# Patient Record
Sex: MALE | Race: WHITE | Employment: FULL TIME | URBAN - METROPOLITAN AREA
[De-identification: names, ages, dates, MRNs, and addresses within clinical notes are randomized per-mention and may not be internally consistent; named-entity substitution may affect disease eponyms.]

---

## 2017-11-01 ENCOUNTER — HOSPITAL ENCOUNTER (EMERGENCY)
Age: 27
Discharge: HOME | End: 2017-11-01
Payer: MEDICARE

## 2017-11-01 ENCOUNTER — APPOINTMENT (OUTPATIENT)
Dept: CT IMAGING | Age: 27
End: 2017-11-01
Payer: MEDICARE

## 2017-11-01 VITALS
BODY MASS INDEX: 23.7 KG/M2 | HEART RATE: 89 BPM | HEIGHT: 69 IN | WEIGHT: 160 LBS | TEMPERATURE: 98.3 F | OXYGEN SATURATION: 98 % | DIASTOLIC BLOOD PRESSURE: 69 MMHG | RESPIRATION RATE: 16 BRPM | SYSTOLIC BLOOD PRESSURE: 112 MMHG

## 2017-11-01 DIAGNOSIS — R10.9 FLANK PAIN: ICD-10-CM

## 2017-11-01 DIAGNOSIS — R39.198 DIFFICULTY URINATING: Primary | ICD-10-CM

## 2017-11-01 DIAGNOSIS — K59.00 CONSTIPATION, UNSPECIFIED CONSTIPATION TYPE: ICD-10-CM

## 2017-11-01 LAB
ALBUMIN SERPL-MCNC: 4.5 G/DL (ref 3.9–4.9)
ALP BLD-CCNC: 78 U/L (ref 35–104)
ALT SERPL-CCNC: 14 U/L (ref 0–41)
ANION GAP SERPL CALCULATED.3IONS-SCNC: 10 MEQ/L (ref 7–13)
AST SERPL-CCNC: 18 U/L (ref 0–40)
BACTERIA: NORMAL /HPF
BASOPHILS ABSOLUTE: 0.1 K/UL (ref 0–0.2)
BASOPHILS RELATIVE PERCENT: 1.1 %
BILIRUB SERPL-MCNC: 0.2 MG/DL (ref 0–1.2)
BILIRUBIN URINE: NEGATIVE
BLOOD, URINE: NEGATIVE
BUN BLDV-MCNC: 11 MG/DL (ref 6–20)
CALCIUM SERPL-MCNC: 9 MG/DL (ref 8.6–10.2)
CHLORIDE BLD-SCNC: 95 MEQ/L (ref 98–107)
CLARITY: CLEAR
CO2: 29 MEQ/L (ref 22–29)
COLOR: ABNORMAL
CREAT SERPL-MCNC: 0.7 MG/DL (ref 0.7–1.2)
EOSINOPHILS ABSOLUTE: 0.3 K/UL (ref 0–0.7)
EOSINOPHILS RELATIVE PERCENT: 3.8 %
EPITHELIAL CELLS, UA: NORMAL /HPF
GFR AFRICAN AMERICAN: >60
GFR NON-AFRICAN AMERICAN: >60
GLOBULIN: 2.2 G/DL (ref 2.3–3.5)
GLUCOSE BLD-MCNC: 88 MG/DL (ref 74–109)
GLUCOSE URINE: NEGATIVE MG/DL
HCT VFR BLD CALC: 42.3 % (ref 42–52)
HEMOGLOBIN: 14 G/DL (ref 14–18)
KETONES, URINE: NEGATIVE MG/DL
LEUKOCYTE ESTERASE, URINE: NEGATIVE
LYMPHOCYTES ABSOLUTE: 2 K/UL (ref 1–4.8)
LYMPHOCYTES RELATIVE PERCENT: 29.1 %
MCH RBC QN AUTO: 28.6 PG (ref 27–31.3)
MCHC RBC AUTO-ENTMCNC: 33.2 % (ref 33–37)
MCV RBC AUTO: 86.3 FL (ref 80–100)
MONOCYTES ABSOLUTE: 0.4 K/UL (ref 0.2–0.8)
MONOCYTES RELATIVE PERCENT: 5.6 %
NEUTROPHILS ABSOLUTE: 4.1 K/UL (ref 1.4–6.5)
NEUTROPHILS RELATIVE PERCENT: 60.4 %
NITRITE, URINE: POSITIVE
PDW BLD-RTO: 12.5 % (ref 11.5–14.5)
PH UA: 7 (ref 5–9)
PLATELET # BLD: 229 K/UL (ref 130–400)
POTASSIUM SERPL-SCNC: 4.3 MEQ/L (ref 3.5–5.1)
PROTEIN UA: NEGATIVE MG/DL
RBC # BLD: 4.9 M/UL (ref 4.7–6.1)
RBC UA: NORMAL /HPF (ref 0–2)
SODIUM BLD-SCNC: 134 MEQ/L (ref 132–144)
SPECIFIC GRAVITY UA: 1.01 (ref 1–1.03)
TOTAL PROTEIN: 6.7 G/DL (ref 6.4–8.1)
UROBILINOGEN, URINE: 1 E.U./DL
WBC # BLD: 6.9 K/UL (ref 4.8–10.8)
WBC UA: NORMAL /HPF (ref 0–5)

## 2017-11-01 PROCEDURE — 36415 COLL VENOUS BLD VENIPUNCTURE: CPT

## 2017-11-01 PROCEDURE — 80053 COMPREHEN METABOLIC PANEL: CPT

## 2017-11-01 PROCEDURE — 87491 CHLMYD TRACH DNA AMP PROBE: CPT

## 2017-11-01 PROCEDURE — 99283 EMERGENCY DEPT VISIT LOW MDM: CPT

## 2017-11-01 PROCEDURE — 81001 URINALYSIS AUTO W/SCOPE: CPT

## 2017-11-01 PROCEDURE — 74150 CT ABDOMEN W/O CONTRAST: CPT

## 2017-11-01 PROCEDURE — 85025 COMPLETE CBC W/AUTO DIFF WBC: CPT

## 2017-11-01 PROCEDURE — 87591 N.GONORRHOEAE DNA AMP PROB: CPT

## 2017-11-01 RX ORDER — CIPROFLOXACIN 500 MG/1
500 TABLET, FILM COATED ORAL 2 TIMES DAILY
Qty: 20 TABLET | Refills: 0 | Status: SHIPPED | OUTPATIENT
Start: 2017-11-01 | End: 2017-11-11

## 2017-11-01 RX ORDER — DESVENLAFAXINE 50 MG/1
50 TABLET, EXTENDED RELEASE ORAL DAILY
COMMUNITY
End: 2018-01-23

## 2017-11-01 RX ORDER — PHENAZOPYRIDINE HYDROCHLORIDE 100 MG/1
100 TABLET, FILM COATED ORAL 3 TIMES DAILY PRN
COMMUNITY
End: 2018-01-23 | Stop reason: ALTCHOICE

## 2017-11-01 RX ORDER — DOCUSATE SODIUM 100 MG/1
100 CAPSULE, LIQUID FILLED ORAL 2 TIMES DAILY
Qty: 14 CAPSULE | Refills: 0 | Status: SHIPPED | OUTPATIENT
Start: 2017-11-01 | End: 2018-01-23 | Stop reason: ALTCHOICE

## 2017-11-01 ASSESSMENT — ENCOUNTER SYMPTOMS
GASTROINTESTINAL NEGATIVE: 1
EYES NEGATIVE: 1
RESPIRATORY NEGATIVE: 1

## 2017-11-01 ASSESSMENT — PAIN DESCRIPTION - ORIENTATION: ORIENTATION: RIGHT;LEFT

## 2017-11-01 ASSESSMENT — PAIN DESCRIPTION - LOCATION: LOCATION: BACK;PELVIS

## 2017-11-01 ASSESSMENT — PAIN DESCRIPTION - DESCRIPTORS: DESCRIPTORS: PRESSURE

## 2017-11-01 ASSESSMENT — PAIN DESCRIPTION - FREQUENCY: FREQUENCY: INTERMITTENT

## 2017-11-01 ASSESSMENT — PAIN SCALES - GENERAL: PAINLEVEL_OUTOF10: 4

## 2017-11-01 NOTE — ED PROVIDER NOTES
alert and oriented to person, place, and time. No cranial nerve deficit. Skin: Skin is warm and dry. No rash noted. No erythema. Psychiatric: He has a normal mood and affect. Judgment normal.         All other labs were within normal range or not returned as of this dictation. EMERGENCY DEPARTMENT COURSE and DIFFERENTIAL DIAGNOSIS/MDM:   Vitals:    Vitals:    11/01/17 1755   BP: 112/69   Pulse: 89   Resp: 16   Temp: 98.3 °F (36.8 °C)   TempSrc: Oral   SpO2: 98%   Weight: 160 lb (72.6 kg)   Height: 5' 9\" (1.753 m)       ED Course      All labs are within normal limits. Urinalysis reveals no acute abnormality. Patient's urine showed nitrite positive; however, he was on a course of Pyridium which likely gave us false positive results. After a negative urine a CT scan was ordered to rule out a kidney stone. Renal CT showed no nephrolithiasis. They did mention a significant amount of constipation present. Patient is instructed to increase his water intake. Patient did mention after test results he was started on a new medication which has been causing him some constipation after the fact. Patient will be started on Colace to help with bowel movements. He is also instructed to increase his water intake. Patient will be placed on a course of Cipro to cover for any potential urinary infection. Patient's urine is cultured and family doctors is to follow up with results. He shouldn't is instructed to return immediately if any new concerning symptoms arise. He verbalizes understanding discharge and as no further questions. PROCEDURES:  Unless otherwise noted below, none     Procedures      FINAL IMPRESSION      1. Difficulty urinating    2. Constipation, unspecified constipation type    3.  Flank pain          DISPOSITION/PLAN   DISPOSITION Decision to Discharge        Bozena Etienne PA-C (electronically signed)  Attending Emergency Physician  [facility] Nicola Baez PA-C  11/01/17 [id number]

## 2017-11-07 LAB
C. TRACHOMATIS DNA ,URINE: NEGATIVE
N. GONORRHOEAE DNA, URINE: NEGATIVE

## 2018-01-23 ENCOUNTER — OFFICE VISIT (OUTPATIENT)
Dept: FAMILY MEDICINE CLINIC | Age: 28
End: 2018-01-23
Payer: MEDICARE

## 2018-01-23 VITALS
OXYGEN SATURATION: 97 % | HEIGHT: 70 IN | WEIGHT: 165 LBS | TEMPERATURE: 98.1 F | HEART RATE: 81 BPM | BODY MASS INDEX: 23.62 KG/M2 | SYSTOLIC BLOOD PRESSURE: 108 MMHG | DIASTOLIC BLOOD PRESSURE: 74 MMHG

## 2018-01-23 DIAGNOSIS — R06.02 SHORTNESS OF BREATH: ICD-10-CM

## 2018-01-23 DIAGNOSIS — F90.9 ATTENTION DEFICIT HYPERACTIVITY DISORDER (ADHD), UNSPECIFIED ADHD TYPE: Primary | ICD-10-CM

## 2018-01-23 PROCEDURE — 1036F TOBACCO NON-USER: CPT | Performed by: NURSE PRACTITIONER

## 2018-01-23 PROCEDURE — G8484 FLU IMMUNIZE NO ADMIN: HCPCS | Performed by: NURSE PRACTITIONER

## 2018-01-23 PROCEDURE — G8427 DOCREV CUR MEDS BY ELIG CLIN: HCPCS | Performed by: NURSE PRACTITIONER

## 2018-01-23 PROCEDURE — G8420 CALC BMI NORM PARAMETERS: HCPCS | Performed by: NURSE PRACTITIONER

## 2018-01-23 PROCEDURE — 99215 OFFICE O/P EST HI 40 MIN: CPT | Performed by: NURSE PRACTITIONER

## 2018-01-23 RX ORDER — CETIRIZINE HYDROCHLORIDE 10 MG/1
10 TABLET ORAL DAILY
Qty: 30 TABLET | Refills: 3 | Status: SHIPPED | OUTPATIENT
Start: 2018-01-23 | End: 2018-11-27

## 2018-01-23 RX ORDER — ALBUTEROL SULFATE 90 UG/1
2 AEROSOL, METERED RESPIRATORY (INHALATION) EVERY 6 HOURS PRN
Qty: 1 INHALER | Refills: 3 | Status: SHIPPED | OUTPATIENT
Start: 2018-01-23 | End: 2018-11-27 | Stop reason: ALTCHOICE

## 2018-01-23 RX ORDER — ALPRAZOLAM 0.25 MG/1
TABLET ORAL
Refills: 0 | COMMUNITY
Start: 2018-01-08 | End: 2018-11-27 | Stop reason: CLARIF

## 2018-01-23 ASSESSMENT — ENCOUNTER SYMPTOMS
SHORTNESS OF BREATH: 1
CHEST TIGHTNESS: 1
COUGH: 0

## 2018-01-23 ASSESSMENT — PATIENT HEALTH QUESTIONNAIRE - PHQ9
1. LITTLE INTEREST OR PLEASURE IN DOING THINGS: 0
SUM OF ALL RESPONSES TO PHQ9 QUESTIONS 1 & 2: 0
SUM OF ALL RESPONSES TO PHQ QUESTIONS 1-9: 0
2. FEELING DOWN, DEPRESSED OR HOPELESS: 0

## 2018-01-23 NOTE — PROGRESS NOTES
Subjective  Chief Complaint   Patient presents with   [address]     pt is here to est care    Other     wondering if he is developing asthma would like to talk to you about that    Discuss Medications     would like to discuss ADD medication with you. has been off of them since August.        HPI     Establish care  Presents today to establish care. Previous PCP was Dr. Troy Almendarez, Sondra Woodward and Dr. Aruna Dawson. He would also like to discuss possible asthma and adhd. Overall is doing well. Has no other questions or concerns at this time. Has been living with his girlfriend and her parents and said people smoke and \"vape\" in the house and there are also 5 cats. He has been waking up every morning feeling some shortness of breath, tightness and wheezing. Got short of breath shoveling snow for 5 minutes. This all started once he got into these living arrangements. Said he did have an inhaler when he was young but grew out of it. Says some days are worse than others. Will notice that it is worse when he is around the cigarette smoke. Pt states he has been diagnosed with ADD for over 10 years. Was on adderall and vyvanse in the past. He did not like they way those made him feel. He is already established with psychiatry. Was seeing [facility] but then wasn't able to see her so he saw Dr. Cristina Funk but says he took him off pretty much everything. Did genetic testing and says he was told that he had depression, anxiety and ADHD. Was tried on pristiq for 3 months but says it made him feel terrible. Does have documented history in his chart of issues with controlled substances including being discharged from a different psychiatry practice. Past Medical History:   Diagnosis Date    ADHD (attention deficit hyperactivity disorder)     was initiated on treatment by Dr. Kat Pelayo.   any testing was done here by Dr. Kat Pelayo, no formal psych eval.      Anxiety      Patient Active Problem List    Diagnosis

## 2018-02-05 DIAGNOSIS — R06.02 SHORTNESS OF BREATH: ICD-10-CM

## 2018-02-06 ENCOUNTER — OFFICE VISIT (OUTPATIENT)
Dept: FAMILY MEDICINE CLINIC | Age: 28
End: 2018-02-06
Payer: MEDICARE

## 2018-02-06 VITALS
HEIGHT: 70 IN | TEMPERATURE: 96.3 F | BODY MASS INDEX: 23.19 KG/M2 | WEIGHT: 162 LBS | SYSTOLIC BLOOD PRESSURE: 128 MMHG | DIASTOLIC BLOOD PRESSURE: 62 MMHG | OXYGEN SATURATION: 98 % | HEART RATE: 92 BPM

## 2018-02-06 DIAGNOSIS — J45.909 REACTIVE AIRWAY DISEASE WITHOUT COMPLICATION, UNSPECIFIED ASTHMA SEVERITY, UNSPECIFIED WHETHER PERSISTENT: ICD-10-CM

## 2018-02-06 DIAGNOSIS — F90.9 ATTENTION DEFICIT HYPERACTIVITY DISORDER (ADHD), UNSPECIFIED ADHD TYPE: Primary | ICD-10-CM

## 2018-02-06 PROCEDURE — G8420 CALC BMI NORM PARAMETERS: HCPCS | Performed by: NURSE PRACTITIONER

## 2018-02-06 PROCEDURE — G8427 DOCREV CUR MEDS BY ELIG CLIN: HCPCS | Performed by: NURSE PRACTITIONER

## 2018-02-06 PROCEDURE — G8484 FLU IMMUNIZE NO ADMIN: HCPCS | Performed by: NURSE PRACTITIONER

## 2018-02-06 PROCEDURE — 1036F TOBACCO NON-USER: CPT | Performed by: NURSE PRACTITIONER

## 2018-02-06 PROCEDURE — 99213 OFFICE O/P EST LOW 20 MIN: CPT | Performed by: NURSE PRACTITIONER

## 2018-02-06 ASSESSMENT — ENCOUNTER SYMPTOMS
SHORTNESS OF BREATH: 0
COUGH: 0

## 2018-02-06 NOTE — PROGRESS NOTES
Subjective  Chief Complaint   Patient presents with   David Scruggs     pt states that he is here to go over results and discuss ADHD. states that he did bring in a dx for that. HPI     Pt here to follow up on shortness of breath. Was started on zyrtec and albuterol inhaler. Feels somewhat better, but does still notice the shortness of breath when he is exposed to the cigarette smoke. Says when he is out of the house he feels instantly better. Also asking about ADHD. Stating he brought his paperwork with his diagnosis. Cannot get in to neurology until March. Has documented questionable use of adderall from 2 providers in this office and also documentation from Dr. Roberth Longoria saying that he was discharged from their practice d/t questionable use of the medications. Patient Active Problem List    Diagnosis Date Noted    Anxiety 07/13/2015    ADHD (attention deficit hyperactivity disorder) 12/05/2012     Past Medical History:   Diagnosis Date    ADHD (attention deficit hyperactivity disorder)     was initiated on treatment by Dr. Jacob Vigil.   any testing was done here by Dr. Jacob Vigil, no formal psych eval.      Anxiety      Past Surgical History:   Procedure Laterality Date    TONSILLECTOMY AND ADENOIDECTOMY      WISDOM TOOTH EXTRACTION       Family History   Problem Relation Age of Onset    Depression Mother     Heart Disease Father     High Blood Pressure Father     High Cholesterol Father      Social History     Social History    Marital status: Single     Spouse name: N/A    Number of children: N/A    Years of education: N/A     Social History Main Topics    Smoking status: Never Smoker    Smokeless tobacco: Never Used    Alcohol use Yes      Comment: occasional    Drug use: No    Sexual activity: Not Asked     Other Topics Concern    None     Social History Narrative    None     Current Outpatient Prescriptions on File Prior to Visit   Medication Sig Dispense Refill    ALPRAZolam (XANAX) 0.25

## 2018-02-08 LAB
ALLERGEN ASPERGILLUS ALTERNATA IGE: <0.1 KU/L
ALLERGEN ASPERGILLUS FUMIGATUS IGE: <0.1 KU/L
ALLERGEN BERMUDA GRASS IGE: <0.1 KU/L
ALLERGEN BIRCH IGE: 0.13 KU/L
ALLERGEN CAT DANDER IGE: 0.89 KU/L
ALLERGEN COMMON SHORT RAGWEED IGE: 0.38 KU/L
ALLERGEN COTTONWOOD: <0.1 KU/L
ALLERGEN COW MILK IGE: <0.1 KU/L
ALLERGEN DOG DANDER IGE: <0.1 KU/L
ALLERGEN ELM IGE: 0.27 KU/L
ALLERGEN ENGLISH PLANTAIN: 0.24 KU/L
ALLERGEN FUNGI/MOLD M.RACEMOSUS IGE: <0.1 KU/L
ALLERGEN GERMAN COCKROACH IGE: <0.1 KU/L
ALLERGEN HORMODENDRUM HORDEI IGE: <0.1 KU/L
ALLERGEN JOHNSON GRASS IGE: <0.1 KU/L
ALLERGEN MAPLE/BOX ELDER IGE: <0.1 KU/L
ALLERGEN MITE DUST FARINAE IGE: 7.4 KU/L
ALLERGEN MITE DUST PTERONYSSINUS IGE: 8.77 KU/L
ALLERGEN MOUNTAIN CEDAR: <0.1 KU/L
ALLERGEN MOUSE EPITHELIA IGE: <0.1 KU/L
ALLERGEN OAK TREE IGE: <0.1 KU/L
ALLERGEN PEANUT (F13) IGE: 0.12 KU/L
ALLERGEN PECAN TREE IGE: 0.23 KU/L
ALLERGEN PENICILLIUM NOTATUM: <0.1 KU/L
ALLERGEN PERENNIAL RYE GRASS IGE: 0.15 KU/L
ALLERGEN ROUGH PIGWEED (W14) IGE: 0.14 KU/L
ALLERGEN RUSSIAN THISTLE IGE: <0.1 KU/L
ALLERGEN SEE NOTE: NORMAL
ALLERGEN SHEEP SORREL (W18) IGE: <0.1 KU/L
ALLERGEN TIMOTHY GRASS: 0.2 KU/L
ALLERGEN TREE SYCAMORE: <0.1 KU/L
ALLERGEN WALNUT TREE IGE: 0.15 KU/L
ALLERGEN WHITE MULBERRY TREE, IGE: <0.1 KU/L
ALLERGEN, TREE, WHITE ASH IGE: <0.1 KU/L
IGE: 63 KU/L

## 2018-02-15 ENCOUNTER — TELEPHONE (OUTPATIENT)
Dept: OTHER | Facility: CLINIC | Age: 28
End: 2018-02-15

## 2018-11-14 ENCOUNTER — OFFICE VISIT (OUTPATIENT)
Dept: FAMILY MEDICINE CLINIC | Age: 28
End: 2018-11-14
Payer: MEDICARE

## 2018-11-14 VITALS
HEART RATE: 87 BPM | SYSTOLIC BLOOD PRESSURE: 100 MMHG | BODY MASS INDEX: 23.31 KG/M2 | HEIGHT: 70 IN | DIASTOLIC BLOOD PRESSURE: 75 MMHG | TEMPERATURE: 96.4 F | OXYGEN SATURATION: 98 % | WEIGHT: 162.8 LBS

## 2018-11-14 DIAGNOSIS — F90.9 ATTENTION DEFICIT HYPERACTIVITY DISORDER (ADHD), UNSPECIFIED ADHD TYPE: Primary | ICD-10-CM

## 2018-11-14 DIAGNOSIS — R30.0 DYSURIA: ICD-10-CM

## 2018-11-14 DIAGNOSIS — F41.9 ANXIETY: ICD-10-CM

## 2018-11-14 LAB
BILIRUBIN, POC: NORMAL
BLOOD URINE, POC: NORMAL
CLARITY, POC: CLEAR
COLOR, POC: NORMAL
GLUCOSE URINE, POC: NORMAL
KETONES, POC: NORMAL
LEUKOCYTE EST, POC: NORMAL
NITRITE, POC: NORMAL
PH, POC: 6
PROTEIN, POC: NORMAL
SPECIFIC GRAVITY, POC: 1.01
UROBILINOGEN, POC: 0.2

## 2018-11-14 PROCEDURE — 99214 OFFICE O/P EST MOD 30 MIN: CPT | Performed by: NURSE PRACTITIONER

## 2018-11-14 RX ORDER — BUPROPION HYDROCHLORIDE 150 MG/1
150 TABLET ORAL EVERY MORNING
Qty: 30 TABLET | Refills: 3 | Status: SHIPPED | OUTPATIENT
Start: 2018-11-14 | End: 2019-08-15 | Stop reason: ALTCHOICE

## 2018-11-14 RX ORDER — BUSPIRONE HYDROCHLORIDE 5 MG/1
5 TABLET ORAL 3 TIMES DAILY
Qty: 90 TABLET | Refills: 1 | Status: SHIPPED | OUTPATIENT
Start: 2018-11-14 | End: 2019-01-13

## 2018-11-14 ASSESSMENT — ENCOUNTER SYMPTOMS
RESPIRATORY NEGATIVE: 1
EYES NEGATIVE: 1
GASTROINTESTINAL NEGATIVE: 1

## 2018-11-14 NOTE — PROGRESS NOTES
Subjective  Chief Complaint   Patient presents with    Hand Injury     right hand broken finger in summer . ..a little sore patient doesnt have insurance    Discuss Medications     not sure if meds can be filled    Depression     patient feeling suicidal lately       HPI     Pt states that his right hand is bothering him since about July. He punched a door out of anger and it has been causing minimal pain ever since. Hasn't been doing anything for it at the moment. Doesn't affect ADLs or ROM. Pt would like to wean off controlled medications. Interested in treating the anxiety and depression with noncontrolled substances. Recently discharged from [facility] office but apparently for a bill that needed paid. Has been on probation with the courts for tampering with adderall prescription. Has occasional suicidal thoughts. Does have a plan. Denies wanting to take action on the plan. Feels he has control over the situation currently. Does not have any desire to go to [facility] psych muro\"  Pt would like a medication to help with anxiety and depression. Pt also mentions occasional lower abdominal pain associated with urination. Patient Active Problem List    Diagnosis Date Noted    Anxiety 07/13/2015    ADHD (attention deficit hyperactivity disorder) 12/05/2012     Past Medical History:   Diagnosis Date    ADHD (attention deficit hyperactivity disorder)     was initiated on treatment by Dr. Aye Kaur.   any testing was done here by Dr. Aye Kaur, no formal psych eval.      Anxiety      Past Surgical History:   Procedure Laterality Date    TONSILLECTOMY AND ADENOIDECTOMY      WISDOM TOOTH EXTRACTION       Family History   Problem Relation Age of Onset    Depression Mother     Heart Disease Father     High Blood Pressure Father     High Cholesterol Father      Social History     Social History    Marital status: Single     Spouse name: N/A    Number of children: N/A    Years of education: N/A     Social

## 2018-11-20 LAB
C. TRACHOMATIS DNA ,URINE: NEGATIVE
N. GONORRHOEAE DNA, URINE: NEGATIVE

## 2018-11-27 ENCOUNTER — OFFICE VISIT (OUTPATIENT)
Dept: FAMILY MEDICINE CLINIC | Age: 28
End: 2018-11-27
Payer: MEDICARE

## 2018-11-27 VITALS
TEMPERATURE: 96.2 F | SYSTOLIC BLOOD PRESSURE: 124 MMHG | OXYGEN SATURATION: 98 % | HEART RATE: 73 BPM | BODY MASS INDEX: 23.94 KG/M2 | WEIGHT: 167.2 LBS | HEIGHT: 70 IN | DIASTOLIC BLOOD PRESSURE: 78 MMHG

## 2018-11-27 DIAGNOSIS — F41.9 ANXIETY: ICD-10-CM

## 2018-11-27 DIAGNOSIS — F32.89 OTHER DEPRESSION: ICD-10-CM

## 2018-11-27 DIAGNOSIS — F90.9 ATTENTION DEFICIT HYPERACTIVITY DISORDER (ADHD), UNSPECIFIED ADHD TYPE: Primary | ICD-10-CM

## 2018-11-27 DIAGNOSIS — Z13.31 POSITIVE DEPRESSION SCREENING: ICD-10-CM

## 2018-11-27 PROCEDURE — G8420 CALC BMI NORM PARAMETERS: HCPCS | Performed by: NURSE PRACTITIONER

## 2018-11-27 PROCEDURE — 99213 OFFICE O/P EST LOW 20 MIN: CPT | Performed by: NURSE PRACTITIONER

## 2018-11-27 PROCEDURE — G8427 DOCREV CUR MEDS BY ELIG CLIN: HCPCS | Performed by: NURSE PRACTITIONER

## 2018-11-27 PROCEDURE — G8484 FLU IMMUNIZE NO ADMIN: HCPCS | Performed by: NURSE PRACTITIONER

## 2018-11-27 PROCEDURE — 96160 PT-FOCUSED HLTH RISK ASSMT: CPT | Performed by: NURSE PRACTITIONER

## 2018-11-27 PROCEDURE — 1036F TOBACCO NON-USER: CPT | Performed by: NURSE PRACTITIONER

## 2018-11-27 PROCEDURE — G8431 POS CLIN DEPRES SCRN F/U DOC: HCPCS | Performed by: NURSE PRACTITIONER

## 2018-11-27 RX ORDER — CLONAZEPAM 0.5 MG/1
TABLET ORAL
Refills: 1 | COMMUNITY
Start: 2018-11-21 | End: 2019-07-08

## 2018-11-27 ASSESSMENT — PATIENT HEALTH QUESTIONNAIRE - PHQ9
7. TROUBLE CONCENTRATING ON THINGS, SUCH AS READING THE NEWSPAPER OR WATCHING TELEVISION: 3
6. FEELING BAD ABOUT YOURSELF - OR THAT YOU ARE A FAILURE OR HAVE LET YOURSELF OR YOUR FAMILY DOWN: 3
1. LITTLE INTEREST OR PLEASURE IN DOING THINGS: 3
SUM OF ALL RESPONSES TO PHQ QUESTIONS 1-9: 21
4. FEELING TIRED OR HAVING LITTLE ENERGY: 3
SUM OF ALL RESPONSES TO PHQ9 QUESTIONS 1 & 2: 5
5. POOR APPETITE OR OVEREATING: 1
2. FEELING DOWN, DEPRESSED OR HOPELESS: 2
9. THOUGHTS THAT YOU WOULD BE BETTER OFF DEAD, OR OF HURTING YOURSELF: 0
8. MOVING OR SPEAKING SO SLOWLY THAT OTHER PEOPLE COULD HAVE NOTICED. OR THE OPPOSITE, BEING SO FIGETY OR RESTLESS THAT YOU HAVE BEEN MOVING AROUND A LOT MORE THAN USUAL: 3
3. TROUBLE FALLING OR STAYING ASLEEP: 3
SUM OF ALL RESPONSES TO PHQ QUESTIONS 1-9: 21
10. IF YOU CHECKED OFF ANY PROBLEMS, HOW DIFFICULT HAVE THESE PROBLEMS MADE IT FOR YOU TO DO YOUR WORK, TAKE CARE OF THINGS AT HOME, OR GET ALONG WITH OTHER PEOPLE: 3

## 2019-07-08 ENCOUNTER — HOSPITAL ENCOUNTER (EMERGENCY)
Age: 29
Discharge: HOME | End: 2019-07-08
Attending: EMERGENCY MEDICINE
Payer: COMMERCIAL

## 2019-07-08 VITALS
SYSTOLIC BLOOD PRESSURE: 120 MMHG | TEMPERATURE: 98.1 F | RESPIRATION RATE: 16 BRPM | DIASTOLIC BLOOD PRESSURE: 78 MMHG | HEART RATE: 97 BPM | OXYGEN SATURATION: 98 %

## 2019-07-08 DIAGNOSIS — Z76.0 ENCOUNTER FOR MEDICATION REFILL: Primary | ICD-10-CM

## 2019-07-08 LAB
ACETAMINOPHEN LEVEL: <5 UG/ML (ref 10–30)
ALBUMIN SERPL-MCNC: 5 G/DL (ref 3.5–4.6)
ALP BLD-CCNC: 103 U/L (ref 35–104)
ALT SERPL-CCNC: 9 U/L (ref 0–41)
ANION GAP SERPL CALCULATED.3IONS-SCNC: 13 MEQ/L (ref 9–15)
AST SERPL-CCNC: 19 U/L (ref 0–40)
BASOPHILS ABSOLUTE: 0.1 K/UL (ref 0–0.2)
BASOPHILS RELATIVE PERCENT: 1.4 %
BILIRUB SERPL-MCNC: 0.3 MG/DL (ref 0.2–0.7)
BUN BLDV-MCNC: 8 MG/DL (ref 6–20)
CALCIUM SERPL-MCNC: 9.3 MG/DL (ref 8.5–9.9)
CHLORIDE BLD-SCNC: 101 MEQ/L (ref 95–107)
CK MB: 2.6 NG/ML (ref 0–6.7)
CO2: 27 MEQ/L (ref 20–31)
CREAT SERPL-MCNC: 0.92 MG/DL (ref 0.7–1.2)
CREATINE KINASE-MB INDEX: 1.3 % (ref 0–3.5)
EOSINOPHILS ABSOLUTE: 0.2 K/UL (ref 0–0.7)
EOSINOPHILS RELATIVE PERCENT: 3.2 %
ETHANOL PERCENT: NORMAL G/DL
ETHANOL: <10 MG/DL (ref 0–0.08)
GFR AFRICAN AMERICAN: >60
GFR NON-AFRICAN AMERICAN: >60
GLOBULIN: 2.7 G/DL (ref 2.3–3.5)
GLUCOSE BLD-MCNC: 110 MG/DL (ref 70–99)
HCT VFR BLD CALC: 43.9 % (ref 42–52)
HEMOGLOBIN: 15.5 G/DL (ref 14–18)
LYMPHOCYTES ABSOLUTE: 2 K/UL (ref 1–4.8)
LYMPHOCYTES RELATIVE PERCENT: 26.4 %
MCH RBC QN AUTO: 30 PG (ref 27–31.3)
MCHC RBC AUTO-ENTMCNC: 35.2 % (ref 33–37)
MCV RBC AUTO: 85.3 FL (ref 80–100)
MONOCYTES ABSOLUTE: 0.6 K/UL (ref 0.2–0.8)
MONOCYTES RELATIVE PERCENT: 7.5 %
NEUTROPHILS ABSOLUTE: 4.7 K/UL (ref 1.4–6.5)
NEUTROPHILS RELATIVE PERCENT: 61.5 %
PDW BLD-RTO: 12.5 % (ref 11.5–14.5)
PLATELET # BLD: 305 K/UL (ref 130–400)
POTASSIUM SERPL-SCNC: 3.9 MEQ/L (ref 3.4–4.9)
RBC # BLD: 5.15 M/UL (ref 4.7–6.1)
SALICYLATE, SERUM: <0.3 MG/DL (ref 15–30)
SODIUM BLD-SCNC: 141 MEQ/L (ref 135–144)
TOTAL CK: 207 U/L (ref 0–190)
TOTAL PROTEIN: 7.7 G/DL (ref 6.3–8)
TSH SERPL DL<=0.05 MIU/L-ACNC: 0.94 UIU/ML (ref 0.44–3.86)
WBC # BLD: 7.7 K/UL (ref 4.8–10.8)

## 2019-07-08 PROCEDURE — 99282 EMERGENCY DEPT VISIT SF MDM: CPT

## 2019-07-08 PROCEDURE — 80053 COMPREHEN METABOLIC PANEL: CPT

## 2019-07-08 PROCEDURE — G0480 DRUG TEST DEF 1-7 CLASSES: HCPCS

## 2019-07-08 PROCEDURE — 82553 CREATINE MB FRACTION: CPT

## 2019-07-08 PROCEDURE — 36415 COLL VENOUS BLD VENIPUNCTURE: CPT

## 2019-07-08 PROCEDURE — 85025 COMPLETE CBC W/AUTO DIFF WBC: CPT

## 2019-07-08 PROCEDURE — 82550 ASSAY OF CK (CPK): CPT

## 2019-07-08 PROCEDURE — 84443 ASSAY THYROID STIM HORMONE: CPT

## 2019-07-08 RX ORDER — CLONAZEPAM 0.5 MG/1
0.5 TABLET ORAL 2 TIMES DAILY PRN
Qty: 6 TABLET | Refills: 0 | Status: ON HOLD | OUTPATIENT
Start: 2019-07-08 | End: 2022-07-20 | Stop reason: HOSPADM

## 2019-07-09 NOTE — ED NOTES
Patient up to nursing office stating \" I just want to leave and go to work\" Explained to patient he would have to collin to be evaluated by RN and Doctor would make the final decision. Patient states \" I don't feel I belong here, its creeping me out\". Will continue to monitor.      Vik Wang, MONICA  42/32/74 [id number]

## 2019-08-13 PROBLEM — G47.26 SHIFT WORK SLEEP DISORDER: Status: ACTIVE | Noted: 2019-07-15

## 2019-08-13 PROBLEM — F41.1 GAD (GENERALIZED ANXIETY DISORDER): Status: ACTIVE | Noted: 2019-06-04

## 2019-08-13 PROBLEM — F33.1 MDD (MAJOR DEPRESSIVE DISORDER), RECURRENT EPISODE, MODERATE (HCC): Status: ACTIVE | Noted: 2019-06-04

## 2019-08-13 PROBLEM — F19.20 POLYSUBSTANCE (EXCLUDING OPIOIDS) DEPENDENCE (HCC): Status: ACTIVE | Noted: 2019-06-04

## 2020-07-16 ENCOUNTER — APPOINTMENT (OUTPATIENT)
Dept: CT IMAGING | Age: 30
End: 2020-07-16
Payer: COMMERCIAL

## 2020-07-16 ENCOUNTER — HOSPITAL ENCOUNTER (EMERGENCY)
Age: 30
Discharge: HOME | End: 2020-07-16
Attending: EMERGENCY MEDICINE
Payer: COMMERCIAL

## 2020-07-16 VITALS
OXYGEN SATURATION: 100 % | SYSTOLIC BLOOD PRESSURE: 113 MMHG | TEMPERATURE: 97.9 F | HEART RATE: 81 BPM | BODY MASS INDEX: 22.96 KG/M2 | DIASTOLIC BLOOD PRESSURE: 71 MMHG | RESPIRATION RATE: 16 BRPM | WEIGHT: 160 LBS

## 2020-07-16 LAB
ALBUMIN SERPL-MCNC: 4.9 G/DL (ref 3.5–4.6)
ALP BLD-CCNC: 87 U/L (ref 35–104)
ALT SERPL-CCNC: 16 U/L (ref 0–41)
ANION GAP SERPL CALCULATED.3IONS-SCNC: 12 MEQ/L (ref 9–15)
AST SERPL-CCNC: 25 U/L (ref 0–40)
BASOPHILS ABSOLUTE: 0.1 K/UL (ref 0–0.2)
BASOPHILS RELATIVE PERCENT: 0.8 %
BILIRUB SERPL-MCNC: <0.2 MG/DL (ref 0.2–0.7)
BUN BLDV-MCNC: 17 MG/DL (ref 6–20)
CALCIUM SERPL-MCNC: 9.8 MG/DL (ref 8.5–9.9)
CHLORIDE BLD-SCNC: 102 MEQ/L (ref 95–107)
CO2: 26 MEQ/L (ref 20–31)
CREAT SERPL-MCNC: 0.86 MG/DL (ref 0.7–1.2)
EOSINOPHILS ABSOLUTE: 0.2 K/UL (ref 0–0.7)
EOSINOPHILS RELATIVE PERCENT: 2.1 %
GFR AFRICAN AMERICAN: >60
GFR NON-AFRICAN AMERICAN: >60
GLOBULIN: 2.2 G/DL (ref 2.3–3.5)
GLUCOSE BLD-MCNC: 94 MG/DL (ref 70–99)
HCT VFR BLD CALC: 40.4 % (ref 42–52)
HEMOGLOBIN: 13.9 G/DL (ref 14–18)
LYMPHOCYTES ABSOLUTE: 2.7 K/UL (ref 1–4.8)
LYMPHOCYTES RELATIVE PERCENT: 30.6 %
MCH RBC QN AUTO: 30.4 PG (ref 27–31.3)
MCHC RBC AUTO-ENTMCNC: 34.5 % (ref 33–37)
MCV RBC AUTO: 88 FL (ref 80–100)
MONOCYTES ABSOLUTE: 0.6 K/UL (ref 0.2–0.8)
MONOCYTES RELATIVE PERCENT: 6.8 %
NEUTROPHILS ABSOLUTE: 5.2 K/UL (ref 1.4–6.5)
NEUTROPHILS RELATIVE PERCENT: 59.7 %
PDW BLD-RTO: 12.2 % (ref 11.5–14.5)
PLATELET # BLD: 279 K/UL (ref 130–400)
POTASSIUM SERPL-SCNC: 4 MEQ/L (ref 3.4–4.9)
RBC # BLD: 4.59 M/UL (ref 4.7–6.1)
SODIUM BLD-SCNC: 140 MEQ/L (ref 135–144)
TOTAL PROTEIN: 7.1 G/DL (ref 6.3–8)
WBC # BLD: 8.7 K/UL (ref 4.8–10.8)

## 2020-07-16 PROCEDURE — 6360000002 HC RX W HCPCS: Performed by: EMERGENCY MEDICINE

## 2020-07-16 PROCEDURE — 6370000000 HC RX 637 (ALT 250 FOR IP): Performed by: EMERGENCY MEDICINE

## 2020-07-16 PROCEDURE — 36415 COLL VENOUS BLD VENIPUNCTURE: CPT

## 2020-07-16 PROCEDURE — 96374 THER/PROPH/DIAG INJ IV PUSH: CPT

## 2020-07-16 PROCEDURE — 74177 CT ABD & PELVIS W/CONTRAST: CPT

## 2020-07-16 PROCEDURE — 99284 EMERGENCY DEPT VISIT MOD MDM: CPT

## 2020-07-16 PROCEDURE — 80053 COMPREHEN METABOLIC PANEL: CPT

## 2020-07-16 PROCEDURE — 6360000004 HC RX CONTRAST MEDICATION: Performed by: EMERGENCY MEDICINE

## 2020-07-16 PROCEDURE — 85025 COMPLETE CBC W/AUTO DIFF WBC: CPT

## 2020-07-16 RX ORDER — KETOROLAC TROMETHAMINE 30 MG/ML
30 INJECTION, SOLUTION INTRAMUSCULAR; INTRAVENOUS ONCE
Status: COMPLETED | OUTPATIENT
Start: 2020-07-16 | End: 2020-07-16

## 2020-07-16 RX ORDER — ESCITALOPRAM OXALATE 10 MG/1
15 TABLET ORAL DAILY
Status: ON HOLD | COMMUNITY
End: 2022-07-20 | Stop reason: HOSPADM

## 2020-07-16 RX ADMIN — MAGNESIUM CITRATE 296 ML: 1.75 LIQUID ORAL at 23:18

## 2020-07-16 RX ADMIN — IOPAMIDOL 100 ML: 612 INJECTION, SOLUTION INTRAVENOUS at 22:43

## 2020-07-16 RX ADMIN — KETOROLAC TROMETHAMINE 30 MG: 30 INJECTION, SOLUTION INTRAMUSCULAR at 21:45

## 2020-07-16 ASSESSMENT — PAIN SCALES - GENERAL
PAINLEVEL_OUTOF10: 5

## 2020-07-16 ASSESSMENT — PAIN DESCRIPTION - PAIN TYPE
TYPE: ACUTE PAIN
TYPE: ACUTE PAIN

## 2020-07-16 ASSESSMENT — PAIN DESCRIPTION - DESCRIPTORS: DESCRIPTORS: DISCOMFORT

## 2020-07-16 ASSESSMENT — ENCOUNTER SYMPTOMS
COUGH: 0
VOMITING: 0
ABDOMINAL DISTENTION: 1
WHEEZING: 0
SORE THROAT: 0
CHEST TIGHTNESS: 0
PHOTOPHOBIA: 0
ABDOMINAL PAIN: 1
EYE DISCHARGE: 0
CONSTIPATION: 1
SHORTNESS OF BREATH: 0

## 2020-07-16 ASSESSMENT — PAIN DESCRIPTION - LOCATION
LOCATION: ABDOMEN
LOCATION: ABDOMEN

## 2020-07-16 ASSESSMENT — PAIN SCALES - WONG BAKER: WONGBAKER_NUMERICALRESPONSE: 0

## 2020-07-16 ASSESSMENT — PAIN DESCRIPTION - FREQUENCY: FREQUENCY: CONTINUOUS

## 2020-07-16 ASSESSMENT — PAIN DESCRIPTION - ORIENTATION: ORIENTATION: LEFT;UPPER

## 2020-07-17 LAB
GFR AFRICAN AMERICAN: >60
GFR NON-AFRICAN AMERICAN: >60
PERFORMED ON: NORMAL
POC CREATININE: 0.9 MG/DL (ref 0.9–1.3)
POC SAMPLE TYPE: NORMAL

## 2020-07-17 NOTE — ED PROVIDER NOTES
[address] [facility] ED  eMERGENCY dEPARTMENT eNCOUnter      Pt Name: Kwasi Ibarra  MRN: [id number]  [place] 1990  Date of evaluation: 7/16/2020  Provider: Patti Bello MD    CHIEF COMPLAINT       Chief Complaint   Patient presents with    Abdominal Pain         HISTORY OF PRESENT ILLNESS   (Location/Symptom, Timing/Onset,Context/Setting, Quality, Duration, Modifying Factors, Severity)  Note limiting factors. Kwasi Ibarra is a 30 y.o. male who presents to the emergency department for evaluation of abdominal pain possible bowel obstruction. Patient states he has been constipated for the past couple weeks and now feels distended thinks may have a bowel obstruction. No prior abdominal surgeries. He states at work he is unable to use the bathroom and that has led to some constipation issues. He tried enema at home x2 without relief. He now is getting nauseated and feels bloated. No related fever chills. He is on Lexapro and Vyvanse but is been on those for a while. No fever chills. No urinary complaints. Minimal abdominal pain. HPI    NursingNotes were reviewed. REVIEW OF SYSTEMS    (2-9 systems for level 4, 10 or more for level 5)     Review of Systems   Constitutional: Negative for chills and diaphoresis. HENT: Negative for congestion, ear pain, mouth sores and sore throat. Eyes: Negative for photophobia and discharge. Respiratory: Negative for cough, chest tightness, shortness of breath and wheezing. Cardiovascular: Negative for chest pain and palpitations. Gastrointestinal: Positive for abdominal distention, abdominal pain and constipation. Negative for vomiting. Endocrine: Negative for cold intolerance. Genitourinary: Negative for difficulty urinating. Musculoskeletal: Negative for arthralgias. Skin: Negative for pallor and rash. Allergic/Immunologic: Negative for immunocompromised state. Neurological: Negative for dizziness and syncope. Hematological: Negative for adenopathy. Psychiatric/Behavioral: Negative for agitation and hallucinations. All other systems reviewed and are negative. Except as noted above the remainder of the review of systems was reviewed and negative. PAST MEDICAL HISTORY     Past Medical History:   Diagnosis Date    ADHD (attention deficit hyperactivity disorder)     was initiated on treatment by Dr. Darcy Patel. any testing was done here by Dr. Darcy Patel, no formal psych eval.      Anxiety     MDD (major depressive disorder), recurrent episode, moderate ([facility] [address] 6/4/2019         SURGICALHISTORY       Past Surgical History:   Procedure Laterality Date    TONSILLECTOMY AND ADENOIDECTOMY      WISDOM TOOTH EXTRACTION           CURRENT MEDICATIONS       Previous Medications    CLONAZEPAM (KLONOPIN) 0.5 MG TABLET    Take 1 tablet by mouth 2 times daily as needed for Anxiety for up to 3 days. ESCITALOPRAM (LEXAPRO) 10 MG TABLET    Take 15 mg by mouth daily    LISDEXAMFETAMINE (VYVANSE) 70 MG CAPSULE    Take 70 mg by mouth every morning.        ALLERGIES     Peanut-containing drug products and Peanut oil    FAMILY HISTORY       Family History   Problem Relation Age of Onset    Depression Mother     Heart Disease Father     High Blood Pressure Father     High Cholesterol Father           SOCIAL HISTORY       Social History     Socioeconomic History    Marital status: Single     Spouse name: None    Number of children: None    Years of education: None    Highest education level: None   Occupational History    None   Social Needs    Financial resource strain: None    Food insecurity     Worry: None     Inability: None    Transportation needs     Medical: None     Non-medical: None   Tobacco Use    Smoking status: Never Smoker    Smokeless tobacco: Never Used   Substance and Sexual Activity    Alcohol use: Yes     Comment: occasional    Drug use: No    Sexual activity: None   Lifestyle    Physical activity Days per week: None     Minutes per session: None    Stress: None   Relationships    Social connections     Talks on phone: None     Gets together: None     Attends Orthodox service: None     Active member of club or organization: None     Attends meetings of clubs or organizations: None     Relationship status: None    Intimate partner violence     Fear of current or ex partner: None     Emotionally abused: None     Physically abused: None     Forced sexual activity: None   Other Topics Concern    None   Social History Narrative    None       SCREENINGS      @FLOW([id number])@      PHYSICAL EXAM    (up to 7 for level 4, 8 or more for level 5)     ED Triage Vitals [07/16/20 2115]   BP Temp Temp Source Pulse Resp SpO2 Height Weight   135/79 97.9 °F (36.6 °C) Oral 93 18 100 % -- 160 lb (72.6 kg)       Physical Exam  Vitals signs and nursing note reviewed. Constitutional:       Appearance: He is well-developed. HENT:      Head: Normocephalic. Right Ear: Tympanic membrane normal.      Left Ear: Tympanic membrane normal.      Nose: Nose normal.      Mouth/Throat:      Mouth: Mucous membranes are moist.   Eyes:      Conjunctiva/sclera: Conjunctivae normal.      Pupils: Pupils are equal, round, and reactive to light. Neck:      Musculoskeletal: Normal range of motion and neck supple. Cardiovascular:      Rate and Rhythm: Normal rate and regular rhythm. Heart sounds: Normal heart sounds. Pulmonary:      Effort: Pulmonary effort is normal.      Breath sounds: Normal breath sounds. Abdominal:      General: Bowel sounds are normal. There is distension. Palpations: Abdomen is soft. Tenderness: There is no abdominal tenderness. There is no guarding. Musculoskeletal: Normal range of motion. Skin:     General: Skin is warm and dry. Capillary Refill: Capillary refill takes less than 2 seconds. Neurological:      Mental Status: He is alert and oriented to person, place, and time. DIAGNOSTIC RESULTS     EKG: All EKG's are interpreted by the Emergency Department Physician who either signs or Co-signsthis chart in the absence of a cardiologist.      RADIOLOGY:   Corean Polio such as CT, Ultrasound and MRI are read by the radiologist. Liz Carissa radiographic images are visualized and preliminarily interpreted by the emergency physician with the below findings:      Interpretation per the Radiologist below, if available at the time ofthis note:    CT ABDOMEN PELVIS W IV CONTRAST Additional Contrast? None    (Results Pending)         ED BEDSIDE ULTRASOUND:   Performed by ED Physician - none    LABS:  Labs Reviewed   COMPREHENSIVE METABOLIC PANEL - Abnormal; Notable for the following components:       Result Value    Alb 4.9 (*)     Globulin 2.2 (*)     All other components within normal limits   CBC WITH AUTO DIFFERENTIAL - Abnormal; Notable for the following components:    RBC 4.59 (*)     Hemoglobin 13.9 (*)     Hematocrit 40.4 (*)     All other components within normal limits       All other labs were within normal range or not returned as of this dictation. EMERGENCY DEPARTMENT COURSE and DIFFERENTIAL DIAGNOSIS/MDM:   Vitals:    Vitals:    07/16/20 2115 07/16/20 2200 07/16/20 2230   BP: 135/79 114/74 115/72   Pulse: 93     Resp: 18     Temp: 97.9 °F (36.6 °C)     TempSrc: Oral     SpO2: 100% 99% 99%   Weight: 160 lb (72.6 kg)          MDM patient CT scan is unremarkable for any acute pathology. Clinically he has constipation. He is treated with mag citrate and other recommendations for home. Return here for fever or worsening symptoms. CONSULTS:  None    PROCEDURES:  Unless otherwise noted below, none     Procedures    FINAL IMPRESSION      1.  Constipation, unspecified constipation type          DISPOSITION/PLAN   DISPOSITION Decision To Discharge 07/16/2020 11:11:20 PM      PATIENT REFERRED TO:  YING Navarro - CNP  [address] [address], [address]  [facility]

## 2020-07-17 NOTE — ED TRIAGE NOTES
Pt presents from home with c/o abd pain. Onset x1 week. Pt a&o x4. resp even. Skin p/w/d. Pt afebrile. Pt reports pain to generalized abd, greater to LUQ. abd distended. Bowel sounds hypoactive. Last bm x1 week. Pt reports nausea, denies emesis.

## 2021-05-25 ENCOUNTER — HOSPITAL ENCOUNTER (EMERGENCY)
Age: 31
Discharge: HOME | End: 2021-05-25
Attending: EMERGENCY MEDICINE
Payer: COMMERCIAL

## 2021-05-25 ENCOUNTER — APPOINTMENT (OUTPATIENT)
Dept: CT IMAGING | Age: 31
End: 2021-05-25
Payer: COMMERCIAL

## 2021-05-25 VITALS
BODY MASS INDEX: 23.62 KG/M2 | RESPIRATION RATE: 16 BRPM | SYSTOLIC BLOOD PRESSURE: 121 MMHG | TEMPERATURE: 98.1 F | WEIGHT: 165 LBS | HEIGHT: 70 IN | HEART RATE: 111 BPM | DIASTOLIC BLOOD PRESSURE: 85 MMHG | OXYGEN SATURATION: 98 %

## 2021-05-25 DIAGNOSIS — R10.30 LOWER ABDOMINAL PAIN: Primary | ICD-10-CM

## 2021-05-25 LAB
ALBUMIN SERPL-MCNC: 4.7 G/DL (ref 3.5–4.6)
ALP BLD-CCNC: 110 U/L (ref 35–104)
ALT SERPL-CCNC: 15 U/L (ref 0–41)
ANION GAP SERPL CALCULATED.3IONS-SCNC: 8 MEQ/L (ref 9–15)
AST SERPL-CCNC: 19 U/L (ref 0–40)
BASOPHILS ABSOLUTE: 0.1 K/UL (ref 0–0.2)
BASOPHILS RELATIVE PERCENT: 0.6 %
BILIRUB SERPL-MCNC: 0.3 MG/DL (ref 0.2–0.7)
BILIRUBIN URINE: NEGATIVE
BLOOD, URINE: NEGATIVE
BUN BLDV-MCNC: 17 MG/DL (ref 6–20)
C-REACTIVE PROTEIN: 0.9 MG/L (ref 0–5)
CALCIUM SERPL-MCNC: 9 MG/DL (ref 8.5–9.9)
CHLORIDE BLD-SCNC: 102 MEQ/L (ref 95–107)
CLARITY: CLEAR
CO2: 29 MEQ/L (ref 20–31)
COLOR: YELLOW
CREAT SERPL-MCNC: 0.97 MG/DL (ref 0.7–1.2)
EOSINOPHILS ABSOLUTE: 0.2 K/UL (ref 0–0.7)
EOSINOPHILS RELATIVE PERCENT: 1.9 %
GFR AFRICAN AMERICAN: >60
GFR NON-AFRICAN AMERICAN: >60
GLOBULIN: 2.2 G/DL (ref 2.3–3.5)
GLUCOSE BLD-MCNC: 96 MG/DL (ref 70–99)
GLUCOSE URINE: NEGATIVE MG/DL
HCT VFR BLD CALC: 42.4 % (ref 42–52)
HEMOGLOBIN: 14.7 G/DL (ref 14–18)
KETONES, URINE: NEGATIVE MG/DL
LEUKOCYTE ESTERASE, URINE: NEGATIVE
LIPASE: 43 U/L (ref 12–95)
LYMPHOCYTES ABSOLUTE: 1.8 K/UL (ref 1–4.8)
LYMPHOCYTES RELATIVE PERCENT: 21.9 %
MCH RBC QN AUTO: 30 PG (ref 27–31.3)
MCHC RBC AUTO-ENTMCNC: 34.8 % (ref 33–37)
MCV RBC AUTO: 86.3 FL (ref 80–100)
MONOCYTES ABSOLUTE: 0.4 K/UL (ref 0.2–0.8)
MONOCYTES RELATIVE PERCENT: 4.2 %
NEUTROPHILS ABSOLUTE: 5.9 K/UL (ref 1.4–6.5)
NEUTROPHILS RELATIVE PERCENT: 71.4 %
NITRITE, URINE: NEGATIVE
PDW BLD-RTO: 12.2 % (ref 11.5–14.5)
PH UA: 7.5 (ref 5–9)
PLATELET # BLD: 263 K/UL (ref 130–400)
POTASSIUM SERPL-SCNC: 4.1 MEQ/L (ref 3.4–4.9)
PROTEIN UA: NEGATIVE MG/DL
RBC # BLD: 4.91 M/UL (ref 4.7–6.1)
SEDIMENTATION RATE, ERYTHROCYTE: 2 MM (ref 0–10)
SODIUM BLD-SCNC: 139 MEQ/L (ref 135–144)
SPECIFIC GRAVITY UA: 1.02 (ref 1–1.03)
TOTAL PROTEIN: 6.9 G/DL (ref 6.3–8)
URINE REFLEX TO CULTURE: NORMAL
UROBILINOGEN, URINE: 1 E.U./DL
WBC # BLD: 8.3 K/UL (ref 4.8–10.8)

## 2021-05-25 PROCEDURE — 85025 COMPLETE CBC W/AUTO DIFF WBC: CPT

## 2021-05-25 PROCEDURE — 81003 URINALYSIS AUTO W/O SCOPE: CPT

## 2021-05-25 PROCEDURE — 85652 RBC SED RATE AUTOMATED: CPT

## 2021-05-25 PROCEDURE — 80053 COMPREHEN METABOLIC PANEL: CPT

## 2021-05-25 PROCEDURE — 36415 COLL VENOUS BLD VENIPUNCTURE: CPT

## 2021-05-25 PROCEDURE — 74177 CT ABD & PELVIS W/CONTRAST: CPT

## 2021-05-25 PROCEDURE — 83690 ASSAY OF LIPASE: CPT

## 2021-05-25 PROCEDURE — 86140 C-REACTIVE PROTEIN: CPT

## 2021-05-25 PROCEDURE — 99283 EMERGENCY DEPT VISIT LOW MDM: CPT

## 2021-05-25 PROCEDURE — 6360000004 HC RX CONTRAST MEDICATION: Performed by: EMERGENCY MEDICINE

## 2021-05-25 RX ORDER — 0.9 % SODIUM CHLORIDE 0.9 %
1000 INTRAVENOUS SOLUTION INTRAVENOUS ONCE
Status: DISCONTINUED | OUTPATIENT
Start: 2021-05-25 | End: 2021-05-26 | Stop reason: HOSPADM

## 2021-05-25 RX ADMIN — IOPAMIDOL 100 ML: 755 INJECTION, SOLUTION INTRAVENOUS at 15:18

## 2021-05-25 ASSESSMENT — PAIN SCALES - GENERAL: PAINLEVEL_OUTOF10: 3

## 2021-05-25 ASSESSMENT — ENCOUNTER SYMPTOMS
RESPIRATORY NEGATIVE: 1
ALLERGIC/IMMUNOLOGIC NEGATIVE: 1
EYES NEGATIVE: 1
ABDOMINAL PAIN: 1

## 2021-05-25 NOTE — ED PROVIDER NOTES
[facility] ED  eMERGENCY dEPARTMENT eNCOUnter      Pt Name: Hal Arzate  MRN: [id number]  [place] 1990  Date of evaluation: 5/25/2021  Provider: Sharifa Marx MD    CHIEF COMPLAINT       Chief Complaint   Patient presents with    Abdominal Pain         HISTORY OF PRESENT ILLNESS   (Location/Symptom, Timing/Onset,Context/Setting, Quality, Duration, Modifying Factors, Severity)  Note limiting factors. Hal Arztae is a 26 y.o. male who presents to the emergency department with lower abdominal pain that has been off and on for over a year. Patient has been to the emergency department before and diagnosed with constipation according to the patient. Pain now is consistent and worse over the last several days. No fever or chills no nausea vomiting or diarrhea no headache blurry double vision. Pain is worse with palpation better with rest does not radiate. Currently the pain is mild it is a 3/10. Dull and achy in nature. No other complaints or concerns. HPI    NursingNotes were reviewed. REVIEW OF SYSTEMS    (2-9 systems for level 4, 10 or more for level 5)     Review of Systems   Constitutional: Negative. HENT: Negative. Eyes: Negative. Respiratory: Negative. Cardiovascular: Negative. Gastrointestinal: Positive for abdominal pain. Endocrine: Negative. Genitourinary: Negative. Musculoskeletal: Negative. Skin: Negative. Allergic/Immunologic: Negative. Neurological: Negative. Hematological: Negative. Psychiatric/Behavioral: Negative. Except as noted above the remainder of the review of systems was reviewed and negative. PAST MEDICAL HISTORY     Past Medical History:   Diagnosis Date    ADHD (attention deficit hyperactivity disorder)     was initiated on treatment by Dr. Isreal Vargas.   any testing was done here by Dr. Isreal Vargas, no formal psych eval.      Anxiety     MDD (major depressive disorder), recurrent episode, moderate ([facility] [address] 6/4/2019         SURGICALHISTORY       Past Surgical History:   Procedure Laterality Date    TONSILLECTOMY AND ADENOIDECTOMY      WISDOM TOOTH EXTRACTION           CURRENT MEDICATIONS       Current Discharge Medication List      CONTINUE these medications which have NOT CHANGED    Details   escitalopram (LEXAPRO) 10 MG tablet Take 15 mg by mouth daily      lisdexamfetamine (VYVANSE) 70 MG capsule Take 70 mg by mouth every morning. clonazePAM (KLONOPIN) 0.5 MG tablet Take 1 tablet by mouth 2 times daily as needed for Anxiety for up to 3 days. Qty: 6 tablet, Refills: 0    Associated Diagnoses: Encounter for medication refill             ALLERGIES     Peanut-containing drug products and Peanut oil    FAMILY HISTORY       Family History   Problem Relation Age of Onset    Depression Mother     Heart Disease Father     High Blood Pressure Father     High Cholesterol Father           SOCIAL HISTORY       Social History     Socioeconomic History    Marital status: Single     Spouse name: None    Number of children: None    Years of education: None    Highest education level: None   Occupational History    None   Tobacco Use    Smoking status: Never Smoker    Smokeless tobacco: Never Used   Substance and Sexual Activity    Alcohol use: Yes     Comment: occasional    Drug use: No    Sexual activity: None   Other Topics Concern    None   Social History Narrative    None     Social Determinants of Health     Financial Resource Strain:     Difficulty of Paying Living Expenses:    Food Insecurity:     Worried About Running Out of Food in the Last Year:     Ran Out of Food in the Last Year:    Transportation Needs:     Lack of Transportation (Medical):      Lack of Transportation (Non-Medical):    Physical Activity:     Days of Exercise per Week:     Minutes of Exercise per Session:    Stress:     Feeling of Stress :    Social Connections:     Frequency of Communication with Friends and Family:     Frequency of Social Gatherings with Friends and Family:     Attends Zoroastrianism Services:     Active Member of Clubs or Organizations:     Attends Club or Organization Meetings:     Marital Status:    Intimate Partner Violence:     Fear of Current or Ex-Partner:     Emotionally Abused:     Physically Abused:     Sexually Abused:        SCREENINGS      @FLOW([id number])@      PHYSICAL EXAM    (up to 7 for level 4, 8 or more for level 5)     ED Triage Vitals [05/25/21 1317]   BP Temp Temp Source Pulse Resp SpO2 Height Weight   121/85 98.1 °F (36.7 °C) Oral 111 16 98 % 5' 10\" (1.778 m) 165 lb (74.8 kg)       Physical Exam  HENT:      Head: Normocephalic. Eyes:      Extraocular Movements: Extraocular movements intact. Pupils: Pupils are equal, round, and reactive to light. Cardiovascular:      Rate and Rhythm: Regular rhythm. Heart sounds: Normal heart sounds. Pulmonary:      Effort: Pulmonary effort is normal.      Breath sounds: Normal breath sounds. Abdominal:      General: Abdomen is flat. Bowel sounds are normal.      Palpations: Abdomen is soft. Tenderness: There is abdominal tenderness in the right lower quadrant and left lower quadrant. Skin:     Capillary Refill: Capillary refill takes less than 2 seconds. Neurological:      General: No focal deficit present. Mental Status: He is alert and oriented to person, place, and time.    Psychiatric:         Mood and Affect: Mood normal.         Behavior: Behavior normal.         DIAGNOSTIC RESULTS     EKG: All EKG's are interpreted by the Emergency Department Physician who either signs or Co-signsthis chart in the absence of a cardiologist.        RADIOLOGY:   Non-plain filmimages such as CT, Ultrasound and MRI are read by the radiologist. Plain radiographic images are visualized and preliminarily interpreted by the emergency physician with the below findings:        Interpretation per the Radiologist below, if available at the time ofthis note:    CT ABDOMEN PELVIS W IV CONTRAST Additional Contrast? None   Final Result      NO ACUTE INTRA-ABDOMINAL PROCESS OR SIGNIFICANT CHANGE FROM 7/16/2020 IDENTIFIED. ED BEDSIDE ULTRASOUND:   Performed by ED Physician - none    LABS:  Labs Reviewed   COMPREHENSIVE METABOLIC PANEL - Abnormal; Notable for the following components:       Result Value    Anion Gap 8 (*)     Albumin 4.7 (*)     Alkaline Phosphatase 110 (*)     Globulin 2.2 (*)     All other components within normal limits   CBC WITH AUTO DIFFERENTIAL   LIPASE   URINE RT REFLEX TO CULTURE   C-REACTIVE PROTEIN   SEDIMENTATION RATE       All other labs were within normal range or not returned as of this dictation. EMERGENCY DEPARTMENT COURSE and DIFFERENTIAL DIAGNOSIS/MDM:   Vitals:    Vitals:    05/25/21 1317   BP: 121/85   Pulse: 111   Resp: 16   Temp: 98.1 °F (36.7 °C)   TempSrc: Oral   SpO2: 98%   Weight: 165 lb (74.8 kg)   Height: 5' 10\" (1.778 m)           MDM  Patient in good and stable condition for discharge home CAT scan and blood work unremarkable. CONSULTS:  None    PROCEDURES:  Unless otherwise noted below, none     Procedures    FINAL IMPRESSION      1.  Lower abdominal pain          DISPOSITION/PLAN   DISPOSITION Decision To Discharge 05/25/2021 04:14:01 PM      PATIENT REFERRED TO:  YING Steel - CNP  [address], [address] [facility] [phone]    In 2 days        DISCHARGE MEDICATIONS:  Current Discharge Medication List             (Please note that portions of this note were completed with a voice recognition program.  Efforts were made to edit the dictations but occasionally words are mis-transcribed.)    Sharifa Marx MD (electronically signed)  Attending Emergency Physician       Sharifa Marx MD  05/25/21 2009

## 2021-05-25 NOTE — ED NOTES
Pt up to restroom at this time. Pt states that he gets the urge to go and is unable to go at time. Pt states that has had an issue with slow urination, trouble urinating for the last year. Pt states that he has lower ABD pain continous. Pt states that he does have an issue with constipation but does not feel this is related to the urination issue. Pt states that this is an ongoing issue that he has had. Pt states he is on vyvanse and antidepressants. Pt states that he has not been feeling himself mentally and feels maybe this has something to do with the urination and bowel issues. Pt states he is under a lot of stress right now. Pt denies N/V at this time.       Denia Farfan, RN  05/25/21 0600

## 2021-05-25 NOTE — ED TRIAGE NOTES
PATIENT PRESENTS TO THE ER WITH MULTIPLE COMPLAINTS  PT WAS SEEN AT  IN [place] AND SENT TO THE ER FOR FURTHER MEDICAL EVALUATION  PT STATES THAT FOR THE PAST YEAR HE HAS HAD PRESSURE IN HIS LOWER ABDOMEN, TROUBLE URINATING, SLOW URINATING STREAM, AND PAIN IN PROSTATE  PT STATES THAT FOR THE PAST TWO WEEKS THESE ABOVE SYMPTOMS HAVE BEEN GETTING WORSE  PT REPORTS THAT WHEN HE USES THE RESTROOM AND HAS A BOWEL MOVEMENT THAT SOMETIMES HE WIPES AND IT IS BRIGHT RED  SOMETIMES HE PASSES WHITE MUCOUS FROM RECTUM  PATIENT HAS HISTORY OF CONSTIPATION  DENIES NVD CURRENTLY

## 2021-05-26 LAB
PERFORMED ON: NORMAL
POC SAMPLE TYPE: NORMAL

## 2021-12-22 ENCOUNTER — VIRTUAL VISIT (OUTPATIENT)
Dept: FAMILY MEDICINE CLINIC | Age: 31
End: 2021-12-22
Payer: COMMERCIAL

## 2021-12-22 DIAGNOSIS — R68.83 CHILLS: ICD-10-CM

## 2021-12-22 DIAGNOSIS — R52 BODY ACHES: Primary | ICD-10-CM

## 2021-12-22 PROCEDURE — 87426 SARSCOV CORONAVIRUS AG IA: CPT | Performed by: FAMILY MEDICINE

## 2021-12-22 PROCEDURE — G8427 DOCREV CUR MEDS BY ELIG CLIN: HCPCS | Performed by: FAMILY MEDICINE

## 2021-12-22 PROCEDURE — 87804 INFLUENZA ASSAY W/OPTIC: CPT | Performed by: FAMILY MEDICINE

## 2021-12-22 PROCEDURE — 99214 OFFICE O/P EST MOD 30 MIN: CPT | Performed by: FAMILY MEDICINE

## 2021-12-22 RX ORDER — DEXTROAMPHETAMINE SACCHARATE, AMPHETAMINE ASPARTATE, DEXTROAMPHETAMINE SULFATE AND AMPHETAMINE SULFATE 3.75; 3.75; 3.75; 3.75 MG/1; MG/1; MG/1; MG/1
TABLET ORAL
Status: ON HOLD | COMMUNITY
End: 2022-07-22 | Stop reason: HOSPADM

## 2021-12-22 SDOH — ECONOMIC STABILITY: FOOD INSECURITY: WITHIN THE PAST 12 MONTHS, THE FOOD YOU BOUGHT JUST DIDN'T LAST AND YOU DIDN'T HAVE MONEY TO GET MORE.: NEVER TRUE

## 2021-12-22 SDOH — ECONOMIC STABILITY: FOOD INSECURITY: WITHIN THE PAST 12 MONTHS, YOU WORRIED THAT YOUR FOOD WOULD RUN OUT BEFORE YOU GOT MONEY TO BUY MORE.: NEVER TRUE

## 2021-12-22 ASSESSMENT — ENCOUNTER SYMPTOMS
ABDOMINAL PAIN: 0
COUGH: 0
ABDOMINAL DISTENTION: 0
CHEST TIGHTNESS: 0
PHOTOPHOBIA: 0
SHORTNESS OF BREATH: 0

## 2021-12-22 ASSESSMENT — VISUAL ACUITY: OU: 1

## 2021-12-22 ASSESSMENT — SOCIAL DETERMINANTS OF HEALTH (SDOH): HOW HARD IS IT FOR YOU TO PAY FOR THE VERY BASICS LIKE FOOD, HOUSING, MEDICAL CARE, AND HEATING?: NOT HARD AT ALL

## 2021-12-22 NOTE — PATIENT INSTRUCTIONS
Patient Education        Learning About COVID-19 and Flu Symptoms  How can you tell COVID-19 from the flu? COVID-19 and the flu have similar symptoms. The two can be hard to tell apart. The only way to know for sure which illness you have is to be tested. Since the symptoms are so alike, it makes sense to act as if you have COVID-19 until your test results come back. This means staying home and limiting contact with people in your home. You'll need to wash your hands often and disinfect surfaces that you touch. And be sure to wear a mask when you're around other people. This is also good advice if you think you have the flu. COVID-19 and the flu have these symptoms in common:  · Fever or chills  · Cough  · Shortness of breath  · Fatigue (tiredness)  · Sore throat  · Runny or stuffy nose  · Muscle and body aches  · Headache  · Vomiting and diarrhea (more common in children than adults)  COVID-19 has another symptom that also may occur:  · New loss of taste or smell  COVID-19 symptoms may appear from 2 to 14 days after infection. Flu symptoms usually appear 1 to 4 days after infection. Why should you get a flu shot during the COVID-19 pandemic? It's important to get your yearly flu vaccine. Both the flu and COVID-19 can be active at the same time. You can get sick with both infections at once. And having both may make you more sick than getting just one. The flu vaccine won't protect you from COVID-19. But it can help prevent the flu or reduce its symptoms. If fewer people get very ill with the flu, this will help free up medical resources that are needed for COVID-19 patients. Where can you learn more? Go to https://[employer]. org and sign in to your [employer] account. Enter C123 in the [employer] box to learn more about \"Learning About COVID-19 and Flu Symptoms. \"     If you do not have an account, please click on the \"Sign Up Now\" link.   Current as of: March 26, 2021               Content Version: 13.1  © 3554-0995 [employer], [employer]. Care instructions adapted under license by [facility] ([facility]). If you have questions about a medical condition or this instruction, always ask your healthcare professional. [address] any warranty or liability for your use of this information.

## 2021-12-22 NOTE — PROGRESS NOTES
Diagnosis Orders   1. Body aches  POCT COVID-19, Antigen    POCT Influenza A/B   2. Chills  POCT COVID-19, Antigen    POCT Influenza A/B         Orders Placed This Encounter   Procedures    POCT COVID-19, Antigen     Order Specific Question:   Is this test for diagnosis or screening? Answer:   Diagnosis of ill patient     Order Specific Question:   Symptomatic for COVID-19 as defined by CDC? Answer:   Yes     Order Specific Question:   Date of Symptom Onset     Answer:   12/21/2021     Order Specific Question:   Hospitalized for COVID-19? Answer:   No     Order Specific Question:   Admitted to ICU for COVID-19? Answer:   No     Order Specific Question:   Employed in healthcare setting? Answer:   Unknown     Order Specific Question:   Resident in a congregate (group) care setting? Answer:   No     Order Specific Question:   Pregnant: Answer:   No    POCT Influenza A/B       No follow-ups on file. Patient Instructions     Patient Education        Learning About COVID-19 and Flu Symptoms  How can you tell COVID-19 from the flu? COVID-19 and the flu have similar symptoms. The two can be hard to tell apart. The only way to know for sure which illness you have is to be tested. Since the symptoms are so alike, it makes sense to act as if you have COVID-19 until your test results come back. This means staying home and limiting contact with people in your home. You'll need to wash your hands often and disinfect surfaces that you touch. And be sure to wear a mask when you're around other people. This is also good advice if you think you have the flu.   COVID-19 and the flu have these symptoms in common:  · Fever or chills  · Cough  · Shortness of breath  · Fatigue (tiredness)  · Sore throat  · Runny or stuffy nose  · Muscle and body aches  · Headache  · Vomiting and diarrhea (more common in children than adults)  COVID-19 has another symptom that also may occur:  · New loss of taste or smell  COVID-19 symptoms may appear from 2 to 14 days after infection. Flu symptoms usually appear 1 to 4 days after infection. Why should you get a flu shot during the COVID-19 pandemic? It's important to get your yearly flu vaccine. Both the flu and COVID-19 can be active at the same time. You can get sick with both infections at once. And having both may make you more sick than getting just one. The flu vaccine won't protect you from COVID-19. But it can help prevent the flu or reduce its symptoms. If fewer people get very ill with the flu, this will help free up medical resources that are needed for COVID-19 patients. Where can you learn more? Go to [email or web address] org and sign in to your [employer] account. Enter C123 in the [employer] box to learn more about \"Learning About COVID-19 and Flu Symptoms. \"     If you do not have an account, please click on the \"Sign Up Now\" link. Current as of: March 26, 2021               Content Version: 13.1  © 1160-4188 [employer], [employer]. Care instructions adapted under license by [facility] ([facility]). If you have questions about a medical condition or this instruction, always ask your healthcare professional. [place] [address] any warranty or liability for your use of this information. This visit began at 11:37am    The location for this appointment was the [facility] primary care site. TELEHEALTH APPOINTMENT  Patient has been screened to determine that this visit qualifies for a \"Video Visit\". This visit was via video due to the restrictions of the COVID-19 pandemic. All issues as below were discussed and addressed but note a limited visually based physical exam was performed. If it was felt the patient should be evaluated in the clinic there will be comment below demonstrating they were directed there.     The patient is aware and has given verbal consent to be billed for this video encounter. The resources used for this visit were [employer] for chart access and [employer]. me    Chief Complaint   Patient presents with    Headache    Fatigue     chills        Patient has been having headache for 1 day  Patient has been having chills for  1 day    Not much cough    Body aches. And some stomach upset and noted constipation and took mag citrate and feels a little better. PMH:    Current Outpatient Medications on File Prior to Visit   Medication Sig Dispense Refill    escitalopram (LEXAPRO) 10 MG tablet Take 15 mg by mouth daily      lisdexamfetamine (VYVANSE) 70 MG capsule Take 70 mg by mouth every morning.  clonazePAM (KLONOPIN) 0.5 MG tablet Take 1 tablet by mouth 2 times daily as needed for Anxiety for up to 3 days. 6 tablet 0    amphetamine-dextroamphetamine (ADDERALL) 15 MG tablet dextroamphetamine-amphetamine 15 mg tablet       No current facility-administered medications on file prior to visit. Past Medical History:   Diagnosis Date    ADHD (attention deficit hyperactivity disorder)     was initiated on treatment by Dr. Reinaldo Winkler.   any testing was done here by Dr. Reinaldo Winkler, no formal psych eval.      Anxiety     MDD (major depressive disorder), recurrent episode, moderate ([facility] [address] 6/4/2019     Past Surgical History:   Procedure Laterality Date    TONSILLECTOMY AND ADENOIDECTOMY      WISDOM TOOTH EXTRACTION       Social History     Socioeconomic History    Marital status: Single     Spouse name: Not on file    Number of children: Not on file    Years of education: Not on file    Highest education level: Not on file   Occupational History    Not on file   Tobacco Use    Smoking status: Never Smoker    Smokeless tobacco: Never Used   Substance and Sexual Activity    Alcohol use: Yes     Comment: occasional    Drug use: No    Sexual activity: Not on file   Other Topics Concern    Not on file   Social History Narrative    Not on file     Social Determinants of Health Financial Resource Strain: Low Risk     Difficulty of Paying Living Expenses: Not hard at all   Food Insecurity: No Food Insecurity    Worried About Running Out of Food in the Last Year: Never true    Anand of Food in the Last Year: Never true   Transportation Needs:     Lack of Transportation (Medical): Not on file    Lack of Transportation (Non-Medical): Not on file   Physical Activity:     Days of Exercise per Week: Not on file    Minutes of Exercise per Session: Not on file   Stress:     Feeling of Stress : Not on file   Social Connections:     Frequency of Communication with Friends and Family: Not on file    Frequency of Social Gatherings with Friends and Family: Not on file    Attends Church Services: Not on file    Active Member of [address] [place] [address] [employer] or Organizations: Not on file    Attends Club or Organization Meetings: Not on file    Marital Status: Not on file   Intimate Partner Violence:     Fear of Current or Ex-Partner: Not on file    Emotionally Abused: Not on file    Physically Abused: Not on file    Sexually Abused: Not on file   Housing Stability:     Unable to Pay for Housing in the Last Year: Not on file    Number of [place] in the Last Year: Not on file    Unstable Housing in the Last Year: Not on file     Family History   Problem Relation Age of Onset    Depression Mother     Heart Disease Father     High Blood Pressure Father     High Cholesterol Father      Allergies:  Peanut-containing drug products and Peanut oil    Review of Systems   Constitutional: Negative for activity change, appetite change, diaphoresis and unexpected weight change. HENT: Positive for congestion. Eyes: Negative for photophobia and visual disturbance. Respiratory: Negative for cough, chest tightness and shortness of breath. No orthopnea   Cardiovascular: Negative for chest pain, palpitations and leg swelling.    Gastrointestinal: Negative for abdominal distention and abdominal pain.   Genitourinary: Negative for flank pain and frequency. Musculoskeletal: Positive for myalgias. Negative for gait problem and joint swelling. Neurological: Positive for headaches. Negative for dizziness, weakness and light-headedness. Psychiatric/Behavioral: Negative for confusion. PHYSICAL EXAM/ RESULTS    (Limited exam: only performed what is available through a visual exam during the video encounter as indicated due to the restrictions of the COVID-19 pandemic)    Patient-Reported Vitals 12/22/2021   Patient-Reported Weight 165lbs   Patient-Reported Height 5 10   Patient-Reported Temperature 99.1             Physical Exam  Vitals (All exam findings are noted during patient movement during video exam) reviewed. Constitutional:       General: He is not in acute distress. Appearance: Normal appearance. He is well-developed. He is not ill-appearing or diaphoretic. HENT:      Head: Normocephalic and atraumatic. No right periorbital erythema or left periorbital erythema. Hair is normal.      Jaw: No swelling or pain on movement. Right Ear: Hearing and external ear normal.      Left Ear: Hearing and external ear normal.      Ears:      Comments: External ears are normal shape and even level placement on the head     Nose: No nasal deformity. Right Nostril: No epistaxis. Left Nostril: No epistaxis. Mouth/Throat:      Lips: Pink. Comments: Lips have appropriate movement with conversation  Eyes:      General: Lids are normal. Vision grossly intact. Gaze aligned appropriately. No allergic shiner. Right eye: No discharge. Left eye: No discharge. Extraocular Movements: Extraocular movements intact. Conjunctiva/sclera: Conjunctivae normal.      Comments: Pupils equal   Neck:      Thyroid: No thyromegaly. Trachea: No tracheal deviation. Pulmonary:      Effort: No tachypnea, accessory muscle usage or respiratory distress.       Comments: No difficulty with conversation  Musculoskeletal:      Cervical back: Normal range of motion. No erythema. No pain with movement. Comments: Range of motion of upper and lower extremity large muscle groups are normal during ambulation. Gait normal   Skin:     Coloration: Skin is not cyanotic, jaundiced or pale. Findings: No acne. Comments: No evidence of abnormal hair loss. Facial skin without defect. Neurological:      Mental Status: He is alert and oriented to person, place, and time. Cranial Nerves: No cranial nerve deficit, dysarthria or facial asymmetry. Coordination: Coordination normal.      Gait: Gait is intact. Comments: Motor function intact for gait and range of motion of large muscle groups of extremities   Psychiatric:         Attention and Perception: Attention and perception normal.         Mood and Affect: Mood and affect normal.         Speech: Speech normal.         Behavior: Behavior normal. Behavior is cooperative. Thought Content: Thought content normal.         Judgment: Judgment normal.         No results found for this or any previous visit (from the past 2016 hour(s)). [] Pt was seen by provider for   Minutes  Counseling and coordination of care was done for all assessment diagnosis listed for today with patient and any family/friend present. More than 50% of this visit was spent coordinating cuurent care, obtaining information for prior records, and counseling for current plan of action. Assessment:       Diagnosis Orders   1. Body aches  POCT COVID-19, Antigen    POCT Influenza A/B   2. Chills  POCT COVID-19, Antigen    POCT Influenza A/B         Orders Placed This Encounter   Procedures    POCT COVID-19, Antigen     Order Specific Question:   Is this test for diagnosis or screening? Answer:   Diagnosis of ill patient     Order Specific Question:   Symptomatic for COVID-19 as defined by CDC?      Answer:   Yes     Order Specific Question:   Date of Symptom Onset     Answer:   12/21/2021     Order Specific Question:   Hospitalized for COVID-19? Answer:   No     Order Specific Question:   Admitted to ICU for COVID-19? Answer:   No     Order Specific Question:   Employed in healthcare setting? Answer:   Unknown     Order Specific Question:   Resident in a congregate (group) care setting? Answer:   No     Order Specific Question:   Pregnant: Answer:   No    POCT Influenza A/B         Plan:   No follow-ups on file. Patient Instructions     Patient Education        Learning About COVID-19 and Flu Symptoms  How can you tell COVID-19 from the flu? COVID-19 and the flu have similar symptoms. The two can be hard to tell apart. The only way to know for sure which illness you have is to be tested. Since the symptoms are so alike, it makes sense to act as if you have COVID-19 until your test results come back. This means staying home and limiting contact with people in your home. You'll need to wash your hands often and disinfect surfaces that you touch. And be sure to wear a mask when you're around other people. This is also good advice if you think you have the flu. COVID-19 and the flu have these symptoms in common:  · Fever or chills  · Cough  · Shortness of breath  · Fatigue (tiredness)  · Sore throat  · Runny or stuffy nose  · Muscle and body aches  · Headache  · Vomiting and diarrhea (more common in children than adults)  COVID-19 has another symptom that also may occur:  · New loss of taste or smell  COVID-19 symptoms may appear from 2 to 14 days after infection. Flu symptoms usually appear 1 to 4 days after infection. Why should you get a flu shot during the COVID-19 pandemic? It's important to get your yearly flu vaccine. Both the flu and COVID-19 can be active at the same time. You can get sick with both infections at once. And having both may make you more sick than getting just one.   The flu vaccine won't

## 2021-12-23 ENCOUNTER — TELEPHONE (OUTPATIENT)
Dept: FAMILY MEDICINE CLINIC | Age: 31
End: 2021-12-23

## 2021-12-23 NOTE — TELEPHONE ENCOUNTER
----- Message from Garlin Dance sent at 12/22/2021 11:49 AM EST -----  Subject: Message to Provider    QUESTIONS  Information for Provider? Doctor ordered a covid test, I tried to warm   transfer to office. Patient wants to know when he's able to come in for   testing.  ---------------------------------------------------------------------------  --------------  CALL BACK INFO  What is the best way for the office to contact you? OK to leave message on   voicemail  Preferred Call Back Phone Number?  [phone]  ---------------------------------------------------------------------------  --------------  SCRIPT ANSWERS  undefined

## 2021-12-23 NOTE — TELEPHONE ENCOUNTER
----- Message from Cornelioruthie Aye sent at 12/22/2021 11:49 AM EST -----  Subject: Message to Provider    QUESTIONS  Information for Provider? Doctor ordered a covid test, I tried to warm   transfer to office. Patient wants to know when he's able to come in for   testing.  ---------------------------------------------------------------------------  --------------  CALL BACK INFO  What is the best way for the office to contact you? OK to leave message on   voicemail  Preferred Call Back Phone Number?  [phone]  ---------------------------------------------------------------------------  --------------  SCRIPT ANSWERS  undefined

## 2021-12-27 LAB
INFLUENZA A ANTIBODY: NORMAL
INFLUENZA B ANTIBODY: NORMAL
Lab: ABNORMAL
PERFORMING INSTRUMENT: ABNORMAL
QC PASS/FAIL: ABNORMAL
SARS-COV-2, POC: DETECTED

## 2022-03-23 ENCOUNTER — HOSPITAL ENCOUNTER (EMERGENCY)
Age: 32
Discharge: HOME | End: 2022-03-23
Payer: COMMERCIAL

## 2022-03-23 ENCOUNTER — APPOINTMENT (OUTPATIENT)
Dept: CT IMAGING | Age: 32
End: 2022-03-23
Payer: COMMERCIAL

## 2022-03-23 VITALS
OXYGEN SATURATION: 99 % | HEART RATE: 86 BPM | BODY MASS INDEX: 24.34 KG/M2 | TEMPERATURE: 97.4 F | DIASTOLIC BLOOD PRESSURE: 76 MMHG | HEIGHT: 70 IN | SYSTOLIC BLOOD PRESSURE: 109 MMHG | RESPIRATION RATE: 16 BRPM | WEIGHT: 170 LBS

## 2022-03-23 DIAGNOSIS — G89.29 CHRONIC BILATERAL LOW BACK PAIN WITHOUT SCIATICA: Primary | ICD-10-CM

## 2022-03-23 DIAGNOSIS — M54.50 CHRONIC BILATERAL LOW BACK PAIN WITHOUT SCIATICA: Primary | ICD-10-CM

## 2022-03-23 PROCEDURE — 99283 EMERGENCY DEPT VISIT LOW MDM: CPT

## 2022-03-23 PROCEDURE — 72131 CT LUMBAR SPINE W/O DYE: CPT

## 2022-03-23 PROCEDURE — 72128 CT CHEST SPINE W/O DYE: CPT

## 2022-03-23 RX ORDER — CYCLOBENZAPRINE HCL 10 MG
10 TABLET ORAL 3 TIMES DAILY PRN
Qty: 21 TABLET | Refills: 0 | Status: SHIPPED | OUTPATIENT
Start: 2022-03-23 | End: 2022-03-30

## 2022-03-23 RX ORDER — NAPROXEN 500 MG/1
500 TABLET ORAL 2 TIMES DAILY
Qty: 14 TABLET | Refills: 0 | Status: ON HOLD | OUTPATIENT
Start: 2022-03-23 | End: 2022-07-01

## 2022-03-23 RX ORDER — ONDANSETRON 2 MG/ML
4 INJECTION INTRAMUSCULAR; INTRAVENOUS ONCE
Status: DISCONTINUED | OUTPATIENT
Start: 2022-03-23 | End: 2022-03-23

## 2022-03-23 RX ORDER — MORPHINE SULFATE 4 MG/ML
4 INJECTION, SOLUTION INTRAMUSCULAR; INTRAVENOUS ONCE
Status: DISCONTINUED | OUTPATIENT
Start: 2022-03-23 | End: 2022-03-23

## 2022-03-23 ASSESSMENT — ENCOUNTER SYMPTOMS
BACK PAIN: 1
ABDOMINAL DISTENTION: 0
SORE THROAT: 0
COLOR CHANGE: 0
CONSTIPATION: 0
RHINORRHEA: 0
ABDOMINAL PAIN: 0
EYE DISCHARGE: 0
SHORTNESS OF BREATH: 0

## 2022-03-23 ASSESSMENT — PAIN SCALES - GENERAL: PAINLEVEL_OUTOF10: 2

## 2022-03-23 ASSESSMENT — PAIN DESCRIPTION - ORIENTATION: ORIENTATION: MID

## 2022-03-23 ASSESSMENT — PAIN DESCRIPTION - LOCATION: LOCATION: BACK

## 2022-03-23 NOTE — ED TRIAGE NOTES
Pt a/o x 3 skin pink w/d resp non labored. Pt reports mid back pain. Sx x 1 month. Pt reports difficulty urinating and constipated.

## 2022-03-23 NOTE — ED PROVIDER NOTES
[address] [facility] ED  eMERGENCY dEPARTMENT eNCOUnter      Pt Name: Cora Ye  MRN: [id number]  [place] 1990  Date of evaluation: 3/23/2022  Provider: Duarte Santoro PA-C    CHIEF COMPLAINT       Chief Complaint   Patient presents with    Back Pain     chronc         HISTORY OF PRESENT ILLNESS   (Location/Symptom, Timing/Onset,Context/Setting, Quality, Duration, Modifying Factors, Severity)  Note limiting factors. Cora Ye is a 33 y.o. male who presents to the emergency department with complaint of mid and low back pain which patient states been ongoing for approximately 1 year, he states that he has not followed up with his regular family provider, he states he has been seen in the emerge department previously for the same issue without any specific diagnosis. He denies any new or acute injury, denies any bowel or bladder issues, he states he has intermittent issues with constipation. There is no fevers, no nausea vomiting, no paresthesias. Patient rates his current pain at this time is a 2 out of 10 to the mid back region. Past medical history significant for ADHD, anxiety, major just depressive disorder, polysubstance abuse. Patient states he was seen at [facility] for the same issue yesterday, he states that he left prior to completing evaluation. HPI    NursingNotes were reviewed. REVIEW OF SYSTEMS    (2-9 systems for level 4, 10 or more for level 5)     Review of Systems   Constitutional: Negative for activity change and appetite change. HENT: Negative for congestion, ear discharge, ear pain, nosebleeds, rhinorrhea and sore throat. Eyes: Negative for discharge. Respiratory: Negative for shortness of breath. Cardiovascular: Negative for chest pain, palpitations and leg swelling. Gastrointestinal: Negative for abdominal distention, abdominal pain and constipation. Genitourinary: Negative for difficulty urinating and dysuria. Musculoskeletal: Positive for back pain. Negative for arthralgias. Skin: Negative for color change, pallor, rash and wound. Neurological: Negative for dizziness, tremors, syncope, weakness, numbness and headaches. Psychiatric/Behavioral: Negative for agitation and confusion. Except as noted above the remainder of the review of systems was reviewed and negative. PAST MEDICAL HISTORY     Past Medical History:   Diagnosis Date    ADHD (attention deficit hyperactivity disorder)     was initiated on treatment by Dr. Jamari Angel. any testing was done here by Dr. Jamari Angel, no formal psych eval.      Anxiety     MDD [facility]major depressive disorder), [facility], [facility]         SURGICALHISTORY       Past Surgical History:   Procedure Laterality Date    TONSILLECTOMY AND ADENOIDECTOMY      WISDOM TOOTH EXTRACTION           CURRENT MEDICATIONS       Previous Medications    AMPHETAMINE-DEXTROAMPHETAMINE (ADDERALL) 15 MG TABLET    dextroamphetamine-amphetamine 15 mg tablet    CLONAZEPAM (KLONOPIN) 0.5 MG TABLET    Take 1 tablet by mouth 2 times daily as needed for Anxiety for up to 3 days. ESCITALOPRAM (LEXAPRO) 10 MG TABLET    Take 15 mg by mouth daily    LISDEXAMFETAMINE (VYVANSE) 70 MG CAPSULE    Take 70 mg by mouth every morning.        ALLERGIES     Peanut-containing drug products and Peanut oil    FAMILY HISTORY       Family History   Problem Relation Age of Onset    Depression Mother     Heart Disease Father     High Blood Pressure Father     High Cholesterol Father           SOCIAL HISTORY       Social History     Socioeconomic History    Marital status: Single     Spouse name: None    Number of children: None    Years of education: None    Highest education level: None   Occupational History    None   Tobacco Use    Smoking status: Never Smoker    Smokeless tobacco: Never Used   Substance and Sexual Activity    Alcohol use: Yes     Comment: occasional    Drug use: No    Sexual activity: None   Other Topics Concern    None   Social History Narrative    None     Social Determinants of Health     Financial Resource Strain: Low Risk     Difficulty of Paying Living Expenses: Not hard at all   Food Insecurity: No Food Insecurity    Worried About Running Out of Food in the Last Year: Never true    Anand of Food in the Last Year: Never true   Transportation Needs:     Lack of Transportation (Medical): Not on file    Lack of Transportation (Non-Medical): Not on file   Physical Activity:     Days of Exercise per Week: Not on file    Minutes of Exercise per Session: Not on file   Stress:     Feeling of Stress : Not on file   Social Connections:     Frequency of Communication with Friends and Family: Not on file    Frequency of Social Gatherings with Friends and Family: Not on file    Attends Muslim Services: Not on file    Active Member of [address] [place] [address] [employer] or Organizations: Not on file    Attends Club or Organization Meetings: Not on file    Marital Status: Not on file   Intimate Partner Violence:     Fear of Current or Ex-Partner: Not on file    Emotionally Abused: Not on file    Physically Abused: Not on file    Sexually Abused: Not on file   Housing Stability:     Unable to Pay for Housing in the Last Year: Not on file    Number of [place] in the Last Year: Not on file    Unstable Housing in the Last Year: Not on file       SCREENINGS    [place] Coma Scale  Eye Opening: Spontaneous  Best Verbal Response: Oriented  Best Motor Response: Obeys commands  [place] Coma Scale Score: 15 @FLOW([id number])@      PHYSICAL EXAM    (up to 7 for level 4, 8 or more for level 5)     ED Triage Vitals [03/23/22 1418]   BP Temp Temp Source Pulse Resp SpO2 Height Weight   109/76 97.4 °F (36.3 °C) Oral 86 16 99 % 5' 10\" (1.778 m) 170 lb (77.1 kg)       Physical Exam  Vitals and nursing note reviewed. Constitutional:       General: He is not in acute distress.      Appearance: He is well-developed. He is not ill-appearing, toxic-appearing or diaphoretic. HENT:      Head: Normocephalic. Nose: No congestion. Mouth/Throat:      Mouth: Mucous membranes are moist.      Pharynx: No oropharyngeal exudate or posterior oropharyngeal erythema. Eyes:      Extraocular Movements: Extraocular movements intact. Conjunctiva/sclera: Conjunctivae normal.      Pupils: Pupils are equal, round, and reactive to light. Neck:      Vascular: No JVD. Trachea: No tracheal deviation. Cardiovascular:      Rate and Rhythm: Normal rate. Pulses: Normal pulses. Heart sounds: Normal heart sounds. No murmur heard. No friction rub. No gallop. Pulmonary:      Effort: Pulmonary effort is normal. No tachypnea, accessory muscle usage, respiratory distress or retractions. Breath sounds: Normal breath sounds. No stridor. No wheezing, rhonchi or rales. Chest:      Chest wall: No tenderness. Abdominal:      General: Abdomen is flat. Bowel sounds are normal. There is no distension or abdominal bruit. Palpations: There is no shifting dullness, fluid wave, hepatomegaly, splenomegaly, mass or pulsatile mass. Tenderness: There is no abdominal tenderness. There is no right CVA tenderness, left CVA tenderness, guarding or rebound. Negative signs include Tsang's sign, Rovsing's sign and McBurney's sign. Comments: Abdomen soft nondistended nontender no guarding mass rebound, no CVA tenderness, no facial grimace on examination. Musculoskeletal:         General: No deformity. Cervical back: Normal range of motion and neck supple. No rigidity. Back:       Comments: Patient is able to stand and ambulate with upright steady gait, there is no limp ataxia or foot drop. Patient appears uncomfortable during ambulation. Skin:     General: Skin is warm and dry. Capillary Refill: Capillary refill takes less than 2 seconds. Coloration: Skin is not jaundiced. Neurological:      General: No focal deficit present. Mental Status: He is alert and oriented to person, place, and time. Mental status is at baseline. Cranial Nerves: No cranial nerve deficit. Sensory: No sensory deficit. Motor: No weakness. Coordination: Coordination normal.   Psychiatric:         Mood and Affect: Mood normal.         DIAGNOSTIC RESULTS     EKG: All EKG's are interpreted by the Emergency Department Physician who either signs or Co-signsthis chart in the absence of a cardiologist.        RADIOLOGY:   Antonio Gun such as CT, Ultrasound and MRI are read by the radiologist. Plain radiographic images are visualized and preliminarily interpreted by the emergency physician with the below findings:        Interpretation per the Radiologist below, if available at the time ofthis note:    CT THORACIC SPINE WO CONTRAST   Final Result   FINAL IMPRESSION:       ESSENTIALLY NEGATIVE THORACIC AND LUMBAR SPINE CTs. CT LUMBAR SPINE WO CONTRAST   Final Result   FINAL IMPRESSION:       ESSENTIALLY NEGATIVE THORACIC AND LUMBAR SPINE CTs. ED BEDSIDE ULTRASOUND:   Performed by ED Physician - none    LABS:  Labs Reviewed - No data to display    All other labs were within normal range or not returned as of this dictation.     EMERGENCY DEPARTMENT COURSE and DIFFERENTIAL DIAGNOSIS/MDM:   Vitals:    Vitals:    03/23/22 1418   BP: 109/76   Pulse: 86   Resp: 16   Temp: 97.4 °F (36.3 °C)   TempSrc: Oral   SpO2: 99%   Weight: 170 lb (77.1 kg)   Height: 5' 10\" (1.778 m)          MDM  Number of Diagnoses or Management Options  Chronic bilateral low back pain without sciatica  Diagnosis management comments: Patient presented ED with complaint of back pain which has been ongoing for approximately 1 year, he states today he is having worsening low back pain, he states increased pain with ambulation, he denies any new or acute injury, there is no bowel or bladder dysfunction, no paresthesias, no weakness. Patient states he has not followed up with his regular family provider for this issue. CT scan of the thoracic and lumbar spine were completed which showed no acute process at this time. Patient very is very comfortable, he is able to stand and ambulate with upright steady gait, he was given a prescription for Naprosyn Flexeril, he was advised to contact his regular family provider for follow-up. Should he have any worsening or changes symptoms, he was advised return to the ED. CRITICAL CARE TIME   Total Critical Care time was 0 minutes, excluding separately reportableprocedures. There was a high probability of clinicallysignificant/life threatening deterioration in the patient's condition which required my urgent intervention. CONSULTS:  None    PROCEDURES:  Unless otherwise noted below, none     Procedures    FINAL IMPRESSION      1.  Chronic bilateral low back pain without sciatica          DISPOSITION/PLAN   DISPOSITION Decision To Discharge 03/23/2022 04:03:19 PM      PATIENT REFERRED TO:  Shagufta Keyes, YING - CNP  [address] [address], [address]  [place] [address] [phone]    In 3 days        DISCHARGE MEDICATIONS:  New Prescriptions    CYCLOBENZAPRINE (FLEXERIL) 10 MG TABLET    Take 1 tablet by mouth 3 times daily as needed for Muscle spasms    NAPROXEN (NAPROSYN) 500 MG TABLET    Take 1 tablet by mouth 2 times daily for 7 days          (Please note that portions of this note were completed with a voice recognition program.  Efforts were made to edit the dictations but occasionally words are mis-transcribed.)    Jai Madden PA-C (electronically signed)  Attending Emergency Physician         Jai Madden PA-C  03/23/22 2282

## 2022-06-30 ENCOUNTER — APPOINTMENT (OUTPATIENT)
Dept: CT IMAGING | Age: 32
DRG: 812 | End: 2022-06-30
Payer: COMMERCIAL

## 2022-06-30 ENCOUNTER — HOSPITAL ENCOUNTER (INPATIENT)
Age: 32
LOS: 11 days | Discharge: NURSING, REHAB OR LONG-TERM CARE | DRG: 812 | End: 2022-07-11
Attending: EMERGENCY MEDICINE
Payer: COMMERCIAL

## 2022-06-30 DIAGNOSIS — R41.82 ALTERED MENTAL STATUS, UNSPECIFIED ALTERED MENTAL STATUS TYPE: ICD-10-CM

## 2022-06-30 DIAGNOSIS — M62.82 NON-TRAUMATIC RHABDOMYOLYSIS: ICD-10-CM

## 2022-06-30 DIAGNOSIS — F19.10 DRUG ABUSE (HCC): Primary | ICD-10-CM

## 2022-06-30 PROBLEM — G93.40 ACUTE ENCEPHALOPATHY: Status: ACTIVE | Noted: 2022-06-30

## 2022-06-30 LAB
ACETAMINOPHEN LEVEL: <5 UG/ML (ref 10–30)
ACETAMINOPHEN LEVEL: <5 UG/ML (ref 10–30)
ALBUMIN SERPL-MCNC: 3.6 G/DL (ref 3.5–4.6)
ALBUMIN SERPL-MCNC: 4.3 G/DL (ref 3.5–4.6)
ALP BLD-CCNC: 120 U/L (ref 35–104)
ALP BLD-CCNC: 138 U/L (ref 35–104)
ALT SERPL-CCNC: 1371 U/L (ref 0–41)
ALT SERPL-CCNC: 1627 U/L (ref 0–41)
AMPHETAMINE SCREEN, URINE: POSITIVE
ANION GAP SERPL CALCULATED.3IONS-SCNC: 12 MEQ/L (ref 9–15)
AST SERPL-CCNC: 1437 U/L (ref 0–40)
AST SERPL-CCNC: 1818 U/L (ref 0–40)
BACTERIA: NEGATIVE /HPF
BANDED NEUTROPHILS RELATIVE PERCENT: 20 %
BARBITURATE SCREEN URINE: ABNORMAL
BASOPHILS ABSOLUTE: 0 K/UL (ref 0–0.2)
BASOPHILS RELATIVE PERCENT: 0.1 %
BENZODIAZEPINE SCREEN, URINE: POSITIVE
BILIRUB SERPL-MCNC: 0.6 MG/DL (ref 0.2–0.7)
BILIRUB SERPL-MCNC: 0.7 MG/DL (ref 0.2–0.7)
BILIRUBIN DIRECT: <0.2 MG/DL (ref 0–0.4)
BILIRUBIN URINE: NEGATIVE
BILIRUBIN, INDIRECT: ABNORMAL MG/DL (ref 0–0.6)
BLOOD, URINE: ABNORMAL
BUN BLDV-MCNC: 30 MG/DL (ref 6–20)
CALCIUM SERPL-MCNC: 8.9 MG/DL (ref 8.5–9.9)
CANNABINOID SCREEN URINE: ABNORMAL
CHLORIDE BLD-SCNC: 98 MEQ/L (ref 95–107)
CHOLESTEROL, TOTAL: 166 MG/DL (ref 0–199)
CLARITY: ABNORMAL
CO2: 28 MEQ/L (ref 20–31)
COCAINE METABOLITE SCREEN URINE: ABNORMAL
COLOR: ABNORMAL
CREAT SERPL-MCNC: 1.49 MG/DL (ref 0.7–1.2)
EOSINOPHILS ABSOLUTE: 0 K/UL (ref 0–0.7)
EOSINOPHILS RELATIVE PERCENT: 0 %
EPITHELIAL CELLS, UA: ABNORMAL /HPF (ref 0–5)
ETHANOL PERCENT: NORMAL G/DL
ETHANOL: <10 MG/DL (ref 0–0.08)
FENTANYL SCREEN, URINE: POSITIVE
GFR AFRICAN AMERICAN: >60
GFR NON-AFRICAN AMERICAN: 54
GLOBULIN: 3.1 G/DL (ref 2.3–3.5)
GLUCOSE BLD-MCNC: 162 MG/DL (ref 70–99)
GLUCOSE URINE: NEGATIVE MG/DL
HCT VFR BLD CALC: 48.8 % (ref 42–52)
HDLC SERPL-MCNC: 55 MG/DL (ref 40–59)
HEMOGLOBIN: 16.2 G/DL (ref 14–18)
HYALINE CASTS: ABNORMAL /HPF (ref 0–5)
KETONES, URINE: NEGATIVE MG/DL
LACTIC ACID: 3.4 MMOL/L (ref 0.5–2.2)
LDL CHOLESTEROL CALCULATED: 85 MG/DL (ref 0–129)
LEUKOCYTE ESTERASE, URINE: ABNORMAL
LYMPHOCYTES ABSOLUTE: 0.3 K/UL (ref 1–4.8)
LYMPHOCYTES RELATIVE PERCENT: 1 %
Lab: ABNORMAL
MCH RBC QN AUTO: 28.6 PG (ref 27–31.3)
MCHC RBC AUTO-ENTMCNC: 33.2 % (ref 33–37)
MCV RBC AUTO: 85.9 FL (ref 80–100)
METAMYELOCYTES RELATIVE PERCENT: 1 %
METHADONE SCREEN, URINE: ABNORMAL
MONOCYTES ABSOLUTE: 0.7 K/UL (ref 0.2–0.8)
MONOCYTES RELATIVE PERCENT: 1.9 %
NEUTROPHILS ABSOLUTE: 33.8 K/UL (ref 1.4–6.5)
NEUTROPHILS RELATIVE PERCENT: 76 %
NITRITE, URINE: NEGATIVE
OPIATE SCREEN URINE: ABNORMAL
OXYCODONE URINE: ABNORMAL
PDW BLD-RTO: 13 % (ref 11.5–14.5)
PH UA: 5.5 (ref 5–9)
PHENCYCLIDINE SCREEN URINE: ABNORMAL
PLATELET # BLD: 322 K/UL (ref 130–400)
PLATELET SLIDE REVIEW: NORMAL
POTASSIUM SERPL-SCNC: 4 MEQ/L (ref 3.4–4.9)
PROCALCITONIN: 5.37 NG/ML (ref 0–0.15)
PROPOXYPHENE SCREEN: ABNORMAL
PROTEIN UA: >=300 MG/DL
RBC # BLD: 5.68 M/UL (ref 4.7–6.1)
RBC # BLD: NORMAL 10*6/UL
RBC UA: ABNORMAL /HPF (ref 0–5)
SALICYLATE, SERUM: <0.3 MG/DL (ref 15–30)
SODIUM BLD-SCNC: 138 MEQ/L (ref 135–144)
SPECIFIC GRAVITY UA: 1.03 (ref 1–1.03)
TOTAL CK: ABNORMAL U/L (ref 0–190)
TOTAL PROTEIN: 6.5 G/DL (ref 6.3–8)
TOTAL PROTEIN: 7.4 G/DL (ref 6.3–8)
TRIGL SERPL-MCNC: 130 MG/DL (ref 0–150)
TSH REFLEX: 1.15 UIU/ML (ref 0.44–3.86)
URINE REFLEX TO CULTURE: ABNORMAL
UROBILINOGEN, URINE: 0.2 E.U./DL
WBC # BLD: 34.8 K/UL (ref 4.8–10.8)
WBC UA: ABNORMAL /HPF (ref 0–5)

## 2022-06-30 PROCEDURE — 81001 URINALYSIS AUTO W/SCOPE: CPT

## 2022-06-30 PROCEDURE — 80143 DRUG ASSAY ACETAMINOPHEN: CPT

## 2022-06-30 PROCEDURE — 87040 BLOOD CULTURE FOR BACTERIA: CPT

## 2022-06-30 PROCEDURE — 80179 DRUG ASSAY SALICYLATE: CPT

## 2022-06-30 PROCEDURE — 82550 ASSAY OF CK (CPK): CPT

## 2022-06-30 PROCEDURE — 6360000002 HC RX W HCPCS: Performed by: EMERGENCY MEDICINE

## 2022-06-30 PROCEDURE — 2580000003 HC RX 258: Performed by: EMERGENCY MEDICINE

## 2022-06-30 PROCEDURE — 84443 ASSAY THYROID STIM HORMONE: CPT

## 2022-06-30 PROCEDURE — 99285 EMERGENCY DEPT VISIT HI MDM: CPT

## 2022-06-30 PROCEDURE — 83605 ASSAY OF LACTIC ACID: CPT

## 2022-06-30 PROCEDURE — 80061 LIPID PANEL: CPT

## 2022-06-30 PROCEDURE — 80307 DRUG TEST PRSMV CHEM ANLYZR: CPT

## 2022-06-30 PROCEDURE — 84145 PROCALCITONIN (PCT): CPT

## 2022-06-30 PROCEDURE — 36415 COLL VENOUS BLD VENIPUNCTURE: CPT

## 2022-06-30 PROCEDURE — 80053 COMPREHEN METABOLIC PANEL: CPT

## 2022-06-30 PROCEDURE — 70450 CT HEAD/BRAIN W/O DYE: CPT

## 2022-06-30 PROCEDURE — 82077 ASSAY SPEC XCP UR&BREATH IA: CPT

## 2022-06-30 PROCEDURE — 85025 COMPLETE CBC W/AUTO DIFF WBC: CPT

## 2022-06-30 PROCEDURE — 96360 HYDRATION IV INFUSION INIT: CPT

## 2022-06-30 PROCEDURE — 2000000000 HC ICU R&B

## 2022-06-30 RX ORDER — ENOXAPARIN SODIUM 100 MG/ML
40 INJECTION SUBCUTANEOUS DAILY
Status: DISCONTINUED | OUTPATIENT
Start: 2022-06-30 | End: 2022-07-11 | Stop reason: HOSPADM

## 2022-06-30 RX ORDER — 0.9 % SODIUM CHLORIDE 0.9 %
1000 INTRAVENOUS SOLUTION INTRAVENOUS ONCE
Status: COMPLETED | OUTPATIENT
Start: 2022-06-30 | End: 2022-06-30

## 2022-06-30 RX ORDER — 0.9 % SODIUM CHLORIDE 0.9 %
1000 INTRAVENOUS SOLUTION INTRAVENOUS ONCE
Status: DISCONTINUED | OUTPATIENT
Start: 2022-06-30 | End: 2022-07-11 | Stop reason: HOSPADM

## 2022-06-30 RX ORDER — POTASSIUM CHLORIDE 7.45 MG/ML
10 INJECTION INTRAVENOUS PRN
Status: DISCONTINUED | OUTPATIENT
Start: 2022-06-30 | End: 2022-07-11 | Stop reason: HOSPADM

## 2022-06-30 RX ORDER — POTASSIUM CHLORIDE 20 MEQ/1
40 TABLET, EXTENDED RELEASE ORAL PRN
Status: DISCONTINUED | OUTPATIENT
Start: 2022-06-30 | End: 2022-07-11 | Stop reason: HOSPADM

## 2022-06-30 RX ORDER — ONDANSETRON 2 MG/ML
4 INJECTION INTRAMUSCULAR; INTRAVENOUS EVERY 6 HOURS PRN
Status: DISCONTINUED | OUTPATIENT
Start: 2022-06-30 | End: 2022-07-11 | Stop reason: HOSPADM

## 2022-06-30 RX ORDER — SODIUM CHLORIDE 0.9 % (FLUSH) 0.9 %
5-40 SYRINGE (ML) INJECTION EVERY 12 HOURS SCHEDULED
Status: DISCONTINUED | OUTPATIENT
Start: 2022-06-30 | End: 2022-07-11 | Stop reason: HOSPADM

## 2022-06-30 RX ORDER — SODIUM CHLORIDE 9 MG/ML
INJECTION, SOLUTION INTRAVENOUS PRN
Status: DISCONTINUED | OUTPATIENT
Start: 2022-06-30 | End: 2022-07-11 | Stop reason: HOSPADM

## 2022-06-30 RX ORDER — ONDANSETRON 4 MG/1
4 TABLET, ORALLY DISINTEGRATING ORAL EVERY 8 HOURS PRN
Status: DISCONTINUED | OUTPATIENT
Start: 2022-06-30 | End: 2022-07-11 | Stop reason: HOSPADM

## 2022-06-30 RX ORDER — SODIUM CHLORIDE 0.9 % (FLUSH) 0.9 %
5-40 SYRINGE (ML) INJECTION PRN
Status: DISCONTINUED | OUTPATIENT
Start: 2022-06-30 | End: 2022-07-11 | Stop reason: HOSPADM

## 2022-06-30 RX ADMIN — SODIUM CHLORIDE 1000 ML: 9 INJECTION, SOLUTION INTRAVENOUS at 17:39

## 2022-06-30 RX ADMIN — ACETYLCYSTEINE 14260 MG: 200 INJECTION, SOLUTION INTRAVENOUS at 22:52

## 2022-06-30 RX ADMIN — SODIUM CHLORIDE 1000 ML: 9 INJECTION, SOLUTION INTRAVENOUS at 16:25

## 2022-06-30 ASSESSMENT — PAIN - FUNCTIONAL ASSESSMENT: PAIN_FUNCTIONAL_ASSESSMENT: NONE - DENIES PAIN

## 2022-06-30 NOTE — H&P
daily as needed for Anxiety for up to 3 days. 7/8/19 12/22/21  Tone Sheriff MD       Allergies:  Peanut-containing drug products and Peanut oil    Social History:   TOBACCO:   reports that he has never smoked. He has never used smokeless tobacco.  ETOH:   reports current alcohol use. OCCUPATION:  [employer]     Family History:       Problem Relation Age of Onset    Depression Mother     Heart Disease Father     High Blood Pressure Father     High Cholesterol Father        REVIEW OF SYSTEMS:  Ten systems reviewed and negative except for as above. Physical Exam:    Vitals: BP (!) 130/92   Pulse (!) 123   Temp 99 °F (37.2 °C) (Oral)   Resp 27   Ht 5' 10\" (1.778 m)   SpO2 96%   BMI 24.39 kg/m²   Constitutional: Obtunded  Skin: pressure changes right foot, large abrasions posterior skull/neck  Eyes:Eye: Normal external eye, conjunctiva, SHABANA. ENT: Head: Normocephalic, no lesions, without obvious abnormality.   Neck: no adenopathy, no carotid bruit, no JVD, supple, symmetrical, trachea midline and thyroid not enlarged, symmetric  Respiratory: clear to auscultation bilaterally  Cardiovascular: regular rate and rhythm, S1, S2 normal, no murmur, click, rub or gallop  Gastrointestinal: soft; bowel sounds normal; no masses,  no organomegaly  Genitourinary: Deferred  Musculoskeletal:extremities normal, atraumatic, no cyanosis or edema  Neurologic: Obtunded, minimally responsive to stimuli        Recent Labs     06/30/22  1600   WBC 34.8*   HGB 16.2        Recent Labs     06/30/22  1600      K 4.0   CL 98   CO2 28   BUN 30*   CREATININE 1.49*   GLUCOSE 162*   AST 1,818*   ALT 1,627*   BILITOT 0.7   ALKPHOS 138*       URINALYSIS:  Recent Labs     06/30/22  1600   COLORU ORANGE*   PHUR 5.5   WBCUA 3-5   RBCUA 3-5*   BACTERIA Negative   CLARITYU CLOUDY*   SPECGRAV 1.033   LEUKOCYTESUR SMALL*   UROBILINOGEN 0.2   BILIRUBINUR Negative   BLOODU LARGE*   GLUCOSEU Negative -----------------------------------------------------------------   CT Head WO Contrast    Result Date: 6/30/2022  CT Brain. Contrast medium:  without contrast.. History: Altered mental status. Technical factors: CT imaging of the brain was obtained and formatted as 5 mm contiguous axial images. 2.5 mm contiguous axial images were obtained through the osseous structures. Sagittal and coronal reconstruction obtained during postprocessing. Comparison:  None. Findings: Extra-axial spaces:  Normal. Intracranial hemorrhage:  None. Ventricular system: [Negative. Basal Cisterns:  Without anomaly. Cerebral Parenchyma: Bilateral, symmetric areas of decreased attenuation exerting no mass effect measuring approximately 9 mm in size since found within the bilateral globus pallidus (series 2, image 17). Midline Shift:  None. Cerebellum:  No anomaly identified. Paranasal sinuses and mastoid air cells:  No anomaly identified. Visualized Orbits:  Negative. Impression: Round symmetric areas decreased attenuation bilateral globus pallidus. This finding may be seen in patients experiencing hypoxia, i.e., carbon monoxide poisoning. All CT scans at this facility use dose modulation, iterative reconstruction, and/or weight based dosing when appropriate to reduce radiation dose to as low as reasonably achievable. Assessment and Plan   1. Polysubstance overdose/acute encephalopathy  1. Unclear story of which substances taken when, supportive care in ICU, intensivist c/s. Protecting airway currently. 2. Rhabdomyolysis/EDGAR  1. CK > 20,000, IV fluids, cycle ck q6, monitor Cr. 3. Acute Hepatitis  1. May be 2/2 kratom use, however cannot completely rule out acetaminophen tox. Neg serum level, patient cannot provide hx or use. NAC given in ER in discussion with poison control, q12 LFTs.   4. DVT proph    Patient Active Problem List   Diagnosis Code    ADHD (attention deficit hyperactivity disorder) F90.9    Anxiety F41.9    ROSALES (generalized anxiety disorder) F41.1    LPRD (laryngopharyngeal reflux disease) K21.9    MDD (major depressive disorder), recurrent episode, moderate (HCC) F33.1    Polysubstance (excluding opioids) dependence (HCC) F19.20    Shift work sleep disorder G47.26    Attention deficit hyperactivity disorder (ADHD) F90.9    Acute encephalopathy G93.40       Karthik Rodriguez MD, MD  Admitting Hospitalist    CCT 30 minutes

## 2022-06-30 NOTE — ED TRIAGE NOTES
[facility] and  hadn't seen pt for a few days   Pt has been taking royal augustintom  Pt hasn't been getting up and just peeing on himself  Pt will open eyes when talked to  Pt isn't really answering any questions  Pt wasn't given any narcan   Pt denies pain

## 2022-06-30 NOTE — PROGRESS NOTES
Assumed 1:1 . Patient resting quietly in bed, with eyes closed, positioned on back. Respirations WNL, even, unlabored. Will continue to monitor.

## 2022-06-30 NOTE — ED NOTES
Straight cath was utilized to obtain the urine sample, pt did respond to the straight cath making a moaning sound and moving his left hand towards his groin.      Gretel Choudhury RN  06/30/22 4106

## 2022-06-30 NOTE — ED PROVIDER NOTES
[address] [facility] ED  EMERGENCY DEPARTMENT ENCOUNTER      Pt Name: Jojo Brooks  MRN: [id number]  [place] 1990  Date of evaluation: 6/30/2022  Provider: Prince Diane MD    CHIEF COMPLAINT       Chief Complaint   Patient presents with    Drug Overdose     pt has been taking royal kratom (hasn't left his apartment in a few days). Well check. HISTORY OF PRESENT ILLNESS   (Location/Symptom, Timing/Onset, Context/Setting, Quality, Duration, Modifying Factors, Severity)  Note limiting factors. 68-year-old male with history of drug abuse presenting with altered mental status. He has not been to work in several days. Crescencio Godwin called his family who did a well check. When they went to the room they noticed that he has not left his house in several days and was disoriented. Nursing Notes were reviewed. REVIEW OF SYSTEMS    (2-9 systems for level 4, 10 or more for level 5)     Review of Systems   Unable to perform ROS: Mental status change   All other systems reviewed and are negative. Except as noted above the remainder of the review of systems was reviewed and negative. PAST MEDICAL HISTORY     Past Medical History:   Diagnosis Date    ADHD (attention deficit hyperactivity disorder)     was initiated on treatment by Dr. Ariel Vo. any testing was done here by Dr. Ariel Vo, no formal psych eval.      Anxiety     MDD (major depressive disorder), recurrent episode, moderate ([facility] [address] 6/4/2019         SURGICAL HISTORY       Past Surgical History:   Procedure Laterality Date    TONSILLECTOMY AND ADENOIDECTOMY      WISDOM TOOTH EXTRACTION           CURRENT MEDICATIONS       Previous Medications    AMPHETAMINE-DEXTROAMPHETAMINE (ADDERALL) 15 MG TABLET    dextroamphetamine-amphetamine 15 mg tablet    CLONAZEPAM (KLONOPIN) 0.5 MG TABLET    Take 1 tablet by mouth 2 times daily as needed for Anxiety for up to 3 days.     ESCITALOPRAM (LEXAPRO) 10 MG TABLET    Take 15 mg by mouth daily Abused: Not on file    Physically Abused: Not on file    Sexually Abused: Not on file   Housing Stability:     Unable to Pay for Housing in the Last Year: Not on file    Number of Places Lived in the Last Year: Not on file    Unstable Housing in the Last Year: Not on file       SCREENINGS    Verna Coma Scale  Eye Opening: Spontaneous  Best Verbal Response: Oriented  Best Motor Response: Obeys commands  [place] Coma Scale Score: 15          PHYSICAL EXAM    (up to 7 for level 4, 8 or more for level 5)     ED Triage Vitals [06/30/22 1550]   BP Temp Temp Source Heart Rate Resp SpO2 Height Weight   117/85 99 °F (37.2 °C) Oral (!) 130 18 98 % 5' 10\" (1.778 m) --       Physical Exam  Vitals and nursing note reviewed. Constitutional:       General: He is not in acute distress. Appearance: Normal appearance. He is well-developed. He is not ill-appearing. Comments: Responsive, alert but not oriented; gcs 12   HENT:      Head: Normocephalic and atraumatic. Mouth/Throat:      Mouth: Mucous membranes are moist.      Pharynx: Oropharynx is clear. Eyes:      Extraocular Movements: Extraocular movements intact. Conjunctiva/sclera: Conjunctivae normal.   Cardiovascular:      Rate and Rhythm: Regular rhythm. Tachycardia present. Pulmonary:      Effort: Pulmonary effort is normal.      Breath sounds: Normal breath sounds. Abdominal:      General: Bowel sounds are normal.      Palpations: Abdomen is soft. Tenderness: There is no abdominal tenderness. Musculoskeletal:         General: No deformity. Normal range of motion. Cervical back: Normal range of motion and neck supple. Skin:     General: Skin is warm and dry. Capillary Refill: Capillary refill takes less than 2 seconds. Neurological:      General: No focal deficit present. Mental Status: He is oriented to person, place, and time. Mental status is at baseline. Cranial Nerves: No cranial nerve deficit.    Psychiatric: Thought Content: Thought content normal.         DIAGNOSTIC RESULTS     EKG: All EKG's are interpreted by the Emergency Department Physician who either signs or Co-signs this chart in the absence of a cardiologist.    RADIOLOGY:   Non-plain film images such as CT, Ultrasound and MRI are read by the radiologist. Plain radiographic images are visualized and preliminarily interpreted by the emergency physician with the below findings:    Interpretation per the Radiologist below, if available at the time of this note:    CT Head WO Contrast   Final Result   Impression:      Round symmetric areas decreased attenuation bilateral globus pallidus. This finding may be seen in patients experiencing hypoxia, i.e., carbon monoxide poisoning. All CT scans at this facility use dose modulation, iterative reconstruction, and/or weight based dosing when appropriate to reduce radiation dose to as low as reasonably achievable. LABS:  Labs Reviewed   CBC WITH AUTO DIFFERENTIAL - Abnormal; Notable for the following components:       Result Value    WBC 34.8 (*)     Neutrophils Absolute 33.8 (*)     Lymphocytes Absolute 0.3 (*)     All other components within normal limits   ACETAMINOPHEN LEVEL - Abnormal; Notable for the following components:    Acetaminophen Level <5 (*)     All other components within normal limits   CK - Abnormal; Notable for the following components:     Total CK >20,000 (*)     All other components within normal limits   COMPREHENSIVE METABOLIC PANEL - Abnormal; Notable for the following components:    Glucose 162 (*)     BUN 30 (*)     CREATININE 1.49 (*)     GFR Non- 54.0 (*)     Alkaline Phosphatase 138 (*)     ALT 1,627 (*)     AST 1,818 (*)     All other components within normal limits   SALICYLATE LEVEL - Abnormal; Notable for the following components:    Salicylate, Serum <7.3 (*)     All other components within normal limits   URINE DRUG SCREEN - Abnormal; Notable for the following components:    Amphetamine Screen, Urine POSITIVE (*)     Benzodiazepine Screen, Urine POSITIVE (*)     FENTANYL SCREEN, URINE POSITIVE (*)     All other components within normal limits   URINALYSIS WITH REFLEX TO CULTURE - Abnormal; Notable for the following components:    Color, UA ORANGE (*)     Clarity, UA CLOUDY (*)     Blood, Urine LARGE (*)     Protein, UA >=300 (*)     Leukocyte Esterase, Urine SMALL (*)     All other components within normal limits   LACTIC ACID - Abnormal; Notable for the following components:    Lactic Acid 3.4 (*)     All other components within normal limits    Narrative:     CALL  [facility] tel. [phone],  Lactic Acid results called to and read back by Eric Wolff, 06/30/2022 17:30, by  [facility] [place]   PROCALCITONIN - Abnormal; Notable for the following components:    Procalcitonin 5.37 (*)     All other components within normal limits   MICROSCOPIC URINALYSIS - Abnormal; Notable for the following components:    RBC, UA 3-5 (*)     All other components within normal limits   CULTURE, BLOOD 2   CULTURE, BLOOD 1   ETHANOL   LIPID PANEL   TSH WITH REFLEX   ACETAMINOPHEN LEVEL   COMPREHENSIVE METABOLIC PANEL W/ REFLEX TO MG FOR LOW K       All other labs were within normal range or not returned as of this dictation. EMERGENCY DEPARTMENT COURSE and DIFFERENTIAL DIAGNOSIS/MDM:   Vitals:    Vitals:    06/30/22 1600 06/30/22 1642 06/30/22 1659 06/30/22 1700   BP: 120/85 (!) 126/92  (!) 130/92   Pulse: (!) 129 (!) 131 (!) 124 (!) 123   Resp: 28 27 26 27   Temp:       TempSrc:       SpO2:   96% 96%   Height:           MDM  Number of Diagnoses or Management Options  Altered mental status, unspecified altered mental status type  Drug abuse ([facility] [address]  Non-traumatic rhabdomyolysis  Diagnosis management comments: Overdose on unknown drug. Found to be in rhabdo with slight elevation in kidney function and liver as well. Spoke with poison control.   Due to unclear history we will start Acetadote. Second bag of Acetadote will need to be run at dose of 100 mg/kg over 16 hours. LFT should be repeated after 12 hours from initial presentation to ED. Spoke to Deonna Durán, hospitalist who accepts admission. Patient is altered and will start in ICU. Otherwise hemodynamically stable and transferred to the floor in fair condition. Procedures    CRITICAL CARE TIME   Total Critical Care time was 0 minutes, excluding separately reportable procedures. There was a high probability of clinically significant/life threatening deterioration in the patient's condition which required my urgent intervention. FINAL IMPRESSION      1. Drug abuse ([facility] [address]    2. Altered mental status, unspecified altered mental status type    3.  Non-traumatic rhabdomyolysis          DISPOSITION/PLAN   DISPOSITION Admitted 06/30/2022 05:42:41 PM      (Please note that portions of this note were completed with a voice recognition program.  Efforts were made to edit the dictations but occasionally words are mis-transcribed.)    Nathalie Hernández MD (electronically signed)  Attending Emergency Physician        Nathalie Hernández MD  06/30/22 9662

## 2022-06-30 NOTE — CARE COORDINATION
[facility] [place] Case Management Initial Discharge Assessment    Met with Family to discuss discharge plan. PCP: Latrice Bland, APRN - CNP                                Date of Last Visit: \"recently\" per mom    [facility] Patient: No        [facility] Notified: no    If no PCP, list provided? N/A    Discharge Planning    Living Arrangements: independently at home    Who do you live with? self    Who helps you with your care:  self    If lives at home:     Do you have any barriers navigating in your home? no    Patient can perform ADL? Yes    Current Services (outpatient and in home) :  None    Dialysis: No    Is transportation available to get to your appointments? No, Discussed Options caresource transportation discussed with mom    DME Equipment:  no    Respiratory equipment: None    Respiratory provider:  no     Pharmacy:  [facility]    Consult with Medication Assistance Program?  No           Does Patient Have a High-Risk for Readmission Diagnosis (CHF, PN, MI, COPD)? No      Initial Discharge Plan? (Note: please see concurrent daily documentation for any updates after initial note). Pt is unconscious so d/c needs will need to be addressed by cm.     Readmission Risk              Risk of Unplanned Readmission:  0         Electronically signed by Gregorio Rubio RN on 6/30/2022 at 6:21 PM

## 2022-07-01 ENCOUNTER — APPOINTMENT (OUTPATIENT)
Dept: GENERAL RADIOLOGY | Age: 32
DRG: 812 | End: 2022-07-01
Payer: COMMERCIAL

## 2022-07-01 ENCOUNTER — APPOINTMENT (OUTPATIENT)
Dept: MRI IMAGING | Age: 32
DRG: 812 | End: 2022-07-01
Payer: COMMERCIAL

## 2022-07-01 LAB
ALBUMIN SERPL-MCNC: 3.1 G/DL (ref 3.5–4.6)
ALBUMIN SERPL-MCNC: 3.3 G/DL (ref 3.5–4.6)
ALP BLD-CCNC: 101 U/L (ref 35–104)
ALP BLD-CCNC: 117 U/L (ref 35–104)
ALT SERPL-CCNC: 1224 U/L (ref 0–41)
ALT SERPL-CCNC: 1436 U/L (ref 0–41)
ANION GAP SERPL CALCULATED.3IONS-SCNC: 13 MEQ/L (ref 9–15)
AST SERPL-CCNC: 1237 U/L (ref 0–40)
AST SERPL-CCNC: 872 U/L (ref 0–40)
BILIRUB SERPL-MCNC: 0.6 MG/DL (ref 0.2–0.7)
BILIRUB SERPL-MCNC: 0.6 MG/DL (ref 0.2–0.7)
BILIRUBIN DIRECT: <0.2 MG/DL (ref 0–0.4)
BILIRUBIN, INDIRECT: ABNORMAL MG/DL (ref 0–0.6)
BUN BLDV-MCNC: 23 MG/DL (ref 6–20)
CALCIUM SERPL-MCNC: 8.4 MG/DL (ref 8.5–9.9)
CARBOXYHEMOGLOBIN: 4.1 % (ref 0–4)
CHLORIDE BLD-SCNC: 105 MEQ/L (ref 95–107)
CO2: 25 MEQ/L (ref 20–31)
CREAT SERPL-MCNC: 1.18 MG/DL (ref 0.7–1.2)
GFR AFRICAN AMERICAN: >60
GFR NON-AFRICAN AMERICAN: >60
GGT: 55 U/L (ref 8–61)
GLOBULIN: 3.2 G/DL (ref 2.3–3.5)
GLUCOSE BLD-MCNC: 113 MG/DL (ref 70–99)
INR BLD: 1.3
POTASSIUM REFLEX MAGNESIUM: 3.6 MEQ/L (ref 3.4–4.9)
PROCALCITONIN: 4.79 NG/ML (ref 0–0.15)
PROTHROMBIN TIME: 16.2 SEC (ref 12.3–14.9)
SODIUM BLD-SCNC: 143 MEQ/L (ref 135–144)
TOTAL CK: ABNORMAL U/L (ref 0–190)
TOTAL PROTEIN: 6.1 G/DL (ref 6.3–8)
TOTAL PROTEIN: 6.5 G/DL (ref 6.3–8)

## 2022-07-01 PROCEDURE — 82977 ASSAY OF GGT: CPT

## 2022-07-01 PROCEDURE — 6360000002 HC RX W HCPCS: Performed by: INTERNAL MEDICINE

## 2022-07-01 PROCEDURE — 99254 IP/OBS CNSLTJ NEW/EST MOD 60: CPT | Performed by: PSYCHIATRY & NEUROLOGY

## 2022-07-01 PROCEDURE — 85610 PROTHROMBIN TIME: CPT

## 2022-07-01 PROCEDURE — 6360000002 HC RX W HCPCS: Performed by: FAMILY MEDICINE

## 2022-07-01 PROCEDURE — 99211 OFF/OP EST MAY X REQ PHY/QHP: CPT

## 2022-07-01 PROCEDURE — 84145 PROCALCITONIN (PCT): CPT

## 2022-07-01 PROCEDURE — 6360000002 HC RX W HCPCS: Performed by: PSYCHIATRY & NEUROLOGY

## 2022-07-01 PROCEDURE — 2580000003 HC RX 258: Performed by: FAMILY MEDICINE

## 2022-07-01 PROCEDURE — 70551 MRI BRAIN STEM W/O DYE: CPT

## 2022-07-01 PROCEDURE — 2580000003 HC RX 258: Performed by: INTERNAL MEDICINE

## 2022-07-01 PROCEDURE — 82375 ASSAY CARBOXYHB QUANT: CPT

## 2022-07-01 PROCEDURE — 1210000000 HC MED SURG R&B

## 2022-07-01 PROCEDURE — 80053 COMPREHEN METABOLIC PANEL: CPT

## 2022-07-01 PROCEDURE — 99291 CRITICAL CARE FIRST HOUR: CPT | Performed by: INTERNAL MEDICINE

## 2022-07-01 PROCEDURE — 2500000003 HC RX 250 WO HCPCS: Performed by: INTERNAL MEDICINE

## 2022-07-01 PROCEDURE — 82550 ASSAY OF CK (CPK): CPT

## 2022-07-01 PROCEDURE — 36415 COLL VENOUS BLD VENIPUNCTURE: CPT

## 2022-07-01 PROCEDURE — 71045 X-RAY EXAM CHEST 1 VIEW: CPT

## 2022-07-01 RX ORDER — DIAZEPAM 5 MG/ML
5 INJECTION, SOLUTION INTRAMUSCULAR; INTRAVENOUS
Status: COMPLETED | OUTPATIENT
Start: 2022-07-01 | End: 2022-07-01

## 2022-07-01 RX ORDER — SODIUM CHLORIDE, SODIUM LACTATE, POTASSIUM CHLORIDE, CALCIUM CHLORIDE 600; 310; 30; 20 MG/100ML; MG/100ML; MG/100ML; MG/100ML
INJECTION, SOLUTION INTRAVENOUS CONTINUOUS
Status: DISCONTINUED | OUTPATIENT
Start: 2022-07-01 | End: 2022-07-11 | Stop reason: HOSPADM

## 2022-07-01 RX ADMIN — PIPERACILLIN AND TAZOBACTAM 3375 MG: 3; .375 INJECTION, POWDER, LYOPHILIZED, FOR SOLUTION INTRAVENOUS at 12:19

## 2022-07-01 RX ADMIN — PIPERACILLIN AND TAZOBACTAM 3375 MG: 3; .375 INJECTION, POWDER, LYOPHILIZED, FOR SOLUTION INTRAVENOUS at 20:31

## 2022-07-01 RX ADMIN — Medication 10 ML: at 20:32

## 2022-07-01 RX ADMIN — ENOXAPARIN SODIUM 40 MG: 100 INJECTION SUBCUTANEOUS at 16:28

## 2022-07-01 RX ADMIN — ACETYLCYSTEINE 7140 MG: 200 INJECTION, SOLUTION INTRAVENOUS at 12:40

## 2022-07-01 RX ADMIN — SODIUM CHLORIDE, POTASSIUM CHLORIDE, SODIUM LACTATE AND CALCIUM CHLORIDE: 600; 310; 30; 20 INJECTION, SOLUTION INTRAVENOUS at 17:36

## 2022-07-01 RX ADMIN — Medication 10 ML: at 08:28

## 2022-07-01 RX ADMIN — SODIUM BICARBONATE: 84 INJECTION, SOLUTION INTRAVENOUS at 08:27

## 2022-07-01 RX ADMIN — DIAZEPAM 5 MG: 5 INJECTION, SOLUTION INTRAMUSCULAR; INTRAVENOUS at 16:24

## 2022-07-01 ASSESSMENT — ENCOUNTER SYMPTOMS
NAUSEA: 0
VOMITING: 0
PHOTOPHOBIA: 0
TROUBLE SWALLOWING: 0
SHORTNESS OF BREATH: 0
CHOKING: 0
COLOR CHANGE: 0
BACK PAIN: 0

## 2022-07-01 ASSESSMENT — PAIN SCALES - WONG BAKER
WONGBAKER_NUMERICALRESPONSE: 2
WONGBAKER_NUMERICALRESPONSE: 4

## 2022-07-01 ASSESSMENT — PAIN DESCRIPTION - LOCATION
LOCATION: GENERALIZED
LOCATION: GENERALIZED
LOCATION: HIP

## 2022-07-01 ASSESSMENT — PAIN SCALES - GENERAL: PAINLEVEL_OUTOF10: 5

## 2022-07-01 ASSESSMENT — PAIN DESCRIPTION - DESCRIPTORS
DESCRIPTORS: PATIENT UNABLE TO DESCRIBE
DESCRIPTORS: SORE

## 2022-07-01 ASSESSMENT — PAIN DESCRIPTION - ORIENTATION: ORIENTATION: RIGHT

## 2022-07-01 NOTE — CONSULTS
Subjective:      Patient ID: Herlinda Reid is a 33 y.o. male who presents today for encephalopathy. HPI 76-year-old right-handed gentleman found unresponsive on the floor unknown time. Patient was found in the motel room by or nurse. He was last seen night before which was last night. His work reports that he is not reported to work for 3 days. On arrival he was unresponsive except for sternal rub. He had he will skin issues as well and he was found with a bottle of Kratom   near him. He is not able to give me much answers regarding what he really took. Poison control was contacted as well. He does take stimulant such as Vyvanse and Adderall for ADD. At this time he denies any headaches or any other symptoms though he is appears to be somewhat encephalopathic but oriented    Review of Systems   Constitutional: Negative for fever. HENT: Negative for ear pain, tinnitus and trouble swallowing. Eyes: Negative for photophobia and visual disturbance. Respiratory: Negative for choking and shortness of breath. Cardiovascular: Negative for chest pain and palpitations. Gastrointestinal: Negative for nausea and vomiting. Musculoskeletal: Negative for back pain, gait problem, joint swelling, myalgias, neck pain and neck stiffness. Skin: Negative for color change. Allergic/Immunologic: Negative for food allergies. Neurological: Negative for dizziness, tremors, seizures, syncope, facial asymmetry, speech difficulty, weakness, light-headedness, numbness and headaches. Psychiatric/Behavioral: Negative for behavioral problems, confusion, hallucinations and sleep disturbance. Past Medical History:   Diagnosis Date    ADHD (attention deficit hyperactivity disorder)     was initiated on treatment by Dr. Layla Rosales.   any testing was done here by Dr. Layla Rosales, no formal psych eval.      Anxiety     MDD (major depressive disorder), recurrent episode, moderate ([facility] [address] 6/4/2019     Past Surgical History:   Procedure Laterality Date    TONSILLECTOMY AND ADENOIDECTOMY      WISDOM TOOTH EXTRACTION       Social History     Socioeconomic History    Marital status: Single     Spouse name: Not on file    Number of children: Not on file    Years of education: Not on file    Highest education level: Not on file   Occupational History    Not on file   Tobacco Use    Smoking status: Never Smoker    Smokeless tobacco: Never Used   Substance and Sexual Activity    Alcohol use: Yes     Comment: occasional    Drug use: No    Sexual activity: Not on file   Other Topics Concern    Not on file   Social History Narrative    Not on file     Social Determinants of Health     Financial Resource Strain: Low Risk     Difficulty of Paying Living Expenses: Not hard at all   Food Insecurity: No Food Insecurity    Worried About [employer] [employer] in the Last Year: Never true    [address] in the Last Year: Never true   Transportation Needs:     Lack of Transportation (Medical): Not on file    Lack of Transportation (Non-Medical):  Not on file   Physical Activity:     Days of Exercise per Week: Not on file    Minutes of Exercise per Session: Not on file   Stress:     Feeling of Stress : Not on file   Social Connections:     Frequency of Communication with Friends and Family: Not on file    Frequency of Social Gatherings with Friends and Family: Not on file    Attends Buddhist Services: Not on file    Active Member of [address] [place] [address] or Organizations: Not on file    Attends Club or Organization Meetings: Not on file    Marital Status: Not on file   Intimate Partner Violence:     Fear of Current or Ex-Partner: Not on file    Emotionally Abused: Not on file    Physically Abused: Not on file    Sexually Abused: Not on file   Housing Stability:     Unable to Pay for Housing in the Last Year: Not on file    Number of [place] in the Last Year: Not on file    Unstable Housing in the Last Year: Not on file Family History   Problem Relation Age of Onset    Depression Mother     Heart Disease Father     High Blood Pressure Father     High Cholesterol Father      Allergies   Allergen Reactions    Peanut-Containing Drug Products Anaphylaxis     Throat swelling, hives, \"I needed hospital admission last time I had any\"    Peanut Oil Other (See Comments)     Current Facility-Administered Medications   Medication Dose Route Frequency Provider Last Rate Last Admin    piperacillin-tazobactam (ZOSYN) 3,375 mg in dextrose 5 % 50 mL IVPB extended infusion (mini-bag)  3,375 mg IntraVENous Q8H Lisa Gill MD   Stopped at 07/01/22 1456    lactated ringers infusion   IntraVENous Continuous Lisa Gill MD        acetylcysteine (ACETADOTE) 7,140 mg in dextrose 5 % 500 mL infusion  100 mg/kg IntraVENous Once Lisa Gill MD 33.5 mL/hr at 07/01/22 1500 Rate Verify at 07/01/22 1500    diazePAM (VALIUM) injection 5 mg  5 mg IntraVENous Once PRN Timothy Kumar MD        0.9 % sodium chloride bolus  1,000 mL IntraVENous Once Paty Martinez MD        sodium chloride flush 0.9 % injection 5-40 mL  5-40 mL IntraVENous 2 times per day Severo Seltzer, MD   10 mL at 07/01/22 0828    sodium chloride flush 0.9 % injection 5-40 mL  5-40 mL IntraVENous PRN Severo Seltzer, MD        0.9 % sodium chloride infusion   IntraVENous PRN Severo Seltzer, MD        enoxaparin (LOVENOX) injection 40 mg  40 mg SubCUTAneous Daily Severo Seltzer, MD        ondansetron (ZOFRAN-ODT) disintegrating tablet 4 mg  4 mg Oral Q8H PRN Severo Seltzer, MD        Or    ondansetron [facility]) injection 4 mg  4 mg IntraVENous Q6H PRN Severo Seltzer, MD        potassium chloride Avanell Cecil M) extended release tablet 40 mEq  40 mEq Oral PRN Severo Seltzer, MD        Or    potassium bicarb-citric acid (EFFER-K) effervescent tablet 40 mEq  40 mEq Oral PRN Severo Seltzer, MD        Or    potassium chloride 10 mEq/100 mL IVPB (Peripheral Line)  10 mEq IntraVENous PRN Vic Rodriguez MD            Objective:   BP (!) 140/94   Pulse (!) 102   Temp 98.5 °F (36.9 °C) (Oral)   Resp 28   Ht 5' 10\" (1.778 m)   Wt 157 lb 3 oz (71.3 kg)   SpO2 97%   BMI 22.55 kg/m²     Physical Exam  Vitals reviewed. Eyes:      Pupils: Pupils are equal, round, and reactive to light. Cardiovascular:      Rate and Rhythm: Normal rate and regular rhythm. Heart sounds: No murmur heard. Pulmonary:      Effort: Pulmonary effort is normal.      Breath sounds: Normal breath sounds. Abdominal:      General: Bowel sounds are normal.   Musculoskeletal:         General: Normal range of motion. Cervical back: Normal range of motion. Skin:     General: Skin is warm. Neurological:      Mental Status: He is alert and oriented to person, place, and time. Cranial Nerves: No cranial nerve deficit. Sensory: No sensory deficit. Motor: No abnormal muscle tone. Coordination: Coordination normal.      Deep Tendon Reflexes: Reflexes are normal and symmetric. Babinski sign absent on the right side. Babinski sign absent on the left side. Psychiatric:         Mood and Affect: Mood normal.       She is oriented to self place and year but did not know the month. Nonfocal examination is otherwise notable and we did not attempt to walk him. CT Head WO Contrast    Result Date: 6/30/2022  CT Brain. Contrast medium:  without contrast.. History: Altered mental status. Technical factors: CT imaging of the brain was obtained and formatted as 5 mm contiguous axial images. 2.5 mm contiguous axial images were obtained through the osseous structures. Sagittal and coronal reconstruction obtained during postprocessing. Comparison:  None. Findings: Extra-axial spaces:  Normal. Intracranial hemorrhage:  None. Ventricular system: [Negative. Basal Cisterns:  Without anomaly.  Cerebral Parenchyma: Bilateral, symmetric areas of decreased attenuation exerting no mass effect measuring approximately 9 mm in size since found within the bilateral globus pallidus (series 2, image 17). Midline Shift:  None. Cerebellum:  No anomaly identified. Paranasal sinuses and mastoid air cells:  No anomaly identified. Visualized Orbits:  Negative. Impression: Round symmetric areas decreased attenuation bilateral globus pallidus. This finding may be seen in patients experiencing hypoxia, i.e., carbon monoxide poisoning. All CT scans at this facility use dose modulation, iterative reconstruction, and/or weight based dosing when appropriate to reduce radiation dose to as low as reasonably achievable. XR CHEST PORTABLE    Result Date: 7/1/2022  EXAMINATION: XR CHEST PORTABLE CLINICAL HISTORY: FEVER COMPARISONS: JANUARY 29, 2018 FINDINGS: Osseous structures are intact. Cardiopericardial silhouette is normal. Pulmonary vasculature is normal. Right lung is clear. Patchy area of increased opacity left upper lung. LEFT UPPER LUNG ATELECTASIS/PNEUMONIA.       Lab Results   Component Value Date/Time    WBC 34.8 06/30/2022 04:00 PM    RBC 5.68 06/30/2022 04:00 PM    HGB 16.2 06/30/2022 04:00 PM    HCT 48.8 06/30/2022 04:00 PM    MCV 85.9 06/30/2022 04:00 PM    MCH 28.6 06/30/2022 04:00 PM    MCHC 33.2 06/30/2022 04:00 PM    RDW 13.0 06/30/2022 04:00 PM     06/30/2022 04:00 PM    MPV 8.5 07/13/2015 04:16 PM     Lab Results   Component Value Date/Time     07/01/2022 07:47 AM    K 3.6 07/01/2022 07:47 AM     07/01/2022 07:47 AM    CO2 25 07/01/2022 07:47 AM    BUN 23 07/01/2022 07:47 AM    CREATININE 1.18 07/01/2022 07:47 AM    GFRAA >60.0 07/01/2022 07:47 AM    LABGLOM >60.0 07/01/2022 07:47 AM    GLUCOSE 113 07/01/2022 07:47 AM    PROT 6.5 07/01/2022 07:47 AM    LABALBU 3.3 07/01/2022 07:47 AM    CALCIUM 8.4 07/01/2022 07:47 AM    BILITOT 0.6 07/01/2022 07:47 AM    ALKPHOS 117 07/01/2022 07:47 AM    AST 1,237 07/01/2022 07:47 AM    ALT 1,436 07/01/2022 07:47 AM Lab Results   Component Value Date/Time    PROTIME 16.2 07/01/2022 12:15 PM    INR 1.3 07/01/2022 12:15 PM     Lab Results   Component Value Date/Time    TSH 0.944 07/08/2019 08:00 PM    [id number] 612 05/16/2019 02:13 PM     Lab Results   Component Value Date/Time    TRIG 130 06/30/2022 04:00 PM    HDL 55 06/30/2022 04:00 PM    LDLCALC 85 06/30/2022 04:00 PM     Lab Results   Component Value Date/Time    LABAMPH POSITIVE 06/30/2022 04:00 PM    BARBSCNU Neg 06/30/2022 04:00 PM    LABBENZ POSITIVE 06/30/2022 04:00 PM    LABMETH Neg 06/30/2022 04:00 PM    OPIATESCREENURINE Neg 06/30/2022 04:00 PM    PHENCYCLIDINESCREENURINE Neg 06/30/2022 04:00 PM    ETOH <10 06/30/2022 04:00 PM     No results found for: LITHIUM, DILFRTOT, VALPROATE    Assessment:   Encephalopathy related to underlying drug use. His urine tox screen is positive for Benzo and amphetamines which he takes. Significant elevated CPK levels are noted and he also had elevated white counts consistent with margination. Underlying status epilepticus with a seizure is a possibility secondary to drugs. Patient's calcitonin also is elevated and an underlying respiratory infection is likely. I was consulted due to abnormal CT scan which I do not see anything of significance and therefore will require an MRI of the brain. Findings were discussed with the intensivist and we will keep him on file. Elevated CPK levels will be obtained. Urine tox screen for fentanyl as well and the patient was on kratom something that patient's buy over-the-counter and does not show up on urine tox screens but can have a liver in toxicities. She denies alcohol use. Rayray Rivera MD, Graciela Hitchcock, American Board of Psychiatry & Neurology  Board Certified in Vascular Neurology  Board Certified in Neuromuscular Medicine  Certified in [facility]:

## 2022-07-01 NOTE — PROGRESS NOTES
Physician Progress Note      PATIENT:               David Myers  CSN #:                  [id number]  :                       1990  ADMIT DATE:       2022 3:45 PM  [address] [place] DATE:  RESPONDING  PROVIDER #:        Brianna Yip MD          QUERY TEXT:    Patient admitted with polysubstance abuse overdose, acute hepatitis,   rhabdomyolysis, EDGAR and has encephalopathy documented. If possible, please   document in progress notes and discharge summary further specificity regarding   the type of encephalopathy:    The medical record reflects the following:  Risk Factors: 33 yom  polysubstance abuse overdose, acute hepatitis,   rhabdomyolysis, EDGAR, use of royal kratom  Clinical Indicators: ALT 1627   AST 1818;    BUN Cr  GFR 30  1.49  54.0;     urine + amphetamines benzo  fentanyl  Treatment:  ICU admit Acetadote x 2 bags  pertinent labs including LFT    Thank you,  Manoj DUMONTN RN [facility]   M-F 6am-2pm  Options provided:  -- Acute hepatic encephalopathy without coma  -- Metabolic encephalopathy  -- Drug-induced encephalopathy due to, Please specify substance. -- Drug-induced encephalopathy, substance(s) unknown  -- Other - I will add my own diagnosis  -- Disagree - Not applicable / Not valid  -- Disagree - Clinically unable to determine / Unknown  -- Refer to Clinical Documentation Reviewer    PROVIDER RESPONSE TEXT:    This patient has metabolic encephalopathy.     Query created by: Gaudencio Lynne on 2022 7:18 AM      Electronically signed by:  Brianna Yip MD 2022 7:01 PM

## 2022-07-01 NOTE — PROGRESS NOTES
Hospitalist Daily Progress Note  Name: Rachael Alexander  Age: 33 y.o. Gender: male  CodeStatus: Full Code  Allergies: Peanut-Containing Drug Products  Nuts [Macadamia Nut Oil]  Peanut Oil  Shellfish-Derived Products    Chief Complaint:Drug Overdose (pt has been taking royal kratom (hasn't left his apartment in a few days). Well check. )      Primary Care Provider: YING Graham CNP    InpatientTreatment Team: Treatment Team: Attending Provider: Lisha Hutchinson MD; Consulting Physician: Delonte Franco MD; Consulting Physician: Gabbi Randolph MD; : Roshni Orlando. JEFF Zaragoza; Utilization Reviewer: Caitlin Lynn RN; Registered Nurse: Dayanna Joshi RN; Consulting Physician: Ne Dooley MD    Admission Date: 6/30/2022      Subjective: No chest pain, sob, nausea. Sleeping, rousable but does not stay awake. Physical Exam  Vitals and nursing note reviewed. Constitutional:       Appearance: Normal appearance. He is ill-appearing. Cardiovascular:      Rate and Rhythm: Normal rate and regular rhythm. Pulmonary:      Effort: Pulmonary effort is normal.      Breath sounds: Normal breath sounds. Abdominal:      General: Bowel sounds are normal.      Palpations: Abdomen is soft. Musculoskeletal:         General: Normal range of motion. Skin:     General: Skin is warm and dry.          Medications:  Reviewed    Infusion Medications:    lactated ringers 100 mL/hr at 07/01/22 1736    sodium chloride       Scheduled Medications:    piperacillin-tazobactam  3,375 mg IntraVENous Q8H    sodium chloride  1,000 mL IntraVENous Once    sodium chloride flush  5-40 mL IntraVENous 2 times per day    enoxaparin  40 mg SubCUTAneous Daily     PRN Meds: sodium chloride flush, sodium chloride, ondansetron **OR** ondansetron, potassium chloride **OR** potassium alternative oral replacement **OR** potassium chloride    Labs:   Recent Labs     06/30/22  1600   WBC 34.8*   HGB 16.2   HCT 48.8      Recent Labs     06/30/22  1600 07/01/22  0747    143   K 4.0 3.6   CL 98 105   CO2 28 25   BUN 30* 23*   CREATININE 1.49* 1.18   CALCIUM 8.9 8.4*     Recent Labs     06/30/22  1600 06/30/22  2110 07/01/22  0747   AST 1,818* 1,437* 1,237*   ALT 1,627* 1,371* 1,436*   BILIDIR  --  <0.2  --    BILITOT 0.7 0.6 0.6   ALKPHOS 138* 120* 117*     Recent Labs     07/01/22  1215   INR 1.3     Recent Labs     06/30/22  1600 07/01/22  1215   CKTOTAL >20,000* >20,000*       Urinalysis:   Lab Results   Component Value Date/Time    NITRU Negative 06/30/2022 04:00 PM    WBCUA 3-5 06/30/2022 04:00 PM    BACTERIA Negative 06/30/2022 04:00 PM    RBCUA 3-5 06/30/2022 04:00 PM    BLOODU LARGE 06/30/2022 04:00 PM    SPECGRAV 1.033 06/30/2022 04:00 PM    GLUCOSEU Negative 06/30/2022 04:00 PM       Radiology:   Most recent    Chest CT      WITH CONTRAST:No results found for this or any previous visit. WITHOUT CONTRAST: No results found for this or any previous visit. CXR      2-view: Results for orders placed in visit on 01/29/18    XR CHEST STANDARD (2 VW)    Narrative  EXAM: CHEST, 2 VIEWS    COMPARISON: NONE AVAILABLE    REASON FOR EXAMINATION: UPPER RESPIRATORY CONGESTION, DYSPNEA X2 WEEKS    FINDINGS:   Two views of the chest demonstrate normal appearance of the heart and mediastinum. The lungs appear clear. The visualized bony thorax and remainder of the chest appears unremarkable. Impression  NO EVIDENCE OF ACTIVE DISEASE IN THE CHEST. Portable: Results for orders placed during the hospital encounter of 06/30/22    XR CHEST PORTABLE    Narrative  EXAMINATION: XR CHEST PORTABLE    CLINICAL HISTORY: FEVER    COMPARISONS: JANUARY 29, 2018    FINDINGS: Osseous structures are intact. Cardiopericardial silhouette is normal. Pulmonary vasculature is normal. Right lung is clear. Patchy area of increased opacity left upper lung. Impression  LEFT UPPER LUNG ATELECTASIS/PNEUMONIA.       Echo No results found for this or any previous visit. Assessment/Plan:    Active Hospital Problems    Diagnosis Date Noted    Drug abuse ([facility] [address] [F19.10]      Priority: Medium    Acute encephalopathy [G93.40] 06/30/2022     Priority: Medium     1. Polysubstance overdose/acute encephalopathy  1. Unclear story of which substances taken when, supportive care in ICU, intensivist c/s. Protecting airway currently. 2. 7/1 - mild improvement, more awake but still altered, plan for MRI brain when able  2. Rhabdomyolysis/EDGAR  1. CK > 20,000, IV fluids, cycle ck q6, monitor Cr.  2. 7/1 - on bicarb gtt, remains elevated, monitor  3. Acute Hepatitis  1. May be 2/2 kratom use, however cannot completely rule out acetaminophen tox. Neg serum level, patient cannot provide hx or use. NAC given in ER in discussion with poison control, q12 LFTs. 2. 7/1 - completing nac treatment, cont monitoring  4.  DVT proph      Electronically signed by Jeannie Davis MD on 7/1/2022 at 5:42 PM

## 2022-07-01 NOTE — CONSULTS
Pulmonary and Critical Care Medicine  Consult Note  Encounter Date: 2022 7:43 AM    Mr. Jean Hernandez is a 33 y.o. male  : 1990  Requesting Provider: Katelin Ruff MD    Reason for request: Drug overdose            HISTORY OF PRESENT ILLNESS:    Patient is 33 y.o. presents with rhabdomyolysis and altered mental status, he is currently awake and alert, he admits to taking Royal kratom, he complains of aching all over, denies chest pain, no shortness of breath, no nausea no vomiting, he denies any trauma or falls, no headaches, no dizziness, no blurry vision or double vision. Fever noted this am           Past Medical History:        Diagnosis Date    ADHD (attention deficit hyperactivity disorder)     was initiated on treatment by Dr. Chuck Desai. any testing was done here by Dr. Chuck Desai, no formal psych eval.      Anxiety     MDD (major depressive disorder), recurrent episode, moderate ([address] 2019       Past Surgical History:        Procedure Laterality Date    TONSILLECTOMY AND ADENOIDECTOMY      WISDOM TOOTH EXTRACTION         Social History:     reports that he has never smoked. He has never used smokeless tobacco. He reports current alcohol use. He reports that he does not use drugs.     Family History:       Problem Relation Age of Onset    Depression Mother     Heart Disease Father     High Blood Pressure Father     High Cholesterol Father        Allergies:  Peanut-containing drug products and Peanut oil        MEDICATIONS during current hospitalization:    Continuous Infusions:   sodium bicarbonate infusion      sodium chloride         Scheduled Meds:   sodium chloride  1,000 mL IntraVENous Once    sodium chloride flush  5-40 mL IntraVENous 2 times per day    enoxaparin  40 mg SubCUTAneous Daily       PRN Meds:sodium chloride flush, sodium chloride, ondansetron **OR** ondansetron, potassium chloride **OR** potassium alternative oral replacement **OR** potassium chloride        REVIEW OF SYSTEMS:  ROS: 10 organs review of system is done including general, psychological, ENT, hematological, endocrine, respiratory, cardiovascular, gastrointestinal, musculoskeletal, neurological,  allergy and Immunology is done and is otherwise negative. PHYSICAL EXAM:    Vitals:  /86   Pulse (!) 128   Temp 98.8 °F (37.1 °C) (Oral)   Resp 23   Ht 5' 10\" (1.778 m)   Wt 157 lb 3 oz (71.3 kg)   SpO2 97%   BMI 22.55 kg/m²     General: alert, cooperative, no distress  Head: normocephalic, atraumatic  Eyes:No gross abnormalities. ENT:  MMM no lesions  Neck:  supple and no masses  Chest : clear to auscultation bilaterally- no wheezes, rales or rhonchi, normal air movement, no respiratory distress  Heart[de-identified] Heart sounds are normal.  Regular rate and rhythm without murmur, gallop or rub. ABD:  symmetric, soft, non-tender  Musculoskeletal : no cyanosis, no clubbing and no edema  Neuro:  Grossly normal  Skin: No rashes or nodules noted. Lymph node:  no cervical nodes  Urology: No Vaughan   Psychiatric: appropriate        Data Review  Recent Labs     06/30/22  1600   WBC 34.8*   HGB 16.2   HCT 48.8         Recent Labs     06/30/22  1600      K 4.0   CL 98   CO2 28   BUN 30*   CREATININE 1.49*   GLUCOSE 162*       MV Settings: ABGs: No results for input(s): PHART, HPV4OPJ, PO2ART, VLG4GXU, BEART, Q9FZNOXZ, YUG7RJU in the last 72 hours. O2 Device: None (Room air)  Lab Results   Component Value Date/Time    LACTA 3.4 06/30/2022 04:45 PM       Radiology  Round symmetric areas decreased attenuation bilateral globus pallidus. This finding may be seen in patients experiencing hypoxia, i.e., carbon monoxide poisoning.               Assessment, plan:    This is a critically ill patient at risk of deterioration / death , needing close ICU monitoring and intervention due to below noted problems   · Drug overdose, urine screen positive for amphetamine, benzos, and fentanyl  · acute encephalopathy, resolving  · Rhabdomyolysis, likely secondary to drug overdose, no history of trauma  · Abnormal LFT, likely muscle source  · EDGAR, mild  · Leukocytosis, likely reactive     Recommendation  · Gentle hydration, will use bicarb drip for now  · Monitor CK  · Monitor renal function urine output  · Check GGT P  · Monitor LFT  · Watch for withdrawal symptoms, if needed will use as needed Ativan  · Monitor in ICU, intubate if worsening mental status or unable to protect his airways  · Start zosyn   · Will contact poison control   · CXR   · Neurology consult for abnormal CT. Thank you for consultation      Due to the immediate potential for life-threatening deterioration due to drug overdose, I spent 35 minutes providing critical care. This time is excluding time spent performing procedures.       Electronically signed by Caitlin Andrea MD, [facility],  on 7/1/2022 at 7:43 AM

## 2022-07-01 NOTE — FLOWSHEET NOTE
Shift summary    0730 report at bedside. Noted pt condom catheter off, pt incontinent of dark solitario foul smelling urine. Liza care done and linen changed. Pt alert to person, stated year was 2019, stated he was in the ER, unsure of situation. Follows commands, delayed responses, sleepy. 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 PIERRE Rodriguez on RA no SOB. Dr. Keya Mccracken in to see pt, updated, see orders. [phone]  pt mom and sister here, updated. Assessment complete see flowsheet. Able to answer now that it is 2022 stated month is March. 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 Discussed plan of care, meds. Per family pt has taken Kratom for \"a while\", is not a new drug for him. Urinal placed. [phone] spoke with Akiko Paris from [address], reviewed case. She recommended:  - a repeat dose acetylcysteine 100 mg/kg over 16 hours  -repeat CK, PT/INR, ALT/AST now and at least 2 hours prior to acetylcysteine dose complete to see if another dose is needed  -check carboxyhemoglobin level x1 now.  -continue acetylcysteine until ALT/AST is less than half of peak values and PT/INR is less than or equal to 1.5  -call with results to see about additional dosing. Spoke with Dr. Keya Mccracken, orders entered. Family updated. [phone] pt still sleepy, taking PO with assistance, no change to assessment- remains alert x 1-2 recognizes family. Safety measures maintained. New IV placed, meds per MAR. Electronically signed by Leonela Arroyo RN on 7/1/2022 at 2:26 PM    1500 incontinent large amt urine. Linen change done, liza care completed. Dr. Jamar Goyal at bedside. Pt able to state year, [facility] ([facility]), does not remember what happened, still lethargic. Follows commands. 1540 MRI form completed with pt mom Lorraine Jack. Electronically signed by Leonela Arroyo RN on 7/1/2022 at 3:43 PM     [phone] noted pt accidentally removed IV. Another IV placed to R hand. Pt medicated with valium for MRI.  Noted R hand IV fell out (pt keeps putting arm up by head). IVF stopped for MRI. This RN walked down with pt to MRI but as pt in for transfer out of ICU, did not stay with patient. Electronically signed by Sunday Lazo RN on 7/1/2022 at 5:06 PM     [id number] pt back from MRI, resting. New IV placed. IVF restarted. I/o's done. Electronically signed by Sunday Lazo RN on 7/1/2022 at 5:51 PM    1800 report to [facility] on 2W. Tele placed, call to monitor room, able to visualize on telemetry. 1810 call to pt radha Macdonald to update on transfer.  Electronically signed by Sunday Lazo RN on 7/1/2022 at 6:13 PM

## 2022-07-01 NOTE — PROGRESS NOTES
Wound Ostomy Continence Nursing    This [phone] [facility] Nurse received referral for evaluation of redness to the right foot and left ankle. Patient is currently resting in room. Spoke with Ivette Chirinos, reports patient's skin is red and blanchable, was found down at home prior to admission. No pressure injury noted at this time, only blanchable erythema. Pressure injury prevention interventions will be placed at  This time for a Sacha Score of 15. No other needs at this time.     Electronically signed by RODERICK Franklin, RN, Maddie Raymond on 7/1/2022 at 1:26 PM

## 2022-07-01 NOTE — CARE COORDINATION
WAYNEW and care management team meet with pt and family at bedside. Pt is currently sleeping at this time. Per family, pt lives in apartment by himself. Family have known that pt is been using drugs for a couple years now. Family were surprised about pt tested positive for Fentanyl. Family report, pt is currently homeless right now because the land lord found pt on the ground at the apartment unresponsive and fran gill does not want pt to come back. Gave two weeks notice to pt and family. Family is unsure if pt over dose is intentional or not. At this time family would like pt to go to a inpatient drug rehab center. Family have no questions or concerns at this time. LSW will follow for any questions or concerns. DC plan: Possible [facility]; pending Psych.      Electronically signed by ANNALISA Gamez on 7/1/2022 at 11:33 AM

## 2022-07-02 ENCOUNTER — APPOINTMENT (OUTPATIENT)
Dept: MRI IMAGING | Age: 32
DRG: 812 | End: 2022-07-02
Payer: COMMERCIAL

## 2022-07-02 LAB
ALBUMIN SERPL-MCNC: 3.1 G/DL (ref 3.5–4.6)
ALBUMIN SERPL-MCNC: 3.2 G/DL (ref 3.5–4.6)
ALP BLD-CCNC: 150 U/L (ref 35–104)
ALP BLD-CCNC: 91 U/L (ref 35–104)
ALP BLD-CCNC: 94 U/L (ref 35–104)
ALP BLD-CCNC: 97 U/L (ref 35–104)
ALT SERPL-CCNC: 1100 U/L (ref 0–41)
ALT SERPL-CCNC: 1152 U/L (ref 0–41)
ALT SERPL-CCNC: 1169 U/L (ref 0–41)
ALT SERPL-CCNC: 977 U/L (ref 0–41)
ANION GAP SERPL CALCULATED.3IONS-SCNC: 13 MEQ/L (ref 9–15)
AST SERPL-CCNC: 643 U/L (ref 0–40)
AST SERPL-CCNC: 768 U/L (ref 0–40)
AST SERPL-CCNC: 883 U/L (ref 0–40)
AST SERPL-CCNC: 913 U/L (ref 0–40)
BASOPHILS ABSOLUTE: 0 K/UL (ref 0–0.2)
BASOPHILS RELATIVE PERCENT: 0.2 %
BILIRUB SERPL-MCNC: 0.5 MG/DL (ref 0.2–0.7)
BILIRUB SERPL-MCNC: 0.6 MG/DL (ref 0.2–0.7)
BILIRUBIN DIRECT: <0.2 MG/DL (ref 0–0.4)
BILIRUBIN, INDIRECT: ABNORMAL MG/DL (ref 0–0.6)
BUN BLDV-MCNC: 15 MG/DL (ref 6–20)
C-REACTIVE PROTEIN: 137.7 MG/L (ref 0–5)
CALCIUM SERPL-MCNC: 8.3 MG/DL (ref 8.5–9.9)
CHLORIDE BLD-SCNC: 100 MEQ/L (ref 95–107)
CO2: 24 MEQ/L (ref 20–31)
CREAT SERPL-MCNC: 0.66 MG/DL (ref 0.7–1.2)
EOSINOPHILS ABSOLUTE: 0.1 K/UL (ref 0–0.7)
EOSINOPHILS RELATIVE PERCENT: 0.6 %
GFR AFRICAN AMERICAN: >60
GFR NON-AFRICAN AMERICAN: >60
GLOBULIN: 2.9 G/DL (ref 2.3–3.5)
GLUCOSE BLD-MCNC: 100 MG/DL (ref 70–99)
GLUCOSE BLD-MCNC: 99 MG/DL (ref 70–99)
HCT VFR BLD CALC: 39.2 % (ref 42–52)
HEMOGLOBIN: 13.3 G/DL (ref 14–18)
HOMOCYSTEINE: <3 UMOL/L
INR BLD: 1.2
INR BLD: 1.3
LYMPHOCYTES ABSOLUTE: 1 K/UL (ref 1–4.8)
LYMPHOCYTES RELATIVE PERCENT: 6.3 %
MAGNESIUM: 2.1 MG/DL (ref 1.7–2.4)
MCH RBC QN AUTO: 28.8 PG (ref 27–31.3)
MCHC RBC AUTO-ENTMCNC: 33.8 % (ref 33–37)
MCV RBC AUTO: 85 FL (ref 80–100)
MONOCYTES ABSOLUTE: 0.8 K/UL (ref 0.2–0.8)
MONOCYTES RELATIVE PERCENT: 4.6 %
NEUTROPHILS ABSOLUTE: 14.6 K/UL (ref 1.4–6.5)
NEUTROPHILS RELATIVE PERCENT: 88.3 %
PDW BLD-RTO: 13.1 % (ref 11.5–14.5)
PERFORMED ON: ABNORMAL
PLATELET # BLD: 210 K/UL (ref 130–400)
POTASSIUM REFLEX MAGNESIUM: 3.4 MEQ/L (ref 3.4–4.9)
PROTHROMBIN TIME: 15.1 SEC (ref 12.3–14.9)
PROTHROMBIN TIME: 16.4 SEC (ref 12.3–14.9)
RBC # BLD: 4.61 M/UL (ref 4.7–6.1)
SODIUM BLD-SCNC: 137 MEQ/L (ref 135–144)
TOTAL CK: ABNORMAL U/L (ref 0–190)
TOTAL CK: ABNORMAL U/L (ref 0–190)
TOTAL PROTEIN: 6 G/DL (ref 6.3–8)
TOTAL PROTEIN: 6.1 G/DL (ref 6.3–8)
TOTAL PROTEIN: 6.2 G/DL (ref 6.3–8)
TOTAL PROTEIN: 6.3 G/DL (ref 6.3–8)
WBC # BLD: 16.6 K/UL (ref 4.8–10.8)

## 2022-07-02 PROCEDURE — 83735 ASSAY OF MAGNESIUM: CPT

## 2022-07-02 PROCEDURE — 2580000003 HC RX 258: Performed by: INTERNAL MEDICINE

## 2022-07-02 PROCEDURE — 36415 COLL VENOUS BLD VENIPUNCTURE: CPT

## 2022-07-02 PROCEDURE — 1210000000 HC MED SURG R&B

## 2022-07-02 PROCEDURE — 80053 COMPREHEN METABOLIC PANEL: CPT

## 2022-07-02 PROCEDURE — 83090 ASSAY OF HOMOCYSTEINE: CPT

## 2022-07-02 PROCEDURE — 99233 SBSQ HOSP IP/OBS HIGH 50: CPT | Performed by: PSYCHIATRY & NEUROLOGY

## 2022-07-02 PROCEDURE — 6360000002 HC RX W HCPCS: Performed by: INTERNAL MEDICINE

## 2022-07-02 PROCEDURE — 90792 PSYCH DIAG EVAL W/MED SRVCS: CPT | Performed by: PSYCHIATRY & NEUROLOGY

## 2022-07-02 PROCEDURE — 86140 C-REACTIVE PROTEIN: CPT

## 2022-07-02 PROCEDURE — 97163 PT EVAL HIGH COMPLEX 45 MIN: CPT

## 2022-07-02 PROCEDURE — 85730 THROMBOPLASTIN TIME PARTIAL: CPT

## 2022-07-02 PROCEDURE — 83516 IMMUNOASSAY NONANTIBODY: CPT

## 2022-07-02 PROCEDURE — 99232 SBSQ HOSP IP/OBS MODERATE 35: CPT | Performed by: INTERNAL MEDICINE

## 2022-07-02 PROCEDURE — 70544 MR ANGIOGRAPHY HEAD W/O DYE: CPT

## 2022-07-02 PROCEDURE — 82550 ASSAY OF CK (CPK): CPT

## 2022-07-02 PROCEDURE — 85610 PROTHROMBIN TIME: CPT

## 2022-07-02 PROCEDURE — 86147 CARDIOLIPIN ANTIBODY EA IG: CPT

## 2022-07-02 PROCEDURE — 6370000000 HC RX 637 (ALT 250 FOR IP): Performed by: FAMILY MEDICINE

## 2022-07-02 PROCEDURE — 85305 CLOT INHIBIT PROT S TOTAL: CPT

## 2022-07-02 PROCEDURE — 85025 COMPLETE CBC W/AUTO DIFF WBC: CPT

## 2022-07-02 PROCEDURE — 85302 CLOT INHIBIT PROT C ANTIGEN: CPT

## 2022-07-02 PROCEDURE — 70547 MR ANGIOGRAPHY NECK W/O DYE: CPT

## 2022-07-02 PROCEDURE — 85613 RUSSELL VIPER VENOM DILUTED: CPT

## 2022-07-02 PROCEDURE — 6360000002 HC RX W HCPCS: Performed by: FAMILY MEDICINE

## 2022-07-02 RX ORDER — DIAZEPAM 5 MG/ML
5 INJECTION, SOLUTION INTRAMUSCULAR; INTRAVENOUS
Status: DISPENSED | OUTPATIENT
Start: 2022-07-02 | End: 2022-07-02

## 2022-07-02 RX ORDER — LIDOCAINE 4 G/G
1 PATCH TOPICAL DAILY
Status: DISCONTINUED | OUTPATIENT
Start: 2022-07-02 | End: 2022-07-11 | Stop reason: HOSPADM

## 2022-07-02 RX ORDER — POLYETHYLENE GLYCOL 3350 17 G/17G
17 POWDER, FOR SOLUTION ORAL DAILY
Status: DISCONTINUED | OUTPATIENT
Start: 2022-07-02 | End: 2022-07-11 | Stop reason: HOSPADM

## 2022-07-02 RX ADMIN — SODIUM CHLORIDE, POTASSIUM CHLORIDE, SODIUM LACTATE AND CALCIUM CHLORIDE: 600; 310; 30; 20 INJECTION, SOLUTION INTRAVENOUS at 04:35

## 2022-07-02 RX ADMIN — PIPERACILLIN AND TAZOBACTAM 3375 MG: 3; .375 INJECTION, POWDER, LYOPHILIZED, FOR SOLUTION INTRAVENOUS at 12:54

## 2022-07-02 RX ADMIN — PIPERACILLIN AND TAZOBACTAM 3375 MG: 3; .375 INJECTION, POWDER, LYOPHILIZED, FOR SOLUTION INTRAVENOUS at 21:03

## 2022-07-02 RX ADMIN — SODIUM CHLORIDE, POTASSIUM CHLORIDE, SODIUM LACTATE AND CALCIUM CHLORIDE: 600; 310; 30; 20 INJECTION, SOLUTION INTRAVENOUS at 17:20

## 2022-07-02 RX ADMIN — ENOXAPARIN SODIUM 40 MG: 100 INJECTION SUBCUTANEOUS at 12:52

## 2022-07-02 RX ADMIN — ACETYLCYSTEINE 7140 MG: 200 INJECTION, SOLUTION INTRAVENOUS at 05:25

## 2022-07-02 RX ADMIN — PIPERACILLIN AND TAZOBACTAM 3375 MG: 3; .375 INJECTION, POWDER, LYOPHILIZED, FOR SOLUTION INTRAVENOUS at 04:36

## 2022-07-02 ASSESSMENT — PAIN SCALES - GENERAL: PAINLEVEL_OUTOF10: 5

## 2022-07-02 ASSESSMENT — ENCOUNTER SYMPTOMS
CHOKING: 0
COLOR CHANGE: 0
BACK PAIN: 0
TROUBLE SWALLOWING: 0
PHOTOPHOBIA: 0
SHORTNESS OF BREATH: 0
VOMITING: 0
NAUSEA: 0

## 2022-07-02 NOTE — PROGRESS NOTES
INPATIENT PROGRESS NOTES    PATIENT NAME: Destiny Moscoso  MRN: [id number]  SERVICE DATE:  July 2, 2022   SERVICE TIME:  1:33 PM      PRIMARY SERVICE: Pulmonary Disease    CHIEF COMPLAIN: Drug overdose      INTERVAL HPI: Patient seen and examined at bedside, Interval Notes, orders reviewed. Nursing notes noted  He said he is feeling fine. No complaint of shortness of breath. No chest pain. No fever or chills. No nausea vomiting diarrhea. He is on room air and O2 saturation 99%. WBC count decreased from 34.8k- 16.6k. Chest x-ray shows left upper lobe atelectasis/pneumonia    OBJECTIVE    Body mass index is 22.55 kg/m². PHYSICAL EXAM:  Vitals:  /76   Pulse 85   Temp 98.7 °F (37.1 °C) (Oral)   Resp 18   Ht 5' 10\" (1.778 m)   Wt 157 lb 3 oz (71.3 kg)   SpO2 99%   BMI 22.55 kg/m²   General: Alert, awake . comfortable in bed, No distress. Head: Atraumatic , Normocephalic   Eyes: PERRL. No sclera icterus. No conjunctival injection. No discharge   ENT: No nasal  discharge. Pharynx clear. Neck:  Trachea midline. No thyromegaly, no JVD, No cervical adenopathy. Chest : Bilaterally symmetrical ,Normal effort,  No accessory muscle use  Lung : . Fair BS bilateral, decreased BS at bases. No Rales. No wheezing. No rhonchi. Heart[de-identified] Normal  rate. Regular rhythm. No mumur ,  Rub or gallop  ABD: Non-tender. Non-distended. No masses. No organmegaly. Normal bowel sounds. No hernia.   Ext : No Pitting both leg , No Cyanosis No clubbing  Neuro: no focal weakness          DATA:   Recent Labs     06/30/22  1600 07/02/22  0505   WBC 34.8* 16.6*   HGB 16.2 13.3*   HCT 48.8 39.2*   MCV 85.9 85.0    210     Recent Labs     07/01/22  0747 07/01/22 2127 07/02/22  0505 07/02/22  0505 07/02/22  1003     --  137  --   --    K 3.6  --  3.4  --   --      --  100  --   --    CO2 25  --  24  --   --    BUN 23*  --  15  --   --    CREATININE 1.18  --  0.66*  --   --    GLUCOSE 113*  --  99  --   --    CALCIUM 8.4*  --  8.3*  --   --    PROT 6.5   < > 6.0*  --  6.2*   LABALBU 3.3*   < > 3.1*  --  3.2*   BILITOT 0.6   < > 0.6  --  0.6   ALKPHOS 117*   < > 94  --  150*   AST 1,237*   < > 883*  --  768*   ALT 1,436*   < > 1,169*   < > 1,100*   LABGLOM >60.0  --  >60.0  --   --    GFRAA >60.0  --  >60.0  --   --    GLOB 3.2  --  2.9  --   --     < > = values in this interval not displayed. MV Settings:          No results for input(s): PHART, QJR5DAU, PO2ART, YJY0LXG, BEART, [id number] in the last 72 hours. O2 Device: None (Room air)    ADULT DIET; Full Liquid     MEDICATIONS during current hospitalization:    Continuous Infusions:   lactated ringers 100 mL/hr at 07/02/22 0435    sodium chloride         Scheduled Meds:   acetylcysteine (ACETADOTE) infusion *second dose*  100 mg/kg IntraVENous Once    piperacillin-tazobactam  3,375 mg IntraVENous Q8H    sodium chloride  1,000 mL IntraVENous Once    sodium chloride flush  5-40 mL IntraVENous 2 times per day    enoxaparin  40 mg SubCUTAneous Daily       PRN Meds:diazePAM, sodium chloride flush, sodium chloride, ondansetron **OR** ondansetron, potassium chloride **OR** potassium alternative oral replacement **OR** potassium chloride    Radiology  CT Head WO Contrast    Result Date: 6/30/2022  CT Brain. Contrast medium:  without contrast.. History: Altered mental status. Technical factors: CT imaging of the brain was obtained and formatted as 5 mm contiguous axial images. 2.5 mm contiguous axial images were obtained through the osseous structures. Sagittal and coronal reconstruction obtained during postprocessing. Comparison:  None. Findings: Extra-axial spaces:  Normal. Intracranial hemorrhage:  None. Ventricular system: [Negative. Basal Cisterns:  Without anomaly.  Cerebral Parenchyma: Bilateral, symmetric areas of decreased attenuation exerting no mass effect measuring approximately 9 mm in size since found within the bilateral globus pallidus (series 2, image 17). Midline Shift:  None. Cerebellum:  No anomaly identified. Paranasal sinuses and mastoid air cells:  No anomaly identified. Visualized Orbits:  Negative. Impression: Round symmetric areas decreased attenuation bilateral globus pallidus. This finding may be seen in patients experiencing hypoxia, i.e., carbon monoxide poisoning. All CT scans at this facility use dose modulation, iterative reconstruction, and/or weight based dosing when appropriate to reduce radiation dose to as low as reasonably achievable. XR CHEST PORTABLE    Result Date: 7/1/2022  EXAMINATION: XR CHEST PORTABLE CLINICAL HISTORY: FEVER COMPARISONS: JANUARY 29, 2018 FINDINGS: Osseous structures are intact. Cardiopericardial silhouette is normal. Pulmonary vasculature is normal. Right lung is clear. Patchy area of increased opacity left upper lung. LEFT UPPER LUNG ATELECTASIS/PNEUMONIA. MRI BRAIN WO CONTRAST    Result Date: 7/1/2022  MRI BRAIN WO CONTRAST : 7/1/2022 CLINICAL HISTORY:  stroke . COMPARISON: Head CT 6/30/2022. TECHNIQUE: Multiplanar MR imaging of the head was performed without contrast. FINDINGS: Areas of restricted diffusion within the globus pallidus, supratentorial white matter and splenium of the corpus callosum are most consistent with carbon monoxide poisoning/hypoxemia and/or other toxic encephalopathy. There is no intracranial hemorrhage, mass effect, midline shift, extra-axial collection, significant cerebral volume loss or other complication identified. FINDINGS CONSISTENT WITH CARBON MONOXIDE POISONING/HYPOXEMIA AND/OR OTHER TOXIC ENCEPHALOPATHY. NO INTRACRANIAL HEMORRHAGE OR COMPLICATION IDENTIFIED.        IMPRESSION AND SUGGESTION:  · Drug overdose, urine screen positive for amphetamine, benzos, and fentanyl  ·  acute encephalopathy, improved   · Rhabdomyolysis, likely secondary to drug overdose, no history of trauma  · Abnormal LFT, likely muscle source  · EDGAR, mild  · Leukocytosis  · Left upper lobe atelectasis/pneumonia      Patient is on room air O2 saturation is 99%. No complaint of shortness of breath. Chest x-ray shows right upper lung atelectasis versus infiltration. He is on IV Zosyn. Leukocytosis is improving. He can be changed to p.o. antibiotic. NOTE: This report was transcribed using voice recognition software. Every effort was made to ensure accuracy; however, inadvertent computerized transcription errors may be present.       Electronically signed by Anahy Adkins MD, [facility] on 7/2/2022 at 1:33 PM

## 2022-07-02 NOTE — CONSULTS
[address] [place] [address], Department of Psychiatry  Behavioral Health Consult    REASON FOR CONSULT: Overdose    CONSULTING PHYSICIAN:     History obtained from: patient and sister    HISTORY OF PRESENT ILLNESS:      The patient is a 33 y.o. male with significant past psychiatric history of addiction and depression who presents following an overdose. Patient was found unconscious in his room. He could not recall the circumstance of admission. Apparently his work reported that he was not showing up for the past 3 days and they wanted a safety check to be done and during this process he was found to be in an unresponsive state. Patient is a poor historian secondary to his current mental status but his family member was at the bedside provided some information. Patient gave consent to talk to the family. According to the sister patient has been abusing Adderall Vyvanse for many years and he has been successfuly getting the prescription even after the family raised concerns with the doctors. She was never aware about him using fentanyl. there were a bottle of kratom found in his room. Patient was encephalopathic when he came to the hospital but is currently alert and oriented x3. He is gradually recovering from the intoxicated state. The patient is not currently receiving care for the above psychiatric illness. Psychiatric Review of Systems       Unable to obtain details        Past Psychiatric History:  Has H/O ADD and addiction    Past Medical History:        Diagnosis Date    ADHD (attention deficit hyperactivity disorder)     was initiated on treatment by Dr. Jorge Luis Orellana.   any testing was done here by Dr. Jorge Luis Orellana, no formal psych eval.      Anxiety     Drug abuse ([facility] [address]     MDD (major depressive disorder), recurrent episode, moderate ([address] 6/4/2019       Past Surgical History:        Procedure Laterality Date    TONSILLECTOMY AND ADENOIDECTOMY      WISDOM TOOTH EXTRACTION         Medications Prior to Frequency of Communication with Friends and Family: Not on file    Frequency of Social Gatherings with Friends and Family: Not on file    Attends Mormonism Services: Not on file    Active Member of Clubs or Organizations: Not on file    Attends Club or Organization Meetings: Not on file    Marital Status: Not on file   Intimate Partner Violence:     Fear of Current or Ex-Partner: Not on file    Emotionally Abused: Not on file    Physically Abused: Not on file    Sexually Abused: Not on file   Housing Stability:     Unable to Pay for Housing in the Last Year: Not on file    Number of [place] in the Last Year: Not on file    Unstable Housing in the Last Year: Not on file       REVIEW OF SYSTEMS    Constitutional: [] fever  [] chills  [] weight loss  []weakness [] Other:  Eyes:  [] photophobia  [] discharge [] acuity change   [] Diplopia   [] Other:  HENT:  [] sore throat  [] ear pain [] Tinnitus   [] Other  Respiratory:  [] Cough  [] Shortness of breath   [] Sputum   [] Other:   Cardiac: []Chest pain   []Palpitations []Edema  []PND  [] Other:  GI:  []Abdominal pain   []Nausea  []Vomiting  []Diarrhea  [] Other:  :  [] Dysuria   []Frequency  []Hematuria  []Discharge  [] Other:  Possible Pregnancy: []Yes   []No   LMP:   Musculoskeletal:  []Back pain  []Neck pain  []Recent Injury   Skin:  []Rash  [] Itching  [] Other:  Neurologic:  [] Headache  [] Focal weakness  [] Sensory changes []Other:  Endocrine:  [] Polyuria  [] Polydipsia  [] Hair Loss  [] Other:  Lymphatic:   [] Swollen glands   Psychiatric:  As per HPI      All other systems negative except as marked or mentioned/indicated in the HPI. Lula Aviles      PHYSICAL EXAM:  Vitals:  /76   Pulse 85   Temp 98.7 °F (37.1 °C) (Oral)   Resp 18   Ht 5' 10\" (1.778 m)   Wt 157 lb 3 oz (71.3 kg)   SpO2 99%   BMI 22.55 kg/m²      Neuro Exam:   Muscle Strength & Tone: full ROM  Gait: normal gait   Involuntary Movements: No    Mental Status Examination: Level of consciousness:  within normal limits   Appearance:  ill-appearing  Behavior/Motor:  no abnormalities noted  Attitude toward examiner:  poor eye contact  Speech:  slow   Mood: anxious  Affect:  anxious  Thought processes:  slow   Association  Thought content:  Unable to elicit  Cognition:  oriented to person, place  Attention & Concentration poor  Memory unable to assess  Mini Mental Status not complete  Insight poor   Judgement poor   Fund of Knowledge limited      DIAGNOSIS:     Poly drug dependence   S/P Overdose  R/O Mood disorder - bipolar vs MDD        RISK ASSESSMENT: high risk of impulsive behavior        LABS: REVIEWED TODAY:  Recent Labs     06/30/22  1600 07/02/22  0505   WBC 34.8* 16.6*   HGB 16.2 13.3*    210     Recent Labs     06/30/22  1600 07/01/22  0747 07/02/22  0505    143 137   K 4.0 3.6 3.4   CL 98 105 100   CO2 28 25 24   BUN 30* 23* 15   CREATININE 1.49* 1.18 0.66*   GLUCOSE 162* 113* 99     Recent Labs     07/02/22  0242 07/02/22  0505 07/02/22  1003   BILITOT 0.6 0.6 0.6   ALKPHOS 97 94 150*   * 883* 768*   ALT 1,152* 1,169* 1,100*     Lab Results   Component Value Date/Time    LABAMPH POSITIVE 06/30/2022 04:00 PM    BARBSCNU Neg 06/30/2022 04:00 PM    LABBENZ POSITIVE 06/30/2022 04:00 PM    LABMETH Neg 06/30/2022 04:00 PM    OPIATESCREENURINE Neg 06/30/2022 04:00 PM    PHENCYCLIDINESCREENURINE Neg 06/30/2022 04:00 PM    ETOH <10 06/30/2022 04:00 PM     Lab Results   Component Value Date/Time    TSH 0.944 07/08/2019 08:00 PM     No results found for: LITHIUM  No results found for: VALPROATE, CBMZ  No results found for: LITHIUM, VALPROATE    FURTHER LABS ORDERED :      Radiology   CT Head WO Contrast    Result Date: 6/30/2022  CT Brain. Contrast medium:  without contrast.. History: Altered mental status. Technical factors: CT imaging of the brain was obtained and formatted as 5 mm contiguous axial images.  2.5 mm contiguous axial images were obtained through the osseous structures. Sagittal and coronal reconstruction obtained during postprocessing. Comparison:  None. Findings: Extra-axial spaces:  Normal. Intracranial hemorrhage:  None. Ventricular system: [Negative. Basal Cisterns:  Without anomaly. Cerebral Parenchyma: Bilateral, symmetric areas of decreased attenuation exerting no mass effect measuring approximately 9 mm in size since found within the bilateral globus pallidus (series 2, image 17). Midline Shift:  None. Cerebellum:  No anomaly identified. Paranasal sinuses and mastoid air cells:  No anomaly identified. Visualized Orbits:  Negative. Impression: Round symmetric areas decreased attenuation bilateral globus pallidus. This finding may be seen in patients experiencing hypoxia, i.e., carbon monoxide poisoning. All CT scans at this facility use dose modulation, iterative reconstruction, and/or weight based dosing when appropriate to reduce radiation dose to as low as reasonably achievable. XR CHEST PORTABLE    Result Date: 7/1/2022  EXAMINATION: XR CHEST PORTABLE CLINICAL HISTORY: FEVER COMPARISONS: JANUARY 29, 2018 FINDINGS: Osseous structures are intact. Cardiopericardial silhouette is normal. Pulmonary vasculature is normal. Right lung is clear. Patchy area of increased opacity left upper lung. LEFT UPPER LUNG ATELECTASIS/PNEUMONIA. MRI BRAIN WO CONTRAST    Result Date: 7/1/2022  MRI BRAIN WO CONTRAST : 7/1/2022 CLINICAL HISTORY:  stroke . COMPARISON: Head CT 6/30/2022. TECHNIQUE: Multiplanar MR imaging of the head was performed without contrast. FINDINGS: Areas of restricted diffusion within the globus pallidus, supratentorial white matter and splenium of the corpus callosum are most consistent with carbon monoxide poisoning/hypoxemia and/or other toxic encephalopathy. There is no intracranial hemorrhage, mass effect, midline shift, extra-axial collection, significant cerebral volume loss or other complication identified.      FINDINGS CONSISTENT WITH CARBON MONOXIDE POISONING/HYPOXEMIA AND/OR OTHER TOXIC ENCEPHALOPATHY. NO INTRACRANIAL HEMORRHAGE OR COMPLICATION IDENTIFIED. EKG: TRACING REVIEWED    RECOMMENDATIONS    Risk Management:  1:1 sitter    Transfer to 3 W when medically stable  Discussed with the treating physician/ team about the patient and treatment plan  Reviewed the chart    Discussed with the patient risk, benefit, alternative and common side effects for the  proposed medication treatment. Patient is consenting to the treatment. Thanks for the consult. Please call me if needed.     Electronically signed by Gerardo Mccracken MD on 7/2/2022 at 12:42 PM

## 2022-07-02 NOTE — PROGRESS NOTES
Neurology Follow up    SUBJECTIVE:  NO Headache, double vision,blurry vision,difficulty with speech,difficulty with swallowing,weakness,numbness,pain,nausea,vomitting,chocking,neck pain,dizziness,    Current Facility-Administered Medications   Medication Dose Route Frequency Provider Last Rate Last Admin    acetylcysteine (ACETADOTE) 7,140 mg in dextrose 5 % 500 mL infusion  100 mg/kg IntraVENous Once Mollys MD Cata 33.5 mL/hr at 07/02/22 0525 7,140 mg at 07/02/22 0525    piperacillin-tazobactam (ZOSYN) 3,375 mg in dextrose 5 % 50 mL IVPB extended infusion (mini-bag)  3,375 mg IntraVENous Q8H Kira Lopez MD   Stopped at 07/02/22 0924    lactated ringers infusion   IntraVENous Continuous Kira Lopez  mL/hr at 07/02/22 0435 New Bag at 07/02/22 0435    0.9 % sodium chloride bolus  1,000 mL IntraVENous Once Cris Velez MD        sodium chloride flush 0.9 % injection 5-40 mL  5-40 mL IntraVENous 2 times per day Tess Escalona MD   10 mL at 07/01/22 2032    sodium chloride flush 0.9 % injection 5-40 mL  5-40 mL IntraVENous PRN Tess Escalona MD        0.9 % sodium chloride infusion   IntraVENous PRN Tess Escalona MD        enoxaparin (LOVENOX) injection 40 mg  40 mg SubCUTAneous Daily Tess Escalona MD   40 mg at 07/01/22 1628    ondansetron (ZOFRAN-ODT) disintegrating tablet 4 mg  4 mg Oral Q8H PRN Tess Escalona MD        Or    ondansetron [facility]) injection 4 mg  4 mg IntraVENous Q6H PRN Tess Escalona MD        potassium chloride Amaryllis Pavy M) extended release tablet 40 mEq  40 mEq Oral PRN Tess Escalona MD        Or    potassium bicarb-citric acid (EFFER-K) effervescent tablet 40 mEq  40 mEq Oral PRN Tess Escalona MD        Or    potassium chloride 10 mEq/100 mL IVPB (Peripheral Line)  10 mEq IntraVENous PRN Tess Escalona MD           PHYSICAL EXAM:    /76   Pulse 85   Temp 98.7 °F (37.1 °C) (Oral)   Resp 18   Ht 5' 10\" (1.778 m)   Wt 157 lb 3 oz (71.3 kg)   SpO2 99%   BMI 22.55 kg/m²    General Appearance:      Skin:  normal  CVS - Normal sounds, No murmurs , No carotid Bruits  RS -CTA  Abdomen Soft, BS present  Review of Systems   Constitutional: Negative for fever. HENT: Negative for ear pain, tinnitus and trouble swallowing. Eyes: Negative for photophobia and visual disturbance. Respiratory: Negative for choking and shortness of breath. Cardiovascular: Negative for chest pain and palpitations. Gastrointestinal: Negative for nausea and vomiting. Musculoskeletal: Negative for back pain, gait problem, joint swelling, myalgias, neck pain and neck stiffness. Skin: Negative for color change. Allergic/Immunologic: Negative for food allergies. Neurological: Negative for dizziness, tremors, seizures, syncope, facial asymmetry, speech difficulty, weakness, light-headedness, numbness and headaches. Psychiatric/Behavioral: Negative for behavioral problems, confusion, hallucinations and sleep disturbance.       Mental Status Exam:             Level of Alertness:   awake            Orientation:   person, place, time                      Attention/Concentration:  normal            Language:  normal      Funduscopic Exam:     Cranial Nerves            Cranial nerve III           Pupils:  equal, round, reactive to light      Cranial nerves III, IV, VI           Extraocular Movements: intact      Cranial nerve V           Facial sensation:  intact      Cranial nerve VII           Facial strength: intact      Cranial nerve VIII           Hearing:  intact      Cranial nerve IX           Palate:  intact      Cranial nerve XI         Shoulder shrug:  intact      Cranial nerve XII          Tongue movement:  normal    Motor:    Drift:  absent  Motor exam is symmetrical 5 out of 5 all extremities bilaterally  Tone:  normal  Abnormal Movements:  absent            Sensory:        Pinprick             Right Upper Extremity:  normal             Left Upper Extremity:  normal             Right Lower Extremity:  normal             Left Lower Extremity:  normal           Vibration                         Touch            Proprioception                 Coordination:           Finger/Nose   Right:  normal              Left:  normal          Heel-Knee-Shin                Right:  normal              Left:  normal          Rapid Alternating Movements              Right:  normal              Left:  normal          Gait:                       Casual: Gait is deferred          Reflexes:             Deep Tendon Reflexes:             Reflexes are 2 +             Plantar response:                Right:  downgoing               Left:  downgoing    Vascular:  Cardiac Exam:  normal         CT Head WO Contrast    Result Date: 6/30/2022  CT Brain. Contrast medium:  without contrast.. History: Altered mental status. Technical factors: CT imaging of the brain was obtained and formatted as 5 mm contiguous axial images. 2.5 mm contiguous axial images were obtained through the osseous structures. Sagittal and coronal reconstruction obtained during postprocessing. Comparison:  None. Findings: Extra-axial spaces:  Normal. Intracranial hemorrhage:  None. Ventricular system: [Negative. Basal Cisterns:  Without anomaly. Cerebral Parenchyma: Bilateral, symmetric areas of decreased attenuation exerting no mass effect measuring approximately 9 mm in size since found within the bilateral globus pallidus (series 2, image 17). Midline Shift:  None. Cerebellum:  No anomaly identified. Paranasal sinuses and mastoid air cells:  No anomaly identified. Visualized Orbits:  Negative. Impression: Round symmetric areas decreased attenuation bilateral globus pallidus. This finding may be seen in patients experiencing hypoxia, i.e., carbon monoxide poisoning.  All CT scans at this facility use dose modulation, iterative reconstruction, and/or weight based dosing when appropriate to reduce radiation dose to as low as reasonably achievable. XR CHEST PORTABLE    Result Date: 7/1/2022  EXAMINATION: XR CHEST PORTABLE CLINICAL HISTORY: FEVER COMPARISONS: JANUARY 29, 2018 FINDINGS: Osseous structures are intact. Cardiopericardial silhouette is normal. Pulmonary vasculature is normal. Right lung is clear. Patchy area of increased opacity left upper lung. LEFT UPPER LUNG ATELECTASIS/PNEUMONIA. MRI BRAIN WO CONTRAST    Result Date: 7/1/2022  MRI BRAIN WO CONTRAST : 7/1/2022 CLINICAL HISTORY:  stroke . COMPARISON: Head CT 6/30/2022. TECHNIQUE: Multiplanar MR imaging of the head was performed without contrast. FINDINGS: Areas of restricted diffusion within the globus pallidus, supratentorial white matter and splenium of the corpus callosum are most consistent with carbon monoxide poisoning/hypoxemia and/or other toxic encephalopathy. There is no intracranial hemorrhage, mass effect, midline shift, extra-axial collection, significant cerebral volume loss or other complication identified. FINDINGS CONSISTENT WITH CARBON MONOXIDE POISONING/HYPOXEMIA AND/OR OTHER TOXIC ENCEPHALOPATHY. NO INTRACRANIAL HEMORRHAGE OR COMPLICATION IDENTIFIED. Recent Labs     06/30/22  1600 07/02/22  0505   WBC 34.8* 16.6*   HGB 16.2 13.3*    210     Recent Labs     06/30/22  1600 07/01/22  0747 07/02/22  0505    143 137   K 4.0 3.6 3.4   CL 98 105 100   CO2 28 25 24   BUN 30* 23* 15   CREATININE 1.49* 1.18 0.66*   GLUCOSE 162* 113* 99     Recent Labs     07/01/22  2127 07/01/22  2127 07/02/22  0242 07/02/22  0505 07/02/22  1003   BILITOT 0.6  --  0.6 0.6  --    ALKPHOS 101  --  97 94  --    *   < > 913* 883* 768*   ALT 1,224*   < > 1,152* 1,169* 1,100*    < > = values in this interval not displayed.      Lab Results   Component Value Date/Time    PROTIME 16.4 07/02/2022 02:42 AM    INR 1.3 07/02/2022 02:42 AM     No results found for: LITHIUM, DILFRTOT, VALPROATE    ASSESSMENT AND PLAN  Hypoxic ischemic encephalopathy with a seizure. Patient appears to have taken some medications that might be contributing with some hepatic dysfunction as well. Patient reports he was down 2 days and cardiac arrhythmia with hypoxic ischemic encephalopathy with an abnormal MRI as noted. MRI shows multiple areas of hypoxemic damage though underlying embolic strokes must be ruled out. Recommended a complete hypercoagulable work-up for now. Findings were discussed with the patient and I am not seeing anything focal the patient is likely to have some sustained memory issues. We will follow this up with an EEG. Rayray Vasquez MD, Silvina Hardy, American Board of Psychiatry & Neurology  Board Certified in Vascular Neurology  Board Certified in Neuromuscular Medicine  Certified in . [address]

## 2022-07-02 NOTE — PROGRESS NOTES
Called poison control center with recent labs, they recommend another bag of acetylcysteine at the same rate since the pt is not improving. Dr. Kinga Dacosta was notified.

## 2022-07-02 NOTE — PROGRESS NOTES
Hospitalist Daily Progress Note  Name: Natalie Solomon  Age: 33 y.o. Gender: male  CodeStatus: Full Code  Allergies: Peanut-Containing Drug Products  Nuts [Macadamia Nut Oil]  Peanut Oil  Shellfish-Derived Products    Chief Complaint:Drug Overdose (pt has been taking royal kratom (hasn't left his apartment in a few days). Well check. )      Primary Care Provider: YING Arechiga CNP    InpatientTreatment Team: Treatment Team: Attending Provider: Jordan Forrest MD; Consulting Physician: Halle Ayers MD; Consulting Physician: Oseas Kent MD; Consulting Physician: Lauryn Reyes MD; Registered Nurse: Marcelo Butts RN; Utilization Reviewer: Sidney Vincent RN    Admission Date: 6/30/2022      Subjective: No chest pain, sob, nausea. Alert, however poor ability to answer questions appropriately. Physical Exam  Vitals and nursing note reviewed. Constitutional:       Appearance: Normal appearance. Cardiovascular:      Rate and Rhythm: Normal rate and regular rhythm. Pulmonary:      Effort: Pulmonary effort is normal.      Breath sounds: Normal breath sounds. Abdominal:      General: Bowel sounds are normal.      Palpations: Abdomen is soft. Musculoskeletal:         General: Normal range of motion. Skin:     General: Skin is warm and dry. Neurological:      Mental Status: He is alert.       Comments: Slow, indirect answers         Medications:  Reviewed    Infusion Medications:    lactated ringers 100 mL/hr at 07/02/22 1720    sodium chloride       Scheduled Medications:    acetylcysteine (ACETADOTE) infusion *second dose*  100 mg/kg IntraVENous Once    polyethylene glycol  17 g Oral Daily    piperacillin-tazobactam  3,375 mg IntraVENous Q8H    sodium chloride  1,000 mL IntraVENous Once    sodium chloride flush  5-40 mL IntraVENous 2 times per day    enoxaparin  40 mg SubCUTAneous Daily     PRN Meds: diazePAM, sodium chloride flush, sodium chloride, ondansetron **OR** ondansetron, potassium chloride **OR** potassium alternative oral replacement **OR** potassium chloride    Labs:   Recent Labs     06/30/22  1600 07/02/22  0505   WBC 34.8* 16.6*   HGB 16.2 13.3*   HCT 48.8 39.2*    210     Recent Labs     06/30/22  1600 07/01/22  0747 07/02/22  0505    143 137   K 4.0 3.6 3.4   CL 98 105 100   CO2 28 25 24   BUN 30* 23* 15   CREATININE 1.49* 1.18 0.66*   CALCIUM 8.9 8.4* 8.3*     Recent Labs     07/01/22  2127 07/01/22  2127 07/02/22  0242 07/02/22  0505 07/02/22  1003   *   < > 913* 883* 768*   ALT 1,224*   < > 1,152* 1,169* 1,100*   BILIDIR <0.2  --  <0.2  --  <0.2   BILITOT 0.6   < > 0.6 0.6 0.6   ALKPHOS 101   < > 97 94 150*    < > = values in this interval not displayed. Recent Labs     07/01/22  1215 07/02/22  0242   INR 1.3 1.3     Recent Labs     07/01/22  1215 07/02/22  0054 07/02/22  1224   CKTOTAL >20,000* >20,000* >20,000*       Urinalysis:   Lab Results   Component Value Date/Time    NITRU Negative 06/30/2022 04:00 PM    WBCUA 3-5 06/30/2022 04:00 PM    BACTERIA Negative 06/30/2022 04:00 PM    RBCUA 3-5 06/30/2022 04:00 PM    BLOODU LARGE 06/30/2022 04:00 PM    SPECGRAV 1.033 06/30/2022 04:00 PM    GLUCOSEU Negative 06/30/2022 04:00 PM       Radiology:   Most recent    Chest CT      WITH CONTRAST:No results found for this or any previous visit. WITHOUT CONTRAST: No results found for this or any previous visit. CXR      2-view: Results for orders placed in visit on 01/29/18    XR CHEST STANDARD (2 VW)    Narrative  EXAM: CHEST, 2 VIEWS    COMPARISON: NONE AVAILABLE    REASON FOR EXAMINATION: UPPER RESPIRATORY CONGESTION, DYSPNEA X2 WEEKS    FINDINGS:   Two views of the chest demonstrate normal appearance of the heart and mediastinum. The lungs appear clear. The visualized bony thorax and remainder of the chest appears unremarkable. Impression  NO EVIDENCE OF ACTIVE DISEASE IN THE CHEST.        Portable: Results for orders placed during the hospital encounter of 06/30/22    XR CHEST PORTABLE    Narrative  EXAMINATION: XR CHEST PORTABLE    CLINICAL HISTORY: FEVER    COMPARISONS: JANUARY 29, 2018    FINDINGS: Osseous structures are intact. Cardiopericardial silhouette is normal. Pulmonary vasculature is normal. Right lung is clear. Patchy area of increased opacity left upper lung. Impression  LEFT UPPER LUNG ATELECTASIS/PNEUMONIA. Echo No results found for this or any previous visit. Assessment/Plan:    Active Hospital Problems    Diagnosis Date Noted    Altered mental status [R41.82]      Priority: Medium    Drug abuse ([address] [F19.10]      Priority: Medium    Acute encephalopathy [G93.40] 06/30/2022     Priority: Medium    Polydrug dependence including opioid type drug with continuous use with complication ([address] [A50.23] 06/04/2019     1. Polysubstance overdose/acute encephalopathy  1. Unclear story of which substances taken when, supportive care in ICU, intensivist c/s. Protecting airway currently. 2. 7/1 - mild improvement, more awake but still altered, plan for MRI brain when able  3. 7/2 - MRI shows multiple areas of hypoxemia  2. Rhabdomyolysis/EDGAR  1. CK > 20,000, IV fluids, cycle ck q6, monitor Cr.  2. 7/1 - on bicarb gtt, remains elevated, monitor  3. 7/2 - renal fxn improves, however ck remains >20,000, cont iv fluids  3. Acute Hepatitis  1. May be 2/2 kratom use, however cannot completely rule out acetaminophen tox. Neg serum level, patient cannot provide hx or use. NAC given in ER in discussion with poison control, q12 LFTs. 2. 7/1 - completing nac treatment, cont monitoring  4.  DVT proph      Electronically signed by Larissa Schultz MD on 7/2/2022 at 6:46 PM

## 2022-07-02 NOTE — PROGRESS NOTES
Physical Therapy Med Surg Initial Assessment  Facility/Department: [facility]  Room: Z236/W196-42       NAME: Gloriann Kussmaul  : 1990 (33 y.o.)  MRN: [id number]  CODE STATUS: Full Code    Date of Service: 2022    Patient Diagnosis(es): Drug abuse ([facility] [F19.10]  Acute encephalopathy [G93.40]  Non-traumatic rhabdomyolysis [M62.82]  Altered mental status, unspecified altered mental status type [R41.82]   Chief Complaint   Patient presents with    Drug Overdose     pt has been taking royal kratom (hasn't left his apartment in a few days). Well check. Patient Active Problem List    Diagnosis Date Noted    Altered mental status     Drug abuse ([facility]     Acute encephalopathy 2022    Shift work sleep disorder 07/15/2019    ROSALES (generalized anxiety disorder) 2019    MDD (major depressive disorder), recurrent episode, moderate ([address] 2019    Polydrug dependence including opioid type drug with continuous use with complication ([address]     Anxiety 2015    ADHD (attention deficit hyperactivity disorder) 2012    Attention deficit hyperactivity disorder (ADHD) 2012    LPRD (laryngopharyngeal reflux disease) 10/19/2011        Past Medical History:   Diagnosis Date    ADHD (attention deficit hyperactivity disorder)     was initiated on treatment by Dr. Gertrude Pabon. any testing was done here by Dr. Gertrude Pabon, no formal psych eval.      Anxiety     Drug abuse ([address]     MDD (major depressive disorder), recurrent episode, moderate ([address] 2019     Past Surgical History:   Procedure Laterality Date    TONSILLECTOMY AND ADENOIDECTOMY      WISDOM TOOTH EXTRACTION         Chart Reviewed: Yes  Patient assessed for rehabilitation services?: Yes    Restrictions:risk for falls        SUBJECTIVE:   Subjective: Pt agreeable to PT evaluation with family present in room.     Pain   R hip 7-8/10    Prior Level of Function:  Social/Functional History  Lives With: Alone  Home Layout: One level  Home Access: Level entry  Active : Yes  Type of Occupation: [employer] work    OBJECTIVE:   Vision  Vision: Within Functional Limits  Hearing: Within functional limits    Cognition:  Orientation Level: Oriented to place,Oriented to time,Oriented to person    Observation/Palpation  Posture: Fair  Palpation: R hip pain to palpation, no bruising  Observation: head gash with pain R hip supine and standing  Edema: has B leg pumps on. ROM:  RLE AROM: WFL  RLE General AROM: Limited hip flex with resistance and pain limiting  LLE AROM : WFL  RUE General AROM: WFL  LUE General AROM: WFL    Strength:  Strength RLE  Comment: 3/5 hip, knee 3+ to 4-/5, ankle 2/5  Strength LLE  Comment: 4/5 hip, knee 4/5, ankle 4-/5  Strength RUE  Comment: shoulder 3 to 3+/5, elbow 4-/5,  3/5  Strength LUE  Comment: shoulder 3- to 3/5, elbow 3/5,  3+/5    Neuro:  Balance  Sitting - Static: Poor; Fair  Sitting - Dynamic: Poor  Standing - Static: Poor  Standing - Dynamic: Poor        Bed mobility  Rolling to Right: Stand by assistance  Supine to Sit: Minimal assistance  Sit to Supine: Moderate assistance    Transfers  Sit to Stand: Minimal Assistance  Stand to sit: Contact guard assistance    Ambulation  WB Status: WBAT  Ambulation  Surface: level tile  Device: Rolling Walker  Assistance: Minimal assistance  Quality of Gait: Assist to lift R LE for lateral stepping, decreased DF in forward and retro. Narrow MESSI, heavy UE use  Gait Deviations: Slow Radha;Decreased step length  Distance: 3 feet forward, 3 feet side step       Activity Tolerance  Activity Tolerance: Patient limited by fatigue;Patient limited by pain        ASSESSMENT:   Body Structures, Functions, Activity Limitations Requiring Skilled Therapeutic Intervention: Decreased functional mobility ; Decreased ROM; Decreased ADL status; Decreased strength;Decreased posture; Increased pain;Decreased balance  Decision Making: High Complexity  History: high  Exam: high  Clinical Presentation: unpredictable    Treatment Diagnosis: Difficulty ambulating  Therapy Prognosis: Fair         DISCHARGE RECOMMENDATIONS:  Discharge Recommendations: Continue to assess pending progress    Assessment: Pt would benefit from skilled PT to improve overall functional tolerance. Decreased ROM/Strength of UE/LE and core contributing to decreased functional ability         PLAN OF CARE:  Plan  Plan: 1 time a day 3-6 times a week  Plan weeks: 4 weeks  Current Treatment Recommendations: Strengthening,ROM,Gait training,Stair training,Functional mobility training,Balance training,Transfer training,Neuromuscular re-education,Home exercise program,Safety education & training    Safety Devices  Type of Devices: All fall risk precautions in place,Bed alarm in place,Call light within reach    Goals:  Patient goals : Improve function  Short Term Goals  Time Frame for Short term goals: 4 weeks  Short term goal 1: Indep HEP for symptom management  Short term goal 2: Indep to supervision bed mobility  Short term goal 3: Indep to supervision transfers sit to stand and bed to chair  Short term goal 4: Amb with approp device 100 ft safe with supervision  Short term goal 5: Improve LE strength 1/2 grade to assist with above goals. [facility] (6 CLICK) BASIC MOBILITY  AM-PAC Inpatient Mobility without Stair Climbing Raw Score : 13     Therapy Time:   Individual   Time In 1430   Time Out 1500   Minutes [facility], [address], 07/02/22 at 3:25 PM         Definitions for assistance levels  Independent = pt does not require any physical supervision or assistance from another person for activity completion. Device may be needed.   Stand by assistance = pt requires verbal cues or instructions from another person, close to but not touching, to perform the activity  Minimal assistance= pt performs 75% or more of the activity; assistance is required to complete the activity  Moderate assistance= pt performs 50% of the activity; assistance is required to complete the activity  Maximal assistance = pt performs 25% of the activity; assistance is required to complete the activity  Dependent = pt requires total physical assistance to accomplish the task

## 2022-07-03 ENCOUNTER — APPOINTMENT (OUTPATIENT)
Dept: ULTRASOUND IMAGING | Age: 32
DRG: 812 | End: 2022-07-03
Payer: COMMERCIAL

## 2022-07-03 LAB
ALBUMIN SERPL-MCNC: 2.9 G/DL (ref 3.5–4.6)
ALBUMIN SERPL-MCNC: 3.2 G/DL (ref 3.5–4.6)
ALBUMIN SERPL-MCNC: 3.3 G/DL (ref 3.5–4.6)
ALP BLD-CCNC: 87 U/L (ref 35–104)
ALP BLD-CCNC: 88 U/L (ref 35–104)
ALP BLD-CCNC: 93 U/L (ref 35–104)
ALT SERPL-CCNC: 659 U/L (ref 0–41)
ALT SERPL-CCNC: 815 U/L (ref 0–41)
ALT SERPL-CCNC: 827 U/L (ref 0–41)
ANION GAP SERPL CALCULATED.3IONS-SCNC: 12 MEQ/L (ref 9–15)
AST SERPL-CCNC: 365 U/L (ref 0–40)
AST SERPL-CCNC: 497 U/L (ref 0–40)
AST SERPL-CCNC: 512 U/L (ref 0–40)
BASOPHILS ABSOLUTE: 0 K/UL (ref 0–0.2)
BASOPHILS RELATIVE PERCENT: 0.1 %
BILIRUB SERPL-MCNC: 0.3 MG/DL (ref 0.2–0.7)
BILIRUB SERPL-MCNC: 0.5 MG/DL (ref 0.2–0.7)
BILIRUB SERPL-MCNC: 0.5 MG/DL (ref 0.2–0.7)
BILIRUBIN DIRECT: <0.2 MG/DL (ref 0–0.4)
BILIRUBIN DIRECT: <0.2 MG/DL (ref 0–0.4)
BILIRUBIN, INDIRECT: ABNORMAL MG/DL (ref 0–0.6)
BILIRUBIN, INDIRECT: ABNORMAL MG/DL (ref 0–0.6)
BUN BLDV-MCNC: 10 MG/DL (ref 6–20)
CALCIUM SERPL-MCNC: 8.9 MG/DL (ref 8.5–9.9)
CHLORIDE BLD-SCNC: 101 MEQ/L (ref 95–107)
CO2: 24 MEQ/L (ref 20–31)
CREAT SERPL-MCNC: 0.62 MG/DL (ref 0.7–1.2)
EOSINOPHILS ABSOLUTE: 0.1 K/UL (ref 0–0.7)
EOSINOPHILS RELATIVE PERCENT: 0.8 %
GFR AFRICAN AMERICAN: >60
GFR NON-AFRICAN AMERICAN: >60
GLOBULIN: 3.2 G/DL (ref 2.3–3.5)
GLUCOSE BLD-MCNC: 104 MG/DL (ref 70–99)
GLUCOSE BLD-MCNC: 76 MG/DL (ref 70–99)
GLUCOSE BLD-MCNC: 84 MG/DL (ref 70–99)
HCT VFR BLD CALC: 38.3 % (ref 42–52)
HEMOGLOBIN: 12.7 G/DL (ref 14–18)
INR BLD: 1.2
LYMPHOCYTES ABSOLUTE: 1 K/UL (ref 1–4.8)
LYMPHOCYTES RELATIVE PERCENT: 8.2 %
MAGNESIUM: 1.9 MG/DL (ref 1.7–2.4)
MCH RBC QN AUTO: 28.4 PG (ref 27–31.3)
MCHC RBC AUTO-ENTMCNC: 33.2 % (ref 33–37)
MCV RBC AUTO: 85.4 FL (ref 80–100)
MONOCYTES ABSOLUTE: 0.8 K/UL (ref 0.2–0.8)
MONOCYTES RELATIVE PERCENT: 6.1 %
NEUTROPHILS ABSOLUTE: 10.8 K/UL (ref 1.4–6.5)
NEUTROPHILS RELATIVE PERCENT: 84.8 %
PDW BLD-RTO: 13.1 % (ref 11.5–14.5)
PERFORMED ON: NORMAL
PERFORMED ON: NORMAL
PLATELET # BLD: 238 K/UL (ref 130–400)
POTASSIUM REFLEX MAGNESIUM: 3.2 MEQ/L (ref 3.4–4.9)
PROTHROMBIN TIME: 14.7 SEC (ref 12.3–14.9)
RBC # BLD: 4.48 M/UL (ref 4.7–6.1)
SODIUM BLD-SCNC: 137 MEQ/L (ref 135–144)
TOTAL CK: ABNORMAL U/L (ref 0–190)
TOTAL CK: ABNORMAL U/L (ref 0–190)
TOTAL PROTEIN: 6.1 G/DL (ref 6.3–8)
TOTAL PROTEIN: 6.1 G/DL (ref 6.3–8)
TOTAL PROTEIN: 6.2 G/DL (ref 6.3–8)
WBC # BLD: 12.7 K/UL (ref 4.8–10.8)

## 2022-07-03 PROCEDURE — 6370000000 HC RX 637 (ALT 250 FOR IP): Performed by: FAMILY MEDICINE

## 2022-07-03 PROCEDURE — 85025 COMPLETE CBC W/AUTO DIFF WBC: CPT

## 2022-07-03 PROCEDURE — 36415 COLL VENOUS BLD VENIPUNCTURE: CPT

## 2022-07-03 PROCEDURE — 97167 OT EVAL HIGH COMPLEX 60 MIN: CPT

## 2022-07-03 PROCEDURE — 6360000002 HC RX W HCPCS: Performed by: FAMILY MEDICINE

## 2022-07-03 PROCEDURE — 85610 PROTHROMBIN TIME: CPT

## 2022-07-03 PROCEDURE — 1210000000 HC MED SURG R&B

## 2022-07-03 PROCEDURE — 99232 SBSQ HOSP IP/OBS MODERATE 35: CPT | Performed by: INTERNAL MEDICINE

## 2022-07-03 PROCEDURE — 93971 EXTREMITY STUDY: CPT

## 2022-07-03 PROCEDURE — 80053 COMPREHEN METABOLIC PANEL: CPT

## 2022-07-03 PROCEDURE — 6370000000 HC RX 637 (ALT 250 FOR IP): Performed by: INTERNAL MEDICINE

## 2022-07-03 PROCEDURE — 6360000002 HC RX W HCPCS: Performed by: INTERNAL MEDICINE

## 2022-07-03 PROCEDURE — 82550 ASSAY OF CK (CPK): CPT

## 2022-07-03 PROCEDURE — 2580000003 HC RX 258: Performed by: INTERNAL MEDICINE

## 2022-07-03 PROCEDURE — 83735 ASSAY OF MAGNESIUM: CPT

## 2022-07-03 RX ORDER — AMOXICILLIN AND CLAVULANATE POTASSIUM 875; 125 MG/1; MG/1
1 TABLET, FILM COATED ORAL EVERY 12 HOURS SCHEDULED
Status: DISCONTINUED | OUTPATIENT
Start: 2022-07-03 | End: 2022-07-11 | Stop reason: HOSPADM

## 2022-07-03 RX ADMIN — POLYETHYLENE GLYCOL 3350 17 G: 17 POWDER, FOR SOLUTION ORAL at 08:01

## 2022-07-03 RX ADMIN — AMOXICILLIN AND CLAVULANATE POTASSIUM 1 TABLET: 875; 125 TABLET, FILM COATED ORAL at 11:09

## 2022-07-03 RX ADMIN — POTASSIUM CHLORIDE 40 MEQ: 1500 TABLET, EXTENDED RELEASE ORAL at 08:01

## 2022-07-03 RX ADMIN — ENOXAPARIN SODIUM 40 MG: 100 INJECTION SUBCUTANEOUS at 08:00

## 2022-07-03 RX ADMIN — SODIUM CHLORIDE, POTASSIUM CHLORIDE, SODIUM LACTATE AND CALCIUM CHLORIDE: 600; 310; 30; 20 INJECTION, SOLUTION INTRAVENOUS at 15:25

## 2022-07-03 RX ADMIN — PIPERACILLIN AND TAZOBACTAM 3375 MG: 3; .375 INJECTION, POWDER, LYOPHILIZED, FOR SOLUTION INTRAVENOUS at 04:34

## 2022-07-03 RX ADMIN — AMOXICILLIN AND CLAVULANATE POTASSIUM 1 TABLET: 875; 125 TABLET, FILM COATED ORAL at 22:38

## 2022-07-03 RX ADMIN — SODIUM CHLORIDE, POTASSIUM CHLORIDE, SODIUM LACTATE AND CALCIUM CHLORIDE: 600; 310; 30; 20 INJECTION, SOLUTION INTRAVENOUS at 03:01

## 2022-07-03 ASSESSMENT — ENCOUNTER SYMPTOMS
TROUBLE SWALLOWING: 0
VOMITING: 0
PHOTOPHOBIA: 0
CHOKING: 0
SHORTNESS OF BREATH: 0
COLOR CHANGE: 0
NAUSEA: 0
BACK PAIN: 0

## 2022-07-03 NOTE — PROGRESS NOTES
Spoke with poison control. Updated on latest labs ALT/AST and INR and aware Acetadote infusion scheduled to stop this evening. Verified that further labs will be checked in the AM.      2245:  Updated Dr. John Diaz on ALT/AST and INR and discussion with poison control. No further orders at this time.

## 2022-07-03 NOTE — PROGRESS NOTES
INPATIENT PROGRESS NOTES    PATIENT NAME: Lennie Coronel  MRN: [id number]  SERVICE DATE:  July 3, 2022   SERVICE TIME:  10:46 AM      PRIMARY SERVICE: Pulmonary Disease    CHIEF COMPLAIN: Drug overdose      INTERVAL HPI: Patient seen and examined at bedside, Interval Notes, orders reviewed. Nursing notes noted  He denies having any hortness of breath. No chest pain. No fever or chills. No nausea vomiting diarrhea. He is on room air and O2 saturation 97%. Chest x-ray shows left upper lobe atelectasis/pneumonia. He is on IV Zosyn. WBC is coming down. OBJECTIVE    Body mass index is 22.55 kg/m². PHYSICAL EXAM:  Vitals:  /77   Pulse 84   Temp 98.2 °F (36.8 °C) (Oral)   Resp 18   Ht 5' 10\" (1.778 m)   Wt 157 lb 3 oz (71.3 kg)   SpO2 97%   BMI 22.55 kg/m²   General: Alert, awake . comfortable in bed, No distress. Head: Atraumatic , Normocephalic   Eyes: PERRL. No sclera icterus. No conjunctival injection. No discharge   ENT: No nasal  discharge. Pharynx clear. Neck:  Trachea midline. No thyromegaly, no JVD, No cervical adenopathy. Chest : Bilaterally symmetrical ,Normal effort,  No accessory muscle use  Lung : . Fair BS bilateral, decreased BS at bases. No Rales. No wheezing. No rhonchi. Heart[de-identified] Normal  rate. Regular rhythm. No mumur ,  Rub or gallop  ABD: Non-tender. Non-distended. No masses. No organmegaly. Normal bowel sounds. No hernia.   Ext : No Pitting both leg , No Cyanosis No clubbing  Neuro: no focal weakness          DATA:   Recent Labs     07/02/22  0505 07/03/22  0450   WBC 16.6* 12.7*   HGB 13.3* 12.7*   HCT 39.2* 38.3*   MCV 85.0 85.4    238     Recent Labs     07/02/22  0505 07/02/22  0505 07/02/22  1003 07/02/22 2024 07/03/22  0450     --   --   --  137   K 3.4  --   --   --  3.2*     --   --   --  101   CO2 24  --   --   --  24   BUN 15  --   --   --  10   CREATININE 0.66*  --   --   --  0.62*   GLUCOSE 99  --   --   --  104*   CALCIUM 8.3*  --   --   -- 8. 9   PROT 6.0*  --    < > 6.1* 6.1*   LABALBU 3.1*  --    < > 3.1* 2.9*   BILITOT 0.6  --    < > 0.5 0.5   ALKPHOS 94  --    < > 91 87   *  --    < > 643* 512*   ALT 1,169*   < >   < > 977* 827*   LABGLOM >60.0   < >  --   --  >60.0   GFRAA >60.0   < >  --   --  >60.0   GLOB 2.9   < >  --   --  3.2    < > = values in this interval not displayed. MV Settings:          No results for input(s): PHART, MFB4DBB, PO2ART, CMH9KWP, BEART, F4OFZZXM in the last 72 hours. O2 Device: None (Room air)    ADULT DIET; Full Liquid     MEDICATIONS during current hospitalization:    Continuous Infusions:   lactated ringers 100 mL/hr at 07/03/22 0301    sodium chloride         Scheduled Meds:   polyethylene glycol  17 g Oral Daily    lidocaine  1 patch TransDERmal Daily    piperacillin-tazobactam  3,375 mg IntraVENous Q8H    sodium chloride  1,000 mL IntraVENous Once    sodium chloride flush  5-40 mL IntraVENous 2 times per day    enoxaparin  40 mg SubCUTAneous Daily       PRN Meds:sodium chloride flush, sodium chloride, ondansetron **OR** ondansetron, potassium chloride **OR** potassium alternative oral replacement **OR** potassium chloride    Radiology  CT Head WO Contrast    Result Date: 6/30/2022  CT Brain. Contrast medium:  without contrast.. History: Altered mental status. Technical factors: CT imaging of the brain was obtained and formatted as 5 mm contiguous axial images. 2.5 mm contiguous axial images were obtained through the osseous structures. Sagittal and coronal reconstruction obtained during postprocessing. Comparison:  None. Findings: Extra-axial spaces:  Normal. Intracranial hemorrhage:  None. Ventricular system: [Negative. Basal Cisterns:  Without anomaly. Cerebral Parenchyma: Bilateral, symmetric areas of decreased attenuation exerting no mass effect measuring approximately 9 mm in size since found within the bilateral globus pallidus (series 2, image 17). Midline Shift:  None.  Cerebellum: No anomaly identified. Paranasal sinuses and mastoid air cells:  No anomaly identified. Visualized Orbits:  Negative. Impression: Round symmetric areas decreased attenuation bilateral globus pallidus. This finding may be seen in patients experiencing hypoxia, i.e., carbon monoxide poisoning. All CT scans at this facility use dose modulation, iterative reconstruction, and/or weight based dosing when appropriate to reduce radiation dose to as low as reasonably achievable. XR CHEST PORTABLE    Result Date: 7/1/2022  EXAMINATION: XR CHEST PORTABLE CLINICAL HISTORY: FEVER COMPARISONS: JANUARY 29, 2018 FINDINGS: Osseous structures are intact. Cardiopericardial silhouette is normal. Pulmonary vasculature is normal. Right lung is clear. Patchy area of increased opacity left upper lung. LEFT UPPER LUNG ATELECTASIS/PNEUMONIA. MRI BRAIN WO CONTRAST    Result Date: 7/1/2022  MRI BRAIN WO CONTRAST : 7/1/2022 CLINICAL HISTORY:  stroke . COMPARISON: Head CT 6/30/2022. TECHNIQUE: Multiplanar MR imaging of the head was performed without contrast. FINDINGS: Areas of restricted diffusion within the globus pallidus, supratentorial white matter and splenium of the corpus callosum are most consistent with carbon monoxide poisoning/hypoxemia and/or other toxic encephalopathy. There is no intracranial hemorrhage, mass effect, midline shift, extra-axial collection, significant cerebral volume loss or other complication identified. FINDINGS CONSISTENT WITH CARBON MONOXIDE POISONING/HYPOXEMIA AND/OR OTHER TOXIC ENCEPHALOPATHY. NO INTRACRANIAL HEMORRHAGE OR COMPLICATION IDENTIFIED.        IMPRESSION AND SUGGESTION:  · Drug overdose, urine screen positive for amphetamine, benzos, and fentanyl  ·  Acute encephalopathy, improved   · Rhabdomyolysis, likely secondary to drug overdose, no history of trauma  · Abnormal LFT, likely muscle source  · EDGAR, mild  · Leukocytosis  · Left upper lobe atelectasis/pneumonia      Patient is on room air O2 saturation is 97%. No complaint of shortness of breath. Chest x-ray shows right upper lung atelectasis versus infiltration. He is on IV Zosyn changed to p.o. Augmentin. NOTE: This report was transcribed using voice recognition software. Every effort was made to ensure accuracy; however, inadvertent computerized transcription errors may be present.       Electronically signed by Génesis Cervantes MD, FCCP on 7/3/2022 at 10:46 AM

## 2022-07-03 NOTE — CARE COORDINATION
Patient is not medically cleared for admission to [facility]. Patient CK is still >18,000, liver enzymes are improving. CM to monitor for d/c readiness, and whenever the patient is medically cleared, the plans are to admit to [facility]. CM to follow.

## 2022-07-03 NOTE — PROGRESS NOTES
Neurology Follow up    SUBJECTIVE:  NO Headache, double vision,blurry vision,difficulty with speech,difficulty with swallowing,weakness,numbness,pain,nausea,vomitting,chocking,neck pain,dizziness,    Current Facility-Administered Medications   Medication Dose Route Frequency Provider Last Rate Last Admin    amoxicillin-clavulanate (AUGMENTIN) 875-125 MG per tablet 1 tablet  1 tablet Oral 2 times per day Thi Young MD   1 tablet at 07/03/22 1109    polyethylene glycol (GLYCOLAX) packet 17 g  17 g Oral Daily Kasey Leos MD   17 g at 07/03/22 0801    lidocaine 4 % external patch 1 patch  1 patch TransDERmal Daily Kasey Leos MD   1 patch at 07/03/22 0804    lactated ringers infusion   IntraVENous Continuous Necia Cheadle,  mL/hr at 07/03/22 0301 New Bag at 07/03/22 0301    0.9 % sodium chloride bolus  1,000 mL IntraVENous Once Umer White MD        sodium chloride flush 0.9 % injection 5-40 mL  5-40 mL IntraVENous 2 times per day Kasey Leos MD   10 mL at 07/01/22 2032    sodium chloride flush 0.9 % injection 5-40 mL  5-40 mL IntraVENous PRN Kasey Leos MD        0.9 % sodium chloride infusion   IntraVENous PRN Kasey Leos MD        enoxaparin (LOVENOX) injection 40 mg  40 mg SubCUTAneous Daily Kasey Leos MD   40 mg at 07/03/22 0800    ondansetron (ZOFRAN-ODT) disintegrating tablet 4 mg  4 mg Oral Q8H PRN Kasey Leos MD        Or    ondansetron [facility]) injection 4 mg  4 mg IntraVENous Q6H PRN Kasey Leos MD        potassium chloride Cleven Combe M) extended release tablet 40 mEq  40 mEq Oral PRN Kasey Loes MD   40 mEq at 07/03/22 0801    Or    potassium bicarb-citric acid (EFFER-K) effervescent tablet 40 mEq  40 mEq Oral PRN Kasey Leos MD        Or    potassium chloride 10 mEq/100 mL IVPB (Peripheral Line)  10 mEq IntraVENous PRN Kasey Leos MD           PHYSICAL EXAM:    /77   Pulse 84   Temp 98.2 °F (36.8 °C) (Oral)   Resp 18   Ht 5' 10\" (1.778 m)   Wt 157 lb 3 oz (71.3 kg)   SpO2 97%   BMI 22.55 kg/m²    General Appearance:      Skin:  normal  CVS - Normal sounds, No murmurs , No carotid Bruits  RS -CTA  Abdomen Soft, BS present  Review of Systems   Constitutional: Negative for fever. HENT: Negative for ear pain, tinnitus and trouble swallowing. Eyes: Negative for photophobia and visual disturbance. Respiratory: Negative for choking and shortness of breath. Cardiovascular: Negative for chest pain and palpitations. Gastrointestinal: Negative for nausea and vomiting. Musculoskeletal: Negative for back pain, gait problem, joint swelling, myalgias, neck pain and neck stiffness. Skin: Negative for color change. Allergic/Immunologic: Negative for food allergies. Neurological: Negative for dizziness, tremors, seizures, syncope, facial asymmetry, speech difficulty, weakness, light-headedness, numbness and headaches. Psychiatric/Behavioral: Negative for behavioral problems, confusion, hallucinations and sleep disturbance.    His only complaints appears to be pain in the right leg in the calf  Mental Status Exam:             Level of Alertness:   awake            Orientation:   person, place, time                      Attention/Concentration:  normal            Language:  normal      Funduscopic Exam:     Cranial Nerves                Cranial nerves III, IV, VI           Extraocular Movements: intact      Cranial nerve V           Facial sensation:  intact      Cranial nerve VII           Facial strength: intact      Cranial nerve VIII           Hearing:  intact      Cranial nerve IX           Palate:  intact      Cranial nerve XI         Shoulder shrug:  intact      Cranial nerve XII          Tongue movement:  normal    Motor:    Drift:  absent  Motor exam is symmetrical 5 out of 5 all extremities bilaterally  Tone:  normal  Abnormal Movements:  absent            Sensory:        Pinprick Right Upper Extremity:  normal             Left Upper Extremity:  normal             Right Lower Extremity:  normal             Left Lower Extremity:  normal           Vibration                         Touch            Proprioception                 Coordination:           Finger/Nose   Right:  normal              Left:  normal          Heel-Knee-Shin                Right:  normal              Left:  normal          Rapid Alternating Movements              Right:  normal              Left:  normal          Gait:                       Casual: Gait is deferred          Reflexes:             Deep Tendon Reflexes:             Reflexes are 2 +             Plantar response:                Right:  downgoing               Left:  downgoing    Vascular:  Cardiac Exam:  normal         CT Head WO Contrast    Result Date: 6/30/2022  CT Brain. Contrast medium:  without contrast.. History: Altered mental status. Technical factors: CT imaging of the brain was obtained and formatted as 5 mm contiguous axial images. 2.5 mm contiguous axial images were obtained through the osseous structures. Sagittal and coronal reconstruction obtained during postprocessing. Comparison:  None. Findings: Extra-axial spaces:  Normal. Intracranial hemorrhage:  None. Ventricular system: [Negative. Basal Cisterns:  Without anomaly. Cerebral Parenchyma: Bilateral, symmetric areas of decreased attenuation exerting no mass effect measuring approximately 9 mm in size since found within the bilateral globus pallidus (series 2, image 17). Midline Shift:  None. Cerebellum:  No anomaly identified. Paranasal sinuses and mastoid air cells:  No anomaly identified. Visualized Orbits:  Negative. Impression: Round symmetric areas decreased attenuation bilateral globus pallidus. This finding may be seen in patients experiencing hypoxia, i.e., carbon monoxide poisoning.  All CT scans at this facility use dose modulation, iterative reconstruction, and/or weight based dosing when appropriate to reduce radiation dose to as low as reasonably achievable. XR CHEST PORTABLE    Result Date: 7/1/2022  EXAMINATION: XR CHEST PORTABLE CLINICAL HISTORY: FEVER COMPARISONS: JANUARY 29, 2018 FINDINGS: Osseous structures are intact. Cardiopericardial silhouette is normal. Pulmonary vasculature is normal. Right lung is clear. Patchy area of increased opacity left upper lung. LEFT UPPER LUNG ATELECTASIS/PNEUMONIA. MRI BRAIN WO CONTRAST    Result Date: 7/1/2022  MRI BRAIN WO CONTRAST : 7/1/2022 CLINICAL HISTORY:  stroke . COMPARISON: Head CT 6/30/2022. TECHNIQUE: Multiplanar MR imaging of the head was performed without contrast. FINDINGS: Areas of restricted diffusion within the globus pallidus, supratentorial white matter and splenium of the corpus callosum are most consistent with carbon monoxide poisoning/hypoxemia and/or other toxic encephalopathy. There is no intracranial hemorrhage, mass effect, midline shift, extra-axial collection, significant cerebral volume loss or other complication identified. FINDINGS CONSISTENT WITH CARBON MONOXIDE POISONING/HYPOXEMIA AND/OR OTHER TOXIC ENCEPHALOPATHY. NO INTRACRANIAL HEMORRHAGE OR COMPLICATION IDENTIFIED. Recent Labs     06/30/22  1600 07/02/22  0505 07/03/22  0450   WBC 34.8* 16.6* 12.7*   HGB 16.2 13.3* 12.7*    210 238     Recent Labs     07/01/22  0747 07/02/22  0505 07/03/22  0450    137 137   K 3.6 3.4 3.2*    100 101   CO2 25 24 24   BUN 23* 15 10   CREATININE 1.18 0.66* 0.62*   GLUCOSE 113* 99 104*     Recent Labs     07/02/22  1003 07/02/22  1003 07/02/22  2024 07/03/22  0450 07/03/22  0951   BILITOT 0.6  --  0.5 0.5  --    ALKPHOS 150*  --  91 87  --    *  --  643* 512*  --    ALT 1,100*   < > 977* 827* 815*    < > = values in this interval not displayed.      Lab Results   Component Value Date/Time    PROTIME 14.7 07/03/2022 04:50 AM    INR 1.2 07/03/2022 04:50 AM     No results found for:

## 2022-07-03 NOTE — PROGRESS NOTES
Pt assessment complete. Pt is alert and oriented. Speech is clear, but delayed. Pt requested going for x-ray in the AM for hip, did not want to go this evening. Lidocaine patch placed to right hip for discomfort. Follows commands. Incontinent of urine at times. Call light in reach. Bed alarm on.

## 2022-07-03 NOTE — PROGRESS NOTES
Hospitalist Daily Progress Note  Name: Jaylene Madison  Age: 33 y.o. Gender: male  CodeStatus: Full Code  Allergies: Peanut-Containing Drug Products  Nuts [Macadamia Nut Oil]  Peanut Oil  Shellfish-Derived Products    Chief Complaint:Drug Overdose (pt has been taking royal kratom (hasn't left his apartment in a few days). Well check. )      Primary Care Provider: YING Williamson - CNP    InpatientTreatment Team: Treatment Team: Attending Provider: Sylvia Frances MD; Consulting Physician: Alex Wright MD; Consulting Physician: Daniel West MD; Consulting Physician: Rachel Salazar MD; Utilization Reviewer: Monique Taylor, RN; Registered Nurse: Tila Matthews RN; Patient Care Tech: Soren Ashton    Admission Date: 6/30/2022      Subjective: No chest pain, sob, nausea. Alert, however poor ability to answer questions appropriately. No significant changes. Physical Exam  Vitals and nursing note reviewed. Constitutional:       Appearance: Normal appearance. Cardiovascular:      Rate and Rhythm: Normal rate and regular rhythm. Pulmonary:      Effort: Pulmonary effort is normal.      Breath sounds: Normal breath sounds. Abdominal:      General: Bowel sounds are normal.      Palpations: Abdomen is soft. Musculoskeletal:         General: Normal range of motion. Skin:     General: Skin is warm and dry. Neurological:      Mental Status: He is alert.       Comments: Slow, indirect answers         Medications:  Reviewed    Infusion Medications:    lactated ringers 100 mL/hr at 07/03/22 1525    sodium chloride       Scheduled Medications:    amoxicillin-clavulanate  1 tablet Oral 2 times per day    polyethylene glycol  17 g Oral Daily    lidocaine  1 patch TransDERmal Daily    sodium chloride  1,000 mL IntraVENous Once    sodium chloride flush  5-40 mL IntraVENous 2 times per day    enoxaparin  40 mg SubCUTAneous Daily     PRN Meds: sodium chloride flush, sodium chloride, ondansetron **OR** ondansetron, potassium chloride **OR** potassium alternative oral replacement **OR** potassium chloride    Labs:   Recent Labs     07/02/22  0505 07/03/22  0450   WBC 16.6* 12.7*   HGB 13.3* 12.7*   HCT 39.2* 38.3*    238     Recent Labs     07/01/22  0747 07/02/22  0505 07/03/22  0450    137 137   K 3.6 3.4 3.2*    100 101   CO2 25 24 24   BUN 23* 15 10   CREATININE 1.18 0.66* 0.62*   CALCIUM 8.4* 8.3* 8.9     Recent Labs     07/02/22  1003 07/02/22  1003 07/02/22 2024 07/03/22  0450 07/03/22  0951   *   < > 643* 512* 497*   ALT 1,100*   < > 977* 827* 815*   BILIDIR <0.2  --  <0.2  --  <0.2   BILITOT 0.6   < > 0.5 0.5 0.5   ALKPHOS 150*   < > 91 87 88    < > = values in this interval not displayed. Recent Labs     07/02/22  0242 07/02/22 2024 07/03/22  0450   INR 1.3 1.2 1.2     Recent Labs     07/02/22  1224 07/02/22  2355 07/03/22  0951   CKTOTAL >20,000* 18,274* 15,536*       Urinalysis:   Lab Results   Component Value Date/Time    NITRU Negative 06/30/2022 04:00 PM    WBCUA 3-5 06/30/2022 04:00 PM    BACTERIA Negative 06/30/2022 04:00 PM    RBCUA 3-5 06/30/2022 04:00 PM    BLOODU LARGE 06/30/2022 04:00 PM    SPECGRAV 1.033 06/30/2022 04:00 PM    GLUCOSEU Negative 06/30/2022 04:00 PM       Radiology:   Most recent    Chest CT      WITH CONTRAST:No results found for this or any previous visit. WITHOUT CONTRAST: No results found for this or any previous visit. CXR      2-view: Results for orders placed in visit on 01/29/18    XR CHEST STANDARD (2 VW)    Narrative  EXAM: CHEST, 2 VIEWS    COMPARISON: NONE AVAILABLE    REASON FOR EXAMINATION: UPPER RESPIRATORY CONGESTION, DYSPNEA X2 WEEKS    FINDINGS:   Two views of the chest demonstrate normal appearance of the heart and mediastinum. The lungs appear clear. The visualized bony thorax and remainder of the chest appears unremarkable.     Impression  NO EVIDENCE OF ACTIVE DISEASE IN THE CHEST.       Portable: Results for orders placed during the hospital encounter of 06/30/22    XR CHEST PORTABLE    Narrative  EXAMINATION: XR CHEST PORTABLE    CLINICAL HISTORY: FEVER    COMPARISONS: JANUARY 29, 2018    FINDINGS: Osseous structures are intact. Cardiopericardial silhouette is normal. Pulmonary vasculature is normal. Right lung is clear. Patchy area of increased opacity left upper lung. Impression  LEFT UPPER LUNG ATELECTASIS/PNEUMONIA. Echo No results found for this or any previous visit. Assessment/Plan:    Active Hospital Problems    Diagnosis Date Noted    Altered mental status [R41.82]      Priority: Medium    Drug abuse ([facility] [F19.10]      Priority: Medium    Acute encephalopathy [G93.40] 06/30/2022     Priority: Medium    Polydrug dependence including opioid type drug with continuous use with complication ([facility] [address] [X99.04] 06/04/2019     1. Polysubstance overdose/acute encephalopathy  1. Unclear story of which substances taken when, supportive care in ICU, intensivist c/s. Protecting airway currently. 2. 7/1 - mild improvement, more awake but still altered, plan for MRI brain when able  3. 7/2 - MRI shows multiple areas of hypoxemia  4. 7/3 - plan to repeat in 2-3 days when edema improves. 2. Rhabdomyolysis/EDGAR  1. CK > 20,000, IV fluids, cycle ck q6, monitor Cr.  2. 7/1 - on bicarb gtt, remains elevated, monitor  3. 7/2 - renal fxn improves, however ck remains >20,000, cont iv fluids  4. 7/3 - starting to improve, cont iv fluids  3. Acute Hepatitis  1. May be 2/2 kratom use, however cannot completely rule out acetaminophen tox. Neg serum level, patient cannot provide hx or use. NAC given in ER in discussion with poison control, q12 LFTs. 2. 7/1 - completing nac treatment, cont monitoring  3. 7/3 - slow improvement  4.  DVT proph      Electronically signed by Isacc Fabian MD on 7/3/2022 at 6:54 PM

## 2022-07-03 NOTE — PROGRESS NOTES
[facility] - ACUTE     NAME: Reji Calderon  : 1990 (33 y.o.)  MRN: [id number]  CODE STATUS: Full Code  [facility]    Date of Service: 7/3/2022    Patient Diagnosis(es): Drug abuse ([facility] [address] [F19.10]  Acute encephalopathy [G93.40]  Non-traumatic rhabdomyolysis [M62.82]  Altered mental status, unspecified altered mental status type [R41.82]   Patient Active Problem List    Diagnosis Date Noted    Altered mental status     Drug abuse ([facility] [address]     Acute encephalopathy 2022    Shift work sleep disorder 07/15/2019    ROSALES (generalized anxiety disorder) 2019    MDD (major depressive disorder), recurrent episode, moderate ([facility] [address] 2019    Polydrug dependence including opioid type drug with continuous use with complication ([facility] [address]     Anxiety 2015    ADHD (attention deficit hyperactivity disorder) 2012    Attention deficit hyperactivity disorder (ADHD) 2012    LPRD (laryngopharyngeal reflux disease) 10/19/2011        Past Medical History:   Diagnosis Date    ADHD (attention deficit hyperactivity disorder)     was initiated on treatment by Dr. Geo Avery. any testing was done here by Dr. Geo Avery, no formal psych eval.      Anxiety     Drug abuse ([facility] [address]     MDD (major depressive disorder), recurrent episode, moderate ([address] 2019     Past Surgical History:   Procedure Laterality Date    TONSILLECTOMY AND ADENOIDECTOMY      WISDOM TOOTH EXTRACTION          Restrictions  Restrictions/Precautions: Fall Risk              Safety Devices: Safety Devices  Type of Devices: All fall risk precautions in place     Patient's date of birth confirmed: Yes    General:  Chart Reviewed: Yes  Patient assessed for rehabilitation services?: Yes  Family / Caregiver Present: Yes    Subjective          Pain at start of treatment: Yes: 5/10    Pain at end of treatment: Yes: 5/10    Location: R hip/pelvic region  Nursing notified:  Team aware.      Prior Level of Function:  Social/Functional History  Lives With: Alone  Home Layout: One level  Home Access: Level entry  Bathroom Toilet: Standard  Has the patient had two or more falls in the past year or any fall with injury in the past year?: No  ADL Assistance: [address]: Independent  Homemaking Responsibilities: Yes  Ambulation Assistance: Independent  Transfer Assistance: Independent  Active : Yes  Type of Occupation: [employer]  Additional Comments: Independent PTA- no medical equipment    OBJECTIVE:     Orientation Status:  Orientation  Overall Orientation Status: Within Functional Limits  Orientation Level: Oriented to place;Oriented to time;Oriented to person    Observation:  Observation/Palpation  Posture: Fair  Palpation: R hip pain to palpation, no bruising  Observation: R hip/pelvic pain mainly in supine  Edema: RLE edema    Cognition Status:  Cognition  Overall Cognitive Status: Exceptions  Arousal/Alertness: Delayed responses to stimuli  Following Commands:  Follows multistep commands with repitition  Attention Span: Difficulty attending to directions  Memory: Decreased recall of recent events  Safety Judgement: Decreased awareness of need for safety;Decreased awareness of need for assistance  Problem Solving: Assistance required to generate solutions;Assistance required to identify errors made  Insights: Decreased awareness of deficits  Initiation: Requires cues for some  Sequencing: Requires cues for some  Cognition Comment: pt reported noticeable fatigue, delayed processing    Perception Status:  Perception  Overall Perceptual Status: WFL    Vision and Hearing Status:  Hearing  Hearing: Within functional limits        GROSS ASSESSMENT AROM/PROM:  AROM: Generally decreased, functional       ROM:   LUE AROM (degrees)  LUE AROM : WFL  RUE AROM (degrees)  RUE AROM : WFL    UE STRENGTH:  Strength: Generally decreased, functional (Generally 3+ to 4-/5 BUEs)    UE COORDINATION:  Coordination: Generally decreased, functional    UE TONE:       UE SENSATION:  Sensation: Impaired (R foot)    Hand Dominance:  Hand Dominance  Hand Dominance: Right    ADL Status:  ADL  Feeding: Setup  Grooming: Setup  LE Dressing: Maximum assistance  LE Dressing Skilled Clinical Factors: difficulty with managing sock over RLE, unable to cross over LLE due to swelling/weakness. Decreased balance in standing  Toileting: Maximum assistance  Additional Comments: Simulated ADLs as above. Pt declined most bathing and dressing tasks due to completing them earlier. Functional Mobility:  Patient ambulated several steps forward from EOB using FWW. Noted: RLE buckling and several posterior losses of balance to take approx 4 steps forward/backward. Min-ModA overall needed for fx mobility     Transfers: CGA-Konrad for STSs and stand to sitting position. Multiple cues needed to follow directions during session. Standing pivot: Konrad bed <> bedside chair     Bed Mobility  Bed mobility  Supine to Sit: Minimal assistance    Seated and Standing Balance:   Konrad for standing balance     Functional Endurance:  Activity Tolerance  Activity Tolerance: Patient limited by fatigue  Activity Tolerance Comments: decreased endurance. Pt pivoted into bedside chair however he only tolerated sitting there for a few minutes prior to asking nursing to put him back into the bed due to fatigue    D/C Recommendations:  OT D/C RECOMMENDATIONS  REQUIRES OT FOLLOW-UP: Yes    Equipment Recommendations:  OT Equipment Recommendations  Other: continue to assess    OT Education:        OT Follow Up:   OT D/C RECOMMENDATIONS  REQUIRES OT FOLLOW-UP: Yes       Assessment/Discharge Disposition:  Assessment: Pt is a 32 y/o male who presents to [facility] for medical decline. Pt with above deficits and significant ADL impairments. Pt with decreased balance, endurance, and general cognition.  Recommend continued OT at this time to address deficits Performance deficits / Impairments: Decreased functional mobility ,Decreased safe awareness,Decreased balance,Decreased coordination,Decreased ADL status,Decreased cognition,Decreased endurance,Decreased high-level IADLs,Decreased strength,Decreased fine motor control  Prognosis: Fair  Discharge Recommendations: Continue to assess pending progress  Decision Making: High Complexity  History: complex chart review  Exam: 10 perf deficits  Assistance / Modification: ModA-max    [facility] ([employer]) Self care Score   How much help for putting on and taking off regular lower body clothing?: A Lot  How much help for Bathing?: A Lot  How much help for Toileting?: A Lot  How much help for putting on and taking off regular upper body clothing?: A Little  How much help for taking care of personal grooming?: None  How much help for eating meals?: None  AM-[facility] Inpatient Daily Activity Raw Score: 17  AM-[facility] Inpatient ADL T-Scale Score : 37.26  ADL Inpatient CMS 0-100% Score: 50.11    Therapy key for assistance levels -   Independent/Mod I = Pt. is able to perform task with no assistance but may require a device   Stand by assistance = Pt. does not perform task at an independent level but does not need physical assistance, requires verbal cues  Minimal, Moderate, Maximal Assistance = Pt. requires physical assistance (25%, 50%, 75% assist from helper) for task but is able to actively participate in task   Dependent = Pt. requires total assistance with task and is not able to actively participate with task completion     Plan:  Plan  Times per Week: 1-4x/week  Plan Weeks: acute LOS  Current Treatment Recommendations: Strengthening,Balance training,Functional mobility training,Endurance training,Neuromuscular re-education,Safety education & training,Patient/Caregiver education & training,Equipment evaluation, education, & procurement,Self-Care / ADL    Goals:   Patient will:    - Improve functional endurance to tolerate/complete 30 mins of ADL's  - Be SUP in UB ADLs   - Be Konrad in LB ADLs  - Be SBA in ADL transfers without LOB  - Improve B UE strength and endurance to good in order to participate in self-care activities as projected. - Access appropriate D/C site with as few architectural barriers as possible.     Patient Goal: Patient goals : Get stronger, walk better     Discussed and agreed upon: Yes Comments:       Therapy Time:   Individual   Time In 1448   Time Out 1517   Minutes 29          Eval: 29 minutes     Electronically signed by:    Asim Pfeiffer OT,   7/3/2022, 3:46 PM

## 2022-07-03 NOTE — PROGRESS NOTES
INPATIENT PROGRESS NOTES    PATIENT NAME: Arleth Laird  MRN: [id number]  SERVICE DATE:  July 3, 2022   SERVICE TIME:  10:49 AM      PRIMARY SERVICE: Pulmonary Disease    CHIEF COMPLAIN: Drug overdose      INTERVAL HPI: Patient seen and examined at bedside, Interval Notes, orders reviewed. Nursing notes noted  He denies having any hortness of breath. No chest pain. No fever or chills. No nausea vomiting diarrhea. He is on room air and O2 saturation 97%. Chest x-ray shows left upper lobe atelectasis/pneumonia. He is on IV Zosyn. WBC is coming down. OBJECTIVE    Body mass index is 22.55 kg/m². PHYSICAL EXAM:  Vitals:  /77   Pulse 84   Temp 98.2 °F (36.8 °C) (Oral)   Resp 18   Ht 5' 10\" (1.778 m)   Wt 157 lb 3 oz (71.3 kg)   SpO2 97%   BMI 22.55 kg/m²   General: Alert, awake . comfortable in bed, No distress. Head: Atraumatic , Normocephalic   Eyes: PERRL. No sclera icterus. No conjunctival injection. No discharge   ENT: No nasal  discharge. Pharynx clear. Neck:  Trachea midline. No thyromegaly, no JVD, No cervical adenopathy. Chest : Bilaterally symmetrical ,Normal effort,  No accessory muscle use  Lung : . Fair BS bilateral, decreased BS at bases. No Rales. No wheezing. No rhonchi. Heart[de-identified] Normal  rate. Regular rhythm. No mumur ,  Rub or gallop  ABD: Non-tender. Non-distended. No masses. No organmegaly. Normal bowel sounds. No hernia.   Ext : No Pitting both leg , No Cyanosis No clubbing  Neuro: no focal weakness          DATA:   Recent Labs     07/02/22  0505 07/03/22  0450   WBC 16.6* 12.7*   HGB 13.3* 12.7*   HCT 39.2* 38.3*   MCV 85.0 85.4    238     Recent Labs     07/02/22  0505 07/02/22  0505 07/02/22  1003 07/02/22 2024 07/03/22  0450     --   --   --  137   K 3.4  --   --   --  3.2*     --   --   --  101   CO2 24  --   --   --  24   BUN 15  --   --   --  10   CREATININE 0.66*  --   --   --  0.62*   GLUCOSE 99  --   --   --  104*   CALCIUM 8.3*  --   --   -- 8. 9   PROT 6.0*  --    < > 6.1* 6.1*   LABALBU 3.1*  --    < > 3.1* 2.9*   BILITOT 0.6  --    < > 0.5 0.5   ALKPHOS 94  --    < > 91 87   *  --    < > 643* 512*   ALT 1,169*   < >   < > 977* 827*   LABGLOM >60.0   < >  --   --  >60.0   GFRAA >60.0   < >  --   --  >60.0   GLOB 2.9   < >  --   --  3.2    < > = values in this interval not displayed. MV Settings:          No results for input(s): PHART, FOC6NWW, PO2ART, TXU8ZVX, BEART, [id number] in the last 72 hours. O2 Device: None (Room air)    ADULT DIET; Full Liquid     MEDICATIONS during current hospitalization:    Continuous Infusions:   lactated ringers 100 mL/hr at 07/03/22 0301    sodium chloride         Scheduled Meds:   polyethylene glycol  17 g Oral Daily    lidocaine  1 patch TransDERmal Daily    piperacillin-tazobactam  3,375 mg IntraVENous Q8H    sodium chloride  1,000 mL IntraVENous Once    sodium chloride flush  5-40 mL IntraVENous 2 times per day    enoxaparin  40 mg SubCUTAneous Daily       PRN Meds:sodium chloride flush, sodium chloride, ondansetron **OR** ondansetron, potassium chloride **OR** potassium alternative oral replacement **OR** potassium chloride    Radiology  CT Head WO Contrast    Result Date: 6/30/2022  CT Brain. Contrast medium:  without contrast.. History: Altered mental status. Technical factors: CT imaging of the brain was obtained and formatted as 5 mm contiguous axial images. 2.5 mm contiguous axial images were obtained through the osseous structures. Sagittal and coronal reconstruction obtained during postprocessing. Comparison:  None. Findings: Extra-axial spaces:  Normal. Intracranial hemorrhage:  None. Ventricular system: [Negative. Basal Cisterns:  Without anomaly. Cerebral Parenchyma: Bilateral, symmetric areas of decreased attenuation exerting no mass effect measuring approximately 9 mm in size since found within the bilateral globus pallidus (series 2, image 17). Midline Shift:  None.  Cerebellum: No anomaly identified. Paranasal sinuses and mastoid air cells:  No anomaly identified. Visualized Orbits:  Negative. Impression: Round symmetric areas decreased attenuation bilateral globus pallidus. This finding may be seen in patients experiencing hypoxia, i.e., carbon monoxide poisoning. All CT scans at this facility use dose modulation, iterative reconstruction, and/or weight based dosing when appropriate to reduce radiation dose to as low as reasonably achievable. XR CHEST PORTABLE    Result Date: 7/1/2022  EXAMINATION: XR CHEST PORTABLE CLINICAL HISTORY: FEVER COMPARISONS: JANUARY 29, 2018 FINDINGS: Osseous structures are intact. Cardiopericardial silhouette is normal. Pulmonary vasculature is normal. Right lung is clear. Patchy area of increased opacity left upper lung. LEFT UPPER LUNG ATELECTASIS/PNEUMONIA. MRI BRAIN WO CONTRAST    Result Date: 7/1/2022  MRI BRAIN WO CONTRAST : 7/1/2022 CLINICAL HISTORY:  stroke . COMPARISON: Head CT 6/30/2022. TECHNIQUE: Multiplanar MR imaging of the head was performed without contrast. FINDINGS: Areas of restricted diffusion within the globus pallidus, supratentorial white matter and splenium of the corpus callosum are most consistent with carbon monoxide poisoning/hypoxemia and/or other toxic encephalopathy. There is no intracranial hemorrhage, mass effect, midline shift, extra-axial collection, significant cerebral volume loss or other complication identified. FINDINGS CONSISTENT WITH CARBON MONOXIDE POISONING/HYPOXEMIA AND/OR OTHER TOXIC ENCEPHALOPATHY. NO INTRACRANIAL HEMORRHAGE OR COMPLICATION IDENTIFIED.        IMPRESSION AND SUGGESTION:  · Drug overdose, urine screen positive for amphetamine, benzos, and fentanyl  ·  Acute encephalopathy, improved   · Rhabdomyolysis, likely secondary to drug overdose, no history of trauma  · Abnormal LFT, likely muscle source  · EDGAR, mild  · Leukocytosis  · Left upper lobe atelectasis/pneumonia      Patient is on room air O2 saturation is 97%. No complaint of shortness of breath. Chest x-ray shows right upper lung atelectasis versus infiltration. He is on IV Zosyn changed to p.o. Augmentin. NOTE: This report was transcribed using voice recognition software. Every effort was made to ensure accuracy; however, inadvertent computerized transcription errors may be present.       Electronically signed by Ernesto Funk MD, [facility] on 7/3/2022 at 10:49 AM

## 2022-07-04 ENCOUNTER — APPOINTMENT (OUTPATIENT)
Dept: MRI IMAGING | Age: 32
DRG: 812 | End: 2022-07-04
Payer: COMMERCIAL

## 2022-07-04 ENCOUNTER — APPOINTMENT (OUTPATIENT)
Dept: GENERAL RADIOLOGY | Age: 32
DRG: 812 | End: 2022-07-04
Payer: COMMERCIAL

## 2022-07-04 LAB
ACETAMINOPHEN LEVEL: <5 UG/ML (ref 10–30)
ALBUMIN SERPL-MCNC: 3.2 G/DL (ref 3.5–4.6)
ALBUMIN SERPL-MCNC: 3.2 G/DL (ref 3.5–4.6)
ALBUMIN SERPL-MCNC: 3.3 G/DL (ref 3.5–4.6)
ALP BLD-CCNC: 85 U/L (ref 35–104)
ALP BLD-CCNC: 89 U/L (ref 35–104)
ALP BLD-CCNC: 90 U/L (ref 35–104)
ALT SERPL-CCNC: 478 U/L (ref 0–41)
ALT SERPL-CCNC: 606 U/L (ref 0–41)
ALT SERPL-CCNC: 610 U/L (ref 0–41)
ANION GAP SERPL CALCULATED.3IONS-SCNC: 11 MEQ/L (ref 9–15)
AST SERPL-CCNC: 262 U/L (ref 0–40)
AST SERPL-CCNC: 331 U/L (ref 0–40)
AST SERPL-CCNC: 334 U/L (ref 0–40)
BANDED NEUTROPHILS RELATIVE PERCENT: 1 %
BASOPHILS ABSOLUTE: 0 K/UL (ref 0–0.2)
BASOPHILS RELATIVE PERCENT: 0.1 %
BILIRUB SERPL-MCNC: 0.4 MG/DL (ref 0.2–0.7)
BILIRUBIN DIRECT: <0.2 MG/DL (ref 0–0.4)
BILIRUBIN DIRECT: <0.2 MG/DL (ref 0–0.4)
BILIRUBIN, INDIRECT: ABNORMAL MG/DL (ref 0–0.6)
BILIRUBIN, INDIRECT: ABNORMAL MG/DL (ref 0–0.6)
BUN BLDV-MCNC: 10 MG/DL (ref 6–20)
CALCIUM SERPL-MCNC: 8.9 MG/DL (ref 8.5–9.9)
CHLORIDE BLD-SCNC: 106 MEQ/L (ref 95–107)
CO2: 25 MEQ/L (ref 20–31)
CREAT SERPL-MCNC: 0.58 MG/DL (ref 0.7–1.2)
EOSINOPHILS ABSOLUTE: 0.3 K/UL (ref 0–0.7)
EOSINOPHILS RELATIVE PERCENT: 2 %
GFR AFRICAN AMERICAN: >60
GFR NON-AFRICAN AMERICAN: >60
GLOBULIN: 2.9 G/DL (ref 2.3–3.5)
GLUCOSE BLD-MCNC: 109 MG/DL (ref 70–99)
HCT VFR BLD CALC: 40.2 % (ref 42–52)
HEMOGLOBIN: 13.7 G/DL (ref 14–18)
LACTATE DEHYDROGENASE: 795 U/L (ref 135–225)
LYMPHOCYTES ABSOLUTE: 1.8 K/UL (ref 1–4.8)
LYMPHOCYTES RELATIVE PERCENT: 11 %
MCH RBC QN AUTO: 29 PG (ref 27–31.3)
MCHC RBC AUTO-ENTMCNC: 34.1 % (ref 33–37)
MCV RBC AUTO: 85.2 FL (ref 80–100)
METAMYELOCYTES RELATIVE PERCENT: 1 %
MONOCYTES ABSOLUTE: 0.3 K/UL (ref 0.2–0.8)
MONOCYTES RELATIVE PERCENT: 1.9 %
NEUTROPHILS ABSOLUTE: 13.9 K/UL (ref 1.4–6.5)
NEUTROPHILS RELATIVE PERCENT: 83 %
PDW BLD-RTO: 13 % (ref 11.5–14.5)
PLATELET # BLD: 293 K/UL (ref 130–400)
PLATELET SLIDE REVIEW: NORMAL
POTASSIUM REFLEX MAGNESIUM: 4 MEQ/L (ref 3.4–4.9)
RBC # BLD: 4.72 M/UL (ref 4.7–6.1)
RBC # BLD: NORMAL 10*6/UL
SODIUM BLD-SCNC: 142 MEQ/L (ref 135–144)
TOTAL CK: ABNORMAL U/L (ref 0–190)
TOTAL PROTEIN: 6 G/DL (ref 6.3–8)
TOTAL PROTEIN: 6.1 G/DL (ref 6.3–8)
TOTAL PROTEIN: 6.3 G/DL (ref 6.3–8)
WBC # BLD: 16.3 K/UL (ref 4.8–10.8)

## 2022-07-04 PROCEDURE — 36415 COLL VENOUS BLD VENIPUNCTURE: CPT

## 2022-07-04 PROCEDURE — 82550 ASSAY OF CK (CPK): CPT

## 2022-07-04 PROCEDURE — 99253 IP/OBS CNSLTJ NEW/EST LOW 45: CPT | Performed by: SPECIALIST

## 2022-07-04 PROCEDURE — 80143 DRUG ASSAY ACETAMINOPHEN: CPT

## 2022-07-04 PROCEDURE — 2580000003 HC RX 258: Performed by: FAMILY MEDICINE

## 2022-07-04 PROCEDURE — 1210000000 HC MED SURG R&B

## 2022-07-04 PROCEDURE — 80074 ACUTE HEPATITIS PANEL: CPT

## 2022-07-04 PROCEDURE — 6370000000 HC RX 637 (ALT 250 FOR IP): Performed by: FAMILY MEDICINE

## 2022-07-04 PROCEDURE — 99232 SBSQ HOSP IP/OBS MODERATE 35: CPT | Performed by: INTERNAL MEDICINE

## 2022-07-04 PROCEDURE — 85025 COMPLETE CBC W/AUTO DIFF WBC: CPT

## 2022-07-04 PROCEDURE — 83615 LACTATE (LD) (LDH) ENZYME: CPT

## 2022-07-04 PROCEDURE — 70553 MRI BRAIN STEM W/O & W/DYE: CPT

## 2022-07-04 PROCEDURE — 2580000003 HC RX 258: Performed by: INTERNAL MEDICINE

## 2022-07-04 PROCEDURE — 99233 SBSQ HOSP IP/OBS HIGH 50: CPT | Performed by: PSYCHIATRY & NEUROLOGY

## 2022-07-04 PROCEDURE — 6360000004 HC RX CONTRAST MEDICATION: Performed by: PSYCHIATRY & NEUROLOGY

## 2022-07-04 PROCEDURE — 80053 COMPREHEN METABOLIC PANEL: CPT

## 2022-07-04 PROCEDURE — 6360000002 HC RX W HCPCS: Performed by: PSYCHIATRY & NEUROLOGY

## 2022-07-04 PROCEDURE — 6370000000 HC RX 637 (ALT 250 FOR IP): Performed by: INTERNAL MEDICINE

## 2022-07-04 PROCEDURE — A9579 GAD-BASE MR CONTRAST NOS,1ML: HCPCS | Performed by: PSYCHIATRY & NEUROLOGY

## 2022-07-04 PROCEDURE — 99232 SBSQ HOSP IP/OBS MODERATE 35: CPT | Performed by: PSYCHIATRY & NEUROLOGY

## 2022-07-04 PROCEDURE — 71046 X-RAY EXAM CHEST 2 VIEWS: CPT

## 2022-07-04 PROCEDURE — 6360000002 HC RX W HCPCS: Performed by: FAMILY MEDICINE

## 2022-07-04 RX ORDER — LORAZEPAM 2 MG/ML
1 INJECTION INTRAMUSCULAR ONCE
Status: COMPLETED | OUTPATIENT
Start: 2022-07-04 | End: 2022-07-04

## 2022-07-04 RX ORDER — DIAZEPAM 5 MG/ML
5 INJECTION, SOLUTION INTRAMUSCULAR; INTRAVENOUS ONCE
Status: DISCONTINUED | OUTPATIENT
Start: 2022-07-04 | End: 2022-07-04

## 2022-07-04 RX ADMIN — ENOXAPARIN SODIUM 40 MG: 100 INJECTION SUBCUTANEOUS at 09:26

## 2022-07-04 RX ADMIN — GADOTERIDOL 15 ML: 279.3 INJECTION, SOLUTION INTRAVENOUS at 14:05

## 2022-07-04 RX ADMIN — Medication 10 ML: at 09:27

## 2022-07-04 RX ADMIN — SODIUM CHLORIDE, POTASSIUM CHLORIDE, SODIUM LACTATE AND CALCIUM CHLORIDE: 600; 310; 30; 20 INJECTION, SOLUTION INTRAVENOUS at 01:05

## 2022-07-04 RX ADMIN — LORAZEPAM 1 MG: 2 INJECTION, SOLUTION INTRAMUSCULAR; INTRAVENOUS at 13:35

## 2022-07-04 RX ADMIN — Medication 10 ML: at 22:56

## 2022-07-04 RX ADMIN — AMOXICILLIN AND CLAVULANATE POTASSIUM 1 TABLET: 875; 125 TABLET, FILM COATED ORAL at 09:26

## 2022-07-04 RX ADMIN — AMOXICILLIN AND CLAVULANATE POTASSIUM 1 TABLET: 875; 125 TABLET, FILM COATED ORAL at 22:56

## 2022-07-04 ASSESSMENT — ENCOUNTER SYMPTOMS
TROUBLE SWALLOWING: 0
VOMITING: 0
COLOR CHANGE: 0
SHORTNESS OF BREATH: 0
CHOKING: 0
BACK PAIN: 0
NAUSEA: 0
PHOTOPHOBIA: 0

## 2022-07-04 NOTE — PROGRESS NOTES
Neurology Follow up    SUBJECTIVE: Somewhat sleepy this morning but appropriate. He has left leg weakness.   He denies any headaches    Current Facility-Administered Medications   Medication Dose Route Frequency Provider Last Rate Last Admin    diazePAM (VALIUM) injection 5 mg  5 mg IntraVENous Once Timothy Kumar MD        amoxicillin-clavulanate (AUGMENTIN) 875-125 MG per tablet 1 tablet  1 tablet Oral 2 times per day Tiffany Macias MD   1 tablet at 07/04/22 0926    polyethylene glycol (GLYCOLAX) packet 17 g  17 g Oral Daily Severo Seltzer, MD   17 g at 07/03/22 0801    lidocaine 4 % external patch 1 patch  1 patch TransDERmal Daily Severo Seltzer, MD   1 patch at 07/04/22 7988    lactated ringers infusion   IntraVENous Continuous Shiela Ta  mL/hr at 07/04/22 0105 New Bag at 07/04/22 0105    0.9 % sodium chloride bolus  1,000 mL IntraVENous Once Paty Martinez MD        sodium chloride flush 0.9 % injection 5-40 mL  5-40 mL IntraVENous 2 times per day Severo Seltzer, MD   10 mL at 07/04/22 5907    sodium chloride flush 0.9 % injection 5-40 mL  5-40 mL IntraVENous PRN Severo Seltzer, MD        0.9 % sodium chloride infusion   IntraVENous PRN Severo Seltzer, MD        enoxaparin (LOVENOX) injection 40 mg  40 mg SubCUTAneous Daily Severo Seltzer, MD   40 mg at 07/04/22 5735    ondansetron (ZOFRAN-ODT) disintegrating tablet 4 mg  4 mg Oral Q8H PRN Severo Seltzer, MD        Or    [facility] injection 4 mg  4 mg IntraVENous Q6H PRN Severo Seltzer, MD        potassium chloride Avanell Cecil M) extended release tablet 40 mEq  40 mEq Oral PRN Severo Seltzer, MD   40 mEq at 07/03/22 0801    Or    potassium bicarb-citric acid (EFFER-K) effervescent tablet 40 mEq  40 mEq Oral PRN Severo Seltzer, MD        Or    potassium chloride 10 mEq/100 mL IVPB (Peripheral Line)  10 mEq IntraVENous PRN Severo Seltzer, MD           PHYSICAL EXAM:    /82   Pulse 91   Temp 97.5 °F (36.4 °C) (Oral)   Resp 18   Ht 5' 10\" (1.778 m)   Wt 157 lb 3 oz (71.3 kg)   SpO2 100%   BMI 22.55 kg/m²    General Appearance:      Skin:  normal  CVS - Normal sounds, No murmurs , No carotid Bruits  RS -CTA  Abdomen Soft, BS present  Review of Systems   Constitutional: Negative for fever. HENT: Negative for ear pain, tinnitus and trouble swallowing. Eyes: Negative for photophobia and visual disturbance. Respiratory: Negative for choking and shortness of breath. Cardiovascular: Negative for chest pain and palpitations. Gastrointestinal: Negative for nausea and vomiting. Musculoskeletal: Negative for back pain, gait problem, joint swelling, myalgias, neck pain and neck stiffness. Skin: Negative for color change. Allergic/Immunologic: Negative for food allergies. Neurological: Negative for dizziness, tremors, seizures, syncope, facial asymmetry, speech difficulty, weakness, light-headedness, numbness and headaches. Psychiatric/Behavioral: Negative for behavioral problems, confusion, hallucinations and sleep disturbance. His only complaints appears to be pain in the right leg in the calf.   Patient reports of left leg weakness  Mental Status Exam:             Level of Alertness:   awake            Orientation:   person, place, time                      Attention/Concentration:  normal            Language:  normal      Funduscopic Exam:     Cranial Nerves                Cranial nerves III, IV, VI           Extraocular Movements: intact      Cranial nerve V           Facial sensation:  intact      Cranial nerve VII           Facial strength: intact      Cranial nerve VIII           Hearing:  intact      Cranial nerve IX           Palate:  intact      Cranial nerve XI         Shoulder shrug:  intact      Cranial nerve XII          Tongue movement:  normal    Motor:    Drift:  absent  Motor exam is symmetrical 5 out of 5 all extremities bilaterally  Tone:  normal  Abnormal Movements: absent            Sensory:        Pinprick             Right Upper Extremity:  normal             Left Upper Extremity:  normal             Right Lower Extremity:  normal             Left Lower Extremity:  normal           Vibration                         Touch            Proprioception                 Coordination:           Finger/Nose   Right:  normal              Left:  normal          Heel-Knee-Shin                Right:  normal              Left:  normal          Rapid Alternating Movements              Right:  normal              Left:  normal          Gait:                       Casual: Gait is deferred          Reflexes:             Deep Tendon Reflexes:             Reflexes are 2 +             Plantar response:                Right:  downgoing               Left:  downgoing    Vascular:  Cardiac Exam:  normal         CT Head WO Contrast    Result Date: 6/30/2022  CT Brain. Contrast medium:  without contrast.. History: Altered mental status. Technical factors: CT imaging of the brain was obtained and formatted as 5 mm contiguous axial images. 2.5 mm contiguous axial images were obtained through the osseous structures. Sagittal and coronal reconstruction obtained during postprocessing. Comparison:  None. Findings: Extra-axial spaces:  Normal. Intracranial hemorrhage:  None. Ventricular system: [Negative. Basal Cisterns:  Without anomaly. Cerebral Parenchyma: Bilateral, symmetric areas of decreased attenuation exerting no mass effect measuring approximately 9 mm in size since found within the bilateral globus pallidus (series 2, image 17). Midline Shift:  None. Cerebellum:  No anomaly identified. Paranasal sinuses and mastoid air cells:  No anomaly identified. Visualized Orbits:  Negative. Impression: Round symmetric areas decreased attenuation bilateral globus pallidus. This finding may be seen in patients experiencing hypoxia, i.e., carbon monoxide poisoning.  All CT scans at this facility use dose modulation, iterative reconstruction, and/or weight based dosing when appropriate to reduce radiation dose to as low as reasonably achievable. XR CHEST PORTABLE    Result Date: 7/1/2022  EXAMINATION: XR CHEST PORTABLE CLINICAL HISTORY: FEVER COMPARISONS: JANUARY 29, 2018 FINDINGS: Osseous structures are intact. Cardiopericardial silhouette is normal. Pulmonary vasculature is normal. Right lung is clear. Patchy area of increased opacity left upper lung. LEFT UPPER LUNG ATELECTASIS/PNEUMONIA. MRI BRAIN WO CONTRAST    Result Date: 7/1/2022  MRI BRAIN WO CONTRAST : 7/1/2022 CLINICAL HISTORY:  stroke . COMPARISON: Head CT 6/30/2022. TECHNIQUE: Multiplanar MR imaging of the head was performed without contrast. FINDINGS: Areas of restricted diffusion within the globus pallidus, supratentorial white matter and splenium of the corpus callosum are most consistent with carbon monoxide poisoning/hypoxemia and/or other toxic encephalopathy. There is no intracranial hemorrhage, mass effect, midline shift, extra-axial collection, significant cerebral volume loss or other complication identified. FINDINGS CONSISTENT WITH CARBON MONOXIDE POISONING/HYPOXEMIA AND/OR OTHER TOXIC ENCEPHALOPATHY. NO INTRACRANIAL HEMORRHAGE OR COMPLICATION IDENTIFIED.        Recent Labs     07/02/22  0505 07/03/22  0450 07/04/22  0458   WBC 16.6* 12.7* 16.3*   HGB 13.3* 12.7* 13.7*    238 293     Recent Labs     07/02/22  0505 07/03/22  0450 07/04/22  0458    137 142   K 3.4 3.2* 4.0    101 106   CO2 24 24 25   BUN 15 10 10   CREATININE 0.66* 0.62* 0.58*   GLUCOSE 99 104* 109*     Recent Labs     07/03/22  0951 07/03/22  2152 07/04/22  0458   BILITOT 0.5 0.3 0.4  0.4   ALKPHOS 88 93 89  90   * 365* 331*  334*   * 659* 610*  606*     Lab Results   Component Value Date/Time    PROTIME 14.7 07/03/2022 04:50 AM    INR 1.2 07/03/2022 04:50 AM     No results found for: LITHIUM, DILFRTOT, VALPROATE    ASSESSMENT AND PLAN  Hypoxic ischemic encephalopathy with a seizure. Patient appears to have taken some medications that might be contributing with some hepatic dysfunction as well. Patient reports he was down 2 days and cardiac arrhythmia with hypoxic ischemic encephalopathy with an abnormal MRI as noted. MRI shows multiple areas of hypoxemic damage though underlying embolic strokes must be ruled out. Recommended a complete hypercoagulable work-up for now. Findings were discussed with the patient and I am not seeing anything focal the patient is likely to have some sustained memory issues. We will follow this up with an EEG. Patient is remained stable without any new complications except for pain in the right leg. Examination was obtained and he has some tenderness and will require evaluation for DVT. Patient has not any seizures. He has significantly abnormal MRI with diffuse edema and require follow-up MRI in 2 days. Patient is somewhat sleepy this morning though awakens and appears to be appropriate. Complains of some left leg weakness. He is has not any headache. A follow-up MRI with contrast to be obtained given his significant abnormal findings on his initial MRI which may suggest hypoxic ischemic encephalopathy secondary status epilepticus. Liver functions are improving. DVT evaluation of the right leg was negative      Rayray Hobson MD, Kierra Morrison, American Board of Psychiatry & Neurology  Board Certified in Vascular Neurology  Board Certified in Neuromuscular Medicine  Certified in Kristen Simms 38

## 2022-07-04 NOTE — PROGRESS NOTES
[facility] FOLLOW-UP NOTE       7/4/2022     Patient was seen and examined in person, Chart reviewed   Patient's case discussed with staff/team    Chief Complaint: Overdose    Interim History:     Pt is currently being investigated by Neurlogy with further work up  Pt still has memory loss around event before admit  Pt is vague with his response  Has been cooperative with treatment  Denies any suicidal thoughts  Agreeable to come to [facility] [address] when medically stable  Appetite:   [x] Normal/Unchanged  [] Increased  [] Decreased      Sleep:       [] Normal/Unchanged  [] Fair       [] Poor              Energy:    [] Normal/Unchanged  [] Increased  [] Decreased        SI [] Present  [] Absent    HI  []Present  [] Absent     Aggression:  [] yes  [] no    Patient is [] able  [] unable to CONTRACT FOR SAFETY     PAST MEDICAL/PSYCHIATRIC HISTORY:   Past Medical History:   Diagnosis Date    ADHD (attention deficit hyperactivity disorder)     was initiated on treatment by Dr. Gopal Maurer.   any testing was done here by Dr. Gopal Maurer, no formal psych eval.      Anxiety     Drug abuse ([facility]     MDD (major depressive disorder), recurrent episode, moderate ([facility] 6/4/2019       FAMILY/SOCIAL HISTORY:  Family History   Problem Relation Age of Onset    Depression Mother     Heart Disease Father     High Blood Pressure Father     High Cholesterol Father      Social History     Socioeconomic History    Marital status: Single     Spouse name: Not on file    Number of children: Not on file    Years of education: Not on file    Highest education level: Not on file   Occupational History    Not on file   Tobacco Use    Smoking status: Never Smoker    Smokeless tobacco: Never Used   Substance and Sexual Activity    Alcohol use: Yes     Comment: occasional    Drug use: No    Sexual activity: Not on file   Other Topics Concern    Not on file   Social History Narrative    Not on file     Social Determinants of Health     Financial Resource Strain: Low Risk     Difficulty of Paying Living Expenses: Not hard at all   Food Insecurity: No Food Insecurity    Worried About Running Out of Food in the Last Year: Never true    Anand of Food in the Last Year: Never true   Transportation Needs:     Lack of Transportation (Medical): Not on file    Lack of Transportation (Non-Medical): Not on file   Physical Activity:     Days of Exercise per Week: Not on file    Minutes of Exercise per Session: Not on file   Stress:     Feeling of Stress : Not on file   Social Connections:     Frequency of Communication with Friends and Family: Not on file    Frequency of Social Gatherings with Friends and Family: Not on file    Attends Worship Services: Not on file    Active Member of [address] [place] [address] [employer] or Organizations: Not on file    Attends Club or Organization Meetings: Not on file    Marital Status: Not on file   Intimate Partner Violence:     Fear of Current or Ex-Partner: Not on file    Emotionally Abused: Not on file    Physically Abused: Not on file    Sexually Abused: Not on file   Housing Stability:     Unable to Pay for Housing in the Last Year: Not on file    Number of [place] in the Last Year: Not on file    Unstable Housing in the Last Year: Not on file           ROS:  [x] All negative/unchanged except if checked.  Explain positive(checked items) below:  [] Constitutional  [] Eyes  [] Ear/Nose/Mouth/Throat  [] Respiratory  [] CV  [] GI  []   [] Musculoskeletal  [] Skin/Breast  [] Neurological  [] Endocrine  [] Heme/Lymph  [] Allergic/Immunologic    Explanation:     MEDICATIONS:    Current Facility-Administered Medications:     amoxicillin-clavulanate (AUGMENTIN) 875-125 MG per tablet 1 tablet, 1 tablet, Oral, 2 times per day, Boyd Argueta MD, 1 tablet at 07/04/22 0926    polyethylene glycol (GLYCOLAX) packet 17 g, 17 g, Oral, Daily, Lisha Hutchinson MD, 17 g at 07/03/22 0801    lidocaine 4 % external patch 1 patch, 1 patch, TransDERmal, Daily, Kasey Leos MD, 1 patch at 07/04/22 3303    lactated ringers infusion, , IntraVENous, Continuous, Goran Arechiga MD, Last Rate: 125 mL/hr at 07/04/22 1155, Rate Change at 07/04/22 1155    0.9 % sodium chloride bolus, 1,000 mL, IntraVENous, Once, Umer White MD    sodium chloride flush 0.9 % injection 5-40 mL, 5-40 mL, IntraVENous, 2 times per day, Kasey Leos MD, 10 mL at 07/04/22 8229    sodium chloride flush 0.9 % injection 5-40 mL, 5-40 mL, IntraVENous, PRN, Kasey Leos MD    0.9 % sodium chloride infusion, , IntraVENous, PRN, Kasey Leos MD    enoxaparin (LOVENOX) injection 40 mg, 40 mg, SubCUTAneous, Daily, Kasey Leos MD, 40 mg at 07/04/22 0926    ondansetron (ZOFRAN-ODT) disintegrating tablet 4 mg, 4 mg, Oral, Q8H PRN **OR** ondansetron (ZOFRAN) injection 4 mg, 4 mg, IntraVENous, Q6H PRN, Kasey Leos MD    potassium chloride (KLOR-CON M) extended release tablet 40 mEq, 40 mEq, Oral, PRN, 40 mEq at 07/03/22 0801 **OR** potassium bicarb-citric acid (EFFER-K) effervescent tablet 40 mEq, 40 mEq, Oral, PRN **OR** potassium chloride 10 mEq/100 mL IVPB (Peripheral Line), 10 mEq, IntraVENous, PRN, Kasey Leos MD      Examination:  /82   Pulse 91   Temp 97.5 °F (36.4 °C) (Oral)   Resp 18   Ht 5' 10\" (1.778 m)   Wt 157 lb 3 oz (71.3 kg)   SpO2 100%   BMI 22.55 kg/m²   Gait - in bed  Medication side effects(SE): no    Mental Status Examination:    Level of consciousness:  within normal limits   Appearance:  poor grooming and fair hygiene  Behavior/Motor:  psychomotor retardation  Attitude toward examiner:  cooperative  Speech:  slow   Mood: depressed  Affect:  blunted  Thought processes:  linear   Thought content:  Suicidal Ideation:  denies suicidal ideation  Cognition:  oriented to person, place   Concentration poor  Insight poor   Judgement poor     ASSESSMENT:   Patient symptoms are:  [] Well

## 2022-07-04 NOTE — PROGRESS NOTES
Physical Therapy Missed Treatment   Facility/Department: [facility] MED SURG [id number]    NAME: Sergio Santana    : 1990 (33 y.o.)  MRN: [id number]    Account: [de-identified]  Gender: male    Chart reviewed, attempted PT at 26. Patient unavailable 2° to:    [] Hold per nsg request    [] Pt declined    [] Nsg notified   [] Other notified    [x] Pt. . off floor for test/procedure. MRI    [] Pt. Unavailable       Will attempt PT treatment again at earliest convenience.       Electronically signed by Marvel Bernal PTA on 22 at 2:04 PM EDT

## 2022-07-04 NOTE — PROGRESS NOTES
Physical Therapy Missed Treatment   Facility/Department: [facility] MED SURG [id number]    NAME: Dayana Locke    : 1990 (33 y.o.)  MRN: [id number]    Account: [de-identified]  Gender: male    Chart reviewed, attempted PT at 14:50. Patient unavailable 2° to:    [] Hold per nsg request    [] Pt declined    [] Nsg notified   [] Other notified    [] Pt. . off floor for test/procedure. [x] Pt. Unavailable secondary to recently returning from MRI. Pt unable to keep eyes open. Per Irish Torres RN pt received ativan prior to going down to MRI. Pt unable to participate in tx at this time. Will attempt PT treatment again at earliest convenience.       Electronically signed by Angus Hickman PTA on 22 at 2:57 PM EDT

## 2022-07-04 NOTE — CONSULTS
Consults    Patient Name: Tonio Riley  Admit Date: 2022  3:45 PM  MR #: [id number]  : 1990    Attending Physician: Darren Ledesma MD  Reason for consult: Abnormal LFT    History of Presenting Illness:      Tonio Riley is a 33 y.o. male on hospital day 4 with a history of altered mental status and drug overdose, patient was initially admitted to ICU and then subsequently transferred to the floor. GI consult was requested because of abnormal transaminases. Patient reports no prior history of any liver disease, no abdominal pain no nausea no vomiting no history of GI bleeding. Patient is getting p.o. Augmentin, his CPK at the time of admission was more than 20,000, AST was 872 ALT is 1224 on admission and most recently  and AST of 334. ,  BC shows no thrombocytopenia, serum albumin is 3.2,  Alk phos and bilirubin is normal, pro time was 16.4 on admission and most recent 1 is 14.7 with INR of one-point, patient is undergoing neurology and psychiatry evaluation. History Obtained From:  patient      History:      Past Medical History:   Diagnosis Date    ADHD (attention deficit hyperactivity disorder)     was initiated on treatment by Dr. Gopal Maurer.   any testing was done here by Dr. Gopal Maurer, no formal psych eval.      Anxiety     [facility] abuse ([facility] [address]     MDD (major depressive disorder), recurrent episode, moderate ([facility] [address] 2019     Past Surgical History:   Procedure Laterality Date    TONSILLECTOMY AND ADENOIDECTOMY      WISDOM TOOTH EXTRACTION         Family History  Family History   Problem Relation Age of Onset    Depression Mother     Heart Disease Father     High Blood Pressure Father     High Cholesterol Father      [] Unable to obtain due to ventilated and/ or neurologic status    Social History     Socioeconomic History    Marital status: Single     Spouse name: Not on file    Number of children: Not on file    Years of education: Not on file    Highest education level: Not on file   Occupational History    Not on file   Tobacco Use    Smoking status: Never Smoker    Smokeless tobacco: Never Used   Substance and Sexual Activity    Alcohol use: Yes     Comment: occasional    Drug use: No    Sexual activity: Not on file   Other Topics Concern    Not on file   Social History Narrative    Not on file     Social Determinants of Health     Financial Resource Strain: Low Risk     Difficulty of Paying Living Expenses: Not hard at all   Food Insecurity: No Food Insecurity    Worried About [address] in the Last Year: Never true    [facility] in the Last Year: Never true   Transportation Needs:     Lack of Transportation (Medical): Not on file    Lack of Transportation (Non-Medical):  Not on file   Physical Activity:     Days of Exercise per Week: Not on file    Minutes of Exercise per Session: Not on file   Stress:     Feeling of Stress : Not on file   Social Connections:     Frequency of Communication with Friends and Family: Not on file    Frequency of Social Gatherings with Friends and Family: Not on file    Attends Oriental orthodox Services: Not on file    Active Member of [address] [place] [address] or Organizations: Not on file    Attends Club or Organization Meetings: Not on file    Marital Status: Not on file   Intimate Partner Violence:     Fear of Current or Ex-Partner: Not on file    Emotionally Abused: Not on file    Physically Abused: Not on file    Sexually Abused: Not on file   Housing Stability:     Unable to Pay for Housing in the Last Year: Not on file    Number of [place] in the Last Year: Not on file    Unstable Housing in the Last Year: Not on file      [] Unable to obtain due to ventilated and/ or neurologic status      Home Medications:      Medications Prior to Admission: [DISCONTINUED] naproxen (NAPROSYN) 500 MG tablet, Take 1 tablet by mouth 2 times daily for 7 days  amphetamine-dextroamphetamine (ADDERALL) 15 MG tablet, dextroamphetamine-amphetamine 15 mg tablet  escitalopram (LEXAPRO) 10 MG tablet, Take 15 mg by mouth daily  lisdexamfetamine (VYVANSE) 70 MG capsule, Take 70 mg by mouth every morning. clonazePAM (KLONOPIN) 0.5 MG tablet, Take 1 tablet by mouth 2 times daily as needed for Anxiety for up to 3 days. Current Hospital Medications:     Scheduled Meds:   amoxicillin-clavulanate  1 tablet Oral 2 times per day    polyethylene glycol  17 g Oral Daily    lidocaine  1 patch TransDERmal Daily    sodium chloride  1,000 mL IntraVENous Once    sodium chloride flush  5-40 mL IntraVENous 2 times per day    enoxaparin  40 mg SubCUTAneous Daily     Continuous Infusions:   lactated ringers 125 mL/hr at 07/04/22 1155    sodium chloride       PRN Meds:.sodium chloride flush, sodium chloride, ondansetron **OR** ondansetron, potassium chloride **OR** potassium alternative oral replacement **OR** potassium chloride  .  lactated ringers 125 mL/hr at 07/04/22 1155    sodium chloride          Allergies: Allergies   Allergen Reactions    Peanut-Containing Drug Products Anaphylaxis     Throat swelling, hives, \"I needed hospital admission last time I had any\"    Nuts [Macadamia Nut Oil] Angioedema    Peanut Oil Other (See Comments)    Shellfish-Derived Products Itching        Review of Systems:       [x] CV, Resp, Neuro, , and all other systems reviewed and negative other than listed in HPI.      [] Unable to obtain due to ventilated and/ or neurologic status      Objective Findings:     Vitals:   Vitals:    07/03/22 0132 07/03/22 0658 07/03/22 1915 07/03/22 2000   BP: 131/80 128/77  137/82   Pulse: 93 84 91    Resp: 20 18 18    Temp: 98.6 °F (37 °C) 98.2 °F (36.8 °C) 97.5 °F (36.4 °C)    TempSrc: Oral Oral Oral    SpO2: 96% 97% 100%    Weight:       Height:            Physical Examination:  General: No acute distress  HEENT: Normocephalic,  scleral icterus.   None, no cutaneous stigmata of chronic liver disease  Neck: No jugular venous distention. Heart: Regular, no murmur, no rub/gallop. No right ventricular heave. Lungs: Clear to ascultation, no rales/wheezing/rhonchi. Good chest wall excursion. Abdomen: Soft nontender no palpable mass  Extremities: No clubbing/cyanosis, no edema. Skin: Warm, dry, normal turgor, no rash, no bruise, no petichiae. Neuro: No myoclonus or tremor. Psych: Normal affect    Results/ Medications reviewed 7/4/2022, 3:07 PM     Laboratory, Microbiology, Pathology, Radiology, Cardiology, Medications and Transcriptions reviewed  Scheduled Meds:   amoxicillin-clavulanate  1 tablet Oral 2 times per day    polyethylene glycol  17 g Oral Daily    lidocaine  1 patch TransDERmal Daily    sodium chloride  1,000 mL IntraVENous Once    sodium chloride flush  5-40 mL IntraVENous 2 times per day    enoxaparin  40 mg SubCUTAneous Daily     Continuous Infusions:   lactated ringers 125 mL/hr at 07/04/22 1155    sodium chloride         Recent Labs     07/02/22  0505 07/03/22  0450 07/04/22  0458   WBC 16.6* 12.7* 16.3*   HGB 13.3* 12.7* 13.7*   HCT 39.2* 38.3* 40.2*   MCV 85.0 85.4 85.2    238 293     Recent Labs     07/02/22  0505 07/03/22  0450 07/04/22  0458    137 142   K 3.4 3.2* 4.0    101 106   CO2 24 24 25   BUN 15 10 10   CREATININE 0.66* 0.62* 0.58*     Recent Labs     07/03/22  0951 07/03/22 2152 07/04/22  0458   * 365* 331*  334*   * 659* 610*  606*   BILIDIR <0.2 <0.2 <0.2   BILITOT 0.5 0.3 0.4  0.4   ALKPHOS 88 93 89  90     No results for input(s): LIPASE, AMYLASE in the last 72 hours. Recent Labs     07/02/22  0242 07/02/22  0505 07/02/22 2024 07/02/22 2024 07/03/22  0450 07/03/22  0450 07/03/22  0951 07/03/22 2152 07/04/22  0458   PROT 6.3   < > 6.1*   < > 6.1*   < > 6.1* 6.2* 6.0*  6.1*   INR 1.3  --  1.2  --  1.2  --   --   --   --     < > = values in this interval not displayed. CT Head WO Contrast    Result Date: 6/30/2022  CT Brain.  Contrast medium: without contrast.. History: Altered mental status. Technical factors: CT imaging of the brain was obtained and formatted as 5 mm contiguous axial images. 2.5 mm contiguous axial images were obtained through the osseous structures. Sagittal and coronal reconstruction obtained during postprocessing. Comparison:  None. Findings: Extra-axial spaces:  Normal. Intracranial hemorrhage:  None. Ventricular system: [Negative. Basal Cisterns:  Without anomaly. Cerebral Parenchyma: Bilateral, symmetric areas of decreased attenuation exerting no mass effect measuring approximately 9 mm in size since found within the bilateral globus pallidus (series 2, image 17). Midline Shift:  None. Cerebellum:  No anomaly identified. Paranasal sinuses and mastoid air cells:  No anomaly identified. Visualized Orbits:  Negative. Impression: Round symmetric areas decreased attenuation bilateral globus pallidus. This finding may be seen in patients experiencing hypoxia, i.e., carbon monoxide poisoning. All CT scans at this facility use dose modulation, iterative reconstruction, and/or weight based dosing when appropriate to reduce radiation dose to as low as reasonably achievable. MRA HEAD WO CONTRAST    Result Date: 7/2/2022  MRA HEAD WO CONTRAST, MRA NECK WO CONTRAST HISTORY:  Drug abuse. Altered mental status. COMPARISON: MRI brain 7/1/2022. CT head 6/30/2022. TECHNIQUE: Routine MRA of the head without contrast with 3-D time-of-flight images and dedicated reconstructions. Routine MRA of the neck with 3-D and 2-D time-of-flight images and dedicated reconstructions. RESULT: Visualized brain: Grossly unchanged from the recent MRI of the brain with multiple areas of restricted diffusion. Please refer to the recent MRI brain report. INTRACRANIAL MRA:    The visualized distal vertebral and basilar arteries are widely patent. The distal ICAs are patent and within normal limits of caliber.    The proximal ACAs, MCAs and PCAs are patent and within normal limits of caliber and configuration. There is no evidence of focal,  significant stenosis or aneurysm in the visualized vessels. EXTRACRANIAL MRA: Carotid Stenosis: Right Common:  No significant stenosis. Right Internal Plaque:  No significant plaque formation. Right Internal Carotid Stenosis (% by NASCET Criteria):  0% Left Common:  No significant stenosis. Left Internal Carotid Plaque:  No significant plaque formation. Left Internal Carotid Stenosis (% by NASCET Criteria):  0% Cervical Vertebral Arteries: Patency:  Bilateral Dominance:  Left     Patent intracranial and extracranial circulation. XR CHEST PORTABLE    Result Date: 7/1/2022  EXAMINATION: XR CHEST PORTABLE CLINICAL HISTORY: FEVER COMPARISONS: JANUARY 29, 2018 FINDINGS: Osseous structures are intact. Cardiopericardial silhouette is normal. Pulmonary vasculature is normal. Right lung is clear. Patchy area of increased opacity left upper lung. LEFT UPPER LUNG ATELECTASIS/PNEUMONIA. US DUP LOWER EXTREMITY RIGHT ROBERT    Result Date: 7/3/2022  EXAMINATION: US DUP LOWER EXTREMITY RIGHT ROBERT DATE AND TIME:7/3/2022 1:02 PM CLINICAL HISTORY:  Fall 1 week ago. Right leg pain. COMPARISON: None TECHNIQUE: The right lower extremity veins were evaluated with color doppler, gray scale imaging and spectral analysis while using compression and augmentation when possible. RESULT: Evaluation of the right lower extremity veins from the thigh to the knee shows normal phasic flow, normal augmentation of the Doppler signal, and normal compression of the deep veins. There is no sonographic evidence for acute deep venous thrombosis from the right groin to the popliteal region. There is no sonographic evidence for acute deep venous thrombosis in the right calf veins. No acute DVT of the right lower extremity veins from the groin to the knee. No acute DVT of the right calf veins.      MRA NECK WO CONTRAST    Result Date: 7/2/2022  MRA HEAD WO CONTRAST, MRA NECK WO CONTRAST HISTORY:  Drug abuse. Altered mental status. COMPARISON: MRI brain 7/1/2022. CT head 6/30/2022. TECHNIQUE: Routine MRA of the head without contrast with 3-D time-of-flight images and dedicated reconstructions. Routine MRA of the neck with 3-D and 2-D time-of-flight images and dedicated reconstructions. RESULT: Visualized brain: Grossly unchanged from the recent MRI of the brain with multiple areas of restricted diffusion. Please refer to the recent MRI brain report. INTRACRANIAL MRA:    The visualized distal vertebral and basilar arteries are widely patent. The distal ICAs are patent and within normal limits of caliber. The proximal ACAs, MCAs and PCAs are patent and within normal limits of caliber and configuration. There is no evidence of focal,  significant stenosis or aneurysm in the visualized vessels. EXTRACRANIAL MRA: Carotid Stenosis: Right Common:  No significant stenosis. Right Internal Plaque:  No significant plaque formation. Right Internal Carotid Stenosis (% by NASCET Criteria):  0% Left Common:  No significant stenosis. Left Internal Carotid Plaque:  No significant plaque formation. Left Internal Carotid Stenosis (% by NASCET Criteria):  0% Cervical Vertebral Arteries: Patency:  Bilateral Dominance:  Left     Patent intracranial and extracranial circulation. MRI BRAIN W WO CONTRAST    Result Date: 7/4/2022  EXAM: MRI of the brain without and with contrast History: Hypoxic ischemic encephalopathy. Technique: Multiplanar multisequence MRI of the brain was performed without and with contrast. Comparison: MRI of the brain July 1, 2022 Findings: Mildly increased edema in the areas of diffusion restriction within the bilateral globi pallidi, splenium of the corpus callosum, and supratentorial white since prior MRI July 1, 2022. No mass effect or midline shift. No acute intracranial hemorrhage. No enhancing mass or pathologic enhancement.      Mildly increased edema in the areas of diffusion restriction within the bilateral globi pallidi, splenium of the corpus callosum, and supratentorial white since prior MRI July 1, 2022. MRI BRAIN WO CONTRAST    Result Date: 7/1/2022  MRI BRAIN WO CONTRAST : 7/1/2022 CLINICAL HISTORY:  stroke . COMPARISON: Head CT 6/30/2022. TECHNIQUE: Multiplanar MR imaging of the head was performed without contrast. FINDINGS: Areas of restricted diffusion within the globus pallidus, supratentorial white matter and splenium of the corpus callosum are most consistent with carbon monoxide poisoning/hypoxemia and/or other toxic encephalopathy. There is no intracranial hemorrhage, mass effect, midline shift, extra-axial collection, significant cerebral volume loss or other complication identified. FINDINGS CONSISTENT WITH CARBON MONOXIDE POISONING/HYPOXEMIA AND/OR OTHER TOXIC ENCEPHALOPATHY. NO INTRACRANIAL HEMORRHAGE OR COMPLICATION IDENTIFIED. Impression:   70-year-old male admitted with altered mental status and drug overdose, and has rhabdomyolysis. Has abnormal AST and ALT which is slowly improving possibilities include drug-induced liver injury viral hepatitis or possible ischemic liver disease if patient were hypotensive. Plan:   Follow LFT and serology for acute viral hepatitis is pending, will obtain right upper quadrant ultrasound to rule out any focal liver disease and also check serum acetaminophen level and LDH. Comments: Thank you for allowing us to participate in the care of this patient. Will continue to follow. Please call if questions or concerns arise.     Electronically signed by Kayden Rangel MD on 7/4/2022 at 3:07 PM

## 2022-07-04 NOTE — PROGRESS NOTES
Hospitalist Daily Progress Note  Name: Sydni Mcclellan  Age: 33 y.o. Gender: male  CodeStatus: Full Code  Allergies: Peanut-Containing Drug Products  Nuts [Macadamia Nut Oil]  Peanut Oil  Shellfish-Derived Products    Chief Complaint:Drug Overdose (pt has been taking royal kratom (hasn't left his apartment in a few days). Well check. )    Primary Care Provider: YING Roy - CNP    InpatientTreatment Team: Treatment Team: Attending Provider: Addis Saleh MD; Consulting Physician: Bibiana Sosa MD; Consulting Physician: Ron Mead MD; Consulting Physician: Aryan Hobson MD; Patient Care Tech: [employer]; Utilization Reviewer: Justina Crawford RN; Registered Nurse: Malick Cintron RN    Admission Date: 6/30/2022      Subjective: Patient is awake; alert; thinks he is here because he had a seizure; 12 point ROS negative    Physical Exam  Vitals and nursing note reviewed. Constitutional:       Appearance: Normal appearance. Cardiovascular:      Rate and Rhythm: Normal rate and regular rhythm. Pulmonary:      Effort: Pulmonary effort is normal.      Breath sounds: Normal breath sounds. Abdominal:      General: Bowel sounds are normal.      Palpations: Abdomen is soft. Musculoskeletal:         General: Normal range of motion. Skin:     General: Skin is warm and dry. Neurological:      Mental Status: He is alert.       Comments: Slow, indirect answers       Medications:  Reviewed    Infusion Medications:    lactated ringers 125 mL/hr at 07/04/22 1155    sodium chloride       Scheduled Medications:    LORazepam  1 mg IntraVENous Once    amoxicillin-clavulanate  1 tablet Oral 2 times per day    polyethylene glycol  17 g Oral Daily    lidocaine  1 patch TransDERmal Daily    sodium chloride  1,000 mL IntraVENous Once    sodium chloride flush  5-40 mL IntraVENous 2 times per day    enoxaparin  40 mg SubCUTAneous Daily     PRN Meds: sodium chloride flush, sodium chloride, ondansetron **OR** ondansetron, potassium chloride **OR** potassium alternative oral replacement **OR** potassium chloride    Labs:   Recent Labs     07/02/22  0505 07/03/22  0450 07/04/22  0458   WBC 16.6* 12.7* 16.3*   HGB 13.3* 12.7* 13.7*   HCT 39.2* 38.3* 40.2*    238 293     Recent Labs     07/02/22  0505 07/03/22  0450 07/04/22  0458    137 142   K 3.4 3.2* 4.0    101 106   CO2 24 24 25   BUN 15 10 10   CREATININE 0.66* 0.62* 0.58*   CALCIUM 8.3* 8.9 8.9     Recent Labs     07/03/22  0951 07/03/22  2152 07/04/22  0458   * 365* 331*  334*   * 659* 610*  606*   BILIDIR <0.2 <0.2 <0.2   BILITOT 0.5 0.3 0.4  0.4   ALKPHOS 88 93 89  90     Recent Labs     07/02/22  0242 07/02/22  2024 07/03/22  0450   INR 1.3 1.2 1.2     Recent Labs     07/03/22  0951 07/04/22  0004 07/04/22  1112   CKTOTAL 15,536* 12,329* 11,231*     Urinalysis:   Lab Results   Component Value Date/Time    NITRU Negative 06/30/2022 04:00 PM    WBCUA 3-5 06/30/2022 04:00 PM    BACTERIA Negative 06/30/2022 04:00 PM    RBCUA 3-5 06/30/2022 04:00 PM    BLOODU LARGE 06/30/2022 04:00 PM    SPECGRAV 1.033 06/30/2022 04:00 PM    GLUCOSEU Negative 06/30/2022 04:00 PM     Radiology:   Most recent    Chest CT      WITH CONTRAST:No results found for this or any previous visit. WITHOUT CONTRAST: No results found for this or any previous visit. CXR      2-view: Results for orders placed in visit on 01/29/18    XR CHEST STANDARD (2 VW)    Narrative  EXAM: CHEST, 2 VIEWS    COMPARISON: NONE AVAILABLE    REASON FOR EXAMINATION: UPPER RESPIRATORY CONGESTION, DYSPNEA X2 WEEKS    FINDINGS:   Two views of the chest demonstrate normal appearance of the heart and mediastinum. The lungs appear clear. The visualized bony thorax and remainder of the chest appears unremarkable. Impression  NO EVIDENCE OF ACTIVE DISEASE IN THE CHEST.        Portable: Results for orders placed during the hospital encounter of 06/30/22    XR CHEST PORTABLE    Narrative  EXAMINATION: XR CHEST PORTABLE    CLINICAL HISTORY: FEVER    COMPARISONS: JANUARY 29, 2018    FINDINGS: Osseous structures are intact. Cardiopericardial silhouette is normal. Pulmonary vasculature is normal. Right lung is clear. Patchy area of increased opacity left upper lung. Impression  LEFT UPPER LUNG ATELECTASIS/PNEUMONIA. Echo No results found for this or any previous visit. Assessment/Plan:    Active Hospital Problems    Diagnosis Date Noted    Altered mental status [R41.82]      Priority: Medium    Drug abuse ([facility] [address] [F19.10]      Priority: Medium    Acute encephalopathy [G93.40] 06/30/2022     Priority: Medium    Polydrug dependence including opioid type drug with continuous use with complication ([facility] [address] [L10.85] 06/04/2019     1. Polysubstance overdose/acute encephalopathy       MRI showed hypoxic ischemia; likely acute hypoxic ischemic encephalopathy with a seizure per neurology; plan to follow up with an MRI with contrast  2. Rhabdomyolysis/EDGAR            EDGAR resolved; CK is trending downwards; increase rate  3. Acute Hepatitis         May be 2/2 kratom use, however cannot completely rule out acetaminophen tox. Neg serum level, patient cannot provide hx or use. NAC given in ER in discussion with poison control, q12 LFTs. Check acute hepatitis panel and consult GI  4.  DVT proph    Electronically signed by Tosha Gupta MD on 7/4/2022 at 12:21 PM

## 2022-07-05 ENCOUNTER — APPOINTMENT (OUTPATIENT)
Dept: GENERAL RADIOLOGY | Age: 32
DRG: 812 | End: 2022-07-05
Payer: COMMERCIAL

## 2022-07-05 ENCOUNTER — APPOINTMENT (OUTPATIENT)
Dept: ULTRASOUND IMAGING | Age: 32
DRG: 812 | End: 2022-07-05
Payer: COMMERCIAL

## 2022-07-05 PROBLEM — R10.9 ABDOMINAL PAIN: Status: ACTIVE | Noted: 2022-07-05

## 2022-07-05 PROBLEM — M21.371 RIGHT FOOT DROP: Status: ACTIVE | Noted: 2022-07-05

## 2022-07-05 PROBLEM — Z74.09 IMPAIRED MOBILITY AND ACTIVITIES OF DAILY LIVING: Status: ACTIVE | Noted: 2022-07-05

## 2022-07-05 PROBLEM — Z78.9 IMPAIRED MOBILITY AND ACTIVITIES OF DAILY LIVING: Status: ACTIVE | Noted: 2022-07-05

## 2022-07-05 PROBLEM — F13.20 BENZODIAZEPINE DEPENDENCE (HCC): Status: ACTIVE | Noted: 2022-07-05

## 2022-07-05 PROBLEM — F32.A DEPRESSIVE DISORDER: Status: ACTIVE | Noted: 2022-07-05

## 2022-07-05 PROBLEM — E55.9 VITAMIN D DEFICIENCY: Status: ACTIVE | Noted: 2022-07-05

## 2022-07-05 LAB
ALBUMIN SERPL-MCNC: 3.3 G/DL (ref 3.5–4.6)
ALBUMIN SERPL-MCNC: 3.5 G/DL (ref 3.5–4.6)
ALBUMIN SERPL-MCNC: 3.8 G/DL (ref 3.5–4.6)
ALP BLD-CCNC: 85 U/L (ref 35–104)
ALP BLD-CCNC: 86 U/L (ref 35–104)
ALP BLD-CCNC: 92 U/L (ref 35–104)
ALT SERPL-CCNC: 426 U/L (ref 0–41)
ALT SERPL-CCNC: 479 U/L (ref 0–41)
ALT SERPL-CCNC: 497 U/L (ref 0–41)
ANION GAP SERPL CALCULATED.3IONS-SCNC: 13 MEQ/L (ref 9–15)
AST SERPL-CCNC: 267 U/L (ref 0–40)
AST SERPL-CCNC: 275 U/L (ref 0–40)
AST SERPL-CCNC: 315 U/L (ref 0–40)
BASOPHILS ABSOLUTE: 0 K/UL (ref 0–0.2)
BASOPHILS RELATIVE PERCENT: 0.3 %
BILIRUB SERPL-MCNC: 0.4 MG/DL (ref 0.2–0.7)
BILIRUBIN DIRECT: <0.2 MG/DL (ref 0–0.4)
BILIRUBIN DIRECT: <0.2 MG/DL (ref 0–0.4)
BILIRUBIN, INDIRECT: ABNORMAL MG/DL (ref 0–0.6)
BILIRUBIN, INDIRECT: ABNORMAL MG/DL (ref 0–0.6)
BLOOD CULTURE, ROUTINE: NORMAL
BUN BLDV-MCNC: 10 MG/DL (ref 6–20)
CALCIUM SERPL-MCNC: 8.9 MG/DL (ref 8.5–9.9)
CHLORIDE BLD-SCNC: 104 MEQ/L (ref 95–107)
CO2: 23 MEQ/L (ref 20–31)
CREAT SERPL-MCNC: 0.53 MG/DL (ref 0.7–1.2)
CULTURE, BLOOD 2: NORMAL
EOSINOPHILS ABSOLUTE: 0.3 K/UL (ref 0–0.7)
EOSINOPHILS RELATIVE PERCENT: 1.6 %
GFR AFRICAN AMERICAN: >60
GFR NON-AFRICAN AMERICAN: >60
GLOBULIN: 3.1 G/DL (ref 2.3–3.5)
GLUCOSE BLD-MCNC: 105 MG/DL (ref 70–99)
HAV IGM SER IA-ACNC: NONREACTIVE
HCT VFR BLD CALC: 41.2 % (ref 42–52)
HEMOGLOBIN: 14.1 G/DL (ref 14–18)
HEPATITIS B CORE IGM ANTIBODY: NONREACTIVE
HEPATITIS B SURFACE ANTIGEN: NONREACTIVE
HEPATITIS C ANTIBODY: NONREACTIVE
LYMPHOCYTES ABSOLUTE: 1.9 K/UL (ref 1–4.8)
LYMPHOCYTES RELATIVE PERCENT: 11.1 %
MCH RBC QN AUTO: 28.9 PG (ref 27–31.3)
MCHC RBC AUTO-ENTMCNC: 34.2 % (ref 33–37)
MCV RBC AUTO: 84.6 FL (ref 80–100)
MONOCYTES ABSOLUTE: 1.2 K/UL (ref 0.2–0.8)
MONOCYTES RELATIVE PERCENT: 7 %
NEUTROPHILS ABSOLUTE: 13.8 K/UL (ref 1.4–6.5)
NEUTROPHILS RELATIVE PERCENT: 80 %
PDW BLD-RTO: 13.2 % (ref 11.5–14.5)
PLATELET # BLD: 358 K/UL (ref 130–400)
POTASSIUM REFLEX MAGNESIUM: 4.1 MEQ/L (ref 3.4–4.9)
RBC # BLD: 4.87 M/UL (ref 4.7–6.1)
SODIUM BLD-SCNC: 140 MEQ/L (ref 135–144)
TOTAL CK: ABNORMAL U/L (ref 0–190)
TOTAL PROTEIN: 6.4 G/DL (ref 6.3–8)
TOTAL PROTEIN: 6.8 G/DL (ref 6.3–8)
TOTAL PROTEIN: 7 G/DL (ref 6.3–8)
WBC # BLD: 17.2 K/UL (ref 4.8–10.8)

## 2022-07-05 PROCEDURE — 99221 1ST HOSP IP/OBS SF/LOW 40: CPT | Performed by: PHYSICAL MEDICINE & REHABILITATION

## 2022-07-05 PROCEDURE — 2580000003 HC RX 258: Performed by: INTERNAL MEDICINE

## 2022-07-05 PROCEDURE — APPSS45 APP SPLIT SHARED TIME 31-45 MINUTES: Performed by: NURSE PRACTITIONER

## 2022-07-05 PROCEDURE — 6360000002 HC RX W HCPCS: Performed by: INTERNAL MEDICINE

## 2022-07-05 PROCEDURE — 36415 COLL VENOUS BLD VENIPUNCTURE: CPT

## 2022-07-05 PROCEDURE — 6370000000 HC RX 637 (ALT 250 FOR IP): Performed by: FAMILY MEDICINE

## 2022-07-05 PROCEDURE — 99232 SBSQ HOSP IP/OBS MODERATE 35: CPT | Performed by: PSYCHIATRY & NEUROLOGY

## 2022-07-05 PROCEDURE — 73502 X-RAY EXAM HIP UNI 2-3 VIEWS: CPT

## 2022-07-05 PROCEDURE — 99232 SBSQ HOSP IP/OBS MODERATE 35: CPT | Performed by: SPECIALIST

## 2022-07-05 PROCEDURE — 80053 COMPREHEN METABOLIC PANEL: CPT

## 2022-07-05 PROCEDURE — 95816 EEG AWAKE AND DROWSY: CPT

## 2022-07-05 PROCEDURE — 97116 GAIT TRAINING THERAPY: CPT

## 2022-07-05 PROCEDURE — 6370000000 HC RX 637 (ALT 250 FOR IP): Performed by: INTERNAL MEDICINE

## 2022-07-05 PROCEDURE — 85025 COMPLETE CBC W/AUTO DIFF WBC: CPT

## 2022-07-05 PROCEDURE — 82550 ASSAY OF CK (CPK): CPT

## 2022-07-05 PROCEDURE — 6360000002 HC RX W HCPCS: Performed by: FAMILY MEDICINE

## 2022-07-05 PROCEDURE — 1210000000 HC MED SURG R&B

## 2022-07-05 PROCEDURE — 76705 ECHO EXAM OF ABDOMEN: CPT

## 2022-07-05 PROCEDURE — 99233 SBSQ HOSP IP/OBS HIGH 50: CPT | Performed by: PSYCHIATRY & NEUROLOGY

## 2022-07-05 PROCEDURE — 2580000003 HC RX 258: Performed by: FAMILY MEDICINE

## 2022-07-05 RX ORDER — KETOROLAC TROMETHAMINE 30 MG/ML
30 INJECTION, SOLUTION INTRAMUSCULAR; INTRAVENOUS ONCE
Status: COMPLETED | OUTPATIENT
Start: 2022-07-05 | End: 2022-07-05

## 2022-07-05 RX ORDER — DEXAMETHASONE SODIUM PHOSPHATE 4 MG/ML
4 INJECTION, SOLUTION INTRA-ARTICULAR; INTRALESIONAL; INTRAMUSCULAR; INTRAVENOUS; SOFT TISSUE 3 TIMES DAILY
Status: DISCONTINUED | OUTPATIENT
Start: 2022-07-05 | End: 2022-07-11 | Stop reason: HOSPADM

## 2022-07-05 RX ADMIN — DEXAMETHASONE SODIUM PHOSPHATE 4 MG: 4 INJECTION, SOLUTION INTRAMUSCULAR; INTRAVENOUS at 15:09

## 2022-07-05 RX ADMIN — DEXAMETHASONE SODIUM PHOSPHATE 4 MG: 4 INJECTION, SOLUTION INTRAMUSCULAR; INTRAVENOUS at 20:38

## 2022-07-05 RX ADMIN — SODIUM CHLORIDE, POTASSIUM CHLORIDE, SODIUM LACTATE AND CALCIUM CHLORIDE: 600; 310; 30; 20 INJECTION, SOLUTION INTRAVENOUS at 02:23

## 2022-07-05 RX ADMIN — AMOXICILLIN AND CLAVULANATE POTASSIUM 1 TABLET: 875; 125 TABLET, FILM COATED ORAL at 09:43

## 2022-07-05 RX ADMIN — AMOXICILLIN AND CLAVULANATE POTASSIUM 1 TABLET: 875; 125 TABLET, FILM COATED ORAL at 20:38

## 2022-07-05 RX ADMIN — DEXAMETHASONE SODIUM PHOSPHATE 4 MG: 4 INJECTION, SOLUTION INTRAMUSCULAR; INTRAVENOUS at 09:43

## 2022-07-05 RX ADMIN — Medication 10 ML: at 20:39

## 2022-07-05 RX ADMIN — ENOXAPARIN SODIUM 40 MG: 100 INJECTION SUBCUTANEOUS at 09:43

## 2022-07-05 RX ADMIN — SODIUM CHLORIDE, POTASSIUM CHLORIDE, SODIUM LACTATE AND CALCIUM CHLORIDE: 600; 310; 30; 20 INJECTION, SOLUTION INTRAVENOUS at 15:09

## 2022-07-05 RX ADMIN — KETOROLAC TROMETHAMINE 30 MG: 30 INJECTION, SOLUTION INTRAMUSCULAR at 23:21

## 2022-07-05 ASSESSMENT — ENCOUNTER SYMPTOMS
WHEEZING: 0
SHORTNESS OF BREATH: 0
TROUBLE SWALLOWING: 0
COLOR CHANGE: 0
CHOKING: 0
VOMITING: 0
BACK PAIN: 1
COUGH: 0
NAUSEA: 0

## 2022-07-05 ASSESSMENT — PAIN SCALES - GENERAL: PAINLEVEL_OUTOF10: 7

## 2022-07-05 ASSESSMENT — PAIN DESCRIPTION - DESCRIPTORS: DESCRIPTORS: ACHING

## 2022-07-05 ASSESSMENT — PAIN DESCRIPTION - LOCATION: LOCATION: LEG

## 2022-07-05 ASSESSMENT — PAIN DESCRIPTION - ORIENTATION: ORIENTATION: RIGHT;LEFT

## 2022-07-05 NOTE — CARE COORDINATION
[facility] Pre-Admission Screening Document      Patient Name: Gloriann Kussmaul       MRN: [id number]    : 1990    Age: 33 y.o. Gender: male   Payor: Payor: Ebenezer Reid / Plan: Kelsie Wesley / Product Type: *No Product type* /   MSSP: No    Admitted from: [facility] Floor: 2W  Attending Care Provider: Ajay Morrison MD  Inpatient Rehab Referring Care Provider: Ajay Morrison MD  Primary Care Provider: YING Membreno CNP  Inpatient Treatment Team including Consults: Treatment Team: Attending Provider: Ajay Morrison MD; Consulting Physician: Lorraine Cagle MD; Consulting Physician: Trista Rojo MD; Consulting Physician: Ioana Nelson MD; Utilization Reviewer: Abby Christina RN; Registered Nurse: Emma Resendiz RN; : Miguel Seay RN; Utilization Reviewer: Sheridan Nash RN    Reason for Hospitalization:   1. Drug abuse ([facility] [address]    2. Altered mental status, unspecified altered mental status type    3. Non-traumatic rhabdomyolysis      Chief Complaint   Patient presents with    Drug Overdose     pt has been taking royal kratom (hasn't left his apartment in a few days). Well check. Isolation:No active isolations    Hospital Course:  Admit Date: 2022  3:45 PM  Inpatient Rehab Referral Date: 22  Narrative of hospital course/history of present illness: 33 yr old male with history of drug abuse presenting with altered mental status after being found by family for well check. Neurology: acute encephalopathy in the setting of Roy[place] kratom overuse with urine drug screen positive for fentanyl, benzodiazepines, amphetamines, transaminitis, EDGAR, rhabdomyolysis resulting in provoked seizure and cerebral edema. Started on dexamethasone 4 mg 3 times daily. Pulmonology: Left upper lobe atelectasis/pneumonia. po  augmentin  Psychiatry: long standing addiction issues.  Pt agreeable to 3W when medically stable  Gastroenterology: Abnormal LFT most probably ischemic injury to the liver which is slowly improving, follow LFT     Medical & Surgical History/Current Comorbidities:  Past Medical History:   Diagnosis Date    ADHD (attention deficit hyperactivity disorder)     was initiated on treatment by Dr. Warner Hill. any testing was done here by Dr. Warner Hill, no formal psych eval.      Anxiety     Drug abuse ([facility] [address]     MDD (major depressive disorder), recurrent episode, moderate ([facility] [address] 6/4/2019     Past Surgical History:   Procedure Laterality Date    TONSILLECTOMY AND ADENOIDECTOMY      WISDOM TOOTH EXTRACTION         Advanced Directives:  Advance Directives     Power of  Living Will ACP-Advance Directive ACP-Power of     Not on File Not on File Not on File Not on File          Labs/Infection Control:  Recent Labs     07/04/22  2220 07/05/22  0459 07/05/22  1209 07/05/22  2301 07/06/22  0510 07/06/22  1130 07/06/22  2041 07/07/22  0418 07/07/22  0901   WBC  --  17.2*  --   --  18.0*  --   --  19.5*  --    HGB  --  14.1  --   --  12.8*  --   --  12.8*  --    HCT  --  41.2*  --   --  38.7*  --   --  37.9*  --    PLT  --  358  --   --  376  --   --  475*  --    BUN  --  10  --   --  11  --   --  13  --    CREATININE  --  0.53*  --   --  0.48*  --   --  0.56*  --    GLUCOSE  --  105*  --   --  106*  --   --  103*  --    NA  --  140  --   --  138  --   --  139  --    K  --  4.1  --   --  4.4  --   --  4.7  --    CALCIUM  --  8.9  --   --  9.2  --   --  9.4  --    PROT 6.3 6.4 7.0 6.8 6.3 6.9 6.8 6.9 6.5      Blood cultures:  No results for input(s): BC in the last 72 hours. Urinalysis/C&S:  No results for input(s): NITRITE, LABCAST, WBCUA, RBCUA, MUCUS, TRICHOMONAS, YEAST, BACTERIA, LEUKOCYTESUR, BLOODU, GLUCOSEU, KETUA, AMORPHOUS, LABURIN in the last 72 hours.     Radiology:  XR CHEST (2 VW)  Result Date: 7/4/2022  No significant interval change of left upper lobe and left lower lobe infiltrate and/or atelectasis. CT Head WO Contrast  Result Date: 6/30/2022  Impression: Round symmetric areas decreased attenuation bilateral globus pallidus. This finding may be seen in patients experiencing hypoxia, i.e., carbon monoxide poisoning. All CT scans at this facility use dose modulation, iterative reconstruction, and/or weight based dosing when appropriate to reduce radiation dose to as low as reasonably achievable. MRA HEAD WO CONTRAST  Result Date: 7/2/2022  MRA HEAD WO CONTRAST, MRA NECK WO CONTRAST Patent intracranial and extracranial circulation. XR CHEST PORTABLE  Result Date: 7/1/2022  LEFT UPPER LUNG ATELECTASIS/PNEUMONIA. US ABDOMEN LIMITED  Result Date: 7/5/2022  NEGATIVE RIGHT UPPER QUADRANT ULTRASOUND WITHOUT EVIDENCE OF CHOLELITHIASIS. US DUP LOWER EXTREMITY RIGHT ROBERT  Result Date: 7/3/2022  No acute DVT of the right lower extremity veins from the groin to the knee. No acute DVT of the right calf veins. MRA NECK WO CONTRAST  Result Date: 7/2/2022  Patent intracranial and extracranial circulation. MRI BRAIN W WO CONTRAST  Result Date: 7/4/2022  Mildly increased edema in the areas of diffusion restriction within the bilateral globi pallidi, splenium of the corpus callosum, and supratentorial white since prior MRI July 1, 2022. MRI BRAIN WO CONTRAST  Result Date: 7/1/2022  FINDINGS CONSISTENT WITH CARBON MONOXIDE POISONING/HYPOXEMIA AND/OR OTHER TOXIC ENCEPHALOPATHY. NO INTRACRANIAL HEMORRHAGE OR COMPLICATION IDENTIFIED. Medications/IV's:  The patient is currently on lovenox for DVT prophylaxis.      Scheduled:  dexamethasone, 4 mg, IntraVENous, TID    amoxicillin-clavulanate, 1 tablet, Oral, 2 times per day    polyethylene glycol, 17 g, Oral, Daily    lidocaine, 1 patch, TransDERmal, Daily    sodium chloride, 1,000 mL, IntraVENous, Once    sodium chloride flush, 5-40 mL, IntraVENous, 2 times per day    enoxaparin, 40 mg, SubCUTAneous, Daily    PRN:  ibuprofen, sodium reports continued difficulty with containers at times (07/06/22 1616)  Grooming: Setup (07/06/22 1616)  Grooming Skilled Clinical Factors: Washing face and hands only (07/06/22 1616)  UE Bathing: Setup (07/06/22 1616)  LE Bathing: Minimal assistance (07/06/22 1616)  LE Bathing Skilled Clinical Factors: Balance assist for lizett area (07/06/22 1616)  UE Dressing: Setup (07/06/22 1616)  LE Dressing: None (07/06/22 1616)  LE Dressing Skilled Clinical Factors: Balance assist with dynamic movements attempting to simulate, did not don pants d/t [place] cath present (07/06/22 1616)  Toileting: Minimal assistance (Autumn Kimball assist for hygiene following attempted toileting) (07/06/22 1616)  Toileting Skilled Clinical Factors: Balance for hygiene post toileting attempt (07/06/22 1616)  Additional Comments: Pt performed sponge bath at EOB. Pt. had stated he wanted to toilet but was unable to have BM (07/06/22 1616)  Toilet Transfers  Toilet - Technique: Stand step (07/06/22 1622)  Equipment Used: Standard bedside commode (07/06/22 1622)  Toilet Transfer: Contact guard assistance (07/06/22 1622)  Toilet Transfers Comments: Impulsive, turns longer distance which impacts safety with transitions (07/06/22 1622)            Speech Language Pathology   Diet/Swallow:  Dysphagia Diagnosis: Swallow function appears WFL;No clinical indicators of dysphagia  Dysphagia Impression : WFL: Pt presents with functional swallow at bedside this date characterized by adequate mastication, A-P transfer, with no observed pocketing and good oral clearance on trials of regular solids, ad  thin liquids. Pt passed 4 oz water screen. No overt s/s of aspiration on trials of thin or regular. Hyolaryngeal movement present observed via palpation. Diet Solids Recommendation: Regular  Liquid Consistency Recommendation:  Thin  Recommended Form of Meds:   Dysphagia Outcome Severity Scale: Level 7: Normal in all situations            Current Conditions Requiring Inpatient Rehabilitation  Bowel/Bladder Dysfunction: Yes  Intervention Required = Frequent toileting  Risk for Medical/Clinical Complications = moderate  Skin Healing/Breakdown Risk: Yes  Intervention Required = Side to side turns  Risk for Medical/Clinical Complications = moderate  Nutrition/Hydration Deficiency: Yes  Intervention Required = Monitor I&Os and Check Labs  Risk for Medical/Clinical Complications = moderate  Medical Comorbidities: Yes  Intervention Required = DVT risk  Risk for Medical/Clinical Complications = high    Rehab/Skilled Needs:   3 hours of Intensive Acute Rehab therapy daily, 5 days/week for a total of 900 minutes  PT Treatment Time:  1.5 hrs/day  OT Treatment Time: 1.5 hrs/day  Rehabilitation Nursing   Case management/Social work  PICC/IV Medications    Cultural needs:   Values / Beliefs  Do You Have Any Ethnic, Cultural, Sacramental, or Spiritual Quaker Needs You Would Like Us To Be Aware of While You Are in the Hospital : No   Funding needs:   Potential Assistance Purchasing Medications: Yes     Expected Level of Improvement with Rehab  Assist for ADL Supervision   Assist for Transfers Supervision  Assist for Gait Supervision    Patient's willingness to participate: Yes  Patient's ability to tolerate proposed care: Yes  Patient/Family Goals of Rehab (in patient's/family's own words): get better, plan for [facility] once physically able    Anticipated Discharge Plan:  Home with [address] [place] [address] Devan De Gaulle, RN PT OT Aide to be determined  Vs transition to 3W B[facility]        Barriers to Discharge:  Caregiver availability  Inaccessible transportation  Resource availability      Rehab evaluation plan: Recommend Acute Rehab   Rehabilitation Impairment Group Code: 2.1  Rehab Impairment Group: Non-traumatic Brain Dysfunction   Estimated Length of Stay (days): 12  Rehab Diagnosis: Impaired mobility and ADL's due to hypoxic encephalopathy status post overdose likely secondary to fentanyl laced Xanax pill  Reviewer's Signature: Electronically signed by Katina Sheridan RN on 7/7/22 at 2:32 PM EDT    I have reviewed and concur with the above Preadmission Screening. Rehab Admitting Doctor: Dr. Eddi Rhodes, DO    7/09/2022 Yanet Barrios MD 11:36 -  Rhabdomyolysis is improving and EDGAR resolved. 7[facility]10/2022 [facility] Rehab.   Electronically signed by Rickey Walker RN on 7/10/22 at 6:53 AM EDT

## 2022-07-05 NOTE — PROGRESS NOTES
Physical Therapy Med Surg Daily Treatment Note  Facility/Department: [facility]  Room: K74/G309-43       NAME: Herlinda Reid  : 1990 (33 y.o.)  MRN: [id number]  CODE STATUS: Full Code    Date of Service: 2022    Patient Diagnosis(es): Drug abuse ([facility] [address] [F19.10]  Acute encephalopathy [G93.40]  Non-traumatic rhabdomyolysis [M62.82]  Altered mental status, unspecified altered mental status type [R41.82]   Chief Complaint   Patient presents with    Drug Overdose     pt has been taking royal kratom (hasn't left his apartment in a few days). Well check. Patient Active Problem List    Diagnosis Date Noted     impaired mobility and ADLs dt encephalopathy  2022    Abdominal pain 2022    Depressive disorder 2022    Vitamin D deficiency 2022    Altered mental status     Drug abuse ([address]     Acute encephalopathy 2022    Shift work sleep disorder 07/15/2019    ROSALES (generalized anxiety disorder) 2019    MDD (major depressive disorder), recurrent episode, moderate ([address] 2019    Polydrug dependence including opioid type drug with continuous use with complication ([address]     Anxiety 2015    ADHD (attention deficit hyperactivity disorder) 2012    Attention deficit hyperactivity disorder (ADHD) 2012    LPRD (laryngopharyngeal reflux disease) 10/19/2011        Past Medical History:   Diagnosis Date    ADHD (attention deficit hyperactivity disorder)     was initiated on treatment by Dr. Layla Rosales.   any testing was done here by Dr. Layla Rosales, no formal psych eval.      Anxiety     Drug abuse ([address]     MDD (major depressive disorder), recurrent episode, moderate ([address] 2019     Past Surgical History:   Procedure Laterality Date    TONSILLECTOMY AND ADENOIDECTOMY      WISDOM TOOTH EXTRACTION         Chart Reviewed: Yes  Family / Caregiver Present: Yes  General Comment  Comments: family reporting pt has had a \"busy day\"    Restrictions:  Restrictions/Precautions: Fall Risk    SUBJECTIVE:   Subjective: pt agreeable to tx states he is feeling okay. Pain  Pain: denies pre/post.      OBJECTIVE:        Bed mobility  Supine to Sit: Minimal assistance (min A for stabilizing upon sitting EOB.)  Sit to Supine:  (DNT pt into wheelchair post tx to promote OOB activity.)  Bed Mobility Comments: increased time to complete. Transfers  Sit to Stand: Minimal Assistance;Contact guard assistance  Stand to sit: Contact guard assistance  Comment: vc's for anterior weight shifting, slower pace, and hand placement STS x8 for improved sequencing/technique. pt needing varying assistance. Ambulation  Surface: level tile  Device: Rolling Walker  Other Apparatus: Wheelchair follow  Assistance: Minimal assistance  Quality of Gait: decreased heel strike BLE. downward gaze. Distance: 20', 48'  Comments: pt fatigued at end of distance needs wheelchair pulled up behind pt. pt striking ground with balls of his feet instead of heels. Activity Tolerance  Activity Tolerance: Patient tolerated treatment well          ASSESSMENT   Assessment: pt able to increase ambulation distance, unsteady at times during mobility, primarily during STS. Discharge Recommendations:  Continue to assess pending progress         Goals  Short Term Goals  Time Frame for Short term goals: 4 weeks  Short term goal 1: Indep HEP for symptom management  Short term goal 2: Indep to supervision bed mobility  Short term goal 3: Indep to supervision transfers sit to stand and bed to chair  Short term goal 4: Amb with approp device 100 ft safe with supervision  Short term goal 5: Improve LE strength 1/2 grade to assist with above goals.   Patient Goals   Patient goals : Improve function    PLAN    Plan: 1 time a day 3-6 times a week  Safety Devices  Type of Devices: Left in chair,Nurse notified (pt left in wheelchair with family pushing him through the halls. RN aware and agreeable)     [facility] (6 CLICK) Omkar Alegria 28 Inpatient Mobility Raw Score : 16      Therapy Time   Individual   Time In 1358   Time Out 1411   Minutes 13      BM/Trsf: 5  Gait: DANA Alvares, 07/05/22 at 2:46 PM         Definitions for assistance levels  Independent = pt does not require any physical supervision or assistance from another person for activity completion. Device may be needed.   Stand by assistance = pt requires verbal cues or instructions from another person, close to but not touching, to perform the activity  Minimal assistance= pt performs 75% or more of the activity; assistance is required to complete the activity  Moderate assistance= pt performs 50% of the activity; assistance is required to complete the activity  Maximal assistance = pt performs 25% of the activity; assistance is required to complete the activity  Dependent = pt requires total physical assistance to accomplish the task

## 2022-07-05 NOTE — CARE COORDINATION
Inpatient Rehab referral received. Met with patient, parents at bedside and permission granted to discuss care, explained [facility] program and requirements, including 3 hours of intense therapy daily, anticipated length of stay and goal of discharge to home. All questions answered and patient verbalized understanding. Freedom of choice provided and patient and parents feel [facility] is a good idea if approved by insurance. Precert will be needed from [address] Medicaid. Will continue to follow.  Electronically signed by Isidro Schaeffer RN on 7/5/22 at 3:19 PM EDT

## 2022-07-05 NOTE — PROGRESS NOTES
[facility] Occupational Therapy      Date: 2022  Patient Name: Justa Barakat        MRN: [id number]  Account: [de-identified]   : 1990  (33 y.o.)  Room: [facility]/68-    Chart reviewed, attempted OT at [phone] for OT treatment. Patient not seen 2° to:    Pt. declined, stating: \"Not now, I'm having such a busy day and I am exhausted. \"    Spoke to Writer.ly, RN aware. Will attempt again when able.     Electronically signed by NATHANIEL Godinez on 2022 at 3:08 PM

## 2022-07-05 NOTE — PROGRESS NOTES
William Bay is a 33 y.o. male patient.     Current Facility-Administered Medications   Medication Dose Route Frequency Provider Last Rate Last Admin    dexamethasone (DECADRON) injection 4 mg  4 mg IntraVENous TID Da Davis MD   4 mg at 07/05/22 1509    amoxicillin-clavulanate (AUGMENTIN) 875-125 MG per tablet 1 tablet  1 tablet Oral 2 times per day Juanita Moran MD   1 tablet at 07/05/22 0943    polyethylene glycol (GLYCOLAX) packet 17 g  17 g Oral Daily Rosa Vasquez MD   17 g at 07/03/22 0801    lidocaine 4 % external patch 1 patch  1 patch TransDERmal Daily Rosa Vasquez MD   1 patch at 07/05/22 5086    lactated ringers infusion   IntraVENous Continuous Da Davis  mL/hr at 07/05/22 1509 New Bag at 07/05/22 1509    0.9 % sodium chloride bolus  1,000 mL IntraVENous Once Ruchi Paris MD        sodium chloride flush 0.9 % injection 5-40 mL  5-40 mL IntraVENous 2 times per day Rosa Vasquez MD   10 mL at 07/04/22 2256    sodium chloride flush 0.9 % injection 5-40 mL  5-40 mL IntraVENous PRN Rosa Vasquez MD        0.9 % sodium chloride infusion   IntraVENous PRN Rosa Vasquez MD        enoxaparin (LOVENOX) injection 40 mg  40 mg SubCUTAneous Daily Rosa Vasquez MD   40 mg at 07/05/22 0943    ondansetron (ZOFRAN-ODT) disintegrating tablet 4 mg  4 mg Oral Q8H PRN Rosa Vasquez MD        Or    ondansetron [facility]) injection 4 mg  4 mg IntraVENous Q6H PRN Rosa Vasquez MD        potassium chloride Vasquez Merritts M) extended release tablet 40 mEq  40 mEq Oral PRN Rosa Vasquez MD   40 mEq at 07/03/22 0801    Or    potassium bicarb-citric acid (EFFER-K) effervescent tablet 40 mEq  40 mEq Oral PRN Rosa Vasquez MD        Or    potassium chloride 10 mEq/100 mL IVPB (Peripheral Line)  10 mEq IntraVENous PRN Rosa Vasquez MD         Allergies   Allergen Reactions    Peanut-Containing Drug Products Anaphylaxis     Throat swelling, hives, \"I needed hospital admission last time I had any\"    Nuts [Macadamia Nut Oil] Angioedema    Peanut Oil Other (See Comments)    Shellfish-Derived Products Itching     Principal Problem:    Acute encephalopathy  Active Problems:    Drug abuse ([address]    Altered mental status     impaired mobility and ADLs dt encephalopathy     Depressive disorder    Vitamin D deficiency    Benzodiazepine dependence (HCC)    Right foot drop    ADHD (attention deficit hyperactivity disorder)    ROSALES (generalized anxiety disorder)    Polydrug dependence including opioid type drug with continuous use with complication (HCC)    Attention deficit hyperactivity disorder (ADHD)  Resolved Problems:    * No resolved hospital problems. *    Blood pressure 130/82, pulse 97, temperature 97.5 °F (36.4 °C), temperature source Oral, resp. rate 18, height 5' 10\" (1.778 m), weight 157 lb 3 oz (71.3 kg), SpO2 97 %.     Subjective reports no GI issues  Objective LFT slowly improving, serum LDH is high and serology for acute hepatitis AB and C was negative, right upper quadrant ultrasound is unremarkable, serum acetaminophen level is less than 5  Assessment & Plan abnormal LFT most probably ischemic injury to the liver which is slowly improving, follow LFT    Trang Avila MD  7/5/2022

## 2022-07-05 NOTE — PROGRESS NOTES
unremarkable. Impression  NO EVIDENCE OF ACTIVE DISEASE IN THE CHEST. Portable: Results for orders placed during the hospital encounter of 06/30/22    XR CHEST PORTABLE    Narrative  EXAMINATION: XR CHEST PORTABLE    CLINICAL HISTORY: FEVER    COMPARISONS: JANUARY 29, 2018    FINDINGS: Osseous structures are intact. Cardiopericardial silhouette is normal. Pulmonary vasculature is normal. Right lung is clear. Patchy area of increased opacity left upper lung. Impression  LEFT UPPER LUNG ATELECTASIS/PNEUMONIA. Echo No results found for this or any previous visit. Assessment/Plan:    Active Hospital Problems    Diagnosis Date Noted    Altered mental status [R41.82]      Priority: Medium    Drug abuse ([facility] [address] [F19.10]      Priority: Medium    Acute encephalopathy [G93.40] 06/30/2022     Priority: Medium    Polydrug dependence including opioid type drug with continuous use with complication ([facility] [address] [X98.40] 06/04/2019     1. Polysubstance overdose/acute ischemic encephalopathy       Repeat MRI shows edema; started on IV decadron per neurology recommendations  2. Rhabdomyolysis/EDGAR            EDGAR resolved; CK is trending downwards; increase rate  3. Acute Hepatitis         May be 2/2 kratom use, however cannot completely rule out acetaminophen tox. Neg serum level, patient cannot provide hx or use. NAC given in ER in discussion with poison control, q12 LFTs. Awaiting hepatitis panel and consulted GI who ordered RUQ ultraosund  4.  DVT proph    Electronically signed by Pepper Toribio MD on 7/5/2022 at 11:35 AM

## 2022-07-05 NOTE — PROGRESS NOTES
[facility]   Facility/Department: [facility]  Speech Language Pathology    Melia Patric  1990  [id number]/[id number]    Date: 7/5/2022      Speech Therapy attempted to see Melia Spivey on this date for a/an:    Clinical Bedside Swallow Evaluation    Pt was unable to be seen due to: Other: nursing care in progress. Will re-attempt at the earliest opportunity.        Electronically signed by SOPHIE Multani on 7/5/22 at 2:36 PM EDT

## 2022-07-05 NOTE — CONSULTS
Physical Medicine & Rehabilitation  Consult Note      Admitting Physician: Braxton Macias MD    Primary Care Provider: YING Odom CNP     Reason for Consult:  Asses rehab needs, promote physical and mental function, analyze level of care to determine rehab needs, improve ability to actively participate in the rehabilitation process, and decrease likelihood of re-admit to the hospital after discharge. History of Present Illness:    Kiki Shukla is a 33 y.o. male admitted to [facility] on 6/30/2022. Was admitted with change in mental status and history of possible overdose. Family did a well check after his boss noticed that he had not shown up to work for several days and called the family out of concern. He was initially admitted and to the ICU. He had abnormal liver enzymes, rhabdomyolysis kidney injury. Urine was positive for amphetamines benzos and fentanyl. He was found to also be using Royle kratom overuse, transaminitis, EDGAR, rhabdomyolysis resulting in provoked seizure and cerebral edema. Now also noted neurologically to be incontinent of bowel and bladder and hyperreflexive. MRIs of the spine are pending. C CAT scans of the head and MRIs of the head as below. MRI of the brain showed edema and he is on dexamethasone. He is also been found to have hemic hypoxic encephalopathy and subsequent seizures. Neurologic Problem  The patient's primary symptoms include an altered mental status, focal sensory loss and focal weakness. This is a new problem. The current episode started 1 to 4 weeks ago. The neurological problem developed suddenly. The problem has been gradually improving since onset. There was right-sided and lower extremity focality noted. Associated symptoms include bladder incontinence, confusion, dizziness, fatigue and light-headedness. Past treatments include walking. The treatment provided mild relief.  His past medical history is significant for mood changes. There is no history of a bleeding disorder, a clotting disorder or a CVA. I reviewed recent nursing notes discussed care with acute care providers, \" Chart reviewed, attempted OT at [phone] for OT treatment. Patient not seen 2° to:Pt. declined, stating: \"Not now, I'm having such a busy day and I am exhausted. \"   Spoke to JEFF Rasheed RN aware. Will attempt again when able. \".   Events from the previous 24 hours reviewed    . Their inpatient work up has included:    Imaging:  Imaging and other studies reviewed and discussed with patient and staff    XR CHEST  7/4/2022 : Suboptimal inspiration. The cardiomediastinal silhouette is within normal limits. No significant interval change of patchy left upper lobe and left lower lobe opacities. No pneumothorax or pleural effusion. No acute osseous abnormality. No significant interval change of left upper lobe and left lower lobe infiltrate and/or atelectasis. CT Head   6/30/2022  CT Brain. Contrast medium:  without contrast.. History: Altered mental status. Technical factors: CT imaging of the brain was obtained and formatted as 5 mm contiguous axial images. 2.5 mm contiguous axial images were obtained through the osseous structures. Sagittal and coronal reconstruction obtained during postprocessing. Comparison:  None. Findings: Extra-axial spaces:  Normal. Intracranial hemorrhage:  None. Ventricular system: [Negative. Basal Cisterns:  Without anomaly. Cerebral Parenchyma: Bilateral, symmetric areas of decreased attenuation exerting no mass effect measuring approximately 9 mm in size since found within the bilateral globus pallidus (series 2, image 17). Midline Shift:  None. Cerebellum:  No anomaly identified. Paranasal sinuses and mastoid air cells:  No anomaly identified. Visualized Orbits:  Negative. Impression: Round symmetric areas decreased attenuation bilateral globus pallidus.  This finding may be seen in patients experiencing hypoxia, i.e., carbon monoxide poisoning. MRA HEAD   7/2/2022  MRA HEAD WO CONTRAST, MRA NECK WO CONTRAST HISTORY:  Drug abuse. Altered mental status. COMPARISON: MRI brain 7/1/2022. CT head 6/30/2022. TECHNIQUE: Routine MRA of the head without contrast with 3-D time-of-flight images and dedicated reconstructions. Routine MRA of the neck with 3-D and 2-D time-of-flight images and dedicated reconstructions. RESULT: Visualized brain: Grossly unchanged from the recent MRI of the brain with multiple areas of restricted diffusion. Please refer to the recent MRI brain report. INTRACRANIAL MRA:    The visualized distal vertebral and basilar arteries are widely patent. The distal ICAs are patent and within normal limits of caliber. The proximal ACAs, MCAs and PCAs are patent and within normal limits of caliber and configuration. There is no evidence of focal,  significant stenosis or aneurysm in the visualized vessels. EXTRACRANIAL MRA: Carotid Stenosis: Right Common:  No significant stenosis. Right Internal Plaque:  No significant plaque formation. Right Internal Carotid Stenosis (% by NASCET Criteria):  0% Left Common:  No significant stenosis. Left Internal Carotid Plaque:  No significant plaque formation. Left Internal Carotid Stenosis (% by NASCET Criteria):  0% Cervical Vertebral Arteries: Patency:  Bilateral Dominance:  Left     Patent intracranial and extracranial circulation. XR CHEST   7/1/2022: Osseous structures are intact. Cardiopericardial silhouette is normal. Pulmonary vasculature is normal. Right lung is clear. Patchy area of increased opacity left upper lung. LEFT UPPER LUNG ATELECTASIS/PNEUMONIA. US ABDOMEN 7/5/2022  EXAMINATION: US ABDOMEN LIMITED DATE AND TIME:7/5/2022 8:53 AM CLINICAL HISTORY: 3   abnormal LFT. COMPARISON: None TECHNIQUE: Gray-scale evaluation of the right upper quadrant was performed.  FINDINGS:            Liver: Hepatic echogenicity is within normal limits without intrahepatic biliary dilatation. Gallbladder: The gallbladder was normally distended without stones, sludge, or wall thickening. The common duct measures up to  3   mm. Pancreas: Was not optimally visualized due to bowel and gas shadowing, but the visualized portion did not demonstrate any gross pathology. NEGATIVE RIGHT UPPER QUADRANT ULTRASOUND WITHOUT EVIDENCE OF CHOLELITHIASIS. US DUP LOWER EXTREMITY RIGHT ROBERT    Result Date: 7/3/2022  EXAMINATION: US DUP LOWER EXTREMITY RIGHT ROBERT DATE AND TIME:7/3/2022 1:02 PM CLINICAL HISTORY:  Fall 1 week ago. Right leg pain. COMPARISON: None TECHNIQUE: The right lower extremity veins were evaluated with color doppler, gray scale imaging and spectral analysis while using compression and augmentation when possible. RESULT: Evaluation of the right lower extremity veins from the thigh to the knee shows normal phasic flow, normal augmentation of the Doppler signal, and normal compression of the deep veins. There is no sonographic evidence for acute deep venous thrombosis from the right groin to the popliteal region. There is no sonographic evidence for acute deep venous thrombosis in the right calf veins. No acute DVT of the right lower extremity veins from the groin to the knee. No acute DVT of the right calf veins. MRA NECK   7/2/2022    Visualized brain: Grossly unchanged from the recent MRI of the brain with multiple areas of restricted diffusion. Please refer to the recent MRI brain report. INTRACRANIAL MRA:    The visualized distal vertebral and basilar arteries are widely patent. The distal ICAs are patent and within normal limits of caliber. The proximal ACAs, MCAs and PCAs are patent and within normal limits of caliber and configuration. There is no evidence of focal,  significant stenosis or aneurysm in the visualized vessels.  EXTRACRANIAL MRA: Carotid Stenosis: Right Common:  No significant stenosis. Right Internal Plaque:  No significant plaque formation. Right Internal Carotid Stenosis (% by NASCET Criteria):  0% Left Common:  No significant stenosis. Left Internal Carotid Plaque:  No significant plaque formation. Left Internal Carotid Stenosis (% by NASCET Criteria):  0% Cervical Vertebral Arteries: Patency:  Bilateral Dominance:  Left     Patent intracranial and extracranial circulation. MRI BRAIN  7/4/2022  Mildly increased edema in the areas of diffusion restriction within the bilateral globi pallidi, splenium of the corpus callosum, and supratentorial white since prior MRI July 1, 2022. No mass effect or midline shift. No acute intracranial hemorrhage. No enhancing mass or pathologic enhancement. Mildly increased edema in the areas of diffusion restriction within the bilateral globi pallidi, splenium of the corpus callosum, and supratentorial white since prior MRI July 1, 2022. MRI BRAIN  : 7/1/2022   Areas of restricted diffusion within the globus pallidus, supratentorial white matter and splenium of the corpus callosum are most consistent with carbon monoxide poisoning/hypoxemia and/or other toxic encephalopathy. There is no intracranial hemorrhage, mass effect, midline shift, extra-axial collection, significant cerebral volume loss or other complication identified. FINDINGS CONSISTENT WITH CARBON MONOXIDE POISONING/HYPOXEMIA AND/OR OTHER TOXIC ENCEPHALOPATHY. NO INTRACRANIAL HEMORRHAGE OR COMPLICATION IDENTIFIED.               Labs:    Labs reviewed and discussed with patient and staff    Lab Results   Component Value Date/Time    POCGLU 84 07/03/2022 04:38 PM    POCGLU 76 07/03/2022 11:08 AM    POCGLU 100 07/02/2022 01:50 AM     Lab Results   Component Value Date/Time     07/05/2022 04:59 AM    K 4.1 07/05/2022 04:59 AM     07/05/2022 04:59 AM    CO2 23 07/05/2022 04:59 AM    BUN 10 07/05/2022 04:59 AM    CREATININE 0.53 07/05/2022 04:59 AM    CALCIUM 8.9 1513)  Ambulation  Surface: level tile (07/05/22 1440)  Device: Rolling Walker (07/05/22 1440)  Other Apparatus: Wheelchair follow (07/05/22 1440)  Assistance: Minimal assistance (07/05/22 1440)  Quality of Gait: decreased heel strike BLE. downward gaze. (07/05/22 1440)  Gait Deviations: Slow Radha;Decreased step length (07/02/22 1513)  Distance: 20', 50' (07/05/22 1440)  Comments: pt fatigued at end of distance needs wheelchair pulled up behind pt. pt striking ground with balls of his feet instead of heels. (07/05/22 1440)  Stairs:     W/C mobility:         Occupational therapy:   Hand Dominance: Right  ADL  Feeding: Setup (07/03/22 1531)  Grooming: Setup (07/03/22 1531)  LE Dressing: Maximum assistance (07/03/22 1531)  LE Dressing Skilled Clinical Factors: difficulty with managing sock over RLE, unable to cross over LLE due to swelling/weakness. Decreased balance in standing (07/03/22 1531)  Toileting: Maximum assistance (07/03/22 1531)  Additional Comments: Simulated ADLs as above. Pt declined most bathing and dressing tasks due to completing them earlier. (07/03/22 1531)               Speech therapy:            Diet/Swallow:                         COGNITION  OT: Cognition Comment: pt reported noticeable fatigue, delayed processing  SP:             Re-evals pending      Past Medical History:        Diagnosis Date    ADHD (attention deficit hyperactivity disorder)     was initiated on treatment by Dr. Storm Gonzalez. any testing was done here by Dr. Storm Gonzalez, no formal psych eval.      Anxiety     Drug abuse ([address]     MDD (major depressive disorder), recurrent episode, moderate ([address] 6/4/2019         PastSurgical History:        Procedure Laterality Date    TONSILLECTOMY AND ADENOIDECTOMY      WISDOM TOOTH EXTRACTION           Allergies:     Allergies   Allergen Reactions    Peanut-Containing Drug Products Anaphylaxis     Throat swelling, hives, \"I needed hospital admission last time I had any\"    Nuts [facility] Nut Oil] Angioedema    Peanut Oil Other (See Comments)    Shellfish-Derived Products Itching        CurrentMedications:   Current Facility-Administered Medications: dexamethasone (DECADRON) injection 4 mg, 4 mg, IntraVENous, TID  amoxicillin-clavulanate (AUGMENTIN) 875-125 MG per tablet 1 tablet, 1 tablet, Oral, 2 times per day  polyethylene glycol (GLYCOLAX) packet 17 g, 17 g, Oral, Daily  lidocaine 4 % external patch 1 patch, 1 patch, TransDERmal, Daily  lactated ringers infusion, , IntraVENous, Continuous  0.9 % sodium chloride bolus, 1,000 mL, IntraVENous, Once  sodium chloride flush 0.9 % injection 5-40 mL, 5-40 mL, IntraVENous, 2 times per day  sodium chloride flush 0.9 % injection 5-40 mL, 5-40 mL, IntraVENous, PRN  0.9 % sodium chloride infusion, , IntraVENous, PRN  enoxaparin (LOVENOX) injection 40 mg, 40 mg, SubCUTAneous, Daily  ondansetron (ZOFRAN-ODT) disintegrating tablet 4 mg, 4 mg, Oral, Q8H PRN **OR** ondansetron (ZOFRAN) injection 4 mg, 4 mg, IntraVENous, Q6H PRN  potassium chloride (KLOR-CON M) extended release tablet 40 mEq, 40 mEq, Oral, PRN **OR** potassium bicarb-citric acid (EFFER-K) effervescent tablet 40 mEq, 40 mEq, Oral, PRN **OR** potassium chloride 10 mEq/100 mL IVPB (Peripheral Line), 10 mEq, IntraVENous, PRN      Social History:  Social History     Socioeconomic History    Marital status: Single     Spouse name: Not on file    Number of children: Not on file    Years of education: Not on file    Highest education level: Not on file   Occupational History    Not on file   Tobacco Use    Smoking status: Never Smoker    Smokeless tobacco: Never Used   Substance and Sexual Activity    Alcohol use: Yes     Comment: occasional    Drug use: No    Sexual activity: Not on file   Other Topics Concern    Not on file   Social History Narrative    Lives With: Alone in [address] [place] [address]    Home Layout: One level    Home Access: Level entry    Bathroom Toilet: Standard Has the patient had two or more falls in the past year or any fall with injury in the past year?: No    ADL Assistance: [address]: Independent    Homemaking Responsibilities: Yes    Ambulation Assistance: Independent    Transfer Assistance: Independent    Active : Yes    Type of Occupation: [employer] work    Additional Comments: Independent PTA- no medical equipment         Social Determinants of Health     Financial Resource Strain: Low Risk     Difficulty of Paying Living Expenses: Not hard at all   Food Insecurity: No Food Insecurity    Worried About Running Out of Food in the Last Year: Never true    [address] in the Last Year: Never true   Transportation Needs:     Lack of Transportation (Medical): Not on file    Lack of Transportation (Non-Medical): Not on file   Physical Activity:     Days of Exercise per Week: Not on file    Minutes of Exercise per Session: Not on file   Stress:     Feeling of Stress : Not on file   Social Connections:     Frequency of Communication with Friends and Family: Not on file    Frequency of Social Gatherings with Friends and Family: Not on file    Attends [employer] Services: Not on file    Active Member of [employer] or Organizations: Not on file    Attends Club or Organization Meetings: Not on file    Marital Status: Not on file   Intimate Partner Violence:     Fear of Current or Ex-Partner: Not on file    Emotionally Abused: Not on file    Physically Abused: Not on file    Sexually Abused: Not on file   Housing Stability:     Unable to Pay for Housing in the Last Year: Not on file    Number of [place] in the Last Year: Not on file    Unstable Housing in the Last Year: Not on file        This history was obtained from family and caregivers and discussed to confirm.     Family History:       Problem Relation Age of Onset    Depression Mother     Heart Disease Father     High Blood Pressure Father     High Cholesterol Father        Review of Systems:  Review of Systems   Constitutional: Positive for fatigue. Genitourinary: Positive for bladder incontinence. Neurological: Positive for dizziness, focal weakness and light-headedness. Psychiatric/Behavioral: Positive for confusion. Physical Exam:  /82   Pulse 97   Temp 97.5 °F (36.4 °C) (Oral)   Resp 18   Ht 5' 10\" (1.778 m)   Wt 157 lb 3 oz (71.3 kg)   SpO2 97%   BMI 22.55 kg/m²      Physical Exam  Vitals and nursing note reviewed. Constitutional:       Appearance: Normal appearance. Cardiovascular:      Rate and Rhythm: Normal rate and regular rhythm. Pulmonary:      Effort: Pulmonary effort is normal.      Breath sounds: Normal breath sounds. Abdominal:      General: Bowel sounds are normal.      Palpations: Abdomen is soft. Musculoskeletal:         General: Normal range of motion. Skin:     General: Skin is warm and dry. Findings: Wound present. Neurological:      Mental Status: He is alert. Comments: Slow, indirect answers   Psychiatric:         Attention and Perception: He is inattentive. Speech: Speech is delayed. Cognition and Memory: Cognition is impaired. Ortho Exam  Neurologic Exam     Mental Status   Disoriented to date. Oriented to year and month. Attention: decreased. Concentration: decreased. Level of consciousness: drowsy  Knowledge: poor.      Motor Exam   Muscle bulk: decreased  Overall muscle tone: normal    Sensory Exam   Sensory deficit distribution on right: deep peroneal            Diagnostics:    Recent Results (from the past 24 hour(s))   Lactate Dehydrogenase    Collection Time: 07/04/22  3:13 PM   Result Value Ref Range     (H) 135 - 225 U/L   Acetaminophen Level    Collection Time: 07/04/22  6:23 PM   Result Value Ref Range    Acetaminophen Level <5 (L) 10 - 30 ug/mL   CK    Collection Time: 07/04/22 10:20 PM   Result Value Ref Range    Total CK 11,063 (H) 0 - 190 U/L   Hepatic Function Panel    Collection Time: 07/04/22 10:20 PM   Result Value Ref Range    Total Protein 6.3 6.3 - 8.0 g/dL    Albumin 3.3 (L) 3.5 - 4.6 g/dL    Alkaline Phosphatase 85 35 - 104 U/L     (H) 0 - 41 U/L     (H) 0 - 40 U/L    Total Bilirubin 0.4 0.2 - 0.7 mg/dL    Bilirubin, Direct <0.2 0.0 - 0.4 mg/dL    Bilirubin, Indirect see below 0.0 - 0.6 mg/dL   Comprehensive Metabolic Panel w/ Reflex to MG    Collection Time: 07/05/22  4:59 AM   Result Value Ref Range    Sodium 140 135 - 144 mEq/L    Potassium reflex Magnesium 4.1 3.4 - 4.9 mEq/L    Chloride 104 95 - 107 mEq/L    CO2 23 20 - 31 mEq/L    Anion Gap 13 9 - 15 mEq/L    Glucose 105 (H) 70 - 99 mg/dL    BUN 10 6 - 20 mg/dL    CREATININE 0.53 (L) 0.70 - 1.20 mg/dL    GFR Non-African American >60.0 >60    GFR  >60.0 >60    Calcium 8.9 8.5 - 9.9 mg/dL    Total Protein 6.4 6.3 - 8.0 g/dL    Albumin 3.3 (L) 3.5 - 4.6 g/dL    Total Bilirubin 0.4 0.2 - 0.7 mg/dL    Alkaline Phosphatase 86 35 - 104 U/L     (H) 0 - 41 U/L     (H) 0 - 40 U/L    Globulin 3.1 2.3 - 3.5 g/dL   CBC with Auto Differential    Collection Time: 07/05/22  4:59 AM   Result Value Ref Range    WBC 17.2 (H) 4.8 - 10.8 K/uL    RBC 4.87 4.70 - 6.10 M/uL    Hemoglobin 14.1 14.0 - 18.0 g/dL    Hematocrit 41.2 (L) 42.0 - 52.0 %    MCV 84.6 80.0 - 100.0 fL    MCH 28.9 27.0 - 31.3 pg    MCHC 34.2 33.0 - 37.0 %    RDW 13.2 11.5 - 14.5 %    Platelets 461 243 - 903 K/uL    Neutrophils % 80.0 %    Lymphocytes % 11.1 %    Monocytes % 7.0 %    Eosinophils % 1.6 %    Basophils % 0.3 %    Neutrophils Absolute 13.8 (H) 1.4 - 6.5 K/uL    Lymphocytes Absolute 1.9 1.0 - 4.8 K/uL    Monocytes Absolute 1.2 (H) 0.2 - 0.8 K/uL    Eosinophils Absolute 0.3 0.0 - 0.7 K/uL    Basophils Absolute 0.0 0.0 - 0.2 K/uL   Hepatic Function Panel    Collection Time: 07/05/22 12:09 PM   Result Value Ref Range    Total Protein 7.0 6.3 - 8.0 g/dL    Albumin 3.5 3.5 - 4.6 g/dL Alkaline Phosphatase 92 35 - 104 U/L     (H) 0 - 41 U/L     (H) 0 - 40 U/L    Total Bilirubin 0.4 0.2 - 0.7 mg/dL    Bilirubin, Direct <0.2 0.0 - 0.4 mg/dL    Bilirubin, Indirect see below 0.0 - 0.6 mg/dL   CK    Collection Time: 07/05/22 12:09 PM   Result Value Ref Range    Total CK 13,156 (H) 0 - 190 U/L              Impression:    1. Impaired mobility and ADLs due to hypoxia is ischemic and possibly toxic encephalopathy  2. Factors favoring recovery include:  near independent premorbid function        Complex Active General Medical Issues that complicate care and require daily medical supervision: 1. Principal Problem:    Acute encephalopathy  Active Problems:    Drug abuse ([address]    Altered mental status     impaired mobility and ADLs dt encephalopathy     Depressive disorder    Vitamin D deficiency    Benzodiazepine dependence (HCC)    Right foot drop    ADHD (attention deficit hyperactivity disorder)    ROSALES (generalized anxiety disorder)    Polydrug dependence including opioid type drug with continuous use with complication (HCC)    Attention deficit hyperactivity disorder (ADHD)  Resolved Problems:    * No resolved hospital problems. *            Recommendations:    1. Considering all of the factors above including the patient's current medical status, social status/home environment, their functional needs, and their ability to participate in a therapy program, I feel that they would best be served at:    acute intensive comprehensive inpatient rehabilitation program.  Due to the diagnoses above the patient has had significant decline in function and is now requiring acute intensive therapy to optimize function. They are expected to achieve meaningful functional gains.   Due to medical complexity as above, rehabilitation physician services is reasonable and necessary including face-to-face visits at least 3 days/week with anticipated need to treat, manage and modify the rehabilitation course of treatment including interdisciplinary team conferences. They will require rehab physician care to monitor for neurogenic bowel bladder, postoperative pain, titration of opiate and high risk medications,   blood pressure and blood sugar control. It is my opinion that they will be able to tolerate and benefit from 3 hours of therapy a day. I reviewed the various options re: levels of care with the patient and family. Please see pre-admission screen note for further details. I discussed acute rehab with the patient and verify that the patient is able and willing to participate in 3 hours of therapy a day. Rehab and Acute Care Case Management has also reinforced this expectation. This patient requires multidisciplinary rehabilitation treatment, including daily care and management from a PM&R physician, 24-hour rehabilitation nursing, Physical Therapy, Occupational Therapy, rehabilitation psychology, consideration of speech and language pathology, recreational therapy, nutritional services, and a rehabilitation . I feel that it is reasonable to plan for a discharge to home setting after acute rehab. 2. Specialized nursing care to focus on:  1. Bowel and bladder issues-Monitor for urinary retention-check PVRs, bladder scan--cath if no void. 2. Wound management re  Left lateral leg -pressure relief protocols-side to side turns  3. IV medication administration      3. Monitor endurance and if necessary spread therapy out over a 7-day window-adding scheduled rest breaks when needed. Focus on energy conservation. Monitor heart rate and   cardiac medications effects on heart rate and blood pressure before, during and after therapy. Progress toward endurance training with pulse ox monitoring for saturation and heart rate. 4. monitoring for dysphagia-- Improve hydration and nutrition by adding Vitamin B12 shot times one, adding Protein supplements and push PO fluids.     5. Treat monitor for higher level cognitive deficits, focus on difficulty with sequencing and problem-solving. 6. Focus on higher-level balance and falls risk issues focusing on balance training and monitoring for orthostasis. Above recommendations are indicated to address medical complexity and need for appropriate rehab services. Will tailor individual care and rehab plan per individuals needs re  . Focus of today's plan-  Upgrade diet if possible. Required Certification Data (potential inpatient rehabilitation facility patient's only)    Deficits:weakness, nutrition, mobility, impaired gait, frequent falls, decreased endurance, deconditioning , adjustment to disability, impaired mobility level increasing the risk of venous thromboembolism, and healing surgical sites, balance and righting reactions, cognitive deficits  and wound healing     Disability:mobility, locomotion, self care and cognitive    Potential barriers to progress/discharge:complex medical conditions, complex social situations and bowel/bladder dysfunction         It was my pleasure to evaluate Reji Calderon today. Please call [phone] with questions.     Prabhjot Lund, DO

## 2022-07-05 NOTE — PROGRESS NOTES
[facility] FOLLOW-UP NOTE       7/5/2022     Patient was seen and examined in person, Chart reviewed   Patient's case discussed with staff/team    Chief Complaint: Overdose    Interim History:     Pt continue to remain the same with significant memory issues around the event  Neuro work up ongoing  Pt mom and dad met with me and expressed their concerns about his long standing addiction issues  Pt agreeable to come to [facility] when medically stable  Appetite:   [x] Normal/Unchanged  [] Increased  [] Decreased      Sleep:       [] Normal/Unchanged  [] Fair       [] Poor              Energy:    [] Normal/Unchanged  [] Increased  [] Decreased        SI [] Present  [] Absent    HI  []Present  [] Absent     Aggression:  [] yes  [] no    Patient is [] able  [] unable to CONTRACT FOR SAFETY     PAST MEDICAL/PSYCHIATRIC HISTORY:   Past Medical History:   Diagnosis Date    ADHD (attention deficit hyperactivity disorder)     was initiated on treatment by Dr. Daiana Wood.   any testing was done here by Dr. Daiana Wood, no formal psych eval.      Anxiety     Drug abuse ([facility]     MDD (major depressive disorder), recurrent episode, moderate ([facility] [facility] 6/4/2019       FAMILY/SOCIAL HISTORY:  Family History   Problem Relation Age of Onset    Depression Mother     Heart Disease Father     High Blood Pressure Father     High Cholesterol Father      Social History     Socioeconomic History    Marital status: Single     Spouse name: Not on file    Number of children: Not on file    Years of education: Not on file    Highest education level: Not on file   Occupational History    Not on file   Tobacco Use    Smoking status: Never Smoker    Smokeless tobacco: Never Used   Substance and Sexual Activity    Alcohol use: Yes     Comment: occasional    Drug use: No    Sexual activity: Not on file   Other Topics Concern    Not on file   Social History Narrative    Lives With: Alone in [address]    Home Layout: One level    Home Access: Level entry    Bathroom Toilet: Standard    Has the patient had two or more falls in the past year or any fall with injury in the past year?: No    ADL Assistance: [facility]: Independent    Homemaking Responsibilities: Yes    Ambulation Assistance: Independent    Transfer Assistance: Independent    Active : Yes    Type of Occupation: [employer]    Additional Comments: Independent PTA- no medical equipment         Social Determinants of Health     Financial Resource Strain: Low Risk     Difficulty of Paying Living Expenses: Not hard at all   Food Insecurity: No Food Insecurity    Worried About Running Out of Food in the Last Year: Never true    [address] in the Last Year: Never true   Transportation Needs:     Lack of Transportation (Medical): Not on file    Lack of Transportation (Non-Medical): Not on file   Physical Activity:     Days of Exercise per Week: Not on file    Minutes of Exercise per Session: Not on file   Stress:     Feeling of Stress : Not on file   Social Connections:     Frequency of Communication with Friends and Family: Not on file    Frequency of Social Gatherings with Friends and Family: Not on file    Attends [employer] Services: Not on file    Active Member of [address] [place] [address] [employer]" or Organizations: Not on file    Attends Club or Organization Meetings: Not on file    Marital Status: Not on file   Intimate Partner Violence:     Fear of Current or Ex-Partner: Not on file    Emotionally Abused: Not on file    Physically Abused: Not on file    Sexually Abused: Not on file   Housing Stability:     Unable to Pay for Housing in the Last Year: Not on file    Number of [place] in the Last Year: Not on file    Unstable Housing in the Last Year: Not on file           ROS:  [x] All negative/unchanged except if checked.  Explain positive(checked items) below:  [] Constitutional  [] Eyes  [] Ear/Nose/Mouth/Throat  [] Respiratory  [] CV  [] GI  []   [] Musculoskeletal  [] Skin/Breast  [] Neurological  [] Endocrine  [] Heme/Lymph  [] Allergic/Immunologic    Explanation:     MEDICATIONS:    Current Facility-Administered Medications:     dexamethasone (DECADRON) injection 4 mg, 4 mg, IntraVENous, TID, Goran Arechiga MD, 4 mg at 07/05/22 1509    amoxicillin-clavulanate (AUGMENTIN) 875-125 MG per tablet 1 tablet, 1 tablet, Oral, 2 times per day, Thi Young MD, 1 tablet at 07/05/22 0943    polyethylene glycol (GLYCOLAX) packet 17 g, 17 g, Oral, Daily, Kasey Leos MD, 17 g at 07/03/22 0801    lidocaine 4 % external patch 1 patch, 1 patch, TransDERmal, Daily, Kasey Leos MD, 1 patch at 07/05/22 7408    lactated ringers infusion, , IntraVENous, Continuous, Goran Arechiga MD, Last Rate: 150 mL/hr at 07/05/22 1509, New Bag at 07/05/22 1509    0.9 % sodium chloride bolus, 1,000 mL, IntraVENous, Once, Umer White MD    sodium chloride flush 0.9 % injection 5-40 mL, 5-40 mL, IntraVENous, 2 times per day, Kasey Leos MD, 10 mL at 07/04/22 2256    sodium chloride flush 0.9 % injection 5-40 mL, 5-40 mL, IntraVENous, PRN, Kasey Leos MD    0.9 % sodium chloride infusion, , IntraVENous, PRN, Kasey Leos MD    enoxaparin (LOVENOX) injection 40 mg, 40 mg, SubCUTAneous, Daily, Kasey Leos MD, 40 mg at 07/05/22 0943    ondansetron (ZOFRAN-ODT) disintegrating tablet 4 mg, 4 mg, Oral, Q8H PRN **OR** ondansetron (ZOFRAN) injection 4 mg, 4 mg, IntraVENous, Q6H PRN, Kasey Leos MD    potassium chloride (KLOR-CON M) extended release tablet 40 mEq, 40 mEq, Oral, PRN, 40 mEq at 07/03/22 0801 **OR** potassium bicarb-citric acid (EFFER-K) effervescent tablet 40 mEq, 40 mEq, Oral, PRN **OR** potassium chloride 10 mEq/100 mL IVPB (Peripheral Line), 10 mEq, IntraVENous, PRN, Kasey Leos MD      Examination:  /82   Pulse 97   Temp 97.5 °F (36.4 °C) (Oral)   Resp 18   Ht 5' 10\" (1.778 m)   Wt 157 lb 3 oz (71.3 kg)   SpO2 97%   BMI 22.55 kg/m²   Gait - in bed  Medication side effects(SE): no    Mental Status Examination:    Level of consciousness:  within normal limits   Appearance:  poor grooming and fair hygiene  Behavior/Motor:  psychomotor retardation  Attitude toward examiner:  cooperative  Speech:  slow   Mood: depressed  Affect:  blunted  Thought processes:  linear   Thought content:  Suicidal Ideation:  denies suicidal ideation  Cognition:  oriented to person, place   Concentration poor  Insight poor   Judgement poor     ASSESSMENT:   Patient symptoms are:  [] Well controlled  [] Improving  [] Worsening  [] No change      Diagnosis:   Polydrug dependence  R/O Bipolar disorder vs SIMD  Principal Problem:    Acute encephalopathy  Active Problems:    Drug abuse ([facility] [address]    Altered mental status     impaired mobility and ADLs dt encephalopathy     Depressive disorder    Vitamin D deficiency    Benzodiazepine dependence ([facility] [address]    Right foot drop    ADHD (attention deficit hyperactivity disorder)    ROSALES (generalized anxiety disorder)    Polydrug dependence including opioid type drug with continuous use with complication (HCC)    Attention deficit hyperactivity disorder (ADHD)  Resolved Problems:    * No resolved hospital problems.  *      LABS:    Recent Labs     07/03/22 0450 07/04/22 0458 07/05/22  0459   WBC 12.7* 16.3* 17.2*   HGB 12.7* 13.7* 14.1    293 358     Recent Labs     07/03/22  0450 07/04/22  0458 07/05/22  0459    142 140   K 3.2* 4.0 4.1    106 104   CO2 24 25 23   BUN 10 10 10   CREATININE 0.62* 0.58* 0.53*   GLUCOSE 104* 109* 105*     Recent Labs     07/04/22  2220 07/05/22  0459 07/05/22  1209   BILITOT 0.4 0.4 0.4   ALKPHOS 85 86 92   * 275* 315*   * 479* 497*     Lab Results   Component Value Date/Time    LABAMPH POSITIVE 06/30/2022 04:00 PM    BARBSCNU Neg 06/30/2022 04:00 PM    LABBENZ POSITIVE 06/30/2022 04:00 PM LABMETH Neg 06/30/2022 04:00 PM    OPIATESCREENURINE Neg 06/30/2022 04:00 PM    PHENCYCLIDINESCREENURINE Neg 06/30/2022 04:00 PM    ETOH <10 06/30/2022 04:00 PM     Lab Results   Component Value Date/Time    TSH 0.944 07/08/2019 08:00 PM     No results found for: LITHIUM  No results found for: VALPROATE, CBMZ      Treatment Plan:  Transfer to [facility] when medically stable            Electronically signed by Yamilet Marks MD on 7/5/2022 at 4:11 PM

## 2022-07-05 NOTE — PROGRESS NOTES
Jodi Marrero MD        Or    ondansetron [facility] injection 4 mg  4 mg IntraVENous Q6H PRN Jodi Marrero MD        potassium chloride Xavi DIAZ) extended release tablet 40 mEq  40 mEq Oral PRN Jodi Marrero MD   40 mEq at 07/03/22 0801    Or    potassium bicarb-citric acid (EFFER-K) effervescent tablet 40 mEq  40 mEq Oral PRN Jodi Marrero MD        Or    potassium chloride 10 mEq/100 mL IVPB (Peripheral Line)  10 mEq IntraVENous PRN Jodi Marrero MD           PHYSICAL EXAM:    /82   Pulse 97   Temp 97.5 °F (36.4 °C) (Oral)   Resp 18   Ht 5' 10\" (1.778 m)   Wt 157 lb 3 oz (71.3 kg)   SpO2 97%   BMI 22.55 kg/m²    General Appearance:      Skin:  normal  CVS - Normal sounds, No murmurs , No carotid Bruits  RS -CTA  Abdomen Soft, BS present  Review of Systems   Constitutional: Negative for fever. HENT: Negative for hearing loss and trouble swallowing. Eyes: Negative for visual disturbance. Respiratory: Negative for cough, choking, shortness of breath and wheezing. Cardiovascular: Negative for chest pain, palpitations and leg swelling. Gastrointestinal: Negative for nausea and vomiting. Musculoskeletal: Positive for back pain. Negative for gait problem, joint swelling, myalgias, neck pain and neck stiffness. Skin: Negative for color change. Neurological: Positive for weakness and numbness. Negative for dizziness, tremors, seizures, syncope, facial asymmetry, speech difficulty, light-headedness and headaches. Psychiatric/Behavioral: Positive for confusion. Negative for behavioral problems, hallucinations and sleep disturbance.    Focal examination noted to have some weakness in the left leg    Mental Status Exam:             Level of Alertness:   awake            Orientation:   person, place, time                      Attention/Concentration:  normal            Language:  normal      Funduscopic Exam:     Cranial Nerves                Cranial nerves III, IV, VI Extraocular Movements: intact      Cranial nerve V           Facial sensation:  intact      Cranial nerve VII           Facial strength: intact      Cranial nerve VIII           Hearing:  intact      Cranial nerve IX           Palate:  intact      Cranial nerve XI         Shoulder shrug:  intact      Cranial nerve XII          Tongue movement:  normal    Motor:    Drift:  absent  Motor exam is symmetrical 5 out of 5 all extremities with exception of the right foot dorsiflexion which is 4 out of 5. Bilaterally  Tone:  normal  Abnormal Movements:  absent            Sensory:        Pinprick             Right Upper Extremity:  normal             Left Upper Extremity:  normal             Right Lower Extremity:  normal             Left Lower Extremity:  normal           Vibration                         Touch            Proprioception                 Coordination:           Finger/Nose   Right:  normal              Left:  normal          Heel-Knee-Shin                Right:  normal              Left:  normal          Rapid Alternating Movements              Right:  normal              Left:  normal          Gait:                       Casual: Gait is deferred          Reflexes:             Deep Tendon Reflexes:             Reflexes are 3+             Plantar response:                Right:  downgoing               Left:  downgoing  As noted above and unchanged  Vascular:  Cardiac Exam:  normal         CT Head WO Contrast    Result Date: 6/30/2022  CT Brain. Contrast medium:  without contrast.. History: Altered mental status. Technical factors: CT imaging of the brain was obtained and formatted as 5 mm contiguous axial images. 2.5 mm contiguous axial images were obtained through the osseous structures. Sagittal and coronal reconstruction obtained during postprocessing. Comparison:  None. Findings: Extra-axial spaces:  Normal. Intracranial hemorrhage:  None. Ventricular system: [Negative.  Basal Cisterns:  Without anomaly. Cerebral Parenchyma: Bilateral, symmetric areas of decreased attenuation exerting no mass effect measuring approximately 9 mm in size since found within the bilateral globus pallidus (series 2, image 17). Midline Shift:  None. Cerebellum:  No anomaly identified. Paranasal sinuses and mastoid air cells:  No anomaly identified. Visualized Orbits:  Negative. Impression: Round symmetric areas decreased attenuation bilateral globus pallidus. This finding may be seen in patients experiencing hypoxia, i.e., carbon monoxide poisoning. All CT scans at this facility use dose modulation, iterative reconstruction, and/or weight based dosing when appropriate to reduce radiation dose to as low as reasonably achievable. XR CHEST PORTABLE    Result Date: 7/1/2022  EXAMINATION: XR CHEST PORTABLE CLINICAL HISTORY: FEVER COMPARISONS: JANUARY 29, 2018 FINDINGS: Osseous structures are intact. Cardiopericardial silhouette is normal. Pulmonary vasculature is normal. Right lung is clear. Patchy area of increased opacity left upper lung. LEFT UPPER LUNG ATELECTASIS/PNEUMONIA. MRI BRAIN WO CONTRAST    Result Date: 7/1/2022  MRI BRAIN WO CONTRAST : 7/1/2022 CLINICAL HISTORY:  stroke . COMPARISON: Head CT 6/30/2022. TECHNIQUE: Multiplanar MR imaging of the head was performed without contrast. FINDINGS: Areas of restricted diffusion within the globus pallidus, supratentorial white matter and splenium of the corpus callosum are most consistent with carbon monoxide poisoning/hypoxemia and/or other toxic encephalopathy. There is no intracranial hemorrhage, mass effect, midline shift, extra-axial collection, significant cerebral volume loss or other complication identified. FINDINGS CONSISTENT WITH CARBON MONOXIDE POISONING/HYPOXEMIA AND/OR OTHER TOXIC ENCEPHALOPATHY. NO INTRACRANIAL HEMORRHAGE OR COMPLICATION IDENTIFIED.        Recent Labs     07/03/22  0450 07/04/22  0458 07/05/22  0459   WBC 12.7* 16.3* 17.2*   HGB 12.7* 13.7* 14.1    293 358     Recent Labs     07/03/22  0450 07/04/22  0458 07/05/22  0459    142 140   K 3.2* 4.0 4.1    106 104   CO2 24 25 23   BUN 10 10 10   CREATININE 0.62* 0.58* 0.53*   GLUCOSE 104* 109* 105*     Recent Labs     07/04/22  0458 07/04/22  2220 07/05/22  0459   BILITOT 0.4  0.4 0.4 0.4   ALKPHOS 89  90 85 86   *  334* 262* 275*   *  606* 478* 479*     Lab Results   Component Value Date/Time    PROTIME 14.7 07/03/2022 04:50 AM    INR 1.2 07/03/2022 04:50 AM     No results found for: LITHIUM, DILFRTOT, VALPROATE    ASSESSMENT AND PLAN  Hypoxic ischemic encephalopathy with a seizure. Patient appears to have taken some medications that might be contributing with some hepatic dysfunction as well. Patient reports he was down 2 days and cardiac arrhythmia with hypoxic ischemic encephalopathy with an abnormal MRI as noted. MRI shows multiple areas of hypoxemic damage though underlying embolic strokes must be ruled out. Recommended a complete hypercoagulable work-up for now. Findings were discussed with the patient and I am not seeing anything focal the patient is likely to have some sustained memory issues. We will follow this up with an EEG. Patient is remained stable without any new complications except for pain in the right leg. Examination was obtained and he has some tenderness and will require evaluation for DVT. Patient has not any seizures. He has significantly abnormal MRI with diffuse edema and require follow-up MRI in 2 days. Patient is somewhat sleepy this morning though awakens and appears to be appropriate. Complains of some left leg weakness. He is has not any headache. A follow-up MRI with contrast to be obtained given his significant abnormal findings on his initial MRI which may suggest hypoxic ischemic encephalopathy secondary status epilepticus. Liver functions are improving.   DVT evaluation of the right leg was negative    7/5/2022:  Acute encephalopathy with provoked seizure secondary to royal kratom overuse with urine drug screen positive for fentanyl, benzodiazepines, amphetamines. MRI of the brain with and without contrast done on 7/4/2022 shows mildly increased edema within the bilateral globi pallidi, splenium of the corpus callosum and supratentorial white matter. No mass-effect or midline shift. No hemorrhage. Patient continues on dexamethasone 4 mg 3 times daily. LFTs gradually improving. GI following. Rhabdomyolysis improving  EDGAR resolved  EEG pending. Patient with complaints of right leg pain, right foot numbness and having bowel incontinence. He is hyperreflexic in the lower extremities. Will obtain screening MRI of cervical, thoracic and lumbar spine. We will continue to follow. I have personally performed a face to face diagnostic evaluation on this patient, reviewed all data and investigations, and am the sole provider of all clinical decisions on the neurological status of this patient. Exam noted. Patient has multiple symptoms of edema in the brain. Repeat MRI was obtained and has some mild increase in yesterday started on Decadron. He remains mostly nonfocal though he may have some cognitive issues. The hyperreflexia is explained by bilateral cerebral pathology. And 50% time spent on evaluating his patient myself and evaluating his MRI. Rayray Gutierrez MD, Silvino Villalobos, American Board of Psychiatry & Neurology  Board Certified in Vascular Neurology  Board Certified in Neuromuscular Medicine  Certified in . [place]

## 2022-07-05 NOTE — PROGRESS NOTES
INPATIENT PROGRESS NOTES    PATIENT NAME: Tonio Riley  MRN: [id number]  SERVICE DATE:  July 5, 2022   SERVICE TIME:  5:29 PM      PRIMARY SERVICE: Pulmonary Disease    CHIEF COMPLAIN: Drug overdose      INTERVAL HPI: Patient seen and examined at bedside, Interval Notes, orders reviewed. Nursing notes noted  He denies having any Shortness of breath. No chest pain. No fever or chills. No nausea vomiting diarrhea. He is on room air and O2 saturation 97 %. He is going to have a EEG done. Yesterday shows no significant interval change in patchy left upper lobe and left lower lobe opacities no pneumothorax or pleural effusion. He had a chest x-ray done. OBJECTIVE    Body mass index is 22.55 kg/m². PHYSICAL EXAM:  Vitals:  /82   Pulse 97   Temp 97.5 °F (36.4 °C) (Oral)   Resp 18   Ht 5' 10\" (1.778 m)   Wt 157 lb 3 oz (71.3 kg)   SpO2 97%   BMI 22.55 kg/m²   General: Alert, awake . comfortable in bed, No distress. Head: Atraumatic , Normocephalic   Eyes: PERRL. No sclera icterus. No conjunctival injection. No discharge   ENT: No nasal  discharge. Pharynx clear. Neck:  Trachea midline. No thyromegaly, no JVD, No cervical adenopathy. Chest : Bilaterally symmetrical ,Normal effort,  No accessory muscle use  Lung : . Fair BS bilateral, decreased BS at bases. No Rales. No wheezing. No rhonchi. Heart[de-identified] Normal  rate. Regular rhythm. No mumur ,  Rub or gallop  ABD: Non-tender. Non-distended. No masses. No organmegaly. Normal bowel sounds. No hernia.   Ext : No Pitting both leg , No Cyanosis No clubbing  Neuro: no focal weakness    DATA:   Recent Labs     07/04/22 0458 07/05/22  0459   WBC 16.3* 17.2*   HGB 13.7* 14.1   HCT 40.2* 41.2*   MCV 85.2 84.6    358     Recent Labs     07/04/22 0458 07/04/22  2220 07/05/22  0459 07/05/22  0459 07/05/22  1209     --  140  --   --    K 4.0  --  4.1  --   --      --  104  --   --    CO2 25  --  23  --   --    BUN 10  --  10  --   -- CREATININE 0.58*  --  0.53*  --   --    GLUCOSE 109*  --  105*  --   --    CALCIUM 8.9  --  8.9  --   --    PROT 6.0*  6.1*   < > 6.4  --  7.0   LABALBU 3.2*  3.2*   < > 3.3*  --  3.5   BILITOT 0.4  0.4   < > 0.4  --  0.4   ALKPHOS 89  90   < > 86  --  92   *  334*   < > 275*  --  315*   *  606*   < > 479*   < > 497*   LABGLOM >60.0  --  >60.0  --   --    GFRAA >60.0  --  >60.0  --   --    GLOB 2.9  --  3.1  --   --     < > = values in this interval not displayed. MV Settings:          No results for input(s): PHART, JOT7SQN, PO2ART, EFY5SFP, BEART, [id number] in the last 72 hours. O2 Device: None (Room air)    ADULT DIET; Full Liquid     MEDICATIONS during current hospitalization:    Continuous Infusions:   lactated ringers 150 mL/hr at 07/05/22 1509    sodium chloride         Scheduled Meds:   dexamethasone  4 mg IntraVENous TID    amoxicillin-clavulanate  1 tablet Oral 2 times per day    polyethylene glycol  17 g Oral Daily    lidocaine  1 patch TransDERmal Daily    sodium chloride  1,000 mL IntraVENous Once    sodium chloride flush  5-40 mL IntraVENous 2 times per day    enoxaparin  40 mg SubCUTAneous Daily       PRN Meds:sodium chloride flush, sodium chloride, ondansetron **OR** ondansetron, potassium chloride **OR** potassium alternative oral replacement **OR** potassium chloride    Radiology  CT Head WO Contrast    Result Date: 6/30/2022  CT Brain. Contrast medium:  without contrast.. History: Altered mental status. Technical factors: CT imaging of the brain was obtained and formatted as 5 mm contiguous axial images. 2.5 mm contiguous axial images were obtained through the osseous structures. Sagittal and coronal reconstruction obtained during postprocessing. Comparison:  None. Findings: Extra-axial spaces:  Normal. Intracranial hemorrhage:  None. Ventricular system: [Negative. Basal Cisterns:  Without anomaly.  Cerebral Parenchyma: Bilateral, symmetric areas of decreased likely secondary to drug overdose, no history of trauma  4. Abnormal LFT, likely muscle source  5. EDGAR, mild  6. Leukocytosis  7. Left upper lobe atelectasis/pneumonia      Patient is on room air O2 saturation is 100%. No complaint of shortness of breath. He is on po  augmentin. CXR follow-up done shows no significant change. He is going to have a EEG done. Continue present treatment plan    NOTE: This report was transcribed using voice recognition software. Every effort was made to ensure accuracy; however, inadvertent computerized transcription errors may be present.       Electronically signed by Jacqueline Dumont MD, FCCP on 7/5/2022 at 5:29 PM

## 2022-07-06 LAB
ALBUMIN SERPL-MCNC: 3.3 G/DL (ref 3.5–4.6)
ALBUMIN SERPL-MCNC: 3.8 G/DL (ref 3.5–4.6)
ALBUMIN SERPL-MCNC: 4 G/DL (ref 3.5–4.6)
ALP BLD-CCNC: 81 U/L (ref 35–104)
ALP BLD-CCNC: 88 U/L (ref 35–104)
ALP BLD-CCNC: 89 U/L (ref 35–104)
ALT SERPL-CCNC: 364 U/L (ref 0–41)
ALT SERPL-CCNC: 396 U/L (ref 0–41)
ALT SERPL-CCNC: 402 U/L (ref 0–41)
ANION GAP SERPL CALCULATED.3IONS-SCNC: 14 MEQ/L (ref 9–15)
ANTICARDIOLIPIN IGA ANTIBODY: <10 APL
ANTICARDIOLIPIN IGG ANTIBODY: <10 GPL
AST SERPL-CCNC: 178 U/L (ref 0–40)
AST SERPL-CCNC: 253 U/L (ref 0–40)
AST SERPL-CCNC: 256 U/L (ref 0–40)
BASOPHILS ABSOLUTE: 0 K/UL (ref 0–0.2)
BASOPHILS RELATIVE PERCENT: 0.1 %
BILIRUB SERPL-MCNC: 0.3 MG/DL (ref 0.2–0.7)
BILIRUB SERPL-MCNC: 0.4 MG/DL (ref 0.2–0.7)
BILIRUB SERPL-MCNC: 0.4 MG/DL (ref 0.2–0.7)
BILIRUBIN DIRECT: <0.2 MG/DL (ref 0–0.4)
BILIRUBIN DIRECT: <0.2 MG/DL (ref 0–0.4)
BILIRUBIN, INDIRECT: ABNORMAL MG/DL (ref 0–0.6)
BILIRUBIN, INDIRECT: ABNORMAL MG/DL (ref 0–0.6)
BUN BLDV-MCNC: 11 MG/DL (ref 6–20)
CALCIUM SERPL-MCNC: 9.2 MG/DL (ref 8.5–9.9)
CARDIOLIPIN AB IGM: <10 MPL
CHLORIDE BLD-SCNC: 102 MEQ/L (ref 95–107)
CO2: 22 MEQ/L (ref 20–31)
CREAT SERPL-MCNC: 0.48 MG/DL (ref 0.7–1.2)
EOSINOPHILS ABSOLUTE: 0.1 K/UL (ref 0–0.7)
EOSINOPHILS RELATIVE PERCENT: 0.7 %
GFR AFRICAN AMERICAN: >60
GFR NON-AFRICAN AMERICAN: >60
GLOBULIN: 3 G/DL (ref 2.3–3.5)
GLUCOSE BLD-MCNC: 100 MG/DL (ref 70–99)
GLUCOSE BLD-MCNC: 106 MG/DL (ref 70–99)
GLUCOSE BLD-MCNC: 145 MG/DL (ref 70–99)
GLUCOSE BLD-MCNC: 97 MG/DL (ref 70–99)
HCT VFR BLD CALC: 38.7 % (ref 42–52)
HEMOGLOBIN: 12.8 G/DL (ref 14–18)
LYMPHOCYTES ABSOLUTE: 2.1 K/UL (ref 1–4.8)
LYMPHOCYTES RELATIVE PERCENT: 11.9 %
MCH RBC QN AUTO: 28.3 PG (ref 27–31.3)
MCHC RBC AUTO-ENTMCNC: 33.2 % (ref 33–37)
MCV RBC AUTO: 85.4 FL (ref 80–100)
MONOCYTES ABSOLUTE: 1.2 K/UL (ref 0.2–0.8)
MONOCYTES RELATIVE PERCENT: 6.8 %
NEUTROPHILS ABSOLUTE: 14.5 K/UL (ref 1.4–6.5)
NEUTROPHILS RELATIVE PERCENT: 80.5 %
PDW BLD-RTO: 13.2 % (ref 11.5–14.5)
PERFORMED ON: ABNORMAL
PERFORMED ON: ABNORMAL
PERFORMED ON: NORMAL
PLATELET # BLD: 376 K/UL (ref 130–400)
POTASSIUM REFLEX MAGNESIUM: 4.4 MEQ/L (ref 3.4–4.9)
RBC # BLD: 4.53 M/UL (ref 4.7–6.1)
SODIUM BLD-SCNC: 138 MEQ/L (ref 135–144)
TOTAL CK: 8261 U/L (ref 0–190)
TOTAL CK: ABNORMAL U/L (ref 0–190)
TOTAL PROTEIN: 6.3 G/DL (ref 6.3–8)
TOTAL PROTEIN: 6.8 G/DL (ref 6.3–8)
TOTAL PROTEIN: 6.9 G/DL (ref 6.3–8)
WBC # BLD: 18 K/UL (ref 4.8–10.8)

## 2022-07-06 PROCEDURE — 6360000002 HC RX W HCPCS: Performed by: INTERNAL MEDICINE

## 2022-07-06 PROCEDURE — 2580000003 HC RX 258: Performed by: FAMILY MEDICINE

## 2022-07-06 PROCEDURE — 6370000000 HC RX 637 (ALT 250 FOR IP): Performed by: INTERNAL MEDICINE

## 2022-07-06 PROCEDURE — 99232 SBSQ HOSP IP/OBS MODERATE 35: CPT | Performed by: INTERNAL MEDICINE

## 2022-07-06 PROCEDURE — 2580000003 HC RX 258: Performed by: INTERNAL MEDICINE

## 2022-07-06 PROCEDURE — 6360000002 HC RX W HCPCS: Performed by: FAMILY MEDICINE

## 2022-07-06 PROCEDURE — 80053 COMPREHEN METABOLIC PANEL: CPT

## 2022-07-06 PROCEDURE — 6370000000 HC RX 637 (ALT 250 FOR IP): Performed by: FAMILY MEDICINE

## 2022-07-06 PROCEDURE — 97116 GAIT TRAINING THERAPY: CPT

## 2022-07-06 PROCEDURE — 97110 THERAPEUTIC EXERCISES: CPT

## 2022-07-06 PROCEDURE — 92610 EVALUATE SWALLOWING FUNCTION: CPT

## 2022-07-06 PROCEDURE — 36415 COLL VENOUS BLD VENIPUNCTURE: CPT

## 2022-07-06 PROCEDURE — 99233 SBSQ HOSP IP/OBS HIGH 50: CPT | Performed by: PSYCHIATRY & NEUROLOGY

## 2022-07-06 PROCEDURE — 82550 ASSAY OF CK (CPK): CPT

## 2022-07-06 PROCEDURE — 1210000000 HC MED SURG R&B

## 2022-07-06 PROCEDURE — 85025 COMPLETE CBC W/AUTO DIFF WBC: CPT

## 2022-07-06 PROCEDURE — 99232 SBSQ HOSP IP/OBS MODERATE 35: CPT | Performed by: PHYSICAL MEDICINE & REHABILITATION

## 2022-07-06 PROCEDURE — 97535 SELF CARE MNGMENT TRAINING: CPT

## 2022-07-06 RX ORDER — IBUPROFEN 400 MG/1
400 TABLET ORAL EVERY 6 HOURS PRN
Status: DISCONTINUED | OUTPATIENT
Start: 2022-07-06 | End: 2022-07-11 | Stop reason: HOSPADM

## 2022-07-06 RX ADMIN — DEXAMETHASONE SODIUM PHOSPHATE 4 MG: 4 INJECTION, SOLUTION INTRAMUSCULAR; INTRAVENOUS at 14:58

## 2022-07-06 RX ADMIN — AMOXICILLIN AND CLAVULANATE POTASSIUM 1 TABLET: 875; 125 TABLET, FILM COATED ORAL at 10:19

## 2022-07-06 RX ADMIN — Medication 10 ML: at 10:22

## 2022-07-06 RX ADMIN — SODIUM CHLORIDE, POTASSIUM CHLORIDE, SODIUM LACTATE AND CALCIUM CHLORIDE: 600; 310; 30; 20 INJECTION, SOLUTION INTRAVENOUS at 22:51

## 2022-07-06 RX ADMIN — DEXAMETHASONE SODIUM PHOSPHATE 4 MG: 4 INJECTION, SOLUTION INTRAMUSCULAR; INTRAVENOUS at 10:18

## 2022-07-06 RX ADMIN — Medication 10 ML: at 08:00

## 2022-07-06 RX ADMIN — IBUPROFEN 400 MG: 400 TABLET, FILM COATED ORAL at 19:10

## 2022-07-06 RX ADMIN — ENOXAPARIN SODIUM 40 MG: 100 INJECTION SUBCUTANEOUS at 10:19

## 2022-07-06 RX ADMIN — SODIUM CHLORIDE, POTASSIUM CHLORIDE, SODIUM LACTATE AND CALCIUM CHLORIDE: 600; 310; 30; 20 INJECTION, SOLUTION INTRAVENOUS at 10:10

## 2022-07-06 RX ADMIN — AMOXICILLIN AND CLAVULANATE POTASSIUM 1 TABLET: 875; 125 TABLET, FILM COATED ORAL at 22:50

## 2022-07-06 RX ADMIN — DEXAMETHASONE SODIUM PHOSPHATE 4 MG: 4 INJECTION, SOLUTION INTRAMUSCULAR; INTRAVENOUS at 22:50

## 2022-07-06 RX ADMIN — SODIUM CHLORIDE, POTASSIUM CHLORIDE, SODIUM LACTATE AND CALCIUM CHLORIDE: 600; 310; 30; 20 INJECTION, SOLUTION INTRAVENOUS at 17:03

## 2022-07-06 ASSESSMENT — PAIN DESCRIPTION - LOCATION
LOCATION: GENERALIZED;LEG
LOCATION: LEG

## 2022-07-06 ASSESSMENT — PAIN SCALES - GENERAL
PAINLEVEL_OUTOF10: 6
PAINLEVEL_OUTOF10: 6
PAINLEVEL_OUTOF10: 8

## 2022-07-06 ASSESSMENT — ENCOUNTER SYMPTOMS
BACK PAIN: 1
COLOR CHANGE: 0
NAUSEA: 0
VOMITING: 0
COUGH: 0
TROUBLE SWALLOWING: 0
CHOKING: 0
WHEEZING: 0
SHORTNESS OF BREATH: 0

## 2022-07-06 ASSESSMENT — PAIN DESCRIPTION - FREQUENCY: FREQUENCY: CONTINUOUS

## 2022-07-06 ASSESSMENT — PAIN DESCRIPTION - DESCRIPTORS: DESCRIPTORS: ACHING

## 2022-07-06 ASSESSMENT — PAIN - FUNCTIONAL ASSESSMENT: PAIN_FUNCTIONAL_ASSESSMENT: PREVENTS OR INTERFERES SOME ACTIVE ACTIVITIES AND ADLS

## 2022-07-06 ASSESSMENT — PAIN DESCRIPTION - ORIENTATION: ORIENTATION: RIGHT

## 2022-07-06 NOTE — CARE COORDINATION
LSW RETURNED A CALL TO PT'S MOTHER AS SHE HAD REACHED OUT TO A  TO DISCUSS NEEDS FOR THE PT.  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 SHE IS CONCERNED THAT HE HAS NO INCOME AND SHE WOULD LIKE TO LOOK INTO SOCIAL SECURITY DISABILITY. LSW EXPLAINED THAT PROCESS AND SAID THAT PAPERWORK CANNOT BE STARTED UNTIL THE PT IS MORE ABLE TO PARTICIPATE INN THE DECISIONS AND FOLLOW UP. PLAN IS FOR THE PT TO GO TO ACUTE REHAB HERE AT [facility] AND THEN TO PSYCH IF NEEDED. PT'S MOTHER WOULD LIKE HIM TO GO TO [facility].   [facility] TO FOLLOW FOR DC.

## 2022-07-06 NOTE — PROGRESS NOTES
Neurology Follow up    SUBJECTIVE:   Patient seen and examined for neurology follow-up for acute encephalopathy in the setting of Roy[place] kratom overuse, transaminitis, EDGAR, rhabdomyolysis resulting in provoked seizure and cerebral edema. Patient is currently alert and oriented x3, no acute distress, cooperative. He reports he still having some forgetfulness and confusion. No focal neurodeficits. Complaining of right leg pain, right hip pain, numbness to the right foot. Hyperreflexic in the lower extremities. Patient is having bowel dysfunction and bladder incontinence.   Patient appears to be doing better and much more awake and compliant though underneath there there is some minor cognitive issues    Current Facility-Administered Medications   Medication Dose Route Frequency Provider Last Rate Last Admin    dexamethasone (DECADRON) injection 4 mg  4 mg IntraVENous TID Tara Bruno MD   4 mg at 07/05/22 2038    amoxicillin-clavulanate (AUGMENTIN) 875-125 MG per tablet 1 tablet  1 tablet Oral 2 times per day Michelle Osei MD   1 tablet at 07/05/22 2038    polyethylene glycol (GLYCOLAX) packet 17 g  17 g Oral Daily Katelin Ruff MD   17 g at 07/03/22 0801    lidocaine 4 % external patch 1 patch  1 patch TransDERmal Daily Katelin Ruff MD   1 patch at 07/05/22 3637    lactated ringers infusion   IntraVENous Continuous Tara Bruno  mL/hr at 07/05/22 1509 New Bag at 07/05/22 1509    0.9 % sodium chloride bolus  1,000 mL IntraVENous Once López Gutiérrez MD        sodium chloride flush 0.9 % injection 5-40 mL  5-40 mL IntraVENous 2 times per day Katelin Ruff MD   10 mL at 07/05/22 2039    sodium chloride flush 0.9 % injection 5-40 mL  5-40 mL IntraVENous PRN Katelin Ruff MD        0.9 % sodium chloride infusion   IntraVENous PRN Katelin Ruff MD        enoxaparin (LOVENOX) injection 40 mg  40 mg SubCUTAneous Daily Katelin Ruff MD   40 mg at 07/05/22 0943    ondansetron (ZOFRAN-ODT) disintegrating tablet 4 mg  4 mg Oral Q8H PRN Lisha Hutchinson MD        Or    ondansetron [facility]) injection 4 mg  4 mg IntraVENous Q6H PRN Lisha Hutchinson MD        potassium chloride [facility] Don[facility]ILY) extended release tablet 40 mEq  40 mEq Oral PRN Lisha Hutchinson MD   40 mEq at 07/03/22 0801    Or    potassium bicarb-citric acid (EFFER-K) effervescent tablet 40 mEq  40 mEq Oral PRN Lisha Hutchinson MD        Or    potassium chloride 10 mEq/100 mL IVPB (Peripheral Line)  10 mEq IntraVENous PRN Lisha Hutchinson MD           PHYSICAL EXAM:    BP (!) 131/56   Pulse 89   Temp 98.3 °F (36.8 °C) (Axillary)   Resp 18   Ht 5' 10\" (1.778 m)   Wt 157 lb 3 oz (71.3 kg)   SpO2 100%   BMI 22.55 kg/m²    General Appearance:      Skin:  normal  CVS - Normal sounds, No murmurs , No carotid Bruits  RS -CTA  Abdomen Soft, BS present  Review of Systems   Constitutional: Negative for fever. HENT: Negative for hearing loss and trouble swallowing. Eyes: Negative for visual disturbance. Respiratory: Negative for cough, choking, shortness of breath and wheezing. Cardiovascular: Negative for chest pain, palpitations and leg swelling. Gastrointestinal: Negative for nausea and vomiting. Musculoskeletal: Positive for back pain. Negative for gait problem, joint swelling, myalgias, neck pain and neck stiffness. Skin: Negative for color change. Neurological: Positive for weakness and numbness. Negative for dizziness, tremors, seizures, syncope, facial asymmetry, speech difficulty, light-headedness and headaches. Psychiatric/Behavioral: Positive for confusion. Negative for behavioral problems, hallucinations and sleep disturbance.    Himself does not complain of any headaches  Mental Status Exam:             Level of Alertness:   awake            Orientation:   person, place, time                      Attention/Concentration:  normal            Language:  normal      Funduscopic Exam:     Cranial Nerves                Cranial nerves III, IV, VI           Extraocular Movements: intact      Cranial nerve V           Facial sensation:  intact      Cranial nerve VII           Facial strength: intact      Cranial nerve VIII           Hearing:  intact      Cranial nerve IX           Palate:  intact      Cranial nerve XI         Shoulder shrug:  intact      Cranial nerve XII          Tongue movement:  normal    Motor:    Drift:  absent  Motor exam is symmetrical 5 out of 5 all extremities with exception of the right foot dorsiflexion which is 4 out of 5. Bilaterally  Tone:  normal  Abnormal Movements:  absent            Sensory:        Pinprick             Right Upper Extremity:  normal             Left Upper Extremity:  normal             Right Lower Extremity:  normal             Left Lower Extremity:  normal           Vibration                         Touch            Proprioception                 Coordination:           Finger/Nose   Right:  normal              Left:  normal          Heel-Knee-Shin                Right:  normal              Left:  normal          Rapid Alternating Movements              Right:  normal              Left:  normal          Gait:                       Casual: Gait is deferred          Reflexes:             Deep Tendon Reflexes:             Reflexes are 3+             Plantar response:                Right:  downgoing               Left:  downgoing  Exam very much unchanged from yesterday  Vascular:  Cardiac Exam:  normal         CT Head WO Contrast    Result Date: 6/30/2022  CT Brain. Contrast medium:  without contrast.. History: Altered mental status. Technical factors: CT imaging of the brain was obtained and formatted as 5 mm contiguous axial images. 2.5 mm contiguous axial images were obtained through the osseous structures. Sagittal and coronal reconstruction obtained during postprocessing. Comparison:  None.  Findings: Extra-axial spaces:  Normal. Intracranial hemorrhage: 07/05/22  0459 07/06/22  0510   WBC 16.3* 17.2* 18.0*   HGB 13.7* 14.1 12.8*    358 376     Recent Labs     07/04/22  0458 07/05/22  0459 07/06/22  0510    140 138   K 4.0 4.1 4.4    104 102   CO2 25 23 22   BUN 10 10 11   CREATININE 0.58* 0.53* 0.48*   GLUCOSE 109* 105* 106*     Recent Labs     07/05/22  1209 07/05/22  2301 07/06/22  0510   BILITOT 0.4 0.4 0.4   ALKPHOS 92 85 81   * 267* 256*   * 426* 396*     Lab Results   Component Value Date/Time    PROTIME 14.7 07/03/2022 04:50 AM    INR 1.2 07/03/2022 04:50 AM     No results found for: LITHIUM, DILFRTOT, VALPROATE    ASSESSMENT AND PLAN  Hypoxic ischemic encephalopathy with a seizure. Patient appears to have taken some medications that might be contributing with some hepatic dysfunction as well. Patient reports he was down 2 days and cardiac arrhythmia with hypoxic ischemic encephalopathy with an abnormal MRI as noted. MRI shows multiple areas of hypoxemic damage though underlying embolic strokes must be ruled out. Recommended a complete hypercoagulable work-up for now. Findings were discussed with the patient and I am not seeing anything focal the patient is likely to have some sustained memory issues. We will follow this up with an EEG. Patient is remained stable without any new complications except for pain in the right leg. Examination was obtained and he has some tenderness and will require evaluation for DVT. Patient has not any seizures. He has significantly abnormal MRI with diffuse edema and require follow-up MRI in 2 days. Patient is somewhat sleepy this morning though awakens and appears to be appropriate. Complains of some left leg weakness. He is has not any headache. A follow-up MRI with contrast to be obtained given his significant abnormal findings on his initial MRI which may suggest hypoxic ischemic encephalopathy secondary status epilepticus. Liver functions are improving.   DVT evaluation of the right leg was negative    7/5/2022:  Acute encephalopathy with provoked seizure secondary to royal kratom overuse with urine drug screen positive for fentanyl, benzodiazepines, amphetamines. MRI of the brain with and without contrast done on 7/4/2022 shows mildly increased edema within the bilateral globi pallidi, splenium of the corpus callosum and supratentorial white matter. No mass-effect or midline shift. No hemorrhage. Patient continues on dexamethasone 4 mg 3 times daily. LFTs gradually improving. GI following. Rhabdomyolysis improving  EDGAR resolved  EEG pending. Patient with complaints of right leg pain, right foot numbness and having bowel incontinence. He is hyperreflexic in the lower extremities. Will obtain screening MRI of cervical, thoracic and lumbar spine. We will continue to follow. I have personally performed a face to face diagnostic evaluation on this patient, reviewed all data and investigations, and am the sole provider of all clinical decisions on the neurological status of this patient. Exam noted. Patient has multiple symptoms of edema in the brain. Repeat MRI was obtained and has some mild increase in yesterday started on Decadron. He remains mostly nonfocal though he may have some cognitive issues. The hyperreflexia is explained by bilateral cerebral pathology. And 50% time spent on evaluating his patient myself and evaluating his MRI.    7/6/22:   EEG completed with report pending  Patient still having bowel and bladder incontinence as well as numbness to the right foot. Screening MRI of the spine pending. Discharge planning for [id number] [facility] rehab  We will plan for repeat MRI in 2 weeks  We will taper dexamethasone after 1 week    I have personally performed a face to face diagnostic evaluation on this patient, reviewed all data and investigations, and am the sole provider of all clinical decisions on the neurological status of this patient.   She is examined and has a nonfocal examination except for some left lower extremity weakness. We did not attempt to walk him but he was able to transfer to the chair as I am told. All his investigations so far have been normal EEG preliminary did not show anything significant. It is more than likely that this is a case of hypoxic ischemic encephalopathy secondary to underlying status epilepticus. Patient is on Decadron which we will slowly taper and a follow-up MRI in 2 weeks. Patient require rehabilitation and is likely to have some degree of cognitive deficits from what we see. More than 50% time spent for evaluating and managing this patient by myself      Rayray Pathak MD, Annabella Joy, American Board of Psychiatry & Neurology  Board Certified in Vascular Neurology  Board Certified in Neuromuscular Medicine  Certified in . [address]

## 2022-07-06 NOTE — PROGRESS NOTES
Subjective: The patient complains of severe acute on chronic progressive fatigue and cognitive slowing and right lower extremity weakness and pain partially relieved by rest, PT, OT and meds  SLP and exacerbated by exertion and recent illness. He now admits that he took 2 Xanax that he bought on the street. He and I both fear that it may have been laced with fentanyl. Discussed with treatment team and hospitalist.    I am concerned about patients medical complexities including:  Principal Problem:    Acute encephalopathy  Active Problems:    Drug abuse ([address]    Altered mental status     impaired mobility and ADLs dt encephalopathy     Depressive disorder    Vitamin D deficiency    Benzodiazepine dependence (HCC)    Right foot drop    ADHD (attention deficit hyperactivity disorder)    ROSALES (generalized anxiety disorder)    Polydrug dependence including opioid type drug with continuous use with complication (HCC)    Attention deficit hyperactivity disorder (ADHD)  Resolved Problems:    * No resolved hospital problems. *      .    Reviewed recent nursing note and discussed current status and planned care with acute care providers, \" Met with patient, parents at bedside and permission granted to discuss care, explained [facility] and requirements, including 3 hours of intense therapy daily, anticipated length of stay and goal of discharge to home. All questions answered and patient verbalized understanding. Freedom of choice provided and patient and parents feel [facility] is a good idea if approved by insurance. Precert will be needed from [employer] \". ROS x10: The patient also complains of severely impaired mobility and activities of daily living.   Otherwise no new problems with vision, hearing, nose, mouth, throat, dermal, cardiovascular, GI, , pulmonary, musculoskeletal, psychiatric or neurological.        Vital signs:  /78   Pulse (!) 106   Temp 97.3 °F (36.3 °C) (Oral)   Resp 18   Ht 5' 10\" (1.778 m)   Wt 157 lb 3 oz (71.3 kg)   SpO2 100%   BMI 22.55 kg/m²   I/O:   PO/Intake:    fair PO intake, continue to monitor closely for dehydration  Reg diet thin liquids    Bowel/Bladder:   continent,    General:  Patient is well developed, adequately nourished, and    well kempt. HEENT:    PERRLA, hearing intact to loud voice, external inspection of ear and nose benign. Inspection of lips, tongue and gums benign  Musculoskeletal: No significant change in strength or tone. All joints stable. Inspection and palpation of digits and nails show no clubbing, cyanosis or inflammatory conditions. Neuro/Psychiatric: Affect: flat-  Alert and oriented to self and situation with  mod cues. No significant change in deep tendon reflexes or sensation-cognitive slowing. Right lower extremity foot drop weakness and deep peroneal numbness. Lungs:  Diminished, CTA-B  . Respiration effort is normal at rest.   Heart:   S1 = S2,   RRR. Abdomen:  Soft, non-tender    Extremities:  no lower extremity edema but no unusual tenderness. Skin:   BUE bruises dt blood draws      Rehabilitation:  Physical Therapy:   Bed mobility:  Bed mobility  Rolling to Right: Stand by assistance (07/02/22 1513)  Supine to Sit: Minimal assistance (min A for stabilizing upon sitting EOB.) (07/05/22 1438)  Sit to Supine:  (DNT pt into wheelchair post tx to promote OOB activity.) (07/05/22 1438)  Bed Mobility Comments: increased time to complete. (07/05/22 1438)  Transfers:  Transfers  Sit to Stand: Minimal Assistance;Contact guard assistance (07/05/22 1439)  Stand to sit: Contact guard assistance (07/05/22 1439)  Comment: vc's for anterior weight shifting, slower pace, and hand placement STS x8 for improved sequencing/technique. pt needing varying assistance.  (07/05/22 1439)  Gait:   Ambulation  WB Status: WBAT (07/02/22 1513)  Ambulation  Surface: level tile (07/05/22 1440)  Device: Karly Yeni (07/05/22 1440)  Other Apparatus: Wheelchair follow (07/05/22 1440)  Assistance: Minimal assistance (07/05/22 1440)  Quality of Gait: decreased heel strike BLE. downward gaze. (07/05/22 1440)  Gait Deviations: Slow Radha;Decreased step length (07/02/22 1513)  Distance: 20', 50' (07/05/22 1440)  Comments: pt fatigued at end of distance needs wheelchair pulled up behind pt. pt striking ground with balls of his feet instead of heels. (07/05/22 1440)  Stairs:     W/C mobility:       Occupational Therapy:   Hand Dominance: Right  ADL  Feeding: Setup (07/03/22 1531)  Grooming: Setup (07/03/22 1531)  LE Dressing: Maximum assistance (07/03/22 1531)  LE Dressing Skilled Clinical Factors: difficulty with managing sock over RLE, unable to cross over LLE due to swelling/weakness. Decreased balance in standing (07/03/22 1531)  Toileting: Maximum assistance (07/03/22 1531)  Additional Comments: Simulated ADLs as above.  Pt declined most bathing and dressing tasks due to completing them earlier. (07/03/22 1531)             Speech Therapy:            Diet/Swallow:                   COGNITION  OT: Cognition Comment: pt reported noticeable fatigue, delayed processing  SP:           Lab/X-ray studies reviewed, analyzed and discussed with patient and staff:   Recent Results (from the past 24 hour(s))   Hepatic Function Panel    Collection Time: 07/05/22 12:09 PM   Result Value Ref Range    Total Protein 7.0 6.3 - 8.0 g/dL    Albumin 3.5 3.5 - 4.6 g/dL    Alkaline Phosphatase 92 35 - 104 U/L     (H) 0 - 41 U/L     (H) 0 - 40 U/L    Total Bilirubin 0.4 0.2 - 0.7 mg/dL    Bilirubin, Direct <0.2 0.0 - 0.4 mg/dL    Bilirubin, Indirect see below 0.0 - 0.6 mg/dL   CK    Collection Time: 07/05/22 12:09 PM   Result Value Ref Range    Total CK 13,156 (H) 0 - 190 U/L   Hepatic Function Panel    Collection Time: 07/05/22 11:01 PM   Result Value Ref Range    Total Protein 6.8 6.3 - 8.0 g/dL    Albumin 3.8 3.5 - 4.6 g/dL    Alkaline Phosphatase 85 35 - 104 U/L     (H) 0 - 41 U/L     (H) 0 - 40 U/L    Total Bilirubin 0.4 0.2 - 0.7 mg/dL    Bilirubin, Direct <0.2 0.0 - 0.4 mg/dL    Bilirubin, Indirect see below 0.0 - 0.6 mg/dL   Comprehensive Metabolic Panel w/ Reflex to MG    Collection Time: 07/06/22  5:10 AM   Result Value Ref Range    Sodium 138 135 - 144 mEq/L    Potassium reflex Magnesium 4.4 3.4 - 4.9 mEq/L    Chloride 102 95 - 107 mEq/L    CO2 22 20 - 31 mEq/L    Anion Gap 14 9 - 15 mEq/L    Glucose 106 (H) 70 - 99 mg/dL    BUN 11 6 - 20 mg/dL    CREATININE 0.48 (L) 0.70 - 1.20 mg/dL    GFR Non-African American >60.0 >60    GFR  >60.0 >60    Calcium 9.2 8.5 - 9.9 mg/dL    Total Protein 6.3 6.3 - 8.0 g/dL    Albumin 3.3 (L) 3.5 - 4.6 g/dL    Total Bilirubin 0.4 0.2 - 0.7 mg/dL    Alkaline Phosphatase 81 35 - 104 U/L     (H) 0 - 41 U/L     (H) 0 - 40 U/L    Globulin 3.0 2.3 - 3.5 g/dL   CBC with Auto Differential    Collection Time: 07/06/22  5:10 AM   Result Value Ref Range    WBC 18.0 (H) 4.8 - 10.8 K/uL    RBC 4.53 (L) 4.70 - 6.10 M/uL    Hemoglobin 12.8 (L) 14.0 - 18.0 g/dL    Hematocrit 38.7 (L) 42.0 - 52.0 %    MCV 85.4 80.0 - 100.0 fL    MCH 28.3 27.0 - 31.3 pg    MCHC 33.2 33.0 - 37.0 %    RDW 13.2 11.5 - 14.5 %    Platelets 608 918 - 147 K/uL    Neutrophils % 80.5 %    Lymphocytes % 11.9 %    Monocytes % 6.8 %    Eosinophils % 0.7 %    Basophils % 0.1 %    Neutrophils Absolute 14.5 (H) 1.4 - 6.5 K/uL    Lymphocytes Absolute 2.1 1.0 - 4.8 K/uL    Monocytes Absolute 1.2 (H) 0.2 - 0.8 K/uL    Eosinophils Absolute 0.1 0.0 - 0.7 K/uL    Basophils Absolute 0.0 0.0 - 0.2 K/uL   POCT Glucose    Collection Time: 07/06/22  7:47 AM   Result Value Ref Range    POC Glucose 100 (H) 70 - 99 mg/dl    Performed on ACCU-CHEK      Previous extensive, complex labs, notes and diagnostics reviewed and analyzed.      ALLERGIES:    Allergies as of 06/30/2022 - Fully Reviewed 06/30/2022   Allergen Reaction Noted  Peanut-containing drug products Anaphylaxis 12/06/2018    Peanut oil Other (See Comments) 02/02/2015      (please also verify by checking [facility])     Complex Physical Medicine & Rehab Issues Assess & Plan:   1. Severe abnormality of gait and mobility and impaired self-care and ADL's secondary to hypoxic encephalopathy status post overdose likely secondary to fentanyl laced Xanax pill. Updated functional and medical status reassessed regarding patients ability to participate in therapies and patient found to be able to participate in:        acute intensive comprehensive inpatient rehabilitation program including PT/OT to improve balance, ambulation, ADLs, and to improve the P/AROM. It is my opinion that they will be able to tolerate 3 hours of therapy a day and benefit from it at an acute level. I again discussed acute rehab with the patient and verify that the patient is able and willing to participate in 3 hours of therapy a day. Rehab and Acute Care Case Management has also reinforced this expectation. Will continue to follow to attempt to get patient to the most efficient but most effective level of care will be in their best interest.  Continue to focus on energy conservation heart rate and blood pressure monitoring before during and after therapy endurance and consistency of function. After acute rehab he will transition to 1 [address]. psychiatric floor at [facility] for further treatment regarding substance overuse abuse anxiety and depression. 2. Bowel constipation   and Bladder dysfunction   overactive, neurogenic bladder:  frequent toileting, ambulate to bathroom with assistance, check post void residuals. Check for C.difficile x1 if >2 loose stools in 24 hours, continue bowel & bladder program.  Monitor for UTI symptoms including lethargy and confusion    3.  Severe right lower extremity pain secondary to nerve injury secondary to overdose and generalized OA pain: reassess pain every shift and prior to and after each therapy session, give prn Tylenol   and consider scheduled Tylenol, modalities prn in therapy, consider Lidoderm, K-pad prn.     4. Skin breakdown   risk:  continue pressure relief program.  Daily skin exams and reports from nursing. 5. Severe fatigue due to immobility and nutritional deficits: monitoring for dysphagia   Add vitamin B12 vitamin D and CoQ10 titrate dosing and add protein supplementation with low carb content. 6. Complex discharge planning:   Discussed with care team-last 24 hour events noted. I will continue to follow along and reassess functional and medical status as we strive to improve patient's functional and medical outcomes progressing to the most efficient and lowest level of care. Complex Active General Medical Issues that complicate care:     1. Principal Problem:    Acute encephalopathy  Active Problems:    Drug abuse ([address]    Altered mental status     impaired mobility and ADLs dt encephalopathy     Depressive disorder    Vitamin D deficiency    Benzodiazepine dependence (HCC)    Right foot drop    ADHD (attention deficit hyperactivity disorder)    ROSALES (generalized anxiety disorder)    Polydrug dependence including opioid type drug with continuous use with complication (HCC)    Attention deficit hyperactivity disorder (ADHD)  Resolved Problems:    * No resolved hospital problems.  *          Events and functional changes in the past 24 hours reviewed improvements in functional status are encouraging       Focus of today's plan-   transition to acute rehab pending care service approval      Amy Schmitt D.O., PM&R     Attending    [address]

## 2022-07-06 NOTE — PROGRESS NOTES
device   Stand by assistance = Pt. does not perform task at an independent level but does not need physical assistance, requires verbal cues  Minimal, Moderate, Maximal Assistance = Pt. requires physical assistance (25%, 50%, 75% assist from helper) for task but is able to actively participate in task   Dependent = Pt. requires total assistance with task and is not able to actively participate with task completion    Orientation Status:  Orientation  Overall Orientation Status: Within Functional Limits  Orientation Level: Oriented to place;Oriented to time;Oriented to person    Observation:  Observation/Palpation  Posture: Fair  Observation: Pt alert and attentive, anxious at times and mildly impulsive    Cognition Status:  Cognition  Overall Cognitive Status: Exceptions  Arousal/Alertness: Delayed responses to stimuli  Following Commands: Follows multistep commands with repitition  Attention Span: Difficulty attending to directions  Memory: Decreased recall of recent events  Safety Judgement: Decreased awareness of need for safety;Decreased awareness of need for assistance  Problem Solving: Assistance required to generate solutions;Assistance required to identify errors made  Insights: Decreased awareness of deficits  Initiation: Requires cues for some  Sequencing: Requires cues for some  Cognition Comment: pt reported noticeable fatigue, increased time for problem solving at times; at others, pt impulsive    Perception Status:  Perception  Overall Perceptual Status: WFL    Vision and Hearing Status:  Vision  Vision Exceptions: Wears glasses for reading  Hearing  Hearing: Within functional limits   Vision - Basic Assessment  Prior Vision: Wears glasses only for reading  Visual History: No significant visual history  Patient Visual Report: No visual complaint reported. Visual Field Cut: No  Oculo Motor Control:  WNL    GROSS ASSESSMENT AROM/PROM:  AROM: Generally decreased, functional       ROM:   LUE AROM (degrees)  LUE AROM : WFL  RUE AROM (degrees)  RUE AROM : WFL    UE STRENGTH:  Strength: Generally decreased, functional (3+/5 during mobility)    UE COORDINATION:  Coordination: Generally decreased, functional    UE TONE:   WFL    UE SENSATION:  Sensation: Intact (R foot per pt report)    Functional Mobility:  Patient ambulated to bedside chair with Handheld assist at [address] level. SBA to CGA to bedside commode and back to bed only. Transfers:  Transfers  Sit to stand: Contact guard assistance  Stand to sit: Contact guard assistance  Transfer Comments: Mildly impulsive  Toilet Transfers  Toilet - Technique: Stand step  Equipment Used: Standard bedside commode  Toilet Transfer: Contact guard assistance  Toilet Transfers Comments: Impulsive, turns longer distance which impacts safety with transitions    Bed Mobility  Bed mobility  Rolling to Left: Stand by assistance  Rolling to Right: Stand by assistance  Supine to Sit: Stand by assistance  Sit to Supine: Minimal assistance  Bed Mobility Comments: increased time to complete. Verbal cues for technique, increased effort for scooting hips to center of bed    Seated and Standing Balance:  Balance  Sitting: Intact  Standing: Impaired  Standing - Static: Good  Standing - Dynamic: Fair    Functional Endurance:  Activity Tolerance  Activity Tolerance: Patient limited by fatigue  Activity Tolerance Comments: Pt with decreased attention to tasks as he becomes more fatigued    Treatment consisted of:    ADL training    Assessment/Discharge Disposition:     Assessment: Pt demonstrates improvement in balance however requires cues and encouragement for mobility. Also impulsive which impacts his ability to perform mobility most safely. Pt continues to benefit from OT to maximize independence and safety with ADL tasks.     [employer]  How much help for putting on and taking off regular lower body clothing?: A Little  How much help for Bathing?: A Little  How much help for Toileting?: A Little  How much help for putting on and taking off regular upper body clothing?: A Little  How much help for taking care of personal grooming?: A Little  How much help for eating meals?: A Little  AM-PAC Inpatient Daily Activity Raw Score: 18  AM-PAC Inpatient ADL T-Scale Score : 38.66  ADL Inpatient CMS 0-100% Score: 46.65  ADL Inpatient CMS G-Code Modifier : CK    Plan:    Continue OT per POC    Patient Education:  Patient Education  Education Given To: Patient  Education Provided: Role of Therapy;Plan of Care  Education Method: Verbal  Barriers to Learning: None  Education Outcome: Verbalized understanding    Equipment recommendations:  OT Equipment Recommendations  Other: continue to assess    Goals/Plan of care addressed during this session:        Patient Goal: Patient goals : \"I still want to feel better\"    Improve [place] with ADLs and Improve [place] with Functional Transfers    Therapy Time:   Individual Group Co-Treat   Time In 1545       Time Out 1604         Minutes 19           ADL/IADL trainin minutes    Electronically signed by:     LEATHA Vazquez    2022, 4:28 PM

## 2022-07-06 NOTE — PROGRESS NOTES
[facility]   Facility/Department: [facility]  Speech Language Pathology  Clinical Bedside Swallow Evaluation    NAME:Michael Chadwick  : 1990 (33 y.o.)   [x]   confirmed    MRN: [id number]  [facility]  ADMISSION DATE: 2022  PATIENT DIAGNOSIS(ES): Drug abuse ([facility] [F19.10]  Acute encephalopathy [G93.40]  Non-traumatic rhabdomyolysis [M62.82]  Altered mental status, unspecified altered mental status type [R41.82]  Chief Complaint   Patient presents with    Drug Overdose     pt has been taking royal kratom (hasn't left his apartment in a few days). Well check. Patient Active Problem List    Diagnosis Date Noted     impaired mobility and ADLs dt encephalopathy  2022    Abdominal pain 2022    Depressive disorder 2022    Vitamin D deficiency 2022    Benzodiazepine dependence ([facility] [address] 2022    Right foot drop 2022    Altered mental status     Drug abuse ([address]     Acute encephalopathy 2022    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 Lionel Khalil.   any testing was done here by Dr. Lionel Khalil, no formal psych eval.      Anxiety     Drug abuse ([address]     MDD (major depressive disorder), recurrent episode, moderate ([address] 2019     Past Surgical History:   Procedure Laterality Date    TONSILLECTOMY AND ADENOIDECTOMY      WISDOM TOOTH EXTRACTION       Allergies   Allergen Reactions    reading  Hearing  Hearing: Within functional limits    Current Diet level  Current Liquid Diet : Full    Oral Motor  Labial: No impairment  Dentition: Intact; Natural  Oral Hygiene: Clean  Lingual: No impairment  Velum: No Impairment  Mandible: No impairment    Oral/Pharyngeal Phase       PO Trials  Neuromuscular Estim Used: No  Assessment Method(s): Observation  Patient Position: Upright in bed  Vocal Quality: No Impairment  Consistency Presented: Thin  How Presented: Self-fed/presented;Cup/gulp  How much presented: 8  Bolus Acceptance: No impairment  Bolus Formation/Control: No impairment  Propulsion: No impairment  Oral Residue: None  Initiation of Swallow: No impairment  Laryngeal Elevation:  (movement observed)  Aspiration Signs/Symptoms: None  Pharyngeal Phase Characteristics: Other (comment) (not suspected)  Additional PO Trials: 1      PO Trials 2  Consistency Presented: Regular  How Presented: Self-fed/presented  How much presented: 4  Bolus Acceptance: No impairment  Bolus Formation/Control: No impairment  Propulsion: No impairment  Oral Residue: None  Initiation of Swallow: No impairment  Aspiration Signs/Symptoms: None  Pharyngeal Phase Characteristics: Other (comment) (not suspected)    Dysphagia Diagnosis  Dysphagia Diagnosis: Swallow function appears WFL;No clinical indicators of dysphagia  Dysphagia Impression : WFL: Pt presents with functional swallow at bedside this date characterized by adequate mastication, A-P transfer, with no observed pocketing and good oral clearance on trials of regular solids, ad  thin liquids. Pt passed 4 oz water screen. No overt s/s of aspiration on trials of thin or regular. Hyolaryngeal movement present observed via palpation. Dysphagia Outcome Severity Scale: Level 7: Normal in all situations     Recommendations  Requires SLP Intervention: No  Recommendations: Other (comments) (speech language evaluation)  D/C Recommendations:  To be determined  Diet Solids Recommendation: Regular  Liquid Consistency Recommendation: Thin  Recommended Form of Meds: PO  Duration of Treatment: n/a  Frequency of Treatment: n/a    Education  Individuals consulted  Consulted and agree with results and recommendations: Patient;RN  RN Name: RN Caren Duenas [facility] in place: Yes  Type of devices:  All fall risk precautions in place  Restraints Initially in Place: No    Pain Assessment  Pain Assessment: Patient does not c/o pain    Pain Re-assessment  Pain Reassessment: Patient does not c/o pain      Therapy Time  SLP Individual Minutes  Time In: 0920  Time Out: 0933  Minutes: 13              Signature: Electronically signed by SOPHIE Man on 7/6/2022 at 11:32 AM

## 2022-07-06 NOTE — PROGRESS NOTES
Hospitalist Daily Progress Note  Name: Dannie Balderas  Age: 33 y.o. Gender: male  CodeStatus: Full Code  Allergies: Peanut-Containing Drug Products  Nuts [Macadamia Nut Oil]  Peanut Oil  Shellfish-Derived Products    Chief Complaint:Drug Overdose (pt has been taking royal kratom (hasn't left his apartment in a few days). Well check. )    Primary Care Provider: YING Pierre - CNP    InpatientTreatment Team: Treatment Team: Attending Provider: Triston Castaneda MD; Consulting Physician: Kevin Gonzales MD; Consulting Physician: Jazmín Rodriguez MD; Consulting Physician: Vero Bell MD; Consulting Physician: Ari Cruz MD; Utilization Reviewer: Holli Gottron, JEFF; Registered Nurse: Hien Cunningham, RN; : Renata Bamberger, RN; : Zach Bautista RN; Registered Nurse: Makenna Escamilla, RN; Registered Nurse: Omar Molina RN    Admission Date: 6/30/2022      Subjective: Patient is awake; alert; denies chset pain or pressure; 12 point ROS negative    Physical Exam  Vitals and nursing note reviewed. Constitutional:       Appearance: Normal appearance. Cardiovascular:      Rate and Rhythm: Normal rate and regular rhythm. Pulmonary:      Effort: Pulmonary effort is normal.      Breath sounds: Normal breath sounds. Abdominal:      General: Bowel sounds are normal.      Palpations: Abdomen is soft. Musculoskeletal:         General: Normal range of motion. Skin:     General: Skin is warm and dry. Neurological:      Mental Status: He is alert.       Comments: Slow, indirect answers       Medications:  Reviewed    Infusion Medications:    lactated ringers 150 mL/hr at 07/06/22 1010    sodium chloride       Scheduled Medications:    dexamethasone  4 mg IntraVENous TID    amoxicillin-clavulanate  1 tablet Oral 2 times per day    polyethylene glycol  17 g Oral Daily    lidocaine  1 patch TransDERmal Daily    sodium chloride  1,000 mL IntraVENous Once    sodium chloride flush  5-40 mL IntraVENous 2 times per day    enoxaparin  40 mg SubCUTAneous Daily     PRN Meds: sodium chloride flush, sodium chloride, ondansetron **OR** ondansetron, potassium chloride **OR** potassium alternative oral replacement **OR** potassium chloride    Labs:   Recent Labs     07/04/22  0458 07/05/22  0459 07/06/22  0510   WBC 16.3* 17.2* 18.0*   HGB 13.7* 14.1 12.8*   HCT 40.2* 41.2* 38.7*    358 376     Recent Labs     07/04/22  0458 07/05/22  0459 07/06/22  0510    140 138   K 4.0 4.1 4.4    104 102   CO2 25 23 22   BUN 10 10 11   CREATININE 0.58* 0.53* 0.48*   CALCIUM 8.9 8.9 9.2     Recent Labs     07/04/22  2220 07/05/22  0459 07/05/22  1209 07/05/22  2301 07/06/22  0510   *   < > 315* 267* 256*   *   < > 497* 426* 396*   BILIDIR <0.2  --  <0.2 <0.2  --    BILITOT 0.4   < > 0.4 0.4 0.4   ALKPHOS 85   < > 92 85 81    < > = values in this interval not displayed. No results for input(s): INR in the last 72 hours. Recent Labs     07/04/22  1112 07/04/22  2220 07/05/22  1209   CKTOTAL 11,231* 11,063* 13,156*     Urinalysis:   Lab Results   Component Value Date/Time    NITRU Negative 06/30/2022 04:00 PM    WBCUA 3-5 06/30/2022 04:00 PM    BACTERIA Negative 06/30/2022 04:00 PM    RBCUA 3-5 06/30/2022 04:00 PM    BLOODU LARGE 06/30/2022 04:00 PM    SPECGRAV 1.033 06/30/2022 04:00 PM    GLUCOSEU Negative 06/30/2022 04:00 PM     Radiology:   Most recent    Chest CT      WITH CONTRAST:No results found for this or any previous visit. WITHOUT CONTRAST: No results found for this or any previous visit. CXR      2-view: Results for orders placed in visit on 01/29/18    XR CHEST STANDARD (2 VW)    Narrative  EXAM: CHEST, 2 VIEWS    COMPARISON: NONE AVAILABLE    REASON FOR EXAMINATION: UPPER RESPIRATORY CONGESTION, DYSPNEA X2 WEEKS    FINDINGS:   Two views of the chest demonstrate normal appearance of the heart and mediastinum. The lungs appear clear.   The visualized bony thorax and remainder of the chest appears unremarkable. Impression  NO EVIDENCE OF ACTIVE DISEASE IN THE CHEST. Portable: Results for orders placed during the hospital encounter of 06/30/22    XR CHEST PORTABLE    Narrative  EXAMINATION: XR CHEST PORTABLE    CLINICAL HISTORY: FEVER    COMPARISONS: JANUARY 29, 2018    FINDINGS: Osseous structures are intact. Cardiopericardial silhouette is normal. Pulmonary vasculature is normal. Right lung is clear. Patchy area of increased opacity left upper lung. Impression  LEFT UPPER LUNG ATELECTASIS/PNEUMONIA. Echo No results found for this or any previous visit. Assessment/Plan:    Active Hospital Problems    Diagnosis Date Noted     impaired mobility and ADLs dt encephalopathy  [Z74.09, Z78.9] 07/05/2022     Priority: Medium    Vitamin D deficiency [E55.9] 07/05/2022     Priority: Medium    Benzodiazepine dependence ([facility] [address] [F13.20] 07/05/2022     Priority: Medium    Depressive disorder [F32.9] 07/05/2022     Priority: Medium    Right foot drop [M21.371] 07/05/2022     Priority: Medium    Altered mental status [R41.82]      Priority: Medium    Drug abuse ([facility] [address] [F19.10]      Priority: Medium    Acute encephalopathy [G93.40] 06/30/2022     Priority: Medium    Polydrug dependence including opioid type drug with continuous use with complication ([facility] [address] [Z66.68] 06/04/2019    ROSALES (generalized anxiety disorder) [F41.1] 06/04/2019    ADHD (attention deficit hyperactivity disorder) [F90.9] 12/05/2012    Attention deficit hyperactivity disorder (ADHD) [F90.9] 12/05/2012     1. Polysubstance overdose/acute ischemic encephalopathy       Repeat MRI shows edema; started on IV decadron per neurology recommendations; will likely need acute rehab  2. Rhabdomyolysis/EDGAR            EDGAR resolved; unclear about CK trend as laboratory has not been collecting his labs as ordered; ordered again as a timed ordered at 9am and still not collected; ordering stat repeat now  3.  Acute Hepatitis         Probably due to ischemic injury; improving  4.  DVT proph    Electronically signed by Joseph Pires MD on 7/6/2022 at 11:32 AM

## 2022-07-06 NOTE — PROGRESS NOTES
Physical Therapy Med Surg Daily Treatment Note  [facility]       NAME: Reno Steven  : 1990 (33 y.o.)  MRN: [id number]  CODE STATUS: Full Code    Date of Service: 2022    Patient Diagnosis(es): Drug abuse ([facility] [address] [F19.10]  Acute encephalopathy [G93.40]  Non-traumatic rhabdomyolysis [M62.82]  Altered mental status, unspecified altered mental status type [R41.82]   Chief Complaint   Patient presents with    Drug Overdose     pt has been taking royal kratom (hasn't left his apartment in a few days). Well check. 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 Lunda Homans.   any testing was done here by Dr. Lunda Homans, no formal psych eval.      Anxiety     Drug abuse ([address]     MDD (major depressive disorder), recurrent episode, moderate ([address] 2019     Past Surgical History:   Procedure Laterality Date    TONSILLECTOMY AND ADENOIDECTOMY      WISDOM TOOTH EXTRACTION         Chart Reviewed: Yes  Family / Caregiver Present: Yes    Restrictions:  Restrictions/Precautions: Fall Risk    SUBJECTIVE:   Subjective: pt agreeable to tx states he is feeling okay. Pain  Pain: denies pre/post.      OBJECTIVE:        Bed mobility  Supine to Sit: Stand by assistance  Sit to Supine:  (DNT pt into chair at end of session.)  Bed Mobility Comments: increased time to complete. good technique. Transfers  Sit to Stand: Contact guard assistance  Stand to sit: Contact guard assistance  Bed to Chair: Contact guard assistance (completes 5 squat pivots to and from chair for improved technique and activity tolerance. vc's for hand placement and safety.)  Comment: CGA d/t instability. Ambulation  Surface: level tile  Device: Rolling Walker  Assistance: Contact guard assistance  Gait Deviations: Slow Radha;Decreased step length;Decreased step height  Distance: 10' FWD/BWD. x 2  Comments: retro walking completed for improved dynamic balance. PT Exercises  Exercise Treatment: supine therex including SLR BLE, bridges, sidelying abduction BLE, hooklying lower trunk rotations against gravity all x10  Dynamic Standing Balance Exercises: standing without UE support, lateral and A/P weight shifting. Activity Tolerance  Activity Tolerance: Patient tolerated treatment well          ASSESSMENT   Assessment: pt demos improved activity tolerance, RLE weaker than LLE. distance not the focus of gait training, pt agreeable and pleasant but withdrawn. Discharge Recommendations:  Continue to assess pending progress         Goals  Short Term Goals  Time Frame for Short term goals: 4 weeks  Short term goal 1: Indep HEP for symptom management  Short term goal 2: Indep to supervision bed mobility  Short term goal 3: Indep to supervision transfers sit to stand and bed to chair  Short term goal 4: Amb with approp device 100 ft safe with supervision  Short term goal 5: Improve LE strength 1/2 grade to assist with above goals.   Patient Goals Patient goals : Improve function    PLAN    Plan: 1 time a day 3-6 times a week  Safety Devices  Type of Devices: Call light within reach,Chair alarm in place,Left in chair     [facility] (6 CLICK) [id number] Mobility Raw Score : 17      Therapy Time   Individual   Time In 1051   Time Out 1115   Minutes 24      Therex: 10  Gait: 10  BM/Trsf: Giorgio DANA, 07/06/22 at 2:33 PM         Definitions for assistance levels  Independent = pt does not require any physical supervision or assistance from another person for activity completion. Device may be needed.   Stand by assistance = pt requires verbal cues or instructions from another person, close to but not touching, to perform the activity  Minimal assistance= pt performs 75% or more of the activity; assistance is required to complete the activity  Moderate assistance= pt performs 50% of the activity; assistance is required to complete the activity  Maximal assistance = pt performs 25% of the activity; assistance is required to complete the activity  Dependent = pt requires total physical assistance to accomplish the task

## 2022-07-06 NOTE — PROGRESS NOTES
INPATIENT PROGRESS NOTES    PATIENT NAME: Shadi Bernard  MRN: [id number]  SERVICE DATE:  July 6, 2022   SERVICE TIME:  6:32 PM      PRIMARY SERVICE: Pulmonary Disease    CHIEF COMPLAIN: Drug overdose      INTERVAL HPI: Patient seen and examined at bedside, Interval Notes, orders reviewed. Nursing notes noted  He denies having any Shortness of breath. No chest pain. No fever or chills. No nausea vomiting diarrhea. He is on room air and O2 saturation 100 %. OBJECTIVE    Body mass index is 22.55 kg/m². PHYSICAL EXAM:  Vitals:  BP (!) 131/56   Pulse 89   Temp 98.3 °F (36.8 °C) (Axillary)   Resp 18   Ht 5' 10\" (1.778 m)   Wt 157 lb 3 oz (71.3 kg)   SpO2 100%   BMI 22.55 kg/m²   General: Alert, awake . comfortable in bed, No distress. Head: Atraumatic , Normocephalic   Eyes: PERRL. No sclera icterus. No conjunctival injection. No discharge   ENT: No nasal  discharge. Pharynx clear. Neck:  Trachea midline. No thyromegaly, no JVD, No cervical adenopathy. Chest : Bilaterally symmetrical ,Normal effort,  No accessory muscle use  Lung : . Fair BS bilateral, decreased BS at bases. No Rales. No wheezing. No rhonchi. Heart[de-identified] Normal  rate. Regular rhythm. No mumur ,  Rub or gallop  ABD: Non-tender. Non-distended. No masses. No organmegaly. Normal bowel sounds. No hernia.   Ext : No Pitting both leg , No Cyanosis No clubbing  Neuro: no focal weakness    DATA:   Recent Labs     07/05/22  0459 07/06/22  0510   WBC 17.2* 18.0*   HGB 14.1 12.8*   HCT 41.2* 38.7*   MCV 84.6 85.4    376     Recent Labs     07/05/22  0459 07/05/22  1209 07/06/22  0510 07/06/22  0510 07/06/22  1130     --  138  --   --    K 4.1  --  4.4  --   --      --  102  --   --    CO2 23  --  22  --   --    BUN 10  --  11  --   --    CREATININE 0.53*  --  0.48*  --   --    GLUCOSE 105*  --  106*  --   --    CALCIUM 8.9  --  9.2  --   --    PROT 6.4   < > 6.3  --  6.9   LABALBU 3.3*   < > 3.3*  --  3.8   BILITOT 0.4   < > 0.4 --  0.4   ALKPHOS 86   < > 81  --  88   *   < > 256*  --  253*   *   < > 396*   < > 402*   LABGLOM >60.0  --  >60.0  --   --    GFRAA >60.0  --  >60.0  --   --    GLOB 3.1  --  3.0  --   --     < > = values in this interval not displayed. MV Settings:          No results for input(s): PHART, OKQ3QDI, PO2ART, NJR3WLK, BEART, X9UHLKBT in the last 72 hours. O2 Device: None (Room air)    ADULT DIET; Regular     MEDICATIONS during current hospitalization:    Continuous Infusions:   lactated ringers 150 mL/hr at 07/06/22 1703    sodium chloride         Scheduled Meds:   dexamethasone  4 mg IntraVENous TID    amoxicillin-clavulanate  1 tablet Oral 2 times per day    polyethylene glycol  17 g Oral Daily    lidocaine  1 patch TransDERmal Daily    sodium chloride  1,000 mL IntraVENous Once    sodium chloride flush  5-40 mL IntraVENous 2 times per day    enoxaparin  40 mg SubCUTAneous Daily       PRN Meds:ibuprofen, sodium chloride flush, sodium chloride, ondansetron **OR** ondansetron, potassium chloride **OR** potassium alternative oral replacement **OR** potassium chloride    Radiology  CT Head WO Contrast    Result Date: 6/30/2022  CT Brain. Contrast medium:  without contrast.. History: Altered mental status. Technical factors: CT imaging of the brain was obtained and formatted as 5 mm contiguous axial images. 2.5 mm contiguous axial images were obtained through the osseous structures. Sagittal and coronal reconstruction obtained during postprocessing. Comparison:  None. Findings: Extra-axial spaces:  Normal. Intracranial hemorrhage:  None. Ventricular system: [Negative. Basal Cisterns:  Without anomaly. Cerebral Parenchyma: Bilateral, symmetric areas of decreased attenuation exerting no mass effect measuring approximately 9 mm in size since found within the bilateral globus pallidus (series 2, image 17). Midline Shift:  None. Cerebellum:  No anomaly identified.  Paranasal sinuses and mastoid air cells:  No anomaly identified. Visualized Orbits:  Negative. Impression: Round symmetric areas decreased attenuation bilateral globus pallidus. This finding may be seen in patients experiencing hypoxia, i.e., carbon monoxide poisoning. All CT scans at this facility use dose modulation, iterative reconstruction, and/or weight based dosing when appropriate to reduce radiation dose to as low as reasonably achievable. XR CHEST PORTABLE    Result Date: 7/1/2022  EXAMINATION: XR CHEST PORTABLE CLINICAL HISTORY: FEVER COMPARISONS: JANUARY 29, 2018 FINDINGS: Osseous structures are intact. Cardiopericardial silhouette is normal. Pulmonary vasculature is normal. Right lung is clear. Patchy area of increased opacity left upper lung. LEFT UPPER LUNG ATELECTASIS/PNEUMONIA. MRI BRAIN WO CONTRAST    Result Date: 7/1/2022  MRI BRAIN WO CONTRAST : 7/1/2022 CLINICAL HISTORY:  stroke . COMPARISON: Head CT 6/30/2022. TECHNIQUE: Multiplanar MR imaging of the head was performed without contrast. FINDINGS: Areas of restricted diffusion within the globus pallidus, supratentorial white matter and splenium of the corpus callosum are most consistent with carbon monoxide poisoning/hypoxemia and/or other toxic encephalopathy. There is no intracranial hemorrhage, mass effect, midline shift, extra-axial collection, significant cerebral volume loss or other complication identified. FINDINGS CONSISTENT WITH CARBON MONOXIDE POISONING/HYPOXEMIA AND/OR OTHER TOXIC ENCEPHALOPATHY. NO INTRACRANIAL HEMORRHAGE OR COMPLICATION IDENTIFIED. IMPRESSION AND SUGGESTION:  1. Drug overdose, urine screen positive for amphetamine, benzos, and fentanyl  2. Acute encephalopathy, improved   3. Rhabdomyolysis, likely secondary to drug overdose, no history of trauma  4. Abnormal LFT, likely muscle source  5. EDGAR, mild  6. Leukocytosis  7. Left upper lobe atelectasis/pneumonia      Patient is on room air O2 saturation is 100%.   No complaint of shortness of breath. He is on po  augmentin. CXR follow-up done shows no significant change. No new change pulmonary wise. Pulmonary to follow as needed. Please call if needed  NOTE: This report was transcribed using voice recognition software. Every effort was made to ensure accuracy; however, inadvertent computerized transcription errors may be present.       Electronically signed by Guille Gr MD, FCCP on 7/6/2022 at 6:32 PM

## 2022-07-06 NOTE — CARE COORDINATION
Per Dr. Jamee Tapia we are waiting on labs to be drawn for pt. No discharge at this time. Rehab precert good till 7/7 per [facility] with [facility].

## 2022-07-07 ENCOUNTER — APPOINTMENT (OUTPATIENT)
Dept: MRI IMAGING | Age: 32
DRG: 812 | End: 2022-07-07
Payer: COMMERCIAL

## 2022-07-07 LAB
ALBUMIN SERPL-MCNC: 3.6 G/DL (ref 3.5–4.6)
ALBUMIN SERPL-MCNC: 3.9 G/DL (ref 3.5–4.6)
ALBUMIN SERPL-MCNC: 4 G/DL (ref 3.5–4.6)
ALP BLD-CCNC: 83 U/L (ref 35–104)
ALP BLD-CCNC: 86 U/L (ref 35–104)
ALP BLD-CCNC: 88 U/L (ref 35–104)
ALT SERPL-CCNC: 286 U/L (ref 0–41)
ALT SERPL-CCNC: 312 U/L (ref 0–41)
ALT SERPL-CCNC: 340 U/L (ref 0–41)
ANION GAP SERPL CALCULATED.3IONS-SCNC: 15 MEQ/L (ref 9–15)
AST SERPL-CCNC: 111 U/L (ref 0–40)
AST SERPL-CCNC: 142 U/L (ref 0–40)
AST SERPL-CCNC: 160 U/L (ref 0–40)
BASOPHILS ABSOLUTE: 0 K/UL (ref 0–0.2)
BASOPHILS RELATIVE PERCENT: 0.3 %
BILIRUB SERPL-MCNC: 0.3 MG/DL (ref 0.2–0.7)
BILIRUB SERPL-MCNC: 0.3 MG/DL (ref 0.2–0.7)
BILIRUB SERPL-MCNC: 0.4 MG/DL (ref 0.2–0.7)
BILIRUBIN DIRECT: <0.2 MG/DL (ref 0–0.4)
BILIRUBIN DIRECT: <0.2 MG/DL (ref 0–0.4)
BILIRUBIN, INDIRECT: ABNORMAL MG/DL (ref 0–0.6)
BILIRUBIN, INDIRECT: ABNORMAL MG/DL (ref 0–0.6)
BUN BLDV-MCNC: 13 MG/DL (ref 6–20)
CALCIUM SERPL-MCNC: 9.4 MG/DL (ref 8.5–9.9)
CHLORIDE BLD-SCNC: 99 MEQ/L (ref 95–107)
CO2: 25 MEQ/L (ref 20–31)
CREAT SERPL-MCNC: 0.56 MG/DL (ref 0.7–1.2)
EOSINOPHILS ABSOLUTE: 0.2 K/UL (ref 0–0.7)
EOSINOPHILS RELATIVE PERCENT: 1 %
GFR AFRICAN AMERICAN: >60
GFR NON-AFRICAN AMERICAN: >60
GLOBULIN: 3 G/DL (ref 2.3–3.5)
GLUCOSE BLD-MCNC: 100 MG/DL (ref 70–99)
GLUCOSE BLD-MCNC: 102 MG/DL (ref 70–99)
GLUCOSE BLD-MCNC: 103 MG/DL (ref 70–99)
GLUCOSE BLD-MCNC: 81 MG/DL (ref 70–99)
GLUCOSE BLD-MCNC: 98 MG/DL (ref 70–99)
HCT VFR BLD CALC: 37.9 % (ref 42–52)
HEMOGLOBIN: 12.8 G/DL (ref 14–18)
LYMPHOCYTES ABSOLUTE: 1.6 K/UL (ref 1–4.8)
LYMPHOCYTES RELATIVE PERCENT: 8 %
MCH RBC QN AUTO: 28.8 PG (ref 27–31.3)
MCHC RBC AUTO-ENTMCNC: 33.9 % (ref 33–37)
MCV RBC AUTO: 84.9 FL (ref 80–100)
MONOCYTES ABSOLUTE: 0.8 K/UL (ref 0.2–0.8)
MONOCYTES RELATIVE PERCENT: 3.8 %
MYELOCYTE PERCENT: 1 %
MYELOPEROXIDASE AB: 0 AU/ML (ref 0–19)
NEUTROPHILS ABSOLUTE: 17.2 K/UL (ref 1.4–6.5)
NEUTROPHILS RELATIVE PERCENT: 87 %
PDW BLD-RTO: 13.1 % (ref 11.5–14.5)
PERFORMED ON: ABNORMAL
PERFORMED ON: ABNORMAL
PERFORMED ON: NORMAL
PERFORMED ON: NORMAL
PLATELET # BLD: 475 K/UL (ref 130–400)
PLATELET SLIDE REVIEW: ABNORMAL
POTASSIUM REFLEX MAGNESIUM: 4.7 MEQ/L (ref 3.4–4.9)
PROTEIN C ANTIGEN: 67 % (ref 63–153)
PROTEIN S ANTIGEN, TOTAL: 128 % (ref 84–134)
RBC # BLD: 4.46 M/UL (ref 4.7–6.1)
RBC # BLD: NORMAL 10*6/UL
SERINE PROTEASE 3 AB: 6 AU/ML (ref 0–19)
SODIUM BLD-SCNC: 139 MEQ/L (ref 135–144)
TOTAL CK: 4180 U/L (ref 0–190)
TOTAL CK: 5273 U/L (ref 0–190)
TOTAL CK: 5895 U/L (ref 0–190)
TOTAL CK: 7859 U/L (ref 0–190)
TOTAL PROTEIN: 6.5 G/DL (ref 6.3–8)
TOTAL PROTEIN: 6.8 G/DL (ref 6.3–8)
TOTAL PROTEIN: 6.9 G/DL (ref 6.3–8)
WBC # BLD: 19.5 K/UL (ref 4.8–10.8)

## 2022-07-07 PROCEDURE — 95816 EEG AWAKE AND DROWSY: CPT | Performed by: PSYCHIATRY & NEUROLOGY

## 2022-07-07 PROCEDURE — 1210000000 HC MED SURG R&B

## 2022-07-07 PROCEDURE — 99232 SBSQ HOSP IP/OBS MODERATE 35: CPT | Performed by: PHYSICAL MEDICINE & REHABILITATION

## 2022-07-07 PROCEDURE — 85025 COMPLETE CBC W/AUTO DIFF WBC: CPT

## 2022-07-07 PROCEDURE — 99233 SBSQ HOSP IP/OBS HIGH 50: CPT | Performed by: PSYCHIATRY & NEUROLOGY

## 2022-07-07 PROCEDURE — 6370000000 HC RX 637 (ALT 250 FOR IP): Performed by: INTERNAL MEDICINE

## 2022-07-07 PROCEDURE — 6370000000 HC RX 637 (ALT 250 FOR IP): Performed by: NURSE PRACTITIONER

## 2022-07-07 PROCEDURE — 80053 COMPREHEN METABOLIC PANEL: CPT

## 2022-07-07 PROCEDURE — 6370000000 HC RX 637 (ALT 250 FOR IP): Performed by: FAMILY MEDICINE

## 2022-07-07 PROCEDURE — 36415 COLL VENOUS BLD VENIPUNCTURE: CPT

## 2022-07-07 PROCEDURE — 6360000002 HC RX W HCPCS: Performed by: FAMILY MEDICINE

## 2022-07-07 PROCEDURE — APPSS15 APP SPLIT SHARED TIME 0-15 MINUTES: Performed by: NURSE PRACTITIONER

## 2022-07-07 PROCEDURE — 2580000003 HC RX 258: Performed by: FAMILY MEDICINE

## 2022-07-07 PROCEDURE — 2580000003 HC RX 258: Performed by: INTERNAL MEDICINE

## 2022-07-07 PROCEDURE — 6360000002 HC RX W HCPCS: Performed by: INTERNAL MEDICINE

## 2022-07-07 PROCEDURE — 82550 ASSAY OF CK (CPK): CPT

## 2022-07-07 PROCEDURE — 72141 MRI NECK SPINE W/O DYE: CPT

## 2022-07-07 RX ORDER — AMOXICILLIN AND CLAVULANATE POTASSIUM 875; 125 MG/1; MG/1
1 TABLET, FILM COATED ORAL EVERY 12 HOURS SCHEDULED
Status: CANCELLED | OUTPATIENT
Start: 2022-07-07 | End: 2022-07-10

## 2022-07-07 RX ORDER — IBUPROFEN 400 MG/1
400 TABLET ORAL EVERY 6 HOURS PRN
Status: CANCELLED | OUTPATIENT
Start: 2022-07-07

## 2022-07-07 RX ORDER — DEXAMETHASONE SODIUM PHOSPHATE 4 MG/ML
4 INJECTION, SOLUTION INTRA-ARTICULAR; INTRALESIONAL; INTRAMUSCULAR; INTRAVENOUS; SOFT TISSUE 3 TIMES DAILY
Status: CANCELLED | OUTPATIENT
Start: 2022-07-07

## 2022-07-07 RX ORDER — SODIUM CHLORIDE 0.9 % (FLUSH) 0.9 %
5-40 SYRINGE (ML) INJECTION EVERY 12 HOURS SCHEDULED
Status: CANCELLED | OUTPATIENT
Start: 2022-07-07

## 2022-07-07 RX ORDER — ONDANSETRON 2 MG/ML
4 INJECTION INTRAMUSCULAR; INTRAVENOUS EVERY 6 HOURS PRN
Status: CANCELLED | OUTPATIENT
Start: 2022-07-07

## 2022-07-07 RX ORDER — POTASSIUM CHLORIDE 20 MEQ/1
40 TABLET, EXTENDED RELEASE ORAL PRN
Status: CANCELLED | OUTPATIENT
Start: 2022-07-07

## 2022-07-07 RX ORDER — ONDANSETRON 4 MG/1
4 TABLET, ORALLY DISINTEGRATING ORAL EVERY 8 HOURS PRN
Status: CANCELLED | OUTPATIENT
Start: 2022-07-07

## 2022-07-07 RX ORDER — SODIUM CHLORIDE 0.9 % (FLUSH) 0.9 %
5-40 SYRINGE (ML) INJECTION PRN
Status: CANCELLED | OUTPATIENT
Start: 2022-07-07

## 2022-07-07 RX ORDER — SODIUM CHLORIDE, SODIUM LACTATE, POTASSIUM CHLORIDE, CALCIUM CHLORIDE 600; 310; 30; 20 MG/100ML; MG/100ML; MG/100ML; MG/100ML
INJECTION, SOLUTION INTRAVENOUS CONTINUOUS
Status: CANCELLED | OUTPATIENT
Start: 2022-07-07

## 2022-07-07 RX ORDER — LANOLIN ALCOHOL/MO/W.PET/CERES
3 CREAM (GRAM) TOPICAL NIGHTLY PRN
Status: DISCONTINUED | OUTPATIENT
Start: 2022-07-07 | End: 2022-07-11 | Stop reason: HOSPADM

## 2022-07-07 RX ORDER — ENOXAPARIN SODIUM 100 MG/ML
40 INJECTION SUBCUTANEOUS DAILY
Status: CANCELLED | OUTPATIENT
Start: 2022-07-07

## 2022-07-07 RX ORDER — LIDOCAINE 4 G/G
1 PATCH TOPICAL DAILY
Status: CANCELLED | OUTPATIENT
Start: 2022-07-07

## 2022-07-07 RX ORDER — SODIUM CHLORIDE 9 MG/ML
INJECTION, SOLUTION INTRAVENOUS PRN
Status: CANCELLED | OUTPATIENT
Start: 2022-07-07

## 2022-07-07 RX ORDER — DIPHENHYDRAMINE HCL 25 MG
50 TABLET ORAL ONCE
Status: COMPLETED | OUTPATIENT
Start: 2022-07-07 | End: 2022-07-07

## 2022-07-07 RX ORDER — LORAZEPAM 1 MG/1
1 TABLET ORAL
Status: COMPLETED | OUTPATIENT
Start: 2022-07-07 | End: 2022-07-07

## 2022-07-07 RX ORDER — POLYETHYLENE GLYCOL 3350 17 G/17G
17 POWDER, FOR SOLUTION ORAL DAILY
Status: CANCELLED | OUTPATIENT
Start: 2022-07-07

## 2022-07-07 RX ORDER — POTASSIUM CHLORIDE 7.45 MG/ML
10 INJECTION INTRAVENOUS PRN
Status: CANCELLED | OUTPATIENT
Start: 2022-07-07

## 2022-07-07 RX ADMIN — SODIUM CHLORIDE, POTASSIUM CHLORIDE, SODIUM LACTATE AND CALCIUM CHLORIDE: 600; 310; 30; 20 INJECTION, SOLUTION INTRAVENOUS at 16:15

## 2022-07-07 RX ADMIN — LORAZEPAM 1 MG: 1 TABLET ORAL at 14:16

## 2022-07-07 RX ADMIN — Medication 10 ML: at 20:58

## 2022-07-07 RX ADMIN — ENOXAPARIN SODIUM 40 MG: 100 INJECTION SUBCUTANEOUS at 09:40

## 2022-07-07 RX ADMIN — DIPHENHYDRAMINE HCL 50 MG: 25 TABLET ORAL at 23:19

## 2022-07-07 RX ADMIN — AMOXICILLIN AND CLAVULANATE POTASSIUM 1 TABLET: 875; 125 TABLET, FILM COATED ORAL at 20:58

## 2022-07-07 RX ADMIN — Medication 3 MG: at 23:20

## 2022-07-07 RX ADMIN — DEXAMETHASONE SODIUM PHOSPHATE 4 MG: 4 INJECTION, SOLUTION INTRAMUSCULAR; INTRAVENOUS at 09:40

## 2022-07-07 RX ADMIN — AMOXICILLIN AND CLAVULANATE POTASSIUM 1 TABLET: 875; 125 TABLET, FILM COATED ORAL at 09:40

## 2022-07-07 RX ADMIN — Medication 10 ML: at 09:42

## 2022-07-07 RX ADMIN — DEXAMETHASONE SODIUM PHOSPHATE 4 MG: 4 INJECTION, SOLUTION INTRAMUSCULAR; INTRAVENOUS at 20:58

## 2022-07-07 RX ADMIN — DEXAMETHASONE SODIUM PHOSPHATE 4 MG: 4 INJECTION, SOLUTION INTRAMUSCULAR; INTRAVENOUS at 14:16

## 2022-07-07 ASSESSMENT — ENCOUNTER SYMPTOMS
WHEEZING: 0
COLOR CHANGE: 0
BACK PAIN: 1
COUGH: 0
VOMITING: 0
SHORTNESS OF BREATH: 0
NAUSEA: 0
TROUBLE SWALLOWING: 0
CHOKING: 0

## 2022-07-07 NOTE — PLAN OF CARE
Problem: Discharge Planning  Goal: Discharge to home or other facility with appropriate resources  Outcome: Progressing  Flowsheets (Taken 7/7/2022 1347)  Discharge to home or other facility with appropriate resources:   Identify barriers to discharge with patient and caregiver   Identify discharge learning needs (meds, wound care, etc)   Arrange for needed discharge resources and transportation as appropriate   Arrange for interpreters to assist at discharge as needed   Refer to discharge planning if patient needs post-hospital services based on physician order or complex needs related to functional status, cognitive ability or social support system     Problem: Skin/Tissue Integrity  Goal: Absence of new skin breakdown  Outcome: Progressing     Problem: Pain  Goal: Verbalizes/displays adequate comfort level or baseline comfort level  Outcome: Progressing     Problem: Safety - Adult  Goal: Free from fall injury  Outcome: Progressing  Flowsheets (Taken 7/7/2022 1351)  Free From Fall Injury:   Instruct family/caregiver on patient safety   Based on caregiver fall risk screen, instruct family/caregiver to ask for assistance with transferring infant if caregiver noted to have fall risk factors     Problem: ABCDS Injury Assessment  Goal: Absence of physical injury  Outcome: Progressing  Flowsheets (Taken 7/7/2022 1351)  Absence of Physical Injury: Implement safety measures based on patient assessment

## 2022-07-07 NOTE — DISCHARGE SUMMARY
status. Technical factors: CT imaging of the brain was obtained and formatted as 5 mm contiguous axial images. 2.5 mm contiguous axial images were obtained through the osseous structures. Sagittal and coronal reconstruction obtained during postprocessing. Comparison:  None. Findings: Extra-axial spaces:  Normal. Intracranial hemorrhage:  None. Ventricular system: [Negative. Basal Cisterns:  Without anomaly. Cerebral Parenchyma: Bilateral, symmetric areas of decreased attenuation exerting no mass effect measuring approximately 9 mm in size since found within the bilateral globus pallidus (series 2, image 17). Midline Shift:  None. Cerebellum:  No anomaly identified. Paranasal sinuses and mastoid air cells:  No anomaly identified. Visualized Orbits:  Negative. Impression: Round symmetric areas decreased attenuation bilateral globus pallidus. This finding may be seen in patients experiencing hypoxia, i.e., carbon monoxide poisoning. All CT scans at this facility use dose modulation, iterative reconstruction, and/or weight based dosing when appropriate to reduce radiation dose to as low as reasonably achievable. XR CHEST PORTABLE    Result Date: 7/1/2022  EXAMINATION: XR CHEST PORTABLE CLINICAL HISTORY: FEVER COMPARISONS: JANUARY 29, 2018 FINDINGS: Osseous structures are intact. Cardiopericardial silhouette is normal. Pulmonary vasculature is normal. Right lung is clear. Patchy area of increased opacity left upper lung. LEFT UPPER LUNG ATELECTASIS/PNEUMONIA. MRI BRAIN WO CONTRAST    Result Date: 7/1/2022  MRI BRAIN WO CONTRAST : 7/1/2022 CLINICAL HISTORY:  stroke . COMPARISON: Head CT 6/30/2022. TECHNIQUE: Multiplanar MR imaging of the head was performed without contrast. FINDINGS: Areas of restricted diffusion within the globus pallidus, supratentorial white matter and splenium of the corpus callosum are most consistent with carbon monoxide poisoning/hypoxemia and/or other toxic encephalopathy.  There is no intracranial hemorrhage, mass effect, midline shift, extra-axial collection, significant cerebral volume loss or other complication identified. FINDINGS CONSISTENT WITH CARBON MONOXIDE POISONING/HYPOXEMIA AND/OR OTHER TOXIC ENCEPHALOPATHY. NO INTRACRANIAL HEMORRHAGE OR COMPLICATION IDENTIFIED. Discharge Medications:         Medication List      ASK your doctor about these medications    amphetamine-dextroamphetamine 15 MG tablet  Commonly known as: ADDERALL     clonazePAM 0.5 MG tablet  Commonly known as: KlonoPIN  Take 1 tablet by mouth 2 times daily as needed for Anxiety for up to 3 days. Lexapro 10 MG tablet  Generic drug: escitalopram     Vyvanse 70 MG capsule  Generic drug: lisdexamfetamine            Disposition:   If discharged to Home, Any [place] [id number] needs that were indicated and/or required as been addressed and set up by Social Work. Condition at discharge: good     Activity: activity as tolerated    Total time taken for discharging this patient: 40 minutes. Greater than 70% of time was spent focused exclusively on this patient. Time was taken to review chart, discuss plans with consultants, reconciling medications, discussing plan answering questions with patient.      Signed:  Darren Ledesma MD  7/7/2022, 1:23 PM  ----------------------------------------------------------------------------------------------------------------------    Tonio Riley

## 2022-07-07 NOTE — PROGRESS NOTES
Subjective: The patient complains of severe acute on chronic progressive fatigue and cognitive slowing and right lower extremity weakness and pain partially relieved by rest, PT, OT and meds  SLP and exacerbated by exertion and recent illness. He now admits that he took 2 Xanax that he bought on the street. He and I both fear that it may have been laced with fentanyl. Discussed with treatment team and hospitalist and neuro--should be ready for acute rehab today pending MRIs spine    I am concerned about patients medical complexities including:  Principal Problem:    Acute encephalopathy  Active Problems:    Drug abuse ([address]    Altered mental status     impaired mobility and ADLs dt encephalopathy     Depressive disorder    Vitamin D deficiency    Benzodiazepine dependence (HCC)    Right foot drop    ADHD (attention deficit hyperactivity disorder)    ROSALES (generalized anxiety disorder)    Polydrug dependence including opioid type drug with continuous use with complication (HCC)    Attention deficit hyperactivity disorder (ADHD)  Resolved Problems:    * No resolved hospital problems. *      .    Reviewed recent nursing note and discussed current status and planned care with acute care providers, \" Met with patient, parents at bedside and permission granted to discuss care, explained [facility] Acute Inpatient Rehab program and requirements, including 3 hours of intense therapy daily, anticipated length of stay and goal of discharge to home. All questions answered and patient verbalized understanding. Freedom of choice provided and patient and parents feel [facility] is a good idea if approved by insurance. Precert will be needed from [employer] \". ROS x10: The patient also complains of severely impaired mobility and activities of daily living.   Otherwise no new problems with vision, hearing, nose, mouth, throat, dermal, cardiovascular, GI, , pulmonary, musculoskeletal, psychiatric or neurological. Vital signs:  /88   Pulse (!) 103   Temp 97.6 °F (36.4 °C) (Oral)   Resp 18   Ht 5' 10\" (1.778 m)   Wt 157 lb 3 oz (71.3 kg)   SpO2 100%   BMI 22.55 kg/m²   I/O:   PO/Intake:    fair PO intake, continue to monitor closely for dehydration  Reg diet thin liquids    Bowel/Bladder:   continent,    General:  Patient is well developed, adequately nourished, and    well kempt. HEENT:    PERRLA, hearing intact to loud voice, external inspection of ear and nose benign. Inspection of lips, tongue and gums benign  Musculoskeletal: No significant change in strength or tone. All joints stable. Inspection and palpation of digits and nails show no clubbing, cyanosis or inflammatory conditions. Neuro/Psychiatric: Affect: flat-  Alert and oriented to self and situation with  mod cues. No significant change in deep tendon reflexes or sensation-cognitive slowing. Right lower extremity foot drop weakness and deep peroneal numbness. Lungs:  Diminished, CTA-B  . Respiration effort is normal at rest.   Heart:   S1 = S2,   RRR. Abdomen:  Soft, non-tender    Extremities:  no lower extremity edema but no unusual tenderness. Skin:   BUE bruises dt blood draws      Rehabilitation:  Physical Therapy:   Bed mobility:  Bed mobility  Rolling to Right: Stand by assistance (07/02/22 1513)  Supine to Sit: Stand by assistance (07/06/22 1428)  Sit to Supine:  (DNT pt into chair at end of session.) (07/06/22 1428)  Bed Mobility Comments: increased time to complete. good technique. (07/06/22 1428)  Transfers:  Transfers  Sit to Stand: Contact guard assistance (07/06/22 1428)  Stand to sit: Contact guard assistance (07/06/22 1428)  Bed to Chair: Contact guard assistance (completes 5 squat pivots to and from chair for improved technique and activity tolerance. vc's for hand placement and safety.) (07/06/22 1428)  Comment: CGA d/t instability.  (07/06/22 1428)  Gait:   Ambulation  WB Status: WBAT (07/02/22 1513)  Ambulation  Surface: level tile (07/06/22 1430)  Device: Rolling Walker (07/06/22 1430)  Other Apparatus: Wheelchair follow (07/05/22 1440)  Assistance: Contact guard assistance (07/06/22 1430)  Quality of Gait: decreased heel strike BLE. downward gaze. (07/05/22 1440)  Gait Deviations: Slow Radha;Decreased step length;Decreased step height (07/06/22 1430)  Distance: 10' FWD/BWD. x 2 (07/06/22 1430)  Comments: retro walking completed for improved dynamic balance. (07/06/22 1430)  Stairs:     W/C mobility:       Occupational Therapy:   Hand Dominance: Right  ADL  Feeding: Setup (07/06/22 1616)  Feeding Skilled Clinical Factors: Pt reports continued difficulty with containers at times (07/06/22 1616)  Grooming: Setup (07/06/22 1616)  Grooming Skilled Clinical Factors: Washing face and hands only (07/06/22 1616)  UE Bathing: Setup (07/06/22 1616)  LE Bathing: Minimal assistance (07/06/22 1616)  LE Bathing Skilled Clinical Factors: Balance assist for lizett area (07/06/22 1616)  UE Dressing: Setup (07/06/22 1616)  LE Dressing: None (07/06/22 1616)  LE Dressing Skilled Clinical Factors: Balance assist with dynamic movements attempting to simulate, did not don pants d/t [place] present (07/06/22 1616)  Toileting: Minimal assistance (Jessica Walton assist for hygiene following attempted toileting) (07/06/22 1616)  Toileting Skilled Clinical Factors: Balance for hygiene post toileting attempt (07/06/22 1616)  Additional Comments: Pt performed sponge bath at EOB.  Pt. had stated he wanted to toilet but was unable to have BM (07/06/22 1616)  Toilet Transfers  Toilet - Technique: Stand step (07/06/22 1622)  Equipment Used: Standard bedside commode (07/06/22 1622)  Toilet Transfer: Contact guard assistance (07/06/22 1622)  Toilet Transfers Comments: Impulsive, turns longer distance which impacts safety with transitions (07/06/22 1622)          Speech Therapy:            Diet/Swallow:        Dysphagia Outcome Severity Scale: Level 7: Normal in all situations          COGNITION  OT: Cognition Comment: pt reported noticeable fatigue, increased time for problem solving at times; at others, pt impulsive  SP:           Lab/X-ray studies reviewed, analyzed and discussed with patient and staff:   Recent Results (from the past 24 hour(s))   Hepatic Function Panel    Collection Time: 07/06/22 11:30 AM   Result Value Ref Range    Total Protein 6.9 6.3 - 8.0 g/dL    Albumin 3.8 3.5 - 4.6 g/dL    Alkaline Phosphatase 88 35 - 104 U/L     (H) 0 - 41 U/L     (H) 0 - 40 U/L    Total Bilirubin 0.4 0.2 - 0.7 mg/dL    Bilirubin, Direct <0.2 0.0 - 0.4 mg/dL    Bilirubin, Indirect see below 0.0 - 0.6 mg/dL   CK    Collection Time: 07/06/22 11:30 AM   Result Value Ref Range    Total CK 11,081 (H) 0 - 190 U/L   POCT Glucose    Collection Time: 07/06/22 11:44 AM   Result Value Ref Range    POC Glucose 97 70 - 99 mg/dl    Performed on ACCU-CHEK    POCT Glucose    Collection Time: 07/06/22  4:42 PM   Result Value Ref Range    POC Glucose 145 (H) 70 - 99 mg/dl    Performed on ACCU-CHEK    CK    Collection Time: 07/06/22  8:41 PM   Result Value Ref Range    Total CK 8,261 (H) 0 - 190 U/L   Hepatic Function Panel    Collection Time: 07/06/22  8:41 PM   Result Value Ref Range    Total Protein 6.8 6.3 - 8.0 g/dL    Albumin 4.0 3.5 - 4.6 g/dL    Alkaline Phosphatase 89 35 - 104 U/L     (H) 0 - 41 U/L     (H) 0 - 40 U/L    Total Bilirubin 0.3 0.2 - 0.7 mg/dL    Bilirubin, Direct <0.2 0.0 - 0.4 mg/dL    Bilirubin, Indirect see below 0.0 - 0.6 mg/dL   CK    Collection Time: 07/06/22 11:36 PM   Result Value Ref Range    Total CK 7,859 (H) 0 - 190 U/L   Comprehensive Metabolic Panel w/ Reflex to MG    Collection Time: 07/07/22  4:18 AM   Result Value Ref Range    Sodium 139 135 - 144 mEq/L    Potassium reflex Magnesium 4.7 3.4 - 4.9 mEq/L    Chloride 99 95 - 107 mEq/L    CO2 25 20 - 31 mEq/L    Anion Gap 15 9 - 15 mEq/L    Glucose 103 (H) 70 - the patient and verify that the patient is able and willing to participate in 3 hours of therapy a day. Rehab and Acute Care Case Management has also reinforced this expectation. Will continue to follow to attempt to get patient to the most efficient but most effective level of care will be in their best interest.  Continue to focus on energy conservation heart rate and blood pressure monitoring before during and after therapy endurance and consistency of function. After acute rehab he will transition to 1 W. psychiatric floor at [facility] for further treatment regarding substance overuse abuse anxiety and depression. 2. Bowel constipation and Bladder dysfunction   overactive, neurogenic bladder:  frequent toileting, ambulate to bathroom with assistance, check post void residuals. Check for C.difficile x1 if >2 loose stools in 24 hours, continue bowel & bladder program.  Monitor for UTI symptoms including lethargy and confusion    3. Severe right lower extremity pain secondary to sciatic vs deep peroneal nerve injury secondary to overdose and generalized OA pain: reassess pain every shift and prior to and after each therapy session, give prn Tylenol   and consider scheduled Tylenol, modalities prn in therapy, consider Lidoderm, K-pad prn.     4. Skin breakdown   risk:  continue pressure relief program.  Daily skin exams and reports from nursing. 5. Severe fatigue due to immobility and nutritional deficits: monitoring for dysphagia   Add vitamin B12 vitamin D and CoQ10 titrate dosing and add protein supplementation with low carb content. 6. Complex discharge planning:   Discussed with care team-last 24 hour events noted. I will continue to follow along and reassess functional and medical status as we strive to improve patient's functional and medical outcomes progressing to the most efficient and lowest level of care.        Complex Active General Medical Issues that complicate care: 1. Principal Problem:    Acute encephalopathy  Active Problems:    Drug abuse ([facility] [address]    Altered mental status     impaired mobility and ADLs dt encephalopathy     Depressive disorder    Vitamin D deficiency    Benzodiazepine dependence (HCC)    Right foot drop    ADHD (attention deficit hyperactivity disorder)    ROSALES (generalized anxiety disorder)    Polydrug dependence including opioid type drug with continuous use with complication (HCC)    Attention deficit hyperactivity disorder (ADHD)  Resolved Problems:    * No resolved hospital problems.  *          Events and functional changes in the past 24 hours reviewed improvements in functional status are encouraging       Focus of today's plan-   transition to acute rehab pending caresourse approval      Betty Ratliff D.O., PM&R     Attending    [address]

## 2022-07-07 NOTE — PROCEDURES
[address]                      [address], [address] [facility]                          ELECTROENCEPHALOGRAM REPORT    PATIENT NAME: Emil Antunez                 :        1990  MED REC NO:   [id number]                            ROOM:       E496  ACCOUNT NO:   [de-identified]                           ADMIT DATE: 2022  PROVIDER:     Aryan Hobson MD    DATE OF EE2022    MEDICATIONS:  Lovenox, Decadron, Augmentin. EEG FINDINGS:  This is a spontaneous 21-channel recording for this  patient with a history of questionable seizure with encephalopathy. Significant amount of sleep patterns are seen throughout the EEG  recording and in an awake phase, there is an alpha rhythm of 8 Hz. This  is more notable during hyperventilation. Photic stimulation is  performed without any photic responses. There is no photo response to  seizures. The record otherwise remains symmetrical.    Hyperventilation is performed with good effort without any change in  frequencies. IMPRESSION:  This is a normal very well-regulated EEG recording with  sleep patterns. Clinical correlation is recommended.         Julia Garcia MD    D: 2022 11:41:00       T: 2022 11:44:24     [id number]  Job#: [id number]     Doc#: [id number]    CC:

## 2022-07-07 NOTE — PROGRESS NOTES
Neurology Follow up    SUBJECTIVE:   Patient seen and examined for neurology follow-up for acute encephalopathy in the setting of Royle kratom overuse, transaminitis, EDGAR, rhabdomyolysis resulting in provoked seizure and cerebral edema. Patient is currently alert and oriented x3, no acute distress, cooperative. He reports he still having some forgetfulness and confusion. No focal neurodeficits. Complaining of right leg pain, right hip pain, numbness to the right foot. Today actually reports that the numbness is of the entire right leg but states that has been going on since admission. None prior to admission. Spoke with physical therapy who reports that yesterday, when working with him, he was noted to be significantly more weak on the right lower extremity. Hyperreflexic in the lower extremities. Patient is having bowel dysfunction and bladder incontinence. Remained stable though he is quite encephalopathic.   Focal though in examined he denies any headache    Current Facility-Administered Medications   Medication Dose Route Frequency Provider Last Rate Last Admin    ibuprofen (ADVIL;MOTRIN) tablet 400 mg  400 mg Oral Q6H PRN Kimberlee Reed MD   400 mg at 07/06/22 1910    dexamethasone (DECADRON) injection 4 mg  4 mg IntraVENous TID Kimberlee Reed MD   4 mg at 07/07/22 0940    amoxicillin-clavulanate (AUGMENTIN) 875-125 MG per tablet 1 tablet  1 tablet Oral 2 times per day Joann Barnes MD   1 tablet at 07/07/22 0940    polyethylene glycol (GLYCOLAX) packet 17 g  17 g Oral Daily Ruthie Barrientos MD   17 g at 07/03/22 0801    lidocaine 4 % external patch 1 patch  1 patch TransDERmal Daily Ruthie Barrientos MD   1 patch at 07/07/22 0940    lactated ringers infusion   IntraVENous Continuous Kimberlee Reed  mL/hr at 07/06/22 2251 New Bag at 07/06/22 2251    0.9 % sodium chloride bolus  1,000 mL IntraVENous Once Sheila Lazcano MD        sodium chloride flush 0.9 % injection 5-40 mL  5-40 mL IntraVENous 2 times per day Tj Jaime MD   10 mL at 07/07/22 0942    sodium chloride flush 0.9 % injection 5-40 mL  5-40 mL IntraVENous PRN Tj Jaime MD        0.9 % sodium chloride infusion   IntraVENous PRN Tj Jaime MD        enoxaparin (LOVENOX) injection 40 mg  40 mg SubCUTAneous Daily Tj Jaime MD   40 mg at 07/07/22 0940    ondansetron (ZOFRAN-ODT) disintegrating tablet 4 mg  4 mg Oral Q8H PRN Tj Jaime MD        Or    ondansetron [facility]) injection 4 mg  4 mg IntraVENous Q6H PRN Tj Jaime MD        potassium chloride Raquel DIAZ) extended release tablet 40 mEq  40 mEq Oral PRN Tj Jaime MD   40 mEq at 07/03/22 0801    Or    potassium bicarb-citric acid (EFFER-K) effervescent tablet 40 mEq  40 mEq Oral PRN Tj Jaime MD        Or    potassium chloride 10 mEq/100 mL IVPB (Peripheral Line)  10 mEq IntraVENous PRN Tj Jaime MD           PHYSICAL EXAM:    /88   Pulse (!) 103   Temp 97.6 °F (36.4 °C) (Oral)   Resp 18   Ht 5' 10\" (1.778 m)   Wt 157 lb 3 oz (71.3 kg)   SpO2 100%   BMI 22.55 kg/m²    General Appearance:      Skin:  normal  CVS - Normal sounds, No murmurs , No carotid Bruits  RS -CTA  Abdomen Soft, BS present  Review of Systems   Constitutional: Negative for fever. HENT: Negative for hearing loss and trouble swallowing. Eyes: Negative for visual disturbance. Respiratory: Negative for cough, choking, shortness of breath and wheezing. Cardiovascular: Negative for chest pain, palpitations and leg swelling. Gastrointestinal: Negative for nausea and vomiting. Musculoskeletal: Positive for back pain. Negative for gait problem, joint swelling, myalgias, neck pain and neck stiffness. Skin: Negative for color change. Neurological: Positive for weakness and numbness. Negative for dizziness, tremors, seizures, syncope, facial asymmetry, speech difficulty, light-headedness and headaches.    Psychiatric/Behavioral: Positive for confusion. Negative for behavioral problems, hallucinations and sleep disturbance. Mental Status Exam:             Level of Alertness:   awake            Orientation:   person, place, time                      Attention/Concentration:  normal            Language:  normal      Funduscopic Exam:     Cranial Nerves                Cranial nerves III, IV, VI           Extraocular Movements: intact      Cranial nerve V           Facial sensation:  intact      Cranial nerve VII           Facial strength: intact      Cranial nerve VIII           Hearing:  intact      Cranial nerve IX           Palate:  intact      Cranial nerve XI         Shoulder shrug:  intact      Cranial nerve XII          Tongue movement:  normal    Motor:    Drift:  absent  Motor exam is symmetrical 5 out of 5 all extremities with exception of the right foot dorsiflexion which is 4 out of 5. Tone:  normal  Abnormal Movements:  absent            Sensory:        Pinprick             Right Upper Extremity:  normal             Left Upper Extremity:  normal             Right Lower Extremity:  normal             Left Lower Extremity:  normal           Vibration                         Touch            Proprioception                 Coordination:           Finger/Nose   Right:  normal              Left:  normal          Heel-Knee-Shin                Right:  normal              Left:  normal          Rapid Alternating Movements              Right:  normal              Left:  normal          Gait:                       Casual: Gait is deferred          Reflexes:             Deep Tendon Reflexes:             Reflexes are 3+             Plantar response:                Right:  downgoing               Left:  downgoing  Exam very much unchanged from yesterday. Normal nonfocal examination noted today. Vascular:  Cardiac Exam:  normal         CT Head WO Contrast    Result Date: 6/30/2022  CT Brain. Contrast medium:  without contrast..  History: Altered mental status. Technical factors: CT imaging of the brain was obtained and formatted as 5 mm contiguous axial images. 2.5 mm contiguous axial images were obtained through the osseous structures. Sagittal and coronal reconstruction obtained during postprocessing. Comparison:  None. Findings: Extra-axial spaces:  Normal. Intracranial hemorrhage:  None. Ventricular system: [Negative. Basal Cisterns:  Without anomaly. Cerebral Parenchyma: Bilateral, symmetric areas of decreased attenuation exerting no mass effect measuring approximately 9 mm in size since found within the bilateral globus pallidus (series 2, image 17). Midline Shift:  None. Cerebellum:  No anomaly identified. Paranasal sinuses and mastoid air cells:  No anomaly identified. Visualized Orbits:  Negative. Impression: Round symmetric areas decreased attenuation bilateral globus pallidus. This finding may be seen in patients experiencing hypoxia, i.e., carbon monoxide poisoning. All CT scans at this facility use dose modulation, iterative reconstruction, and/or weight based dosing when appropriate to reduce radiation dose to as low as reasonably achievable. XR CHEST PORTABLE    Result Date: 7/1/2022  EXAMINATION: XR CHEST PORTABLE CLINICAL HISTORY: FEVER COMPARISONS: JANUARY 29, 2018 FINDINGS: Osseous structures are intact. Cardiopericardial silhouette is normal. Pulmonary vasculature is normal. Right lung is clear. Patchy area of increased opacity left upper lung. LEFT UPPER LUNG ATELECTASIS/PNEUMONIA. MRI BRAIN WO CONTRAST    Result Date: 7/1/2022  MRI BRAIN WO CONTRAST : 7/1/2022 CLINICAL HISTORY:  stroke . COMPARISON: Head CT 6/30/2022.  TECHNIQUE: Multiplanar MR imaging of the head was performed without contrast. FINDINGS: Areas of restricted diffusion within the globus pallidus, supratentorial white matter and splenium of the corpus callosum are most consistent with carbon monoxide poisoning/hypoxemia and/or other toxic encephalopathy. There is no intracranial hemorrhage, mass effect, midline shift, extra-axial collection, significant cerebral volume loss or other complication identified. FINDINGS CONSISTENT WITH CARBON MONOXIDE POISONING/HYPOXEMIA AND/OR OTHER TOXIC ENCEPHALOPATHY. NO INTRACRANIAL HEMORRHAGE OR COMPLICATION IDENTIFIED. Recent Labs     07/05/22 0459 07/06/22 0510 07/07/22 0418   WBC 17.2* 18.0* 19.5*   HGB 14.1 12.8* 12.8*    376 475*     Recent Labs     07/05/22 0459 07/06/22  0510 07/07/22 0418    138 139   K 4.1 4.4 4.7    102 99   CO2 23 22 25   BUN 10 11 13   CREATININE 0.53* 0.48* 0.56*   GLUCOSE 105* 106* 103*     Recent Labs     07/06/22 2041 07/07/22 0418 07/07/22  0901   BILITOT 0.3 0.3 0.4   ALKPHOS 89 86 83   * 160* 142*   * 340* 312*     Lab Results   Component Value Date/Time    PROTIME 14.7 07/03/2022 04:50 AM    INR 1.2 07/03/2022 04:50 AM     No results found for: LITHIUM, DILFRTOT, VALPROATE    ASSESSMENT AND PLAN  Hypoxic ischemic encephalopathy with a seizure. Patient appears to have taken some medications that might be contributing with some hepatic dysfunction as well. Patient reports he was down 2 days and cardiac arrhythmia with hypoxic ischemic encephalopathy with an abnormal MRI as noted. MRI shows multiple areas of hypoxemic damage though underlying embolic strokes must be ruled out. Recommended a complete hypercoagulable work-up for now. Findings were discussed with the patient and I am not seeing anything focal the patient is likely to have some sustained memory issues. We will follow this up with an EEG. Patient is remained stable without any new complications except for pain in the right leg. Examination was obtained and he has some tenderness and will require evaluation for DVT. Patient has not any seizures. He has significantly abnormal MRI with diffuse edema and require follow-up MRI in 2 days.     Patient is somewhat sleepy this morning though awakens and appears to be appropriate. Complains of some left leg weakness. He is has not any headache. A follow-up MRI with contrast to be obtained given his significant abnormal findings on his initial MRI which may suggest hypoxic ischemic encephalopathy secondary status epilepticus. Liver functions are improving. DVT evaluation of the right leg was negative    7/5/2022:  Acute encephalopathy with provoked seizure secondary to royal kratom overuse with urine drug screen positive for fentanyl, benzodiazepines, amphetamines. MRI of the brain with and without contrast done on 7/4/2022 shows mildly increased edema within the bilateral globi pallidi, splenium of the corpus callosum and supratentorial white matter. No mass-effect or midline shift. No hemorrhage. Patient continues on dexamethasone 4 mg 3 times daily. LFTs gradually improving. GI following. Rhabdomyolysis improving  EDGAR resolved  EEG pending. Patient with complaints of right leg pain, right foot numbness and having bowel incontinence. He is hyperreflexic in the lower extremities. Will obtain screening MRI of cervical, thoracic and lumbar spine. We will continue to follow. I have personally performed a face to face diagnostic evaluation on this patient, reviewed all data and investigations, and am the sole provider of all clinical decisions on the neurological status of this patient. Exam noted. Patient has multiple symptoms of edema in the brain. Repeat MRI was obtained and has some mild increase in yesterday started on Decadron. He remains mostly nonfocal though he may have some cognitive issues. The hyperreflexia is explained by bilateral cerebral pathology. And 50% time spent on evaluating his patient myself and evaluating his MRI.    7/6/22:   EEG completed with report pending  Patient still having bowel and bladder incontinence as well as numbness to the right foot.   Screening MRI of the spine pending. Discharge planning for [facility] rehab  We will plan for repeat MRI in 2 weeks  We will taper dexamethasone after 1 week    I have personally performed a face to face diagnostic evaluation on this patient, reviewed all data and investigations, and am the sole provider of all clinical decisions on the neurological status of this patient. She is examined and has a nonfocal examination except for some left lower extremity weakness. We did not attempt to walk him but he was able to transfer to the chair as I am told. All his investigations so far have been normal EEG preliminary did not show anything significant. It is more than likely that this is a case of hypoxic ischemic encephalopathy secondary to underlying status epilepticus. Patient is on Decadron which we will slowly taper and a follow-up MRI in 2 weeks. Patient require rehabilitation and is likely to have some degree of cognitive deficits from what we see. 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 We will have to see how much of this he recovers. May be some permanent deficits. This was discussed with patient and family. Continue Decadron for cerebral edema with plan to taper starting around 7/11/2022. Repeat MRI brain in 2 weeks per above. Patient with ongoing right lower extremity weakness, numbness and bowel and bladder dysfunction. Awaiting screening MRI of the spine pending. Rehab consult pending. Okay to DC to rehab from neurology standpoint. I have personally performed a face to face diagnostic evaluation on this patient, reviewed all data and investigations, and am the sole provider of all clinical decisions on the neurological status of this patient.   Patient remains encephalopathic in the face of a normal EEG 1 would suspect that this might be also part of his ADD as he is not on medications. We will transfer him to rehabilitation with a taper of Decadron and reinstate at least Adderall in the next few days given these findings. Findings discussed with Dr. Carlitos Dukes and Dr. Megan Manzano for transfer to rehabilitation with a follow-up MRI in a few days. No seizures are noted. More than 70% time spent on evaluating this patient and his neurological management      Rayray Melendez MD, Billie Mccauley, American Board of Psychiatry & Neurology  Board Certified in Vascular Neurology  Board Certified in Neuromuscular Medicine  Certified in . [place]

## 2022-07-07 NOTE — PROGRESS NOTES
Physical Therapy Missed Treatment   Facility/Department: [facility] MED SURG P146/[id number]    NAME: Dannie Balderas    : 1990 (33 y.o.)  MRN: [id number]    Account: [de-identified]  Gender: male    [x] Patient Unavailable: off unit for MRI       Will attempt PT treatment again at earliest convenience.         Electronically signed by Alissa Barajas PTA on 22 at 2:37 PM EDT

## 2022-07-07 NOTE — CARE COORDINATION
Call placed to [facility] and spoke with Joan jauregui, who verified patient's current medical Auth#[id number] for dates 6/30/2022-7/7/2022. Per Kailey Feng, patient is NOT able to transfer to ARU today under current auth. Clinicals must be sent and authorization for additional days provided. Clinicals uploaded via [employer] Provider portal. MRI C/T/L remains pending.  Electronically signed by Giselle Walker RN on 7/7/22 at 2:56 PM EDT [facility]NS[facility]

## 2022-07-07 NOTE — CARE COORDINATION
Waiting on pre-cert for pt to transfer to [facility]. Per Love in rehab she called for new pre-cert today.

## 2022-07-07 NOTE — DISCHARGE INSTR - COC
Expiration Resolved Resolved By    None active    Resolved    COVID-19 21 POCT COVID-19, Antigen   01/10/22             Nurse Assessment:  Last Vital Signs: /88   Pulse (!) 103   Temp 97.6 °F (36.4 °C) (Oral)   Resp 18   Ht 5' 10\" (1.778 m)   Wt 157 lb 3 oz (71.3 kg)   SpO2 100%   BMI 22.55 kg/m²     Last documented pain score (0-10 scale): Pain Level: 6  Last Weight:   Wt Readings from Last 1 Encounters:   22 157 lb 3 oz (71.3 kg)     Mental Status:  oriented, alert, and delayed responses    IV Access:  - Peripheral IV - site  right forearm, insertion date: ***    Nursing Mobility/ADLs:  Walking   Assisted  Transfer  Assisted  Bathing  Assisted  Dressing  Assisted  Toileting  Assisted  Feeding  Independent  Med Admin  Assisted  Med Delivery   whole    Wound Care Documentation and Therapy:  Wound 22 Ankle Anterior; Left (Active)   Wound Cleansed Not Cleansed 22 2100   Dressing/Treatment Open to air 22 2100   Number of days: 6       Wound 22 Pretibial Distal;Left Reddened, blistered (Active)   Wound Cleansed Not Cleansed 22 2100   Dressing/Treatment Open to air 22 2100   Number of days: 6       Wound 22 Pedal Anterior;Right Reddened, non-blanchable (Active)   Dressing/Treatment Open to air 22 1000   Number of days: 6        Elimination:  Continence: Bowel: Yes  Bladder: Yes  Urinary Catheter: None   Colostomy/Ileostomy/Ileal Conduit: No       Date of Last BM: 22      Intake/Output Summary (Last 24 hours) at 2022 1322  Last data filed at 2022 0950  Gross per 24 hour   Intake --   Output 750 ml   Net -750 ml     I/O last 3 completed shifts:  In: -   Out: 2100 [Urine:2100]    Safety Concerns: At Risk for Falls    Impairments/Disabilities:      None    Nutrition Therapy:  Current Nutrition Therapy:   - Oral Diet:  General    Routes of Feeding: Oral  Liquids:  Thin Liquids  Daily Fluid Restriction: {CHP DME Yes amt example:[id number]}  Last Modified Barium Swallow with Video (Video Swallowing Test): {Done Not Done [facility]:[id number]}    Treatments at the Time of Hospital Discharge:   Respiratory Treatments: ***  Oxygen Therapy:  is not on home oxygen therapy. Ventilator:    - No ventilator support    Rehab Therapies: Physical Therapy and Occupational Therapy  Weight Bearing Status/Restrictions: No weight bearing restrictions  Other Medical Equipment (for information only, NOT a DME order):  walker  Other Treatments: ***    Patient's personal belongings (please select all that are sent with patient):  None    RN SIGNATURE:  Electronically signed by La Levine RN on 7/11/22 at 4:40 PM EDT    CASE MANAGEMENT/SOCIAL WORK SECTION    Inpatient Status Date: ***    Readmission Risk Assessment Score:  Readmission Risk              Risk of Unplanned Readmission:  10           Discharging to Facility/ Agency   Name:   Address:  Phone:  Fax:    Dialysis Facility (if applicable)   Name:  Address:  Dialysis Schedule:  Phone:  Fax:    / signature: {Esignature:[id number]}    PHYSICIAN SECTION    Prognosis: Good    Condition at Discharge: Stable    Rehab Potential (if transferring to Rehab): Good    Recommended Labs or Other Treatments After Discharge: Continue IVF until CK < 1k; continue steroids per neuro recommendations; psych consult    Physician Certification: I certify the above information and transfer of Sydni Mcclellan  is necessary for the continuing treatment of the diagnosis listed and that he requires Acute Rehab for less 30 days.      Update Admission H&P: No change in H&P    PHYSICIAN SIGNATURE:  Electronically signed by Addis Saleh MD on 7/7/22 at 1:23 PM EDT

## 2022-07-08 LAB
ALBUMIN SERPL-MCNC: 3.8 G/DL (ref 3.5–4.6)
ALBUMIN SERPL-MCNC: 4 G/DL (ref 3.5–4.6)
ALP BLD-CCNC: 82 U/L (ref 35–104)
ALP BLD-CCNC: 86 U/L (ref 35–104)
ALT SERPL-CCNC: 254 U/L (ref 0–41)
ALT SERPL-CCNC: 271 U/L (ref 0–41)
ANION GAP SERPL CALCULATED.3IONS-SCNC: 16 MEQ/L (ref 9–15)
AST SERPL-CCNC: 90 U/L (ref 0–40)
AST SERPL-CCNC: 99 U/L (ref 0–40)
BANDED NEUTROPHILS RELATIVE PERCENT: 1 %
BASOPHILS ABSOLUTE: 0 K/UL (ref 0–0.2)
BASOPHILS RELATIVE PERCENT: 0.1 %
BILIRUB SERPL-MCNC: 0.4 MG/DL (ref 0.2–0.7)
BILIRUB SERPL-MCNC: 0.5 MG/DL (ref 0.2–0.7)
BILIRUBIN DIRECT: <0.2 MG/DL (ref 0–0.4)
BILIRUBIN, INDIRECT: ABNORMAL MG/DL (ref 0–0.6)
BUN BLDV-MCNC: 15 MG/DL (ref 6–20)
CALCIUM SERPL-MCNC: 9.3 MG/DL (ref 8.5–9.9)
CHLORIDE BLD-SCNC: 100 MEQ/L (ref 95–107)
CO2: 23 MEQ/L (ref 20–31)
CREAT SERPL-MCNC: 0.5 MG/DL (ref 0.7–1.2)
DRVVT CONFIRMATION TEST: ABNORMAL RATIO
DRVVT SCREEN: 44 SEC (ref 33–44)
DRVVT,DIL: ABNORMAL SEC (ref 33–44)
EOSINOPHILS ABSOLUTE: 0 K/UL (ref 0–0.7)
EOSINOPHILS RELATIVE PERCENT: 0.3 %
GFR AFRICAN AMERICAN: >60
GFR NON-AFRICAN AMERICAN: >60
GLOBULIN: 2.8 G/DL (ref 2.3–3.5)
GLUCOSE BLD-MCNC: 102 MG/DL (ref 70–99)
GLUCOSE BLD-MCNC: 103 MG/DL (ref 70–99)
GLUCOSE BLD-MCNC: 138 MG/DL (ref 70–99)
GLUCOSE BLD-MCNC: 89 MG/DL (ref 70–99)
HCT VFR BLD CALC: 39.3 % (ref 42–52)
HEMOGLOBIN: 13 G/DL (ref 14–18)
HEXAGONAL PHOSPHOLIPID NEUTRALIZAT TEST: ABNORMAL
LUPUS ANTICOAG INTERP: ABNORMAL
LYMPHOCYTES ABSOLUTE: 1.1 K/UL (ref 1–4.8)
LYMPHOCYTES RELATIVE PERCENT: 5 %
MCH RBC QN AUTO: 28.4 PG (ref 27–31.3)
MCHC RBC AUTO-ENTMCNC: 33 % (ref 33–37)
MCV RBC AUTO: 85.9 FL (ref 80–100)
METAMYELOCYTES RELATIVE PERCENT: 2 %
MONOCYTES ABSOLUTE: 1.3 K/UL (ref 0.2–0.8)
MONOCYTES RELATIVE PERCENT: 5.7 %
MYELOCYTE PERCENT: 1 %
NEUTROPHILS ABSOLUTE: 19.9 K/UL (ref 1.4–6.5)
NEUTROPHILS RELATIVE PERCENT: 86 %
PDW BLD-RTO: 12.8 % (ref 11.5–14.5)
PERFORMED ON: ABNORMAL
PERFORMED ON: ABNORMAL
PERFORMED ON: NORMAL
PLATELET # BLD: 538 K/UL (ref 130–400)
PLATELET SLIDE REVIEW: ABNORMAL
PLT NEUTA: ABNORMAL
POTASSIUM REFLEX MAGNESIUM: 4.1 MEQ/L (ref 3.4–4.9)
PT D: 15.8 SEC (ref 12–15.5)
PTT D: 62 SEC (ref 32–48)
PTT-D CORR REFLEX: 46 SEC (ref 32–48)
PTT-HEPARIN NEUTRALIZED: 52 SEC (ref 32–48)
RBC # BLD: 4.58 M/UL (ref 4.7–6.1)
RBC # BLD: NORMAL 10*6/UL
REPTILASE TIME: 19.7 SEC
SODIUM BLD-SCNC: 139 MEQ/L (ref 135–144)
THROMBIN TIME: 19.9 SEC (ref 14.7–19.5)
TOTAL CK: 2327 U/L (ref 0–190)
TOTAL CK: 2741 U/L (ref 0–190)
TOTAL CK: 2935 U/L (ref 0–190)
TOTAL CK: 4015 U/L (ref 0–190)
TOTAL PROTEIN: 6.7 G/DL (ref 6.3–8)
TOTAL PROTEIN: 6.8 G/DL (ref 6.3–8)
WBC # BLD: 22.1 K/UL (ref 4.8–10.8)

## 2022-07-08 PROCEDURE — APPSS15 APP SPLIT SHARED TIME 0-15 MINUTES: Performed by: NURSE PRACTITIONER

## 2022-07-08 PROCEDURE — 99233 SBSQ HOSP IP/OBS HIGH 50: CPT | Performed by: PSYCHIATRY & NEUROLOGY

## 2022-07-08 PROCEDURE — 6370000000 HC RX 637 (ALT 250 FOR IP): Performed by: INTERNAL MEDICINE

## 2022-07-08 PROCEDURE — 1210000000 HC MED SURG R&B

## 2022-07-08 PROCEDURE — 85730 THROMBOPLASTIN TIME PARTIAL: CPT

## 2022-07-08 PROCEDURE — 99232 SBSQ HOSP IP/OBS MODERATE 35: CPT | Performed by: PHYSICAL MEDICINE & REHABILITATION

## 2022-07-08 PROCEDURE — 85635 REPTILASE TEST: CPT

## 2022-07-08 PROCEDURE — 6360000002 HC RX W HCPCS: Performed by: FAMILY MEDICINE

## 2022-07-08 PROCEDURE — 6370000000 HC RX 637 (ALT 250 FOR IP): Performed by: NURSE PRACTITIONER

## 2022-07-08 PROCEDURE — 36415 COLL VENOUS BLD VENIPUNCTURE: CPT

## 2022-07-08 PROCEDURE — 2580000003 HC RX 258: Performed by: FAMILY MEDICINE

## 2022-07-08 PROCEDURE — 6370000000 HC RX 637 (ALT 250 FOR IP): Performed by: FAMILY MEDICINE

## 2022-07-08 PROCEDURE — 6370000000 HC RX 637 (ALT 250 FOR IP): Performed by: PSYCHIATRY & NEUROLOGY

## 2022-07-08 PROCEDURE — 80053 COMPREHEN METABOLIC PANEL: CPT

## 2022-07-08 PROCEDURE — 99231 SBSQ HOSP IP/OBS SF/LOW 25: CPT | Performed by: SPECIALIST

## 2022-07-08 PROCEDURE — 6360000002 HC RX W HCPCS: Performed by: INTERNAL MEDICINE

## 2022-07-08 PROCEDURE — 97116 GAIT TRAINING THERAPY: CPT

## 2022-07-08 PROCEDURE — 85732 THROMBOPLASTIN TIME PARTIAL: CPT

## 2022-07-08 PROCEDURE — 85025 COMPLETE CBC W/AUTO DIFF WBC: CPT

## 2022-07-08 PROCEDURE — 82550 ASSAY OF CK (CPK): CPT

## 2022-07-08 PROCEDURE — 85670 THROMBIN TIME PLASMA: CPT

## 2022-07-08 PROCEDURE — 2580000003 HC RX 258: Performed by: INTERNAL MEDICINE

## 2022-07-08 RX ORDER — ESCITALOPRAM OXALATE 10 MG/1
15 TABLET ORAL DAILY
Status: DISCONTINUED | OUTPATIENT
Start: 2022-07-08 | End: 2022-07-09

## 2022-07-08 RX ORDER — TRAZODONE HYDROCHLORIDE 50 MG/1
50 TABLET ORAL NIGHTLY
Status: DISCONTINUED | OUTPATIENT
Start: 2022-07-08 | End: 2022-07-11 | Stop reason: HOSPADM

## 2022-07-08 RX ORDER — ALPRAZOLAM 0.5 MG/1
0.5 TABLET ORAL 3 TIMES DAILY PRN
Status: DISCONTINUED | OUTPATIENT
Start: 2022-07-08 | End: 2022-07-11 | Stop reason: HOSPADM

## 2022-07-08 RX ADMIN — DEXAMETHASONE SODIUM PHOSPHATE 4 MG: 4 INJECTION, SOLUTION INTRAMUSCULAR; INTRAVENOUS at 13:52

## 2022-07-08 RX ADMIN — DEXAMETHASONE SODIUM PHOSPHATE 4 MG: 4 INJECTION, SOLUTION INTRAMUSCULAR; INTRAVENOUS at 08:28

## 2022-07-08 RX ADMIN — Medication 10 ML: at 08:29

## 2022-07-08 RX ADMIN — POLYETHYLENE GLYCOL 3350 17 G: 17 POWDER, FOR SOLUTION ORAL at 08:28

## 2022-07-08 RX ADMIN — Medication 10 ML: at 20:21

## 2022-07-08 RX ADMIN — TRAZODONE HYDROCHLORIDE 50 MG: 50 TABLET ORAL at 20:21

## 2022-07-08 RX ADMIN — SODIUM CHLORIDE, POTASSIUM CHLORIDE, SODIUM LACTATE AND CALCIUM CHLORIDE: 600; 310; 30; 20 INJECTION, SOLUTION INTRAVENOUS at 13:58

## 2022-07-08 RX ADMIN — IBUPROFEN 400 MG: 400 TABLET, FILM COATED ORAL at 15:23

## 2022-07-08 RX ADMIN — ESCITALOPRAM OXALATE 15 MG: 10 TABLET ORAL at 20:21

## 2022-07-08 RX ADMIN — ENOXAPARIN SODIUM 40 MG: 100 INJECTION SUBCUTANEOUS at 08:28

## 2022-07-08 RX ADMIN — ALPRAZOLAM 0.5 MG: 0.5 TABLET ORAL at 17:55

## 2022-07-08 RX ADMIN — AMOXICILLIN AND CLAVULANATE POTASSIUM 1 TABLET: 875; 125 TABLET, FILM COATED ORAL at 20:21

## 2022-07-08 RX ADMIN — AMOXICILLIN AND CLAVULANATE POTASSIUM 1 TABLET: 875; 125 TABLET, FILM COATED ORAL at 08:28

## 2022-07-08 RX ADMIN — Medication 3 MG: at 20:21

## 2022-07-08 RX ADMIN — DEXAMETHASONE SODIUM PHOSPHATE 4 MG: 4 INJECTION, SOLUTION INTRAMUSCULAR; INTRAVENOUS at 20:20

## 2022-07-08 ASSESSMENT — PAIN SCALES - WONG BAKER: WONGBAKER_NUMERICALRESPONSE: 0

## 2022-07-08 ASSESSMENT — ENCOUNTER SYMPTOMS
VOMITING: 0
TROUBLE SWALLOWING: 0
WHEEZING: 0
BACK PAIN: 1
NAUSEA: 0
SHORTNESS OF BREATH: 0
CHOKING: 0
COUGH: 0
COLOR CHANGE: 0

## 2022-07-08 ASSESSMENT — PAIN SCALES - GENERAL
PAINLEVEL_OUTOF10: 0
PAINLEVEL_OUTOF10: 5

## 2022-07-08 ASSESSMENT — PAIN DESCRIPTION - LOCATION: LOCATION: GENERALIZED

## 2022-07-08 NOTE — CARE COORDINATION
Call placed to [facility] and spoke with Mendy Leija specialist. Kevin Cooks remains pending at this time.  .Electronically signed by Junie Julian RN on 7/8/22 at 4:06 PM EDT

## 2022-07-08 NOTE — PROGRESS NOTES
Hospitalist Daily Progress Note  Name: Tonio Riley  Age: 33 y.o. Gender: male  CodeStatus: Full Code  Allergies: Peanut-Containing Drug Products  Nuts [Macadamia Nut Oil]  Peanut Oil  Shellfish-Derived Products    Chief Complaint:Drug Overdose (pt has been taking royal kratom (hasn't left his apartment in a few days). Well check. )    Primary Care Provider: YING Napoles CNP    InpatientTreatment Team: Treatment Team: Attending Provider: Darren Ledesma MD; Consulting Physician: Fausto Barragan MD; Consulting Physician: Jhonny Mariee MD; Consulting Physician: Garrett Broussard MD; Utilization Reviewer: Pepper Gonzales, RN; Registered Nurse: Nai Resendiz RN; Utilization Reviewer: rEica Enriquez RN; Registered Nurse: Ally Grewal, JEFF; Registered Nurse: Osman Noble, RN; Patient Care Tech: [facility]; : Destiney Rodríguez RN; Utilization Reviewer: Ashanti Sheffield RN    Admission Date: 6/30/2022      Subjective: Patient is awake; alert; denies chset pain or pressure; 12 point ROS negative    Physical Exam  Vitals and nursing note reviewed. Constitutional:       Appearance: Normal appearance. Cardiovascular:      Rate and Rhythm: Normal rate and regular rhythm. Pulmonary:      Effort: Pulmonary effort is normal.      Breath sounds: Normal breath sounds. Abdominal:      General: Bowel sounds are normal.      Palpations: Abdomen is soft. Musculoskeletal:         General: Normal range of motion. Skin:     General: Skin is warm and dry. Neurological:      Mental Status: He is alert.       Comments: Slow, indirect answers       Medications:  Reviewed    Infusion Medications:    lactated ringers 150 mL/hr at 07/07/22 1615    sodium chloride       Scheduled Medications:    dexamethasone  4 mg IntraVENous TID    amoxicillin-clavulanate  1 tablet Oral 2 times per day    polyethylene glycol  17 g Oral Daily    lidocaine  1 patch TransDERmal Daily    sodium chloride  1,000 mL IntraVENous Once    sodium chloride flush  5-40 mL IntraVENous 2 times per day    enoxaparin  40 mg SubCUTAneous Daily     PRN Meds: melatonin, ibuprofen, sodium chloride flush, sodium chloride, ondansetron **OR** ondansetron, potassium chloride **OR** potassium alternative oral replacement **OR** potassium chloride    Labs:   Recent Labs     07/06/22  0510 07/07/22  0418 07/08/22  0525   WBC 18.0* 19.5* 22.1*   HGB 12.8* 12.8* 13.0*   HCT 38.7* 37.9* 39.3*    475* 538*     Recent Labs     07/06/22  0510 07/07/22  0418 07/08/22  0525    139 139   K 4.4 4.7 4.1    99 100   CO2 22 25 23   BUN 11 13 15   CREATININE 0.48* 0.56* 0.50*   CALCIUM 9.2 9.4 9.3     Recent Labs     07/07/22  0901 07/07/22  0901 07/07/22  2130 07/08/22  0525 07/08/22  0901   *   < > 111* 99* 90*   *   < > 286* 271* 254*   BILIDIR <0.2  --  <0.2  --  <0.2   BILITOT 0.4   < > 0.3 0.5 0.4   ALKPHOS 83   < > 88 86 82    < > = values in this interval not displayed. No results for input(s): INR in the last 72 hours. Recent Labs     07/07/22 2130 07/08/22  0021 07/08/22  0901   CKTOTAL 4,180* 4,015* 2,935*     Urinalysis:   Lab Results   Component Value Date/Time    NITRU Negative 06/30/2022 04:00 PM    WBCUA 3-5 06/30/2022 04:00 PM    BACTERIA Negative 06/30/2022 04:00 PM    RBCUA 3-5 06/30/2022 04:00 PM    BLOODU LARGE 06/30/2022 04:00 PM    SPECGRAV 1.033 06/30/2022 04:00 PM    GLUCOSEU Negative 06/30/2022 04:00 PM     Radiology:   Most recent    Chest CT      WITH CONTRAST:No results found for this or any previous visit. WITHOUT CONTRAST: No results found for this or any previous visit.       CXR      2-view: Results for orders placed in visit on 01/29/18    XR CHEST STANDARD (2 VW)    Narrative  EXAM: CHEST, 2 VIEWS    COMPARISON: NONE AVAILABLE    REASON FOR EXAMINATION: UPPER RESPIRATORY CONGESTION, DYSPNEA X2 WEEKS    FINDINGS:   Two views of the chest demonstrate normal appearance of the heart and mediastinum. The lungs appear clear. The visualized bony thorax and remainder of the chest appears unremarkable. Impression  NO EVIDENCE OF ACTIVE DISEASE IN THE CHEST. Portable: Results for orders placed during the hospital encounter of 06/30/22    XR CHEST PORTABLE    Narrative  EXAMINATION: XR CHEST PORTABLE    CLINICAL HISTORY: FEVER    COMPARISONS: JANUARY 29, 2018    FINDINGS: Osseous structures are intact. Cardiopericardial silhouette is normal. Pulmonary vasculature is normal. Right lung is clear. Patchy area of increased opacity left upper lung. Impression  LEFT UPPER LUNG ATELECTASIS/PNEUMONIA. Echo No results found for this or any previous visit. Assessment/Plan:    Active Hospital Problems    Diagnosis Date Noted    Cerebral edema ([facility] [place] [address] [G93.6]      Priority: Medium    Weakness of right lower extremity [R29.898]      Priority: Medium     impaired mobility and ADLs dt encephalopathy  [Z74.09, Z78.9] 07/05/2022     Priority: Medium    Vitamin D deficiency [E55.9] 07/05/2022     Priority: Medium    Benzodiazepine dependence ([facility] [place] [address] [F13.20] 07/05/2022     Priority: Medium    Depressive disorder [F32.9] 07/05/2022     Priority: Medium    Right foot drop [M21.371] 07/05/2022     Priority: Medium    Altered mental status [R41.82]      Priority: Medium    Drug abuse ([facility] [place] [address] [F19.10]      Priority: Medium    Acute encephalopathy [G93.40] 06/30/2022     Priority: Medium    Polydrug dependence including opioid type drug with continuous use with complication ([facility] [place] [address] [Q38.72] 06/04/2019    ROSALES (generalized anxiety disorder) [F41.1] 06/04/2019    ADHD (attention deficit hyperactivity disorder) [F90.9] 12/05/2012    Attention deficit hyperactivity disorder (ADHD) [F90.9] 12/05/2012     1. Polysubstance overdose/acute ischemic encephalopathy       Repeat MRI shows edema; started on IV decadron per neurology recommendations;  Medically dischartged to acute rehab yesterday; still awaiting precert  2. Rhabdomyolysis/EDGAR            EDGAR resolved; Rhabdo improving  3. Acute Hepatitis         Probably due to ischemic injury; improving  4.  DVT proph    Electronically signed by Leonidas Villavicencio MD on 7/8/2022 at 12:40 PM

## 2022-07-08 NOTE — PROGRESS NOTES
Physical Therapy Missed Treatment   Facility/Department: [facility] MED SURG [id number]/[id number]    NAME: Herlinda Reid  Patient Status:   : 1990 (33 y.o.)  MRN: [id number]  Account: [de-identified]  Gender: male        [] Patient Declines PT Treatment            [x] Patient Unavailable:     Pt had MRI to r/o cord compression and no results are in. Will attempt PT Treatment again at earliest convenience.         Electronically signed by Dave Borrero PTA on 22 at 10:01 AM EDT

## 2022-07-08 NOTE — CONSULTS
[address] [place] [address], Department of Psychiatry  Behavioral Health Consult    REASON FOR CONSULT:  For suicidal ideations     CONSULTING PHYSICIAN: Dr. Michelle Hairston     History obtained from: patient     HISTORY OF PRESENT ILLNESS:      Per medical H&P  The patient is a 33 y.o. male who presents with a hx of polysubstance abuse, adhd, who was found unresponsive in his motel room by the owners. He was last seen last night by his brother in seemingly normal health. However, his work reports he has not reported for the past 3 days. On arrival, he is unresponsive except to sternal rub and pain, but is unable to give any history. He was found with kratom near him in his room. Per medical note   1. Polysubstance overdose/acute ischemic encephalopathy       Repeat MRI shows edema; started on IV decadron per neurology recommendations; Medically dischartged to acute rehab yesterday; still awaiting precert  2. Rhabdomyolysis/EDGAR            EDGAR resolved; Rhabdo improving  3. Acute Hepatitis         Probably due to ischemic injury; improving  4. DVT proph    Patient was seen at bedside, he stated he is anxious about his medical condition. He currently denies suicidal ideations with an intent or plan. He was seen with mother [address] stated he was just anxious in the room. Patient and mother reported that Lexapro 10 mg has helped him with anxious thoughts. Patient is alert and oriented to time place and date. He currently denies auditory hallucinations, paranoid ideations, and visual hallucinations. Mother stated that the plan is for him to go to rehab because of his medical conditions     Medical doctor stated he will give Ativan as needed only for anxiety. The patient is currently receiving care for the above psychiatric illness.       Psychiatric Review of Systems       Depression: Sad mood anxious      Desi or Hypomania:  no     Panic Attacks:  no     Phobias:  no     Obsessions and Compulsions:  no     PTSD : Denies      Hallucinations:  no     Delusions:  no      Substance Abuse History:  Unable to get a clear history       Past Psychiatric History:  Prior Diagnosis:   Psychiatrist:None   Therapist: None   Hospitalization: no  Hx of Suicidal Attempts: no  Hx of violence:  no  ECT: no    Past Medical History:        Diagnosis Date    ADHD (attention deficit hyperactivity disorder)     was initiated on treatment by Dr. Reinaldo Winkler. any testing was done here by Dr. Reinaldo Winkler, no formal psych eval.      Anxiety     Drug abuse ([facility]     MDD (major depressive disorder), recurrent episode, moderate ([facility] [address] 6/4/2019       Past Surgical History:        Procedure Laterality Date    TONSILLECTOMY AND ADENOIDECTOMY      WISDOM TOOTH EXTRACTION         Medications Prior to Admission:   Medications Prior to Admission: [DISCONTINUED] naproxen (NAPROSYN) 500 MG tablet, Take 1 tablet by mouth 2 times daily for 7 days  amphetamine-dextroamphetamine (ADDERALL) 15 MG tablet, dextroamphetamine-amphetamine 15 mg tablet  escitalopram (LEXAPRO) 10 MG tablet, Take 15 mg by mouth daily  lisdexamfetamine (VYVANSE) 70 MG capsule, Take 70 mg by mouth every morning. clonazePAM (KLONOPIN) 0.5 MG tablet, Take 1 tablet by mouth 2 times daily as needed for Anxiety for up to 3 days. Allergies:  Peanut-containing drug products, Nuts [macadamia nut oil], Peanut oil, and Shellfish-derived products    FAMILY/SOCIAL HISTORY:  Family History   Problem Relation Age of Onset    Depression Mother     Heart Disease Father     High Blood Pressure Father     High Cholesterol Father      Social History     Socioeconomic History    Marital status: Single     Spouse name: Not on file    Number of children: Not on file    Years of education: Not on file    Highest education level: Not on file   Occupational History    Not on file   Tobacco Use    Smoking status: Never Smoker    Smokeless tobacco: Never Used   Substance and Sexual Activity    Alcohol use:  Yes Comment: occasional    Drug use: No    Sexual activity: Not on file   Other Topics Concern    Not on file   Social History Narrative    Lives With: Alone in [address]: One level    Home Access: Level entry    Bathroom Toilet: Standard    Has the patient had two or more falls in the past year or any fall with injury in the past year?: No    ADL Assistance: [address]: Independent    Homemaking Responsibilities: Yes    Ambulation Assistance: Independent    Transfer Assistance: Independent    Active : Yes    Type of Occupation: [employer] work    Additional Comments: Independent PTA- no medical equipment         Social Determinants of Health     Financial Resource Strain: Low Risk     Difficulty of Paying Living Expenses: Not hard at all   Food Insecurity: No Food Insecurity    Worried About Running Out of Food in the Last Year: Never true    [address] in the Last Year: Never true   Transportation Needs:     Lack of Transportation (Medical): Not on file    Lack of Transportation (Non-Medical):  Not on file   Physical Activity:     Days of Exercise per Week: Not on file    Minutes of Exercise per Session: Not on file   Stress:     Feeling of Stress : Not on file   Social Connections:     Frequency of Communication with Friends and Family: Not on file    Frequency of Social Gatherings with Friends and Family: Not on file    Attends Bahai Services: Not on file    Active Member of Clubs or Organizations: Not on file    Attends Club or Organization Meetings: Not on file    Marital Status: Not on file   Intimate Partner Violence:     Fear of Current or Ex-Partner: Not on file    Emotionally Abused: Not on file    Physically Abused: Not on file    Sexually Abused: Not on file   Housing Stability:     Unable to Pay for Housing in the Last Year: Not on file    Number of Places Lived in the Last Year: Not on file    Unstable condition, Polysubstance overdose/acute ischemic encephalopathy      RISK ASSESSMENT:  Patient currently denies suicidal ideations with a plan. LABS: REVIEWED TODAY:  Recent Labs     07/06/22  0510 07/07/22  0418 07/08/22  0525   WBC 18.0* 19.5* 22.1*   HGB 12.8* 12.8* 13.0*    475* 538*     Recent Labs     07/06/22  0510 07/07/22  0418 07/08/22  0525    139 139   K 4.4 4.7 4.1    99 100   CO2 22 25 23   BUN 11 13 15   CREATININE 0.48* 0.56* 0.50*   GLUCOSE 106* 103* 102*     Recent Labs     07/07/22  2130 07/08/22  0525 07/08/22  0901   BILITOT 0.3 0.5 0.4   ALKPHOS 88 86 82   * 99* 90*   * 271* 254*     Lab Results   Component Value Date/Time    LABAMPH POSITIVE 06/30/2022 04:00 PM    BARBSCNU Neg 06/30/2022 04:00 PM    LABBENZ POSITIVE 06/30/2022 04:00 PM    LABMETH Neg 06/30/2022 04:00 PM    OPIATESCREENURINE Neg 06/30/2022 04:00 PM    PHENCYCLIDINESCREENURINE Neg 06/30/2022 04:00 PM    ETOH <10 06/30/2022 04:00 PM     Lab Results   Component Value Date/Time    TSH 0.944 07/08/2019 08:00 PM     No results found for: LITHIUM  No results found for: VALPROATE, CBMZ  No results found for: LITHIUM, VALPROATE    FURTHER LABS ORDERED :      Radiology   XR CHEST (2 VW)    Result Date: 7/4/2022  Exam: XR CHEST (2 VW) History: Shortness breath Technique: AP portable view of the chest obtained. Comparison: Portable chest radiograph July 1, 2022 Findings: Suboptimal inspiration. The cardiomediastinal silhouette is within normal limits. No significant interval change of patchy left upper lobe and left lower lobe opacities. No pneumothorax or pleural effusion. No acute osseous abnormality. No significant interval change of left upper lobe and left lower lobe infiltrate and/or atelectasis. XR HIP RIGHT (2-3 VIEWS)    Result Date: 7/6/2022  EXAMINATION: RIGHT HIP  CLINICAL HISTORY: Pain, fall COMPARISON: None  FINDINGS: Two views are  submitted. No acute fractures.  No dislocations. No focal bony abnormalities                                                                                   NO ACUTE FRACTURE. CT Head WO Contrast    Result Date: 6/30/2022  CT Brain. Contrast medium:  without contrast.. History: Altered mental status. Technical factors: CT imaging of the brain was obtained and formatted as 5 mm contiguous axial images. 2.5 mm contiguous axial images were obtained through the osseous structures. Sagittal and coronal reconstruction obtained during postprocessing. Comparison:  None. Findings: Extra-axial spaces:  Normal. Intracranial hemorrhage:  None. Ventricular system: [Negative. Basal Cisterns:  Without anomaly. Cerebral Parenchyma: Bilateral, symmetric areas of decreased attenuation exerting no mass effect measuring approximately 9 mm in size since found within the bilateral globus pallidus (series 2, image 17). Midline Shift:  None. Cerebellum:  No anomaly identified. Paranasal sinuses and mastoid air cells:  No anomaly identified. Visualized Orbits:  Negative. Impression: Round symmetric areas decreased attenuation bilateral globus pallidus. This finding may be seen in patients experiencing hypoxia, i.e., carbon monoxide poisoning. All CT scans at this facility use dose modulation, iterative reconstruction, and/or weight based dosing when appropriate to reduce radiation dose to as low as reasonably achievable. MRA HEAD WO CONTRAST    Result Date: 7/2/2022  MRA HEAD WO CONTRAST, MRA NECK WO CONTRAST HISTORY:  Drug abuse. Altered mental status. COMPARISON: MRI brain 7/1/2022. CT head 6/30/2022. TECHNIQUE: Routine MRA of the head without contrast with 3-D time-of-flight images and dedicated reconstructions. Routine MRA of the neck with 3-D and 2-D time-of-flight images and dedicated reconstructions. RESULT: Visualized brain: Grossly unchanged from the recent MRI of the brain with multiple areas of restricted diffusion.  Please refer to the recent MRI brain report. INTRACRANIAL MRA:    The visualized distal vertebral and basilar arteries are widely patent. The distal ICAs are patent and within normal limits of caliber. The proximal ACAs, MCAs and PCAs are patent and within normal limits of caliber and configuration. There is no evidence of focal,  significant stenosis or aneurysm in the visualized vessels. EXTRACRANIAL MRA: Carotid Stenosis: Right Common:  No significant stenosis. Right Internal Plaque:  No significant plaque formation. Right Internal Carotid Stenosis (% by NASCET Criteria):  0% Left Common:  No significant stenosis. Left Internal Carotid Plaque:  No significant plaque formation. Left Internal Carotid Stenosis (% by NASCET Criteria):  0% Cervical Vertebral Arteries: Patency:  Bilateral Dominance:  Left     Patent intracranial and extracranial circulation. XR CHEST PORTABLE    Result Date: 7/1/2022  EXAMINATION: XR CHEST PORTABLE CLINICAL HISTORY: FEVER COMPARISONS: JANUARY 29, 2018 FINDINGS: Osseous structures are intact. Cardiopericardial silhouette is normal. Pulmonary vasculature is normal. Right lung is clear. Patchy area of increased opacity left upper lung. LEFT UPPER LUNG ATELECTASIS/PNEUMONIA. US ABDOMEN LIMITED    Result Date: 7/5/2022  EXAMINATION: US ABDOMEN LIMITED DATE AND TIME:7/5/2022 8:53 AM CLINICAL HISTORY: 3   abnormal LFT. COMPARISON: None TECHNIQUE: Gray-scale evaluation of the right upper quadrant was performed. FINDINGS:            Liver: Hepatic echogenicity is within normal limits without intrahepatic biliary dilatation. Gallbladder: The gallbladder was normally distended without stones, sludge, or wall thickening. The common duct measures up to  3   mm. Pancreas: Was not optimally visualized due to bowel and gas shadowing, but the visualized portion did not demonstrate any gross pathology. NEGATIVE RIGHT UPPER QUADRANT ULTRASOUND WITHOUT EVIDENCE OF CHOLELITHIASIS. US DUP LOWER EXTREMITY RIGHT ROBERT    Result Date: 7/3/2022  EXAMINATION: US DUP LOWER EXTREMITY RIGHT ROBERT DATE AND TIME:7/3/2022 1:02 PM CLINICAL HISTORY:  Fall 1 week ago. Right leg pain. COMPARISON: None TECHNIQUE: The right lower extremity veins were evaluated with color doppler, gray scale imaging and spectral analysis while using compression and augmentation when possible. RESULT: Evaluation of the right lower extremity veins from the thigh to the knee shows normal phasic flow, normal augmentation of the Doppler signal, and normal compression of the deep veins. There is no sonographic evidence for acute deep venous thrombosis from the right groin to the popliteal region. There is no sonographic evidence for acute deep venous thrombosis in the right calf veins. No acute DVT of the right lower extremity veins from the groin to the knee. No acute DVT of the right calf veins. MRA NECK WO CONTRAST    Result Date: 7/2/2022  MRA HEAD WO CONTRAST, MRA NECK WO CONTRAST HISTORY:  Drug abuse. Altered mental status. COMPARISON: MRI brain 7/1/2022. CT head 6/30/2022. TECHNIQUE: Routine MRA of the head without contrast with 3-D time-of-flight images and dedicated reconstructions. Routine MRA of the neck with 3-D and 2-D time-of-flight images and dedicated reconstructions. RESULT: Visualized brain: Grossly unchanged from the recent MRI of the brain with multiple areas of restricted diffusion. Please refer to the recent MRI brain report. INTRACRANIAL MRA:    The visualized distal vertebral and basilar arteries are widely patent. The distal ICAs are patent and within normal limits of caliber. The proximal ACAs, MCAs and PCAs are patent and within normal limits of caliber and configuration. There is no evidence of focal,  significant stenosis or aneurysm in the visualized vessels. EXTRACRANIAL MRA: Carotid Stenosis: Right Common:  No significant stenosis.  Right Internal Plaque:  No significant plaque formation. Right Internal Carotid Stenosis (% by NASCET Criteria):  0% Left Common:  No significant stenosis. Left Internal Carotid Plaque:  No significant plaque formation. Left Internal Carotid Stenosis (% by NASCET Criteria):  0% Cervical Vertebral Arteries: Patency:  Bilateral Dominance:  Left     Patent intracranial and extracranial circulation. MRI BRAIN W WO CONTRAST    Result Date: 7/4/2022  EXAM: MRI of the brain without and with contrast History: Hypoxic ischemic encephalopathy. Technique: Multiplanar multisequence MRI of the brain was performed without and with contrast. Comparison: MRI of the brain July 1, 2022 Findings: Mildly increased edema in the areas of diffusion restriction within the bilateral globi pallidi, splenium of the corpus callosum, and supratentorial white since prior MRI July 1, 2022. No mass effect or midline shift. No acute intracranial hemorrhage. No enhancing mass or pathologic enhancement. Mildly increased edema in the areas of diffusion restriction within the bilateral globi pallidi, splenium of the corpus callosum, and supratentorial white since prior MRI July 1, 2022. MRI BRAIN WO CONTRAST    Result Date: 7/1/2022  MRI BRAIN WO CONTRAST : 7/1/2022 CLINICAL HISTORY:  stroke . COMPARISON: Head CT 6/30/2022. TECHNIQUE: Multiplanar MR imaging of the head was performed without contrast. FINDINGS: Areas of restricted diffusion within the globus pallidus, supratentorial white matter and splenium of the corpus callosum are most consistent with carbon monoxide poisoning/hypoxemia and/or other toxic encephalopathy. There is no intracranial hemorrhage, mass effect, midline shift, extra-axial collection, significant cerebral volume loss or other complication identified. FINDINGS CONSISTENT WITH CARBON MONOXIDE POISONING/HYPOXEMIA AND/OR OTHER TOXIC ENCEPHALOPATHY. NO INTRACRANIAL HEMORRHAGE OR COMPLICATION IDENTIFIED.      MRI CERVICAL THORACIC LUMBAR SPINE WO CONTRAST LIMITED    Result Date: 7/7/2022  MRI CERVICAL THORACIC LUMBAR SPINE WO CONTRAST LIMITED : 7/7/2022 CLINICAL HISTORY:  right foot numb, bowel incontinence, hyperreflexia . COMPARISON: Head MRI 7/4/2022 TECHNIQUE: Limited sagittal and axial imaging of the cervical, thoracic and lumbar spine was performed. FINDINGS: Motion artifact mildly limits some sequences. There is no central spinal stenosis, neural foraminal narrowing, significant disc herniations, compression fractures, subluxation, or epidural fluid collections identified. NEGATIVE MILDLY LIMITED CERVICAL, THORACIC AND LUMBAR SPINE MRI. EKG: TRACING REVIEWED  Not ordered  RECOMMENDATIONS  1. Start lexapro 10 mg for mood and anxiety  2. If patient is agitated, can give 1 mg ativan prn daily only. 3. Patient is currently not medically cleared     Risk Management:  routine:  no special precautions necessary    Medications:  See orders  Discussed with the treating physician/ team about the patient and treatment plan  Reviewed the chart    Discussed with the patient risk, benefit, alternative and common side effects for the  proposed medication treatment. Patient is consenting to the treatment. Thanks for the consult. Please call me if needed.     Electronically signed by Lisa Nino MD on 7/8/2022 at 3:54 PM

## 2022-07-08 NOTE — PROGRESS NOTES
Sydni Mcclellan is a 33 y.o. male patient.     Current Facility-Administered Medications   Medication Dose Route Frequency Provider Last Rate Last Admin    traZODone (DESYREL) tablet 50 mg  50 mg Oral Nightly Zain Sails, APRN - CNP        ALPRAZolam [facility]) tablet 0.5 mg  0.5 mg Oral TID PRN Aryan Hobson MD   0.5 mg at 07/08/22 1755    escitalopram (LEXAPRO) tablet 15 mg  15 mg Oral Daily Addis Saleh MD        melatonin tablet 3 mg  3 mg Oral Nightly PRN Nahum Mauricioar DO   3 mg at 07/07/22 2320    ibuprofen (ADVIL;MOTRIN) tablet 400 mg  400 mg Oral Q6H PRN Addis Saleh MD   400 mg at 07/08/22 1523    dexamethasone (DECADRON) injection 4 mg  4 mg IntraVENous TID Addis Saleh MD   4 mg at 07/08/22 1352    amoxicillin-clavulanate (AUGMENTIN) 875-125 MG per tablet 1 tablet  1 tablet Oral 2 times per day Benita Regalado MD   1 tablet at 07/08/22 0828    polyethylene glycol (GLYCOLAX) packet 17 g  17 g Oral Daily Alejandra Joseph MD   17 g at 07/08/22 0828    lidocaine 4 % external patch 1 patch  1 patch TransDERmal Daily Alejandra Joseph MD   1 patch at 07/08/22 3371    lactated ringers infusion   IntraVENous Continuous Addis Saleh  mL/hr at 07/08/22 1358 New Bag at 07/08/22 1358    0.9 % sodium chloride bolus  1,000 mL IntraVENous Once Yuriy Cox MD        sodium chloride flush 0.9 % injection 5-40 mL  5-40 mL IntraVENous 2 times per day Alejandra Joseph MD   10 mL at 07/08/22 0829    sodium chloride flush 0.9 % injection 5-40 mL  5-40 mL IntraVENous PRN Alejandra Joseph MD        0.9 % sodium chloride infusion   IntraVENous PRN Alejandra Joseph MD        enoxaparin (LOVENOX) injection 40 mg  40 mg SubCUTAneous Daily Alejandra Joseph MD   40 mg at 07/08/22 0828    ondansetron (ZOFRAN-ODT) disintegrating tablet 4 mg  4 mg Oral Q8H PRN Alejandra Joseph MD        Or    ondansetron [facility]) injection 4 mg  4 mg IntraVENous Q6H PRN Alejandra Joseph MD        potassium chloride (KLOR-CON M) extended release tablet 40 mEq  40 mEq Oral PRN Mónica Richardson MD   40 mEq at 07/03/22 0801    Or    potassium bicarb-citric acid (EFFER-K) effervescent tablet 40 mEq  40 mEq Oral PRN Mónica Richardson MD        Or    potassium chloride 10 mEq/100 mL IVPB (Peripheral Line)  10 mEq IntraVENous PRN Mónica Richardson MD         Allergies   Allergen Reactions    Peanut-Containing Drug Products Anaphylaxis     Throat swelling, hives, \"I needed hospital admission last time I had any\"    Nuts [Macadamia Nut Oil] Angioedema    Peanut Oil Other (See Comments)    Shellfish-Derived Products Itching     Principal Problem:    Acute encephalopathy  Active Problems:    Drug abuse ([facility] [address]    Altered mental status     impaired mobility and ADLs dt encephalopathy     Depressive disorder    Vitamin D deficiency    Benzodiazepine dependence ([facility] [address]    Right foot drop    Cerebral edema (HCC)    Weakness of right lower extremity    ADHD (attention deficit hyperactivity disorder)    ROSALES (generalized anxiety disorder)    Polydrug dependence including opioid type drug with continuous use with complication (HCC)    Attention deficit hyperactivity disorder (ADHD)  Resolved Problems:    * No resolved hospital problems. *    Blood pressure 127/78, pulse 99, temperature 98.1 °F (36.7 °C), temperature source Oral, resp. rate 16, height 5' 10\" (1.778 m), weight 157 lb 3 oz (71.3 kg), SpO2 100 %. Subjective reports no specific GI issues  Objective LFTs improving AST 90 ALT is 254 and alk phos is 82  Assessment & Plan ischemic injury to liver and LFTs slowly improvimg.   GI will sign off    Brianna Truong MD  7/8/2022

## 2022-07-08 NOTE — PROGRESS NOTES
Subjective: The patient complains of severe acute on chronic progressive fatigue and cognitive slowing and right lower extremity weakness and pain partially relieved by rest, PT, OT and meds  SLP and exacerbated by exertion and recent illness. He now admits that he took 2 Xanax that he bought on the street. He and I both fear that it may have been laced with fentanyl. Discussed with treatment team and hospitalist and neuro--should be ready for acute rehab today  MRIs spine-     NEGATIVE MILDLY LIMITED CERVICAL, THORACIC AND LUMBAR SPINE MRI. I am concerned about patients medical complexities including:  Principal Problem:    Acute encephalopathy  Active Problems:    Drug abuse ([facility] [address]    Altered mental status     impaired mobility and ADLs dt encephalopathy     Depressive disorder    Vitamin D deficiency    Benzodiazepine dependence (HCC)    Right foot drop    Cerebral edema (HCC)    Weakness of right lower extremity    ADHD (attention deficit hyperactivity disorder)    ROSALES (generalized anxiety disorder)    Polydrug dependence including opioid type drug with continuous use with complication (HCC)    Attention deficit hyperactivity disorder (ADHD)  Resolved Problems:    * No resolved hospital problems. *      .    Reviewed recent nursing note and discussed current status and planned care with acute care providers, \" Waiting on pre-cert for pt to transfer to [facility]. Per Love in rehab she called for new pre-cert today. \".    ROS x10: The patient also complains of severely impaired mobility and activities of daily living.   Otherwise no new problems with vision, hearing, nose, mouth, throat, dermal, cardiovascular, GI, , pulmonary, musculoskeletal, psychiatric or neurological.        Vital signs:  /78   Pulse 99   Temp 98.1 °F (36.7 °C) (Oral)   Resp 16   Ht 5' 10\" (1.778 m)   Wt 157 lb 3 oz (71.3 kg)   SpO2 100%   BMI 22.55 kg/m²   I/O:   PO/Intake:    fair PO intake, continue to monitor closely for dehydration  Reg diet thin liquids    Bowel/Bladder:   continent,    General:  Patient is well developed, adequately nourished, and    well kempt. HEENT:    PERRLA, hearing intact to loud voice, external inspection of ear and nose benign. Inspection of lips, tongue and gums benign  Musculoskeletal: No significant change in strength or tone. All joints stable. Inspection and palpation of digits and nails show no clubbing, cyanosis or inflammatory conditions. Neuro/Psychiatric: Affect: flat-  Alert and oriented to self and situation with  mod cues. No significant change in deep tendon reflexes or sensation-cognitive slowing. Right lower extremity foot drop weakness and deep peroneal numbness. Lungs:  Diminished, CTA-B  . Respiration effort is normal at rest.   Heart:   S1 = S2,   RRR. Abdomen:  Soft, non-tender    Extremities:  no lower extremity edema but no unusual tenderness. Skin:   BUE bruises dt blood draws      Rehabilitation:  Physical Therapy:   Bed mobility:  Bed mobility  Rolling to Right: Stand by assistance (07/02/22 1513)  Supine to Sit: Stand by assistance (07/06/22 1428)  Sit to Supine:  (DNT pt into chair at end of session.) (07/06/22 1428)  Bed Mobility Comments: increased time to complete. good technique. (07/06/22 1428)  Transfers:  Transfers  Sit to Stand: Contact guard assistance (07/06/22 1428)  Stand to sit: Contact guard assistance (07/06/22 1428)  Bed to Chair: Contact guard assistance (completes 5 squat pivots to and from chair for improved technique and activity tolerance. vc's for hand placement and safety.) (07/06/22 1428)  Comment: CGA d/t instability.  (07/06/22 1428)  Gait:   Ambulation  WB Status: WBAT (07/02/22 1513)  Ambulation  Surface: level tile (07/06/22 1430)  Device: Rolling Walker (07/06/22 1430)  Other Apparatus: Wheelchair follow (07/05/22 1440)  Assistance: Contact guard assistance (07/06/22 1430)  Quality of Gait: decreased heel strike BLE. downward gaze. (07/05/22 1440)  Gait Deviations: Slow Radha;Decreased step length;Decreased step height (07/06/22 1430)  Distance: 10' FWD/BWD. x 2 (07/06/22 1430)  Comments: retro walking completed for improved dynamic balance. (07/06/22 1430)  Stairs:     W/C mobility:       Occupational Therapy:   Hand Dominance: Right  ADL  Feeding: Setup (07/06/22 1616)  Feeding Skilled Clinical Factors: Pt reports continued difficulty with containers at times (07/06/22 1616)  Grooming: Setup (07/06/22 1616)  Grooming Skilled Clinical Factors: Washing face and hands only (07/06/22 1616)  UE Bathing: Setup (07/06/22 1616)  LE Bathing: Minimal assistance (07/06/22 1616)  LE Bathing Skilled Clinical Factors: Balance assist for lizett area (07/06/22 1616)  UE Dressing: Setup (07/06/22 1616)  LE Dressing: None (07/06/22 1616)  LE Dressing Skilled Clinical Factors: Balance assist with dynamic movements attempting to simulate, did not don pants d/t [place] present (07/06/22 1616)  Toileting: Minimal assistance (Melanie Barfield assist for hygiene following attempted toileting) (07/06/22 1616)  Toileting Skilled Clinical Factors: Balance for hygiene post toileting attempt (07/06/22 1616)  Additional Comments: Pt performed sponge bath at EOB.  Pt. had stated he wanted to toilet but was unable to have BM (07/06/22 1616)  Toilet Transfers  Toilet - Technique: Stand step (07/06/22 1622)  Equipment Used: Standard bedside commode (07/06/22 1622)  Toilet Transfer: Contact guard assistance (07/06/22 1622)  Toilet Transfers Comments: Impulsive, turns longer distance which impacts safety with transitions (07/06/22 1622)          Speech Therapy:            Diet/Swallow:        Dysphagia Outcome Severity Scale: Level 7: Normal in all situations          COGNITION  OT: Cognition Comment: pt reported noticeable fatigue, increased time for problem solving at times; at others, pt impulsive  SP:           Lab/X-ray studies reviewed, analyzed and discussed with patient and staff:   Recent Results (from the past 24 hour(s))   POCT Glucose    Collection Time: 07/07/22  7:54 AM   Result Value Ref Range    POC Glucose 81 70 - 99 mg/dl    Performed on ACCU-CHEK    CK    Collection Time: 07/07/22  9:01 AM   Result Value Ref Range    Total CK 5,895 (H) 0 - 190 U/L   Hepatic Function Panel    Collection Time: 07/07/22  9:01 AM   Result Value Ref Range    Total Protein 6.5 6.3 - 8.0 g/dL    Albumin 3.6 3.5 - 4.6 g/dL    Alkaline Phosphatase 83 35 - 104 U/L     (H) 0 - 41 U/L     (H) 0 - 40 U/L    Total Bilirubin 0.4 0.2 - 0.7 mg/dL    Bilirubin, Direct <0.2 0.0 - 0.4 mg/dL    Bilirubin, Indirect see below 0.0 - 0.6 mg/dL   POCT Glucose    Collection Time: 07/07/22 11:40 AM   Result Value Ref Range    POC Glucose 100 (H) 70 - 99 mg/dl    Performed on ACCU-CHEK    CK    Collection Time: 07/07/22  1:14 PM   Result Value Ref Range    Total CK 5,273 (H) 0 - 190 U/L   POCT Glucose    Collection Time: 07/07/22  4:25 PM   Result Value Ref Range    POC Glucose 102 (H) 70 - 99 mg/dl    Performed on ACCU-CHEK    POCT Glucose    Collection Time: 07/07/22  8:47 PM   Result Value Ref Range    POC Glucose 98 70 - 99 mg/dl    Performed on ACCU-CHEK    CK    Collection Time: 07/07/22  9:30 PM   Result Value Ref Range    Total CK 4,180 (H) 0 - 190 U/L   Hepatic Function Panel    Collection Time: 07/07/22  9:30 PM   Result Value Ref Range    Total Protein 6.8 6.3 - 8.0 g/dL    Albumin 4.0 3.5 - 4.6 g/dL    Alkaline Phosphatase 88 35 - 104 U/L     (H) 0 - 41 U/L     (H) 0 - 40 U/L    Total Bilirubin 0.3 0.2 - 0.7 mg/dL    Bilirubin, Direct <0.2 0.0 - 0.4 mg/dL    Bilirubin, Indirect see below 0.0 - 0.6 mg/dL   CK    Collection Time: 07/08/22 12:21 AM   Result Value Ref Range    Total CK 4,015 (H) 0 - 190 U/L   CBC with Auto Differential    Collection Time: 07/08/22  5:25 AM   Result Value Ref Range    WBC 22.1 (H) 4.8 - 10.8 K/uL    RBC 4.58 (L) 4.70 - 6.10 M/uL Hemoglobin 13.0 (L) 14.0 - 18.0 g/dL    Hematocrit 39.3 (L) 42.0 - 52.0 %    MCV 85.9 80.0 - 100.0 fL    MCH 28.4 27.0 - 31.3 pg    MCHC 33.0 33.0 - 37.0 %    RDW 12.8 11.5 - 14.5 %    Platelets 933 (H) 646 - 400 K/uL       7/7/22-     NEGATIVE MILDLY LIMITED CERVICAL, THORACIC AND LUMBAR SPINE MRI. Previous extensive, complex labs, notes and diagnostics reviewed and analyzed. ALLERGIES:    Allergies as of 06/30/2022 - Fully Reviewed 06/30/2022   Allergen Reaction Noted    Peanut-containing drug products Anaphylaxis 12/06/2018    Peanut oil Other (See Comments) 02/02/2015      (please also verify by checking [facility])     Complex Physical Medicine & Rehab Issues Assess & Plan:   1. Severe abnormality of gait and mobility and impaired self-care and ADL's secondary to hypoxic encephalopathy status post overdose likely secondary to fentanyl laced Xanax pill. Updated functional and medical status reassessed regarding patients ability to participate in therapies and patient found to be able to participate in:        acute intensive comprehensive inpatient rehabilitation program including PT/OT to improve balance, ambulation, ADLs, and to improve the P/AROM. It is my opinion that they will be able to tolerate 3 hours of therapy a day and benefit from it at an acute level. I again discussed acute rehab with the patient and verify that the patient is able and willing to participate in 3 hours of therapy a day. Rehab and Acute Care Case Management has also reinforced this expectation. Will continue to follow to attempt to get patient to the most efficient but most effective level of care will be in their best interest.  Continue to focus on energy conservation heart rate and blood pressure monitoring before during and after therapy endurance and consistency of function.   After acute rehab he will transition to 1 [address]. psychiatric floor at [facility] for further treatment regarding substance overuse abuse anxiety and depression. 2. Bowel constipation and Bladder dysfunction   overactive, neurogenic bladder:  frequent toileting, ambulate to bathroom with assistance, check post void residuals. Check for C.difficile x1 if >2 loose stools in 24 hours, continue bowel & bladder program.  Monitor for UTI symptoms including lethargy and confusion    3. Severe right lower extremity pain secondary to sciatic vs deep peroneal nerve injury secondary to overdose and generalized OA pain: reassess pain every shift and prior to and after each therapy session, give prn Tylenol   and consider scheduled Tylenol, modalities prn in therapy, consider Lidoderm, K-pad prn.     4. Skin breakdown   risk:  continue pressure relief program.  Daily skin exams and reports from nursing. 5. Severe fatigue due to immobility and nutritional deficits: monitoring for dysphagia   Add vitamin B12 vitamin D and CoQ10 titrate dosing and add protein supplementation with low carb content. 6. Complex discharge planning:   Discussed with care team-last 24 hour events noted. I will continue to follow along and reassess functional and medical status as we strive to improve patient's functional and medical outcomes progressing to the most efficient and lowest level of care. Complex Active General Medical Issues that complicate care:     1. Principal Problem:    Acute encephalopathy  Active Problems:    Drug abuse ([facility] [address]    Altered mental status     impaired mobility and ADLs dt encephalopathy     Depressive disorder    Vitamin D deficiency    Benzodiazepine dependence (HCC)    Right foot drop    Cerebral edema (HCC)    Weakness of right lower extremity    ADHD (attention deficit hyperactivity disorder)    ROSALES (generalized anxiety disorder)    Polydrug dependence including opioid type drug with continuous use with complication (HCC)    Attention deficit hyperactivity disorder (ADHD)  Resolved Problems:    * No resolved hospital problems.  * Events and functional changes in the past 24 hours reviewed improvements in functional status are encouraging OK for Acute rehab today.       Focus of today's plan-   transition to acute rehab pending caresourse approval      Dino Do D.O., PM&R     Attending    [address]

## 2022-07-08 NOTE — PROGRESS NOTES
Neurology Follow up    SUBJECTIVE:   Patient seen and examined for neurology follow-up for acute encephalopathy in the setting of Royle kratom overuse, transaminitis, EDGAR, rhabdomyolysis resulting in provoked seizure and cerebral edema. Patient is currently alert and oriented x3, no acute distress, cooperative. He reports he still having some forgetfulness and confusion. No focal neurodeficits. Complaining of right leg pain, right hip pain, numbness to the right foot. Today actually reports that the numbness is of the entire right leg but states that has been going on since admission. None prior to admission. Spoke with physical therapy who reports that yesterday, when working with him, he was noted to be significantly more weak on the right lower extremity. Hyperreflexic in the lower extremities. Patient is having bowel dysfunction and bladder incontinence. Patient is awaiting to transfer to rehab today. Patient appears to be more anxious today and tells me that he is not feeling well but does not know exactly what it is. He is just uneasy in bed.   He had also exhibited some suicidal ideation and therefore psychiatrist on-call Case was discussed with the psychiatrist on-call  Current Facility-Administered Medications   Medication Dose Route Frequency Provider Last Rate Last Admin    melatonin tablet 3 mg  3 mg Oral Nightly PRN Richrd Medico Sedar, DO   3 mg at 07/07/22 2320    ibuprofen (ADVIL;MOTRIN) tablet 400 mg  400 mg Oral Q6H PRN Katy Freedman MD   400 mg at 07/06/22 1910    dexamethasone (DECADRON) injection 4 mg  4 mg IntraVENous TID Katy Freedman MD   4 mg at 07/08/22 0828    amoxicillin-clavulanate (AUGMENTIN) 875-125 MG per tablet 1 tablet  1 tablet Oral 2 times per day Cris Alfaro MD   1 tablet at 07/08/22 0828    polyethylene glycol (GLYCOLAX) packet 17 g  17 g Oral Daily Jordan Forrest MD   17 g at 07/08/22 0828    lidocaine 4 % external patch 1 patch  1 patch TransDERmal Daily Alejandra Joseph MD   1 patch at 07/08/22 2142    lactated ringers infusion   IntraVENous Continuous Addis Saleh  mL/hr at 07/07/22 1615 New Bag at 07/07/22 1615    0.9 % sodium chloride bolus  1,000 mL IntraVENous Once Yuriy Cox MD        sodium chloride flush 0.9 % injection 5-40 mL  5-40 mL IntraVENous 2 times per day Alejandra Joseph MD   10 mL at 07/08/22 0829    sodium chloride flush 0.9 % injection 5-40 mL  5-40 mL IntraVENous PRN Alejandra Joseph MD        0.9 % sodium chloride infusion   IntraVENous PRN Alejandra Joseph MD        enoxaparin (LOVENOX) injection 40 mg  40 mg SubCUTAneous Daily Alejandra Joseph MD   40 mg at 07/08/22 0828    ondansetron (ZOFRAN-ODT) disintegrating tablet 4 mg  4 mg Oral Q8H PRN Alejandra Joseph MD        Or    ondansetron [facility]) injection 4 mg  4 mg IntraVENous Q6H PRN Alejandra Joseph MD        potassium chloride Dariel People M) extended release tablet 40 mEq  40 mEq Oral PRN Alejandra Joseph MD   40 mEq at 07/03/22 0801    Or    potassium bicarb-citric acid (EFFER-K) effervescent tablet 40 mEq  40 mEq Oral PRN Alejandra Joseph MD        Or    potassium chloride 10 mEq/100 mL IVPB (Peripheral Line)  10 mEq IntraVENous PRN Alejandra Joseph MD           PHYSICAL EXAM:    /78   Pulse 99   Temp 98.1 °F (36.7 °C) (Oral)   Resp 16   Ht 5' 10\" (1.778 m)   Wt 157 lb 3 oz (71.3 kg)   SpO2 100%   BMI 22.55 kg/m²    General Appearance:      Skin:  normal  CVS - Normal sounds, No murmurs , No carotid Bruits  RS -CTA  Abdomen Soft, BS present  Review of Systems   Constitutional: Negative for fever. HENT: Negative for hearing loss and trouble swallowing. Eyes: Negative for visual disturbance. Respiratory: Negative for cough, choking, shortness of breath and wheezing. Cardiovascular: Negative for chest pain, palpitations and leg swelling. Gastrointestinal: Negative for nausea and vomiting. Musculoskeletal: Positive for back pain.  Negative for gait problem, joint swelling, myalgias, neck pain and neck stiffness. Skin: Negative for color change. Neurological: Positive for weakness and numbness. Negative for dizziness, tremors, seizures, syncope, facial asymmetry, speech difficulty, light-headedness and headaches. Psychiatric/Behavioral: Positive for confusion. Negative for behavioral problems, hallucinations and sleep disturbance. Exam as noted above with some agitation. Mental Status Exam:             Level of Alertness:   awake            Orientation:   person, place, time                      Attention/Concentration:  normal            Language:  normal      Funduscopic Exam:     Cranial Nerves                Cranial nerves III, IV, VI           Extraocular Movements: intact      Cranial nerve V           Facial sensation:  intact      Cranial nerve VII           Facial strength: intact      Cranial nerve VIII           Hearing:  intact      Cranial nerve IX           Palate:  intact      Cranial nerve XI         Shoulder shrug:  intact      Cranial nerve XII          Tongue movement:  normal    Motor:    Drift:  absent  Motor exam is symmetrical 5 out of 5 all extremities with exception of the right foot dorsiflexion which is 4 out of 5.    Tone:  normal  Abnormal Movements:  absent            Sensory:        Pinprick             Right Upper Extremity:  normal             Left Upper Extremity:  normal             Right Lower Extremity:  normal             Left Lower Extremity:  normal           Vibration                         Touch            Proprioception                 Coordination:           Finger/Nose   Right:  normal              Left:  normal          Heel-Knee-Shin                Right:  normal              Left:  normal          Rapid Alternating Movements              Right:  normal              Left:  normal          Gait:                       Casual: Gait is deferred          Reflexes:             Deep Tendon Reflexes: HISTORY:  stroke . COMPARISON: Head CT 6/30/2022. TECHNIQUE: Multiplanar MR imaging of the head was performed without contrast. FINDINGS: Areas of restricted diffusion within the globus pallidus, supratentorial white matter and splenium of the corpus callosum are most consistent with carbon monoxide poisoning/hypoxemia and/or other toxic encephalopathy. There is no intracranial hemorrhage, mass effect, midline shift, extra-axial collection, significant cerebral volume loss or other complication identified. FINDINGS CONSISTENT WITH CARBON MONOXIDE POISONING/HYPOXEMIA AND/OR OTHER TOXIC ENCEPHALOPATHY. NO INTRACRANIAL HEMORRHAGE OR COMPLICATION IDENTIFIED. Recent Labs     07/06/22  0510 07/07/22  0418 07/08/22  0525   WBC 18.0* 19.5* 22.1*   HGB 12.8* 12.8* 13.0*    475* 538*     Recent Labs     07/06/22  0510 07/07/22  0418 07/08/22  0525    139 139   K 4.4 4.7 4.1    99 100   CO2 22 25 23   BUN 11 13 15   CREATININE 0.48* 0.56* 0.50*   GLUCOSE 106* 103* 102*     Recent Labs     07/07/22  2130 07/08/22  0525 07/08/22  0901   BILITOT 0.3 0.5 0.4   ALKPHOS 88 86 82   * 99* 90*   * 271* 254*     Lab Results   Component Value Date/Time    PROTIME 14.7 07/03/2022 04:50 AM    INR 1.2 07/03/2022 04:50 AM     No results found for: LITHIUM, DILFRTOT, VALPROATE    ASSESSMENT AND PLAN  Hypoxic ischemic encephalopathy with a seizure. Patient appears to have taken some medications that might be contributing with some hepatic dysfunction as well. Patient reports he was down 2 days and cardiac arrhythmia with hypoxic ischemic encephalopathy with an abnormal MRI as noted. MRI shows multiple areas of hypoxemic damage though underlying embolic strokes must be ruled out. Recommended a complete hypercoagulable work-up for now. Findings were discussed with the patient and I am not seeing anything focal the patient is likely to have some sustained memory issues.   We will follow this up with an EEG. Patient is remained stable without any new complications except for pain in the right leg. Examination was obtained and he has some tenderness and will require evaluation for DVT. Patient has not any seizures. He has significantly abnormal MRI with diffuse edema and require follow-up MRI in 2 days. Patient is somewhat sleepy this morning though awakens and appears to be appropriate. Complains of some left leg weakness. He is has not any headache. A follow-up MRI with contrast to be obtained given his significant abnormal findings on his initial MRI which may suggest hypoxic ischemic encephalopathy secondary status epilepticus. Liver functions are improving. DVT evaluation of the right leg was negative    7/5/2022:  Acute encephalopathy with provoked seizure secondary to royal kratom overuse with urine drug screen positive for fentanyl, benzodiazepines, amphetamines. MRI of the brain with and without contrast done on 7/4/2022 shows mildly increased edema within the bilateral globi pallidi, splenium of the corpus callosum and supratentorial white matter. No mass-effect or midline shift. No hemorrhage. Patient continues on dexamethasone 4 mg 3 times daily. LFTs gradually improving. GI following. Rhabdomyolysis improving  EDGAR resolved  EEG pending. Patient with complaints of right leg pain, right foot numbness and having bowel incontinence. He is hyperreflexic in the lower extremities. Will obtain screening MRI of cervical, thoracic and lumbar spine. We will continue to follow. I have personally performed a face to face diagnostic evaluation on this patient, reviewed all data and investigations, and am the sole provider of all clinical decisions on the neurological status of this patient. Exam noted. Patient has multiple symptoms of edema in the brain. Repeat MRI was obtained and has some mild increase in yesterday started on Decadron.   He remains mostly nonfocal though he may have some cognitive issues. The hyperreflexia is explained by bilateral cerebral pathology. And 50% time spent on evaluating his patient myself and evaluating his MRI.    7/6/22:   EEG completed with report pending  Patient still having bowel and bladder incontinence as well as numbness to the right foot. Screening MRI of the spine pending. Discharge planning for [facility]  We will plan for repeat MRI in 2 weeks  We will taper dexamethasone after 1 week    I have personally performed a face to face diagnostic evaluation on this patient, reviewed all data and investigations, and am the sole provider of all clinical decisions on the neurological status of this patient. She is examined and has a nonfocal examination except for some left lower extremity weakness. We did not attempt to walk him but he was able to transfer to the chair as I am told. All his investigations so far have been normal EEG preliminary did not show anything significant. It is more than likely that this is a case of hypoxic ischemic encephalopathy secondary to underlying status epilepticus. Patient is on Decadron which we will slowly taper and a follow-up MRI in 2 weeks. Patient require rehabilitation and is likely to have some degree of cognitive deficits from what we see. 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 We will have to see how much of this he recovers. May be some permanent deficits. This was discussed with patient and family. Continue Decadron for cerebral edema with plan to taper starting around 7/11/2022. Repeat MRI brain in 2 weeks per above. Patient with ongoing right lower extremity weakness, numbness and bowel and bladder dysfunction. Awaiting screening MRI of the spine pending. Rehab consult pending.   Okay to DC to rehab from neurology standpoint. I have personally performed a face to face diagnostic evaluation on this patient, reviewed all data and investigations, and am the sole provider of all clinical decisions on the neurological status of this patient. Patient remains encephalopathic in the face of a normal EEG 1 would suspect that this might be also part of his ADD as he is not on medications. We will transfer him to rehabilitation with a taper of Decadron and reinstate at least Adderall in the next few days given these findings. Findings discussed with Dr. Brandon Hopkins and Dr. Deanna Juarez for transfer to rehabilitation with a follow-up MRI in a few days. No seizures are noted. 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 Begin taper on 7/11/2022. Repeat MRI of the brain in 2 weeks  MRI of the spine is been placed on hold for transfer to rehab. Patient has not been transferred to rehab yet as pre-CERT is still pending. Addendum: Patient with insomnia. Family requesting medication. He is also reporting depression with suicidal ideation. I consulted psych to see him regarding this new suicidal concern and also initiated trazodone 50 mg nightly. This was discussed with Dr. Sorin Thomas. I have personally performed a face to face diagnostic evaluation on this patient, reviewed all data and investigations, and am the sole provider of all clinical decisions on the neurological status of this patient. Patient appears to feeling very uneasy and anxious today. He had exhibited some suicidal ideation psychiatrist on-call I discussed the case with psychiatry and there is a suggestion that this also could be steroid psychosis. Low-dose benzodiazepines will be added and we added trazodone for sleep. Than 60% time spent evaluating this patient and management      Rayray Thomas MD, Ana Paula Farris, American Board of Psychiatry & Neurology  Board Certified in Vascular Neurology  Board Certified in Neuromuscular Medicine  Certified in Neurorehabilitation

## 2022-07-08 NOTE — FLOWSHEET NOTE
Pt denied suicidal ideations at this time. C/O of increased anxiety. Updated Dr. Chintan Jefferson and received new orders for anxiety. Electronically signed by Gerald Dos Santos RN on 7/8/2022 at 7:49 PM

## 2022-07-08 NOTE — FLOWSHEET NOTE
Assessment completed. VS obtained. Alert et oriented x4. Pt is calm et cooperative. No c/o at this time. Family at bedside. Electronically signed by Jigar Bryan RN on 7/8/2022 at 11:18 AM

## 2022-07-08 NOTE — CARE COORDINATION
ANNALISA spoke with ERNESTO. Per ERNESTO, Pre-cert still pending. ANNALISA will follow.      Electronically signed by ANNALISA Choudhury on 7/8/2022 at 12:14 PM

## 2022-07-09 LAB
ALBUMIN SERPL-MCNC: 3.8 G/DL (ref 3.5–4.6)
ALP BLD-CCNC: 79 U/L (ref 35–104)
ALT SERPL-CCNC: 205 U/L (ref 0–41)
ANION GAP SERPL CALCULATED.3IONS-SCNC: 13 MEQ/L (ref 9–15)
AST SERPL-CCNC: 57 U/L (ref 0–40)
BASOPHILS ABSOLUTE: 0 K/UL (ref 0–0.2)
BASOPHILS RELATIVE PERCENT: 0.3 %
BILIRUB SERPL-MCNC: 0.4 MG/DL (ref 0.2–0.7)
BUN BLDV-MCNC: 15 MG/DL (ref 6–20)
CALCIUM SERPL-MCNC: 9.1 MG/DL (ref 8.5–9.9)
CHLORIDE BLD-SCNC: 102 MEQ/L (ref 95–107)
CO2: 25 MEQ/L (ref 20–31)
CREAT SERPL-MCNC: 0.54 MG/DL (ref 0.7–1.2)
EOSINOPHILS ABSOLUTE: 0.1 K/UL (ref 0–0.7)
EOSINOPHILS RELATIVE PERCENT: 0.4 %
GFR AFRICAN AMERICAN: >60
GFR NON-AFRICAN AMERICAN: >60
GLOBULIN: 2.7 G/DL (ref 2.3–3.5)
GLUCOSE BLD-MCNC: 107 MG/DL (ref 70–99)
HCT VFR BLD CALC: 36.3 % (ref 42–52)
HEMOGLOBIN: 12.6 G/DL (ref 14–18)
LYMPHOCYTES ABSOLUTE: 2.3 K/UL (ref 1–4.8)
LYMPHOCYTES RELATIVE PERCENT: 11.9 %
MCH RBC QN AUTO: 29.3 PG (ref 27–31.3)
MCHC RBC AUTO-ENTMCNC: 34.7 % (ref 33–37)
MCV RBC AUTO: 84.4 FL (ref 80–100)
MONOCYTES ABSOLUTE: 1 K/UL (ref 0.2–0.8)
MONOCYTES RELATIVE PERCENT: 4.9 %
NEUTROPHILS ABSOLUTE: 16.2 K/UL (ref 1.4–6.5)
NEUTROPHILS RELATIVE PERCENT: 82.5 %
PDW BLD-RTO: 12.9 % (ref 11.5–14.5)
PLATELET # BLD: 504 K/UL (ref 130–400)
POTASSIUM REFLEX MAGNESIUM: 3.9 MEQ/L (ref 3.4–4.9)
RBC # BLD: 4.3 M/UL (ref 4.7–6.1)
SODIUM BLD-SCNC: 140 MEQ/L (ref 135–144)
TOTAL CK: 1051 U/L (ref 0–190)
TOTAL CK: 1437 U/L (ref 0–190)
TOTAL PROTEIN: 6.5 G/DL (ref 6.3–8)
WBC # BLD: 19.6 K/UL (ref 4.8–10.8)

## 2022-07-09 PROCEDURE — 6370000000 HC RX 637 (ALT 250 FOR IP): Performed by: INTERNAL MEDICINE

## 2022-07-09 PROCEDURE — 2580000003 HC RX 258: Performed by: INTERNAL MEDICINE

## 2022-07-09 PROCEDURE — 80053 COMPREHEN METABOLIC PANEL: CPT

## 2022-07-09 PROCEDURE — 1210000000 HC MED SURG R&B

## 2022-07-09 PROCEDURE — 99233 SBSQ HOSP IP/OBS HIGH 50: CPT | Performed by: PSYCHIATRY & NEUROLOGY

## 2022-07-09 PROCEDURE — 6360000002 HC RX W HCPCS: Performed by: FAMILY MEDICINE

## 2022-07-09 PROCEDURE — 6360000002 HC RX W HCPCS: Performed by: INTERNAL MEDICINE

## 2022-07-09 PROCEDURE — 6370000000 HC RX 637 (ALT 250 FOR IP): Performed by: NURSE PRACTITIONER

## 2022-07-09 PROCEDURE — 6370000000 HC RX 637 (ALT 250 FOR IP): Performed by: PSYCHIATRY & NEUROLOGY

## 2022-07-09 PROCEDURE — 82550 ASSAY OF CK (CPK): CPT

## 2022-07-09 PROCEDURE — 85025 COMPLETE CBC W/AUTO DIFF WBC: CPT

## 2022-07-09 PROCEDURE — 36415 COLL VENOUS BLD VENIPUNCTURE: CPT

## 2022-07-09 RX ORDER — ESCITALOPRAM OXALATE 10 MG/1
10 TABLET ORAL DAILY
Status: DISCONTINUED | OUTPATIENT
Start: 2022-07-10 | End: 2022-07-11 | Stop reason: HOSPADM

## 2022-07-09 RX ADMIN — ALPRAZOLAM 0.5 MG: 0.5 TABLET ORAL at 11:48

## 2022-07-09 RX ADMIN — ALPRAZOLAM 0.5 MG: 0.5 TABLET ORAL at 19:56

## 2022-07-09 RX ADMIN — TRAZODONE HYDROCHLORIDE 50 MG: 50 TABLET ORAL at 19:56

## 2022-07-09 RX ADMIN — DEXAMETHASONE SODIUM PHOSPHATE 4 MG: 4 INJECTION, SOLUTION INTRAMUSCULAR; INTRAVENOUS at 08:35

## 2022-07-09 RX ADMIN — AMOXICILLIN AND CLAVULANATE POTASSIUM 1 TABLET: 875; 125 TABLET, FILM COATED ORAL at 19:56

## 2022-07-09 RX ADMIN — SODIUM CHLORIDE, POTASSIUM CHLORIDE, SODIUM LACTATE AND CALCIUM CHLORIDE: 600; 310; 30; 20 INJECTION, SOLUTION INTRAVENOUS at 11:40

## 2022-07-09 RX ADMIN — DEXAMETHASONE SODIUM PHOSPHATE 4 MG: 4 INJECTION, SOLUTION INTRAMUSCULAR; INTRAVENOUS at 19:55

## 2022-07-09 RX ADMIN — AMOXICILLIN AND CLAVULANATE POTASSIUM 1 TABLET: 875; 125 TABLET, FILM COATED ORAL at 08:35

## 2022-07-09 RX ADMIN — ESCITALOPRAM OXALATE 15 MG: 10 TABLET ORAL at 08:35

## 2022-07-09 RX ADMIN — DEXAMETHASONE SODIUM PHOSPHATE 4 MG: 4 INJECTION, SOLUTION INTRAMUSCULAR; INTRAVENOUS at 16:33

## 2022-07-09 RX ADMIN — ENOXAPARIN SODIUM 40 MG: 100 INJECTION SUBCUTANEOUS at 08:37

## 2022-07-09 ASSESSMENT — ENCOUNTER SYMPTOMS
NAUSEA: 0
TROUBLE SWALLOWING: 0
COUGH: 0
WHEEZING: 0
SHORTNESS OF BREATH: 0
COLOR CHANGE: 0
VOMITING: 0
BACK PAIN: 1
CHOKING: 0

## 2022-07-09 ASSESSMENT — PAIN SCALES - GENERAL
PAINLEVEL_OUTOF10: 0

## 2022-07-09 ASSESSMENT — PAIN SCALES - WONG BAKER
WONGBAKER_NUMERICALRESPONSE: 0
WONGBAKER_NUMERICALRESPONSE: 0

## 2022-07-09 NOTE — PROGRESS NOTES
Neurology Follow up    SUBJECTIVE:   Patient seen and examined for neurology follow-up for acute encephalopathy in the setting of Royle kratom overuse, transaminitis, EDGAR, rhabdomyolysis resulting in provoked seizure and cerebral edema. Patient is currently alert and oriented x3, no acute distress, cooperative. He reports he still having some forgetfulness and confusion. No focal neurodeficits. Complaining of right leg pain, right hip pain, numbness to the right foot. Today actually reports that the numbness is of the entire right leg but states that has been going on since admission. None prior to admission. Spoke with physical therapy who reports that yesterday, when working with him, he was noted to be significantly more weak on the right lower extremity. Hyperreflexic in the lower extremities. Patient is having bowel dysfunction and bladder incontinence. Patient is awaiting to transfer to rehab today. Patient appears to be more anxious today and tells me that he is not feeling well but does not know exactly what it is. He is just uneasy in bed. He had also exhibited some suicidal ideation and therefore psychiatrist on-call Case was discussed with the psychiatrist on-call    Eventually no other medication was given and I did prescribe him Xanax which he is taking and is feeling much better today.   Current Facility-Administered Medications   Medication Dose Route Frequency Provider Last Rate Last Admin    [START ON 7/10/2022] escitalopram (LEXAPRO) tablet 10 mg  10 mg Oral Daily Guru Toscano MD        traZODone (DESYREL) tablet 50 mg  50 mg Oral Nightly YING Marrero - CNP   50 mg at 07/08/22 2021    ALPRAZolam (XANAX) tablet 0.5 mg  0.5 mg Oral TID PRN Prabhakar Foy MD   0.5 mg at 07/08/22 1755    melatonin tablet 3 mg  3 mg Oral Nightly PRN Ashley Mcgraw DO   3 mg at 07/08/22 2021    ibuprofen (ADVIL;MOTRIN) tablet 400 mg  400 mg Oral Q6H PRN Guru Toscano MD   400 mg at 07/08/22 1523    dexamethasone (DECADRON) injection 4 mg  4 mg IntraVENous TID Tosha Gupta MD   4 mg at 07/09/22 0835    amoxicillin-clavulanate (AUGMENTIN) 875-125 MG per tablet 1 tablet  1 tablet Oral 2 times per day Brian Rockwell MD   1 tablet at 07/09/22 0835    polyethylene glycol (GLYCOLAX) packet 17 g  17 g Oral Daily Vicki Thompson MD   17 g at 07/08/22 0828    lidocaine 4 % external patch 1 patch  1 patch TransDERmal Daily Vicki Thompson MD   1 patch at 07/08/22 7492    lactated ringers infusion   IntraVENous Continuous Tosha Gupta  mL/hr at 07/08/22 1358 New Bag at 07/08/22 1358    0.9 % sodium chloride bolus  1,000 mL IntraVENous Once Kita Zazueta MD        sodium chloride flush 0.9 % injection 5-40 mL  5-40 mL IntraVENous 2 times per day Vicki Thompson MD   10 mL at 07/08/22 2021    sodium chloride flush 0.9 % injection 5-40 mL  5-40 mL IntraVENous PRN Vicki Thompson MD        0.9 % sodium chloride infusion   IntraVENous PRN Vicki Thompson MD        enoxaparin (LOVENOX) injection 40 mg  40 mg SubCUTAneous Daily Vicki Thompson MD   40 mg at 07/09/22 2551    ondansetron (ZOFRAN-ODT) disintegrating tablet 4 mg  4 mg Oral Q8H PRN Vicki Thompson MD        Or    ondansetron [facility]) injection 4 mg  4 mg IntraVENous Q6H PRN Vicki Thompson MD        potassium chloride Jose F Shall M) extended release tablet 40 mEq  40 mEq Oral PRN Vicki Thompson MD   40 mEq at 07/03/22 0801    Or    potassium bicarb-citric acid (EFFER-K) effervescent tablet 40 mEq  40 mEq Oral PRN Vicki Thompson MD        Or    potassium chloride 10 mEq/100 mL IVPB (Peripheral Line)  10 mEq IntraVENous PRN Vicki Thompson MD           PHYSICAL EXAM:    /78   Pulse 99   Temp 98.1 °F (36.7 °C) (Oral)   Resp 16   Ht 5' 10\" (1.778 m)   Wt 157 lb 3 oz (71.3 kg)   SpO2 100%   BMI 22.55 kg/m²    General Appearance:      Skin:  normal  CVS - Normal sounds, No murmurs , No carotid Bruits  RS -CTA  Abdomen Soft, BS present  Review of Systems   Constitutional: Negative for fever. HENT: Negative for hearing loss and trouble swallowing. Eyes: Negative for visual disturbance. Respiratory: Negative for cough, choking, shortness of breath and wheezing. Cardiovascular: Negative for chest pain, palpitations and leg swelling. Gastrointestinal: Negative for nausea and vomiting. Musculoskeletal: Positive for back pain. Negative for gait problem, joint swelling, myalgias, neck pain and neck stiffness. Skin: Negative for color change. Neurological: Positive for weakness and numbness. Negative for dizziness, tremors, seizures, syncope, facial asymmetry, speech difficulty, light-headedness and headaches. Psychiatric/Behavioral: Positive for confusion. Negative for behavioral problems, hallucinations and sleep disturbance. Exam as noted above and patient is much calmer today with the Xanax  Mental Status Exam:             Level of Alertness:   awake            Orientation:   person, place, time                      Attention/Concentration:  normal            Language:  normal      Funduscopic Exam:     Cranial Nerves                Cranial nerves III, IV, VI           Extraocular Movements: intact      Cranial nerve V           Facial sensation:  intact      Cranial nerve VII           Facial strength: intact      Cranial nerve VIII           Hearing:  intact      Cranial nerve IX           Palate:  intact      Cranial nerve XI         Shoulder shrug:  intact      Cranial nerve XII          Tongue movement:  normal    Motor:    Drift:  absent  Motor exam is symmetrical 5 out of 5 all extremities with exception of the right foot dorsiflexion which is 4 out of 5.    Tone:  normal  Abnormal Movements:  absent            Sensory:        Pinprick             Right Upper Extremity:  normal             Left Upper Extremity:  normal             Right Lower Extremity:  normal             Left Lower Extremity:  normal           Vibration                         Touch            Proprioception                 Coordination:           Finger/Nose   Right:  normal              Left:  normal          Heel-Knee-Shin                Right:  normal              Left:  normal          Rapid Alternating Movements              Right:  normal              Left:  normal          Gait:                       Casual: Gait is deferred          Reflexes:             Deep Tendon Reflexes:             Reflexes are 3+             Plantar response:                Right:  downgoing               Left:  downgoing  Exam very much unchanged from yesterday. Normal nonfocal examination noted today. Vascular:  Cardiac Exam:  normal         CT Head WO Contrast    Result Date: 6/30/2022  CT Brain. Contrast medium:  without contrast.. History: Altered mental status. Technical factors: CT imaging of the brain was obtained and formatted as 5 mm contiguous axial images. 2.5 mm contiguous axial images were obtained through the osseous structures. Sagittal and coronal reconstruction obtained during postprocessing. Comparison:  None. Findings: Extra-axial spaces:  Normal. Intracranial hemorrhage:  None. Ventricular system: [Negative. Basal Cisterns:  Without anomaly. Cerebral Parenchyma: Bilateral, symmetric areas of decreased attenuation exerting no mass effect measuring approximately 9 mm in size since found within the bilateral globus pallidus (series 2, image 17). Midline Shift:  None. Cerebellum:  No anomaly identified. Paranasal sinuses and mastoid air cells:  No anomaly identified. Visualized Orbits:  Negative. Impression: Round symmetric areas decreased attenuation bilateral globus pallidus. This finding may be seen in patients experiencing hypoxia, i.e., carbon monoxide poisoning.  All CT scans at this facility use dose modulation, iterative reconstruction, and/or weight based dosing when appropriate to reduce radiation dose to as low as reasonably achievable. XR CHEST PORTABLE    Result Date: 7/1/2022  EXAMINATION: XR CHEST PORTABLE CLINICAL HISTORY: FEVER COMPARISONS: JANUARY 29, 2018 FINDINGS: Osseous structures are intact. Cardiopericardial silhouette is normal. Pulmonary vasculature is normal. Right lung is clear. Patchy area of increased opacity left upper lung. LEFT UPPER LUNG ATELECTASIS/PNEUMONIA. MRI BRAIN WO CONTRAST    Result Date: 7/1/2022  MRI BRAIN WO CONTRAST : 7/1/2022 CLINICAL HISTORY:  stroke . COMPARISON: Head CT 6/30/2022. TECHNIQUE: Multiplanar MR imaging of the head was performed without contrast. FINDINGS: Areas of restricted diffusion within the globus pallidus, supratentorial white matter and splenium of the corpus callosum are most consistent with carbon monoxide poisoning/hypoxemia and/or other toxic encephalopathy. There is no intracranial hemorrhage, mass effect, midline shift, extra-axial collection, significant cerebral volume loss or other complication identified. FINDINGS CONSISTENT WITH CARBON MONOXIDE POISONING/HYPOXEMIA AND/OR OTHER TOXIC ENCEPHALOPATHY. NO INTRACRANIAL HEMORRHAGE OR COMPLICATION IDENTIFIED. Recent Labs     07/07/22 0418 07/08/22 0525 07/09/22  0456   WBC 19.5* 22.1* 19.6*   HGB 12.8* 13.0* 12.6*   * 538* 504*     Recent Labs     07/07/22 0418 07/08/22  0525 07/09/22  0456    139 140   K 4.7 4.1 3.9   CL 99 100 102   CO2 25 23 25   BUN 13 15 15   CREATININE 0.56* 0.50* 0.54*   GLUCOSE 103* 102* 107*     Recent Labs     07/08/22  0525 07/08/22  0901 07/09/22  0456   BILITOT 0.5 0.4 0.4   ALKPHOS 86 82 79   AST 99* 90* 57*   * 254* 205*     Lab Results   Component Value Date/Time    PROTIME 14.7 07/03/2022 04:50 AM    INR 1.2 07/03/2022 04:50 AM     No results found for: LITHIUM, DILFRTOT, VALPROATE    ASSESSMENT AND PLAN  Hypoxic ischemic encephalopathy with a seizure.   Patient appears to have taken some medications that might be contributing with some hepatic dysfunction as well. Patient reports he was down 2 days and cardiac arrhythmia with hypoxic ischemic encephalopathy with an abnormal MRI as noted. MRI shows multiple areas of hypoxemic damage though underlying embolic strokes must be ruled out. Recommended a complete hypercoagulable work-up for now. Findings were discussed with the patient and I am not seeing anything focal the patient is likely to have some sustained memory issues. We will follow this up with an EEG. Patient is remained stable without any new complications except for pain in the right leg. Examination was obtained and he has some tenderness and will require evaluation for DVT. Patient has not any seizures. He has significantly abnormal MRI with diffuse edema and require follow-up MRI in 2 days. Patient is somewhat sleepy this morning though awakens and appears to be appropriate. Complains of some left leg weakness. He is has not any headache. A follow-up MRI with contrast to be obtained given his significant abnormal findings on his initial MRI which may suggest hypoxic ischemic encephalopathy secondary status epilepticus. Liver functions are improving. DVT evaluation of the right leg was negative    7/5/2022:  Acute encephalopathy with provoked seizure secondary to royal kratom overuse with urine drug screen positive for fentanyl, benzodiazepines, amphetamines. MRI of the brain with and without contrast done on 7/4/2022 shows mildly increased edema within the bilateral globi pallidi, splenium of the corpus callosum and supratentorial white matter. No mass-effect or midline shift. No hemorrhage. Patient continues on dexamethasone 4 mg 3 times daily. LFTs gradually improving. GI following. Rhabdomyolysis improving  EDGAR resolved  EEG pending. Patient with complaints of right leg pain, right foot numbness and having bowel incontinence. He is hyperreflexic in the lower extremities.   Will obtain screening MRI of cervical, thoracic and lumbar spine. We will continue to follow. I have personally performed a face to face diagnostic evaluation on this patient, reviewed all data and investigations, and am the sole provider of all clinical decisions on the neurological status of this patient. Exam noted. Patient has multiple symptoms of edema in the brain. Repeat MRI was obtained and has some mild increase in yesterday started on Decadron. He remains mostly nonfocal though he may have some cognitive issues. The hyperreflexia is explained by bilateral cerebral pathology. And 50% time spent on evaluating his patient myself and evaluating his MRI.    7/6/22:   EEG completed with report pending  Patient still having bowel and bladder incontinence as well as numbness to the right foot. Screening MRI of the spine pending. Discharge planning for [facility] rehab  We will plan for repeat MRI in 2 weeks  We will taper dexamethasone after 1 week    I have personally performed a face to face diagnostic evaluation on this patient, reviewed all data and investigations, and am the sole provider of all clinical decisions on the neurological status of this patient. She is examined and has a nonfocal examination except for some left lower extremity weakness. We did not attempt to walk him but he was able to transfer to the chair as I am told. All his investigations so far have been normal EEG preliminary did not show anything significant. It is more than likely that this is a case of hypoxic ischemic encephalopathy secondary to underlying status epilepticus. Patient is on Decadron which we will slowly taper and a follow-up MRI in 2 weeks. Patient require rehabilitation and is likely to have some degree of cognitive deficits from what we see.     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 We will have to see how much of this he recovers. May be some permanent deficits. This was discussed with patient and family. Continue Decadron for cerebral edema with plan to taper starting around 7/11/2022. Repeat MRI brain in 2 weeks per above. Patient with ongoing right lower extremity weakness, numbness and bowel and bladder dysfunction. Awaiting screening MRI of the spine pending. Rehab consult pending. Okay to DC to rehab from neurology standpoint. I have personally performed a face to face diagnostic evaluation on this patient, reviewed all data and investigations, and am the sole provider of all clinical decisions on the neurological status of this patient. Patient remains encephalopathic in the face of a normal EEG 1 would suspect that this might be also part of his ADD as he is not on medications. We will transfer him to rehabilitation with a taper of Decadron and reinstate at least Adderall in the next few days given these findings. Findings discussed with Dr. Misael Garcia and Dr. Jose Luis Ratliff for transfer to rehabilitation with a follow-up MRI in a few days. No seizures are noted. 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 Begin taper on 7/11/2022. Repeat MRI of the brain in 2 weeks  MRI of the spine is been placed on hold for transfer to rehab. Patient has not been transferred to rehab yet as pre-CERT is still pending. Addendum: Patient with insomnia. Family requesting medication. He is also reporting depression with suicidal ideation. I consulted psych to see him regarding this new suicidal concern and also initiated trazodone 50 mg nightly. This was discussed with Dr. Yemi Olivier.     I have personally performed a face to face diagnostic evaluation on this patient, reviewed all data and investigations, and am the sole provider of all clinical decisions on the neurological status of this patient. Patient appears to feeling very uneasy and anxious today. He had exhibited some suicidal ideation psychiatrist on-call I discussed the case with psychiatry and there is a suggestion that this also could be steroid psychosis. Low-dose benzodiazepines will be added and we added trazodone for sleep. Than 60% time spent evaluating this patient and management    Patient appeared to be quite anxious yesterday and therefore I prescribed Xanax and is actually doing quite well. Patient slept somewhat better with the trazodone as well. MRI of the spinal axis was reviewed and does not show any compression. Patient may be discharged to rehabilitation from neurological standpoint with a follow-up MRI in a few days. Findings were discussed with the sister. Was yesterday      Rayray Melendez MD, Billie Mccauley, American Board of Psychiatry & Neurology  Board Certified in Vascular Neurology  Board Certified in Neuromuscular Medicine  Certified in . [address]

## 2022-07-09 NOTE — CARE COORDINATION
Pending Pre-cert at this time. ANNALISA will follow.      Electronically signed by ANNALISA Stokes on 7/9/2022 at 10:36 AM

## 2022-07-09 NOTE — CARE COORDINATION
Checked [employer] for prior authorization status.  Ref# [id number] is STILL PENDING as of 7:30 this am. Electronically signed by Herbie Jay RN on 7/9/22 at 7:30 AM EDT

## 2022-07-09 NOTE — PROGRESS NOTES
Hospitalist Daily Progress Note  Name: Sydni Mcclellan  Age: 33 y.o. Gender: male  CodeStatus: Full Code  Allergies: Peanut-Containing Drug Products  Nuts [Macadamia Nut Oil]  Peanut Oil  Shellfish-Derived Products    Chief Complaint:Drug Overdose (pt has been taking royal kratom (hasn't left his apartment in a few days). Well check. )    Primary Care Provider: YING Roy CNP    InpatientTreatment Team: Treatment Team: Attending Provider: Addis Saleh MD; Consulting Physician: Bibiana Sosa MD; Consulting Physician: Aryan Hobson MD; Consulting Physician: Nelly Montes De Oca MD; Registered Nurse: Asia Bryant RN; : Prince Carmenza RN; Respiratory Therapist (Day): Gillian Klein RCP; Utilization Reviewer: Nawaf Gray RN    Admission Date: 6/30/2022      Subjective: Patient is awake; alert; denies chset pain or pressure; 12 point ROS negative    Physical Exam  Vitals and nursing note reviewed. Constitutional:       Appearance: Normal appearance. Cardiovascular:      Rate and Rhythm: Normal rate and regular rhythm. Pulmonary:      Effort: Pulmonary effort is normal.      Breath sounds: Normal breath sounds. Abdominal:      General: Bowel sounds are normal.      Palpations: Abdomen is soft. Musculoskeletal:         General: Normal range of motion. Skin:     General: Skin is warm and dry. Neurological:      Mental Status: He is alert.       Comments: Slow, indirect answers       Medications:  Reviewed    Infusion Medications:    lactated ringers 152 mL/hr at 07/08/22 1358    sodium chloride       Scheduled Medications:    [START ON 7/10/2022] escitalopram  10 mg Oral Daily    traZODone  50 mg Oral Nightly    dexamethasone  4 mg IntraVENous TID    amoxicillin-clavulanate  1 tablet Oral 2 times per day    polyethylene glycol  17 g Oral Daily    lidocaine  1 patch TransDERmal Daily    sodium chloride  1,000 mL IntraVENous Once    sodium chloride flush  5-40 mL IntraVENous 2 times per day    enoxaparin  40 mg SubCUTAneous Daily     PRN Meds: ALPRAZolam, melatonin, ibuprofen, sodium chloride flush, sodium chloride, ondansetron **OR** ondansetron, potassium chloride **OR** potassium alternative oral replacement **OR** potassium chloride    Labs:   Recent Labs     07/07/22 0418 07/08/22  0525 07/09/22  0456   WBC 19.5* 22.1* 19.6*   HGB 12.8* 13.0* 12.6*   HCT 37.9* 39.3* 36.3*   * 538* 504*     Recent Labs     07/07/22 0418 07/08/22  0525 07/09/22  0456    139 140   K 4.7 4.1 3.9   CL 99 100 102   CO2 25 23 25   BUN 13 15 15   CREATININE 0.56* 0.50* 0.54*   CALCIUM 9.4 9.3 9.1     Recent Labs     07/07/22  0901 07/07/22  0901 07/07/22  2130 07/07/22  2130 07/08/22 0525 07/08/22  0901 07/09/22  0456   *   < > 111*   < > 99* 90* 57*   *   < > 286*   < > 271* 254* 205*   BILIDIR <0.2  --  <0.2  --   --  <0.2  --    BILITOT 0.4   < > 0.3   < > 0.5 0.4 0.4   ALKPHOS 83   < > 88   < > 86 82 79    < > = values in this interval not displayed. No results for input(s): INR in the last 72 hours. Recent Labs     07/08/22  1151 07/08/22 2033 07/09/22  0456   CKTOTAL 2,741* 2,327* 1,437*     Urinalysis:   Lab Results   Component Value Date/Time    NITRU Negative 06/30/2022 04:00 PM    WBCUA 3-5 06/30/2022 04:00 PM    BACTERIA Negative 06/30/2022 04:00 PM    RBCUA 3-5 06/30/2022 04:00 PM    BLOODU LARGE 06/30/2022 04:00 PM    SPECGRAV 1.033 06/30/2022 04:00 PM    GLUCOSEU Negative 06/30/2022 04:00 PM     Radiology:   Most recent    Chest CT      WITH CONTRAST:No results found for this or any previous visit. WITHOUT CONTRAST: No results found for this or any previous visit.       CXR      2-view: Results for orders placed in visit on 01/29/18    XR CHEST STANDARD (2 VW)    Narrative  EXAM: CHEST, 2 VIEWS    COMPARISON: NONE AVAILABLE    REASON FOR EXAMINATION: UPPER RESPIRATORY CONGESTION, DYSPNEA X2 WEEKS    FINDINGS:   Two views of the chest demonstrate normal appearance of the heart and mediastinum. The lungs appear clear. The visualized bony thorax and remainder of the chest appears unremarkable. Impression  NO EVIDENCE OF ACTIVE DISEASE IN THE CHEST. Portable: Results for orders placed during the hospital encounter of 06/30/22    XR CHEST PORTABLE    Narrative  EXAMINATION: XR CHEST PORTABLE    CLINICAL HISTORY: FEVER    COMPARISONS: JANUARY 29, 2018    FINDINGS: Osseous structures are intact. Cardiopericardial silhouette is normal. Pulmonary vasculature is normal. Right lung is clear. Patchy area of increased opacity left upper lung. Impression  LEFT UPPER LUNG ATELECTASIS/PNEUMONIA. Echo No results found for this or any previous visit. Assessment/Plan:    Active Hospital Problems    Diagnosis Date Noted    Cerebral edema ([facility] [address] [G93.6]      Priority: Medium    Weakness of right lower extremity [R29.898]      Priority: Medium     impaired mobility and ADLs dt encephalopathy  [Z74.09, Z78.9] 07/05/2022     Priority: Medium    Vitamin D deficiency [E55.9] 07/05/2022     Priority: Medium    Benzodiazepine dependence ([facility] [address] [F13.20] 07/05/2022     Priority: Medium    Depressive disorder [F32.9] 07/05/2022     Priority: Medium    Right foot drop [M21.371] 07/05/2022     Priority: Medium    Altered mental status [R41.82]      Priority: Medium    Drug abuse ([facility] [address] [F19.10]      Priority: Medium    Acute encephalopathy [G93.40] 06/30/2022     Priority: Medium    Polydrug dependence including opioid type drug with continuous use with complication ([facility] [address] [D86.92] 06/04/2019    ROSALES (generalized anxiety disorder) [F41.1] 06/04/2019    ADHD (attention deficit hyperactivity disorder) [F90.9] 12/05/2012    Attention deficit hyperactivity disorder (ADHD) [F90.9] 12/05/2012     1. Polysubstance overdose/acute ischemic encephalopathy       Repeat MRI shows edema; started on IV decadron per neurology recommendations;  Medically dischartged to acute rehab on Thursday; still awaiting precert  2. Rhabdomyolysis/EDGAR            EDGAR resolved; Rhabdo improving  3. Acute Hepatitis         Probably due to ischemic injury; improving  4.  DVT proph    Electronically signed by Melissa Moscoso MD on 7/9/2022 at 11:36 AM

## 2022-07-10 VITALS
SYSTOLIC BLOOD PRESSURE: 131 MMHG | TEMPERATURE: 98.3 F | OXYGEN SATURATION: 99 % | HEART RATE: 94 BPM | BODY MASS INDEX: 22.5 KG/M2 | WEIGHT: 157.19 LBS | RESPIRATION RATE: 16 BRPM | DIASTOLIC BLOOD PRESSURE: 75 MMHG | HEIGHT: 70 IN

## 2022-07-10 LAB
ALBUMIN SERPL-MCNC: 3.9 G/DL (ref 3.5–4.6)
ALP BLD-CCNC: 87 U/L (ref 35–104)
ALT SERPL-CCNC: 162 U/L (ref 0–41)
ANION GAP SERPL CALCULATED.3IONS-SCNC: 13 MEQ/L (ref 9–15)
AST SERPL-CCNC: 38 U/L (ref 0–40)
BASOPHILS ABSOLUTE: 0.1 K/UL (ref 0–0.2)
BASOPHILS RELATIVE PERCENT: 0.3 %
BILIRUB SERPL-MCNC: 0.3 MG/DL (ref 0.2–0.7)
BUN BLDV-MCNC: 16 MG/DL (ref 6–20)
CALCIUM SERPL-MCNC: 9.2 MG/DL (ref 8.5–9.9)
CHLORIDE BLD-SCNC: 102 MEQ/L (ref 95–107)
CO2: 25 MEQ/L (ref 20–31)
CREAT SERPL-MCNC: 0.57 MG/DL (ref 0.7–1.2)
EOSINOPHILS ABSOLUTE: 0 K/UL (ref 0–0.7)
EOSINOPHILS RELATIVE PERCENT: 0.2 %
GFR AFRICAN AMERICAN: >60
GFR NON-AFRICAN AMERICAN: >60
GLOBULIN: 2.6 G/DL (ref 2.3–3.5)
GLUCOSE BLD-MCNC: 120 MG/DL (ref 70–99)
HCT VFR BLD CALC: 38.1 % (ref 42–52)
HEMOGLOBIN: 13 G/DL (ref 14–18)
LYMPHOCYTES ABSOLUTE: 2.4 K/UL (ref 1–4.8)
LYMPHOCYTES RELATIVE PERCENT: 10.7 %
MCH RBC QN AUTO: 29.2 PG (ref 27–31.3)
MCHC RBC AUTO-ENTMCNC: 34.1 % (ref 33–37)
MCV RBC AUTO: 85.8 FL (ref 80–100)
MONOCYTES ABSOLUTE: 1.1 K/UL (ref 0.2–0.8)
MONOCYTES RELATIVE PERCENT: 5.1 %
NEUTROPHILS ABSOLUTE: 18.3 K/UL (ref 1.4–6.5)
NEUTROPHILS RELATIVE PERCENT: 83.7 %
PDW BLD-RTO: 12.8 % (ref 11.5–14.5)
PLATELET # BLD: 503 K/UL (ref 130–400)
POTASSIUM REFLEX MAGNESIUM: 3.8 MEQ/L (ref 3.4–4.9)
RBC # BLD: 4.44 M/UL (ref 4.7–6.1)
SODIUM BLD-SCNC: 140 MEQ/L (ref 135–144)
TOTAL CK: 656 U/L (ref 0–190)
TOTAL CK: 891 U/L (ref 0–190)
TOTAL PROTEIN: 6.5 G/DL (ref 6.3–8)
WBC # BLD: 21.9 K/UL (ref 4.8–10.8)

## 2022-07-10 PROCEDURE — 6360000002 HC RX W HCPCS: Performed by: FAMILY MEDICINE

## 2022-07-10 PROCEDURE — 2580000003 HC RX 258: Performed by: INTERNAL MEDICINE

## 2022-07-10 PROCEDURE — 6370000000 HC RX 637 (ALT 250 FOR IP): Performed by: NURSE PRACTITIONER

## 2022-07-10 PROCEDURE — 99233 SBSQ HOSP IP/OBS HIGH 50: CPT | Performed by: PSYCHIATRY & NEUROLOGY

## 2022-07-10 PROCEDURE — 36415 COLL VENOUS BLD VENIPUNCTURE: CPT

## 2022-07-10 PROCEDURE — 6370000000 HC RX 637 (ALT 250 FOR IP): Performed by: INTERNAL MEDICINE

## 2022-07-10 PROCEDURE — 82550 ASSAY OF CK (CPK): CPT

## 2022-07-10 PROCEDURE — 1210000000 HC MED SURG R&B

## 2022-07-10 PROCEDURE — 80053 COMPREHEN METABOLIC PANEL: CPT

## 2022-07-10 PROCEDURE — 6370000000 HC RX 637 (ALT 250 FOR IP): Performed by: PSYCHIATRY & NEUROLOGY

## 2022-07-10 PROCEDURE — 99232 SBSQ HOSP IP/OBS MODERATE 35: CPT | Performed by: PHYSICAL MEDICINE & REHABILITATION

## 2022-07-10 PROCEDURE — 6370000000 HC RX 637 (ALT 250 FOR IP): Performed by: FAMILY MEDICINE

## 2022-07-10 PROCEDURE — 6360000002 HC RX W HCPCS: Performed by: INTERNAL MEDICINE

## 2022-07-10 PROCEDURE — 85025 COMPLETE CBC W/AUTO DIFF WBC: CPT

## 2022-07-10 RX ADMIN — Medication 3 MG: at 20:16

## 2022-07-10 RX ADMIN — SODIUM CHLORIDE, POTASSIUM CHLORIDE, SODIUM LACTATE AND CALCIUM CHLORIDE: 600; 310; 30; 20 INJECTION, SOLUTION INTRAVENOUS at 18:54

## 2022-07-10 RX ADMIN — DEXAMETHASONE SODIUM PHOSPHATE 4 MG: 4 INJECTION, SOLUTION INTRAMUSCULAR; INTRAVENOUS at 20:16

## 2022-07-10 RX ADMIN — ALPRAZOLAM 0.5 MG: 0.5 TABLET ORAL at 03:59

## 2022-07-10 RX ADMIN — ENOXAPARIN SODIUM 40 MG: 100 INJECTION SUBCUTANEOUS at 08:17

## 2022-07-10 RX ADMIN — Medication 3 MG: at 03:29

## 2022-07-10 RX ADMIN — ALPRAZOLAM 0.5 MG: 0.5 TABLET ORAL at 20:16

## 2022-07-10 RX ADMIN — SODIUM CHLORIDE, POTASSIUM CHLORIDE, SODIUM LACTATE AND CALCIUM CHLORIDE: 600; 310; 30; 20 INJECTION, SOLUTION INTRAVENOUS at 11:33

## 2022-07-10 RX ADMIN — AMOXICILLIN AND CLAVULANATE POTASSIUM 1 TABLET: 875; 125 TABLET, FILM COATED ORAL at 20:16

## 2022-07-10 RX ADMIN — ALPRAZOLAM 0.5 MG: 0.5 TABLET ORAL at 12:15

## 2022-07-10 RX ADMIN — TRAZODONE HYDROCHLORIDE 50 MG: 50 TABLET ORAL at 20:16

## 2022-07-10 RX ADMIN — ESCITALOPRAM OXALATE 10 MG: 10 TABLET ORAL at 08:17

## 2022-07-10 RX ADMIN — AMOXICILLIN AND CLAVULANATE POTASSIUM 1 TABLET: 875; 125 TABLET, FILM COATED ORAL at 08:17

## 2022-07-10 RX ADMIN — DEXAMETHASONE SODIUM PHOSPHATE 4 MG: 4 INJECTION, SOLUTION INTRAMUSCULAR; INTRAVENOUS at 08:17

## 2022-07-10 RX ADMIN — DEXAMETHASONE SODIUM PHOSPHATE 4 MG: 4 INJECTION, SOLUTION INTRAMUSCULAR; INTRAVENOUS at 14:24

## 2022-07-10 ASSESSMENT — PAIN SCALES - GENERAL
PAINLEVEL_OUTOF10: 0

## 2022-07-10 ASSESSMENT — ENCOUNTER SYMPTOMS
WHEEZING: 0
VOMITING: 0
COLOR CHANGE: 0
SHORTNESS OF BREATH: 0
NAUSEA: 0
COUGH: 0
TROUBLE SWALLOWING: 0
BACK PAIN: 1
CHOKING: 0

## 2022-07-10 ASSESSMENT — PAIN SCALES - WONG BAKER: WONGBAKER_NUMERICALRESPONSE: 0

## 2022-07-10 NOTE — PROGRESS NOTES
8195 assessment as stated, patient is still sleepy from sleeping pill, but will wake and take am meds, states right hip still is painful, lidocaine patch applied. 1347 Dr Edgar Benson made rounds patient was accepted for rehab starting tomorrow.  Electronically signed by Thomas Peter RN on 7/10/2022 at 9:39 AM

## 2022-07-10 NOTE — CARE COORDINATION
[place] Fully Approved admission to Acute Inpatient Rehab, Ref# [id number]. Patient approved for admit to Rehab today, Jose 7/10/22. Will notify C3. Electronically signed by Elysia Alcala RN on 7/10/22 at 6:56 AM EDT    [facility] just [facility]. Patient is approved for Rehab with START DATE: 7/11/22 and END DATE 7/18/22. [facility] that patient is approved to admit to Rehab on  Monday 7/11 and asked to have PT/OT to see the patient today since last therapy was 7/6/22.  Electronically signed by Elysia Alcala RN on 7/10/22 at 8:16 AM EDT

## 2022-07-10 NOTE — PROGRESS NOTES
Hospitalist Daily Progress Note  Name: Abiel Auguste  Age: 33 y.o. Gender: male  CodeStatus: Full Code  Allergies: Peanut-Containing Drug Products  Nuts [Macadamia Nut Oil]  Peanut Oil  Shellfish-Derived Products    Chief Complaint:Drug Overdose (pt has been taking royal kratom (hasn't left his apartment in a few days). Well check. )    Primary Care Provider: YING Carr CNP    InpatientTreatment Team: Treatment Team: Attending Provider: Urban Ahumada, MD; Consulting Physician: Yuri Correia MD; Consulting Physician: Jayden Romero MD; Consulting Physician: Daija Burnette MD; Registered Nurse: Sarthak Martin RN; : Melissa Szymanski RN    Admission Date: 6/30/2022      Subjective: Patient is awake; alert; denies chset pain or pressure; 12 point ROS negative    Physical Exam  Vitals and nursing note reviewed. Constitutional:       Appearance: Normal appearance. Cardiovascular:      Rate and Rhythm: Normal rate and regular rhythm. Pulmonary:      Effort: Pulmonary effort is normal.      Breath sounds: Normal breath sounds. Abdominal:      General: Bowel sounds are normal.      Palpations: Abdomen is soft. Musculoskeletal:         General: Normal range of motion. Skin:     General: Skin is warm and dry. Neurological:      Mental Status: He is alert.       Comments: Slow, indirect answers       Medications:  Reviewed    Infusion Medications:    lactated ringers 150 mL/hr at 07/09/22 1140    sodium chloride       Scheduled Medications:    escitalopram  10 mg Oral Daily    traZODone  50 mg Oral Nightly    dexamethasone  4 mg IntraVENous TID    amoxicillin-clavulanate  1 tablet Oral 2 times per day    polyethylene glycol  17 g Oral Daily    lidocaine  1 patch TransDERmal Daily    sodium chloride  1,000 mL IntraVENous Once    sodium chloride flush  5-40 mL IntraVENous 2 times per day    enoxaparin  40 mg SubCUTAneous Daily     PRN Meds: ALPRAZolam, melatonin, ibuprofen, sodium chloride flush, sodium chloride, ondansetron **OR** ondansetron, potassium chloride **OR** potassium alternative oral replacement **OR** potassium chloride    Labs:   Recent Labs     07/08/22  0525 07/09/22  0456 07/10/22  0457   WBC 22.1* 19.6* 21.9*   HGB 13.0* 12.6* 13.0*   HCT 39.3* 36.3* 38.1*   * 504* 503*     Recent Labs     07/08/22  0525 07/09/22  0456 07/10/22  0457    140 140   K 4.1 3.9 3.8    102 102   CO2 23 25 25   BUN 15 15 16   CREATININE 0.50* 0.54* 0.57*   CALCIUM 9.3 9.1 9.2     Recent Labs     07/07/22  2130 07/08/22  0525 07/08/22  0901 07/09/22  0456 07/10/22  0457   *   < > 90* 57* 38   *   < > 254* 205* 162*   BILIDIR <0.2  --  <0.2  --   --    BILITOT 0.3   < > 0.4 0.4 0.3   ALKPHOS 88   < > 82 79 87    < > = values in this interval not displayed. No results for input(s): INR in the last 72 hours. Recent Labs     07/08/22 2033 07/09/22 0456 07/09/22 2131   CKTOTAL 2,327* 1,437* 1,051*     Urinalysis:   Lab Results   Component Value Date/Time    NITRU Negative 06/30/2022 04:00 PM    WBCUA 3-5 06/30/2022 04:00 PM    BACTERIA Negative 06/30/2022 04:00 PM    RBCUA 3-5 06/30/2022 04:00 PM    BLOODU LARGE 06/30/2022 04:00 PM    SPECGRAV 1.033 06/30/2022 04:00 PM    GLUCOSEU Negative 06/30/2022 04:00 PM     Radiology:   Most recent    Chest CT      WITH CONTRAST:No results found for this or any previous visit. WITHOUT CONTRAST: No results found for this or any previous visit. CXR      2-view: Results for orders placed in visit on 01/29/18    XR CHEST STANDARD (2 VW)    Narrative  EXAM: CHEST, 2 VIEWS    COMPARISON: NONE AVAILABLE    REASON FOR EXAMINATION: UPPER RESPIRATORY CONGESTION, DYSPNEA X2 WEEKS    FINDINGS:   Two views of the chest demonstrate normal appearance of the heart and mediastinum. The lungs appear clear. The visualized bony thorax and remainder of the chest appears unremarkable.     Impression  NO EVIDENCE OF ACTIVE DISEASE IN THE CHEST. Portable: Results for orders placed during the hospital encounter of 06/30/22    XR CHEST PORTABLE    Narrative  EXAMINATION: XR CHEST PORTABLE    CLINICAL HISTORY: FEVER    COMPARISONS: JANUARY 29, 2018    FINDINGS: Osseous structures are intact. Cardiopericardial silhouette is normal. Pulmonary vasculature is normal. Right lung is clear. Patchy area of increased opacity left upper lung. Impression  LEFT UPPER LUNG ATELECTASIS/PNEUMONIA. Echo No results found for this or any previous visit. Assessment/Plan:    Active Hospital Problems    Diagnosis Date Noted    Cerebral edema ([facility] [address] [G93.6]      Priority: Medium    Weakness of right lower extremity [R29.898]      Priority: Medium     impaired mobility and ADLs dt encephalopathy  [Z74.09, Z78.9] 07/05/2022     Priority: Medium    Vitamin D deficiency [E55.9] 07/05/2022     Priority: Medium    Benzodiazepine dependence ([facility] [address] [F13.20] 07/05/2022     Priority: Medium    Depressive disorder [F32.9] 07/05/2022     Priority: Medium    Right foot drop [M21.371] 07/05/2022     Priority: Medium    Altered mental status [R41.82]      Priority: Medium    Drug abuse ([facility] [address] [F19.10]      Priority: Medium    Acute encephalopathy [G93.40] 06/30/2022     Priority: Medium    Polydrug dependence including opioid type drug with continuous use with complication ([facility] [address] [Y96.99] 06/04/2019    ROSALES (generalized anxiety disorder) [F41.1] 06/04/2019    ADHD (attention deficit hyperactivity disorder) [F90.9] 12/05/2012    Attention deficit hyperactivity disorder (ADHD) [F90.9] 12/05/2012     1. Polysubstance overdose/acute ischemic encephalopathy       Repeat MRI shows edema; started on IV decadron per neurology recommendations; Medically dischartged to acute rehab on Thursday; still awaiting precert; will be able to be discharged Monday  2. Rhabdomyolysis/EDGAR            EDGAR resolved; Rhabdo improving  3.  Acute Hepatitis         Probably

## 2022-07-10 NOTE — CARE COORDINATION
[facility] spoke with Kimberly Roland. Per Kimberly Roland, \" Tonia Palmer came back and pt is able to tx tomorrow 7/11/2022. Also need New PT and OT notes\". LSW Notify RN and .      Electronically signed by ANNALISA Smith on 7/10/2022 at 9:40 AM

## 2022-07-10 NOTE — PROGRESS NOTES
Neurology Follow up    SUBJECTIVE:   Patient seen and examined for neurology follow-up for acute encephalopathy in the setting of Royle kratom overuse, transaminitis, EDGAR, rhabdomyolysis resulting in provoked seizure and cerebral edema. Patient is currently alert and oriented x3, no acute distress, cooperative. He reports he still having some forgetfulness and confusion. No focal neurodeficits. Complaining of right leg pain, right hip pain, numbness to the right foot. Today actually reports that the numbness is of the entire right leg but states that has been going on since admission. None prior to admission. Spoke with physical therapy who reports that yesterday, when working with him, he was noted to be significantly more weak on the right lower extremity. Hyperreflexic in the lower extremities. Patient is having bowel dysfunction and bladder incontinence. Patient is awaiting to transfer to rehab today. Patient appears to be more anxious today and tells me that he is not feeling well but does not know exactly what it is. He is just uneasy in bed. He had also exhibited some suicidal ideation and therefore psychiatrist on-call Case was discussed with the psychiatrist on-call    Eventually no other medication was given and I did prescribe him Xanax which he is taking and is feeling much better today.     Clinical change and is doing better with the Xanax has some cognitive issues  Current Facility-Administered Medications   Medication Dose Route Frequency Provider Last Rate Last Admin    escitalopram (LEXAPRO) tablet 10 mg  10 mg Oral Daily Tosha Gupta MD   10 mg at 07/10/22 0817    traZODone (DESYREL) tablet 50 mg  50 mg Oral Nightly YING Cobos - CNP   50 mg at 07/09/22 1956    ALPRAZolam (XANAX) tablet 0.5 mg  0.5 mg Oral TID PRN Bijan Rodriguez MD   0.5 mg at 07/10/22 0359    melatonin tablet 3 mg  3 mg Oral Nightly PRN Thomas Mcgraw DO   3 mg at 07/10/22 0329    ibuprofen Appearance:      Skin:  normal  CVS - Normal sounds, No murmurs , No carotid Bruits  RS -CTA  Abdomen Soft, BS present  Review of Systems   Constitutional: Negative for fever. HENT: Negative for hearing loss and trouble swallowing. Eyes: Negative for visual disturbance. Respiratory: Negative for cough, choking, shortness of breath and wheezing. Cardiovascular: Negative for chest pain, palpitations and leg swelling. Gastrointestinal: Negative for nausea and vomiting. Musculoskeletal: Positive for back pain. Negative for gait problem, joint swelling, myalgias, neck pain and neck stiffness. Skin: Negative for color change. Neurological: Positive for weakness and numbness. Negative for dizziness, tremors, seizures, syncope, facial asymmetry, speech difficulty, light-headedness and headaches. Psychiatric/Behavioral: Positive for confusion. Negative for behavioral problems, hallucinations and sleep disturbance. Exam as noted above and patient is much calmer today with the Xanax  Mental Status Exam:             Level of Alertness:   awake            Orientation:   person, place, time                      Attention/Concentration:  normal            Language:  normal    Exam very much unchanged and doing much better with Xanax and less agitated and confused. Funduscopic Exam:     Cranial Nerves                Cranial nerves III, IV, VI           Extraocular Movements: intact      Cranial nerve V           Facial sensation:  intact      Cranial nerve VII           Facial strength: intact      Cranial nerve VIII           Hearing:  intact      Cranial nerve IX           Palate:  intact      Cranial nerve XI         Shoulder shrug:  intact      Cranial nerve XII          Tongue movement:  normal    Motor:    Drift:  absent  Motor exam is symmetrical 5 out of 5 all extremities with exception of the right foot dorsiflexion which is 4 out of 5.    Tone:  normal  Abnormal Movements:  absent Sensory:        Pinprick             Right Upper Extremity:  normal             Left Upper Extremity:  normal             Right Lower Extremity:  normal             Left Lower Extremity:  normal           Vibration                         Touch            Proprioception                 Coordination:           Finger/Nose   Right:  normal              Left:  normal          Heel-Knee-Shin                Right:  normal              Left:  normal          Rapid Alternating Movements              Right:  normal              Left:  normal          Gait:                       Casual: Gait is deferred          Reflexes:             Deep Tendon Reflexes:             Reflexes are 3+             Plantar response:                Right:  downgoing               Left:  downgoing  Exam very much unchanged from yesterday. Normal nonfocal examination noted today. Vascular:  Cardiac Exam:  normal         CT Head WO Contrast    Result Date: 6/30/2022  CT Brain. Contrast medium:  without contrast.. History: Altered mental status. Technical factors: CT imaging of the brain was obtained and formatted as 5 mm contiguous axial images. 2.5 mm contiguous axial images were obtained through the osseous structures. Sagittal and coronal reconstruction obtained during postprocessing. Comparison:  None. Findings: Extra-axial spaces:  Normal. Intracranial hemorrhage:  None. Ventricular system: [Negative. Basal Cisterns:  Without anomaly. Cerebral Parenchyma: Bilateral, symmetric areas of decreased attenuation exerting no mass effect measuring approximately 9 mm in size since found within the bilateral globus pallidus (series 2, image 17). Midline Shift:  None. Cerebellum:  No anomaly identified. Paranasal sinuses and mastoid air cells:  No anomaly identified. Visualized Orbits:  Negative. Impression: Round symmetric areas decreased attenuation bilateral globus pallidus.  This finding may be seen in patients experiencing hypoxia, i.e., carbon monoxide poisoning. All CT scans at this facility use dose modulation, iterative reconstruction, and/or weight based dosing when appropriate to reduce radiation dose to as low as reasonably achievable. XR CHEST PORTABLE    Result Date: 7/1/2022  EXAMINATION: XR CHEST PORTABLE CLINICAL HISTORY: FEVER COMPARISONS: JANUARY 29, 2018 FINDINGS: Osseous structures are intact. Cardiopericardial silhouette is normal. Pulmonary vasculature is normal. Right lung is clear. Patchy area of increased opacity left upper lung. LEFT UPPER LUNG ATELECTASIS/PNEUMONIA. MRI BRAIN WO CONTRAST    Result Date: 7/1/2022  MRI BRAIN WO CONTRAST : 7/1/2022 CLINICAL HISTORY:  stroke . COMPARISON: Head CT 6/30/2022. TECHNIQUE: Multiplanar MR imaging of the head was performed without contrast. FINDINGS: Areas of restricted diffusion within the globus pallidus, supratentorial white matter and splenium of the corpus callosum are most consistent with carbon monoxide poisoning/hypoxemia and/or other toxic encephalopathy. There is no intracranial hemorrhage, mass effect, midline shift, extra-axial collection, significant cerebral volume loss or other complication identified. FINDINGS CONSISTENT WITH CARBON MONOXIDE POISONING/HYPOXEMIA AND/OR OTHER TOXIC ENCEPHALOPATHY. NO INTRACRANIAL HEMORRHAGE OR COMPLICATION IDENTIFIED.        Recent Labs     07/08/22  0525 07/09/22  0456 07/10/22  0457   WBC 22.1* 19.6* 21.9*   HGB 13.0* 12.6* 13.0*   * 504* 503*     Recent Labs     07/08/22  0525 07/09/22  0456 07/10/22  0457    140 140   K 4.1 3.9 3.8    102 102   CO2 23 25 25   BUN 15 15 16   CREATININE 0.50* 0.54* 0.57*   GLUCOSE 102* 107* 120*     Recent Labs     07/08/22  0901 07/09/22  0456 07/10/22  0457   BILITOT 0.4 0.4 0.3   ALKPHOS 82 79 87   AST 90* 57* 38   * 205* 162*     Lab Results   Component Value Date/Time    PROTIME 14.7 07/03/2022 04:50 AM    INR 1.2 07/03/2022 04:50 AM     No results found for: LITHIUM, DILFRTOT, VALPROATE    ASSESSMENT AND PLAN  Hypoxic ischemic encephalopathy with a seizure. Patient appears to have taken some medications that might be contributing with some hepatic dysfunction as well. Patient reports he was down 2 days and cardiac arrhythmia with hypoxic ischemic encephalopathy with an abnormal MRI as noted. MRI shows multiple areas of hypoxemic damage though underlying embolic strokes must be ruled out. Recommended a complete hypercoagulable work-up for now. Findings were discussed with the patient and I am not seeing anything focal the patient is likely to have some sustained memory issues. We will follow this up with an EEG. Patient is remained stable without any new complications except for pain in the right leg. Examination was obtained and he has some tenderness and will require evaluation for DVT. Patient has not any seizures. He has significantly abnormal MRI with diffuse edema and require follow-up MRI in 2 days. Patient is somewhat sleepy this morning though awakens and appears to be appropriate. Complains of some left leg weakness. He is has not any headache. A follow-up MRI with contrast to be obtained given his significant abnormal findings on his initial MRI which may suggest hypoxic ischemic encephalopathy secondary status epilepticus. Liver functions are improving. DVT evaluation of the right leg was negative    7/5/2022:  Acute encephalopathy with provoked seizure secondary to royal kratom overuse with urine drug screen positive for fentanyl, benzodiazepines, amphetamines. MRI of the brain with and without contrast done on 7/4/2022 shows mildly increased edema within the bilateral globi pallidi, splenium of the corpus callosum and supratentorial white matter. No mass-effect or midline shift. No hemorrhage. Patient continues on dexamethasone 4 mg 3 times daily. LFTs gradually improving. GI following.   Rhabdomyolysis improving  EDGAR resolved  EEG pending. Patient with complaints of right leg pain, right foot numbness and having bowel incontinence. He is hyperreflexic in the lower extremities. Will obtain screening MRI of cervical, thoracic and lumbar spine. We will continue to follow. I have personally performed a face to face diagnostic evaluation on this patient, reviewed all data and investigations, and am the sole provider of all clinical decisions on the neurological status of this patient. Exam noted. Patient has multiple symptoms of edema in the brain. Repeat MRI was obtained and has some mild increase in yesterday started on Decadron. He remains mostly nonfocal though he may have some cognitive issues. The hyperreflexia is explained by bilateral cerebral pathology. And 50% time spent on evaluating his patient myself and evaluating his MRI.    7/6/22:   EEG completed with report pending  Patient still having bowel and bladder incontinence as well as numbness to the right foot. Screening MRI of the spine pending. [facility]  We will plan for repeat MRI in 2 weeks  We will taper dexamethasone after 1 week    I have personally performed a face to face diagnostic evaluation on this patient, reviewed all data and investigations, and am the sole provider of all clinical decisions on the neurological status of this patient. She is examined and has a nonfocal examination except for some left lower extremity weakness. We did not attempt to walk him but he was able to transfer to the chair as I am told. All his investigations so far have been normal EEG preliminary did not show anything significant. It is more than likely that this is a case of hypoxic ischemic encephalopathy secondary to underlying status epilepticus. Patient is on Decadron which we will slowly taper and a follow-up MRI in 2 weeks. Patient require rehabilitation and is likely to have some degree of cognitive deficits from what we see.     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 We will have to see how much of this he recovers. May be some permanent deficits. This was discussed with patient and family. Continue Decadron for cerebral edema with plan to taper starting around 7/11/2022. Repeat MRI brain in 2 weeks per above. Patient with ongoing right lower extremity weakness, numbness and bowel and bladder dysfunction. Awaiting screening MRI of the spine pending. Rehab consult pending. Okay to DC to rehab from neurology standpoint. I have personally performed a face to face diagnostic evaluation on this patient, reviewed all data and investigations, and am the sole provider of all clinical decisions on the neurological status of this patient. Patient remains encephalopathic in the face of a normal EEG 1 would suspect that this might be also part of his ADD as he is not on medications. We will transfer him to rehabilitation with a taper of Decadron and reinstate at least Adderall in the next few days given these findings. Findings discussed with Dr. Angel Devlin and Dr. Carbajal Screen for transfer to rehabilitation with a follow-up MRI in a few days. No seizures are noted. 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 Begin taper on 7/11/2022. Repeat MRI of the brain in 2 weeks  MRI of the spine is been placed on hold for transfer to rehab. Patient has not been transferred to rehab yet as pre-CERT is still pending. Addendum: Patient with insomnia. Family requesting medication. He is also reporting depression with suicidal ideation.   I consulted psych to see him regarding this new suicidal concern and also initiated trazodone 50 mg nightly. This was discussed with Dr. Antoine Samuel. I have personally performed a face to face diagnostic evaluation on this patient, reviewed all data and investigations, and am the sole provider of all clinical decisions on the neurological status of this patient. Patient appears to feeling very uneasy and anxious today. He had exhibited some suicidal ideation psychiatrist on-call I discussed the case with psychiatry and there is a suggestion that this also could be steroid psychosis. Low-dose benzodiazepines will be added and we added trazodone for sleep. Than 60% time spent evaluating this patient and management    Patient appeared to be quite anxious yesterday and therefore I prescribed Xanax and is actually doing quite well. Patient slept somewhat better with the trazodone as well. MRI of the spinal axis was reviewed and does not show any compression. Patient may be discharged to rehabilitation from neurological standpoint with a follow-up MRI in a few days. Findings were discussed with the sister. Was yesterday    Patient is much calmer and less anxious with the Xanax and doing much better. MRI of the axis does not show any cord compression. Patient is presented for rehab and we will transfer tomorrow and will follow this up with an MRI again in the next few days      Rayray Samuel MD, Margo Elliott, American Board of Psychiatry & Neurology  Board Certified in Vascular Neurology  Board Certified in Neuromuscular Medicine  Certified in [place]en Simms

## 2022-07-10 NOTE — PROGRESS NOTES
°C) (Oral)   Resp 16   Ht 5' 10\" (1.778 m)   Wt 157 lb 3 oz (71.3 kg)   SpO2 100%   BMI 22.55 kg/m²   I/O:   PO/Intake:    fair PO intake, continue to monitor closely for dehydration  Reg diet thin liquids    Bowel/Bladder:   continent,    General:  Patient is well developed, adequately nourished, and    well kempt. HEENT:    PERRLA, hearing intact to loud voice, external inspection of ear and nose benign. Inspection of lips, tongue and gums benign  Musculoskeletal: No significant change in strength or tone. All joints stable. Inspection and palpation of digits and nails show no clubbing, cyanosis or inflammatory conditions. Neuro/Psychiatric: Affect: flat-  Alert and oriented to self and situation with  mod cues. No significant change in deep tendon reflexes or sensation-cognitive slowing. Right lower extremity foot drop weakness and deep peroneal numbness. Lungs:  Diminished, CTA-B  . Respiration effort is normal at rest.   Heart:   S1 = S2,   RRR. Abdomen:  Soft, non-tender    Extremities:  no lower extremity edema but no unusual tenderness. Skin:   BUE bruises dt blood draws      Rehabilitation:  Physical Therapy:   Bed mobility:  Bed mobility  Rolling to Right: Stand by assistance (07/02/22 1513)  Supine to Sit: Supervision (07/08/22 1350)  Sit to Supine: Supervision (07/08/22 1350)  Bed Mobility Comments: SPV d/t decreased safety awareness. (07/08/22 1350)  Transfers:  Transfers  Sit to Stand: Contact guard assistance (07/08/22 1351)  Stand to sit: Contact guard assistance (07/08/22 1351)  Bed to Chair: Contact guard assistance (completes 5 squat pivots to and from chair for improved technique and activity tolerance. vc's for hand placement and safety.) (07/06/22 5228)  Comment: CGA d/t instability. multiple attempts needed to complete first stand. decreased safety awareness.  (07/08/22 1351)  Gait:   Ambulation  WB Status: WBAT (07/02/22 1513)  Ambulation  Surface: level tile (07/08/22 1352)  Device: Rolling Walker (07/08/22 1352)  Other Apparatus: Wheelchair follow (07/05/22 1440)  Assistance: Stand by assistance;Minimal assistance (Min A needed for LOB, otherwise SBA.) (07/08/22 1352)  Quality of Gait: decreased heel strike BLE. downward gaze. LOB with turns. (07/08/22 1352)  Gait Deviations: Slow Radha;Decreased step length;Decreased step height (07/08/22 1352)  Distance: 150' (07/08/22 1352)  Comments: pt with improved stability, LOB with turning, informal DGI tasks completed pt with noted decreased in speed and path deviations with head turns. (07/08/22 1352)  More Ambulation?: Yes (07/08/22 1352)  Ambulation 2  Surface - 2: level tile (07/08/22 1352)  Device 2: Single point cane (07/08/22 1352)  Assistance 2: Contact guard assistance (07/08/22 1352)  Quality of Gait 2: antalgic RLE, decreased stabilty and gait speed.  (07/08/22 1352)  Gait Deviations: Slow Radha;Decreased step length;Decreased step height (07/08/22 1352)  Distance: 30' (07/08/22 1352)  Comments: pt safest with [facility] this date but pt states would be more pleased to use [facility] as opposed to [facility]. (07/08/22 1352)  Stairs:     W/C mobility:       Occupational Therapy:   Hand Dominance: Right  ADL  Feeding: Setup (07/06/22 1616)  Feeding Skilled Clinical Factors: Pt reports continued difficulty with containers at times (07/06/22 1616)  Grooming: Setup (07/06/22 1616)  Grooming Skilled Clinical Factors: Washing face and hands only (07/06/22 1616)  UE Bathing: Setup (07/06/22 1616)  LE Bathing: Minimal assistance (07/06/22 1616)  LE Bathing Skilled Clinical Factors: Balance assist for lizett area (07/06/22 1616)  UE Dressing: Setup (07/06/22 1616)  LE Dressing: None (07/06/22 1616)  LE Dressing Skilled Clinical Factors: Balance assist with dynamic movements attempting to simulate, did not don pants d/t dianna francois present (07/06/22 5589)  Toileting: Minimal assistance (Lorella Ora assist for hygiene following attempted toileting) (07/06/22 1616)  Toileting Skilled Clinical Factors: Balance for hygiene post toileting attempt (07/06/22 1616)  Additional Comments: Pt performed sponge bath at EOB. Pt. had stated he wanted to toilet but was unable to have BM (07/06/22 1616)  Toilet Transfers  Toilet - Technique: Stand step (07/06/22 1622)  Equipment Used: Standard bedside commode (07/06/22 1622)  Toilet Transfer: Contact guard assistance (07/06/22 1622)  Toilet Transfers Comments: Impulsive, turns longer distance which impacts safety with transitions (07/06/22 1622)          Speech Therapy:            Diet/Swallow:        Dysphagia Outcome Severity Scale: Level 7: Normal in all situations          COGNITION  OT: Cognition Comment: pt reported noticeable fatigue, increased time for problem solving at times; at others, pt impulsive  SP:           Lab/X-ray studies reviewed, analyzed and discussed with patient and staff:   Recent Results (from the past 24 hour(s))   CK    Collection Time: 07/09/22  9:31 PM   Result Value Ref Range    Total CK 1,051 (H) 0 - 190 U/L   Comprehensive Metabolic Panel w/ Reflex to MG    Collection Time: 07/10/22  4:57 AM   Result Value Ref Range    Sodium 140 135 - 144 mEq/L    Potassium reflex Magnesium 3.8 3.4 - 4.9 mEq/L    Chloride 102 95 - 107 mEq/L    CO2 25 20 - 31 mEq/L    Anion Gap 13 9 - 15 mEq/L    Glucose 120 (H) 70 - 99 mg/dL    BUN 16 6 - 20 mg/dL    CREATININE 0.57 (L) 0.70 - 1.20 mg/dL    GFR Non-African American >60.0 >60    GFR  >60.0 >60    Calcium 9.2 8.5 - 9.9 mg/dL    Total Protein 6.5 6.3 - 8.0 g/dL    Albumin 3.9 3.5 - 4.6 g/dL    Total Bilirubin 0.3 0.2 - 0.7 mg/dL    Alkaline Phosphatase 87 35 - 104 U/L     (H) 0 - 41 U/L    AST 38 0 - 40 U/L    Globulin 2.6 2.3 - 3.5 g/dL       7/7/22-     NEGATIVE MILDLY LIMITED CERVICAL, THORACIC AND LUMBAR SPINE MRI. Previous extensive, complex labs, notes and diagnostics reviewed and analyzed.      ALLERGIES:    Allergies as of nerve injury secondary to overdose and generalized OA pain: reassess pain every shift and prior to and after each therapy session, give prn Tylenol   and consider scheduled Tylenol, modalities prn in therapy, consider Lidoderm, K-pad prn.     4. Skin breakdown   risk:  continue pressure relief program.  Daily skin exams and reports from nursing. 5. Severe fatigue due to immobility and nutritional deficits: monitoring for dysphagia   Add vitamin B12 vitamin D and CoQ10 titrate dosing and add protein supplementation with low carb content. 6. Complex discharge planning:   Discussed with care team-last 24 hour events noted. I will continue to follow along and reassess functional and medical status as we strive to improve patient's functional and medical outcomes progressing to the most efficient and lowest level of care. Complex Active General Medical Issues that complicate care:     1. Principal Problem:    Acute encephalopathy  Active Problems:    Drug abuse ([facility] [address]    Altered mental status     impaired mobility and ADLs dt encephalopathy     Depressive disorder    Vitamin D deficiency    Benzodiazepine dependence (HCC)    Right foot drop    Cerebral edema (HCC)    Weakness of right lower extremity    ADHD (attention deficit hyperactivity disorder)    ROSALES (generalized anxiety disorder)    Polydrug dependence including opioid type drug with continuous use with complication (HCC)    Attention deficit hyperactivity disorder (ADHD)  Resolved Problems:    * No resolved hospital problems. *          Events and functional changes in the past 24 hours reviewed improvements in functional status are encouraging OK for Acute rehab tomorrow as per his insurance company. .      Focus of today's plan-   transition to acute rehab pending caresourse approval      Raghu Palmer D.O., PM&R     Attending    [address]

## 2022-07-10 NOTE — PROGRESS NOTES
Subjective: The patient complains of severe acute on chronic progressive fatigue and cognitive slowing and right lower extremity weakness and pain partially relieved by rest, PT, OT and meds  SLP and exacerbated by exertion and recent illness. He now admits that he took 2 Xanax that he bought on the street. He and I both fear that it may have been laced with fentanyl. Discussed with treatment team and hospitalist and neuro--should be ready for acute rehab today pending MRIs spine    I am concerned about patients medical complexities including:  Principal Problem:    Acute encephalopathy  Active Problems:    Drug abuse ([address]    Altered mental status     impaired mobility and ADLs dt encephalopathy     Depressive disorder    Vitamin D deficiency    Benzodiazepine dependence (HCC)    Right foot drop    Cerebral edema (HCC)    Weakness of right lower extremity    ADHD (attention deficit hyperactivity disorder)    ROSALES (generalized anxiety disorder)    Polydrug dependence including opioid type drug with continuous use with complication (HCC)    Attention deficit hyperactivity disorder (ADHD)  Resolved Problems:    * No resolved hospital problems. *      .    Reviewed recent nursing note and discussed current status and planned care with acute care providers, \" Met with patient, parents at bedside and permission granted to discuss care, explained [facility] Acute Inpatient Rehab program and requirements, including 3 hours of intense therapy daily, anticipated length of stay and goal of discharge to home. All questions answered and patient verbalized understanding. Freedom of choice provided and patient and parents feel [facility] is a good idea if approved by insurance. Precert will be needed from [employer] \". ROS x10: The patient also complains of severely impaired mobility and activities of daily living.   Otherwise no new problems with vision, hearing, nose, mouth, throat, dermal, cardiovascular, GI, , pulmonary, musculoskeletal, psychiatric or neurological.        Vital signs:  /73   Pulse 92   Temp 98.3 °F (36.8 °C) (Oral)   Resp 16   Ht 5' 10\" (1.778 m)   Wt 157 lb 3 oz (71.3 kg)   SpO2 100%   BMI 22.55 kg/m²   I/O:   PO/Intake:    fair PO intake, continue to monitor closely for dehydration  Reg diet thin liquids    Bowel/Bladder:   continent,    General:  Patient is well developed, adequately nourished, and    well kempt. HEENT:    PERRLA, hearing intact to loud voice, external inspection of ear and nose benign. Inspection of lips, tongue and gums benign  Musculoskeletal: No significant change in strength or tone. All joints stable. Inspection and palpation of digits and nails show no clubbing, cyanosis or inflammatory conditions. Neuro/Psychiatric: Affect: flat-  Alert and oriented to self and situation with  mod cues. No significant change in deep tendon reflexes or sensation-cognitive slowing. Right lower extremity foot drop weakness and deep peroneal numbness. Lungs:  Diminished, CTA-B  . Respiration effort is normal at rest.   Heart:   S1 = S2,   RRR. Abdomen:  Soft, non-tender    Extremities:  no lower extremity edema but no unusual tenderness. Skin:   BUE bruises dt blood draws      Rehabilitation:  Physical Therapy:   Bed mobility:  Bed mobility  Rolling to Right: Stand by assistance (07/02/22 1513)  Supine to Sit: Supervision (07/08/22 1350)  Sit to Supine: Supervision (07/08/22 1350)  Bed Mobility Comments: SPV d/t decreased safety awareness. (07/08/22 1350)  Transfers:  Transfers  Sit to Stand: Contact guard assistance (07/08/22 1351)  Stand to sit: Contact guard assistance (07/08/22 1351)  Bed to Chair: Contact guard assistance (completes 5 squat pivots to and from chair for improved technique and activity tolerance. vc's for hand placement and safety.) (07/06/22 9684)  Comment: CGA d/t instability. multiple attempts needed to complete first stand.  decreased safety awareness. (07/08/22 1351)  Gait:   Ambulation  WB Status: WBAT (07/02/22 1513)  Ambulation  Surface: level tile (07/08/22 1352)  Device: Rolling Walker (07/08/22 1352)  Other Apparatus: Wheelchair follow (07/05/22 1440)  Assistance: Stand by assistance;Minimal assistance (Min A needed for LOB, otherwise SBA.) (07/08/22 1352)  Quality of Gait: decreased heel strike BLE. downward gaze. LOB with turns. (07/08/22 1352)  Gait Deviations: Slow Radha;Decreased step length;Decreased step height (07/08/22 1352)  Distance: 150' (07/08/22 1352)  Comments: pt with improved stability, LOB with turning, informal DGI tasks completed pt with noted decreased in speed and path deviations with head turns. (07/08/22 1352)  More Ambulation?: Yes (07/08/22 1352)  Ambulation 2  Surface - 2: level tile (07/08/22 1352)  Device 2: Single point cane (07/08/22 1352)  Assistance 2: Contact guard assistance (07/08/22 1352)  Quality of Gait 2: antalgic RLE, decreased stabilty and gait speed.  (07/08/22 1352)  Gait Deviations: Slow Radha;Decreased step length;Decreased step height (07/08/22 1352)  Distance: 30' (07/08/22 1352)  Comments: pt safest with [employer] this date but pt states would be more pleased to use [facility] as opposed to [employer]. (07/08/22 1352)  Stairs:     W/C mobility:       Occupational Therapy:   Hand Dominance: Right  ADL  Feeding: Setup (07/06/22 1616)  Feeding Skilled Clinical Factors: Pt reports continued difficulty with containers at times (07/06/22 1616)  Grooming: Setup (07/06/22 1616)  Grooming Skilled Clinical Factors: Washing face and hands only (07/06/22 1616)  UE Bathing: Setup (07/06/22 1616)  LE Bathing: Minimal assistance (07/06/22 1616)  LE Bathing Skilled Clinical Factors: Balance assist for lizett area (07/06/22 1616)  UE Dressing: Setup (07/06/22 1616)  LE Dressing: None (07/06/22 1616)  LE Dressing Skilled Clinical Factors: Balance assist with dynamic movements attempting to simulate, did not don pants d/t [place] present (07/06/22 1616)  Toileting: Minimal assistance (Balanc assist for hygiene following attempted toileting) (07/06/22 1616)  Toileting Skilled Clinical Factors: Balance for hygiene post toileting attempt (07/06/22 1616)  Additional Comments: Pt performed sponge bath at EOB.  Pt. had stated he wanted to toilet but was unable to have BM (07/06/22 1616)  Toilet Transfers  Toilet - Technique: Stand step (07/06/22 1622)  Equipment Used: Standard bedside commode (07/06/22 1622)  Toilet Transfer: Contact guard assistance (07/06/22 1622)  Toilet Transfers Comments: Impulsive, turns longer distance which impacts safety with transitions (07/06/22 1622)          Speech Therapy:            Diet/Swallow:        Dysphagia Outcome Severity Scale: Level 7: Normal in all situations          COGNITION  OT: Cognition Comment: pt reported noticeable fatigue, increased time for problem solving at times; at others, pt impulsive  SP:           Lab/X-ray studies reviewed, analyzed and discussed with patient and staff:   Recent Results (from the past 24 hour(s))   Comprehensive Metabolic Panel w/ Reflex to MG    Collection Time: 07/09/22  4:56 AM   Result Value Ref Range    Sodium 140 135 - 144 mEq/L    Potassium reflex Magnesium 3.9 3.4 - 4.9 mEq/L    Chloride 102 95 - 107 mEq/L    CO2 25 20 - 31 mEq/L    Anion Gap 13 9 - 15 mEq/L    Glucose 107 (H) 70 - 99 mg/dL    BUN 15 6 - 20 mg/dL    CREATININE 0.54 (L) 0.70 - 1.20 mg/dL    GFR Non-African American >60.0 >60    GFR  >60.0 >60    Calcium 9.1 8.5 - 9.9 mg/dL    Total Protein 6.5 6.3 - 8.0 g/dL    Albumin 3.8 3.5 - 4.6 g/dL    Total Bilirubin 0.4 0.2 - 0.7 mg/dL    Alkaline Phosphatase 79 35 - 104 U/L     (H) 0 - 41 U/L    AST 57 (H) 0 - 40 U/L    Globulin 2.7 2.3 - 3.5 g/dL   CBC with Auto Differential    Collection Time: 07/09/22  4:56 AM   Result Value Ref Range    WBC 19.6 (H) 4.8 - 10.8 K/uL    RBC 4.30 (L) 4.70 - 6.10 M/uL    Hemoglobin 12.6 (L) 14.0 - 18.0 g/dL    Hematocrit 36.3 (L) 42.0 - 52.0 %    MCV 84.4 80.0 - 100.0 fL    MCH 29.3 27.0 - 31.3 pg    MCHC 34.7 33.0 - 37.0 %    RDW 12.9 11.5 - 14.5 %    Platelets 686 (H) 295 - 400 K/uL    Neutrophils % 82.5 %    Lymphocytes % 11.9 %    Monocytes % 4.9 %    Eosinophils % 0.4 %    Basophils % 0.3 %    Neutrophils Absolute 16.2 (H) 1.4 - 6.5 K/uL    Lymphocytes Absolute 2.3 1.0 - 4.8 K/uL    Monocytes Absolute 1.0 (H) 0.2 - 0.8 K/uL    Eosinophils Absolute 0.1 0.0 - 0.7 K/uL    Basophils Absolute 0.0 0.0 - 0.2 K/uL   CK    Collection Time: 07/09/22  4:56 AM   Result Value Ref Range    Total CK 1,437 (H) 0 - 190 U/L   CK    Collection Time: 07/09/22  9:31 PM   Result Value Ref Range    Total CK 1,051 (H) 0 - 190 U/L     Previous extensive, complex labs, notes and diagnostics reviewed and analyzed. ALLERGIES:    Allergies as of 06/30/2022 - Fully Reviewed 06/30/2022   Allergen Reaction Noted    Peanut-containing drug products Anaphylaxis 12/06/2018    Peanut oil Other (See Comments) 02/02/2015      (please also verify by checking [facility])     Complex Physical Medicine & Rehab Issues Assess & Plan:   1. Severe abnormality of gait and mobility and impaired self-care and ADL's secondary to hypoxic encephalopathy status post overdose likely secondary to fentanyl laced Xanax pill. Updated functional and medical status reassessed regarding patients ability to participate in therapies and patient found to be able to participate in:        acute intensive comprehensive inpatient rehabilitation program including PT/OT to improve balance, ambulation, ADLs, and to improve the P/AROM. It is my opinion that they will be able to tolerate 3 hours of therapy a day and benefit from it at an acute level. I again discussed acute rehab with the patient and verify that the patient is able and willing to participate in 3 hours of therapy a day. Rehab and Acute Care Case Management has also reinforced this expectation.   Will continue to follow to attempt to get patient to the most efficient but most effective level of care will be in their best interest.  Continue to focus on energy conservation heart rate and blood pressure monitoring before during and after therapy endurance and consistency of function. After acute rehab he will transition to 1 [address]. psychiatric floor at [facility] for further treatment regarding substance overuse abuse anxiety and depression. 2. Bowel constipation and Bladder dysfunction   overactive, neurogenic bladder:  frequent toileting, ambulate to bathroom with assistance, check post void residuals. Check for C.difficile x1 if >2 loose stools in 24 hours, continue bowel & bladder program.  Monitor for UTI symptoms including lethargy and confusion    3. Severe right lower extremity pain secondary to sciatic vs deep peroneal nerve injury secondary to overdose and generalized OA pain: reassess pain every shift and prior to and after each therapy session, give prn Tylenol   and consider scheduled Tylenol, modalities prn in therapy, consider Lidoderm, K-pad prn.     4. Skin breakdown   risk:  continue pressure relief program.  Daily skin exams and reports from nursing. 5. Severe fatigue due to immobility and nutritional deficits: monitoring for dysphagia   Add vitamin B12 vitamin D and CoQ10 titrate dosing and add protein supplementation with low carb content. 6. Complex discharge planning:   Discussed with care team-last 24 hour events noted. I will continue to follow along and reassess functional and medical status as we strive to improve patient's functional and medical outcomes progressing to the most efficient and lowest level of care. Complex Active General Medical Issues that complicate care:     1.  Principal Problem:    Acute encephalopathy  Active Problems:    Drug abuse ([facility] [address]    Altered mental status     impaired mobility and ADLs dt encephalopathy     Depressive disorder    Vitamin D deficiency    Benzodiazepine dependence (HCC)    Right foot drop    Cerebral edema (HCC)    Weakness of right lower extremity    ADHD (attention deficit hyperactivity disorder)    ROSALES (generalized anxiety disorder)    Polydrug dependence including opioid type drug with continuous use with complication (HCC)    Attention deficit hyperactivity disorder (ADHD)  Resolved Problems:    * No resolved hospital problems.  *          Events and functional changes in the past 24 hours reviewed improvements in functional status are encouraging       Focus of today's plan-   transition to acute rehab pending caresourse approval  Still awaiting approval, cont therapies  López Shabazz D.O., PM&R     Attending    [address] [place], [place] [address]en [facility]

## 2022-07-11 ENCOUNTER — HOSPITAL ENCOUNTER (INPATIENT)
Age: 32
LOS: 14 days | Discharge: OTHER INSTITUTION | DRG: 812 | End: 2022-07-25
Attending: PHYSICAL MEDICINE & REHABILITATION | Admitting: PHYSICAL MEDICINE & REHABILITATION
Payer: COMMERCIAL

## 2022-07-11 DIAGNOSIS — F13.20 BENZODIAZEPINE DEPENDENCE (HCC): ICD-10-CM

## 2022-07-11 DIAGNOSIS — G47.01 INSOMNIA SECONDARY TO CHRONIC PAIN: Primary | ICD-10-CM

## 2022-07-11 DIAGNOSIS — G89.29 INSOMNIA SECONDARY TO CHRONIC PAIN: Primary | ICD-10-CM

## 2022-07-11 LAB
ACETAMINOPHEN LEVEL: <5 UG/ML (ref 10–30)
ALBUMIN SERPL-MCNC: 3.7 G/DL (ref 3.5–4.6)
ALBUMIN SERPL-MCNC: 3.9 G/DL (ref 3.5–4.6)
ALP BLD-CCNC: 81 U/L (ref 35–104)
ALP BLD-CCNC: 85 U/L (ref 35–104)
ALT SERPL-CCNC: 116 U/L (ref 0–41)
ALT SERPL-CCNC: 132 U/L (ref 0–41)
ANION GAP SERPL CALCULATED.3IONS-SCNC: 11 MEQ/L (ref 9–15)
AST SERPL-CCNC: 28 U/L (ref 0–40)
AST SERPL-CCNC: 28 U/L (ref 0–40)
BASOPHILS ABSOLUTE: 0 K/UL (ref 0–0.2)
BASOPHILS RELATIVE PERCENT: 0.1 %
BILIRUB SERPL-MCNC: 0.3 MG/DL (ref 0.2–0.7)
BILIRUB SERPL-MCNC: <0.2 MG/DL (ref 0.2–0.7)
BILIRUBIN DIRECT: <0.2 MG/DL (ref 0–0.4)
BILIRUBIN, INDIRECT: ABNORMAL MG/DL (ref 0–0.6)
BUN BLDV-MCNC: 12 MG/DL (ref 6–20)
CALCIUM SERPL-MCNC: 9.2 MG/DL (ref 8.5–9.9)
CHLORIDE BLD-SCNC: 102 MEQ/L (ref 95–107)
CO2: 26 MEQ/L (ref 20–31)
CREAT SERPL-MCNC: 0.56 MG/DL (ref 0.7–1.2)
EOSINOPHILS ABSOLUTE: 0 K/UL (ref 0–0.7)
EOSINOPHILS RELATIVE PERCENT: 0.2 %
GFR AFRICAN AMERICAN: >60
GFR NON-AFRICAN AMERICAN: >60
GLOBULIN: 2.5 G/DL (ref 2.3–3.5)
GLUCOSE BLD-MCNC: 118 MG/DL (ref 70–99)
HCT VFR BLD CALC: 37.7 % (ref 42–52)
HEMOGLOBIN: 12.6 G/DL (ref 14–18)
LYMPHOCYTES ABSOLUTE: 2.3 K/UL (ref 1–4.8)
LYMPHOCYTES RELATIVE PERCENT: 10.1 %
MCH RBC QN AUTO: 28.6 PG (ref 27–31.3)
MCHC RBC AUTO-ENTMCNC: 33.3 % (ref 33–37)
MCV RBC AUTO: 85.9 FL (ref 80–100)
MONOCYTES ABSOLUTE: 1.1 K/UL (ref 0.2–0.8)
MONOCYTES RELATIVE PERCENT: 4.9 %
NEUTROPHILS ABSOLUTE: 19.4 K/UL (ref 1.4–6.5)
NEUTROPHILS RELATIVE PERCENT: 84.7 %
PDW BLD-RTO: 12.9 % (ref 11.5–14.5)
PLATELET # BLD: 515 K/UL (ref 130–400)
POTASSIUM REFLEX MAGNESIUM: 4 MEQ/L (ref 3.4–4.9)
RBC # BLD: 4.39 M/UL (ref 4.7–6.1)
SARS-COV-2, NAAT: NOT DETECTED
SODIUM BLD-SCNC: 139 MEQ/L (ref 135–144)
TOTAL CK: 463 U/L (ref 0–190)
TOTAL CK: 570 U/L (ref 0–190)
TOTAL CK: 570 U/L (ref 0–190)
TOTAL PROTEIN: 6.2 G/DL (ref 6.3–8)
TOTAL PROTEIN: 6.4 G/DL (ref 6.3–8)
WBC # BLD: 22.9 K/UL (ref 4.8–10.8)

## 2022-07-11 PROCEDURE — 87635 SARS-COV-2 COVID-19 AMP PRB: CPT

## 2022-07-11 PROCEDURE — 97116 GAIT TRAINING THERAPY: CPT

## 2022-07-11 PROCEDURE — 6360000002 HC RX W HCPCS: Performed by: INTERNAL MEDICINE

## 2022-07-11 PROCEDURE — 99232 SBSQ HOSP IP/OBS MODERATE 35: CPT | Performed by: PHYSICAL MEDICINE & REHABILITATION

## 2022-07-11 PROCEDURE — 6370000000 HC RX 637 (ALT 250 FOR IP): Performed by: INTERNAL MEDICINE

## 2022-07-11 PROCEDURE — 36415 COLL VENOUS BLD VENIPUNCTURE: CPT

## 2022-07-11 PROCEDURE — 6370000000 HC RX 637 (ALT 250 FOR IP): Performed by: PSYCHIATRY & NEUROLOGY

## 2022-07-11 PROCEDURE — 82550 ASSAY OF CK (CPK): CPT

## 2022-07-11 PROCEDURE — 2580000003 HC RX 258: Performed by: INTERNAL MEDICINE

## 2022-07-11 PROCEDURE — 80053 COMPREHEN METABOLIC PANEL: CPT

## 2022-07-11 PROCEDURE — 85025 COMPLETE CBC W/AUTO DIFF WBC: CPT

## 2022-07-11 PROCEDURE — 6360000002 HC RX W HCPCS: Performed by: FAMILY MEDICINE

## 2022-07-11 PROCEDURE — 80143 DRUG ASSAY ACETAMINOPHEN: CPT

## 2022-07-11 PROCEDURE — 6370000000 HC RX 637 (ALT 250 FOR IP): Performed by: FAMILY MEDICINE

## 2022-07-11 PROCEDURE — 97535 SELF CARE MNGMENT TRAINING: CPT

## 2022-07-11 PROCEDURE — 1180000000 HC REHAB R&B

## 2022-07-11 RX ORDER — AMOXICILLIN AND CLAVULANATE POTASSIUM 875; 125 MG/1; MG/1
1 TABLET, FILM COATED ORAL EVERY 12 HOURS SCHEDULED
Status: COMPLETED | OUTPATIENT
Start: 2022-07-11 | End: 2022-07-13

## 2022-07-11 RX ORDER — IBUPROFEN 400 MG/1
400 TABLET ORAL EVERY 6 HOURS PRN
Status: DISCONTINUED | OUTPATIENT
Start: 2022-07-11 | End: 2022-07-25 | Stop reason: HOSPADM

## 2022-07-11 RX ORDER — SODIUM CHLORIDE 9 MG/ML
INJECTION, SOLUTION INTRAVENOUS PRN
Status: DISCONTINUED | OUTPATIENT
Start: 2022-07-11 | End: 2022-07-20

## 2022-07-11 RX ORDER — ALPRAZOLAM 0.5 MG/1
0.5 TABLET ORAL 3 TIMES DAILY PRN
Status: CANCELLED | OUTPATIENT
Start: 2022-07-11

## 2022-07-11 RX ORDER — ONDANSETRON 4 MG/1
4 TABLET, ORALLY DISINTEGRATING ORAL EVERY 8 HOURS PRN
Status: DISCONTINUED | OUTPATIENT
Start: 2022-07-11 | End: 2022-07-25 | Stop reason: HOSPADM

## 2022-07-11 RX ORDER — LANOLIN ALCOHOL/MO/W.PET/CERES
3 CREAM (GRAM) TOPICAL NIGHTLY PRN
Status: CANCELLED | OUTPATIENT
Start: 2022-07-11

## 2022-07-11 RX ORDER — LIDOCAINE 4 G/G
1 PATCH TOPICAL DAILY
Status: DISCONTINUED | OUTPATIENT
Start: 2022-07-12 | End: 2022-07-12

## 2022-07-11 RX ORDER — ALPRAZOLAM 0.25 MG/1
0.5 TABLET ORAL 3 TIMES DAILY PRN
Status: DISCONTINUED | OUTPATIENT
Start: 2022-07-11 | End: 2022-07-22

## 2022-07-11 RX ORDER — LANOLIN ALCOHOL/MO/W.PET/CERES
3 CREAM (GRAM) TOPICAL NIGHTLY PRN
Status: DISCONTINUED | OUTPATIENT
Start: 2022-07-11 | End: 2022-07-20

## 2022-07-11 RX ORDER — ENOXAPARIN SODIUM 100 MG/ML
40 INJECTION SUBCUTANEOUS DAILY
Status: DISCONTINUED | OUTPATIENT
Start: 2022-07-12 | End: 2022-07-20

## 2022-07-11 RX ORDER — TRAZODONE HYDROCHLORIDE 50 MG/1
50 TABLET ORAL NIGHTLY
Status: DISCONTINUED | OUTPATIENT
Start: 2022-07-11 | End: 2022-07-25 | Stop reason: HOSPADM

## 2022-07-11 RX ORDER — POLYETHYLENE GLYCOL 3350 17 G/17G
17 POWDER, FOR SOLUTION ORAL DAILY
Status: DISCONTINUED | OUTPATIENT
Start: 2022-07-12 | End: 2022-07-14

## 2022-07-11 RX ORDER — SODIUM CHLORIDE 0.9 % (FLUSH) 0.9 %
5-40 SYRINGE (ML) INJECTION EVERY 12 HOURS SCHEDULED
Status: DISCONTINUED | OUTPATIENT
Start: 2022-07-11 | End: 2022-07-19 | Stop reason: ALTCHOICE

## 2022-07-11 RX ORDER — SODIUM CHLORIDE, SODIUM LACTATE, POTASSIUM CHLORIDE, CALCIUM CHLORIDE 600; 310; 30; 20 MG/100ML; MG/100ML; MG/100ML; MG/100ML
INJECTION, SOLUTION INTRAVENOUS CONTINUOUS
Status: DISCONTINUED | OUTPATIENT
Start: 2022-07-11 | End: 2022-07-12

## 2022-07-11 RX ORDER — ONDANSETRON 2 MG/ML
4 INJECTION INTRAMUSCULAR; INTRAVENOUS EVERY 6 HOURS PRN
Status: DISCONTINUED | OUTPATIENT
Start: 2022-07-11 | End: 2022-07-20

## 2022-07-11 RX ORDER — POTASSIUM CHLORIDE 7.45 MG/ML
10 INJECTION INTRAVENOUS PRN
Status: DISCONTINUED | OUTPATIENT
Start: 2022-07-11 | End: 2022-07-12

## 2022-07-11 RX ORDER — ESCITALOPRAM OXALATE 10 MG/1
10 TABLET ORAL DAILY
Status: CANCELLED | OUTPATIENT
Start: 2022-07-11

## 2022-07-11 RX ORDER — POTASSIUM CHLORIDE 20 MEQ/1
40 TABLET, EXTENDED RELEASE ORAL PRN
Status: DISCONTINUED | OUTPATIENT
Start: 2022-07-11 | End: 2022-07-12

## 2022-07-11 RX ORDER — TRAZODONE HYDROCHLORIDE 50 MG/1
50 TABLET ORAL NIGHTLY
Status: CANCELLED | OUTPATIENT
Start: 2022-07-11

## 2022-07-11 RX ORDER — DEXAMETHASONE SODIUM PHOSPHATE 4 MG/ML
4 INJECTION, SOLUTION INTRA-ARTICULAR; INTRALESIONAL; INTRAMUSCULAR; INTRAVENOUS; SOFT TISSUE 3 TIMES DAILY
Status: DISCONTINUED | OUTPATIENT
Start: 2022-07-11 | End: 2022-07-12

## 2022-07-11 RX ORDER — SODIUM CHLORIDE 0.9 % (FLUSH) 0.9 %
5-40 SYRINGE (ML) INJECTION PRN
Status: DISCONTINUED | OUTPATIENT
Start: 2022-07-11 | End: 2022-07-20

## 2022-07-11 RX ORDER — ESCITALOPRAM OXALATE 10 MG/1
10 TABLET ORAL DAILY
Status: DISCONTINUED | OUTPATIENT
Start: 2022-07-12 | End: 2022-07-14

## 2022-07-11 RX ADMIN — AMOXICILLIN AND CLAVULANATE POTASSIUM 1 TABLET: 875; 125 TABLET, FILM COATED ORAL at 09:38

## 2022-07-11 RX ADMIN — Medication 3 MG: at 21:20

## 2022-07-11 RX ADMIN — DEXAMETHASONE SODIUM PHOSPHATE 4 MG: 4 INJECTION, SOLUTION INTRAMUSCULAR; INTRAVENOUS at 09:39

## 2022-07-11 RX ADMIN — SODIUM CHLORIDE, POTASSIUM CHLORIDE, SODIUM LACTATE AND CALCIUM CHLORIDE: 600; 310; 30; 20 INJECTION, SOLUTION INTRAVENOUS at 09:43

## 2022-07-11 RX ADMIN — ALPRAZOLAM 0.5 MG: 0.5 TABLET ORAL at 09:41

## 2022-07-11 RX ADMIN — SODIUM CHLORIDE, PRESERVATIVE FREE 10 ML: 5 INJECTION INTRAVENOUS at 21:14

## 2022-07-11 RX ADMIN — ENOXAPARIN SODIUM 40 MG: 100 INJECTION SUBCUTANEOUS at 09:38

## 2022-07-11 RX ADMIN — DEXAMETHASONE SODIUM PHOSPHATE 4 MG: 4 INJECTION, SOLUTION INTRAMUSCULAR; INTRAVENOUS at 13:18

## 2022-07-11 RX ADMIN — SODIUM CHLORIDE, POTASSIUM CHLORIDE, SODIUM LACTATE AND CALCIUM CHLORIDE: 600; 310; 30; 20 INJECTION, SOLUTION INTRAVENOUS at 01:44

## 2022-07-11 RX ADMIN — TRAZODONE HYDROCHLORIDE 50 MG: 50 TABLET ORAL at 21:12

## 2022-07-11 RX ADMIN — ALPRAZOLAM 0.5 MG: 0.5 TABLET ORAL at 02:59

## 2022-07-11 RX ADMIN — SODIUM CHLORIDE, POTASSIUM CHLORIDE, SODIUM LACTATE AND CALCIUM CHLORIDE: 600; 310; 30; 20 INJECTION, SOLUTION INTRAVENOUS at 17:02

## 2022-07-11 RX ADMIN — ESCITALOPRAM OXALATE 10 MG: 10 TABLET ORAL at 09:38

## 2022-07-11 RX ADMIN — ALPRAZOLAM 0.5 MG: 0.25 TABLET ORAL at 18:05

## 2022-07-11 RX ADMIN — IBUPROFEN 400 MG: 400 TABLET, FILM COATED ORAL at 18:20

## 2022-07-11 RX ADMIN — Medication 10 ML: at 21:42

## 2022-07-11 RX ADMIN — AMOXICILLIN AND CLAVULANATE POTASSIUM 1 TABLET: 875; 125 TABLET, FILM COATED ORAL at 21:12

## 2022-07-11 RX ADMIN — Medication 3 MG: at 02:59

## 2022-07-11 RX ADMIN — DEXAMETHASONE SODIUM PHOSPHATE 4 MG: 4 INJECTION, SOLUTION INTRA-ARTICULAR; INTRALESIONAL; INTRAMUSCULAR; INTRAVENOUS; SOFT TISSUE at 21:12

## 2022-07-11 ASSESSMENT — PAIN DESCRIPTION - LOCATION
LOCATION: LEG
LOCATION: LEG

## 2022-07-11 ASSESSMENT — PAIN DESCRIPTION - DESCRIPTORS
DESCRIPTORS: ACHING
DESCRIPTORS: BURNING;ACHING

## 2022-07-11 ASSESSMENT — PAIN - FUNCTIONAL ASSESSMENT: PAIN_FUNCTIONAL_ASSESSMENT: PREVENTS OR INTERFERES SOME ACTIVE ACTIVITIES AND ADLS

## 2022-07-11 ASSESSMENT — PAIN DESCRIPTION - FREQUENCY: FREQUENCY: CONTINUOUS

## 2022-07-11 ASSESSMENT — PAIN SCALES - GENERAL
PAINLEVEL_OUTOF10: 5
PAINLEVEL_OUTOF10: 3

## 2022-07-11 ASSESSMENT — PAIN DESCRIPTION - ORIENTATION
ORIENTATION: RIGHT
ORIENTATION: RIGHT

## 2022-07-11 NOTE — CARE COORDINATION
[id number] [facility] Pre-Admission Screening Document ADDENDUM-See also PAS completed on 7/10/22      Hospital Course: 33 yr old male with history of drug abuse presenting with altered mental status after being found by family for well check. Neurology: acute encephalopathy in the setting of [place] kratom overuse with urine drug screen positive for fentanyl, benzodiazepines, amphetamines, transaminitis, EDGAR, rhabdomyolysis resulting in provoked seizure and cerebral edema. Started on dexamethasone 4 mg 3 times daily.   Pulmonology: Left upper lobe atelectasis/pneumonia. po augmentin started 7/3  Psychiatry: long standing addiction issues. Pt agreeable to 3W when medically stable if needed    Medical status/changes: Transfer to ARU held d/t awaiting insurance approval      Labs/Infection Control:  Recent Labs     07/09/22  0456 07/10/22  0457 07/11/22  0514   WBC 19.6* 21.9* 22.9*   HGB 12.6* 13.0* 12.6*   HCT 36.3* 38.1* 37.7*   * 503* 515*   BUN 15 16 12   CREATININE 0.54* 0.57* 0.56*   GLUCOSE 107* 120* 118*    140 139   K 3.9 3.8 4.0   CALCIUM 9.1 9.2 9.2   PROT 6.5 6.5 6.2*      Blood cultures:  No results for input(s): BC in the last 72 hours. Urinalysis/C&S:  No results for input(s): NITRITE, LABCAST, WBCUA, RBCUA, MUCUS, TRICHOMONAS, YEAST, BACTERIA, LEUKOCYTESUR, BLOODU, GLUCOSEU, KETUA, AMORPHOUS, LABURIN in the last 72 hours. Radiology:  XR CHEST (2 VW)  Result Date: 7/4/2022  No significant interval change of left upper lobe and left lower lobe infiltrate and/or atelectasis. XR HIP RIGHT (2-3 VIEWS)  Result Date: 7/6/2022  NO ACUTE FRACTURE. CT Head WO Contrast  Result Date: 6/30/2022  Impression: Round symmetric areas decreased attenuation bilateral globus pallidus. This finding may be seen in patients experiencing hypoxia, i.e., carbon monoxide poisoning.  All CT scans at this facility use dose modulation, iterative reconstruction, and/or weight based dosing when appropriate to reduce radiation dose to as low as reasonably achievable. MRA HEAD WO CONTRAST  Result Date: 7/2/2022  Patent intracranial and extracranial circulation. XR CHEST PORTABLE  Result Date: 7/1/2022  LEFT UPPER LUNG ATELECTASIS/PNEUMONIA. US ABDOMEN LIMITED  Result Date: 7/5/2022  NEGATIVE RIGHT UPPER QUADRANT ULTRASOUND WITHOUT EVIDENCE OF CHOLELITHIASIS. US DUP LOWER EXTREMITY RIGHT ROBERT  Result Date: 7/3/2022  No acute DVT of the right lower extremity veins from the groin to the knee. No acute DVT of the right calf veins. MRA NECK WO CONTRAST  Result Date: 7/2/2022  Patent intracranial and extracranial circulation. MRI BRAIN W WO CONTRAST  Result Date: 7/4/2022  Mildly increased edema in the areas of diffusion restriction within the bilateral globi pallidi, splenium of the corpus callosum, and supratentorial white since prior MRI July 1, 2022. MRI BRAIN WO CONTRAST  Result Date: 7/1/2022  FINDINGS CONSISTENT WITH CARBON MONOXIDE POISONING/HYPOXEMIA AND/OR OTHER TOXIC ENCEPHALOPATHY. NO INTRACRANIAL HEMORRHAGE OR COMPLICATION IDENTIFIED. MRI CERVICAL THORACIC LUMBAR SPINE WO CONTRAST LIMITED  Result Date: 7/7/2022  NEGATIVE MILDLY LIMITED CERVICAL, THORACIC AND LUMBAR SPINE MRI. Medications/IV's:  The patient is currently on lovenox for DVT prophylaxis.      Scheduled:    escitalopram, 10 mg, Oral, Daily    traZODone, 50 mg, Oral, Nightly    dexamethasone, 4 mg, IntraVENous, TID    amoxicillin-clavulanate, 1 tablet, Oral, 2 times per day    polyethylene glycol, 17 g, Oral, Daily    lidocaine, 1 patch, TransDERmal, Daily    sodium chloride, 1,000 mL, IntraVENous, Once    sodium chloride flush, 5-40 mL, IntraVENous, 2 times per day    enoxaparin, 40 mg, SubCUTAneous, Daily    PRN:  ALPRAZolam, melatonin, ibuprofen, sodium chloride flush, sodium chloride, ondansetron **OR** ondansetron, potassium chloride **OR** potassium alternative oral replacement **OR** potassium chloride    Allergies: Allergies   Allergen Reactions    Peanut-Containing Drug Products Anaphylaxis     Throat swelling, hives, \"I needed hospital admission last time I had any\"    Nuts [Macadamia Nut Oil] Angioedema    Peanut Oil Other (See Comments)    Shellfish-Derived Products Itching         Most Recent Vitals, Height and Weight  /75   Pulse 94   Temp 98.3 °F (36.8 °C) (Oral)   Resp 16   Ht 5' 10\" (1.778 m)   Wt 157 lb 3 oz (71.3 kg)   SpO2 99%   BMI 22.55 kg/m²     Weight Bearing Restrictions: wbat    Current Diet Order: ADULT DIET; Regular    Skin: Abrasions BLE  Wound Care Documentation:  Wound 06/30/22 Ankle Anterior; Left (Active)   Wound Cleansed Not Cleansed 07/08/22 0950   Dressing/Treatment Open to air 07/10/22 2015   Number of days: 10       Wound 06/30/22 Pretibial Distal;Left Reddened, blistered (Active)   Wound Cleansed Not Cleansed 07/08/22 0950   Dressing/Treatment Open to air 07/10/22 2015   Number of days: 10       Wound 06/30/22 Pedal Anterior;Right Reddened, non-blanchable (Active)   Dressing/Treatment Open to air 07/08/22 0950   Number of days: 10          Lungs: wdl       Cognition and Behavior:  Language Preference (if other than English):      Alertness/Behavior  Neuro (WDL): Exceptions to WDL  Level of Consciousness: Alert (0)  History of Falling: Yes Cognition Comment: pt reported noticeable fatigue, increased time for problem solving at times; at others, pt impulsive    Short Term Memory Deficits    noted forgetfulness and confusion, improving  History of Falling: Yes    Safety    Avasys      CURRENT FUNCTIONAL LEVEL:  Physical Therapy  Bed mobility:  Bed mobility  Rolling to Right: Stand by assistance (07/02/22 6643)  Supine to Sit: Supervision (07/08/22 1350)  Sit to Supine: Supervision (07/08/22 1350)  Bed Mobility Comments: SPV d/t decreased safety awareness.  (07/08/22 1350)  Bed Mobility Training  Bed Mobility Training: Yes (07/11/22 1045)  Overall Level of Assistance: Stand-by assistance (07/11/22 1045)  Interventions: Safety awareness training (07/11/22 1045)  Rolling: Stand-by assistance (07/11/22 1045)  Supine to Sit: Stand-by assistance (07/11/22 1045)  Scooting: Stand-by assistance (07/11/22 1045)  Transfers:  Transfers  Sit to Stand: Contact guard assistance (07/08/22 1351)  Stand to sit: Contact guard assistance (07/08/22 1351)  Bed to Chair: Contact guard assistance (completes 5 squat pivots to and from chair for improved technique and activity tolerance. vc's for hand placement and safety.) (07/06/22 1428)  Comment: CGA d/t instability. multiple attempts needed to complete first stand. decreased safety awareness. (07/08/22 1351)  Transfer Training  Transfer Training: Yes (07/11/22 1045)  Overall Level of Assistance: Stand-by assistance;Contact-guard assistance (07/11/22 1045)  Interventions: Safety awareness training (07/11/22 1045)  Sit to Stand: Stand-by assistance;Contact-guard assistance (07/11/22 1045)  Stand to Sit: Contact-guard assistance;Minimum assistance (decreased safety with approach to chair) (07/11/22 1045)  Gait:   Ambulation  WB Status: WBAT (07/02/22 1513)  Ambulation  Surface: level tile (07/08/22 1352)  Device: [address] [facility] (07/08/22 1352)  Other Apparatus: Wheelchair follow (07/05/22 1440)  Assistance: Stand by assistance;Minimal assistance (Min A needed for LOB, otherwise SBA.) (07/08/22 1352)  Quality of Gait: decreased heel strike BLE. downward gaze. LOB with turns. (07/08/22 1352)  Gait Deviations: Slow Radha;Decreased step length;Decreased step height (07/08/22 1352)  Distance: 150' (07/08/22 1352)  Comments: pt with improved stability, LOB with turning, informal DGI tasks completed pt with noted decreased in speed and path deviations with head turns.  (07/08/22 1352)  More Ambulation?: Yes (07/08/22 3639)  Ambulation 2  Surface - 2: level tile (07/08/22 7131)  Device 2: Single point cane (07/08/22 1352)  Assistance 2: Contact guard assistance (07/08/22 1352)  Quality of Gait 2: antalgic RLE, decreased stabilty and gait speed. (07/08/22 1352)  Gait Deviations: Slow Radha;Decreased step length;Decreased step height (07/08/22 1352)  Distance: 30' (07/08/22 1352)  Comments: pt safest with [employer] this date but pt states would be more pleased to use [employer] as opposed to [employer]. (07/08/22 1352)  Gait Training: Yes (07/11/22 1045)  Overall Level of Assistance: Stand-by assistance;Contact-guard assistance (07/11/22 1045)  Distance (ft): 30 Feet (07/11/22 1045)  Assistive Device: Cane, straight (07/11/22 1045)  Interventions: Manual cues; Safety awareness training (07/11/22 1045)  Base of Support: Widened (07/11/22 1045)  Speed/Radha: Fluctuations (07/11/22 1045)  Step Length: Left shortened (07/11/22 1045)  Gait Abnormalities: Antalgic (07/11/22 1045)  Right Side Weight Bearing: As tolerated (07/11/22 1045)  Left Side Weight Bearing: As tolerated (07/11/22 1045)  Stairs: NT     W/C mobility: NT         Occupational Therapy  Hand Dominance: Right  ADL  Feeding: Setup (07/06/22 1616)  Feeding Skilled Clinical Factors: Pt reports continued difficulty with containers at times (07/06/22 1616)  Grooming: Setup (07/06/22 1616)  Grooming Skilled Clinical Factors: Washing face and hands only (07/06/22 1616)  UE Bathing: Setup (07/06/22 1616)  LE Bathing: Minimal assistance (07/06/22 1616)  LE Bathing Skilled Clinical Factors: Balance assist for lizett area (07/06/22 1616)  UE Dressing: Setup (07/06/22 1616)  LE Dressing: None (07/06/22 1616)  LE Dressing Skilled Clinical Factors: Balance assist with dynamic movements attempting to simulate, did not don pants d/t [place] present (07/06/22 1616)  Toileting: Minimal assistance (Yudith Punches assist for hygiene following attempted toileting) (07/06/22 1616)  Toileting Skilled Clinical Factors: Balance for hygiene post toileting attempt (07/06/22 1616)  Additional Comments: Pt performed sponge bath at EOB.  Pt. had stated he wanted to toilet but was unable to have BM (07/06/22 1616)  Toilet Transfers  Toilet - Technique: Stand step (07/06/22 1622)  Equipment Used: Standard bedside commode (07/06/22 1622)  Toilet Transfer: Contact guard assistance (07/06/22 1622)  Toilet Transfers Comments: Impulsive, turns longer distance which impacts safety with transitions (07/06/22 1622)            Speech Language Pathology   Diet/Swallow:  Dysphagia Outcome Severity Scale: Level 7: Normal in all situations              Current Conditions Requiring Inpatient Rehabilitation  Bowel/Bladder Dysfunction: Yes  Intervention Required = Frequent toileting  Risk for Medical/Clinical Complications = moderate  Skin Healing/Breakdown Risk: Yes  Intervention Required = Side to side turns  Risk for Medical/Clinical Complications = moderate  Nutrition/Hydration Deficiency: Yes  Intervention Required = Monitor I&Os and Check Labs  Risk for Medical/Clinical Complications = moderate  Medical Comorbidities: Yes  Intervention Required = DVT risk  Risk for Medical/Clinical Complications = moderate    Rehab/Skilled Needs:   3 hours of Intensive Acute Rehab therapy daily, 5 days/week for a total of 900 minutes  PT Treatment Time:  1.5 hrs/day  OT Treatment Time: 1.5 hrs/day  Rehabilitation Nursing   Case management/Social work    Cultural needs:   Values / Beliefs  Do You Have Any Ethnic, Cultural, Sacramental, or Spiritual [place]ondoism Needs You Would Like Us To Be Aware of While You Are in the Hospital : No   Funding needs:   Potential Assistance Purchasing Medications: Yes     Expected Level of Improvement with Rehab  Assist for ADL Supervision   Assist for Transfers Supervision   Assist for Gait Supervision     Patient's willingness to participate: Yes  Patient's ability to tolerate proposed care: Yes  Patient/Family Goals of Rehab (in patient's/family's own words): get better    Anticipated Discharge Plan:  Home with [address] Frey RN PT OT Aide to be determined vs [facility]       Barriers to Discharge:  Caregiver availability  Inaccessible transportation  Resource availability    Rehab evaluation plan: Recommend Acute Rehab   Rehabilitation Impairment Group Code: 2.1  Rehab Impairment Group: Non-traumatic Brain Dysfunction  Estimated Length of Stay (days): 10  Rehab Diagnosis: Impaired mobility and ADL's due to hypoxic encephalopathy   Reviewer's Signature: Electronically signed by Kelsey John RN on 7/11/22 at 3:21 PM EDT    I have reviewed and concur with the above Preadmission Screening.     Rehab Admitting Doctor: Dr. Jennifer Esparza DO

## 2022-07-11 NOTE — PROGRESS NOTES
Assumed care of patient at [phone] and received handoff report from Ryan De La Cruz, [facility]. Patient is alert and oriented to self, time and place but when asked what brought him to the hospital he replies, \" a fall. \" Responses are delayed with a flat affect. Patient on call-light at 0230 asking for melatonin because he couldn't sleep. Patient received 3mg of melatonin at 2000. Message to SARAY Villafana to see if I could administer another dose, given go ahead to admin 3mg more. Patient also asked for xanax, given per MAR.

## 2022-07-11 NOTE — CARE COORDINATION
Case reviewed with PM&R Dr Kaylen Hercules, patient ok to transfer to [address] [place] [address] room 242 pending negative covid and medical clearance. Patient and family aware. Nohemi CHAMPION aware of bed assignment.  Electronically signed by Lino Lopez RN on 7/11/22 at 4:15 PM EDT

## 2022-07-11 NOTE — CARE COORDINATION
[facility] today.      Electronically signed by ANNALISA Dimas on 7/11/2022 at 12:44 PM

## 2022-07-11 NOTE — PROGRESS NOTES
[facility] OCCUPATIONAL THERAPY MED SURG TREATMENT NOTE     Date: 2022  Patient Name: Christina Bryson        MRN: [id number]  Account: [de-identified]   : 1990  (33 y.o.)  Room: [place] [id number]    Chart Review:    Restrictions  Restrictions/Precautions  Restrictions/Precautions: Fall Risk     Safety:  Safety Devices  Type of Devices: All fall risk precautions in place    Patient's birthday verified: Yes    Subjective:    Subjective: \"I feel okay\"       Pain at start of treatment: Yes: 3/10    Pain at end of treatment: Yes: 2/10    Location: B hips- ace  Nursing notified: Declined  RN:   Intervention: Repositioned    Objective:    ADL Status:  ADL  Feeding: Independent  Grooming: Setup  Grooming Skilled Clinical Factors: Washing face and combing hair  UE Bathing: Setup  LE Bathing: Minimal assistance  LE Bathing Skilled Clinical Factors: Assist to reach feet d/t pain with bending  UE Dressing: Setup  LE Dressing: Minimal assistance  Toileting: None  Toileting Skilled Clinical Factors: Declines need; anticipate SBA per pt. report  Additional Comments: Pt completed partial ADL as above.  Did not need to toilet, was able to don pants, increased time and effort for reaching feet, difficulty with R LE d/t pain    CHAR Hose donned: No  If no - why  not ordered    Therapy key for assistance levels -   Independent/Mod I = Pt. is able to perform task with no assistance but may require a device   Stand by assistance = Pt. does not perform task at an independent level but does not need physical assistance, requires verbal cues  Minimal, Moderate, Maximal Assistance = Pt. requires physical assistance (25%, 50%, 75% assist from helper) for task but is able to actively participate in task   Dependent = Pt. requires total assistance with task and is not able to actively participate with task completion    Orientation Status:  Orientation  Overall Orientation Status: Within Functional Limits  Orientation Level: Oriented to place;Oriented to time;Oriented to person    Observation:  Observation/Palpation  Posture: Fair  Observation: Pt alert and attentive, anxious at times and mildly impulsive    Cognition Status:  Cognition  Arousal/Alertness: Appropriate responses to stimuli  Following Commands: Follows one step commands consistently  Attention Span: Attends with cues to redirect  Memory: Decreased recall of recent events  Safety Judgement: Decreased awareness of need for safety  Problem Solving: Assistance required to generate solutions  Insights: Decreased awareness of deficits  Initiation: Requires cues for some  Sequencing: Requires cues for some  Cognition Comment: Verbal cues for sequencing intermittently    Perception Status:  Perception  Overall Perceptual Status: WFL    Vision and Hearing Status:  Vision  Vision Exceptions: Wears glasses for reading  Hearing  Hearing: Within functional limits   Vision - Basic Assessment  Prior Vision: Wears glasses only for reading  Visual History: No significant visual history  Patient Visual Report: No visual complaint reported. Visual Field Cut: No  Oculo Motor Control: WNL    GROSS ASSESSMENT AROM/PROM:  AROM: Generally decreased, functional       ROM:   LUE AROM (degrees)  LUE AROM : WFL  RUE AROM (degrees)  RUE AROM : WFL    UE STRENGTH:  Strength: Generally decreased, functional (3+/5 during mobility)    UE COORDINATION:  Coordination: Generally decreased, functional    UE TONE:    WFL    UE SENSATION:  Sensation: Intact (R foot per pt report)    Functional Mobility:  Patient ambulated to/from sink with WW at Supervision level. Verbal cues for positioning and technique.     Transfers:    SBA    Bed Mobility    NT up in chair at start and end of tx    Seated and Standing Balance:  Balance  Sitting: Intact  Standing: Impaired  Standing - Static: Good  Standing - Dynamic: Fair    Functional Endurance:  Activity Tolerance  Activity Tolerance: Patient Tolerated treatment well    Treatment consisted of:    ADL training    Assessment/Discharge Disposition:  Assessment  Discharge Recommendations: Continue to assess pending progress  Assessment: Pt demonstrates improved balance, endurance and mobility however continues to be limited d/t fatiguing quickly and requiring min verbal cues for sequencing as well as encouragement for making attempts to complete tasks independently. [employer]  How much help for putting on and taking off regular lower body clothing?: A Little  How much help for Bathing?: A Little  How much help for Toileting?: A Little  How much help for putting on and taking off regular upper body clothing?: A Little  How much help for taking care of personal grooming?: A Little  How much help for eating meals?: A Little  AM-PAC Inpatient Daily Activity Raw Score: 18  AM-PAC Inpatient ADL T-Scale Score : 38.66  ADL Inpatient CMS 0-100% Score: 46.65  ADL Inpatient CMS G-Code Modifier : CK    Plan:    Continue OT per POC    Patient Education:  Patient Education  Education Given To: Patient  Education Provided: Role of Therapy;Plan of Care  Education Method: Verbal  Barriers to Learning: None  Education Outcome: Verbalized understanding    Equipment recommendations:  OT Equipment Recommendations  Other: continue to assess    Goals/Plan of care addressed during this session:      Patient Goal: Patient goals : \"I have to get stronger and move better.'    Improve [place] with ADLs and Improve [place] with Functional Transfers    Therapy Time:   Individual Group Co-Treat   Time In 1520       Time Out 1530         Minutes 10         ADL/IADL training: 10 minutes    Electronically signed by:     LEATHA Somers    7/11/2022, 4:04 PM

## 2022-07-11 NOTE — PROGRESS NOTES
Physical Therapy Med Surg Daily Treatment Note  Facility/Department: [facility]  Room: J587/A764-14       NAME: Jojo Brooks  : 1990 (33 y.o.)  MRN: [id number]  CODE STATUS: Full Code    Date of Service: 2022    Patient Diagnosis(es): Drug abuse ([facility] [F19.10]  Acute encephalopathy [G93.40]  Non-traumatic rhabdomyolysis [M62.82]  Altered mental status, unspecified altered mental status type [R41.82]   Chief Complaint   Patient presents with    Drug Overdose     pt has been taking royal kratom (hasn't left his apartment in a few days). Well check. Patient Active Problem List    Diagnosis Date Noted    Cerebral edema ([facility] [address]     Weakness of right lower extremity      impaired mobility and ADLs dt encephalopathy  2022    Abdominal pain 2022    Depressive disorder 2022    Vitamin D deficiency 2022    Benzodiazepine dependence ([facility] [address] 2022    Right foot drop 2022    Altered mental status     Drug abuse ([facility] [address]     Acute encephalopathy 2022    Shift work sleep disorder 07/15/2019    ROSALES (generalized anxiety disorder) 2019    MDD (major depressive disorder), recurrent episode, moderate ([address] 2019    Polydrug dependence including opioid type drug with continuous use with complication ([address]     Anxiety 2015    ADHD (attention deficit hyperactivity disorder) 2012    Attention deficit hyperactivity disorder (ADHD) 2012    LPRD (laryngopharyngeal reflux disease) 10/19/2011        Past Medical History:   Diagnosis Date    ADHD (attention deficit hyperactivity disorder)     was initiated on treatment by Dr. Ariel Vo.   any testing was done here by Dr. Ariel Vo, no formal psych eval.      Anxiety     Drug abuse ([address]     MDD (major depressive disorder), recurrent episode, moderate ([address] 2019     Past Surgical History:   Procedure Laterality Date    TONSILLECTOMY AND ADENOIDECTOMY      WISDOM TOOTH EXTRACTION              Restrictions:fall risk       SUBJECTIVE:   Subjective: \"I will get up and walk with you\"    Pain  Pain: left foot pain constant. not rated. pt states it has been bothering him since admission. OBJECTIVE:   Orientation  Overall Orientation Status: Within Functional Limits  Cognition  Overall Cognitive Status: Exceptions  Arousal/Alertness: Delayed responses to stimuli  Following Commands: Follows multistep commands with repitition  Attention Span: Attends with cues to redirect  Memory: Decreased recall of recent events  Insights: Decreased awareness of deficits  Initiation: Requires cues for some    Bed Mobility Training  Bed Mobility Training: Yes  Overall Level of Assistance: Stand-by assistance  Interventions: Safety awareness training  Rolling: Stand-by assistance  Supine to Sit: Stand-by assistance  Scooting: Stand-by assistance  Duration: 5    Transfer Training  Transfer Training: Yes  Overall Level of Assistance: Stand-by assistance;Contact-guard assistance  Interventions: Safety awareness training  Sit to Stand: Stand-by assistance;Contact-guard assistance  Stand to Sit: Contact-guard assistance;Minimum assistance (decreased safety with approach to chair)  Duration: 5    Gait Training: Yes  Overall Level of Assistance: Stand-by assistance;Contact-guard assistance  Distance (ft): 30 Feet x 2   Assistive Device: Cane, straight  Interventions: Manual cues; Safety awareness training  Base of Support: Widened  Speed/Radha: Fluctuations  Step Length: Left shortened  Gait Abnormalities: Antalgic  Right Side Weight Bearing: As tolerated  Left Side Weight Bearing: As tolerated  5                                       ASSESSMENT pt presents with flat affect but did engage more with staff he recognized when he was ambulating in the holder. Pt safest with ww but prefers straight cane at this time.  Pt with very slow antalgic gait and asked when he would be back to his normal. He states he is starting to feel better but feels he is moving so slow. Pt with guarded movement. Agreed to sit up in the chair after tx. Pt needed constant direction and cues to perform each task. Discharge Recommendations:  Continue to assess pending progress         Goals  Short Term Goals  Time Frame for Short term goals: 4 weeks  Short term goal 1: Indep HEP for symptom management  Short term goal 2: Indep to supervision bed mobility  Short term goal 3: Indep to supervision transfers sit to stand and bed to chair  Short term goal 4: Amb with approp device 100 ft safe with supervision  Short term goal 5: Improve LE strength 1/2 grade to assist with above goals. Patient Goals   Patient goals : Improve function    PLAN    Plan: 1 time a day 3-6 times a week        AMPAC (6 CLICK) BASIC MOBILITY  AM-PAC Inpatient Mobility Raw Score : 18     Therapy Time   Individual   Time In  1030   Time Out 1045   Minutes 15   10 minutes bed mob/transfers  5 minutes gt          Asaf Bro PTA, 07/11/22 at 10:48 AM         Definitions for assistance levels  Independent = pt does not require any physical supervision or assistance from another person for activity completion. Device may be needed.   Stand by assistance = pt requires verbal cues or instructions from another person, close to but not touching, to perform the activity  Minimal assistance= pt performs 75% or more of the activity; assistance is required to complete the activity  Moderate assistance= pt performs 50% of the activity; assistance is required to complete the activity  Maximal assistance = pt performs 25% of the activity; assistance is required to complete the activity  Dependent = pt requires total physical assistance to accomplish the task

## 2022-07-11 NOTE — FLOWSHEET NOTE
Patient admitted to room 242. Patient alert and oriented x4 and appropriate. Assessment completed. Skin assessed by two nurses. Complains of pain/numbness/tingling to right leg, ibuprofen given. Complains of anxiety, xanax given. Oriented to room and unit. Call light in reach.

## 2022-07-11 NOTE — FLOWSHEET NOTE
Patient's mother called and expressed her concern regarding the patient being given Xanax since it is an addictive drug.

## 2022-07-11 NOTE — PROGRESS NOTES
Hospitalist Daily Progress Note  Name: Minerva Mortimer  Age: 33 y.o. Gender: male  CodeStatus: Full Code  Allergies: Peanut-Containing Drug Products  Nuts [Macadamia Nut Oil]  Peanut Oil  Shellfish-Derived Products    Chief Complaint:Drug Overdose (pt has been taking royal kratom (hasn't left his apartment in a few days). Well check. )    Primary Care Provider: YING Long CNP    InpatientTreatment Team: Treatment Team: Attending Provider: Adelina Taylor MD; Consulting Physician: Price Rizvi MD; Consulting Physician: Aisha Sorensen MD; Consulting Physician: Jazmin Cueva MD; Utilization Reviewer: Katya Sauceda, RN; Registered Nurse: Alissa Márquez RN; : Marcela Carter RN    Admission Date: 6/30/2022      Subjective: Patient is awake; alert; denies chset pain or pressure; 12 point ROS negative    Physical Exam  Vitals and nursing note reviewed. Constitutional:       Appearance: Normal appearance. Cardiovascular:      Rate and Rhythm: Normal rate and regular rhythm. Pulmonary:      Effort: Pulmonary effort is normal.      Breath sounds: Normal breath sounds. Abdominal:      General: Bowel sounds are normal.      Palpations: Abdomen is soft. Musculoskeletal:         General: Normal range of motion. Skin:     General: Skin is warm and dry. Neurological:      Mental Status: He is alert.       Comments: Slow, indirect answers       Medications:  Reviewed    Infusion Medications:    lactated ringers 150 mL/hr at 07/11/22 0144    sodium chloride       Scheduled Medications:    escitalopram  10 mg Oral Daily    traZODone  50 mg Oral Nightly    dexamethasone  4 mg IntraVENous TID    amoxicillin-clavulanate  1 tablet Oral 2 times per day    polyethylene glycol  17 g Oral Daily    lidocaine  1 patch TransDERmal Daily    sodium chloride  1,000 mL IntraVENous Once    sodium chloride flush  5-40 mL IntraVENous 2 times per day    enoxaparin  40 mg SubCUTAneous Daily     PRN Meds: ALPRAZolam, melatonin, ibuprofen, sodium chloride flush, sodium chloride, ondansetron **OR** ondansetron, potassium chloride **OR** potassium alternative oral replacement **OR** potassium chloride    Labs:   Recent Labs     07/09/22  0456 07/10/22  0457 07/11/22  0514   WBC 19.6* 21.9* 22.9*   HGB 12.6* 13.0* 12.6*   HCT 36.3* 38.1* 37.7*   * 503* 515*     Recent Labs     07/09/22  0456 07/10/22  0457 07/11/22  0514    140 139   K 3.9 3.8 4.0    102 102   CO2 25 25 26   BUN 15 16 12   CREATININE 0.54* 0.57* 0.56*   CALCIUM 9.1 9.2 9.2     Recent Labs     07/08/22  0901 07/08/22  0901 07/09/22  0456 07/10/22  0457 07/11/22  0514   AST 90*   < > 57* 38 28   *   < > 205* 162* 132*   BILIDIR <0.2  --   --   --   --    BILITOT 0.4   < > 0.4 0.3 0.3   ALKPHOS 82   < > 79 87 81    < > = values in this interval not displayed. No results for input(s): INR in the last 72 hours. Recent Labs     07/09/22  2131 07/10/22  0457 07/10/22  2117   CKTOTAL 1,051* 891* 656*     Urinalysis:   Lab Results   Component Value Date/Time    NITRU Negative 06/30/2022 04:00 PM    WBCUA 3-5 06/30/2022 04:00 PM    BACTERIA Negative 06/30/2022 04:00 PM    RBCUA 3-5 06/30/2022 04:00 PM    BLOODU LARGE 06/30/2022 04:00 PM    SPECGRAV 1.033 06/30/2022 04:00 PM    GLUCOSEU Negative 06/30/2022 04:00 PM     Radiology:   Most recent    Chest CT      WITH CONTRAST:No results found for this or any previous visit. WITHOUT CONTRAST: No results found for this or any previous visit. CXR      2-view: Results for orders placed in visit on 01/29/18    XR CHEST STANDARD (2 VW)    Narrative  EXAM: CHEST, 2 VIEWS    COMPARISON: NONE AVAILABLE    REASON FOR EXAMINATION: UPPER RESPIRATORY CONGESTION, DYSPNEA X2 WEEKS    FINDINGS:   Two views of the chest demonstrate normal appearance of the heart and mediastinum. The lungs appear clear.   The visualized bony thorax and remainder of the chest appears unremarkable. Impression  NO EVIDENCE OF ACTIVE DISEASE IN THE CHEST. Portable: Results for orders placed during the hospital encounter of 06/30/22    XR CHEST PORTABLE    Narrative  EXAMINATION: XR CHEST PORTABLE    CLINICAL HISTORY: FEVER    COMPARISONS: JANUARY 29, 2018    FINDINGS: Osseous structures are intact. Cardiopericardial silhouette is normal. Pulmonary vasculature is normal. Right lung is clear. Patchy area of increased opacity left upper lung. Impression  LEFT UPPER LUNG ATELECTASIS/PNEUMONIA. Echo No results found for this or any previous visit. Assessment/Plan:    Active Hospital Problems    Diagnosis Date Noted    Cerebral edema ([facility] [address] [G93.6]      Priority: Medium    Weakness of right lower extremity [R29.898]      Priority: Medium     impaired mobility and ADLs dt encephalopathy  [Z74.09, Z78.9] 07/05/2022     Priority: Medium    Vitamin D deficiency [E55.9] 07/05/2022     Priority: Medium    Benzodiazepine dependence ([facility] [address] [F13.20] 07/05/2022     Priority: Medium    Depressive disorder [F32.9] 07/05/2022     Priority: Medium    Right foot drop [M21.371] 07/05/2022     Priority: Medium    Altered mental status [R41.82]      Priority: Medium    Drug abuse ([facility] [address] [F19.10]      Priority: Medium    Acute encephalopathy [G93.40] 06/30/2022     Priority: Medium    Polydrug dependence including opioid type drug with continuous use with complication ([facility] [address] [Q61.37] 06/04/2019    ROSALES (generalized anxiety disorder) [F41.1] 06/04/2019    ADHD (attention deficit hyperactivity disorder) [F90.9] 12/05/2012    Attention deficit hyperactivity disorder (ADHD) [F90.9] 12/05/2012     1. Polysubstance overdose/acute ischemic encephalopathy       Repeat MRI shows edema; started on IV decadron per neurology recommendations;  Medically dischartged to acute rehab on Thursday; still awaiting precert; will be able to be discharged Monday 0/63 - rehab pre-cert approved, transfer to acute rehab today  2. Rhabdomyolysis/EDGAR            EDGAR resolved; Rhabdo improving  3. Acute Hepatitis         Probably due to ischemic injury; improving  4.  DVT proph    Electronically signed by Suzy Jones MD on 7/11/2022 at 7:20 AM

## 2022-07-11 NOTE — PROGRESS NOTES
Subjective: The patient complains of severe acute on chronic progressive fatigue and cognitive slowing and right lower extremity weakness and pain partially relieved by rest, PT, OT and meds  SLP and exacerbated by exertion and recent illness. He now admits that he took 2 Xanax that he bought on the street. He and I both fear that it may have been laced with fentanyl. Discussed with treatment team and hospitalist and neuro--should be ready for acute rehab today  MRIs spine-     NEGATIVE MILDLY LIMITED CERVICAL, THORACIC AND LUMBAR SPINE MRI. I am concerned about patients medical complexities including:  Principal Problem:    Acute encephalopathy  Active Problems:    Drug abuse ([facility] [address]    Altered mental status     impaired mobility and ADLs dt encephalopathy     Depressive disorder    Vitamin D deficiency    Benzodiazepine dependence (HCC)    Right foot drop    Cerebral edema (HCC)    Weakness of right lower extremity    ADHD (attention deficit hyperactivity disorder)    ROSALES (generalized anxiety disorder)    Polydrug dependence including opioid type drug with continuous use with complication (HCC)    Attention deficit hyperactivity disorder (ADHD)  Resolved Problems:    * No resolved hospital problems. *      .    Reviewed recent nursing note and discussed current status and planned care with acute care providers, \" Assumed care of patient at [phone] and received handoff report from Samreen Garcia, [address] [place] [address]. Patient is alert and oriented to self, time and place but when asked what brought him to the hospital he replies, \" a fall. \" Responses are delayed with a flat affect. Patient on call-light at 0230 asking for melatonin because he couldn't sleep. Patient received 3mg of melatonin at 2000. Message to SARAY Villafana to see if I could administer another dose, given go ahead to admin 3mg more. Patient also asked for xanax, given per MAR.  \".  He was approved for acute rehab however it looks like his insurance company does not want him to come until today. Will need therapeutic reevaluation's as he did not have a therapy note for the past several days. ROS x10: The patient also complains of severely impaired mobility and activities of daily living. Otherwise no new problems with vision, hearing, nose, mouth, throat, dermal, cardiovascular, GI, , pulmonary, musculoskeletal, psychiatric or neurological.        Vital signs:  /75   Pulse 94   Temp 98.3 °F (36.8 °C) (Oral)   Resp 16   Ht 5' 10\" (1.778 m)   Wt 157 lb 3 oz (71.3 kg)   SpO2 99%   BMI 22.55 kg/m²   I/O:   PO/Intake:    fair PO intake, continue to monitor closely for dehydration  Reg diet thin liquids    Bowel/Bladder:   continent,    General:  Patient is well developed, adequately nourished, and    well kempt. HEENT:    PERRLA, hearing intact to loud voice, external inspection of ear and nose benign. Inspection of lips, tongue and gums benign  Musculoskeletal: No significant change in strength or tone. All joints stable. Inspection and palpation of digits and nails show no clubbing, cyanosis or inflammatory conditions. Neuro/Psychiatric: Affect: flat-  Alert and oriented to self and situation with  mod cues. No significant change in deep tendon reflexes or sensation-cognitive slowing. Right lower extremity foot drop weakness and deep peroneal numbness. Lungs:  Diminished, CTA-B  . Respiration effort is normal at rest.   Heart:   S1 = S2,   RRR. Abdomen:  Soft, non-tender    Extremities:  no lower extremity edema but no unusual tenderness. Skin:   BUE bruises dt blood draws      Rehabilitation:  Physical Therapy:   Bed mobility:  Bed mobility  Rolling to Right: Stand by assistance (07/02/22 9453)  Supine to Sit: Supervision (07/08/22 1350)  Sit to Supine: Supervision (07/08/22 1350)  Bed Mobility Comments: SPV d/t decreased safety awareness.  (07/08/22 1350)  Transfers:  Transfers  Sit to Stand: Contact guard assistance (07/08/22 [id number])  Stand to sit: Contact guard assistance (07/08/22 1351)  Bed to Chair: Contact guard assistance (completes 5 squat pivots to and from chair for improved technique and activity tolerance. vc's for hand placement and safety.) (07/06/22 1428)  Comment: CGA d/t instability. multiple attempts needed to complete first stand. decreased safety awareness. (07/08/22 1351)  Gait:   Ambulation  WB Status: WBAT (07/02/22 1513)  Ambulation  Surface: level tile (07/08/22 1352)  Device: Rolling Walker (07/08/22 1352)  Other Apparatus: Wheelchair follow (07/05/22 1440)  Assistance: Stand by assistance;Minimal assistance (Min A needed for LOB, otherwise SBA.) (07/08/22 1352)  Quality of Gait: decreased heel strike BLE. downward gaze. LOB with turns. (07/08/22 1352)  Gait Deviations: Slow Radha;Decreased step length;Decreased step height (07/08/22 1352)  Distance: 150' (07/08/22 1352)  Comments: pt with improved stability, LOB with turning, informal DGI tasks completed pt with noted decreased in speed and path deviations with head turns. (07/08/22 1352)  More Ambulation?: Yes (07/08/22 1352)  Ambulation 2  Surface - 2: level tile (07/08/22 1352)  Device 2: Single point cane (07/08/22 1352)  Assistance 2: Contact guard assistance (07/08/22 1352)  Quality of Gait 2: antalgic RLE, decreased stabilty and gait speed.  (07/08/22 1352)  Gait Deviations: Slow Radha;Decreased step length;Decreased step height (07/08/22 1352)  Distance: 30' (07/08/22 1352)  Comments: pt safest with [employer] this date but pt states would be more pleased to use [employer] as opposed to [employer]. (07/08/22 1352)  Stairs:     W/C mobility:       Occupational Therapy:   Hand Dominance: Right  ADL  Feeding: Setup (07/06/22 1616)  Feeding Skilled Clinical Factors: Pt reports continued difficulty with containers at times (07/06/22 1616)  Grooming: Setup (07/06/22 1616)  Grooming Skilled Clinical Factors: Washing face and hands only (07/06/22 1616)  UE Bathing: Setup (07/06/22 1616)  LE Bathing: Minimal assistance (07/06/22 1616)  LE Bathing Skilled Clinical Factors: Balance assist for lizett area (07/06/22 1616)  UE Dressing: Setup (07/06/22 1616)  LE Dressing: None (07/06/22 1616)  LE Dressing Skilled Clinical Factors: Balance assist with dynamic movements attempting to simulate, did not don pants d/t [place] present (07/06/22 1616)  Toileting: Minimal assistance (Jamielin Guess assist for hygiene following attempted toileting) (07/06/22 1616)  Toileting Skilled Clinical Factors: Balance for hygiene post toileting attempt (07/06/22 1616)  Additional Comments: Pt performed sponge bath at EOB.  Pt. had stated he wanted to toilet but was unable to have BM (07/06/22 1616)  Toilet Transfers  Toilet - Technique: Stand step (07/06/22 1622)  Equipment Used: Standard bedside commode (07/06/22 1622)  Toilet Transfer: Contact guard assistance (07/06/22 1622)  Toilet Transfers Comments: Impulsive, turns longer distance which impacts safety with transitions (07/06/22 1622)          Speech Therapy:            Diet/Swallow:        Dysphagia Outcome Severity Scale: Level 7: Normal in all situations          COGNITION  OT: Cognition Comment: pt reported noticeable fatigue, increased time for problem solving at times; at others, pt impulsive  SP:           Lab/X-ray studies reviewed, analyzed and discussed with patient and staff:   Recent Results (from the past 24 hour(s))   CK    Collection Time: 07/10/22  9:17 PM   Result Value Ref Range    Total  (H) 0 - 190 U/L   Comprehensive Metabolic Panel w/ Reflex to MG    Collection Time: 07/11/22  5:14 AM   Result Value Ref Range    Sodium 139 135 - 144 mEq/L    Potassium reflex Magnesium 4.0 3.4 - 4.9 mEq/L    Chloride 102 95 - 107 mEq/L    CO2 26 20 - 31 mEq/L    Anion Gap 11 9 - 15 mEq/L    Glucose 118 (H) 70 - 99 mg/dL    BUN 12 6 - 20 mg/dL    CREATININE 0.56 (L) 0.70 - 1.20 mg/dL    GFR Non-African American >60.0 >60    GFR  >60.0 >60 Calcium 9.2 8.5 - 9.9 mg/dL    Total Protein 6.2 (L) 6.3 - 8.0 g/dL    Albumin 3.7 3.5 - 4.6 g/dL    Total Bilirubin 0.3 0.2 - 0.7 mg/dL    Alkaline Phosphatase 81 35 - 104 U/L     (H) 0 - 41 U/L    AST 28 0 - 40 U/L    Globulin 2.5 2.3 - 3.5 g/dL   CBC with Auto Differential    Collection Time: 07/11/22  5:14 AM   Result Value Ref Range    WBC 22.9 (H) 4.8 - 10.8 K/uL    RBC 4.39 (L) 4.70 - 6.10 M/uL    Hemoglobin 12.6 (L) 14.0 - 18.0 g/dL    Hematocrit 37.7 (L) 42.0 - 52.0 %    MCV 85.9 80.0 - 100.0 fL    MCH 28.6 27.0 - 31.3 pg    MCHC 33.3 33.0 - 37.0 %    RDW 12.9 11.5 - 14.5 %    Platelets 420 (H) 519 - 400 K/uL    Neutrophils % 84.7 %    Lymphocytes % 10.1 %    Monocytes % 4.9 %    Eosinophils % 0.2 %    Basophils % 0.1 %    Neutrophils Absolute 19.4 (H) 1.4 - 6.5 K/uL    Lymphocytes Absolute 2.3 1.0 - 4.8 K/uL    Monocytes Absolute 1.1 (H) 0.2 - 0.8 K/uL    Eosinophils Absolute 0.0 0.0 - 0.7 K/uL    Basophils Absolute 0.0 0.0 - 0.2 K/uL       7/7/22-     NEGATIVE MILDLY LIMITED CERVICAL, THORACIC AND LUMBAR SPINE MRI. Previous extensive, complex labs, notes and diagnostics reviewed and analyzed. ALLERGIES:    Allergies as of 06/30/2022 - Fully Reviewed 06/30/2022   Allergen Reaction Noted    Peanut-containing drug products Anaphylaxis 12/06/2018    Peanut oil Other (See Comments) 02/02/2015      (please also verify by checking [facility])     Complex Physical Medicine & Rehab Issues Assess & Plan:   1. Severe abnormality of gait and mobility and impaired self-care and ADL's secondary to hypoxic encephalopathy status post overdose likely secondary to fentanyl laced Xanax pill. Updated functional and medical status reassessed regarding patients ability to participate in therapies and patient found to be able to participate in:        acute intensive comprehensive inpatient rehabilitation program including PT/OT to improve balance, ambulation, ADLs, and to improve the P/AROM.    It is my opinion that they will be able to tolerate 3 hours of therapy a day and benefit from it at an acute level. I again discussed acute rehab with the patient and verify that the patient is able and willing to participate in 3 hours of therapy a day. Rehab and Acute Care Case Management has also reinforced this expectation. Will continue to follow to attempt to get patient to the most efficient but most effective level of care will be in their best interest.  Continue to focus on energy conservation heart rate and blood pressure monitoring before during and after therapy endurance and consistency of function. After acute rehab he will transition to 1 [address]. psychiatric floor at [facility] for further treatment regarding substance overuse abuse anxiety and depression. 2. Bowel constipation and Bladder dysfunction   overactive, neurogenic bladder:  frequent toileting, ambulate to bathroom with assistance, check post void residuals. Check for C.difficile x1 if >2 loose stools in 24 hours, continue bowel & bladder program.  Monitor for UTI symptoms including lethargy and confusion    3. Severe right lower extremity pain secondary to sciatic vs deep peroneal nerve injury secondary to overdose and generalized OA pain: reassess pain every shift and prior to and after each therapy session, give prn Tylenol   and consider scheduled Tylenol, modalities prn in therapy, consider Lidoderm, K-pad prn.     4. Skin breakdown   risk:  continue pressure relief program.  Daily skin exams and reports from nursing. 5. Severe fatigue due to immobility and nutritional deficits: monitoring for dysphagia   Add vitamin B12 vitamin D and CoQ10 titrate dosing and add protein supplementation with low carb content. 6. Complex discharge planning:   Discussed with care team-last 24 hour events noted.   I will continue to follow along and reassess functional and medical status as we strive to improve patient's functional and medical outcomes

## 2022-07-11 NOTE — PROGRESS NOTES
[facility] Occupational Therapy      Date: 2022  Patient Name: Abiel Auguste        MRN: [id number]  Account: [de-identified]   : 1990  (33 y.o.)  Room: [place] [address]    Chart reviewed, attempted OT at [phone] for ASAP OT treatment. Patient not seen 2° to:    Pt. declined, stating: \"I have not slept all night so I need to sleep more. \"    Spoke to RN, RN aware. Will attempt again when able.     Electronically signed by NATHANIEL Carbone on 2022 at 7:30 AM

## 2022-07-12 PROBLEM — G57.01 LESION OF RIGHT SCIATIC NERVE: Status: ACTIVE | Noted: 2022-07-12

## 2022-07-12 PROBLEM — K57.32 DIVERTICULITIS OF COLON: Status: ACTIVE | Noted: 2022-07-12

## 2022-07-12 PROBLEM — M62.830 SPASM OF BACK MUSCLES: Status: ACTIVE | Noted: 2022-07-12

## 2022-07-12 PROBLEM — R33.8 ACUTE RETENTION OF URINE: Status: ACTIVE | Noted: 2022-07-12

## 2022-07-12 PROBLEM — Z78.9 IMPAIRED MOBILITY AND ADLS: Status: ACTIVE | Noted: 2022-07-12

## 2022-07-12 PROBLEM — K12.0 RECURRENT APHTHOUS ULCER: Status: ACTIVE | Noted: 2022-07-12

## 2022-07-12 PROBLEM — G57.31 NEUROPATHY OF RIGHT PERONEAL NERVE: Status: ACTIVE | Noted: 2022-07-12

## 2022-07-12 PROBLEM — Z74.09 IMPAIRED MOBILITY AND ADLS: Status: ACTIVE | Noted: 2022-07-12

## 2022-07-12 PROBLEM — R53.83 FATIGUE: Status: ACTIVE | Noted: 2022-07-12

## 2022-07-12 LAB
ALBUMIN SERPL-MCNC: 4 G/DL (ref 3.5–4.6)
ALP BLD-CCNC: 78 U/L (ref 35–104)
ALT SERPL-CCNC: 106 U/L (ref 0–41)
AST SERPL-CCNC: 24 U/L (ref 0–40)
BILIRUB SERPL-MCNC: 0.4 MG/DL (ref 0.2–0.7)
BILIRUBIN DIRECT: <0.2 MG/DL (ref 0–0.4)
BILIRUBIN, INDIRECT: ABNORMAL MG/DL (ref 0–0.6)
GLUCOSE BLD-MCNC: 134 MG/DL (ref 70–99)
PERFORMED ON: ABNORMAL
TOTAL CK: 451 U/L (ref 0–190)
TOTAL PROTEIN: 6.5 G/DL (ref 6.3–8)

## 2022-07-12 PROCEDURE — 6370000000 HC RX 637 (ALT 250 FOR IP): Performed by: PHYSICAL MEDICINE & REHABILITATION

## 2022-07-12 PROCEDURE — 1180000000 HC REHAB R&B

## 2022-07-12 PROCEDURE — 96125 COGNITIVE TEST BY HC PRO: CPT

## 2022-07-12 PROCEDURE — 6370000000 HC RX 637 (ALT 250 FOR IP): Performed by: INTERNAL MEDICINE

## 2022-07-12 PROCEDURE — 97530 THERAPEUTIC ACTIVITIES: CPT

## 2022-07-12 PROCEDURE — 6360000002 HC RX W HCPCS: Performed by: PHYSICAL MEDICINE & REHABILITATION

## 2022-07-12 PROCEDURE — 97166 OT EVAL MOD COMPLEX 45 MIN: CPT

## 2022-07-12 PROCEDURE — 97162 PT EVAL MOD COMPLEX 30 MIN: CPT

## 2022-07-12 PROCEDURE — 6370000000 HC RX 637 (ALT 250 FOR IP): Performed by: FAMILY MEDICINE

## 2022-07-12 PROCEDURE — 92523 SPEECH SOUND LANG COMPREHEN: CPT

## 2022-07-12 PROCEDURE — 92610 EVALUATE SWALLOWING FUNCTION: CPT

## 2022-07-12 PROCEDURE — 99223 1ST HOSP IP/OBS HIGH 75: CPT | Performed by: PHYSICAL MEDICINE & REHABILITATION

## 2022-07-12 PROCEDURE — 36415 COLL VENOUS BLD VENIPUNCTURE: CPT

## 2022-07-12 PROCEDURE — 97535 SELF CARE MNGMENT TRAINING: CPT

## 2022-07-12 PROCEDURE — 82550 ASSAY OF CK (CPK): CPT

## 2022-07-12 PROCEDURE — 80076 HEPATIC FUNCTION PANEL: CPT

## 2022-07-12 PROCEDURE — 97112 NEUROMUSCULAR REEDUCATION: CPT

## 2022-07-12 PROCEDURE — 6360000002 HC RX W HCPCS: Performed by: INTERNAL MEDICINE

## 2022-07-12 PROCEDURE — 97116 GAIT TRAINING THERAPY: CPT

## 2022-07-12 PROCEDURE — 2580000003 HC RX 258: Performed by: INTERNAL MEDICINE

## 2022-07-12 RX ORDER — SODIUM PHOSPHATE, DIBASIC AND SODIUM PHOSPHATE, MONOBASIC 7; 19 G/133ML; G/133ML
1 ENEMA RECTAL DAILY PRN
Status: DISCONTINUED | OUTPATIENT
Start: 2022-07-12 | End: 2022-07-20

## 2022-07-12 RX ORDER — PREDNISONE 1 MG/1
5 TABLET ORAL DAILY
Status: COMPLETED | OUTPATIENT
Start: 2022-07-21 | End: 2022-07-23

## 2022-07-12 RX ORDER — UBIDECARENONE 100 MG
100 CAPSULE ORAL DAILY
Status: DISCONTINUED | OUTPATIENT
Start: 2022-07-12 | End: 2022-07-20

## 2022-07-12 RX ORDER — CYANOCOBALAMIN 1000 UG/ML
1000 INJECTION INTRAMUSCULAR; SUBCUTANEOUS WEEKLY
Status: DISCONTINUED | OUTPATIENT
Start: 2022-07-12 | End: 2022-07-20

## 2022-07-12 RX ORDER — BISACODYL 10 MG
10 SUPPOSITORY, RECTAL RECTAL DAILY PRN
Status: DISCONTINUED | OUTPATIENT
Start: 2022-07-12 | End: 2022-07-20

## 2022-07-12 RX ORDER — ACETAMINOPHEN 325 MG/1
650 TABLET ORAL EVERY 4 HOURS PRN
Status: DISCONTINUED | OUTPATIENT
Start: 2022-07-12 | End: 2022-07-14

## 2022-07-12 RX ORDER — PREDNISONE 10 MG/1
10 TABLET ORAL DAILY
Status: COMPLETED | OUTPATIENT
Start: 2022-07-18 | End: 2022-07-20

## 2022-07-12 RX ORDER — PREDNISONE 20 MG/1
20 TABLET ORAL DAILY
Status: COMPLETED | OUTPATIENT
Start: 2022-07-12 | End: 2022-07-14

## 2022-07-12 RX ORDER — LIDOCAINE 4 G/G
3 PATCH TOPICAL DAILY
Status: DISCONTINUED | OUTPATIENT
Start: 2022-07-12 | End: 2022-07-14

## 2022-07-12 RX ORDER — VITAMIN B COMPLEX
2000 TABLET ORAL
Status: DISCONTINUED | OUTPATIENT
Start: 2022-07-12 | End: 2022-07-20

## 2022-07-12 RX ADMIN — TRAZODONE HYDROCHLORIDE 50 MG: 50 TABLET ORAL at 20:13

## 2022-07-12 RX ADMIN — Medication 2000 UNITS: at 16:50

## 2022-07-12 RX ADMIN — ALPRAZOLAM 0.5 MG: 0.25 TABLET ORAL at 08:05

## 2022-07-12 RX ADMIN — Medication 10 ML: at 21:29

## 2022-07-12 RX ADMIN — ESCITALOPRAM OXALATE 10 MG: 10 TABLET ORAL at 08:05

## 2022-07-12 RX ADMIN — AMOXICILLIN AND CLAVULANATE POTASSIUM 1 TABLET: 875; 125 TABLET, FILM COATED ORAL at 20:13

## 2022-07-12 RX ADMIN — CYANOCOBALAMIN 1000 MCG: 1000 INJECTION, SOLUTION INTRAMUSCULAR; SUBCUTANEOUS at 12:26

## 2022-07-12 RX ADMIN — ALPRAZOLAM 0.5 MG: 0.25 TABLET ORAL at 15:09

## 2022-07-12 RX ADMIN — Medication 10 ML: at 08:13

## 2022-07-12 RX ADMIN — ALPRAZOLAM 0.5 MG: 0.25 TABLET ORAL at 20:14

## 2022-07-12 RX ADMIN — PREDNISONE 20 MG: 20 TABLET ORAL at 14:57

## 2022-07-12 RX ADMIN — SODIUM CHLORIDE, POTASSIUM CHLORIDE, SODIUM LACTATE AND CALCIUM CHLORIDE: 600; 310; 30; 20 INJECTION, SOLUTION INTRAVENOUS at 00:40

## 2022-07-12 RX ADMIN — DEXAMETHASONE SODIUM PHOSPHATE 4 MG: 4 INJECTION, SOLUTION INTRA-ARTICULAR; INTRALESIONAL; INTRAMUSCULAR; INTRAVENOUS; SOFT TISSUE at 08:05

## 2022-07-12 RX ADMIN — ACETAMINOPHEN 650 MG: 325 TABLET ORAL at 12:25

## 2022-07-12 RX ADMIN — ENOXAPARIN SODIUM 40 MG: 100 INJECTION SUBCUTANEOUS at 08:04

## 2022-07-12 RX ADMIN — SODIUM CHLORIDE, PRESERVATIVE FREE 10 ML: 5 INJECTION INTRAVENOUS at 20:12

## 2022-07-12 RX ADMIN — AMOXICILLIN AND CLAVULANATE POTASSIUM 1 TABLET: 875; 125 TABLET, FILM COATED ORAL at 08:04

## 2022-07-12 RX ADMIN — Medication 100 MG: at 12:26

## 2022-07-12 ASSESSMENT — ENCOUNTER SYMPTOMS
DIARRHEA: 0
BLOOD IN STOOL: 0
EYE PAIN: 0
CONSTIPATION: 1
VOMITING: 0
NAUSEA: 0
WHEEZING: 0
STRIDOR: 0
ABDOMINAL PAIN: 0
COUGH: 0
PHOTOPHOBIA: 0
SORE THROAT: 0
SHORTNESS OF BREATH: 1
BACK PAIN: 1
EYE REDNESS: 0

## 2022-07-12 ASSESSMENT — PAIN DESCRIPTION - LOCATION
LOCATION: HIP
LOCATION: HIP
LOCATION: LEG
LOCATION: LEG

## 2022-07-12 ASSESSMENT — PAIN DESCRIPTION - DESCRIPTORS
DESCRIPTORS: TINGLING
DESCRIPTORS: ACHING

## 2022-07-12 ASSESSMENT — VISUAL ACUITY: VA_NORMAL: 1

## 2022-07-12 ASSESSMENT — PAIN SCALES - GENERAL
PAINLEVEL_OUTOF10: 4
PAINLEVEL_OUTOF10: 5

## 2022-07-12 ASSESSMENT — PAIN DESCRIPTION - ORIENTATION
ORIENTATION: RIGHT
ORIENTATION: RIGHT

## 2022-07-12 ASSESSMENT — PAIN DESCRIPTION - FREQUENCY: FREQUENCY: CONTINUOUS

## 2022-07-12 ASSESSMENT — PAIN - FUNCTIONAL ASSESSMENT: PAIN_FUNCTIONAL_ASSESSMENT: PREVENTS OR INTERFERES SOME ACTIVE ACTIVITIES AND ADLS

## 2022-07-12 NOTE — PROGRESS NOTES
Physical Therapy Rehab Treatment Note  [facility]Department[facility]  [facility]       NAME: Evelyne Phelps  : 1990 (33 y.o.)  MRN: [id number]  CODE STATUS: Full Code    Date of Service: 2022     Restrictions:  Restrictions/Precautions: Fall Risk    SUBJECTIVE:   Subjective: I am a little better    Pain  Pain: Denies pre and post session pain. .    OBJECTIVE:   Roll Left  Assistance Level: Independent  Roll Right  Assistance Level: Independent  Sit to Supine  Assistance Level: Independent  Supine to Sit  Assistance Level: Independent  Scooting  Assistance Level: Independent    Sit to Stand  Assistance Level: Stand by assist  Stand to Sit  Assistance Level: Stand by assist  Bed To/From Chair  Assistance Level: Stand by assist    Ambulation  Surface: Level surface; Uneven surface; Carpet; Ramp  Distance: 300' x 2  Level of assistance: Stand by assist, Supervision  Activity: Within Unit  Activity Comments: Reciprocal pattern mild antalgia left, Wide negotiation of turns    Neuromuscular Education  Neuromuscular Education: Yes  NDT Treatment: Gait   Neuromuscular Comments: Gait training with obstacle course negotiation stepping around over and onto obstacles. Patient unsteady with stepping onto non compliant surfaces. Scanning environment searching for cards while stepping over and around obstacles. Difficulty stepping over obstacles while scanning environment with LOB able to show proper steppage to correct LOB. Gait with walking around targets on floor and calling out numbers on targets. No LOB with activitiy. Resisted gait training with GTB around waist with perturberances in all directions. No LOB with activitiy    ASSESSMENT/PROGRESS TOWARDS GOALS:   Assessment  Assessment: Patient challenged with obstacle course training and scanning environment.  Multiple LOB noted with activities patient able to show porper steppage technique to correct LOB  Activity Tolerance: Patient tolerated treatment well    Goals:  Long Term Goals  Long term goal 1: Pt to complete bed mobility indep  Long term goal 2: Pt to complete transfers with indep (bed/chair/car)  Long term goal 3: Pt to ambulate >300ft indoor/outdoor without AD indep  Long term goal 4: Pt to achieve 20/24 DGI  Long term goal 5: Pt to complete 5XSTS in 8sec    PLAN OF CARE/Safety:   Safety Devices  Type of Devices:  All fall risk precautions in place  Therapy Time:   Individual   Time In 1430   Time Out 1530   Minutes 60     Minutes: 60  Transfer/Bed mobility training: 15  Gait training: 15  Neuro re education: [address] [place] [address], 07/12/22 at 4:15 PM

## 2022-07-12 NOTE — PROGRESS NOTES
OCCUPATIONAL THERAPY  INPATIENT REHAB TREATMENT NOTE  [facility]      NAME: Riri Zapata  : 1990 (33 y.o.)  MRN: [id number]  CODE STATUS: Full Code  Room: A391/H351-27    Date of Service: 2022    Referring Physician: Dr. Zeus Auguste Diagnosis: Imp ADLs d/t hypoxic encephalopathy s/p overdose likely secondary to fentanyl laced xanax pill    Restrictions  Restrictions/Precautions  Restrictions/Precautions: Fall Risk          Patient's date of birth confirmed: Yes    SAFETY:  Safety Devices  Safety Devices in place: Yes  Type of devices: All fall risk precautions in place    SUBJECTIVE:  Pain: no pain    OBJECTIVE:    While seated, completed fine motor task with focus on coordination, activity tolerance, and strength in order to improve general function. Challenged pt through usage of PVC pipes/connectors with pt to build 3D structures according to visual model presented on piece of paper. Pt also instructed to disassemble all parts for added /digit exercise. Pt completed 2 structures with >90% accuracy. Pt placed several pieces there were of the correct shape but incorrect length. Medications    Medication management -Level of Assistance: Modified independent- pt scored 100% on the task    Health Management: Patient completed medication management simulation utilizing 7 provided medication bottles with written instruction to place a total of 74 beads into the provided weekly medication organizer. Patient with no difficulty opening medication bottles. Patient with no difficulty opening organizer boxes. Patient placed a total of 74 beads into organizer with 0 verbal cues and a total of 74 being correct. Patient with no difficulty manipulating beads. Patient able to sort beads back into correct bottles with 0 errors. Patient able to securely close 7/7 caps on medication bottles.     While seated in w/c, challenged strength, activity tolerance, ROM, and flexibility for improved ADL performance. Challenged pt through usage of 3# weight to complete BUE exercises. 2 sets x 10 reps completed. Exercises focused on all UE joints and planes of motion including scapular protraction/retraction, shoulder flexion/extension/rotation/horizontal abduction, elbow flexion/extension, supination/pronation, and wrist/digit flexion/extension. Education:   POC, med management, AE    Equipment recommendations:  OT Equipment Recommendations  Other: continue to assess      ASSESSMENT:   Good participation in the PM. Good ability to complete med management. - rec pill organizer      PLAN OF CARE:  Strengthening,Balance training,Functional mobility training,Endurance training,Neuromuscular re-education,Safety education & training,Patient/Caregiver education & training,Equipment evaluation, education, & procurement,Self-Care / ADL,Home management training       Patient goals : \"Get home, get back to life, feel normal\"  Time Frame for Long term goals :  Within 1 weeks, pt to demo progress in the following areas listed below to achieve specific LTGs as stated in the evaluation  Long Term Goal 1: Pt will demo improved overall ADL/IADL status  Long Term Goal 2: Pt will demo improved standing balance and tolerance for self-care completion  Long Term Goal 3: Pt will demo improved activity tolerance for completion of ADL/IADL  Long Term Goal 4: Pt will demo improved overall safety and cognition in order to be able to care for himself with minimal assistance    Therapy Time:   Individual Group Co-Treat   Time In 1330       Time Out 1400         Minutes 30         ADL/IADL training: 10 minutes  Therapeutic activities: 20 minutes     Electronically signed by:    Renetta De La Cruz OT,   7/12/2022, 1:37 PM

## 2022-07-12 NOTE — H&P
encephalopathy and subsequent seizures. Neurologic Problem  The patient's primary symptoms include an altered mental status, clumsiness, focal sensory loss, focal weakness, a loss of balance and weakness. This is a new problem. The current episode started 1 to 4 weeks ago. The neurological problem developed suddenly. The problem has been gradually improving since onset. There was right-sided and lower extremity focality noted. Associated symptoms include back pain, confusion, dizziness, fatigue, light-headedness and shortness of breath. Pertinent negatives include no abdominal pain, chest pain, diaphoresis, fever, headaches, nausea, neck pain, palpitations or vomiting. Past treatments include sleep, walking, drinking, position change and medication. The treatment provided mild relief. His past medical history is significant for liver disease and mood changes. There is no history of a bleeding disorder, a clotting disorder, a CVA, dementia or head trauma. I reviewed recent nursing note, \" Assumed care of patient at [phone] and received handoff report from [address]. Patient is alert and oriented to self, time and place but when asked what brought him to the hospital he replies, \" a fall. \" Responses are delayed with a flat affect. Patient on call-light at 0230 asking for melatonin because he couldn't sleep. Patient received 3mg of melatonin at 2000. Message to SARAY Villafana to see if I could administer another dose, given go ahead to admin 3mg more. Patient also asked for xanax, given per MAR. \". The patient has stabilized medically and is able to participate at acute level rehab but is too medically complex for SNF due to need for therapy at the acute level with at least 15 hours a week of PT OT and cognitive and recreational therapy at an acute level with daily medical monitoring.          Imaging:  Imaging and other studies reviewed and discussed with patient and staff    XR CHEST  7/4/2022 Suboptimal inspiration. The cardiomediastinal silhouette is within normal limits. No significant interval change of patchy left upper lobe and left lower lobe opacities. No pneumothorax or pleural effusion. No acute osseous abnormality. No significant interval change of left upper lobe and left lower lobe infiltrate and/or atelectasis. XR HIP RIGHT 7/6/2022  Two views are  submitted. No acute fractures. No dislocations. No focal bony abnormalities                                                                                   NO ACUTE FRACTURE. CT Head  6/30/2022  Extra-axial spaces:  Normal. Intracranial hemorrhage:  None. Ventricular system: [Negative. Basal Cisterns:  Without anomaly. Cerebral Parenchyma: Bilateral, symmetric areas of decreased attenuation exerting no mass effect measuring approximately 9 mm in size since found within the bilateral globus pallidus (series 2, image 17). Midline Shift:  None. Cerebellum:  No anomaly identified. Paranasal sinuses and mastoid air cells:  No anomaly identified. Visualized Orbits:  Negative. Impression: Round symmetric areas decreased attenuation bilateral globus pallidus. This finding may be seen in patients experiencing hypoxia, i.e., carbon monoxide poisoning. MRA HEAD   7/2/2022   The visualized distal vertebral and basilar arteries are widely patent. The distal ICAs are patent and within normal limits of caliber. The proximal ACAs, MCAs and PCAs are patent and within normal limits of caliber and configuration. There is no evidence of focal,  significant stenosis or aneurysm in the visualized vessels. EXTRACRANIAL MRA: Carotid Stenosis: Right Common:  No significant stenosis. Right Internal Plaque:  No significant plaque formation. Right Internal Carotid Stenosis (% by NASCET Criteria):  0% Left Common:  No significant stenosis. Left Internal Carotid Plaque:  No significant plaque formation.  Left Internal Carotid Stenosis (% by NASCET Criteria):  0% Cervical Vertebral Arteries: Patency:  Bilateral Dominance:  Left     Patent intracranial and extracranial circulation. XR CHEST   7/1/2022: Osseous structures are intact. Cardiopericardial silhouette is normal. Pulmonary vasculature is normal. Right lung is clear. Patchy area of increased opacity left upper lung. LEFT UPPER LUNG ATELECTASIS/PNEUMONIA. : 7/5/2022       Liver: Hepatic echogenicity is within normal limits without intrahepatic biliary dilatation. Gallbladder: The gallbladder was normally distended without stones, sludge, or wall thickening. The common duct measures up to  3   mm. Pancreas: Was not optimally visualized due to bowel and gas shadowing, but the visualized portion did not demonstrate any gross pathology. NEGATIVE RIGHT UPPER QUADRANT ULTRASOUND WITHOUT EVIDENCE OF CHOLELITHIASIS. US DUP LOWER EXTREMITY RIGHT ROBERT 7/3/2022   No acute DVT of the right lower extremity veins from the groin to the knee. No acute DVT of the right calf veins. MRA NECK   7/2/2022     The visualized distal vertebral and basilar arteries are widely patent. The distal ICAs are patent and within normal limits of caliber. The proximal ACAs, MCAs and PCAs are patent and within normal limits of caliber and configuration. There is no evidence of focal,  significant stenosis or aneurysm in the visualized vessels. EXTRACRANIAL MRA: Carotid Stenosis: Right Common:  No significant stenosis. Right Internal Plaque:  No significant plaque formation. Right Internal Carotid Stenosis (% by NASCET Criteria):  0% Left Common:  No significant stenosis. Left Internal Carotid Plaque:  No significant plaque formation. Left Internal Carotid Stenosis (% by NASCET Criteria):  0% Cervical Vertebral Arteries: Patency:  Bilateral Dominance:  Left     Patent intracranial and extracranial circulation.       MRI BRAIN  7/4/2022: Mildly increased edema in the areas of diffusion restriction within the bilateral globi pallidi, splenium of the corpus callosum, and supratentorial white since prior MRI July 1, 2022. No mass effect or midline shift. No acute intracranial hemorrhage. No enhancing mass or pathologic enhancement. Mildly increased edema in the areas of diffusion restriction within the bilateral globi pallidi, splenium of the corpus callosum, and supratentorial white since prior MRI July 1, 2022. MRI BRAIN WO  7/1/2022: Areas of restricted diffusion within the globus pallidus, supratentorial white matter and splenium of the corpus callosum are most consistent with carbon monoxide poisoning/hypoxemia and/or other toxic encephalopathy. There is no intracranial hemorrhage, mass effect, midline shift, extra-axial collection, significant cerebral volume loss or other complication identified. FINDINGS CONSISTENT WITH CARBON MONOXIDE POISONING/HYPOXEMIA AND/OR OTHER TOXIC ENCEPHALOPATHY. NO INTRACRANIAL HEMORRHAGE OR COMPLICATION IDENTIFIED. MRI CERVICAL THORACIC LUMBAR SPINE  7/7/2022  Motion artifact mildly limits some sequences. There is no central spinal stenosis, neural foraminal narrowing, significant disc herniations, compression fractures, subluxation, or epidural fluid collections identified. NEGATIVE MILDLY LIMITED CERVICAL, THORACIC AND LUMBAR SPINE MRI.             Labs:    Labs reviewed and discussed with patient and staff    Lab Results   Component Value Date/Time    POCGLU 134 07/12/2022 07:55 AM    POCGLU 103 07/08/2022 03:52 PM    POCGLU 138 07/08/2022 12:15 PM    POCGLU 89 07/08/2022 07:55 AM    POCGLU 98 07/07/2022 08:47 PM     Lab Results   Component Value Date/Time     07/11/2022 05:14 AM    K 4.0 07/11/2022 05:14 AM     07/11/2022 05:14 AM    CO2 26 07/11/2022 05:14 AM    BUN 12 07/11/2022 05:14 AM    CREATININE 0.56 07/11/2022 05:14 AM    CALCIUM 9.2 07/11/2022 05:14 AM    LABALBU 4.0 07/12/2022 05:20 AM    BILITOT 0.4 07/12/2022 05:20 AM    ALKPHOS 78 07/12/2022 05:20 AM    AST 24 07/12/2022 05:20 AM     07/12/2022 05:20 AM     Lab Results   Component Value Date/Time    WBC 22.9 07/11/2022 05:14 AM    RBC 4.39 07/11/2022 05:14 AM    HGB 12.6 07/11/2022 05:14 AM    HCT 37.7 07/11/2022 05:14 AM    MCV 85.9 07/11/2022 05:14 AM    MCH 28.6 07/11/2022 05:14 AM    MCHC 33.3 07/11/2022 05:14 AM    RDW 12.9 07/11/2022 05:14 AM     07/11/2022 05:14 AM    MPV 8.5 07/13/2015 04:16 PM     Lab Results   Component Value Date/Time    VITD25 24.4 05/16/2019 02:13 PM     Lab Results   Component Value Date/Time    COLORU ORANGE 06/30/2022 04:00 PM    NITRU Negative 06/30/2022 04:00 PM    GLUCOSEU Negative 06/30/2022 04:00 PM    KETUA Negative 06/30/2022 04:00 PM    UROBILINOGEN 0.2 06/30/2022 04:00 PM    BILIRUBINUR Negative 06/30/2022 04:00 PM    BILIRUBINUR n 11/14/2018 11:39 AM     Lab Results   Component Value Date/Time    PROTIME 14.7 07/03/2022 04:50 AM     Lab Results   Component Value Date/Time    INR 1.2 07/03/2022 04:50 AM           The patient remains highly medically complex and continues to have severe problems with activities of daily living and mobility. The patient was assessed to be able to tolerate intensive rehabilitation and therefore was admitted to Rehabilitation to address these needs.     The patient has been found to have severe abnormality of gait and mobility with impaired self care and is admitted to the acute inpatient rehab program.       Prior Function; everyday activities:     Social History     Socioeconomic History    Marital status: Single     Spouse name: Not on file    Number of children: Not on file    Years of education: Not on file    Highest education level: Not on file   Occupational History    Not on file   Tobacco Use    Smoking status: Never Smoker    Smokeless tobacco: Never Used   Substance and Sexual Activity    Alcohol use: Yes     Comment: occasional    Drug use: No    Sexual activity: Not on file   Other Topics Concern    Not on file   Social History Narrative    Lives With: Alone in [address]: One level    Home Access: Level entry    Bathroom Toilet: Standard    Has the patient had two or more falls in the past year or any fall with injury in the past year?: No    ADL Assistance: [address]: Independent    Homemaking Responsibilities: Yes    Ambulation Assistance: Independent    Transfer Assistance: Independent    Active : Yes    Type of Occupation: [employer] work    Additional Comments: Independent PTA- no medical equipment         Social Determinants of Health     Financial Resource Strain: Low Risk     Difficulty of Paying Living Expenses: Not hard at all   Food Insecurity: No Food Insecurity    Worried About Running Out of Food in the Last Year: Never true    [address] in the Last Year: Never true   Transportation Needs:     Lack of Transportation (Medical): Not on file    Lack of Transportation (Non-Medical):  Not on file   Physical Activity:     Days of Exercise per Week: Not on file    Minutes of Exercise per Session: Not on file   Stress:     Feeling of Stress : Not on file   Social Connections:     Frequency of Communication with Friends and Family: Not on file    Frequency of Social Gatherings with Friends and Family: Not on file    Attends Yazidi Services: Not on file    Active Member of [address] [place] [address] or Organizations: Not on file    Attends Club or Organization Meetings: Not on file    Marital Status: Not on file   Intimate Partner Violence:     Fear of Current or Ex-Partner: Not on file    Emotionally Abused: Not on file    Physically Abused: Not on file    Sexually Abused: Not on file   Housing Stability:     Unable to Pay for Housing in the Last Year: Not on file    Number of [place] in the Last Year: Not on file    Unstable Housing in the Last Year: Not on file Social supports listed above. Prior Device(s) used:  As above    History of falls:  Rarely falls    In depth analysis of complex functional data; the patient has been:    Current Rehabilitation Assessments:  Physical Therapy:   Bed mobility:  Bed mobility  Rolling to Left: Modified independent (07/12/22 1155)  Rolling to Right: Modified independent (07/12/22 1155)  Supine to Sit: Modified independent;Supervision (07/12/22 1155)  Sit to Supine: Modified independent;Supervision (07/12/22 1155)  Bed Mobility Comments: Mildly impulsive. (07/12/22 1155)  Transfers:  Transfers  Sit to Stand: Stand by assistance;Contact guard assistance (07/12/22 1156)  Stand to sit: Stand by assistance;Contact guard assistance (07/12/22 1156)  Bed to Chair: Stand by assistance;Contact guard assistance (07/12/22 1156)  Car Transfer: Stand by assistance;Contact guard assistance (07/12/22 1156)  Comment: Varied performance. Pt with increased time to approach chair. Safe technique however inaccurate execution at times. 5XSTS = 10.7sec. (07/12/22 1156)  Gait:   Ambulation  Surface: level tile (07/12/22 1241)  Device: No Device (07/12/22 1241)  Assistance: Stand by assistance;Contact guard assistance (07/12/22 1241)  Quality of Gait: Decreased speed and step length. Challenged with turns and obstacles. (07/12/22 1241)  Distance: 150ft; 75ft; multiple small distances within gym focusing on obstacles and turns. Gaze stabilization introduced.  (07/12/22 1241)  Stairs:  Stairs/Curb  Stairs?: Yes (07/12/22 1241)  Stairs  # Steps : 4 (07/12/22 1241)  Rails: Right ascending (07/12/22 1241)  Assistance: Stand by assistance (07/12/22 1241)  Comment: Step to pattern (07/12/22 1241)  W/C mobility:       Occupational Therapy:   Hand Dominance: Right  ADL  Feeding: Setup (07/12/22 1018)  Grooming: Modified independent  (07/12/22 1018)  Grooming Skilled Clinical Factors: oral hygiene, comb hair (07/12/22 1018)  UE Bathing: Supervision (07/12/22 1018)  UE Bathing Skilled Clinical Factors: cues to attend to all areas, wash hair, thoroughness (07/12/22 1018)  LE Bathing: Minimal assistance (07/12/22 1018)  LE Bathing Skilled Clinical Factors: assist to reach RLE and for posterior hygiene (07/12/22 1018)  UE Dressing Skilled Clinical Factors: gown only (07/12/22 1018)  LE Dressing: Minimal assistance (07/12/22 1018)  LE Dressing Skilled Clinical Factors: assist with R sock, pt declined to don pants/bref (07/12/22 1018)  Toileting: Minimal assistance (07/12/22 1018)  Additional Comments: Shower completed. Pt impulsive at times (07/12/22 1018)  Toilet Transfers  Toilet - Technique: Ambulating (07/12/22 1022)  Equipment Used: Grab bars (07/12/22 1022)  Toilet Transfer: Contact guard assistance (07/12/22 1022)     Shower Transfers  Shower - Transfer From: Leah Rota (07/12/22 1022)  Shower - Transfer Type: To and From (07/12/22 1022)  Shower - Transfer To: Shower seat with back (07/12/22 1022)  Shower - Technique: Stand pivot (07/12/22 1022)  Shower Transfers: Contact Guard (07/12/22 1022)    Speech Therapy:      Comprehension: Within Functional Limits  Verbal Expression: Within functional limits  Diet/Swallow:        Dysphagia Outcome Severity Scale: Level 6: Within functional limits/Modified independence  Compensatory Swallowing Strategies : Small bites/sips,Upright as possible for all oral intake,Alternate solids and liquids             COGNITION:  OT:    SP:          Prior to admission patient was independent with all ADLs and mobilityand did not require any outside services. Past Medical History:   Diagnosis Date    ADHD (attention deficit hyperactivity disorder)     was initiated on treatment by Dr. Daiana Wood.   any testing was done here by Dr. Daiana Wood, no formal psych eval.      Anxiety     [facility] [address]     MDD (major depressive disorder), recurrent episode, moderate ([facility] [address] 6/4/2019    Neuropathy of right peroneal nerve 7/12/2022       Past Surgical History:   Procedure Laterality Date    TONSILLECTOMY AND ADENOIDECTOMY      WISDOM TOOTH EXTRACTION         Current Facility-Administered Medications   Medication Dose Route Frequency Provider Last Rate Last Admin    Vitamin D (CHOLECALCIFEROL) tablet 2,000 Units  2,000 Units Oral [employer], DO        cyanocobalamin injection 1,000 mcg  1,000 mcg IntraMUSCular Weekly Ligia Scullin, DO   1,000 mcg at 07/12/22 1226    coenzyme Q10 capsule 100 mg  100 mg Oral Daily Ligia Scullin, DO   100 mg at 07/12/22 1226    lidocaine 4 % external patch 3 patch  3 patch TransDERmal Daily Ligia Scullin, DO        acetaminophen (TYLENOL) tablet 650 mg  650 mg Oral Q4H PRN Ligia Scullin, DO   650 mg at 07/12/22 1225    bisacodyl (DULCOLAX) suppository 10 mg  10 mg Rectal Daily PRN [employer], DO        fleet rectal enema 1 enema  1 enema Rectal Daily PRN Ligia Scullin, DO        predniSONE (DELTASONE) tablet 20 mg  20 mg Oral Daily Severo Seltzer, MD        Followed by   Devin Caldwell ON 7/15/2022] predniSONE (DELTASONE) tablet 15 mg  15 mg Oral Daily Severo Seltzer, MD        Followed by   Devin Caldwell ON 7/18/2022] predniSONE (DELTASONE) tablet 10 mg  10 mg Oral Daily Severo Seltzer, MD        Followed by   Devin Cadlwell ON 7/21/2022] predniSONE (DELTASONE) tablet 5 mg  5 mg Oral Daily Severo Seltzer, MD        ALPRAZolam Jennifer Eunice) tablet 0.5 mg  0.5 mg Oral TID PRN Severo Seltzer, MD   0.5 mg at 07/12/22 0805    escitalopram (LEXAPRO) tablet 10 mg  10 mg Oral Daily Severo Seltzer, MD   10 mg at 07/12/22 0805    melatonin tablet 3 mg  3 mg Oral Nightly PRN Severo Seltzer, MD   3 mg at 07/11/22 2120    traZODone (DESYREL) tablet 50 mg  50 mg Oral Nightly Severo Seltzer, MD   50 mg at 07/11/22 2112    0.9 % sodium chloride infusion   IntraVENous PRN Shiela Ta MD        enoxaparin (LOVENOX) injection 40 mg  40 mg SubCUTAneous Daily Shiela Ta MD   40 mg at 07/12/22 0804    ondansetron (ZOFRAN-ODT) disintegrating tablet 4 mg  4 mg Oral Q8H PRN Melissa Moscoso MD        Or    ondansetron (ZOFRAN) injection 4 mg  4 mg IntraVENous Q6H PRN Melissa Moscoso MD        sodium chloride flush 0.9 % injection 5-40 mL  5-40 mL IntraVENous 2 times per day Melissa Moscoso MD   10 mL at 07/12/22 0813    sodium chloride flush 0.9 % injection 5-40 mL  5-40 mL IntraVENous PRN Melissa Moscoso MD   10 mL at 07/11/22 2114    amoxicillin-clavulanate (AUGMENTIN) 875-125 MG per tablet 1 tablet  1 tablet Oral 2 times per day Melissa Moscoso MD   1 tablet at 07/12/22 0804    ibuprofen (ADVIL;MOTRIN) tablet 400 mg  400 mg Oral Q6H PRN Melissa Moscoso MD   400 mg at 07/11/22 1820    polyethylene glycol (GLYCOLAX) packet 17 g  17 g Oral Daily Melissa Moscoso MD           Allergies   Allergen Reactions    Peanut-Containing Drug Products Anaphylaxis     Throat swelling, hives, \"I needed hospital admission last time I had any\"    Nuts [Macadamia Nut Oil] Angioedema    Peanut Oil Other (See Comments)    Shellfish-Derived Products Itching                      FAMILY HISTORY:  Does not pertain to chief complaint. Review of Systems   Constitutional: Positive for activity change and fatigue. Negative for chills, diaphoresis and fever. HENT: Negative for congestion, ear discharge, ear pain, hearing loss, nosebleeds, sore throat and tinnitus. Eyes: Negative for photophobia, pain and redness. Respiratory: Positive for shortness of breath. Negative for cough, wheezing and stridor. Shortness of breath on exertion   Cardiovascular: Negative for chest pain, palpitations and leg swelling. Gastrointestinal: Positive for constipation. Negative for abdominal pain, blood in stool, diarrhea, nausea and vomiting. Endocrine: Negative for polydipsia. Genitourinary: Negative for dysuria, flank pain, frequency, hematuria and urgency. Musculoskeletal: Positive for back pain, gait problem and myalgias.  Negative for neck pain. Skin: Negative for rash. Allergic/Immunologic: Positive for immunocompromised state. Negative for environmental allergies. Neurological: Positive for dizziness, focal weakness, weakness, light-headedness, numbness and loss of balance. Negative for tremors, seizures and headaches. Hematological: Does not bruise/bleed easily. Psychiatric/Behavioral: Positive for confusion, decreased concentration and dysphoric mood. Negative for hallucinations and suicidal ideas. The patient is not nervous/anxious. Objective  /78   Pulse 93   Temp 98.1 °F (36.7 °C) (Oral)   Resp 14   SpO2 98% *    Physical Exam  Vitals and nursing note reviewed. Constitutional:       General: He is not in acute distress. Appearance: Normal appearance. He is well-developed. He is ill-appearing. He is not toxic-appearing or diaphoretic. HENT:      Head: Normocephalic. Not macrocephalic and not microcephalic. No raccoon eyes or Nino's sign. Mouth/Throat:      Pharynx: Oropharyngeal exudate present. Eyes:      General: No scleral icterus. Right eye: No discharge. Left eye: No discharge. Conjunctiva/sclera: Conjunctivae normal.      Pupils: Pupils are equal, round, and reactive to light. Neck:      Thyroid: No thyromegaly. Vascular: Normal carotid pulses. No carotid bruit, hepatojugular reflux or JVD. Trachea: No tracheal deviation. Cardiovascular:      Rate and Rhythm: Normal rate and regular rhythm. Pulmonary:      Effort: Pulmonary effort is normal.      Breath sounds: Normal breath sounds. No stridor. Abdominal:      General: Bowel sounds are normal.      Palpations: Abdomen is soft. Musculoskeletal:         General: Normal range of motion. Cervical back: Normal range of motion. Tenderness present. Spinous process tenderness and muscular tenderness present. Thoracic back: Tenderness present. Lumbar back: Tenderness present.    Skin: General: Skin is warm and dry. Coloration: Skin is pale. Findings: Bruising present. Comments: Old IV sites   Neurological:      Mental Status: He is alert. Sensory: Sensory deficit present. Coordination: Coordination abnormal. Finger-Nose-Finger Test abnormal and Romberg Test abnormal.      Gait: Gait abnormal.      Deep Tendon Reflexes:      Reflex Scores:       Tricep reflexes are 1+ on the right side and 1+ on the left side. Bicep reflexes are 1+ on the right side and 1+ on the left side. Brachioradialis reflexes are 1+ on the right side and 1+ on the left side. Patellar reflexes are 0 on the right side and 0 on the left side. Achilles reflexes are 0 on the right side and 0 on the left side. Comments: Slow, indirect answers   Psychiatric:         Attention and Perception: He is inattentive. Mood and Affect: Mood is not anxious or depressed. Affect is not angry. Speech: Speech is delayed. Speech is not rapid and pressured. Behavior: Behavior is slowed. Behavior is not withdrawn. Thought Content: Thought content normal.         Cognition and Memory: Cognition is impaired. Memory is impaired. He exhibits impaired recent memory. He does not exhibit impaired remote memory. Judgment: Judgment is not impulsive or inappropriate. Ortho Exam  Neurologic Exam     Mental Status   Follows 1 step commands. Attention: decreased. Concentration: decreased. Level of consciousness: drowsy  Able to name object. Cranial Nerves     CN II   Visual acuity: normal    CN III, IV, VI   Pupils are equal, round, and reactive to light.     Motor Exam   Muscle bulk: normal    Sensory Exam   Sensory deficit distribution on right: deep peroneal    Gait, Coordination, and Reflexes     Coordination   Romberg: positive  Finger to nose coordination: abnormal    Reflexes   Right brachioradialis: 1+  Left brachioradialis: 1+  Right biceps: 1+  Left biceps: 1+  Right triceps: 1+  Left triceps: 1+  Right patellar: 0  Left patellar: 0  Right achilles: 0  Left achilles: 0      After extensive review of the records and above physical exam, I have formulated the following diagnoses and plan:      DIAGNOSES:    1. The patient was admitted to the acute rehabilitation unit with the primary rehab diagnosis being severe abnormality of gait and mobility and impaired self care and ADL's due to Hypoxic encephalopathy. Compared to Pre-Admission Assessment, patients medical and functional status remain challengingly complex and patient continues to requireintensive therapeutic intervention from multiple therapies, therefore, initiate acute intensive comprehensive inpatient rehabilitation program including PT/OT to improve balance, ambulation, ADLs, and to improve the P/AROM. Functional and medical status reassessed regarding patients ability to participate in therapies and patient found to be able to participate in acute intensive comprehensive inpatient rehabilitation program.  Therapeutic modifications regarding activities in therapies, place, amount of time per day and intensity of therapy made daily. Enroll in acute course of therapy program to include 1 1/2 hours per day of PT 5 to 7days per week and 1 1/2 hours per day of OT 5 to 7 days per week,   SLP and Rec T 1/2 hour per day 3-5 days per week. The patient is stable medically and physically on previous exam.  If deemed necessary therapy will be spread out over a 7-day window. This patient presented with significant new onset decreased mobility and inability to perform activities of daily living skills independently and is at significant risk for prolonged disability  For this reason they have been admitted to [address] [place] [address].   The patient's current functional and medical status are highly complex but the patient is able to participate in intensive rehabilitation. A comprehensive inpatient rehabilitation program is appropriate. The patient will undergo initial evaluation by the rehabilitation team and be discussed at regular treatment team meetings to assess progress, mobility, self care, mood and discharge issues. Physical therapy will be consulted for mobility and endurance issues and should be performed 1 to 2 times per day, 7 days per week for the length of stay. Occupational therapy will be consulted for activities of daily living and should be performed 1 to 2 times per day, 7 days per week for the length of stay. Their capacity to participate at an acute level, decision to be treated in the gym, room or on the unit, their activity goals for the day and the number of minutes of active participation will be reassessed and re-prescribed daily. Because this patient is medically complex, I will check a CBC, BMP, UA and orthostatic blood pressures. They will be reassessed daily regarding their ability to participate in an acute level rehabilitation program.  Recreational Therapy will be consulted for community re-entry and adjustment to disability. Communication, cognitive and emotional issues will also be addressed during this rehabilitation stay by rehabilitation psychologist or speech therapist as appropriate. I reviewed the patient's old and current charts and discussed other rehabilitation options with the rehabilitation team including the rehab RN and the admission team as well as the patient. I feel that the patient's functional recovery would be best served at an acute inpatient rehabilitation program because the patient needs intense therapy three hours per day, direct RN supervision and daily monitoring by a physician for medical status. This cannot be sufficiently provided by home health care, a skilled nursing facility or in an outpatient setting.   I further feel that the patient has the potential to improve functional abilities in an acute intensive rehabilitation program.    Old records were reviewed and summarized. 2.  Other diagnoses which complicate rehabilitation stay include:     Principal Problem:     impaired mobility and ADLs dt encephalopathy   Active Problems:  1. Drug abuse,   Polydrug dependence including opioid type drug with continuous use with complication -is aware of the implications that his drug use has had on his health and that he almost . He is planning on not using or overusing medications in the future. 2.   Altered mental status-status post drug overdose apparently was accidental overdose. Patient admits to taking 2 Xanax pills that he bought off on the street. Xanax in this area is notoriously laced with fentanyl. Like that he had inadvertent fentanyl overdose from the medication that he thought was Xanax that he bought on the street. 12-step program as advised. 3.   Vitamin D deficiency-Add high-dose vitamin D, recheck vitamin D level out after discharge,  4. Spasm of back muscles-lowest effective dose of muscle spasms  5. Cerebral edema with seizures-antiseizure medications consult neurology and steroid taper. 6.   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 Pneumonia likely aspiration related to overdose-Augmentin every 12 hours monitor pulmonary status. 8. Liver toxicity likely secondary to polysubstance abuse-recheck LFTs as an outpatient avoid toxic medications  9. ADHD (attention deficit hyperactivity disorder)-lowest effective dose of stimulant medication  10.    Anxiety/ROSALES (generalized anxiety disorder),   MDD (major depressive disorder), recurrent episode, moderate -emotional support provided daily, vitamin B12, encourage participation in rehabilitation support group and recreational therapy, adjust/add medications ( b explanation of my services, my specialty and what to expect during the rehabilitation process. As well as introducing myself, I also wrote my name on their bedside marker board with their name as well as the names of the other physicians with an explanation of our individual roles in their care, as well as the rehab process.             Octavio Alves D.O., [facility]

## 2022-07-12 NOTE — PLAN OF CARE
Problem: Discharge Planning  Goal: Discharge to home or other facility with appropriate resources  Outcome: Progressing  Flowsheets (Taken 7/11/2022 2110)  Discharge to home or other facility with appropriate resources: Identify barriers to discharge with patient and caregiver     Problem: Safety - Adult  Goal: Free from fall injury  Outcome: Progressing     Problem: ABCDS Injury Assessment  Goal: Absence of physical injury  Outcome: Progressing

## 2022-07-12 NOTE — CONSULTS
Hospitalist Group   Consult for Medical Management      Reason for Consult:  Medical management of ADHD, anxiety    History of Present Illness:  33 y.o. male with a history of drug abuse, ADHD and anxiety presents with drug overdose. He was found unresponsive in his motel room. He was admitted to the ICU 2 weeks ago. 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 Due to increased generalized weakness patient was admitted to rehab. Patient currently denies chest pain, shortness breath, nausea, vomiting, abdominal pain. He is complaining of right hip and right foot pain which he likely injured during his seizure episodes. REVIEW OF SYSTEMS:  Complete ROS performed with patient, pertinent positives and negatives are listed in the HPI. PMH:  Past Medical History:   Diagnosis Date    ADHD (attention deficit hyperactivity disorder)     was initiated on treatment by Dr. Gillian Mccracken. any testing was done here by Dr. Gillian Mccracken, no formal psych eval.      Anxiety     Drug abuse ([facility]     MDD (major depressive disorder), recurrent episode, moderate ([facility] 6/4/2019    Neuropathy of right peroneal nerve 7/12/2022       Surgical History:  Past Surgical History:   Procedure Laterality Date    TONSILLECTOMY AND ADENOIDECTOMY      WISDOM TOOTH EXTRACTION         Medications Prior to Admission:    Prior to Admission medications    Medication Sig Start Date End Date Taking? Authorizing Provider   amphetamine-dextroamphetamine (ADDERALL) 15 MG tablet dextroamphetamine-amphetamine 15 mg tablet    Historical Provider, MD   escitalopram (LEXAPRO) 10 MG tablet Take 15 mg by mouth daily    Historical Provider, MD   lisdexamfetamine (VYVANSE) 70 MG capsule Take 70 mg by mouth every morning. Historical Provider, MD   clonazePAM (KLONOPIN) 0.5 MG tablet Take 1 tablet by mouth 2 times daily as needed for Anxiety for up to 3 days.  7/8/19 12/22/21  Ruchi Paris MD       Allergies: Peanut-containing drug products, Nuts [macadamia nut oil], Peanut oil, and Shellfish-derived products    Social History:    reports that he has never smoked. He has never used smokeless tobacco. He reports current alcohol use. He reports that he does not use drugs. Family History:       Problem Relation Age of Onset    Depression Mother     Heart Disease Father     High Blood Pressure Father     High Cholesterol Father          PHYSICAL EXAM:  Vitals:  /78   Pulse 93   Temp 98.1 °F (36.7 °C) (Oral)   Resp 14   SpO2 98%   General Appearance: alert and oriented to person, place and time, well developed and well- nourished, in no acute distress  Skin: warm and dry, no rash or erythema  Head: normocephalic and atraumatic  Eyes: pupils equal, round, and reactive to light, extraocular eye movements intact, conjunctivae normal  ENT: tympanic membrane, external ear and ear canal normal bilaterally, nose without deformity, nasal mucosa and turbinates normal without polyps  Neck: supple and non-tender without mass, no thyromegaly or thyroid nodules, no cervical lymphadenopathy  Pulmonary/Chest: clear to auscultation bilaterally- no wheezes, rales   Cardiovascular: normal rate, regular rhythm, normal S1 and S2, no murmurs, rubs   Abdomen: soft, non-tender, non-distended, normal bowel sounds, no masses or organomegaly  Extremities: no cyanosis, clubbing    Musculoskeletal: normal range of motion, no joint swelling, deformity, mild tenderness over the right hip  Neurologic: Bilateral lower extremity weakness, no other neurological signs noted.       LABS:  Recent Labs     07/10/22  0457 07/11/22  0514    139   K 3.8 4.0    102   CO2 25 26   BUN 16 12   CREATININE 0.57* 0.56*   GLUCOSE 120* 118*   CALCIUM 9.2 9.2       Recent Labs     07/10/22  0457 07/11/22  0514   WBC 21.9* 22.9*   RBC 4.44* 4.39*   HGB 13.0* 12.6*   HCT 38.1* 37.7*   MCV 85.8 85.9   MCH 29.2 28.6   MCHC 34.1 33.3   RDW 12.8 12.9 * 515*       Recent Labs     07/12/22  0755   POCGLU 134*       CBC with Differential:    Lab Results   Component Value Date/Time    WBC 22.9 07/11/2022 05:14 AM    RBC 4.39 07/11/2022 05:14 AM    HGB 12.6 07/11/2022 05:14 AM    HCT 37.7 07/11/2022 05:14 AM     07/11/2022 05:14 AM    MCV 85.9 07/11/2022 05:14 AM    MCH 28.6 07/11/2022 05:14 AM    MCHC 33.3 07/11/2022 05:14 AM    RDW 12.9 07/11/2022 05:14 AM    BANDSPCT 1 07/08/2022 05:25 AM    METASPCT 2 07/08/2022 05:25 AM    LYMPHOPCT 10.1 07/11/2022 05:14 AM    MONOPCT 4.9 07/11/2022 05:14 AM    MYELOPCT 1 07/08/2022 05:25 AM    BASOPCT 0.1 07/11/2022 05:14 AM    MONOSABS 1.1 07/11/2022 05:14 AM    LYMPHSABS 2.3 07/11/2022 05:14 AM    EOSABS 0.0 07/11/2022 05:14 AM    BASOSABS 0.0 07/11/2022 05:14 AM     CMP:    Lab Results   Component Value Date/Time     07/11/2022 05:14 AM    K 4.0 07/11/2022 05:14 AM     07/11/2022 05:14 AM    CO2 26 07/11/2022 05:14 AM    BUN 12 07/11/2022 05:14 AM    CREATININE 0.56 07/11/2022 05:14 AM    GFRAA >60.0 07/11/2022 05:14 AM    LABGLOM >60.0 07/11/2022 05:14 AM    GLUCOSE 118 07/11/2022 05:14 AM    PROT 6.5 07/12/2022 05:20 AM    LABALBU 4.0 07/12/2022 05:20 AM    CALCIUM 9.2 07/11/2022 05:14 AM    BILITOT 0.4 07/12/2022 05:20 AM    ALKPHOS 78 07/12/2022 05:20 AM    AST 24 07/12/2022 05:20 AM     07/12/2022 05:20 AM       Radiology:   No orders to display            ASSESSMENT/ PLAN:    1. Hypoxic ischemic encephalopathy- patient was admitted for 2 weeks on the medical floor including ICU. He was found to have status epilepticus post drug overdose. Being followed by neurology. We will consult neurology and rehab. Plan for repeating MRI. Started on prednisone taper for brain edema. Has generalized weakness due to his prolonged hospital stay and drug overdose/brain injury. Getting treatment in rehab. 2. ADHD- resume Adderall when okay with neurology  3.  Pneumonia-found on x-ray to have pneumonia which could be aspiration. He is on Augmentin currently and he has been on it for about 9 days. No respiratory symptoms. We will continue for another day and discontinue. 4. Rhabdomyolysis- CK initially over 20,000. Currently 451. Resolved             Electronically signed by Terry Lawton MD on 7/12/2022 at 1:41 PM    NOTE: This report was transcribed using voice recognition software. Every effort was made to ensure accuracy; however, inadvertent computerized transcription errors may be present.

## 2022-07-12 NOTE — PROGRESS NOTES
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 Patient Active Problem List    Diagnosis Date Noted    Acute retention of urine 2022    Diverticulitis of colon 2022    Fatigue 2022    Recurrent aphthous ulcer 2022    Spasm of back muscles 2022    Neuropathy of right peroneal nerve 2022    Lesion of right sciatic nerve 2022    Impaired mobility and ADLs 2022    Cerebral edema (H[facility])     Weakness of right lower extremity      impaired mobility and ADLs dt encephalopathy  2022    Abdominal pain 2022    Depressive disorder 2022    Vitamin D deficiency 2022    Benzodiazepine dependence ([address] 2022    Right foot drop 2022    Altered mental status     Drug abuse ([address]     Acute encephalopathy 2022    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 Magdalene Bailey.   any testing was done here by Dr. Magdalene Bailey, no formal psych eval.      Anxiety     Drug abuse ([address]     MDD (major depressive disorder), recurrent episode, moderate ([address] 2019  Neuropathy of right peroneal nerve 7/12/2022     Past Surgical History:   Procedure Laterality Date    TONSILLECTOMY AND ADENOIDECTOMY      WISDOM TOOTH EXTRACTION       Allergies   Allergen Reactions    Peanut-Containing Drug Products Anaphylaxis     Throat swelling, hives, \"I needed hospital admission last time I had any\"    Nuts [Macadamia Nut Oil] Angioedema    Peanut Oil Other (See Comments)    Shellfish-Derived Products Itching       Date of Onset: 7/11/22  Rehab Diagnosis: Impaired mobility and ADL's due to hypoxic encephalopathy. [facility] admit 7/11/22    Date of Evaluation: 7/12/2022   Evaluating Therapist: SOPHIE Andrade    Dysphagia Diagnosis  Dysphagia Diagnosis: Mild oral stage dysphagia  Dysphagia Impression : Patient presents with mild oral dysphagia characterized by mild lingual residue, likely secondary to extreme fatigue during PO trials. Patient able to successfully clear all residue with liquid wash independently. Patient passed 3oz water screen. No s/s of aspiration noted in all trials. Recommended Diet     Diet Solids Recommendation: Regular  Liquid Consistency Recommendation: Thin  Recommended Form of Meds: PO  Compensatory Swallowing Strategies : Small bites/sips;Upright as possible for all oral intake; Alternate solids and liquids      Reason for Referral  Riri Zapata was referred for a bedside swallow evaluation to assess the efficiency of his swallow function, identify signs and symptoms of aspiration, identify risk factors, and make recommendations regarding safe dietary consistencies, effective compensatory strategies, and safe eating environment. General  Chart Reviewed: Yes  Comments: Patient was seen for an evaluation upon admittance into inpatient rehab. Patient arrived at ED due to altered mental status secondary to [employer] use. Patient received CXR on 7/1 which indicated left upper lobe pneumonia.  Patient received MRI on 7/1 which showed deficits secondary to hypoxia. Patient demonstrated hypoxic ischemic encephalopathy with a seizure. Patient was positive for fetanyl, benzodiazepines, amphetamines, transaminitis, EDGAR, rhabdomyolysis resulting in provoked seizure and cerebral edema. Patient reports that he is noticing cognitive deficits since hospitalizations but cannot explain what deficits have been occuring. Patient was seen for BSE at this facility on 7/6/22 where he was placed on a regular and thin liquid diet with no follow-up. Behavior/Cognition: Alert; Cooperative  Respiratory Status: Room air  O2 Device: None (Room air)  Communication Observation: Functional  Follows Directions: Simple  Dentition: Adequate  Patient Positioning: Upright in bed  Baseline Vocal Quality: Normal (soft)  Volitional Cough: Strong  Prior Dysphagia History: Pt denies, none per chart review  Consistencies Administered: Regular;Thin;Pureed    Vision and Hearing  Vision  Vision: Impaired  Vision Exceptions: Wears glasses at all times  Hearing  Hearing: Within functional limits    Current Diet level  Current Diet : Regular  Current Liquid Diet : Thin    Oral Motor  Labial: No impairment  Dentition: Intact; Natural  Oral Hygiene: Other (Dry and cracked)  Lingual: No impairment  Velum: No Impairment  Mandible: No impairment    Oral/Pharyngeal Phase  Oral Phase - Comment: Patient presents with  mild oral dysphagia characterized by mild lingual residue following trials of regular solids. Patient was significantly fatigued at the time but was able to clear all residue with SLP cued liquid wash. Patient independently utilized liquid wash successfully in all other trials. Pharyngeal Phase: Patient presents with suspected functional pharyngeal phase of the swallow. Patient demonstrated no s/s of aspiration in all observed trials.     PO Trials  Neuromuscular Estim Used: No  Assessment Method(s): Observation  Patient Position: Upright in bed  Vocal Quality: No Impairment  Consistency Presented: Pureed; Thin  How Presented: Self-fed/presented  How much presented: 5 (teaspoons of puree, 3oz of liquid)  Bolus Acceptance: No impairment  Bolus Formation/Control: No impairment  Propulsion: No impairment  Oral Residue: None  Initiation of Swallow: No impairment  Laryngeal Elevation: Functional  Aspiration Signs/Symptoms: None  Pharyngeal Phase Characteristics: No impairment, issues, or problems  Additional PO Trials: 1    PO Trials 2  Consistency Presented: Regular  How Presented: Self-fed/presented  How much presented: 5 (bites)  Bolus Acceptance: No impairment  Bolus Formation/Control: No impairment  Propulsion: No impairment  Oral Residue: Lingual  Initiation of Swallow: No impairment  Aspiration Signs/Symptoms: None  Pharyngeal Phase Characteristics: No impairment, issues, or problems  Effective Modifications: Alternate liquids/solids    Dysphagia Diagnosis  Dysphagia Diagnosis: Mild oral stage dysphagia  Dysphagia Impression : Patient presents with mild oral dysphagia characterized by mild lingual residue, likely secondary to extreme fatigue during PO trials. Patient able to successfully clear all residue with liquid wash independently. Patient passed 3oz water screen. No s/s of aspiration noted in all trials. Dysphagia Outcome Severity Scale: Level 6: Within functional limits/Modified independence     Recommendations  Requires SLP Intervention: Yes  Diet Solids Recommendation: Regular  Liquid Consistency Recommendation: Thin  Compensatory Swallowing Strategies : Small bites/sips;Upright as possible for all oral intake; Alternate solids and liquids  Recommended Form of Meds: PO  Duration of Treatment: N/A  Frequency of Treatment: N/A    Prognosis  Speech Therapy Prognosis  Prognosis: Excellent  Prognosis Considerations: Previous Level of Function; Motivation    Education  Individuals consulted  Consulted and agree with results and recommendations: Patient  RN Name: Thor Melchor    [x]  [facility] notified     Safety Devices  Safety Devices  Safety Devices in place: Yes  Type of devices: Bed alarm in place;Call light within reach  Restraints Initially in Place: No    Pain Assessment  Pain Assessment: Patient c/o pain  Pain Assessment: 0-10  Pain Level: 5  Pain Location: Leg  Patient reports RN is aware of pain and declined SLP notifying RN    Pain Re-assessment  Pain Reassessment: Patient c/o pain         DYSPHAGIA OUTCOMES SEVERITY SCALE:    SWALLOWING  Ratin    Therapy Time  SLP Individual Minutes  Time In: 1005  Time Out: 1020  Minutes: 15        Signature: Electronically signed by SOPHIE Buck on 2022 at 11:24 AM

## 2022-07-12 NOTE — PLAN OF CARE
RN  Skin Integrity Remains Intact: Monitor for areas of redness and/or skin breakdown  Taken 7/12/2022 0203 by Olga Le RN  Skin Integrity Remains Intact: Monitor for areas of redness and/or skin breakdown     Problem: Musculoskeletal - Adult  Goal: Return mobility to safest level of function  Outcome: Progressing     Problem: Gastrointestinal - Adult  Goal: Maintains adequate nutritional intake  Outcome: Progressing     Problem: Genitourinary - Adult  Goal: Absence of urinary retention  Outcome: Progressing     Problem: Infection - Adult  Goal: Absence of infection at discharge  Outcome: Progressing     Problem: Metabolic/Fluid and Electrolytes - Adult  Goal: Electrolytes maintained within normal limits  Outcome: Progressing     Problem: Hematologic - Adult  Goal: Maintains hematologic stability  Outcome: Progressing

## 2022-07-12 NOTE — PROGRESS NOTES
Facility/Department: [facility] Initial Assessment: Occupational Therapy  Room: E170/O599-64    NAME: Reji Calderon  : 1990  MRN: [id number]    Date of Service: 2022    Rehab Diagnosis(es): Impaired mobility and ADL's due to hypoxic encephalopathy. [facility] admit 22  Patient Active Problem List    Diagnosis Date Noted    Acute retention of urine 2022    Diverticulitis of colon 2022    Fatigue 2022    Recurrent aphthous ulcer 2022    Spasm of back muscles 2022    Neuropathy of right peroneal nerve 2022    Lesion of right sciatic nerve 2022    Impaired mobility and ADLs 2022    Cerebral edema (HCC)     Weakness of right lower extremity      impaired mobility and ADLs dt encephalopathy  2022    Abdominal pain 2022    Depressive disorder 2022    Vitamin D deficiency 2022    Benzodiazepine dependence ([address] 2022    Right foot drop 2022    Altered mental status     Drug abuse ([address]     Acute encephalopathy 2022    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 Geo Avery.   any testing was done here by Dr. Geo Avery, no formal psych eval.      Anxiety     Drug abuse ([address]     MDD (major depressive disorder), recurrent episode, moderate ([address] 2019    Neuropathy of right peroneal nerve 2022     Past Surgical History: Procedure Laterality Date    TONSILLECTOMY AND ADENOIDECTOMY      WISDOM TOOTH EXTRACTION         Restrictions:  Restrictions/Precautions  Restrictions/Precautions: Fall Risk    Subjective:  General  Chart Reviewed: Yes  Patient assessed for rehabilitation services?: Yes  Family / Caregiver Present: No  Referring Practitioner: Dr. Kadie Mckenna  Diagnosis: Imp ADLs d/t hypoxic encephalopathy s/p overdose likely secondary to fentanyl laced xanax pill  Subjective: \"I feel okay\"    Patient's date of birth confirmed: Yes     Pain at start of treatment: slight knee pain- pt declined to rate     Social Functional:  Social/Functional History  Lives With: Alone  Type of Home: Apartment  Home Layout: One level  Home Access: Level entry  Bathroom Shower/Tub: Tub/Shower unit;Curtain  Bathroom Toilet: Standard  Has the patient had two or more falls in the past year or any fall with injury in the past year?: No  ADL Assistance: [address]: Independent  Homemaking Responsibilities: Yes  Ambulation Assistance: Independent  Transfer Assistance: Independent  Active : Yes  Education: bachelors- public health. Was also a case-worker  Type of Occupation: [employer] work  Leisure & Hobbies: golf  IADL Comments: Erwin Velásquez is in patients unit. Additional Comments: Independent PTA- no medical equipment.  Parents live 10 mins away from patient    Objective:    Self Care Status:  ADL  Feeding: Setup  Grooming: Modified independent   Grooming Skilled Clinical Factors: oral hygiene, comb hair  UE Bathing: Supervision  UE Bathing Skilled Clinical Factors: cues to attend to all areas, wash hair, thoroughness  LE Bathing: Minimal assistance  LE Bathing Skilled Clinical Factors: assist to reach RLE and for posterior hygiene  UE Dressing Skilled Clinical Factors: gown only  LE Dressing: Minimal assistance  LE Dressing Skilled Clinical Factors: assist with R sock, pt declined to don pants/bref  Toileting: Minimal assistance  Additional Comments: Shower completed. Pt impulsive at times and needs cues to increase safety + problem solve during self care. Some fatigue also noted. Toilet Transfers  Toilet - Technique: Ambulating  Equipment Used: Grab bars  Toilet Transfer: Contact guard assistance  Shower Transfers  Shower - Transfer From: [facility] - Transfer Type: To and From  Shower - Transfer To:  Shower seat with back  Shower - Technique: Stand pivot  Shower Transfers: Contact Guard      Functional Mobility:  Balance  Sitting Balance: Stand by assistance  Standing Balance: Contact guard assistance  Functional Mobility  Functional - Mobility Device: Rolling Walker (completed with and without FWW- pt unsteady at times in standing)  Activity: To/from bathroom  Assist Level: Contact guard assistance    Bed mobility and transfers:  Bed mobility  Supine to Sit: Stand by assistance  Sit to Supine: Stand by assistance  Transfers  Stand Pivot Transfers: Contact guard assistance  Sit to stand: Stand by assistance  Stand to sit: Stand by assistance  Transfer Comments: impulsive    Observation:  Observation/Palpation  Posture: Fair  Observation: Pt alert and attentive, anxious at times and mildly impulsive  Edema: none    Orientation and Cognition:  Orientation  Overall Orientation Status: Within Functional Limits  Cognition  Overall Cognitive Status: Exceptions  Arousal/Alertness: Appropriate responses to stimuli    Comprehension: Independent  Expression: Independent  Social Interaction: Independent  Problem Solving: Min A  Memory: Supervision    Vision and Hearing:  Vision  Vision Exceptions: Wears glasses for distance;Wears glasses for reading (does not always wear)  Hearing  Hearing: Within functional limits  Perception  Overall Perceptual Status: WFL    Range of Motion:  LUE AROM (degrees)  LUE AROM : WFL  RUE AROM (degrees)  RUE AROM : WFL      Strength:  LUE Strength  Gross LUE Strength: Exceptions to [facility]: 4-/5  LUE Strength Comment: 4-/5  RUE Strength  Gross RUE Strength: Exceptions to [facility]  R Hand General: 4-/5  RUE Strength Comment: 4-/5    Quality of Movement: Tone RUE  RUE Tone: Normotonic  Tone LUE  LUE Tone: Normotonic  Coordination  Movements Are Fluid And Coordinated: No  Coordination and Movement Description: Fine motor impairments;Decreased speed;Decreased accuracy    Sensation:  Sensation  Overall Sensation Status: Impaired  Light Touch: Partial deficits in the RLE (distal RLE numbness)    Hand Dominance  Hand Dominance: Right    Safety:  Safety Devices  Type of Devices: All fall risk precautions in place    Assessment:  Activity Tolerance  Activity Tolerance: Patient limited by fatigue    Assessment  Performance deficits / Impairments: Decreased functional mobility ; Decreased safe awareness;Decreased balance;Decreased coordination;Decreased ADL status; Decreased cognition;Decreased endurance;Decreased high-level IADLs;Decreased strength;Decreased fine motor control;Decreased sensation  Assessment: Pt is a 34 y/o male who presents to [facility] with above deficits and impaired overall ADL/IADL completion. Pt currently demonstrating decreased balance, activity tolerance, and overall cognition.  Skilled OT required at this time to improve functional status  Prognosis: Fair  Decision Making: Medium Complexity  History: complex chart review  Exam: 11 perf deficits  Assistance / Modification: mod  Discharge Recommendations: Continue to assess pending progress  Plan  REQUIRES OT FOLLOW-UP: Yes    Equipment needed:  OT Equipment Recommendations  Other: continue to assess    OT Education:  Education Given To: Patient  Education Provided: Role of Therapy,Plan of Care,Precautions,Mobility Training,ADL [employer] Training,DME/Home Modifications,Fall Prevention Strategies,Cognition,Equipment,Energy Conservation  Education Method: Demonstration,Verbal  Barriers to Learning: Cognition  Education Outcome: Verbalized understanding,Demonstrated understanding,Continued education needed      Plan:  Plan  Times per Week: 5-7x/week  Plan Weeks: 1 week  Current Treatment Recommendations: Strengthening;Balance training;Functional mobility training; Endurance training;Neuromuscular re-education; Safety education & training;Patient/Caregiver education & training;Equipment evaluation, education, & procurement;Self-Care / ADL; Home management training    Goals:  Patient goals : \"Get home, get back to life, feel normal\"    Time Frame for Long term goals : Within 1 weeks, pt to demo progress in the following areas listed below to achieve specific LTGs as stated in the evaluation  Long Term Goal 1: Pt will demo improved overall ADL/IADL status  Long Term Goal 2: Pt will demo improved standing balance and tolerance for self-care completion  Long Term Goal 3: Pt will demo improved activity tolerance for completion of ADL/IADL  Long Term Goal 4: Pt will demo improved overall safety and cognition in order to be able to care for himself with minimal assistance    - Patient will complete self care as followed using the recommended adaptive equipment and/or adaptive techniques as instructed:  Feeding: Independent  Grooming: Independent  Bathing: Independent  UE Dressing: Independent  LE Dressing: Independent  Toileting: Independent  Toilet Transfer: Independent  Shower/Tub Transfer: Independent  - Patient will sequence self-care routine with out assist and no verbal/tactile cues  - Patient will improve static and dynamic standing balance to complete pants management at [facility] level  - Patient will improve B UE Function (AROM, strength, motor control, tone normalization) to complete ADLs as projected. - Patient will improve functional endurance to tolerate/complete 60 minutes of ADLs. - Patient will improve B UE strength and endurance to [facility] in order to participate in self-care activities as projected.   - Patient will improve B hand fine motor coordination to [facility] in order to manage clothing fasteners/self-care containers in a timely manner  - Patient will perform kitchen mobility at device level without episodes of LOB and good safety awareness   - Patient will perform basic room mobility at indep level. - Patient will access appropriate D/C site with as few architectural barriers as possible. - Patient and/or caregiver will demonstrate understanding of recommended HEP for BUE strengthening .   - Problem Solving: Mod I  Memory:  Mod I     Therapy Time:   Individual   Time In 0830   Time Out 0930   Minutes 60        Eval: 60 minutes     Electronically signed by:    Mirza Zhang OT,   7/12/2022, 1:14 PM

## 2022-07-12 NOTE — PROGRESS NOTES
Facility/Department: [facility] Initial Assessment: Physical Therapy  Room: R242/R242-01    NAME: Gopi Navarro  : 1990  MRN: [id number]    Date of Service: 2022    Rehab Diagnosis(es): Impaired mobility and ADL's due to hypoxic encephalopathy. [facility] admit 22  Patient Active Problem List    Diagnosis Date Noted    Acute retention of urine 2022    Diverticulitis of colon 2022    Fatigue 2022    Recurrent aphthous ulcer 2022    Spasm of back muscles 2022    Neuropathy of right peroneal nerve 2022    Lesion of right sciatic nerve 2022    Impaired mobility and ADLs 2022    Cerebral edema (HCC)     Weakness of right lower extremity      impaired mobility and ADLs dt encephalopathy  2022    Abdominal pain 2022    Depressive disorder 2022    Vitamin D deficiency 2022    Benzodiazepine dependence ([address] 2022    Right foot drop 2022    Altered mental status     Drug abuse ([address]     Acute encephalopathy 2022    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 Magdalene Bailey.   any testing was done here by Dr. Magdalene Bailey, no formal psych eval.      Anxiety     Drug abuse ([address]     MDD (major depressive disorder), recurrent episode, moderate ([address] 2019    Neuropathy of right peroneal nerve 2022     Past Surgical History: Procedure Laterality Date    TONSILLECTOMY AND ADENOIDECTOMY      WISDOM TOOTH EXTRACTION         Chart Reviewed: Yes  Patient assessed for rehabilitation services?: Yes  Family / Caregiver Present: No  Diagnosis: Impaired mobility and ADL's due to hypoxic encephalopathy. [facility] admit 7/11/22  General Comment  Comments: Pt resting in bed - agreeable to PT evaluation    Restrictions:  Restrictions/Precautions: Fall Risk     SUBJECTIVE: Subjective: \"Sure. \"    Pain  Pain: Denies pre and post session pain. C/o R hip stiffness. Pt able to continue with session without intervention. Prior Level of Function:  Social/Functional History  Lives With: Alone  Type of Home: Apartment  Home Layout: One level  Home Access: Level entry  Bathroom Shower/Tub: Tub/Shower unit,Curtain  Bathroom Toilet: Standard  Home Equipment:  (NA)  Has the patient had two or more falls in the past year or any fall with injury in the past year?: No  ADL Assistance: Independent  Homemaking Assistance: Independent  Ambulation Assistance: Independent  Transfer Assistance: Independent  Active : Yes  Education: bachelors - public health. Was also a case-worker  Occupation: Full time employment  Type of Occupation: [employer] work  Leisure & Hobbies: golf, football  IADL Comments: Laundry is in patients unit. Additional Comments: Independent PTA- no medical equipment. Parents live 10 mins away from patient    OBJECTIVE:   Vision/Hearing:  Vision  Vision: Within Functional Limits (smooth pursuits WFL; impaired convergence and VOR)  Hearing: Within functional limits    Cognition/Observation:  Orientation Level: Oriented to place,Oriented to time,Oriented to person  Follows Commands: Within Functional Limits    Observation/Palpation  Observation: No acute distress noted. Flat affect. Appears tired. Pleasant and motivated.     ROM:  RLE PROM: WFL  LLE PROM: WFL    Strength:  Strength RLE  Comment: 3/5 hip, knee 3+ to 4-/5, ankle 2/5  Strength LLE  Comment: 4/5 hip, knee 4/5, ankle 4-/5    Neuro:  Balance  Sitting - Static: Good  Sitting - Dynamic: Good  Standing - Static: Good;-  Standing - Dynamic: Fair;+  Comments: 15/24 DGI indicating mod falls risk. Bed mobility  Rolling to Left: Modified independent  Rolling to Right: Modified independent  Supine to Sit: Modified independent;Supervision  Sit to Supine: Modified independent;Supervision  Bed Mobility Comments: Mildly impulsive. Transfers  Sit to Stand: Stand by assistance;Contact guard assistance  Stand to sit: Stand by assistance;Contact guard assistance  Bed to Chair: Stand by assistance;Contact guard assistance  Car Transfer: Stand by assistance;Contact guard assistance  Comment: Varied performance. Pt with increased time to approach chair. Safe technique however inaccurate execution at times. 5XSTS = 10.7sec. Ambulation  Surface: level tile  Device: No Device  Assistance: Stand by assistance;Contact guard assistance  Quality of Gait: Decreased speed and step length. Challenged with turns and obstacles. Distance: 150ft; 75ft; multiple small distances within gym focusing on obstacles and turns. Gaze stabilization introduced.     Stairs/Curb  Stairs?: Yes  Stairs  # Steps : 4  Rails: Right ascending  Assistance: Stand by assistance  Comment: Step to pattern              Activity Tolerance  Activity Tolerance: Patient tolerated treatment well    Patient Education  Education Given To: Patient  Education Provided: Role of Therapy;Plan of Care;Transfer Training  Education Provided Comments: safety  Education Method: Verbal  Education Outcome: Verbalized understanding    Quality Indicators (IRF-SHAHZAD):  Rolling L and R: Supervision or Touching Assistance - 4  Sit>Supine: Supervision or Touching Assistance - 4  Supine>Sit: Supervision or Touching Assistance - 4  Sit>Stand: Supervision or Touching Assistance - 4  Chair/Bed>Chair Transfer: Supervision or Touching Assistance - Pt to complete transfers with indep (bed/chair/car)  Long term goal 3: Pt to ambulate >300ft indoor/outdoor without AD indep  Long term goal 4: Pt to achieve 20/24 DGI  Long term goal 5: Pt to complete 5XSTS in 8sec  Additional Goals?: Yes    ELOS:   Plan weeks: 1    Therapy Time:    Individual   Time In 1030   Time Out 1100   Minutes 30     8 Minutes (gait 5min; NMR 2min; transfers 1min)       Jake Houser, PT, 07/12/22 at 12:57 PM

## 2022-07-12 NOTE — PROGRESS NOTES
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 Patient Active Problem List    Diagnosis Date Noted    Acute retention of urine 2022    Diverticulitis of colon 2022    Fatigue 2022    Recurrent aphthous ulcer 2022    Spasm of back muscles 2022    Neuropathy of right peroneal nerve 2022    Lesion of right sciatic nerve 2022    Impaired mobility and ADLs 2022    Cerebral edema (HCC)     Weakness of right lower extremity      impaired mobility and ADLs dt encephalopathy  2022    Abdominal pain 2022    Depressive disorder 2022    Vitamin D deficiency 2022    Benzodiazepine dependence ([address] 2022    Right foot drop 2022    Altered mental status     Drug abuse ([address]     Acute encephalopathy 2022    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 Storm Gonzalez.   any testing was done here by Dr. Storm Gonzalez, no formal psych eval.      Anxiety     Drug abuse ([address]     MDD (major depressive disorder), recurrent episode, moderate ([address] 6/4/2019    Neuropathy of right peroneal nerve 7/12/2022     Past Surgical History:   Procedure Laterality Date    TONSILLECTOMY AND ADENOIDECTOMY      WISDOM TOOTH EXTRACTION           Date of Onset: 07/11/2022  Rehab Diagnosis: Impaired mobility and ADL's due to hypoxic encephalopathy. [facility] admit 7/11/22    Date of Evaluation: 7/12/2022   Evaluating Therapist: SOPHIE Bui        Diagnosis: Patient presents with moderate cognitive impairment characterized by decreased attention (divided and alternating), ST memory, divergent naming abilities, and problem solving. Requires SLP Intervention: Yes     General  Previous level of function and limitations: Patient living independently at home. Patient was seen for an evaluation upon admittance into inpatient rehab. Patient arrived at ED due to altered mental status secondary to [employer] use. Patient received CXR on 7/1 which indicated left upper lobe pneumonia. Patient received MRI on 7/1 which showed deficits secondary to hypoxia. Patient demonstrated hypoxic ischemic encephalopathy with a seizure. Patient was positive for fetanyl, benzodiazepines, amphetamines, transaminitis, EDGAR, rhabdomyolysis resulting in provoked seizure and cerebral edema. Patient reports that he is noticing cognitive deficits since hospitalizations but cannot explain what deficits have been occuring. Vision and Hearing  Vision  Vision: Impaired  Vision Exceptions: Wears glasses at all times  Hearing  Hearing: Within functional limits     Oral/Motor  Oral Motor   Labial: No impairment  Dentition: Intact; Natural  Oral Hygiene:  (Dry and cracked)  Lingual: No impairment  Velum: No Impairment  Mandible: No impairment    Motor Speech  Motor Speech  Dysarthric Characteristics: None  Intelligibility: No impairment  Overall Impairment Severity: None    Comprehension  Auditory Comprehension  Comprehension: Within Functional Limits  Basic Questions: WFL  One Step Commands: WFL  Two Step Commands: WFL  Pictures: WFL    Expression  Expression  Primary Mode of Expression: Verbal  Verbal Expression  Verbal Expression: Within functional limits  Repetition: WFL  Confrontation: WFL  Convergent: WFL (80%)  Divergent: Severe (0%)    Cognition  Orientation  Overall Orientation Status: Within Functional Limits  Orientation Level: Oriented to person;Oriented to place;Oriented to time;Oriented to situation  Attention  Attention: Exceptions to [facility]  Alternating Attention: Moderate  Divided Attention: Moderate  Sustained Attention: Mild  Memory  Memory: Exceptions to [facility]  Short-term Memory: Mild  Problem Solving  Problem Solving: Exceptions to [facility]  Managing Finances: Severe (0%)  Managing Medications: Severe (0%)  Numeric Reasoning  Numeric Reasoning: Exceptions to [facility]   Money Management: Severe (0%)  Time: Severe (0%)  Abstract Reasoning  Abstract Reasoning: Exceptions to [facility]  Convergent Thinking: WFL (80%)  Divergent Thinking: Severe (0%)  Safety/Judgment  Safety/Judgment: Exceptions to [facility]  Complex Functional Tasks: Mild    Additional Assessments  Patient was administered the CLQT (Cognitive Linguistic Quick Test) which assesses the areas listed below. Results were as follows:    Domain Score Severity   Attention 179 Mild   Memory 128 Moderate   Executive Function 25 WNL   Language 24.5 Mild   Visuospatial Skills 85 WNL   Clock Drawing Severity 9 Moderate     General observations show deficits in the following areas: Patient had difficulty completing alternating and dividing attention tasks. Patient demonstrated difficulty recalling directions for tasks and problem solving scenarios.      Recommendations  Requires SLP Intervention: Yes  Patient Education: Patient educated on results of SLE  Patient Education Response: Verbalizes understanding  Duration of Treatment: 2-3 weeks  Frequency of Treatment: 3-5x's    Prognosis  Speech Therapy Prognosis  Prognosis: Excellent  Prognosis Considerations: Leg  Patient reports that RN is aware of pain level and declined SLP contacting them     Pain Re-assessment  Pain Reassessment: Patient c/o pain    Speech Therapy Level of Assistance Scale:    AUDITORY COMPREHENSION  Rating: Modified Independent    VERBAL EXPRESSION  Rating: Independent-Modified Independent    MOTOR SPEECH  Rating: Independent    PROBLEM SOLVING  Rating: Supervised Assistance-Minimal Assistance    MEMORY  Rating: Supervised Assistance-Minimal Assistance      Therapy Time  SLP Individual Minutes  Time In: 0930  Time Out: 1005  Minutes: 35                 Signature: Electronically signed by SOPHIE Joyner on 7/12/2022 at 11:13 AM

## 2022-07-13 LAB — TOTAL CK: 398 U/L (ref 0–190)

## 2022-07-13 PROCEDURE — 97140 MANUAL THERAPY 1/> REGIONS: CPT

## 2022-07-13 PROCEDURE — 99233 SBSQ HOSP IP/OBS HIGH 50: CPT | Performed by: PHYSICAL MEDICINE & REHABILITATION

## 2022-07-13 PROCEDURE — 1180000000 HC REHAB R&B

## 2022-07-13 PROCEDURE — 97535 SELF CARE MNGMENT TRAINING: CPT

## 2022-07-13 PROCEDURE — 97129 THER IVNTJ 1ST 15 MIN: CPT

## 2022-07-13 PROCEDURE — 6360000002 HC RX W HCPCS: Performed by: INTERNAL MEDICINE

## 2022-07-13 PROCEDURE — 6370000000 HC RX 637 (ALT 250 FOR IP): Performed by: PHYSICAL MEDICINE & REHABILITATION

## 2022-07-13 PROCEDURE — 97110 THERAPEUTIC EXERCISES: CPT

## 2022-07-13 PROCEDURE — 99254 IP/OBS CNSLTJ NEW/EST MOD 60: CPT | Performed by: NURSE PRACTITIONER

## 2022-07-13 PROCEDURE — 82550 ASSAY OF CK (CPK): CPT

## 2022-07-13 PROCEDURE — 6370000000 HC RX 637 (ALT 250 FOR IP): Performed by: FAMILY MEDICINE

## 2022-07-13 PROCEDURE — 97112 NEUROMUSCULAR REEDUCATION: CPT

## 2022-07-13 PROCEDURE — 36415 COLL VENOUS BLD VENIPUNCTURE: CPT

## 2022-07-13 PROCEDURE — 97116 GAIT TRAINING THERAPY: CPT

## 2022-07-13 PROCEDURE — 6370000000 HC RX 637 (ALT 250 FOR IP): Performed by: INTERNAL MEDICINE

## 2022-07-13 PROCEDURE — 97530 THERAPEUTIC ACTIVITIES: CPT

## 2022-07-13 PROCEDURE — 97130 THER IVNTJ EA ADDL 15 MIN: CPT

## 2022-07-13 RX ADMIN — ENOXAPARIN SODIUM 40 MG: 100 INJECTION SUBCUTANEOUS at 08:20

## 2022-07-13 RX ADMIN — PREDNISONE 20 MG: 20 TABLET ORAL at 08:21

## 2022-07-13 RX ADMIN — POLYETHYLENE GLYCOL 3350 17 G: 17 POWDER, FOR SOLUTION ORAL at 08:21

## 2022-07-13 RX ADMIN — AMOXICILLIN AND CLAVULANATE POTASSIUM 1 TABLET: 875; 125 TABLET, FILM COATED ORAL at 08:21

## 2022-07-13 RX ADMIN — TRAZODONE HYDROCHLORIDE 50 MG: 50 TABLET ORAL at 21:00

## 2022-07-13 RX ADMIN — ALPRAZOLAM 0.5 MG: 0.25 TABLET ORAL at 08:21

## 2022-07-13 RX ADMIN — AMOXICILLIN AND CLAVULANATE POTASSIUM 1 TABLET: 875; 125 TABLET, FILM COATED ORAL at 21:00

## 2022-07-13 RX ADMIN — Medication 100 MG: at 08:21

## 2022-07-13 RX ADMIN — ESCITALOPRAM OXALATE 10 MG: 10 TABLET ORAL at 08:21

## 2022-07-13 RX ADMIN — ALPRAZOLAM 0.5 MG: 0.25 TABLET ORAL at 16:08

## 2022-07-13 RX ADMIN — Medication 2000 UNITS: at 16:08

## 2022-07-13 ASSESSMENT — ENCOUNTER SYMPTOMS
COLOR CHANGE: 0
NAUSEA: 0
VOMITING: 0
COUGH: 0
BACK PAIN: 1
ABDOMINAL PAIN: 0
TROUBLE SWALLOWING: 0
CHEST TIGHTNESS: 0
WHEEZING: 0
SHORTNESS OF BREATH: 0
ABDOMINAL DISTENTION: 0

## 2022-07-13 ASSESSMENT — PAIN SCALES - GENERAL: PAINLEVEL_OUTOF10: 0

## 2022-07-13 NOTE — PROGRESS NOTES
Nutrition Assessment     Type and Reason for Visit: Initial,Consult    Nutrition Recommendations/Plan:   1. Malnutrition Assessment:  Malnutrition Status: No malnutrition    Nutrition Assessment: Pt appears stable from a nutritional standpoint, with verbalized good appetite/noted good intake at meals, with no nutritional complaints. Nutrition Related Findings:   PMH-ADHD, MDD, substance abuse. Labs/meds reviewed. Wound Type:  (see LDA-R foot)    Current Nutrition Therapies:    ADULT DIET;  Regular (%)    Anthropometric Measures:  · Height:  5'10\"  · Current Body Wt: 163 lb (73.9 kg) (7/13); 165lb (stated UBW)  · BMI: 23.4    Nutrition Diagnosis:   No nutrition diagnosis at this time     Nutrition Interventions:   Food and/or Nutrient Delivery: Continue Current Diet  Nutrition Education/Counseling: Education not indicated  Coordination of Nutrition Care: Continue to monitor while inpatient       Goals:     Goals: PO intake 75% or greater (stable weight)       Nutrition Monitoring and Evaluation:      Food/Nutrient Intake Outcomes: Food and Nutrient Intake  Physical Signs/Symptoms Outcomes: [address] [facility] [address] MAY[address] Arredondo, LD

## 2022-07-13 NOTE — PROGRESS NOTES
Method: Demonstration;Verbal  Education Outcome: Demonstrated understanding;Continued education needed;Verbalized understanding    ASSESSMENT:   Good participation. Pt continues to need cues to improve safety. Pt perseverates/frequently with questions on his therapy schedule at times despite education on his scheduling being available on his w/c     PLAN OF CARE:  Strengthening,Balance training,Functional mobility training,Endurance training,Neuromuscular re-education,Safety education & training,Patient/Caregiver education & training,Equipment evaluation, education, & procurement,Self-Care / ADL,Home management training     Patient goals : \"Get home, get back to life, feel normal\"  Time Frame for Long term goals :  Within 1 weeks, pt to demo progress in the following areas listed below to achieve specific LTGs as stated in the evaluation  Long Term Goal 1: Pt will demo improved overall ADL/IADL status  Long Term Goal 2: Pt will demo improved standing balance and tolerance for self-care completion  Long Term Goal 3: Pt will demo improved activity tolerance for completion of ADL/IADL  Long Term Goal 4: Pt will demo improved overall safety and cognition in order to be able to care for himself with minimal assistance    Therapy Time:   Individual Group Co-Treat   Time In 1300       Time Out 1330         Minutes 30             ADL/IADL training: 15 minutes  Therapeutic activities: 15 minutes     Electronically signed by:    Ar Benson OT,   7/13/2022, 1:26 PM

## 2022-07-13 NOTE — PROGRESS NOTES
Physical Therapy Rehab Treatment Note  Facility/Department: [facility]  Room: R242R242-01       NAME: Destiny Moscoso  : 1990 (33 y.o.)  MRN: [id number]  CODE STATUS: Full Code    Date of Service: 2022       Restrictions:  Restrictions/Precautions: Fall Risk    SUBJECTIVE:   Subjective: I am a little better    Pain  Pain: 3/10 R hip pain pre session    OBJECTIVE:   Sit to Supine  Assistance Level: Independent  Supine to Sit  Assistance Level: Independent    Sit to Stand  Assistance Level: Stand by assist;Supervision  Stand to Sit  Skilled Clinical Factors: Occasionally unstable to approach to chair  Bed To/From Chair  Assistance Level: Stand by assist    Ambulation  Surface: Level surface; Uneven surface; Carpet; Ramp  Distance: 275', 150'  Level of assistance: Stand by assist, Supervision  Activity: Within Room  Activity Comments: Reciprocal pattern mild antalgia wide negotiation of turns. Cues for negotiation around obstacles. left reports R foot pain with gait training attempted gait training with shoes donned with mild decrease in pain    Stairs  Stair Height: 6''  Device: One handrail;Bilateral handrails  Number of Stairs: 12  Additional Factors: Verbal cues  Assistance Level: Stand by assist  Skilled Clinical Factors: Unstable with negotiation of stairs utilizing single hand rail improved with B handrails     Neuro:Lateral weight shifting in partial squat position with focus on eccentric control descending to sit. X 5 each. Activity ceased due to elevated R foot pain    PT Exercises  Exercise Treatment: Supine exercises: Quad sets, Glute sets, Straight leg raises, Bridges, Hip abduction x 20     ASSESSMENT/PROGRESS TOWARDS GOALS:  Assessment  Assessment: Patient with decreased motivation with therapy this a.m. Gait and standing activities limited by increased R foot pain. Attempted to decrease pain by donning shoes with minimal decrease in pain.   Activity Tolerance: Patient limited by

## 2022-07-13 NOTE — PLAN OF CARE
Problem: Discharge Planning  Goal: Discharge to home or other facility with appropriate resources  Outcome: Progressing     Problem: Safety - Adult  Goal: Free from fall injury  Outcome: Progressing     Problem: ABCDS Injury Assessment  Goal: Absence of physical injury  Outcome: Progressing     Problem: Pain  Goal: Verbalizes/displays adequate comfort level or baseline comfort level  Outcome: Progressing     Problem: Neurosensory - Adult  Goal: Achieves maximal functionality and self care  Outcome: Progressing     Problem: Respiratory - Adult  Goal: Achieves optimal ventilation and oxygenation  Outcome: Progressing     Problem: Cardiovascular - Adult  Goal: Maintains optimal cardiac output and hemodynamic stability  Outcome: Progressing     Problem: Skin/Tissue Integrity - Adult  Goal: Skin integrity remains intact  Outcome: Progressing     Problem: Musculoskeletal - Adult  Goal: Return mobility to safest level of function  Outcome: Progressing     Problem: Gastrointestinal - Adult  Goal: Maintains adequate nutritional intake  Outcome: Progressing     Problem: Genitourinary - Adult  Goal: Absence of urinary retention  Outcome: Progressing     Problem: Infection - Adult  Goal: Absence of infection at discharge  Outcome: Progressing     Problem: Metabolic/Fluid and Electrolytes - Adult  Goal: Electrolytes maintained within normal limits  Outcome: Progressing     Problem: Hematologic - Adult  Goal: Maintains hematologic stability  Outcome: Progressing

## 2022-07-13 NOTE — PROGRESS NOTES
[facility]  Facility/Department: [facility]  Speech Language Pathology   Treatment Note          Dayana Locke  1990  [id number]  [x]   confirmed    Date: 2022    Rehab Diagnosis: Impaired mobility and ADL's due to hypoxic encephalopathy. [facility] admit [facility]      Restrictions/Precautions: Fall Risk          ADULT DIET; Regular    SpO2: 98 % (22 0639)  No active isolations    Speech Dx: Cognitive Linguistic Impairment    Subjective:  Alert, Cooperative and Pleasant        Interventions used this date:  Cognitive Skill Development    Objective/Assessment:  Patient progressing towards goals:  Short-term Goals  Timeframe for Short-term Goals: 2-3 weeks  Goal 1: To increase safety awareness and judgment for safe completion of ADLs secondary to pt's cognitive deficits, pt will complete abstract reasoning tasks (i.e. Word deduction, convergent and divergent naming, similarities/differences) with 80% accuracy and min cues. Patient completed naming objects by description task I with 53% accuracy, mod cueing 73% accuracy. Delayed response noted X2. Goal 2: To address pt's cognitive deficits and promote recall of personal and medical information, pt will answer questions addressing (recent, delayed) recall with 80% accuracy and fading cues. Patient completed 3 word immediate memory task, placing words in order of occurrence with repetition strategy I with 50% accuracy, with moderate cueing 61% accuracy, with repetition 66% accuracy. Reduced thought organization was noted,     Goal 5: To increase independence for functional activities for home and community, pt will complete mid level executive functioning tasks (i.e. Path finding, scheduling appointments, prioritizing tasks) with 80% accuracy and min cues. Patient completed bill and coin addition task I with 83% accuracy,     ST cued patient to slow rate with task completion at this time to improve accuracy. Treatment/Activity Tolerance:  Patient tolerated treatment well    Plan:  Continue per POC    Pain Assessment:  Patient does not c/o pain. Pain Re-assessment:  Patient does not c/o pain. Patient/Caregiver Education:  Patient educated on session and progression towards goals.     Safety Devices:  Bed alarm in place and Call light within reach      Speech Therapy Level of Assistance Scale    AUDITORY COMPREHENSION  Rating:  Modified Independent    VERBAL EXPRESSION  Rating:  Minimal Assistance    MOTOR SPEECH  Rating: Independent    PROBLEM SOLVING  Rating: Supervised Assistance-Minimal Assistance    MEMORY  Rating:  Minimal Assistance          Therapy Time  SLP Individual Minutes  Time In: 1000  Time Out: 4448  Minutes: 30              Signature: Electronically signed by SOPHIE Skaggs on 7/13/2022 at 11:18 AM

## 2022-07-13 NOTE — CARE COORDINATION
LSW spoke with Gerardo Leonard at [facility] and she stated that they denied the referral as they feel that they cannot meet pt's needs. Gerardo Leonard explained that they cannot test for [employer] (which is what pt was on) and because of that, they cannot risk if he brings some in that would affect their other clients. Gerardo Leonard recommended for LSW to call [employer] to see other agency options. LSW called [employer] and spoke to [place] and she provided other options such as [facility] in [place] ([phone]), [facility],[place] [address] in [place] ([phone] opt 2 then opt 3), [employer], [address] [place] [address], and Road to [address] [place] [address]. [facility] also offered the option of a peer support to meet with pt. LSW will discuss with both pt and mother regarding alternative options.  Electronically signed by LENNY Gilman, ANNALISA on 7/13/2022 at 4:55 PM

## 2022-07-13 NOTE — CARE COORDINATION
WAYNEW confirmed with pt that he would like to discharge to [facility] from rehab and gave permission for LSW to fax over a referral. WAYNEW faxed over 93 pages to Gloria Sanches at [facility] via [phone].  Electronically signed by LENNY Pete, ANNALISA on 7/13/2022 at 1:47 PM

## 2022-07-13 NOTE — PROGRESS NOTES
OCCUPATIONAL THERAPY  INPATIENT REHAB TREATMENT NOTE  [facility]      NAME: Sydni Mcclellan  : 1990 (33 y.o.)  MRN: [id number]  CODE STATUS: Full Code  Room: O457/W957-21    Date of Service: 2022    Referring Physician: Dr. Den Jean Diagnosis: Imp ADLs d/t hypoxic encephalopathy s/p overdose likely secondary to fentanyl laced xanax pill    Restrictions  Restrictions/Precautions  Restrictions/Precautions: Fall Risk       Patient's date of birth confirmed: Yes    SAFETY:  Safety Devices  Safety Devices in place: Yes  Type of devices: All fall risk precautions in place    SUBJECTIVE:  Subjective: My brought all that in for me  Pain: no pain    OBJECTIVE:     ADLs  Grooming/Oral Hygiene  Assistance Level: Modified independent  Upper Extremity Bathing  Assistance Level: Modified independent  Lower Extremity Bathing  Assistance Level: Minimal assistance  Skilled Clinical Factors: assist to reach posterior region for hygiene  Upper Extremity Dressing  Assistance Level: Modified independent  Lower Extremity Dressing  Assistance Level: Minimal assistance  Skilled Clinical Factors: assist with RLE  Putting On/Taking Off Footwear  Assistance Level: Minimal assistance  Skilled Clinical Factors: assist with RLE  Toileting  Skilled Clinical Factors: NT  Tub/Shower Transfers  Type: Shower  Transfer From:  (ambulating without AD)  Assistance Level: Contact guard assist     Cues needed at times to improve safety such as sitting during LB ADLs due to decreased standing balance. Cues also needed for problem solving. Some diff with RLE during dressing due to RLE stiffness.  Dr. Navid Jay present and is aware    Instrumental ADL's  Instrumental ADLs: Yes  Light Housekeeping  Light Housekeeping Level:  (no AD)  Light Housekeeping Level of Assistance: Contact guard assistance  Light Housekeeping: clothing gather and transfer without AD- cues for safety   Pt also carried bag of soiled linens down to laundry room- fair balance      Functional Mobility  Device:  (No AD)  Activity: To/From bathroom; Retrieve items;Transport items  Assistance Level: Contact guard assist  Sit to Stand  Assistance Level: Stand by assist  Stand to Sit  Assistance Level: Stand by assist   On initial stand from bed pt stood up too quickly and immediately lost his balance to his R side. Pt able to self-recover. Edu provided on slow transition to allow for his body to adjust to positioning     Snack gather also completed- pt asking for ice cream this AM. Pt walked down to therapy wing kitchenette and was able to retrieve self cup of ice cream and spoon with cues for item location     Kitchen mobility task completed w/out AD. Fx mobility completed throughout gym to kitchen at SBA level. Pt instructed to search through appliances, drawers, and cabinets to find 8 cones. Pt with safe reaching distance during task. Cues needed to improve searching ability. No LOBs noted      While seated and standing, challenged strength, activity tolerance, ROM, and flexibility for improved ADL performance. Challenged pt through usage of 2# dowel altagracia to complete BUE and trunk exercises. 2 sets x 10-15 reps completed. Exercises focused on all UE joints and planes of motion including scapular protraction/retraction, shoulder flexion/extension/rotation/horizontal abduction, elbow flexion/extension. Trunk exercises focuses on flexion/extension/rotation. Several rest breaks needed. While standing for exercises- several LOBs noted. Pt challenged with alternating foot placement during exercises to also address balance     Education:  Education  Education Given To: Patient  Education Provided: Role of Therapy;Plan of Care;Safety;ADL Function;IADL Function;Mobility Training;Transfer Training;DME/Home Modifications; Energy Conservation; Fall Prevention Strategies  Education Method: Demonstration;Verbal  Education Outcome: Demonstrated understanding;Continued education

## 2022-07-13 NOTE — CONSULTS
[id number] [facility] Neurology Consult Note  Name: Reno Steven  Age: 33 y.o. Gender: male  CodeStatus: Full Code  Allergies: Peanut-Containing Drug Products  Nuts [Macadamia Nut Oil]  Peanut Oil  Shellfish-Derived Products    Chief Complaint:No chief complaint on file. Primary Care Provider: YING Wayne CNP  InpatientTreatment Team: Treatment Team: Attending Provider: Jennifer Esparza DO; Consulting Physician: Lashell Bryan MD; Consulting Physician: Mónica Richardson MD; Consulting Physician: Johnny Pendleton, PhD; Registered Nurse: Eduar Kennedy RN; Occupational Therapist: Cielo Sal, OTR/L; Occupational Therapist: Moni Biggs OT; Occupational Therapist Assistant: Joaquin Up; : Shanna Bose MSW, LSW; Consulting Physician: Christian Wolff MD  Admission Date: 7/11/2022      HPI   Consulting provider: Dr. Jennifer Esparza for encephalopathy  Pt seen and examined on rehab unit for neurology consult. Patient is a 27-year-old  male with past medical history of major depressive disorder, drug abuse, anxiety, ADHD who was admitted to [facility] from 6/30/2022 until 7/11/2022 for hypoxic ischemic encephalopathy secondary to status epilepticus from polysubstance abuse and overuse of Royle kratom leading to liver failure. Initial CT the head done on 6/30/2022 showed round symmetric areas of decreased attenuation bilateral globus pallidus consistent with hypoxia. MRI of the brain done on 7/1/2022 showed areas of restricted diffusion within the globus pallidus, supratentorial white matter and splenium of the corpus callosum most consistent with hypoxemia and toxic encephalopathy. Repeat MRI of the brain on 7/4/2022 showed mildly increased edema in the areas of diffusion restriction. Cerebral edema treated with oral dexamethasone. MRA of the head and neck were patent. EEG was normal.  Patient had rhabdomyolysis that responded to IV fluids.   EDGAR resolved. LFTs improved. Patient reported numbness of the right foot with weakness of dorsiflexion. Physical therapy confirmed right lower extremity weakness. Patient also bowel and bladder incontinence. Screening MRI was done of the spine and was negative. At this time patient is alert and oriented x3 and continues to have forgetfulness. Parents at bedside. Flat affect. Insomnia noted. Continues to have right lower extremity weakness at 4 out of 5. No other focal neurodeficits. No seizure activity reported. Afebrile. Vitals:    07/13/22 0639   BP: 125/81   Pulse: (!) 103   Resp: 14   Temp: 97.5 °F (36.4 °C)   SpO2: 98%     Family History   Problem Relation Age of Onset    Depression Mother     Heart Disease Father     High Blood Pressure Father     High Cholesterol Father      Social History     Socioeconomic History    Marital status: Single     Spouse name: Not on file    Number of children: Not on file    Years of education: Not on file    Highest education level: Not on file   Occupational History    Not on file   Tobacco Use    Smoking status: Never Smoker    Smokeless tobacco: Never Used   Substance and Sexual Activity    Alcohol use: Yes     Comment: occasional    Drug use: No    Sexual activity: Not on file   Other Topics Concern    Not on file   Social History Narrative    Lives With: Alone in [address]: One level    Home Access: Level entry    Bathroom Toilet: Standard    Has the patient had two or more falls in the past year or any fall with injury in the past year?: No    ADL Assistance: Independent    Homemaking Assistance: Independent    Homemaking Responsibilities: Yes    Ambulation Assistance: Independent    Transfer Assistance: Independent    Active : Yes    Type of Occupation: [employer] work    Additional Comments:  Independent PTA- no medical equipment         Social Determinants of Health     Financial Resource Strain: Low Risk     Difficulty of Paying Living Expenses: Not hard at all   Food Insecurity: No Food Insecurity    Worried About Running Out of Food in the Last Year: Never true    Anand of Food in the Last Year: Never true   Transportation Needs:     Lack of Transportation (Medical): Not on file    Lack of Transportation (Non-Medical): Not on file   Physical Activity:     Days of Exercise per Week: Not on file    Minutes of Exercise per Session: Not on file   Stress:     Feeling of Stress : Not on file   Social Connections:     Frequency of Communication with Friends and Family: Not on file    Frequency of Social Gatherings with Friends and Family: Not on file    Attends Mandaeism Services: Not on file    Active Member of [address] [place] [address] or Organizations: Not on file    Attends Club or Organization Meetings: Not on file    Marital Status: Not on file   Intimate Partner Violence:     Fear of Current or Ex-Partner: Not on file    Emotionally Abused: Not on file    Physically Abused: Not on file    Sexually Abused: Not on file   Housing Stability:     Unable to Pay for Housing in the Last Year: Not on file    Number of [place] in the Last Year: Not on file    Unstable Housing in the Last Year: Not on file       Review of Systems   Constitutional: Negative for appetite change and fever. HENT: Negative for hearing loss and trouble swallowing. Eyes: Negative for visual disturbance. Respiratory: Negative for cough, chest tightness, shortness of breath and wheezing. Cardiovascular: Negative for chest pain, palpitations and leg swelling. Gastrointestinal: Negative for abdominal distention, abdominal pain, nausea and vomiting. Genitourinary: Negative for difficulty urinating (incontinence). Musculoskeletal: Positive for back pain and gait problem. Skin: Negative for color change and rash. Neurological: Positive for weakness and numbness.  Negative for dizziness, tremors, seizures, syncope, facial asymmetry, speech difficulty, light-headedness and headaches. Psychiatric/Behavioral: Positive for behavioral problems, decreased concentration and sleep disturbance. Negative for agitation, confusion and hallucinations. The patient is not nervous/anxious. Physical Exam  Vitals and nursing note reviewed. Constitutional:       General: He is not in acute distress. Appearance: He is not diaphoretic. HENT:      Head: Normocephalic and atraumatic. Eyes:      General: No visual field deficit. Extraocular Movements: Extraocular movements intact. Pupils: Pupils are equal, round, and reactive to light. Cardiovascular:      Rate and Rhythm: Normal rate and regular rhythm. Pulmonary:      Effort: Pulmonary effort is normal. No respiratory distress. Breath sounds: Normal breath sounds. Abdominal:      General: Bowel sounds are normal. There is no distension. Palpations: Abdomen is soft. Tenderness: There is no abdominal tenderness. Skin:     General: Skin is warm and dry. Neurological:      Mental Status: He is alert and oriented to person, place, and time. Cranial Nerves: No cranial nerve deficit, dysarthria or facial asymmetry. Motor: Weakness present. No tremor, atrophy, abnormal muscle tone, seizure activity or pronator drift. Coordination: Coordination normal. Finger-Nose-Finger Test normal.      Comments: Right weakness of dorsiflexion and proximal weakness as well.                 Medications:  Reviewed    Infusion Medications:    sodium chloride       Scheduled Medications:    Vitamin D  2,000 Units Oral Dinner    cyanocobalamin  1,000 mcg IntraMUSCular Weekly    coenzyme Q10  100 mg Oral Daily    lidocaine  3 patch TransDERmal Daily    predniSONE  20 mg Oral Daily    Followed by   Cindy Sheppard ON 7/15/2022] predniSONE  15 mg Oral Daily    Followed by   Cindy Sheppard ON 7/18/2022] predniSONE  10 mg Oral Daily    Followed by   Cindy Sheppard ON 7/21/2022] shift. No acute intracranial hemorrhage. No enhancing mass or pathologic enhancement. Impression  Mildly increased edema in the areas of diffusion restriction within the bilateral globi pallidi, splenium of the corpus callosum, and supratentorial white since prior MRI July 1, 2022. Results for orders placed during the hospital encounter of 06/30/22    MRI BRAIN WO CONTRAST    Narrative  MRI BRAIN WO CONTRAST : 7/1/2022    CLINICAL HISTORY:  stroke . COMPARISON: Head CT 6/30/2022. TECHNIQUE: Multiplanar MR imaging of the head was performed without contrast.      FINDINGS:    Areas of restricted diffusion within the globus pallidus, supratentorial white matter and splenium of the corpus callosum are most consistent with carbon monoxide poisoning/hypoxemia and/or other toxic encephalopathy. There is no intracranial hemorrhage, mass effect, midline shift, extra-axial collection, significant cerebral volume loss or other complication identified. Impression  FINDINGS CONSISTENT WITH CARBON MONOXIDE POISONING/HYPOXEMIA AND/OR OTHER TOXIC ENCEPHALOPATHY. NO INTRACRANIAL HEMORRHAGE OR COMPLICATION IDENTIFIED. MRA of the Head and Neck: No results found for this or any previous visit. No results found for this or any previous visit. Results for orders placed during the hospital encounter of 06/30/22    MRA HEAD WO CONTRAST    Narrative  MRA HEAD WO CONTRAST, MRA NECK WO CONTRAST    HISTORY:  Drug abuse. Altered mental status. COMPARISON: MRI brain 7/1/2022. CT head 6/30/2022. TECHNIQUE: Routine MRA of the head without contrast with 3-D time-of-flight images and dedicated reconstructions. Routine MRA of the neck with 3-D and 2-D time-of-flight images and dedicated reconstructions. RESULT:    Visualized brain: Grossly unchanged from the recent MRI of the brain with multiple areas of restricted diffusion. Please refer to the recent MRI brain report.       INTRACRANIAL MRA:    The visualized distal vertebral and basilar arteries are widely patent. The distal ICAs are patent and within normal limits of caliber. The proximal ACAs, MCAs and PCAs are patent and within normal limits of caliber and configuration. There is no  evidence of focal,  significant stenosis or aneurysm in the visualized vessels. EXTRACRANIAL MRA:    Carotid Stenosis:    Right Common:  No significant stenosis. Right Internal Plaque:  No significant plaque formation. Right Internal Carotid Stenosis (% by NASCET Criteria):  0%    Left Common:  No significant stenosis. Left Internal Carotid Plaque:  No significant plaque formation. Left Internal Carotid Stenosis (% by NASCET Criteria):  0%    Cervical Vertebral Arteries:    Patency:  Bilateral  Dominance:  Left    Impression  Patent intracranial and extracranial circulation. CT of the Head: Results for orders placed during the hospital encounter of 06/30/22    CT Head WO Contrast    Narrative  CT Brain. Contrast medium:  without contrast.. History: Altered mental status. Technical factors: CT imaging of the brain was obtained and formatted as 5 mm contiguous axial images. 2.5 mm contiguous axial images were obtained through the osseous structures. Sagittal and coronal reconstruction obtained during postprocessing. Comparison:  None. Findings:    Extra-axial spaces:  Normal.    Intracranial hemorrhage:  None. Ventricular system: [Negative. Basal Cisterns:  Without anomaly. Cerebral Parenchyma: Bilateral, symmetric areas of decreased attenuation exerting no mass effect measuring approximately 9 mm in size since found within the bilateral globus pallidus (series 2, image 17). Midline Shift:  None. Cerebellum:  No anomaly identified. Paranasal sinuses and mastoid air cells:  No anomaly identified. Visualized Orbits:  Negative.     Impression  Impression:    Round symmetric areas decreased attenuation bilateral globus pallidus. This finding may be seen in patients experiencing hypoxia, i.e., carbon monoxide poisoning. All CT scans at this facility use dose modulation, iterative reconstruction, and/or weight based dosing when appropriate to reduce radiation dose to as low as reasonably achievable. No results found for this or any previous visit. No results found for this or any previous visit. Carotid duplex: No results found for this or any previous visit. No results found for this or any previous visit. No results found for this or any previous visit. Echo No results found for this or any previous visit. Assessment/Plan:    hypoxic ischemic encephalopathy secondary to status epilepticus with polysubstance use and Royle kratom overuse, patient continues to have some confusion and forgetfulness intermittently. We will have to see how much of this he recovers. May be some permanent deficits. This was discussed with patient and family. Decadron discontinued by another provider and he has been placed on a steroid taper. Repeat MRI brain will be scheduled for 7/18/2022  Patient with ongoing right lower extremity weakness, numbness and bowel and bladder dysfunction. Screening MRI of the spine was negative. Insomnia, continue trazodone. Expect that this will improve as steroids are tapered off. Discharge planning pending for drug rehabilitation versus behavioral health.       Collaborating physicians: Dr Sherif Woodward    Electronically signed by YING Morales CNP on 7/13/2022 at 1:05 PM

## 2022-07-13 NOTE — PROGRESS NOTES
. Call light with in reachPt on light - RN emtied urinal 750ml cl yellow urine. Pt repositions self.

## 2022-07-13 NOTE — CARE COORDINATION
Facility/Department: [facility] CASE MANAGEMENT    NAME: Justa Barakat      : 1990  MRN: [id number]  [id number]      Social/Functional History  Social/Functional History  Lives With: Alone  Type of Home: Apartment  Home Layout: One level  Home Access: Level entry  Bathroom Shower/Tub: Tub/Shower unit,Curtain  Bathroom Toilet: Standard  Home Equipment:  (NA)  Has the patient had two or more falls in the past year or any fall with injury in the past year?: No  ADL Assistance: [address]: Independent  Homemaking Responsibilities: Yes  Ambulation Assistance: Independent  Transfer Assistance: Independent  Active : Yes  Education: bachelors - public health. Was also a case-worker  Occupation: Full time employment  Type of Occupation: [employer] work  Leisure & Hobbies: golf, football  IADL Comments: Laundry is in patients unit. Additional Comments: Independent PTA- no medical equipment. Parents live 10 mins away from patient    Spoke with patient and explained role in the team. Patient questions answered appropriately. Explained discharge process. Patient stated understanding. Spoke with the patient who is alert and oriented upon assessment. The patient states he is from home alone and was actively working prior to hospitalization. He feels weak now and has concern for his weak right leg. The patient denies any suicidal thoughts at this time. He told me he has suffered from depression for years. He denies any tobacco use, and admits to occassionally ETOH use. The patient states he has never been addicted to alcohol. The patient says he has not been addicted to illicit drugs. He admits he has been addicted to Kratom he has bought this drug from gas stations and friends. He states he has been addicted to this medication for 5 years. He ststed he was addicted to opioids years ago and switched to Kratom to get off of opoid addiction.  He did a drug rehab program and and said that he stayed clean for about a month. He was abusing adderol years ago in college and was getting some from other people as well as xanax. He feels he may have received tainted drugs because he has not bought or used fentanyl. I asked the patient if he would be interested in stopping Kratom and he states he wants to get treatment again and would like inpatient treatment if able. He wants to stop using drugs. He is interested in [place]. The patient said it was ok to speak with his mother. The patient works full-time at [employer], lives in and apartment, and was independent prior to admission. The patient is an active . Called the Mother and asked about the patients home situation because the patient states he works, has his own apartment, and car. The notes from hospital admit state the patient has no apartment and essentially homeless. His mother states he still has his job, but she does not like his job because he works with other users at work. She states he got evicted from his apartment for not paying rent for 4 months. The patient has had his car prepossessed as well and no longer has a car. The patient is aware of losing his car but she has not informed him of his apartment loss yet because she did not want to drop too much on him at one time. I asked his mother if he can dc home with her and she states only if he is clean and that he must complete a drug rehabilitation program first. Then he can move back in with his mother/father. She states the patient has been addicted to drugs since college, and was experimenting Marijuana in . She stated the patient has frequently run out of Klonopin and adderol and has Dr hopped and tried to get drugs from other people, and has bought street xanax in the past.  The parents house is 2 steps into one-level home. His parents hope he gets stronger and independent and to a drug program. His mother is hopeful he can go to [facility] and they can help him get on his feet from this addiction to help him find a place. She wants FMLA papers completed for herself, and I asked then he is coming home with you as you are caring for him? So then he is not homeless? The parents stress he needs to get clean and they will care for him. The patient has used many pharmacies but prefers [facility] [place] and has no issues affording his medications.      Electronically signed by Veronica Turner RN on 7/13/22 at 11:54 AM EDT

## 2022-07-13 NOTE — FLOWSHEET NOTE
Patient alert, oriented and cooperative. Complains of right leg and foot pain. Denies any further complaints or needs at this time.

## 2022-07-13 NOTE — PROGRESS NOTES
Subjective: The patient complains of severe acute on chronic progressive fatigue and ataxia, cognitive slowing, right lower extremity numbness and stiffness partially relieved by rest, medications, PT,  OT, medication titration SLP and rest and exacerbated by recent OD-he was admitted to [facility] on 6/30/2022  with change in mental status and history of possible overdose.  Family did a well check after his boss noticed that he had not shown up to work for several days and called the family out of concern. Soraya Cuellar was initially admitted and to the ICU. Soraya Cuellar had abnormal liver enzymes, rhabdomyolysis kidney injury.  Urine was positive for amphetamines benzos and fentanyl.  He was found to also be using Royle kratom overuse, transaminitis, EDGAR, rhabdomyolysis resulting in provoked seizure and cerebral edema. He admits that he took 2 Xanax that he bought on the street. Soraya Cuellar and I both fear that it may have been laced with fentanyl.  Discussed with treatment team and hospitalist and neuro--  MRIs spine were normal..      I am concerned about patients medical complexities and barriers to advancing in rehab goals including right lower extremity stiffness secondary to sciatic neuropathy and nerve injury as well as his history of substance abuse and overuse of benzodiazepines. I will consult psychiatry for benzodiazepine taper and management. .        I reviewed current care and plans for further care with other rehab providers including nursing and case management. According to recent nursing note, \" Patient's mother called and expressed her concern regarding the patient being given Xanax since it is an addictive drug.   \".  Neurology has had him on this medication so he does not go through withdrawal I will have psychiatry contemplate a taper and transition to substance abuse control program.  He was buying Xanax off the street because he could not get it prescribed and therefore causing his overdose as we fear that he bought something that was laced with fentanyl. ROS x10: The patient also complains of severely impaired mobility and activities of daily living. Otherwise no new problems with vision, hearing, nose, mouth, throat, dermal, cardiovascular, GI, , pulmonary, musculoskeletal, psychiatric or neurological. See also Acute Rehab PM&R H&P. Vital signs:  /81   Pulse (!) 103   Temp 97.5 °F (36.4 °C) (Oral)   Resp 14   SpO2 98%   I/O:   PO/Intake:  fair PO intake,  Adult reg  Diet thin liquids    Bowel:   continent   Bladder: continent   No need for schwartz  General:  Patient is well developed,   adequately nourished, and    well kempt. HEENT:    Pupils equal, hearing intact to loud voice, external inspection of ear and nose benign. Inspection of lips, tongue and gums benign  -cognitive slowing right lower extremity weakness and numbness. Musculoskeletal: No significant change in strength or tone. All joints stable. Inspection and palpation of digits and nails show no clubbing, cyanosis or inflammatory conditions. Neuro/Psychiatric: Affect: flat but pleasant. Alert and oriented to person, place and situation with   min cues. No significant change in deep tendon reflexes or sensation  Lungs:  Diminished, CTA-B. Respiration effort is   normal at rest.     Heart:   S1 = S2,   RRR. Abdomen:  Soft, non-tender, no enlargement of liver or spleen. Extremities:    lower extremity edema  -weakness right lower extremity decreased sciatic neuropathy.   Skin:   Intact to general survey,      Rehabilitation:  Physical Therapy:   Bed mobility:  Bed mobility  Rolling to Left: Modified independent (07/12/22 1155)  Rolling to Right: Modified independent (07/12/22 1155)  Supine to Sit: Modified independent;Supervision (07/12/22 1155)  Sit to Supine: Modified independent;Supervision (07/12/22 1155)  Bed Mobility Comments: Mildly impulsive. (07/12/22 1155)  Roll Left  Assistance Level: Independent (07/12/22 1613)  Roll Right  Assistance Level: Independent (07/12/22 1613)  Sit to Supine  Assistance Level: Independent (07/12/22 1613)  Supine to Sit  Assistance Level: Independent (07/12/22 1613)  Scooting  Assistance Level: Independent (07/12/22 1613)  Transfers:  Transfers  Sit to Stand: Stand by assistance;Contact guard assistance (07/12/22 1156)  Stand to sit: Stand by assistance;Contact guard assistance (07/12/22 1156)  Bed to Chair: Stand by assistance;Contact guard assistance (07/12/22 1156)  Car Transfer: Stand by assistance;Contact guard assistance (07/12/22 1156)  Comment: Varied performance. Pt with increased time to approach chair. Safe technique however inaccurate execution at times. 5XSTS = 10.7sec. (07/12/22 1156)  Sit to Stand  Assistance Level: Stand by assist (07/12/22 1515)  Stand to Sit  Assistance Level: Stand by assist (07/12/22 1515)  Bed To/From Chair  Assistance Level: Stand by assist (07/12/22 1515)  Gait:   Ambulation  Surface: level tile (07/12/22 1241)  Device: No Device (07/12/22 1241)  Assistance: Stand by assistance;Contact guard assistance (07/12/22 1241)  Quality of Gait: Decreased speed and step length. Challenged with turns and obstacles. (07/12/22 1241)  Distance: 150ft; 75ft; multiple small distances within gym focusing on obstacles and turns. Gaze stabilization introduced. (07/12/22 1241)  Ambulation  Surface: Level surface; Uneven surface; Carpet; Ramp (07/12/22 1517)  Distance: 300' x 2 (07/12/22 1517)  Activity: Within Unit (07/12/22 1517)  Activity Comments: Reciprocal pattern mild antalgia left (07/12/22 1517)  Stairs:  Stairs/Curb  Stairs?: Yes (07/12/22 1241)  Stairs  # Steps : 4 (07/12/22 1241)  Rails: Right ascending (07/12/22 1241)  Assistance: Stand by assistance (07/12/22 1241)  Comment: Step to pattern (07/12/22 1241)  W/C mobility:       Occupational Therapy:   Hand Dominance: Right  ADL  Feeding: Setup (07/12/22 1018)  Grooming: Modified independent (07/12/22 1018)  Grooming Skilled Clinical Factors: oral hygiene, comb hair (07/12/22 1018)  UE Bathing: Supervision (07/12/22 1018)  UE Bathing Skilled Clinical Factors: cues to attend to all areas, wash hair, thoroughness (07/12/22 1018)  LE Bathing: Minimal assistance (07/12/22 1018)  LE Bathing Skilled Clinical Factors: assist to reach RLE and for posterior hygiene (07/12/22 1018)  UE Dressing Skilled Clinical Factors: gown only (07/12/22 1018)  LE Dressing: Minimal assistance (07/12/22 1018)  LE Dressing Skilled Clinical Factors: assist with R sock, pt declined to don pants/bref (07/12/22 1018)  Toileting: Minimal assistance (07/12/22 1018)  Additional Comments: Shower completed. Pt impulsive at times (07/12/22 1018)  Toilet Transfers  Toilet - Technique: Ambulating (07/12/22 1022)  Equipment Used: Grab bars (07/12/22 1022)  Toilet Transfer: Contact guard assistance (07/12/22 1022)     Shower Transfers  Shower - Transfer From: ToñaCojoin Bears (07/12/22 1022)  Shower - Transfer Type: To and From (07/12/22 1022)  Shower - Transfer To: Shower seat with back (07/12/22 1022)  Shower - Technique: Stand pivot (07/12/22 1022)  Shower Transfers: Contact Guard (07/12/22 1022)    Speech Therapy:      Comprehension: Within Functional Limits  Verbal Expression: Within functional limits  Diet/Swallow:        Dysphagia Outcome Severity Scale: Level 6: Within functional limits/Modified independence  Compensatory Swallowing Strategies : Small bites/sips,Upright as possible for all oral intake,Alternate solids and liquids          Lab/X-ray studies reviewed, analyzed and discussed with patient and staff:   Recent Results (from the past 24 hour(s))   CK    Collection Time: 07/13/22  5:19 AM   Result Value Ref Range    Total  (H) 0 - 190 U/L       XR CHEST   7/4/2022: Suboptimal inspiration. The cardiomediastinal silhouette is within normal limits.   No significant interval change of patchy left upper lobe and left lower lobe opacities. No pneumothorax or pleural effusion. No acute osseous abnormality. No significant interval change of left upper lobe and left lower lobe infiltrate and/or atelectasis. XR HIP RIGHT   7/6/2022  EXAMINATION: RIGHT HIP  CLINICAL HISTORY: Pain, fall COMPARISON: None  FINDINGS: Two views are  submitted. No acute fractures. No dislocations. No focal bony abnormalities                                                                                   NO ACUTE FRACTURE. CT Head   6/30/2022  CT Brain. Contrast medium:  without contrast.. History: Altered mental status. Technical factors: CT imaging of the brain was obtained and formatted as 5 mm contiguous axial images. 2.5 mm contiguous axial images were obtained through the osseous structures. Sagittal and coronal reconstruction obtained during postprocessing. Comparison:  None. Findings: Extra-axial spaces:  Normal. Intracranial hemorrhage:  None. Ventricular system: [Negative. Basal Cisterns:  Without anomaly. Cerebral Parenchyma: Bilateral, symmetric areas of decreased attenuation exerting no mass effect measuring approximately 9 mm in size since found within the bilateral globus pallidus (series 2, image 17). Midline Shift:  None. Cerebellum:  No anomaly identified. Paranasal sinuses and mastoid air cells:  No anomaly identified. Visualized Orbits:  Negative. Impression: Round symmetric areas decreased attenuation bilateral globus pallidus. This finding may be seen in patients experiencing hypoxia, i.e., carbon monoxide poisoning. All CT scans at this facility use dose modulation, iterative reconstruction, and/or weight based dosing when appropriate to reduce radiation dose to as low as reasonably achievable. MRA HEAD : 7/2/2022  MRA HEAD WO CONTRAST, MRA NECK WO CONTRAST HISTORY:  Drug abuse. Altered mental status. COMPARISON: MRI brain 7/1/2022. CT head 6/30/2022.  TECHNIQUE: Routine MRA of the head without contrast with 3-D time-of-flight images and dedicated reconstructions. Routine MRA of the neck with 3-D and 2-D time-of-flight images and dedicated reconstructions. RESULT: Visualized brain: Grossly unchanged from the recent MRI of the brain with multiple areas of restricted diffusion. Please refer to the recent MRI brain report. INTRACRANIAL MRA:    The visualized distal vertebral and basilar arteries are widely patent. The distal ICAs are patent and within normal limits of caliber. The proximal ACAs, MCAs and PCAs are patent and within normal limits of caliber and configuration. There is no evidence of focal,  significant stenosis or aneurysm in the visualized vessels. EXTRACRANIAL MRA: Carotid Stenosis: Right Common:  No significant stenosis. Right Internal Plaque:  No significant plaque formation. Right Internal Carotid Stenosis (% by NASCET Criteria):  0% Left Common:  No significant stenosis. Left Internal Carotid Plaque:  No significant plaque formation. Left Internal Carotid Stenosis (% by NASCET Criteria):  0% Cervical Vertebral Arteries: Patency:  Bilateral Dominance:  Left     Patent intracranial and extracranial circulation. XR CHEST   7/1/2022  EXAMINATION: XR CHEST PORTABLE CLINICAL HISTORY: FEVER COMPARISONS: JANUARY 29, 2018 FINDINGS: Osseous structures are intact. Cardiopericardial silhouette is normal. Pulmonary vasculature is normal. Right lung is clear. Patchy area of increased opacity left upper lung. LEFT UPPER LUNG ATELECTASIS/PNEUMONIA. US ABDOMEN   7/5/2022     Liver: Hepatic echogenicity is within normal limits without intrahepatic biliary dilatation. Gallbladder: The gallbladder was normally distended without stones, sludge, or wall thickening. The common duct measures up to  3   mm. Pancreas: Was not optimally visualized due to bowel and gas shadowing, but the visualized portion did not demonstrate any gross pathology.      NEGATIVE RIGHT UPPER QUADRANT ULTRASOUND WITHOUT EVIDENCE OF CHOLELITHIASIS. US DUP LOWER EXTREMITY RIGHT ROBERT 7/3/2022 : Evaluation of the right lower extremity veins from the thigh to the knee shows normal phasic flow, normal augmentation of the Doppler signal, and normal compression of the deep veins. There is no sonographic evidence for acute deep venous thrombosis from the right groin to the popliteal region. There is no sonographic evidence for acute deep venous thrombosis in the right calf veins. No acute DVT of the right lower extremity veins from the groin to the knee. No acute DVT of the right calf veins. MRA NECK 7/2/2022   Visualized brain: Grossly unchanged from the recent MRI of the brain with multiple areas of restricted diffusion. Please refer to the recent MRI brain report. INTRACRANIAL MRA:    The visualized distal vertebral and basilar arteries are widely patent. The distal ICAs are patent and within normal limits of caliber. The proximal ACAs, MCAs and PCAs are patent and within normal limits of caliber and configuration. There is no evidence of focal,  significant stenosis or aneurysm in the visualized vessels. EXTRACRANIAL MRA: Carotid Stenosis: Right Common:  No significant stenosis. Right Internal Plaque:  No significant plaque formation. Right Internal Carotid Stenosis (% by NASCET Criteria):  0% Left Common:  No significant stenosis. Left Internal Carotid Plaque:  No significant plaque formation. Left Internal Carotid Stenosis (% by NASCET Criteria):  0% Cervical Vertebral Arteries: Patency:  Bilateral Dominance:  Left     Patent intracranial and extracranial circulation. MRI BRAIN  7/4/2022  EXAM: MRI of the brain without and with contrast History: Hypoxic ischemic encephalopathy.  Technique: Multiplanar multisequence MRI of the brain was performed without and with contrast. Comparison: MRI of the brain July 1, 2022 Findings: Mildly increased edema in the areas of diffusion restriction within the bilateral globi pallidi, splenium of the corpus callosum, and supratentorial white since prior MRI July 1, 2022. No mass effect or midline shift. No acute intracranial hemorrhage. No enhancing mass or pathologic enhancement. Mildly increased edema in the areas of diffusion restriction within the bilateral globi pallidi, splenium of the corpus callosum, and supratentorial white since prior MRI July 1, 2022. MRI BRAIN   7/1/2022   Areas of restricted diffusion within the globus pallidus, supratentorial white matter and splenium of the corpus callosum are most consistent with carbon monoxide poisoning/hypoxemia and/or other toxic encephalopathy. There is no intracranial hemorrhage, mass effect, midline shift, extra-axial collection, significant cerebral volume loss or other complication identified. FINDINGS CONSISTENT WITH CARBON MONOXIDE POISONING/HYPOXEMIA AND/OR OTHER TOXIC ENCEPHALOPATHY. NO INTRACRANIAL HEMORRHAGE OR COMPLICATION IDENTIFIED. MRI CERVICAL THORACIC LUMBAR SPINE  7/7/2022   Motion artifact mildly limits some sequences. There is no central spinal stenosis, neural foraminal narrowing, significant disc herniations, compression fractures, subluxation, or epidural fluid collections identified. NEGATIVE MILDLY LIMITED CERVICAL, THORACIC AND LUMBAR SPINE MRI. Previous extensive, complex labs, notes and diagnostics reviewed and analyzed. ALLERGIES:    Allergies as of 07/11/2022 - Fully Reviewed 07/11/2022   Allergen Reaction Noted    Peanut-containing drug products Anaphylaxis 12/06/2018    Nuts [macadamia nut oil] Angioedema 07/01/2022    Peanut oil Other (See Comments) 02/02/2015    Shellfish-derived products Itching 07/01/2022      (please also verify by checking [facility])       Complex Physical Medicine & Rehab Issues Assess & Plan:   1.  Severe abnormality of gait and mobility and impaired self-care and ADL's secondary to progressive hypoxic encephalopathy and rhabdomyolysis status post overdose. Functional and medical status reassessed regarding patients ability to participate in therapies and patient found to be able to participate in acute intensive comprehensive inpatient rehabilitation program including PT/OT to improve balance, ambulation, ADLs, and to improve the P/AROM. Therapeutic modifications regarding activities in therapies, place, amount of time per day and intensity of therapy made daily. In bed therapies or bedside therapies prn.   2. Bowel and Bladder dysfunction  , Neurogenic bowel and bladder:  frequent toileting, ambulate to bathroom with assistance, check post void residuals. Check for C.difficile x1 if >2 loose stools in 24 hours, continue bowel & bladder program.  Monitor bowel and bladder function. Lactinex 2 PO every AC. MOM prn, Brown Bomb prn, Glycerin suppository prn, enema prn. Encourage therapy and nursing to co-treat and problem solve re continence. 3. Severe RLE pain as well as generalized OA pain: reassess pain every shift and prior to and after each therapy session, give prn Tylenol and consider scheduled Tylenol, modalities prn in therapy, masage, Lidoderm, K-pad prn. Consider scheduled AM pain meds. 4. Skin healing   and breakdown risk:  continue pressure relief program.  Daily skin exams and reports from nursing. 5. Fatigue due to nutritional and hydration deficiency: Add and titrate vitamin B12 vitamin D and CoQ10 continue to monitor I&Os, calorie counts prn, dietary consult prn. Add healthy snack at night. 6. Acute episodic insomnia with situational adjustment disorder:  prn Ambien, monitor for day time sedation. 7. Falls risk elevated:  patient to use call light to get nursing assistance to get up, bed and chair alarm. 8. Elevated DVT risk: progressive activities in PT, continue prophylaxis CHAR hose, elevation and Lovenox with goal to discontinue over the next few days.   9. Complex discharge planning:  Weekly team meeting every daily, vitamin B12, encourage participation in rehabilitation support group and recreational therapy, adjust/add medications ( b Lexapro, Desyrel, Xanax and taper to lowest effective dose) Taper Deltasone  11. LPRD (laryngopharyngeal reflux disease)-consult speech and language pathology  12. Shift work sleep disorder-patient may need to adjust his therapy times. Focus of today's plan-  Initiate and modify therapuetic plan to meet patients individual needs, add rest breaks as needed, and transition off of Xanax -however patient will also need a 12-step program or he risks going back to buying it on the street with accidental overdose.     Electronically signed by Ct Woo DO on 7/13/22 at 8:35 AM EDT       Lloyd Fields D.O., PM&R     Attending    [facility]

## 2022-07-13 NOTE — PROGRESS NOTES
Physical Therapy Rehab Treatment Note  Fac[facility]/Department: [facility]  [facility]       NAME: Evelyne Phelps  : 1990 (33 y.o.)  MRN: [id number]  CODE STATUS: Full Code    Date of Service: 2022       Restrictions:  Restrictions/Precautions: Fall Risk       SUBJECTIVE:   Subjective: \"where's Alphonse Jacinto? \"    Pain  Pain: mild right foot pain not rated      OBJECTIVE:   Orientation  Overall Orientation Status: Within Functional Limits  Orientation Level: Oriented to place;Oriented to time;Oriented to person  Cognition  Overall Cognitive Status: Exceptions  Arousal/Alertness: Delayed responses to stimuli  Following Commands: Follows one step commands consistently  Attention Span: Attends with cues to redirect         Bed Mobility Training  Bed Mobility Training: Yes  Overall Level of Assistance: Modified independent  Interventions: Safety awareness training  Rolling: Independent  Supine to Sit: Modified independent  Sit to Supine: Modified independent  Scooting: Modified independent    Transfer Training  Transfer Training: Yes  Overall Level of Assistance: Supervision  Interventions: Safety awareness training  Sit to Stand: Supervision;Stand-by assistance  Stand to Sit: Supervision;Stand-by assistance  Stand Pivot Transfers: Stand-by assistance  Transfers  Surface:  To chair with arms  Sit to Stand  Assistance Level: Stand by assist;Supervision  Stand to Sit  Assistance Level: Stand by assist  Bed To/From Chair  Assistance Level: Stand by assist    Gait Training: Yes  Overall Level of Assistance: Stand-by assistance  Distance (ft): 250 Feet  Assistive Device: Other (comment) (no device)  Interventions: Safety awareness training  Base of Support: Widened  Speed/Radha: Fluctuations  Gait Abnormalities: Altered arm swing  Rail Use: Both  Right Side Weight Bearing: As tolerated  Left Side Weight Bearing: As tolerated  Uneven Terrain - Level of Assistance: Stand-by assistance;Contact-guard assistance    Stairs - Level of Assistance: Stand-by assistance  Number of Stairs Trained: 24    Wheelchair Management  Wheelchair Management: No    Neuromuscular Education  NDT Treatment: Gait   Neuromuscular Comments: 4 inch number block. alternating step up min assist. dribbling ball while ambulating around room. carrying ball and going up/down stairs. overhead flexion, rotation. stepping around objects/over objects. Supine: manual resistance to both legs. Pt with increased pain in right leg with adduction movement. ASSESSMENT/PROGRESS TOWARDS GOALS: progressing. Goals:  Short Term Goals  Short term goal 1: Indep HEP for symptom management  Short term goal 2: Indep to supervision bed mobility  Short term goal 3: Indep to supervision transfers sit to stand and bed to chair  Short term goal 4: Amb with approp device 100 ft safe with supervision  Short term goal 5: Improve LE strength 1/2 grade to assist with above goals.     PLAN OF CARE/Safety: ongoing         Therapy Time:   Individual   Time In 1400   Time Out 1500   Minutes 60     Minutes:60  Transfer/Bed mobility training:10  Gait trainin  Neuro re education:20  Therapeutic ex:Marjorie Jones PTA, 22 at 3:49 PM

## 2022-07-13 NOTE — PROGRESS NOTES
Physical Therapy  Facility/Department: [facility] [id number]  Physical Therapy Discharge      NAME: Jean Hernandez    : 1990 (33 y.o.)  MRN: [id number]    Account: [de-identified]  Gender: male      Patient has been discharged from acute care hospital. DC patient from current PT program.      Electronically signed by Tiffani Whitman PT on 22 at 4:17 PM EDT

## 2022-07-13 NOTE — CARE COORDINATION
[address] [place] [address] NOTE  Room: R242/R242-01  Admit Date: 2022       Date: 2022  Patient Name: Abiel Auguste        MRN: [id number]    : 1990  (33 y.o.)  Gender: male        REHAB DIAGNOSIS:   Diagnosis: Impaired mobility and ADL's due to hypoxic encephalopathy. [facility] admit 22    CO MORBIDITIES:      Past Medical History:   Diagnosis Date    ADHD (attention deficit hyperactivity disorder)     was initiated on treatment by Dr. Warner Hill. any testing was done here by Dr. Warner Hill, no formal psych eval.      Anxiety     [facility] abuse ([facility]     MDD (major depressive disorder), recurrent episode, moderate ([facility] 2019    Neuropathy of right peroneal nerve 2022     Past Surgical History:   Procedure Laterality Date    TONSILLECTOMY AND ADENOIDECTOMY      WISDOM TOOTH EXTRACTION          Restrictions  Restrictions/Precautions: Fall Risk  CASE MANAGEMENT    Social/Functional History  Social/Functional History  Lives With: Alone  Type of Home: Apartment  Home Layout: One level  Home Access: Level entry  Bathroom Shower/Tub: Tub/Shower unit,Curtain  Bathroom Toilet: Standard  Home Equipment:  (NA)  Has the patient had two or more falls in the past year or any fall with injury in the past year?: No  ADL Assistance: [facility] [address]: Independent  Homemaking Responsibilities: Yes  Ambulation Assistance: Independent  Transfer Assistance: Independent  Active : Yes  Education: bachelors - public health. Was also a case-worker  Occupation: Full time employment  Type of Occupation: [employer] work  Leisure & Hobbies: golf, football  IADL Comments: Laundry is in patients unit. Additional Comments: Independent PTA- no medical equipment.  Parents live 10 mins away from patient       Pts personal preferences: goes by Veezeon    Pts assets/resources/support system: mother, father, sister    COVERAGE INFORMATION:Payor: Juwan Sousa / Level: Independent (07/13/22 1214)  Scooting  Assistance Level: Independent (07/12/22 1613)  Transfers:  Transfers  Sit to Stand: Stand by assistance;Contact guard assistance (07/12/22 1156)  Stand to sit: Stand by assistance;Contact guard assistance (07/12/22 1156)  Bed to Chair: Stand by assistance;Contact guard assistance (07/12/22 1156)  Car Transfer: Stand by assistance;Contact guard assistance (07/12/22 1156)  Comment: Varied performance. Pt with increased time to approach chair. Safe technique however inaccurate execution at times. 5XSTS = 10.7sec. (07/12/22 1156)  Transfer Training  Transfer Training: Yes (07/13/22 1539)  Overall Level of Assistance: Supervision (07/13/22 1539)  Interventions: Safety awareness training (07/13/22 1539)  Sit to Stand: Supervision;Stand-by assistance (07/13/22 1539)  Stand to Sit: Supervision;Stand-by assistance (07/13/22 1539)  Stand Pivot Transfers: Stand-by assistance (07/13/22 1539)  Transfers  Surface: To chair with arms (07/13/22 1412)  Sit to Stand  Assistance Level: Stand by assist;Supervision (07/13/22 1412)  Stand to Sit  Assistance Level: Stand by assist (07/13/22 1412)  Skilled Clinical Factors: Occasionally unstable to approach to chair (07/13/22 1214)  Bed To/From Chair  Assistance Level: Stand by assist (07/13/22 1412)  Gait:   Ambulation  Surface: level tile (07/12/22 1241)  Device: No Device (07/12/22 1241)  Assistance: Stand by assistance;Contact guard assistance (07/12/22 1241)  Quality of Gait: Decreased speed and step length. Challenged with turns and obstacles. (07/12/22 1241)  Distance: 150ft; 75ft; multiple small distances within gym focusing on obstacles and turns. Gaze stabilization introduced.  (07/12/22 1241)  Gait Training: Yes (07/13/22 1539)  Overall Level of Assistance: Stand-by assistance (07/13/22 1539)  Distance (ft): 250 Feet (07/13/22 1539)  Assistive Device: Other (comment) (no device) (07/13/22 6891)  Interventions: Safety awareness training (07/13/22 1539)  Base of Support: Widened (07/13/22 1539)  Speed/Radha: Fluctuations (07/13/22 1539)  Gait Abnormalities: Altered arm swing (07/13/22 1539)  Rail Use: Both (07/13/22 1539)  Right Side Weight Bearing: As tolerated (07/13/22 1539)  Left Side Weight Bearing: As tolerated (07/13/22 1539)  Uneven Terrain - Level of Assistance: Stand-by assistance;Contact-guard assistance (07/13/22 1539)  Ambulation  Surface: Level surface; Uneven surface; Carpet; Ramp (07/13/22 1215)  Distance: 275', 150' (07/13/22 1215)  Activity: Within Room (07/13/22 1215)  Activity Comments: Reciprocal pattern mild antalgia wide negotiation of turns. Cues for negotiation around obstacles.  left reports R foot pain with gait training attempted gait training with shoes donned with mild decrease in pain (07/13/22 1215)  Stairs:  Stairs/Curb  Stairs?: Yes (07/12/22 1241)  Stairs  # Steps : 4 (07/12/22 1241)  Rails: Right ascending (07/12/22 1241)  Assistance: Stand by assistance (07/12/22 1241)  Comment: Step to pattern (07/12/22 1241)  Stairs - Level of Assistance: Stand-by assistance (07/13/22 1539)  Number of Stairs Trained: 24 (07/13/22 1539)  Stairs  Stair Height: 6'' (07/13/22 1215)  Device: One handrail;Bilateral handrails (07/13/22 1215)  Number of Stairs: 12 (07/13/22 1215)  Additional Factors: Verbal cues (07/13/22 1215)  Assistance Level: Stand by assist (07/13/22 1215)  Skilled Clinical Factors: Unstable with negotiation of stairs utilizing single hand rail improved with B handrails (07/13/22 1215)  W/C mobility:  Wheelchair Management  Wheelchair Management: No (07/13/22 1539)  LTG:  Long term goal 1: Pt to complete bed mobility indep  Long term goal 2: Pt to complete transfers with indep (bed/chair/car)  Long term goal 3: Pt to ambulate >300ft indoor/outdoor without AD indep  Long term goal 4: Pt to achieve 20/24 DGI  Long term goal 5: Pt to complete 5XSTS in 8sec  PT Treatment Time:  1.5 hrs      OCCUPATIONAL THERAPY    EVALUATION SELF CARE STATUS:  Hand Dominance: Right  Feeding: Setup (07/12/22 1018)  Grooming: Modified independent  (07/12/22 1018)  Grooming Skilled Clinical Factors: oral hygiene, comb hair (07/12/22 1018)  UE Bathing: Supervision (07/12/22 1018)  UE Bathing Skilled Clinical Factors: cues to attend to all areas, wash hair, thoroughness (07/12/22 1018)  LE Bathing: Minimal assistance (07/12/22 1018)  LE Bathing Skilled Clinical Factors: assist to reach RLE and for posterior hygiene (07/12/22 1018)  UE Dressing Skilled Clinical Factors: gown only (07/12/22 1018)  LE Dressing: Minimal assistance (07/12/22 1018)  LE Dressing Skilled Clinical Factors: assist with R sock, pt declined to don pants/bref (07/12/22 1018)  Toileting: Minimal assistance (07/12/22 1018)  Additional Comments: Shower completed.  Pt impulsive at times (07/12/22 1018)             CURRENT SELF CARE:  Grooming/Oral Hygiene  Assistance Level: Modified independent (07/13/22 0901)  Upper Extremity Bathing  Assistance Level: Modified independent (07/13/22 0901)  Lower Extremity Bathing  Assistance Level: Minimal assistance (07/13/22 0901)  Skilled Clinical Factors: assist to reach posterior region for hygiene (07/13/22 0901)  Upper Extremity Dressing  Assistance Level: Modified independent (07/13/22 0901)  Lower Extremity Dressing  Assistance Level: Minimal assistance (07/13/22 0901)  Skilled Clinical Factors: assist with RLE (07/13/22 0901)  Putting On/Taking Off Footwear  Assistance Level: Minimal assistance (07/13/22 0901)  Skilled Clinical Factors: assist with RLE (07/13/22 0901)  Toileting  Assistance Level: Supervision (07/13/22 1325)  Skilled Clinical Factors: NT (07/13/22 0901)  Tub/Shower Transfers  Type: Shower (07/13/22 0901)  Transfer From:  (ambulating without AD) (07/13/22 0901)  Assistance Level: Contact guard assist (07/13/22 0901)        LTG:  Eating  Assistance Needed: Setup or clean-up assistance  CARE Score: 5  Discharge Goal: Independent, Oral Hygiene  Assistance Needed: Independent  CARE Score: 6  Discharge Goal: Independent, [address] needed: Partial/moderate assistance  CARE Score: 3  Discharge Goal: Independent, Shower/Bathe Self  Assistance Needed: Partial/moderate assistance  CARE Score: 3  Discharge Goal: Independent  Upper Body Dressing  Reason if not Attempted: Not attempted due to environmental limitations  CARE Score: 10  Discharge Goal: Independent, Lower Body Dressing  Assistance Needed: Partial/moderate assistance  CARE Score: 3  Discharge Goal: Independent, Putting On/Taking Off Footwear  Assistance Needed: Partial/moderate assistance  CARE Score: 3  Discharge Goal: Independent, Toilet Transfer  Assistance needed: Supervision or touching assistance  CARE Score: 4  Discharge Goal: Independent  OT Treatment Time: 1.5 hrs      SPEECH THERAPY       Comprehension: Within Functional Limits  Verbal Expression:  (Moderate high level naming deficits with divergent naming secondary to impaired thought organization)      Diet/Swallow:        Dysphagia Outcome Severity Scale: Level 6: Within functional limits/Modified independence    Compensatory Swallowing Strategies : Small bites/sips,Upright as possible for all oral intake,Alternate solids and liquids         LTG:  Long-term Goals  Timeframe for Long-term Goals: 2-3 weeks for LOS or until goals met  Goal 1: Pt will demonstrate functional cognitive-linguistic abilities in all opportunities with modified independence in order to safely complete ADLs.              COGNITION  OT:    SP:        RECREATIONAL THERAPY  Attendance to recreational therapy programs:    []  Pet Therapy  [] Music Therapy  [] Art Therapy    [] Recreation Therapy Group [] Support Group           Patient social interaction (mood, participation): good, flat affect    Patient strengths: independent prior, good family support    Patients goal: discharge to a drug tx center    Problems/Barriers: lost apartment, some cognitive deficits--ST recommending 24/7 supervision upon discharge, impulsive, decreased balance at times, fatigues quickly, Drug [facility] ([facility]) declined him due to being unable to test for Kratom         1. Safety:          - Intervention / Plan:    [x]  falls protocol     [x]  PT/OT    [x]  SP        - Results:         2. Potential DME needs:         - Intervention / Plan:  [x]  PT/OT     [x]  Assess equipment needs/access       - Results:         3. Weakness:          - Intervention / Plan:  [x]  PT/OT      []  Other:         - Results:         4. Discharge planning needs:          - Intervention / Plan:  [x]  Weekly team conference      [x]  family training        - Results:         5.            - Intervention / Plan:          - Results:         6.            - Intervention / Plan:         - Results:         7.            - Intervention / Plan:         - Results:           Discharge Plan   Estimated Length of Stay: 10 days    Tentative Discharge date: 7/20/22      Anticipated Discharge Destination:  drug treatment center/rehab      Team recommendations:    1. Follow up Therapy :    PT  OT  SLP  RN    2. Home Health    Other:     Equipment needed at Discharge:  Other: TBD      Team Members Present at Conference:    Physician: Dr. Janeth Walton  : Jasmin Rosa, RN  : LENNY Granados, LSW  RN: Newton Mejia, RN  Physical Therapist: Dorcas Pierre PT  Occupational Therapist: Gilson Cedillo OTR  Speech Therapist: Orion Jones, SLP  Nurse Manager: Andrew Riley, RN     Electronically signed by LENNY Granados, LSW on 7/14/2022 at 8:47 AM

## 2022-07-13 NOTE — PLAN OF CARE
RN  Outcome: Progressing  7/12/2022 1204 by Sachi Ramos RN  Outcome: Progressing     Problem: Skin/Tissue Integrity - Adult  Goal: Skin integrity remains intact  7/12/2022 2254 by Vidya Henry RN  Outcome: Progressing  7/12/2022 1204 by Sachi Ramos RN  Outcome: Progressing  Flowsheets  Taken 7/12/2022 1203 by Sachi Ramos RN  Skin Integrity Remains Intact: Monitor for areas of redness and/or skin breakdown  Taken 7/12/2022 0203 by Vidya Henry RN  Skin Integrity Remains Intact: Monitor for areas of redness and/or skin breakdown     Problem: Musculoskeletal - Adult  Goal: Return mobility to safest level of function  7/12/2022 2254 by Vidya Henry RN  Outcome: Progressing  7/12/2022 1204 by Sachi Ramos RN  Outcome: Progressing     Problem: Gastrointestinal - Adult  Goal: Maintains adequate nutritional intake  7/12/2022 2254 by Vidya Henry RN  Outcome: Progressing  7/12/2022 1204 by Sachi Ramos RN  Outcome: Progressing     Problem: Genitourinary - Adult  Goal: Absence of urinary retention  7/12/2022 2254 by Vidya Henry RN  Outcome: Progressing  7/12/2022 1204 by Sachi Ramos RN  Outcome: Progressing     Problem: Infection - Adult  Goal: Absence of infection at discharge  7/12/2022 2254 by Vidya Henry RN  Outcome: Progressing  7/12/2022 1204 by Sachi Ramos RN  Outcome: Progressing     Problem: Metabolic/Fluid and Electrolytes - Adult  Goal: Electrolytes maintained within normal limits  7/12/2022 2254 by Vidya Henry RN  Outcome: Progressing  7/12/2022 1204 by Sachi Ramos RN  Outcome: Progressing     Problem: Hematologic - Adult  Goal: Maintains hematologic stability  7/12/2022 2254 by Vidya Henry RN  Outcome: Progressing  7/12/2022 1204 by Sachi Ramos RN  Outcome: Progressing     Problem: Physical Therapy - Adult  Goal: By Discharge: Performs mobility at highest level of function for planned discharge setting. See evaluation for individualized goals.   7/12/2022 1301 by Conrado Woodson, PT  Outcome: Progressing

## 2022-07-13 NOTE — CONSULTS
[address] NOTE     Consult note on 7/8 7/13/2022     Patient was seen and examined in person, Chart reviewed   Patient's case discussed with staff/team    Chief Complaint:   Follow up for depression     Interim History:     Patient stated he will want go to inpatient rehab at [facility]. He denies SI with an intent or plan. Denies Auditory hallucinations and paranoid ideations. He is agreeable to increase lexapro to 20 mg to improve mood. LSW confirmed with pt that he would like to discharge to [facility] from rehab and gave permission for LSW to fax over a referral. LSW faxed over 93 pages to Gloria Sanches at [facility] via [phone]. Electronically signed by LENNY Pete, ANNALISA on 7/13/2022 at 1:47 PM      Appetite:   [x] Normal/Unchanged  [] Increased  [] Decreased      Sleep:       [x] Normal/Unchanged  [] Fair       [] Poor              Energy:    [x] Normal/Unchanged  [] Increased  [] Decreased        SI [] Present  [x] Absent    HI  []Present  [x] Absent     Aggression:  [] yes  [x] no    Patient is [x] able  [] unable to CONTRACT FOR SAFETY     PAST MEDICAL/PSYCHIATRIC HISTORY:   Past Medical History:   Diagnosis Date    ADHD (attention deficit hyperactivity disorder)     was initiated on treatment by Dr. Mary Adkins.   any testing was done here by Dr. Mary Adkins, no formal psych eval.      Anxiety     Drug abuse ([facility]     MDD (major depressive disorder), recurrent episode, moderate ([address] 6/4/2019    Neuropathy of right peroneal nerve 7/12/2022       FAMILY/SOCIAL HISTORY:  Family History   Problem Relation Age of Onset    Depression Mother     Heart Disease Father     High Blood Pressure Father     High Cholesterol Father      Social History     Socioeconomic History    Marital status: Single     Spouse name: Not on file    Number of children: Not on file    Years of education: Not on file    Highest education level: Not on file   Occupational History    Not on file   Tobacco Use    Smoking status: Never Smoker    Smokeless tobacco: Never Used   Substance and Sexual Activity    Alcohol use: Yes     Comment: occasional    Drug use: No    Sexual activity: Not on file   Other Topics Concern    Not on file   Social History Narrative    Lives With: Alone in [address]: One level    Home Access: Level entry    Bathroom Toilet: Standard    Has the patient had two or more falls in the past year or any fall with injury in the past year?: No    ADL Assistance: [address]: Independent    Homemaking Responsibilities: Yes    Ambulation Assistance: Independent    Transfer Assistance: Independent    Active : Yes    Type of Occupation: [employer] work    Additional Comments: Independent PTA- no medical equipment         Social Determinants of Health     Financial Resource Strain: Low Risk     Difficulty of Paying Living Expenses: Not hard at all   Food Insecurity: No Food Insecurity    Worried About Running Out of Food in the Last Year: Never true    [address] in the Last Year: Never true   Transportation Needs:     Lack of Transportation (Medical): Not on file    Lack of Transportation (Non-Medical):  Not on file   Physical Activity:     Days of Exercise per Week: Not on file    Minutes of Exercise per Session: Not on file   Stress:     Feeling of Stress : Not on file   Social Connections:     Frequency of Communication with Friends and Family: Not on file    Frequency of Social Gatherings with Friends and Family: Not on file    Attends [employer] Services: Not on file    Active Member of Clubs or Organizations: Not on file    Attends Club or Organization Meetings: Not on file    Marital Status: Not on file   Intimate Partner Violence:     Fear of Current or Ex-Partner: Not on file    Emotionally Abused: Not on file    Physically Abused: Not on file  Sexually Abused: Not on file   Housing Stability:     Unable to Pay for Housing in the Last Year: Not on file    Number of Places Lived in the Last Year: Not on file    Unstable Housing in the Last Year: Not on file           ROS:  [x] All negative/unchanged except if checked.  Explain positive(checked items) below:  [] Constitutional  [] Eyes  [] Ear/Nose/Mouth/Throat  [] Respiratory  [] CV  [] GI  []   [] Musculoskeletal  [] Skin/Breast  [] Neurological  [] Endocrine  [] Heme/Lymph  [] Allergic/Immunologic    Explanation:     MEDICATIONS:    Current Facility-Administered Medications:     Vitamin D (CHOLECALCIFEROL) tablet 2,000 Units, 2,000 Units, Oral, Dinner, Agueda Js, DO, 2,000 Units at 07/12/22 1650    cyanocobalamin injection 1,000 mcg, 1,000 mcg, IntraMUSCular, Weekly, Ligia Scullin, DO, 1,000 mcg at 07/12/22 1226    coenzyme Q10 capsule 100 mg, 100 mg, Oral, Daily, Ligia Scullin, DO, 100 mg at 07/13/22 2335    lidocaine 4 % external patch 3 patch, 3 patch, TransDERmal, Daily, Ligia Scullin, DO    acetaminophen (TYLENOL) tablet 650 mg, 650 mg, Oral, Q4H PRN, Ligia Scullin, DO, 650 mg at 07/12/22 1225    bisacodyl (DULCOLAX) suppository 10 mg, 10 mg, Rectal, Daily PRN, Agueda Js, DO    fleet rectal enema 1 enema, 1 enema, Rectal, Daily PRN, Ligia Scullin, DO    predniSONE (DELTASONE) tablet 20 mg, 20 mg, Oral, Daily, 20 mg at 07/13/22 0821 **FOLLOWED BY** [START ON 7/15/2022] predniSONE (DELTASONE) tablet 15 mg, 15 mg, Oral, Daily **FOLLOWED BY** [START ON 7/18/2022] predniSONE (DELTASONE) tablet 10 mg, 10 mg, Oral, Daily **FOLLOWED BY** [START ON 7/21/2022] predniSONE (DELTASONE) tablet 5 mg, 5 mg, Oral, Daily, Jacob Iyer MD    ALPRAZolam [facility]) tablet 0.5 mg, 0.5 mg, Oral, TID PRN, Jacob Iyer MD, 0.5 mg at 07/13/22 0821    escitalopram (LEXAPRO) tablet 10 mg, 10 mg, Oral, Daily, Jacob Iyer MD, 10 mg at 07/13/22 0821    melatonin tablet 3 mg, 3 mg, Oral, Nightly PRN, Jordan Forrest MD, 3 mg at 07/11/22 2120    traZODone (DESYREL) tablet 50 mg, 50 mg, Oral, Nightly, Jordan Forrest MD, 50 mg at 07/12/22 2013    0.9 % sodium chloride infusion, , IntraVENous, PRN, Katy Freedman MD    enoxaparin (LOVENOX) injection 40 mg, 40 mg, SubCUTAneous, Daily, Katy Freedman MD, 40 mg at 07/13/22 0820    ondansetron (ZOFRAN-ODT) disintegrating tablet 4 mg, 4 mg, Oral, Q8H PRN **OR** ondansetron (ZOFRAN) injection 4 mg, 4 mg, IntraVENous, Q6H PRN, Katy Freedman MD    sodium chloride flush 0.9 % injection 5-40 mL, 5-40 mL, IntraVENous, 2 times per day, Katy Freedman MD, 10 mL at 07/12/22 2129    sodium chloride flush 0.9 % injection 5-40 mL, 5-40 mL, IntraVENous, PRN, Katy Freedman MD, 10 mL at 07/12/22 2012    amoxicillin-clavulanate (AUGMENTIN) 875-125 MG per tablet 1 tablet, 1 tablet, Oral, 2 times per day, Katy Freedman MD, 1 tablet at 07/13/22 0821    ibuprofen (ADVIL;MOTRIN) tablet 400 mg, 400 mg, Oral, Q6H PRN, Katy Freedman MD, 400 mg at 07/11/22 1820    polyethylene glycol (GLYCOLAX) packet 17 g, 17 g, Oral, Daily, Katy Freedman MD, 17 g at 07/13/22 9376      Examination:  /81   Pulse (!) 103   Temp 97.5 °F (36.4 °C) (Oral)   Resp 14   SpO2 98%   Gait - not tested   Medication side effects(SE): none                Mental Status Examination:    Level of consciousness:  within normal limits   Appearance:   In bed resting   Behavior/Motor:  no abnormalities noted  Attitude toward examiner:  cooperative  Speech:  normal rate and normal volume   Mood: euthymic  Affect:  mood congruent  Thought processes:  linear and goal directed   Thought content: Denies Homocidal ideation at others  Suicidal Ideation:  denies suicidal ideation, without plan, and without intent  Perceptual Disturbance:  denies any perceptual disturbance  Cognition:  oriented to person, place, and time   Concentration intact  Insight fair   Judgement fair ASSESSMENT:   Patient symptoms are:  [x] Well controlled with lexapro 20 mg   [] Improving  [] Worsening  [] No change      Diagnosis:   Principal Problem:     impaired mobility and ADLs dt encephalopathy   Active Problems:    Drug abuse (HCC)    Altered mental status    Vitamin D deficiency    Spasm of back muscles    Neuropathy of right peroneal nerve    Lesion of right sciatic nerve    Impaired mobility and ADLs    Hypoxic encephalopathy (HCC)    ADHD (attention deficit hyperactivity disorder)    Anxiety    ROSALES (generalized anxiety disorder)    LPRD (laryngopharyngeal reflux disease)    MDD (major depressive disorder), recurrent episode, moderate ([facility])    Polydrug dependence including opioid type drug with continuous use with complication ([facility]    Shift work sleep disorder  Resolved Problems:    * No resolved hospital problems. *      LABS:    Recent Labs     07/11/22  0514   WBC 22.9*   HGB 12.6*   *     Recent Labs     07/11/22  0514      K 4.0      CO2 26   BUN 12   CREATININE 0.56*   GLUCOSE 118*     Recent Labs     07/11/22  0514 07/11/22  2119 07/12/22  0520   BILITOT 0.3 <0.2 0.4   ALKPHOS 81 85 78   AST 28 28 24   * 116* 106*     Lab Results   Component Value Date/Time    LABAMPH POSITIVE 06/30/2022 04:00 PM    BARBSCNU Neg 06/30/2022 04:00 PM    LABBENZ POSITIVE 06/30/2022 04:00 PM    LABMETH Neg 06/30/2022 04:00 PM    OPIATESCREENURINE Neg 06/30/2022 04:00 PM    PHENCYCLIDINESCREENURINE Neg 06/30/2022 04:00 PM    ETOH <10 06/30/2022 04:00 PM     Lab Results   Component Value Date/Time    TSH 0.944 07/08/2019 08:00 PM     No results found for: LITHIUM  No results found for: VALPROATE, CBMZ    RISK ASSESSMENT:   Currently denies SI with an intent or plan    Treatment Plan:  Reviewed current Medications with the patient.    Increase Lexapro to 20 mg for depression  Patient is agreeable to go to [facility]  Patient does not meet criteria for inpatient psychiatry    LSW confirmed with pt that he would like to discharge to [facility] from rehab and gave permission for LSW to fax over a referral. [facility] faxed over 93 pages to Adele Maya at [facility] via [phone]. Electronically signed by LENNY Hoang, LSW on 7/13/2022 at 1:47 PM    Risks, benefits, side effects, drug-to-drug interactions and alternatives to treatment were discussed.          Electronically signed by Leslie White MD on 7/13/2022 at 3:42 PM

## 2022-07-14 LAB — TOTAL CK: 330 U/L (ref 0–190)

## 2022-07-14 PROCEDURE — 6370000000 HC RX 637 (ALT 250 FOR IP): Performed by: FAMILY MEDICINE

## 2022-07-14 PROCEDURE — 36415 COLL VENOUS BLD VENIPUNCTURE: CPT

## 2022-07-14 PROCEDURE — 1180000000 HC REHAB R&B

## 2022-07-14 PROCEDURE — 97130 THER IVNTJ EA ADDL 15 MIN: CPT

## 2022-07-14 PROCEDURE — 82550 ASSAY OF CK (CPK): CPT

## 2022-07-14 PROCEDURE — 97530 THERAPEUTIC ACTIVITIES: CPT

## 2022-07-14 PROCEDURE — 97129 THER IVNTJ 1ST 15 MIN: CPT

## 2022-07-14 PROCEDURE — 97535 SELF CARE MNGMENT TRAINING: CPT

## 2022-07-14 PROCEDURE — 6360000002 HC RX W HCPCS: Performed by: INTERNAL MEDICINE

## 2022-07-14 PROCEDURE — 97116 GAIT TRAINING THERAPY: CPT

## 2022-07-14 PROCEDURE — 97112 NEUROMUSCULAR REEDUCATION: CPT

## 2022-07-14 PROCEDURE — 99233 SBSQ HOSP IP/OBS HIGH 50: CPT | Performed by: PHYSICAL MEDICINE & REHABILITATION

## 2022-07-14 PROCEDURE — 6370000000 HC RX 637 (ALT 250 FOR IP): Performed by: PHYSICAL MEDICINE & REHABILITATION

## 2022-07-14 RX ORDER — ESCITALOPRAM OXALATE 20 MG/1
20 TABLET ORAL DAILY
Status: DISCONTINUED | OUTPATIENT
Start: 2022-07-14 | End: 2022-07-25 | Stop reason: HOSPADM

## 2022-07-14 RX ORDER — ZOLPIDEM TARTRATE 5 MG/1
10 TABLET ORAL NIGHTLY
Status: DISCONTINUED | OUTPATIENT
Start: 2022-07-14 | End: 2022-07-25 | Stop reason: HOSPADM

## 2022-07-14 RX ORDER — ACETAMINOPHEN 325 MG/1
325 TABLET ORAL EVERY 4 HOURS PRN
Status: DISCONTINUED | OUTPATIENT
Start: 2022-07-14 | End: 2022-07-20

## 2022-07-14 RX ORDER — POLYETHYLENE GLYCOL 3350 17 G/17G
17 POWDER, FOR SOLUTION ORAL DAILY PRN
Status: DISCONTINUED | OUTPATIENT
Start: 2022-07-14 | End: 2022-07-20

## 2022-07-14 RX ORDER — LIDOCAINE 4 G/G
3 PATCH TOPICAL DAILY PRN
Status: DISCONTINUED | OUTPATIENT
Start: 2022-07-14 | End: 2022-07-25 | Stop reason: HOSPADM

## 2022-07-14 RX ADMIN — ALPRAZOLAM 0.5 MG: 0.25 TABLET ORAL at 03:36

## 2022-07-14 RX ADMIN — ZOLPIDEM TARTRATE 10 MG: 5 TABLET ORAL at 20:07

## 2022-07-14 RX ADMIN — PREDNISONE 20 MG: 20 TABLET ORAL at 09:25

## 2022-07-14 RX ADMIN — ENOXAPARIN SODIUM 40 MG: 100 INJECTION SUBCUTANEOUS at 09:25

## 2022-07-14 RX ADMIN — ESCITALOPRAM OXALATE 20 MG: 20 TABLET ORAL at 09:25

## 2022-07-14 RX ADMIN — TRAZODONE HYDROCHLORIDE 50 MG: 50 TABLET ORAL at 20:07

## 2022-07-14 RX ADMIN — Medication 100 MG: at 09:25

## 2022-07-14 RX ADMIN — Medication 2000 UNITS: at 17:19

## 2022-07-14 RX ADMIN — ALPRAZOLAM 0.5 MG: 0.25 TABLET ORAL at 15:40

## 2022-07-14 NOTE — CARE COORDINATION
Spoke with the patient and his parents who are at bedside about the upcoming DC date. His DC is planned for 7/20/2022. 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 The patient and his parents want an inpatient facility after rehab for drug addiction. Will see who can accept for Kratom addiction. Explained to his parents that speech has said the patient needs 24 hour supervision. His mother will take FMLA to care for him after drug rehab but has concern about when the patient will be independent again cognitively.  The parents have agreed to come for training tomorrow 7/15/2022 at 1pm.   Electronically signed by Gabby Mcginnis RN on 7/14/22 at 3:01 PM EDT

## 2022-07-14 NOTE — PROGRESS NOTES
accuracy. Patient appeared to give up quickly when the task became to difficult. Encouragement was provided. Treatment/Activity Tolerance:  Patient tolerated treatment well    Plan:  Continue per POC    Pain Assessment:  Patient does not c/o pain. Pain Re-assessment:  Patient does not c/o pain. Patient/Caregiver Education:  Patient educated on session and progression towards goals. Safety Devices:  Chair alarm in place      Speech Therapy Level of Assistance Scale    AUDITORY COMPREHENSION  Rating:  Modified Independent-Supervised Assistance    VERBAL EXPRESSION  Rating:  Minimal Assistance    PROBLEM SOLVING  Rating:  Moderate Assistance    MEMORY  Rating:  Supervised Assistance-Minimal Assistance          Therapy Time  SLP Individual Minutes  Time In: 3335  Time Out: 1100  Minutes: 30              Signature: Electronically signed by SOPHIE Yang on 7/14/2022 at 11:43 AM

## 2022-07-14 NOTE — PROGRESS NOTES
INDIVIDUALIZED OVERALL REHAB PLAN OF CARE  ADDENDUM TO REHAB PROGRESS NOTE-for audit purposes must also refer to this day's clinical note and combine the information      Date: 2022  Patient Name: Minerva Mortimer   Room: Z931/M227-04    MRN: [id number]    : 1990  (33 y.o.)  Gender: male       Today 2022 during weekly team meeting, I reviewed the patient Minerva Mortimer in detail with the therapists and nurses involved in patient's care gathering complex physiatric data regarding current medical issues, progress in therapies, factors limiting progress, social issues, psychological issues, ongoing therapeutic plans and discharge planning. Legend:  I= independent Im =Modified independent  S=Supervised SB=stand by WONG=set up CG=contact horacio Min= minimal Mod=Moderate Max=maximal Max of 2 =maximal assist of 2 people      CURRENT FUNCTIONAL STATUS:    Barriers to progress and discharge complex medical conditions and complex social situations      NURSING ISSUES:    Addicted previously to opio ds--now --in past 5 years taking Kratom--and buying Xanax on the streets. Patient is resting in bed with no c/o pain or discomfort noted at this time.  used ADD meds and Klonopin in the past.  Now off Augmentin. To limit Tylenol dosing dt liver disease. Nursing will continue to focus on bowel and bladder continence transitioning toward independence by time of discharge. Monitoring post void residuals monitoring for severe constipation and bowel obstruction.     Bowel function- continent/normal  Plans to address- scheduled voids     Bladder function-  continent    Plans to address- schedule voids before bed and therapy     Skin deficits- dressing changes   Plans to address- continue to monitor closely for infection     Hydration/Nutritional deficits- monitoring for dysphagia  Plans to address-Push PO, assist with feeds as needed     Low BP- continue to monitor Ortho BPs  Plans to address- check ortho BPs Safety awareness training (07/13/22 1539)  Sit to Stand: Supervision;Stand-by assistance (07/13/22 1539)  Stand to Sit: Supervision;Stand-by assistance (07/13/22 1539)  Stand Pivot Transfers: Stand-by assistance (07/13/22 1539)  Transfers  Surface: To chair with arms (07/13/22 1412)  Sit to Stand  Assistance Level: Stand by assist;Supervision (07/13/22 1412)  Stand to Sit  Assistance Level: Stand by assist (07/13/22 1412)  Skilled Clinical Factors: Occasionally unstable to approach to chair (07/13/22 1214)  Bed To/From Chair  Assistance Level: Stand by assist (07/13/22 1412)  Gait:   Ambulation  Surface: level tile (07/12/22 1241)  Device: No Device (07/12/22 1241)  Assistance: Stand by assistance;Contact guard assistance (07/12/22 1241)  Quality of Gait: Decreased speed and step length. Challenged with turns and obstacles. (07/12/22 1241)  Distance: 150ft; 75ft; multiple small distances within gym focusing on obstacles and turns. Gaze stabilization introduced. (07/12/22 1241)  Gait Training: Yes (07/13/22 1539)  Overall Level of Assistance: Stand-by assistance (07/13/22 1539)  Distance (ft): 250 Feet (07/13/22 1539)  Assistive Device: Other (comment) (no device) (07/13/22 1539)  Interventions: Safety awareness training (07/13/22 1539)  Base of Support: Widened (07/13/22 1539)  Speed/Radha: Fluctuations (07/13/22 1539)  Gait Abnormalities: Altered arm swing (07/13/22 1539)  Rail Use: Both (07/13/22 1539)  Right Side Weight Bearing: As tolerated (07/13/22 1539)  Left Side Weight Bearing: As tolerated (07/13/22 1539)  Uneven Terrain - Level of Assistance: Stand-by assistance;Contact-guard assistance (07/13/22 1539)  Ambulation  Surface: Level surface; Uneven surface; Carpet; Ramp (07/13/22 1215)  Distance: 275', 150' (07/13/22 1215)  Activity: Within Room (07/13/22 1215)  Activity Comments: Reciprocal pattern mild antalgia wide negotiation of turns. Cues for negotiation around obstacles.  left reports R foot pain with gait training attempted gait training with shoes donned with mild decrease in pain (07/13/22 1215)  Stairs:  Stairs/Curb  Stairs?: Yes (07/12/22 1241)  Stairs  # Steps : 4 (07/12/22 1241)  Rails: Right ascending (07/12/22 1241)  Assistance: Stand by assistance (07/12/22 1241)  Comment: Step to pattern (07/12/22 1241)  Stairs - Level of Assistance: Stand-by assistance (07/13/22 1539)  Number of Stairs Trained: 24 (07/13/22 1539)  Stairs  Stair Height: 6'' (07/13/22 1215)  Device: One handrail;Bilateral handrails (07/13/22 1215)  Number of Stairs: 12 (07/13/22 1215)  Additional Factors: Verbal cues (07/13/22 1215)  Assistance Level: Stand by assist (07/13/22 1215)  Skilled Clinical Factors: Unstable with negotiation of stairs utilizing single hand rail improved with B handrails (07/13/22 1215)  W/C mobility:  Wheelchair Management  Wheelchair Management: No (07/13/22 1539)        Pt and Family training goals-  Focus on sequencing and endurance        OCCUPATIONAL THERAPY  Grooming/Oral Hygiene  Assistance Level: Modified independent (07/13/22 0901)  Upper Extremity Bathing  Assistance Level: Modified independent (07/13/22 0901)  Lower Extremity Bathing  Assistance Level: Minimal assistance (07/13/22 0901)  Skilled Clinical Factors: assist to reach posterior region for hygiene (07/13/22 0901)  Upper Extremity Dressing  Assistance Level: Modified independent (07/13/22 0901)  Lower Extremity Dressing  Assistance Level: Minimal assistance (07/13/22 0901)  Skilled Clinical Factors: assist with RLE (07/13/22 0901)  Putting On/Taking Off Footwear  Assistance Level: Minimal assistance (07/13/22 0901)  Skilled Clinical Factors: assist with RLE (07/13/22 0901)  Toileting  Assistance Level: Supervision (07/13/22 1325)  Skilled Clinical Factors: NT (07/13/22 0901)  Tub/Shower Transfers  Type: Shower (07/13/22 0901)  Transfer From:  (ambulating without AD) (07/13/22 0901)  Assistance Level: Contact guard assist (07/13/22 0901)      Plans for addressing ADL deficits affecting: Focus on sequencing.         Bladder function disrupting functional progress- continent      Plans to address- coordinate scheduled voids before bed and therapy with nursing     Skin deficits- skin tears   Plans to address- coordinate patient dressing changes education with nursing as part of ADL training                SPEECH THERAPY/SLP     Comprehension: Within Functional Limits  Verbal Expression:  (Moderate high level naming deficits with divergent naming secondary to impaired thought organization)    Plans for cognitive and communication issues affecting addressing:   Pt and Family training goals-  teach patient family memory strategies      Diet/Swallow:        Dysphagia Outcome Severity Scale: Level 6: Within functional limits/Modified independence    Compensatory Swallowing Strategies : Small bites/sips,Upright as possible for all oral intake,Alternate solids and liquids             COGNITION  OT:    SP:        Social History     Socioeconomic History    Marital status: Single     Spouse name: Not on file    Number of children: Not on file    Years of education: Not on file    Highest education level: Not on file   Occupational History    Not on file   Tobacco Use    Smoking status: Never Smoker    Smokeless tobacco: Never Used   Substance and Sexual Activity    Alcohol use: Yes     Comment: occasional    Drug use: No    Sexual activity: Not on file   Other Topics Concern    Not on file   Social History Narrative    Lives With: Alone in [address]: One level    Home Access: Level entry    Bathroom Toilet: Standard    Has the patient had two or more falls in the past year or any fall with injury in the past year?: No    ADL Assistance: [place]: Independent    Homemaking Responsibilities: Yes    Ambulation Assistance: Independent    Transfer Assistance: Independent    Active : Yes    Type of Occupation: [employer] work    Additional Comments: Independent PTA- no medical equipment         Social Determinants of Health     Financial Resource Strain: Low Risk     Difficulty of Paying Living Expenses: Not hard at all   Food Insecurity: No Food Insecurity    Worried About Running Out of Food in the Last Year: Never true    [address] in the Last Year: Never true   Transportation Needs:     Lack of Transportation (Medical): Not on file    Lack of Transportation (Non-Medical): Not on file   Physical Activity:     Days of Exercise per Week: Not on file    Minutes of Exercise per Session: Not on file   Stress:     Feeling of Stress : Not on file   Social Connections:     Frequency of Communication with Friends and Family: Not on file    Frequency of Social Gatherings with Friends and Family: Not on file    Attends [employer] Services: Not on file    Active Member of [address] [place] [address] [employer] or Organizations: Not on file    Attends Club or Organization Meetings: Not on file    Marital Status: Not on file   Intimate Partner Violence:     Fear of Current or Ex-Partner: Not on file    Emotionally Abused: Not on file    Physically Abused: Not on file    Sexually Abused: Not on file   Housing Stability:     Unable to Pay for Housing in the Last Year: Not on file    Number of [place] in the Last Year: Not on file    Unstable Housing in the Last Year: Not on file           THERAPY, MEDICAL AND NURSING COORDINATION:    [x]  Pain medication before therapies     [x]  Check orthostatic BP and monitor heart rate and medications effects with therapy      [x]  Ambulate to the bathroom in room    [x]  Add scheduled rest beaks     [x]  In room therapies as needed      Discharge date set for:              7/20/22      Home with: To drug rehab if accepted and then home with mother  with help from   mother            And:      Home Health Care:     [x]  PT    [x]  OT    [x]  ST   [x]  Aide   []  SW [x]  RN               Or preferably     Outpatient Therapy:  []  PT    []  OT    []  ST   []  Rehab Psych                 Equipment:  WW      At D/C their function is goaled at:   PT:Long term goal 1: Pt to complete bed mobility indep  Long term goal 2: Pt to complete transfers with indep (bed/chair/car)  Long term goal 3: Pt to ambulate >300ft indoor/outdoor without AD indep  Long term goal 4: Pt to achieve 20/24 DGI  Long term goal 5: Pt to complete 5XSTS in [address] Needed: Setup or clean-up assistance  CARE Score: 5  Discharge Goal: Independent, Oral Hygiene  Assistance Needed: Independent  CARE Score: 6  Discharge Goal: Independent, [address] needed: Partial/moderate assistance  CARE Score: 3  Discharge Goal: Independent, Shower/Bathe Self  Assistance Needed: Partial/moderate assistance  CARE Score: 3  Discharge Goal: Independent  Upper Body Dressing  Reason if not Attempted: Not attempted due to environmental limitations  CARE Score: 10  Discharge Goal: Independent, Lower Body Dressing  Assistance Needed: Partial/moderate assistance  CARE Score: 3  Discharge Goal: Independent, Putting On/Taking Off Footwear  Assistance Needed: Partial/moderate assistance  CARE Score: 3  Discharge Goal: Independent, Toilet Transfer  Assistance needed: Supervision or touching assistance  CARE Score: 4  Discharge Goal: Independent  SP:Long-term Goals  Timeframe for Long-term Goals: 2-3 weeks for LOS or until goals met  Goal 1: Pt will demonstrate functional cognitive-linguistic abilities in all opportunities with modified independence in order to safely complete ADLs.               From a cognitive standpoint they will need:        24 hr supervision  --progress to occasional           Significant problems/ barriers to functional progress include: Pt is at a high risk for functional loss,      [x]  Acute infection/UTI       [x]  poor endurance    [x]  Severe pain exacerbation--RLE     [x] Impaired mental status        Plan to correct barriers to functional progress: Add scheduled rest breaks, CoQ 10 and Vit B 12, control pain by using ice Lidoderm rest and massage as well as pain medications prior to therapy. Spread therapy of 15 hours out over a 7 day window to accommodate rest breaks and medical interventions. Patient seems to be making fair to good response to these interventions. Based on a comprehensive evaluation of the above, the individualized therapy and Discharge plan will be:    -Times stated are an average that will be varied based on the patient's daily need. PT    1 1/2  hrs/day 5-7 days per week      OT    1 1/2 hrs per day 5-7 days per week     ST     1/2    hrs /day 3-5 days per week       Estimated LOS 1- 2 week(s)    - Overall functional prognosis:     [x]  Good    []  Fair    []  Poor -Medical Prognosis:   [x]  Good    []  Fair    []  Poor    This patient was made aware of the discussion of Plan of Care, their projected dicharge date and their projected function at discharge.          Kenya Roebrts, DO

## 2022-07-14 NOTE — PROGRESS NOTES
Per secure conversation w/Dr. Jamar Goyal regarding medication Xanax, Xanax will be D/C at pt scheduled D/C. No changes are planned at this time, other than to not continue this medication for home use.  Electronically signed by Elijah Villafana RN on 7/14/2022 at 5:13 PM

## 2022-07-14 NOTE — PROGRESS NOTES
Subjective: The patient complains of severe acute on chronic progressive fatigue and ataxia, cognitive slowing, right lower extremity numbness and stiffness partially relieved by rest, medications, PT,  OT, medication titration SLP and rest and exacerbated by recent OD-he was admitted to [facility] on 6/30/2022  with change in mental status and history of possible overdose.  Family did a well check after his boss noticed that he had not shown up to work for several days and called the family out of concern. [place] was initially admitted and to the ICU. [place] had abnormal liver enzymes, rhabdomyolysis kidney injury.  Urine was positive for amphetamines benzos and fentanyl.  He was found to also be using Royle kratom overuse, transaminitis, EDGAR, rhabdomyolysis resulting in provoked seizure and cerebral edema. He admits that he took 2 Xanax that he bought on the street. Fernando and I both fear that it may have been laced with fentanyl.  Discussed with treatment team and hospitalist and neuro--  MRIs spine were normal..      I am concerned about patients medical complexities and barriers to advancing in rehab goals including right lower extremity stiffness secondary to sciatic neuropathy and nerve injury as well as his history of substance abuse and overuse of benzodiazepines. I will consult psychiatry for benzodiazepine taper and management. .        I reviewed current care and plans for further care with other rehab providers including nursing and case management. According to recent nursing note, \" Patient is resting in bed with no c/o pain or discomfort noted at this time. \"... 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 Psychiatry and neurology will work with tapering Xanax as a taper the Deltasone. They are trying to avoid any type of withdrawal and/or seizure activity. Still having a hard time sleeping we discussed possibly switching Xanax to Klonopin and or titrating over to Ambien. I will reconsult psychiatry regarding this taper and or pharmacy. I am hoping also to transition off daily blood draws. ROS x10: The patient also complains of severely impaired mobility and activities of daily living. Otherwise no new problems with vision, hearing, nose, mouth, throat, dermal, cardiovascular, GI, , pulmonary, musculoskeletal, psychiatric or neurological. See also Acute Rehab PM&R H&P. Vital signs:  BP (!) 132/90   Pulse (!) 106   Temp 98.4 °F (36.9 °C)   Resp 16   Wt 160 lb 12.8 oz (72.9 kg)   SpO2 99%   BMI 23.07 kg/m²   I/O:   PO/Intake:  fair PO intake,  Adult reg  Diet thin liquids    Bowel:   continent   Bladder: continent   No need for schwartz  General:  Patient is well developed,   adequately nourished, and    well kempt. HEENT:    Pupils equal, hearing intact to loud voice, external inspection of ear and nose benign. Inspection of lips, tongue and gums benign  -cognitive slowing right lower extremity weakness and numbness. Musculoskeletal: No significant change in strength or tone. All joints stable. Inspection and palpation of digits and nails show no clubbing, cyanosis or inflammatory conditions. Neuro/Psychiatric: Affect: flat but pleasant. Alert and oriented to person, place and situation with   min cues. No significant change in deep tendon reflexes or sensation  Lungs:  Diminished, CTA-B. Respiration effort is   normal at rest.     Heart:   S1 = S2,   RRR. Abdomen:  Soft, non-tender, no enlargement of liver or spleen. Extremities:    lower extremity edema  -weakness right lower extremity decreased sciatic neuropathy.   Skin:   Intact to general survey,      Rehabilitation:  Physical Therapy:   Bed mobility:  Bed mobility  Rolling to Left: Modified independent (07/12/22 1155)  Rolling to Right: Modified independent (07/12/22 1155)  Supine to Sit: Modified independent;Supervision (07/12/22 1155)  Sit to Supine: Modified independent;Supervision (07/12/22 1155)  Bed Mobility Comments: Mildly impulsive. (07/12/22 1155)  Bed Mobility Training  Bed Mobility Training: Yes (07/13/22 1539)  Overall Level of Assistance: Modified independent (07/13/22 1539)  Interventions: Safety awareness training (07/13/22 1539)  Rolling: Independent (07/13/22 1539)  Supine to Sit: Modified independent (07/13/22 1539)  Sit to Supine: Modified independent (07/13/22 1539)  Scooting: Modified independent (07/13/22 1539)  Roll Left  Assistance Level: Independent (07/12/22 1613)  Roll Right  Assistance Level: Independent (07/12/22 1613)  Sit to Supine  Assistance Level: Independent (07/13/22 1214)  Supine to Sit  Assistance Level: Independent (07/13/22 1214)  Scooting  Assistance Level: Independent (07/12/22 1613)  Transfers:  Transfers  Sit to Stand: Stand by assistance;Contact guard assistance (07/12/22 1156)  Stand to sit: Stand by assistance;Contact guard assistance (07/12/22 1156)  Bed to Chair: Stand by assistance;Contact guard assistance (07/12/22 1156)  Car Transfer: Stand by assistance;Contact guard assistance (07/12/22 1156)  Comment: Varied performance. Pt with increased time to approach chair. Safe technique however inaccurate execution at times. 5XSTS = 10.7sec. (07/12/22 1156)  Transfer Training  Transfer Training: Yes (07/13/22 1539)  Overall Level of Assistance: Supervision (07/13/22 1539)  Interventions: Safety awareness training (07/13/22 1539)  Sit to Stand: Supervision;Stand-by assistance (07/13/22 1539)  Stand to Sit: Supervision;Stand-by assistance (07/13/22 1539)  Stand Pivot Transfers: Stand-by assistance (07/13/22 1539)  Transfers  Surface:  To chair with arms (07/13/22 1412)  Sit to Stand  Assistance Level: Stand by assist;Supervision (07/13/22 1274)  Stand to Sit  Assistance Level: Stand by assist (07/13/22 1412)  Skilled Clinical Factors: Occasionally unstable to approach to chair (07/13/22 1214)  Bed To/From Chair  Assistance Level: Stand by assist (07/13/22 1412)  Gait:   Ambulation  Surface: level tile (07/12/22 1241)  Device: No Device (07/12/22 1241)  Assistance: Stand by assistance;Contact guard assistance (07/12/22 1241)  Quality of Gait: Decreased speed and step length. Challenged with turns and obstacles. (07/12/22 1241)  Distance: 150ft; 75ft; multiple small distances within gym focusing on obstacles and turns. Gaze stabilization introduced. (07/12/22 1241)  Gait Training: Yes (07/13/22 1539)  Overall Level of Assistance: Stand-by assistance (07/13/22 1539)  Distance (ft): 250 Feet (07/13/22 1539)  Assistive Device: Other (comment) (no device) (07/13/22 1539)  Interventions: Safety awareness training (07/13/22 1539)  Base of Support: Widened (07/13/22 1539)  Speed/Radha: Fluctuations (07/13/22 1539)  Gait Abnormalities: Altered arm swing (07/13/22 1539)  Rail Use: Both (07/13/22 1539)  Right Side Weight Bearing: As tolerated (07/13/22 1539)  Left Side Weight Bearing: As tolerated (07/13/22 1539)  Uneven Terrain - Level of Assistance: Stand-by assistance;Contact-guard assistance (07/13/22 1539)  Ambulation  Surface: Level surface; Uneven surface; Carpet; Ramp (07/13/22 1215)  Distance: 275', 150' (07/13/22 1215)  Activity: Within Room (07/13/22 1215)  Activity Comments: Reciprocal pattern mild antalgia wide negotiation of turns. Cues for negotiation around obstacles.  left reports R foot pain with gait training attempted gait training with shoes donned with mild decrease in pain (07/13/22 1215)  Stairs:  Stairs/Curb  Stairs?: Yes (07/12/22 1241)  Stairs  # Steps : 4 (07/12/22 1241)  Rails: Right ascending (07/12/22 1241)  Assistance: Stand by assistance (07/12/22 1241)  Comment: Step to pattern (07/12/22 1241)  Stairs - Level of Assistance: Stand-by assistance (07/13/22 1539)  Number of Stairs Trained: 24 (07/13/22 1539)  Stairs  Stair Height: 6'' (07/13/22 1215)  Device: One handrail;Bilateral handrails (07/13/22 1215)  Number of Stairs: 12 (07/13/22 1215)  Additional Factors: Verbal cues (07/13/22 1215)  Assistance Level: Stand by assist (07/13/22 1215)  Skilled Clinical Factors: Unstable with negotiation of stairs utilizing single hand rail improved with B handrails (07/13/22 1215)  W/C mobility:  Wheelchair Management  Wheelchair Management: No (07/13/22 1539)    Occupational Therapy:   Hand Dominance: Right  ADL  Feeding: Setup (07/12/22 1018)  Grooming: Modified independent  (07/12/22 1018)  Grooming Skilled Clinical Factors: oral hygiene, comb hair (07/12/22 1018)  UE Bathing: Supervision (07/12/22 1018)  UE Bathing Skilled Clinical Factors: cues to attend to all areas, wash hair, thoroughness (07/12/22 1018)  LE Bathing: Minimal assistance (07/12/22 1018)  LE Bathing Skilled Clinical Factors: assist to reach RLE and for posterior hygiene (07/12/22 1018)  UE Dressing Skilled Clinical Factors: gown only (07/12/22 1018)  LE Dressing: Minimal assistance (07/12/22 1018)  LE Dressing Skilled Clinical Factors: assist with R sock, pt declined to don pants/bref (07/12/22 1018)  Toileting: Minimal assistance (07/12/22 1018)  Additional Comments: Shower completed. Pt impulsive at times (07/12/22 1018)  Toilet Transfers  Toilet - Technique: Ambulating (07/12/22 1022)  Equipment Used: Grab bars (07/12/22 1022)  Toilet Transfer: Contact guard assistance (07/12/22 1022)     Shower Transfers  Shower - Transfer From: [place] (07/12/22 1022)  Shower - Transfer Type: To and From (07/12/22 1022)  Shower - Transfer To:  Shower seat with back (07/12/22 1022)  Shower - Technique: Stand pivot (07/12/22 1022)  Shower Transfers: Contact Guard (07/12/22 1022)    Speech Therapy:      Comprehension: Within Functional Limits  Verbal Expression:  (Moderate high level naming deficits with divergent naming secondary to impaired thought organization)  Diet/Swallow:        Dysphagia Outcome Severity Scale: Level 6: Within functional limits/Modified independence  Compensatory Swallowing Strategies : Small bites/sips,Upright as possible for all oral intake,Alternate solids and liquids          Lab/X-ray studies reviewed, analyzed and discussed with patient and staff:   Recent Results (from the past 24 hour(s))   CK    Collection Time: 07/14/22  5:06 AM   Result Value Ref Range    Total  (H) 0 - 190 U/L     EEG was normal.    XR CHEST   7/4/2022: Suboptimal inspiration. The cardiomediastinal silhouette is within normal limits. No significant interval change of patchy left upper lobe and left lower lobe opacities. No pneumothorax or pleural effusion. No acute osseous abnormality. No significant interval change of left upper lobe and left lower lobe infiltrate and/or atelectasis. XR HIP RIGHT   7/6/2022  EXAMINATION: RIGHT HIP  CLINICAL HISTORY: Pain, fall COMPARISON: None  FINDINGS: Two views are  submitted. No acute fractures. No dislocations. No focal bony abnormalities                                                                                   NO ACUTE FRACTURE. CT Head   6/30/2022 Extra-axial spaces:  Normal. Intracranial hemorrhage:  None. Ventricular system: [Negative. Basal Cisterns:  Without anomaly. Cerebral Parenchyma: Bilateral, symmetric areas of decreased attenuation exerting no mass effect measuring approximately 9 mm in size since found within the bilateral globus pallidus (series 2, image 17). Midline Shift:  None. Cerebellum:  No anomaly identified. Paranasal sinuses and mastoid air cells:  No anomaly identified. Visualized Orbits:  Negative. Impression: Round symmetric areas decreased attenuation bilateral globus pallidus. This finding may be seen in patients experiencing hypoxia, i.e., carbon monoxide poisoning.           MRA HEAD : 7/2/2022    Visualized brain: Grossly unchanged from the recent MRI of the brain with multiple areas of restricted diffusion. Please refer to the recent MRI brain report. INTRACRANIAL MRA:    The visualized distal vertebral and basilar arteries are widely patent. The distal ICAs are patent and within normal limits of caliber. The proximal ACAs, MCAs and PCAs are patent and within normal limits of caliber and configuration. There is no evidence of focal,  significant stenosis or aneurysm in the visualized vessels. EXTRACRANIAL MRA: Carotid Stenosis: Right Common:  No significant stenosis. Right Internal Plaque:  No significant plaque formation. Right Internal Carotid Stenosis (% by NASCET Criteria):  0% Left Common:  No significant stenosis. Left Internal Carotid Plaque:  No significant plaque formation. Left Internal Carotid Stenosis (% by NASCET Criteria):  0% Cervical Vertebral Arteries: Patency:  Bilateral Dominance:  Left     Patent intracranial and extracranial circulation. XR CHEST   7/1/2022: Osseous structures are intact. Cardiopericardial silhouette is normal. Pulmonary vasculature is normal. Right lung is clear. Patchy area of increased opacity left upper lung. LEFT UPPER LUNG ATELECTASIS/PNEUMONIA. US ABDOMEN   7/5/2022    NEGATIVE RIGHT UPPER QUADRANT ULTRASOUND WITHOUT EVIDENCE OF CHOLELITHIASIS. US DUP LOWER EXTREMITY RIGHT ROBERT 7/3/2022 : Evaluation of the right lower extremity veins from the thigh to the knee shows normal phasic flow, normal augmentation of the Doppler signal, and normal compression of the deep veins. There is no sonographic evidence for acute deep venous thrombosis from the right groin to the popliteal region. There is no sonographic evidence for acute deep venous thrombosis in the right calf veins. No acute DVT of the right lower extremity veins from the groin to the knee. No acute DVT of the right calf veins.      MRA NECK 7/2/2022   Visualized brain: Grossly unchanged from the recent MRI of the brain with multiple areas of restricted diffusion. Please refer to the recent MRI brain report. INTRACRANIAL MRA:    The visualized distal vertebral and basilar arteries are widely patent. The distal ICAs are patent and within normal limits of caliber. The proximal ACAs, MCAs and PCAs are patent and within normal limits of caliber and configuration. There is no evidence of focal,  significant stenosis or aneurysm in the visualized vessels. EXTRACRANIAL MRA: Carotid Stenosis: Right Common:  No significant stenosis. Right Internal Plaque:  No significant plaque formation. Right Internal Carotid Stenosis (% by NASCET Criteria):  0% Left Common:  No significant stenosis. Left Internal Carotid Plaque:  No significant plaque formation. Left Internal Carotid Stenosis (% by NASCET Criteria):  0% Cervical Vertebral Arteries: Patency:  Bilateral Dominance:  Left     Patent intracranial and extracranial circulation. MRI BRAIN  7/4/2022: Mildly increased edema in the areas of diffusion restriction within the bilateral globi pallidi, splenium of the corpus callosum, and supratentorial white since prior MRI July 1, 2022. No mass effect or midline shift. No acute intracranial hemorrhage. No enhancing mass or pathologic enhancement. Mildly increased edema in the areas of diffusion restriction within the bilateral globi pallidi, splenium of the corpus callosum, and supratentorial white since prior MRI July 1, 2022. MRI BRAIN   7/1/2022   Areas of restricted diffusion within the globus pallidus, supratentorial white matter and splenium of the corpus callosum are most consistent with carbon monoxide poisoning/hypoxemia and/or other toxic encephalopathy. There is no intracranial hemorrhage, mass effect, midline shift, extra-axial collection, significant cerebral volume loss or other complication identified. FINDINGS CONSISTENT WITH CARBON MONOXIDE POISONING/HYPOXEMIA AND/OR OTHER TOXIC ENCEPHALOPATHY. NO INTRACRANIAL HEMORRHAGE OR COMPLICATION IDENTIFIED. MRI CERVICAL THORACIC LUMBAR SPINE  7/7/2022   Motion artifact mildly limits some sequences. There is no central spinal stenosis, neural foraminal narrowing, significant disc herniations, compression fractures, subluxation, or epidural fluid collections identified. NEGATIVE MILDLY LIMITED CERVICAL, THORACIC AND LUMBAR SPINE MRI. Previous extensive, complex labs, notes and diagnostics reviewed and analyzed. ALLERGIES:    Allergies as of 07/11/2022 - Fully Reviewed 07/11/2022   Allergen Reaction Noted    Peanut-containing drug products Anaphylaxis 12/06/2018    Nuts [macadamia nut oil] Angioedema 07/01/2022    Peanut oil Other (See Comments) 02/02/2015    Shellfish-derived products Itching 07/01/2022      (please also verify by checking MAR)     Today I evaluated this patient for periodic reassessment of medical and functional status. The patient was discussed in detail at the treatment team meeting focusing on current medical issues, progress in therapies, social issues, psychological issues, barriers to progress and strategies to address these barriers, and discharge planning. See the addendum to rehab progress note-as a second progress note in the chart. The patient continues to be high risk for future disability and their medical and rehabilitation prognosis continue to be good and therefore, we will continue the patient's rehabilitation course as planned. The patient's tentative discharge date was set. Patient and family education was discussed. The patient was made aware of the team discussion regarding their progress. Complex Physical Medicine & Rehab Issues Assess & Plan:   1.  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 Functional and medical status reassessed regarding patients ability to participate in therapies and patient found to be able to participate in acute intensive comprehensive inpatient rehabilitation program including PT/OT to improve balance, ambulation, ADLs, and to improve the P/AROM. Therapeutic modifications regarding activities in therapies, place, amount of time per day and intensity of therapy made daily. In bed therapies or bedside therapies prn.   2. Bowel and Bladder dysfunction  , Neurogenic bowel and bladder:  frequent toileting, ambulate to bathroom with assistance, check post void residuals. Check for C.difficile x1 if >2 loose stools in 24 hours, continue bowel & bladder program.  Monitor bowel and bladder function. Lactinex 2 PO every AC. MOM prn, Brown Bomb prn, Glycerin suppository prn, enema prn. Encourage therapy and nursing to co-treat and problem solve re continence. 3. Severe RLE pain as well as generalized OA pain: reassess pain every shift and prior to and after each therapy session, give prn Tylenol and consider scheduled Tylenol, modalities prn in therapy, masage, Lidoderm, K-pad prn. Consider scheduled AM pain meds. 4. Skin healing   and breakdown risk:  continue pressure relief program.  Daily skin exams and reports from nursing. 5. Fatigue due to nutritional and hydration deficiency: Add and titrate vitamin B12 vitamin D and CoQ10 continue to monitor I&Os, calorie counts prn, dietary consult prn. Add healthy snack at night. 6. Acute episodic insomnia with situational adjustment disorder:  prn Ambien, monitor for day time sedation. 7. Falls risk elevated:  patient to use call light to get nursing assistance to get up, bed and chair alarm. 8. Elevated DVT risk: progressive activities in PT, continue prophylaxis CHAR hose, elevation and Lovenox with goal to discontinue over the next few days.   9. Complex discharge planning:  Weekly team meeting every episode, moderate -emotional support provided daily, vitamin B12, encourage participation in rehabilitation support group and recreational therapy, adjust/add medications ( b Lexapro, Desyrel, Xanax and taper to lowest effective dose) Taper Deltasone-and taper Xanax under the direction of neurology and psychiatry. 11. LPRD (laryngopharyngeal reflux disease)-consult speech and language pathology  12. Shift work sleep disorder-patient may need to adjust his therapy times. Focus of today's plan-  Initiate and modify therapuetic plan to meet patients individual needs, add rest breaks as needed, and transition off of Xanax -however patient will also need a 12-step program or he risks going back to buying it on the street with accidental overdose. Transition off of benzodiazepines if okay with neurology.     Electronically signed by Payam Aviles DO on 7/13/22 at 8:35 AM KATHY Alves D.O., PM&R     Attending    [address]

## 2022-07-14 NOTE — PROGRESS NOTES
Physical Therapy Rehab Treatment Note  Facility/Department: [facility]  Room: R242R242-01       NAME: Gopi Navarro  : 1990 (33 y.o.)  MRN: [id number]  CODE STATUS: Full Code    Date of Service: 2022     Restrictions:  Restrictions/Precautions: Fall Risk    SUBJECTIVE:   Subjective: \"Okay\"    Pain  Pain: 0/10 pain pre and post session    OBJECTIVE:   Roll Left  Assistance Level: Independent  Roll Right  Assistance Level: Independent  Sit to Supine  Assistance Level: Independent  Supine to Sit  Assistance Level: Independent  Scooting  Assistance Level: Independent    Sit to Stand  Assistance Level: Supervision  Stand to Sit  Assistance Level: Supervision  Bed To/From Chair  Assistance Level: Supervision    Ambulation  Surface: Level surface; Uneven surface; Carpet; Ramp  Distance: 300', 275'  Activity: Within Room  Activity Comments: Reciprocal pattern mild antalgia, improved negotiation of turns  Assistance Level: Supervision    Stairs  Stair Height: 6''  Device: One handrail;Bilateral handrails  Number of Stairs: 12  Additional Factors: Verbal cues  Assistance Level: Supervision  Skilled Clinical Factors: Improved stability on stairs, sequencing varies step to step    Neuromuscular Education  Neuromuscular Education: Yes  NDT Treatment: Gait ;Standing  Facilitation techniques: Gait training: obstacle course with dribbling basketball  Neuromuscular Comments: Gait training: Obstacle course with dribbling ball while negotiating obstacles and counting x 3 trials with no LOB, Standing on non compliant surface while dribbling ball, and standing catching football promoting reaching OBOS and across midline with mild delay in reaction time    ASSESSMENT/PROGRESS TOWARDS GOALS:   Assessment  Assessment: Patient with improved ability to multitask this a.m. with obstacle course trainig with cognitive tasks. Patient with no LOB this session  Discharge Recommendations: Outpatient PT; Home

## 2022-07-14 NOTE — CARE COORDINATION
LSW called [facility] and left a voicemail requesting a peer support referral. Electronically signed by LENNY Cherry, ANNALISA on 7/14/2022 at 3:04 PM

## 2022-07-14 NOTE — CARE COORDINATION
Spoke with Dilcia from [facility] and he will set up a [facility] counselor to reach out to the patient and will assist with recommending other inpatient programs for addiction that may take patients for Kratom abuse and that have impaired cognition.   Electronically signed by Fox Perry RN on 7/14/22 at 3:18 PM EDT

## 2022-07-14 NOTE — PROGRESS NOTES
OCCUPATIONAL THERAPY  INPATIENT REHAB TREATMENT NOTE  [facility]      NAME: Saida Onofre  : 1990 (33 y.o.)  MRN: [id number]  CODE STATUS: Full Code  Room: Y510/C740-69    Date of Service: 2022    Referring Physician: Dr. Jan Galvan Diagnosis: Imp ADLs d/t hypoxic encephalopathy s/p overdose likely secondary to fentanyl laced xanax pill    Restrictions  Restrictions/Precautions  Restrictions/Precautions: Fall Risk       Patient's date of birth confirmed: Yes    SAFETY:  Safety Devices  Type of devices: All fall risk precautions in place    SUBJECTIVE:  Subjective: I didnt sleep good  Pain: no pain- stiffness in the R leg    OBJECTIVE:    Toileting:  Pt SUP for transfer and clothing management but requesting assistance for hygiene reported he was not able to clean self thoroughly. Hygiene assessed and pt noted to be clean s/p bowel movement. Pt w/ some perseveration on this topic and asked OT to check again. Problem solving:    Completed standing activity focusing on following steps/sequencing, problem solving, and attention. Pt required to follow a list of steps detailing how to go about planting 6 different types of plants into small plastic cups and then labeling them. Pt needing consistent cues to not lose track of what step he was on during the task  Pt also needing cues for problem solving simple dilemmas   Pt with fair overall attention   Increased time needed for completion     While standing, challenged strength, activity tolerance, ROM, and flexibility for improved ADL performance. Challenged pt through usage of 2# to complete BUE exercises. 2 sets x 10 reps completed. Exercises focused on all UE joints and planes of motion including scapular protraction/retraction, shoulder flexion/extension/rotation/horizontal abduction, elbow flexion/extension. Good tolerance of exercises.  fx cues needed for technique      Education:   Cognitive compensation strategies, POC, exercises, fall prevention       ASSESSMENT:   Good participation- cognitive deficits continue to be apparent during higher level tasks       PLAN OF CARE:  Strengthening,Balance training,Functional mobility training,Endurance training,Neuromuscular re-education,Safety education & training,Patient/Caregiver education & training,Equipment evaluation, education, & procurement,Self-Care / ADL,Home management training       Patient goals : \"Get home, get back to life, feel normal\"  Time Frame for Long term goals :  Within 1 weeks, pt to demo progress in the following areas listed below to achieve specific LTGs as stated in the evaluation  Long Term Goal 1: Pt will demo improved overall ADL/IADL status  Long Term Goal 2: Pt will demo improved standing balance and tolerance for self-care completion  Long Term Goal 3: Pt will demo improved activity tolerance for completion of ADL/IADL  Long Term Goal 4: Pt will demo improved overall safety and cognition in order to be able to care for himself with minimal assistance    Therapy Time:   Individual Group Co-Treat   Time In 1300       Time Out 1330         Minutes 30         Therapeutic activities: 15 minutes  Cognitive Retraining: 15 minutes     Electronically signed by:    Kevan Nyhan, OT,   7/14/2022, 1:52 PM

## 2022-07-14 NOTE — PROGRESS NOTES
Pt requested medication Miralax and Lidocaine patches be moved to as needed. I updated Dr. Luis Alfredo Werner via phone message.  Electronically signed by Ruby Castillo RN on 7/14/2022 at 10:09 AM

## 2022-07-14 NOTE — PROGRESS NOTES
Physical Therapy Rehab Treatment Note  Facility/Department: [facility]  Room: R242/R242-01       NAME: Kiki Shukla  : 1990 (33 y.o.)  MRN: [id number]  CODE STATUS: Full Code    Date of Service: 2022     Restrictions:  Restrictions/Precautions: Fall Risk     SUBJECTIVE:   Subjective: \"Okay\"    Pain  Pain: 0/10 pain pre and post session    OBJECTIVE:   Roll Left  Assistance Level: Independent  Roll Right  Assistance Level: Independent  Sit to Supine  Assistance Level: Independent  Supine to Sit  Assistance Level: Independent  Scooting  Assistance Level: Independent    Sit to Stand  Assistance Level: Supervision  Stand to Sit  Assistance Level: Supervision  Bed To/From Chair  Assistance Level: Supervision    Ambulation  Surface: Level surface; Uneven surface; Carpet;Ramp; Outdoors  Distance: 1000'+  Activity: Within Room  Activity Comments: Reciprocal pattern mild antalgia, improved negotiation of turns. Gait performed to unfamiliar area in hospital with focus on path finding returning to therapy department. Patient able to return to therapy gym without concern  Assistance Level: Supervision    Stairs  Stair Height: 6''  Device: One handrail;Bilateral handrails  Number of Stairs: 22  Additional Factors: Verbal cues  Assistance Level: Supervision  Skilled Clinical Factors: Improved stability on stairs, sequencing varies step to step    ASSESSMENT/PROGRESS TOWARDS GOALS:   Body Structures, Functions, Activity Limitations Requiring Skilled Therapeutic Intervention: Decreased functional mobility ; Decreased ROM; Decreased ADL status; Decreased strength;Decreased posture; Increased pain;Decreased balance;Decreased coordination;Decreased safe awareness;Decreased vision/visual deficit  Assessment: Patient with improved motor control with gait on multiple surfaces negotiation around obstacles and throughout crowded areas.  Patient with good pathfinding skills able to return to therapy gym  Discharge Recommendations: Outpatient PT; Home independently    Goals:  Long Term Goals  Long term goal 1: Pt to complete bed mobility indep  Long term goal 2: Pt to complete transfers with indep (bed/chair/car)  Long term goal 3: Pt to ambulate >300ft indoor/outdoor without AD indep  Long term goal 4: Pt to achieve 20/24 DGI  Long term goal 5: Pt to complete 5XSTS in 8sec    PLAN OF CARE/Safety:   Safety Devices  Type of Devices:  All fall risk precautions in place      Therapy Time:   Individual   Time In 1400   Time Out 1430   Minutes 30     Missed 30 minutes due to patient declining to continue with therapy  Minutes: 30  Transfer/Bed mobility training: 10  Gait trainin N Rey Crain PTA, 22 at 4:15 PM

## 2022-07-14 NOTE — PROGRESS NOTES
non-tender, non-distended with normal bowel sounds. Musculoskeletal: No clubbing, cyanosis or edema bilaterally. Neuro: Non Focal.  Alert, moving all extremities      Labs:   No results for input(s): WBC, HGB, HCT, PLT in the last 72 hours. No results for input(s): NA, K, CL, CO2, BUN, CREATININE, CALCIUM, PHOS in the last 72 hours. Invalid input(s): MAGNES  Recent Labs     07/11/22  2119 07/12/22  0520   AST 28 24   * 106*   BILIDIR <0.2 <0.2   BILITOT <0.2 0.4   ALKPHOS 85 78     No results for input(s): INR in the last 72 hours.   Recent Labs     07/12/22  0520 07/13/22  0519 07/14/22  0506   CKTOTAL 451* 398* 330*       Urinalysis:      Lab Results   Component Value Date/Time    NITRU Negative 06/30/2022 04:00 PM    WBCUA 3-5 06/30/2022 04:00 PM    BACTERIA Negative 06/30/2022 04:00 PM    RBCUA 3-5 06/30/2022 04:00 PM    BLOODU LARGE 06/30/2022 04:00 PM    SPECGRAV 1.033 06/30/2022 04:00 PM    GLUCOSEU Negative 06/30/2022 04:00 PM       Radiology:  No orders to display         Assessment/Plan:    Active Hospital Problems    Diagnosis Date Noted    Hypoxic encephalopathy (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 [F19.10]      Priority: Medium    Shift work sleep disorder [G47.26] 07/15/2019    ROSALES (generalized anxiety disorder) [F41.1] 06/04/2019    MDD (major depressive disorder), recurrent episode, moderate ([facility] [F33.1] 06/04/2019    Polydrug dependence including opioid type drug with continuous use with complication ([facility] [Y35.63] 06/04/2019    Anxiety [F41.9] 07/13/2015    ADHD (attention deficit hyperactivity disorder) [F90.9] 12/05/2012    LPRD (laryngopharyngeal reflux disease) [K21.9] 10/19/2011        Hypoxic ischemic encephalopathy  -MRI of the brain showed findings consistent with hypoxic encephalopathy. EEG was normal.  He is on prednisone for cerebral edema. management per neurology. Repeat MRI of the brain is pending. Rhabdomyolysis-CK has improved. Abnormal LFTs, transaminitis-likely 2/2 ischemic hepatitis. LFTs are much improved. ADHD-resume Adderall when okay with neurology. Additional work up or/and treatment plan may be added today or then after based on clinical progression. I am managing a portion of pt care. Some medical issues are handled by other specialists. Additional work up and treatment should be done in out pt setting by pt PCP and other out pt providers. In addition to examining and evaluating pt, I spent additional time explaining care, normal and abnormal findings, and treatment plan. All of pt questions were answered. Counseling, diet and education were  provided. Case will be discussed with nursing staff when appropriate. Family will be updated if and when appropriate. Diet: ADULT DIET;  Regular    Code Status: Full Code      Electronically signed by Bossman Medley MD on 7/14/2022 at 10:07 AM

## 2022-07-14 NOTE — PROGRESS NOTES
OCCUPATIONAL THERAPY  INPATIENT REHAB TREATMENT NOTE  [facility]      NAME: Evelyne Phelps  : 1990 (33 y.o.)  MRN: [id number]  CODE STATUS: Full Code  Room: N939/T310-19    Date of Service: 2022    Referring Physician: Dr. Margarito Pfeiffer Diagnosis: Imp ADLs d/t hypoxic encephalopathy s/p overdose likely secondary to fentanyl laced xanax pill    Restrictions  Restrictions/Precautions  Restrictions/Precautions: Fall Risk            Patient's date of birth confirmed: Yes    SAFETY:  Safety Devices  Type of devices: All fall risk precautions in place    SUBJECTIVE:  Subjective: I didnt sleep good  Pain: no pain- stiffness in the R leg  OBJECTIVE:    Pt presented supine in bed- pt declining ADL today but agreeable to complete some hygiene tasks     Grooming/Oral Hygiene  Assistance Level: Supervision  Skilled Clinical Factors: cues needed for sequencing and completion of ADLs (pt forgets need to brush teeth, apply deodorant)  Putting On/Taking Off Footwear  Assistance Level: Minimal assistance  Skilled Clinical Factors: assist with RLE     Functional Mobility  Device:  (no AD)  Activity: To/From bathroom; Retrieve items;Transport items  Assistance Level: Stand by assist  Sit to Stand  Assistance Level: Stand by assist  Stand to Sit  Assistance Level: Stand by assist     Instructed patient w/ usage of LB AE to increase independence with footwear/LBD    Pt used dressing stick and sock aide to don footwear on R foot- Pt able to complete with SUP     While seated, challenged strength, activity tolerance, ROM, and flexibility for improved ADL performance. Challenged pt through usage of 3# dowel to complete BUE exercises. 2 sets x 10 reps completed. Exercises focused on all UE joints and planes of motion including scapular protraction/retraction, shoulder flexion/extension/rotation/horizontal abduction, elbow flexion/extension, supination/pronation, and wrist/digit flexion/extension.     IADL  Money management  Assist level: Min-ModA    Completed money management task to assess independence with bill pay/transactions- pt requiring increased time to completed most items. Pt able to correctly organize dollars and coins into correct amount needed according to amount on note cards 4/5 times. Graded task up to requiring pt to provide correct amount of change for simulated transaction. Pt with significant diffculty with this task. Pt unable to calculate correct change without using calculator. Pt able to input information into calculator correct to come up with correct dollar amount. However, some difficulty also noted with then providing OT correct amount of $ on all 3 attempts. Patient engaged in table task with cognitive and  visual perceptual components to increase [place] with ADL/IADL completion. Pt required pt to select, match, and assemble botls, wing nuts, and wooden blocks with holes into 3D structure. Pt required to follow visual model presented on piece of paper and required to replicate it. Pt with some difficulty managing pieces and with decreased accuracy in assembling the structure. Several errors noted with pt appearing somewhat frustrated    Education:   Insight into cog deficits, fall prevention, AE, ADL techniques     ASSESSMENT:   Good participation    PLAN OF CARE:  Strengthening,Balance training,Functional mobility training,Endurance training,Neuromuscular re-education,Safety education & training,Patient/Caregiver education & training,Equipment evaluation, education, & procurement,Self-Care / ADL,Home management training       Patient goals : \"Get home, get back to life, feel normal\"  Time Frame for Long term goals :  Within 1 weeks, pt to demo progress in the following areas listed below to achieve specific LTGs as stated in the evaluation  Long Term Goal 1: Pt will demo improved overall ADL/IADL status  Long Term Goal 2: Pt will demo improved standing balance and tolerance for self-care completion  Long Term Goal 3: Pt will demo improved activity tolerance for completion of ADL/IADL  Long Term Goal 4: Pt will demo improved overall safety and cognition in order to be able to care for himself with minimal assistance        Therapy Time:   Individual Group Co-Treat   Time In 930       Time Out 1030         Minutes 60             ADL/IADL trainin minutes  Therapeutic activities: 20 minutes  Cognitive Retraining: 15 minutes     Electronically signed by:    Eugene Litten, OT,   2022, 9:46 AM

## 2022-07-15 PROBLEM — R00.0 TACHYCARDIA: Status: ACTIVE | Noted: 2022-07-15

## 2022-07-15 PROBLEM — R41.89 COGNITIVE IMPAIRMENT: Status: ACTIVE | Noted: 2022-07-15

## 2022-07-15 LAB
T3 FREE: 2.98 PG/ML (ref 2.02–4.43)
T4 FREE: 1.53 NG/DL (ref 0.84–1.68)
TOTAL CK: 351 U/L (ref 0–190)

## 2022-07-15 PROCEDURE — 84439 ASSAY OF FREE THYROXINE: CPT

## 2022-07-15 PROCEDURE — APPSS15 APP SPLIT SHARED TIME 0-15 MINUTES: Performed by: NURSE PRACTITIONER

## 2022-07-15 PROCEDURE — 97535 SELF CARE MNGMENT TRAINING: CPT

## 2022-07-15 PROCEDURE — 97130 THER IVNTJ EA ADDL 15 MIN: CPT

## 2022-07-15 PROCEDURE — 97112 NEUROMUSCULAR REEDUCATION: CPT

## 2022-07-15 PROCEDURE — 6370000000 HC RX 637 (ALT 250 FOR IP): Performed by: PHYSICAL MEDICINE & REHABILITATION

## 2022-07-15 PROCEDURE — 97116 GAIT TRAINING THERAPY: CPT

## 2022-07-15 PROCEDURE — 99222 1ST HOSP IP/OBS MODERATE 55: CPT | Performed by: INTERNAL MEDICINE

## 2022-07-15 PROCEDURE — 6360000002 HC RX W HCPCS: Performed by: INTERNAL MEDICINE

## 2022-07-15 PROCEDURE — 1180000000 HC REHAB R&B

## 2022-07-15 PROCEDURE — 6370000000 HC RX 637 (ALT 250 FOR IP): Performed by: INTERNAL MEDICINE

## 2022-07-15 PROCEDURE — 84481 FREE ASSAY (FT-3): CPT

## 2022-07-15 PROCEDURE — 97530 THERAPEUTIC ACTIVITIES: CPT

## 2022-07-15 PROCEDURE — 36415 COLL VENOUS BLD VENIPUNCTURE: CPT

## 2022-07-15 PROCEDURE — 93005 ELECTROCARDIOGRAM TRACING: CPT | Performed by: INTERNAL MEDICINE

## 2022-07-15 PROCEDURE — 82550 ASSAY OF CK (CPK): CPT

## 2022-07-15 PROCEDURE — 99233 SBSQ HOSP IP/OBS HIGH 50: CPT | Performed by: PSYCHIATRY & NEUROLOGY

## 2022-07-15 PROCEDURE — 99232 SBSQ HOSP IP/OBS MODERATE 35: CPT | Performed by: PHYSICAL MEDICINE & REHABILITATION

## 2022-07-15 PROCEDURE — 6370000000 HC RX 637 (ALT 250 FOR IP): Performed by: FAMILY MEDICINE

## 2022-07-15 PROCEDURE — 97129 THER IVNTJ 1ST 15 MIN: CPT

## 2022-07-15 RX ADMIN — VERAPAMIL HYDROCHLORIDE 120 MG: 120 TABLET, FILM COATED, EXTENDED RELEASE ORAL at 20:32

## 2022-07-15 RX ADMIN — TRAZODONE HYDROCHLORIDE 50 MG: 50 TABLET ORAL at 20:27

## 2022-07-15 RX ADMIN — ALPRAZOLAM 0.5 MG: 0.25 TABLET ORAL at 09:02

## 2022-07-15 RX ADMIN — ZOLPIDEM TARTRATE 10 MG: 5 TABLET ORAL at 20:27

## 2022-07-15 RX ADMIN — ENOXAPARIN SODIUM 40 MG: 100 INJECTION SUBCUTANEOUS at 09:01

## 2022-07-15 RX ADMIN — Medication 100 MG: at 09:02

## 2022-07-15 RX ADMIN — ALPRAZOLAM 0.5 MG: 0.25 TABLET ORAL at 16:58

## 2022-07-15 RX ADMIN — ESCITALOPRAM OXALATE 20 MG: 20 TABLET ORAL at 09:02

## 2022-07-15 RX ADMIN — Medication 3 MG: at 20:33

## 2022-07-15 RX ADMIN — Medication 2000 UNITS: at 16:55

## 2022-07-15 RX ADMIN — PREDNISONE 15 MG: 5 TABLET ORAL at 09:02

## 2022-07-15 ASSESSMENT — ENCOUNTER SYMPTOMS
STRIDOR: 0
VOMITING: 0
TROUBLE SWALLOWING: 0
COLOR CHANGE: 0
CHEST TIGHTNESS: 0
WHEEZING: 0
BLOOD IN STOOL: 0
EYES NEGATIVE: 1
ABDOMINAL PAIN: 0
NAUSEA: 0
GASTROINTESTINAL NEGATIVE: 1
SHORTNESS OF BREATH: 0
COUGH: 0
RESPIRATORY NEGATIVE: 1

## 2022-07-15 NOTE — PROGRESS NOTES
[facility] Neurology Daily Progress Note  Name: Riri Zapata  Age: 33 y.o. Gender: male  CodeStatus: Full Code  Allergies: Peanut-Containing Drug Products  Nuts [Macadamia Nut Oil]  Peanut Oil  Shellfish-Derived Products    Chief Complaint:No chief complaint on file. Primary Care Provider: YING Spring CNP  InpatientTreatment Team: Treatment Team: Attending Provider: Ct Woo DO; Consulting Physician: Demarco Rosales MD; Consulting Physician: Emily Bartlett, PhD; Consulting Physician: Virgen Cowan MD; Consulting Physician: Carine Jason DO; Registered Nurse: Amanda Wolfe, RN; Registered Nurse: Vania Funk. Sandra Mckenzie; Occupational Therapist: Asim Pfeiffer OT; Occupational Therapist: Misha Palmer OTR/L; : LENNY Cosby, LSW; Patient Care Tech: Manuel Metro  Admission Date: 7/11/2022      HPI   Pt seen and examined on rehab unit for neurology follow-up. Currently alert and oriented x3, no acute distress, cooperative. Still with some concentration issues and forgetfulness. Right lower extremity weakness noted at 4 out of 5 with numbness/paresthesia to the right foot and right hip. Continues to have some sleep issues. Lexapro has been increased for emotional lability. Remains on prednisone taper. Repeat MRI of the brain pending for Monday. Patient examined on my weekly rounds and patient appears to be still quite encephalopathic with a flat affect though oriented and slow to respond is not any anxiety episodes since she has been on Xanax. Vitals:    07/15/22 0702   BP: 136/76   Pulse: 100   Resp: 16   Temp: 98.2 °F (36.8 °C)   SpO2: 100%      Review of Systems   Constitutional:  Negative for fatigue and fever. HENT:  Negative for hearing loss and trouble swallowing. Eyes:  Negative for visual disturbance. Respiratory:  Negative for cough, chest tightness, shortness of breath and wheezing.     Cardiovascular:  Negative for chest pain, palpitations and leg swelling. Gastrointestinal:  Negative for abdominal pain, nausea and vomiting. Skin:  Negative for color change and rash. Neurological:  Negative for dizziness, tremors, seizures, syncope, facial asymmetry, speech difficulty, weakness, light-headedness, numbness and headaches. Psychiatric/Behavioral:  Positive for decreased concentration. Negative for agitation, confusion and hallucinations. The patient is not nervous/anxious. Physical Exam  Vitals and nursing note reviewed. Constitutional:       General: He is not in acute distress. Appearance: He is not diaphoretic. HENT:      Head: Normocephalic and atraumatic. Eyes:      General: No visual field deficit. Extraocular Movements: Extraocular movements intact. Pupils: Pupils are equal, round, and reactive to light. Cardiovascular:      Rate and Rhythm: Normal rate and regular rhythm. Pulmonary:      Effort: Pulmonary effort is normal. No respiratory distress. Breath sounds: Normal breath sounds. Abdominal:      General: Bowel sounds are normal. There is no distension. Palpations: Abdomen is soft. Tenderness: There is no abdominal tenderness. Skin:     General: Skin is warm and dry. Neurological:      Mental Status: He is alert and oriented to person, place, and time. Cranial Nerves: No cranial nerve deficit, dysarthria or facial asymmetry. Motor: Weakness present. No tremor, atrophy, abnormal muscle tone, seizure activity or pronator drift. Coordination: Coordination normal.      Deep Tendon Reflexes: Reflexes abnormal.      Reflex Scores:       Patellar reflexes are 3+ on the right side and 3+ on the left side. Semination unchanged but he has some right-sided weakness in the leg which we have known since he was admitted and this is central in origin.       Medications:  Reviewed    Infusion Medications:    sodium chloride       Scheduled Medications:    escitalopram  20 mg Oral Daily zolpidem  10 mg Oral Nightly    Vitamin D  2,000 Units Oral Dinner    cyanocobalamin  1,000 mcg IntraMUSCular Weekly    coenzyme Q10  100 mg Oral Daily    predniSONE  15 mg Oral Daily    Followed by    Jaycee Cooper ON 7/18/2022] predniSONE  10 mg Oral Daily    Followed by    Jaycee Cooper ON 7/21/2022] predniSONE  5 mg Oral Daily    traZODone  50 mg Oral Nightly    enoxaparin  40 mg SubCUTAneous Daily    sodium chloride flush  5-40 mL IntraVENous 2 times per day     PRN Meds: acetaminophen, lidocaine, polyethylene glycol, bisacodyl, fleet, ALPRAZolam, melatonin, sodium chloride, ondansetron **OR** ondansetron, sodium chloride flush, ibuprofen    Labs:   No results for input(s): WBC, HGB, HCT, PLT in the last 72 hours. No results for input(s): NA, K, CL, CO2, BUN, CREATININE, CALCIUM, PHOS in the last 72 hours. Invalid input(s): MAGNES  No results for input(s): AST, ALT, BILIDIR, BILITOT, ALKPHOS in the last 72 hours. No results for input(s): INR in the last 72 hours. Recent Labs     07/13/22  0519 07/14/22  0506 07/15/22  0401   CKTOTAL 398* 330* 351*       Urinalysis:   Lab Results   Component Value Date/Time    NITRU Negative 06/30/2022 04:00 PM    WBCUA 3-5 06/30/2022 04:00 PM    BACTERIA Negative 06/30/2022 04:00 PM    RBCUA 3-5 06/30/2022 04:00 PM    BLOODU LARGE 06/30/2022 04:00 PM    SPECGRAV 1.033 06/30/2022 04:00 PM    GLUCOSEU Negative 06/30/2022 04:00 PM       Radiology:   Most recent    EEG No valid procedures specified. MRI of Brain Results for orders placed during the hospital encounter of 06/30/22    MRI BRAIN W WO CONTRAST    Narrative  EXAM: MRI of the brain without and with contrast    History: Hypoxic ischemic encephalopathy.     Technique: Multiplanar multisequence MRI of the brain was performed without and with contrast.    Comparison: MRI of the brain July 1, 2022    Findings:    Mildly increased edema in the areas of diffusion restriction within the bilateral globi pallidi, splenium of the corpus callosum, and supratentorial white since prior MRI July 1, 2022. No mass effect or midline shift. No acute intracranial hemorrhage. No enhancing mass or pathologic enhancement. Impression  Mildly increased edema in the areas of diffusion restriction within the bilateral globi pallidi, splenium of the corpus callosum, and supratentorial white since prior MRI July 1, 2022. Results for orders placed during the hospital encounter of 06/30/22    MRI BRAIN WO CONTRAST    Narrative  MRI BRAIN WO CONTRAST : 7/1/2022    CLINICAL HISTORY:  stroke . COMPARISON: Head CT 6/30/2022. TECHNIQUE: Multiplanar MR imaging of the head was performed without contrast.      FINDINGS:    Areas of restricted diffusion within the globus pallidus, supratentorial white matter and splenium of the corpus callosum are most consistent with carbon monoxide poisoning/hypoxemia and/or other toxic encephalopathy. There is no intracranial hemorrhage, mass effect, midline shift, extra-axial collection, significant cerebral volume loss or other complication identified. Impression  FINDINGS CONSISTENT WITH CARBON MONOXIDE POISONING/HYPOXEMIA AND/OR OTHER TOXIC ENCEPHALOPATHY. NO INTRACRANIAL HEMORRHAGE OR COMPLICATION IDENTIFIED. MRA of the Head and Neck: No results found for this or any previous visit. No results found for this or any previous visit. Results for orders placed during the hospital encounter of 06/30/22    MRA HEAD WO CONTRAST    Narrative  MRA HEAD WO CONTRAST, MRA NECK WO CONTRAST    HISTORY:  Drug abuse. Altered mental status. COMPARISON: MRI brain 7/1/2022. CT head 6/30/2022. TECHNIQUE: Routine MRA of the head without contrast with 3-D time-of-flight images and dedicated reconstructions. Routine MRA of the neck with 3-D and 2-D time-of-flight images and dedicated reconstructions.       RESULT:    Visualized brain: Grossly unchanged from the recent MRI of the brain with identified. Visualized Orbits:  Negative. Impression  Impression:    Round symmetric areas decreased attenuation bilateral globus pallidus. This finding may be seen in patients experiencing hypoxia, i.e., carbon monoxide poisoning. All CT scans at this facility use dose modulation, iterative reconstruction, and/or weight based dosing when appropriate to reduce radiation dose to as low as reasonably achievable. No results found for this or any previous visit. No results found for this or any previous visit. Carotid duplex: No results found for this or any previous visit. No results found for this or any previous visit. No results found for this or any previous visit. Echo No results found for this or any previous visit. 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 We will have to see how much of this he recovers. May be some permanent deficits. This was discussed with patient and family. Decadron discontinued by another provider and he has been placed on a steroid taper. Repeat MRI brain will be scheduled for 7/18/2022  Patient with ongoing right lower extremity weakness, numbness and bowel and bladder dysfunction. Screening MRI of the spine was negative. Insomnia, continue trazodone. Expect that this will improve as steroids are tapered off. Discharge planning pending for drug rehabilitation versus behavioral health. 7/15/2022:  Encephalopathy improved. Still with some concentration issues and forgetfulness.   He continues on prednisone taper  Repeat MRI of the brain pending for 7/18/2022    I have personally performed a face to face diagnostic evaluation on this patient, reviewed all data and investigations, and am the sole provider of all clinical decisions on the neurological status of this patient. And examined on weekly rehab rounds. Patient is improved though is still quite flattened affect and is likely has some cognitive impairment which may require neuropsychometric testing. We will repeat his MRI for cerebral edema and this represents mostly hypoxic ischemic encephalopathy and truly patient may had a seizure. Patient also is very anxious and will continue on Xanax while in the hospital as we have been called on this several times. We will start tapering his prednisone. Repeat MRI on Monday. 60% time spent on evaluating this patient. Patient has right lower extremity weakness which is central in origin. We have been aware of this    Overlook Medical CenterKristen Tan MD, Nannette Woodson, American Board of Psychiatry & Neurology  Board Certified in Vascular Neurology  Board Certified in Neuromuscular Medicine  Certified in Neurorehabilitation         Collaborating physicians: Dr Ratna Tan    Electronically signed by YING Carrillo CNP on 7/15/2022 at 12:28 PM

## 2022-07-15 NOTE — PROGRESS NOTES
Subjective: The patient complains of severe acute on chronic progressive fatigue and ataxia, cognitive slowing, right lower extremity numbness and stiffness partially relieved by rest, medications, PT,  OT, medication titration SLP and rest and exacerbated by recent OD-he was admitted to [facility] on 6/30/2022  with change in mental status and history of possible overdose. Family did a well check after his boss noticed that he had not shown up to work for several days and called the family out of concern. He was initially admitted and to the ICU. He had abnormal liver enzymes, rhabdomyolysis kidney injury. Urine was positive for amphetamines benzos and fentanyl. He was found to also be using Royle kratom overuse, transaminitis, EDGAR, rhabdomyolysis resulting in provoked seizure and cerebral edema. He admits that he took 2 Xanax that he bought on the street. He and I both fear that it may have been laced with fentanyl. Discussed with treatment team and hospitalist and neuro--  MRIs spine were normal..      I am concerned about patients medical complexities and barriers to advancing in rehab goals including right lower extremity stiffness secondary to sciatic neuropathy and nerve injury as well as his history of substance abuse and overuse of benzodiazepines. She plans to take him off of the benzos but closer to discharge. Patient slept much better with the Ambien dosing last night. I reviewed current care and plans for further care with other rehab providers including nursing and case management. According to recent nursing note, \"  Pt had flat affect and had delayed responses. Cooperative. Pt states that he is bored. Pt was given  sleep meds as scheduled and has been sleeping without any difficulty tonight.  Pt had a snack at hs and call light in reach and bed alarm on \"      Am concerned about his baseline sinus tachycardia we will consult cardiology and check thyroid and cortisol levels. ROS x10: The patient also complains of severely impaired mobility and activities of daily living. Otherwise no new problems with vision, hearing, nose, mouth, throat, dermal, cardiovascular, GI, , pulmonary, musculoskeletal, psychiatric or neurological. See also Acute Rehab PM&R H&P. Vital signs:  /76   Pulse 100   Temp 98.2 °F (36.8 °C) (Oral)   Resp 16   Wt 160 lb 12.8 oz (72.9 kg)   SpO2 100%   BMI 23.07 kg/m²   I/O:   PO/Intake:  fair PO intake,  Adult reg  Diet thin liquids    Bowel:   continent   Bladder: continent   No need for schwartz  General:  Patient is well developed,   adequately nourished, and    well kempt. HEENT:    Pupils equal, hearing intact to loud voice, external inspection of ear and nose benign. Inspection of lips, tongue and gums benign  -cognitive slowing right lower extremity weakness and numbness. Musculoskeletal: No significant change in strength or tone. All joints stable. Inspection and palpation of digits and nails show no clubbing, cyanosis or inflammatory conditions. Neuro/Psychiatric: Affect: flat but pleasant. Alert and oriented to person, place and situation with   min cues. No significant change in deep tendon reflexes or sensation  Lungs:  Diminished, CTA-B. Respiration effort is   normal at rest.     Heart:   S1 = S2,   RRR. Abdomen:  Soft, non-tender, no enlargement of liver or spleen. Extremities:    lower extremity edema  -weakness right lower extremity decreased sciatic neuropathy.   Skin:   Intact to general survey,      Rehabilitation:  Physical Therapy:   Bed mobility:  Bed mobility  Rolling to Left: Modified independent (07/12/22 1155)  Rolling to Right: Modified independent (07/12/22 1155)  Supine to Sit: Modified independent;Supervision (07/12/22 1155)  Sit to Supine: Modified independent;Supervision (07/12/22 1155)  Bed Mobility Comments: Mildly impulsive. (07/12/22 1155)  Bed Mobility Training  Bed Mobility Training: Yes (07/13/22 1539)  Overall Level of Assistance: Modified independent (07/13/22 1539)  Interventions: Safety awareness training (07/13/22 1539)  Rolling: Independent (07/13/22 1539)  Supine to Sit: Modified independent (07/13/22 1539)  Sit to Supine: Modified independent (07/13/22 1539)  Scooting: Modified independent (07/13/22 1539)  Roll Left  Assistance Level: Independent (07/14/22 1224)  Roll Right  Assistance Level: Independent (07/14/22 1224)  Sit to Supine  Assistance Level: Independent (07/14/22 1224)  Supine to Sit  Assistance Level: Independent (07/14/22 1224)  Scooting  Assistance Level: Independent (07/14/22 1224)  Transfers:  Transfers  Sit to Stand: Stand by assistance;Contact guard assistance (07/12/22 1156)  Stand to sit: Stand by assistance;Contact guard assistance (07/12/22 1156)  Bed to Chair: Stand by assistance;Contact guard assistance (07/12/22 1156)  Car Transfer: Stand by assistance;Contact guard assistance (07/12/22 1156)  Comment: Varied performance. Pt with increased time to approach chair. Safe technique however inaccurate execution at times. 5XSTS = 10.7sec. (07/12/22 1156)  Transfer Training  Transfer Training: Yes (07/13/22 1539)  Overall Level of Assistance: Supervision (07/13/22 1539)  Interventions: Safety awareness training (07/13/22 1539)  Sit to Stand: Supervision;Stand-by assistance (07/13/22 1539)  Stand to Sit: Supervision;Stand-by assistance (07/13/22 1539)  Stand Pivot Transfers: Stand-by assistance (07/13/22 1539)  Transfers  Surface:  To chair with arms (07/13/22 1412)  Sit to Stand  Assistance Level: Supervision (07/14/22 1224)  Stand to Sit  Assistance Level: Supervision (07/14/22 1224)  Skilled Clinical Factors: Occasionally unstable to approach to chair (07/13/22 1214)  Bed To/From Chair  Assistance Level: Supervision (07/14/22 1224)  Gait:   Ambulation  Surface: level tile (07/12/22 1241)  Device: No Device (07/12/22 1241)  Assistance: Stand by Management  Wheelchair Management: No (07/13/22 1539)    Occupational Therapy:   Hand Dominance: Right  ADL  Feeding: Setup (07/12/22 1018)  Grooming: Modified independent  (07/12/22 1018)  Grooming Skilled Clinical Factors: oral hygiene, comb hair (07/12/22 1018)  UE Bathing: Supervision (07/12/22 1018)  UE Bathing Skilled Clinical Factors: cues to attend to all areas, wash hair, thoroughness (07/12/22 1018)  LE Bathing: Minimal assistance (07/12/22 1018)  LE Bathing Skilled Clinical Factors: assist to reach RLE and for posterior hygiene (07/12/22 1018)  UE Dressing Skilled Clinical Factors: gown only (07/12/22 1018)  LE Dressing: Minimal assistance (07/12/22 1018)  LE Dressing Skilled Clinical Factors: assist with R sock, pt declined to don pants/bref (07/12/22 1018)  Toileting: Minimal assistance (07/12/22 1018)  Additional Comments: Shower completed. Pt impulsive at times (07/12/22 1018)  Toilet Transfers  Toilet - Technique: Ambulating (07/12/22 1022)  Equipment Used: Grab bars (07/12/22 1022)  Toilet Transfer: Contact guard assistance (07/12/22 1022)     Shower Transfers  Shower - Transfer From: Chuck Starks (07/12/22 1022)  Shower - Transfer Type: To and From (07/12/22 1022)  Shower - Transfer To:  Shower seat with back (07/12/22 1022)  Shower - Technique: Stand pivot (07/12/22 1022)  Shower Transfers: Contact Guard (07/12/22 1022)    Speech Therapy:      Comprehension: Within Functional Limits  Verbal Expression:  (Moderate high level naming deficits with divergent naming secondary to impaired thought organization)  Diet/Swallow:        Dysphagia Outcome Severity Scale: Level 6: Within functional limits/Modified independence  Compensatory Swallowing Strategies : Small bites/sips, Upright as possible for all oral intake, Alternate solids and liquids          Lab/X-ray studies reviewed, analyzed and discussed with patient and staff:   Recent Results (from the past 24 hour(s))   CK    Collection Time: 07/15/22  4:01 AM   Result Value Ref Range    Total  (H) 0 - 190 U/L     EEG was normal.    XR CHEST   7/4/2022: Suboptimal inspiration. The cardiomediastinal silhouette is within normal limits. No significant interval change of patchy left upper lobe and left lower lobe opacities. No pneumothorax or pleural effusion. No acute osseous abnormality. No significant interval change of left upper lobe and left lower lobe infiltrate and/or atelectasis. XR HIP RIGHT   7/6/2022  EXAMINATION: RIGHT HIP  CLINICAL HISTORY: Pain, fall COMPARISON: None  FINDINGS: Two views are  submitted. No acute fractures. No dislocations. No focal bony abnormalities                                                                                   NO ACUTE FRACTURE. CT Head   6/30/2022 Extra-axial spaces:  Normal. Intracranial hemorrhage:  None. Ventricular system: [Negative. Basal Cisterns:  Without anomaly. Cerebral Parenchyma: Bilateral, symmetric areas of decreased attenuation exerting no mass effect measuring approximately 9 mm in size since found within the bilateral globus pallidus (series 2, image 17). Midline Shift:  None. Cerebellum:  No anomaly identified. Paranasal sinuses and mastoid air cells:  No anomaly identified. Visualized Orbits:  Negative. Impression: Round symmetric areas decreased attenuation bilateral globus pallidus. This finding may be seen in patients experiencing hypoxia, i.e., carbon monoxide poisoning. MRA HEAD : 7/2/2022    Visualized brain: Grossly unchanged from the recent MRI of the brain with multiple areas of restricted diffusion. Please refer to the recent MRI brain report. INTRACRANIAL MRA:    The visualized distal vertebral and basilar arteries are widely patent. The distal ICAs are patent and within normal limits of caliber. The proximal ACAs, MCAs and PCAs are patent and within normal limits of caliber and configuration.   There is no evidence of focal,  significant stenosis or EXTRACRANIAL MRA: Carotid Stenosis: Right Common:  No significant stenosis. Right Internal Plaque:  No significant plaque formation. Right Internal Carotid Stenosis (% by NASCET Criteria):  0% Left Common:  No significant stenosis. Left Internal Carotid Plaque:  No significant plaque formation. Left Internal Carotid Stenosis (% by NASCET Criteria):  0% Cervical Vertebral Arteries: Patency:  Bilateral Dominance:  Left     Patent intracranial and extracranial circulation. MRI BRAIN  7/4/2022: Mildly increased edema in the areas of diffusion restriction within the bilateral globi pallidi, splenium of the corpus callosum, and supratentorial white since prior MRI July 1, 2022. No mass effect or midline shift. No acute intracranial hemorrhage. No enhancing mass or pathologic enhancement. Mildly increased edema in the areas of diffusion restriction within the bilateral globi pallidi, splenium of the corpus callosum, and supratentorial white since prior MRI July 1, 2022. MRI BRAIN   7/1/2022   Areas of restricted diffusion within the globus pallidus, supratentorial white matter and splenium of the corpus callosum are most consistent with carbon monoxide poisoning/hypoxemia and/or other toxic encephalopathy. There is no intracranial hemorrhage, mass effect, midline shift, extra-axial collection, significant cerebral volume loss or other complication identified. FINDINGS CONSISTENT WITH CARBON MONOXIDE POISONING/HYPOXEMIA AND/OR OTHER TOXIC ENCEPHALOPATHY. NO INTRACRANIAL HEMORRHAGE OR COMPLICATION IDENTIFIED. MRI CERVICAL THORACIC LUMBAR SPINE  7/7/2022   Motion artifact mildly limits some sequences. There is no central spinal stenosis, neural foraminal narrowing, significant disc herniations, compression fractures, subluxation, or epidural fluid collections identified. NEGATIVE MILDLY LIMITED CERVICAL, THORACIC AND LUMBAR SPINE MRI.         Previous extensive, complex labs, notes and diagnostics reviewed and analyzed. ALLERGIES:    Allergies as of 07/11/2022 - Fully Reviewed 07/11/2022   Allergen Reaction Noted    Peanut-containing drug products Anaphylaxis 12/06/2018    Nuts [macadamia nut oil] Angioedema 07/01/2022    Peanut oil Other (See Comments) 02/02/2015    Shellfish-derived products Itching 07/01/2022      (please also verify by checking MAR)      Recently, I evaluated this patient for periodic reassessment of medical and functional status. The patient was discussed in detail at the treatment team meeting focusing on current medical issues, progress in therapies, social issues, psychological issues, barriers to progress and strategies to address these barriers, and discharge planning. See the hand written addendum to rehab progress note. The patient continues to be high risk for future disability and their medical and rehabilitation prognosis continue to be good and therefore, we will continue the patient's rehabilitation course as planned. The patient's tentative discharge date was set. Patient and family education was discussed. The patient was made aware of the team discussion regarding their progress. Discharge plans were discussed along with barriers to progress and strategies to address these barriers, patient encouraged to continue to discuss discharge plans with . 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   Functional and medical status reassessed regarding patients ability to participate in therapies and patient found to be able to participate in acute intensive comprehensive inpatient rehabilitation program including PT/OT to improve balance, ambulation, ADLs, and to improve the P/AROM. Therapeutic modifications regarding activities in therapies, place, amount of time per day and intensity of therapy made daily. In bed therapies or bedside therapies prn. Bowel and Bladder dysfunction  , Neurogenic bowel and bladder:  frequent toileting, ambulate to bathroom with assistance, check post void residuals. Check for C.difficile x1 if >2 loose stools in 24 hours, continue bowel & bladder program.  Monitor bowel and bladder function. Lactinex 2 PO every AC. MOM prn, Brown Bomb prn, Glycerin suppository prn, enema prn. Encourage therapy and nursing to co-treat and problem solve re continence. Severe RLE pain as well as generalized OA pain: reassess pain every shift and prior to and after each therapy session, give prn Tylenol and consider scheduled Tylenol, modalities prn in therapy, masage, Lidoderm, K-pad prn. Consider scheduled AM pain meds. Skin healing   and breakdown risk:  continue pressure relief program.  Daily skin exams and reports from nursing. Fatigue due to nutritional and hydration deficiency: Add and titrate vitamin B12 vitamin D and CoQ10 continue to monitor I&Os, calorie counts prn, dietary consult prn. Add healthy snack at night. Acute episodic insomnia with situational adjustment disorder:  prn Ambien, monitor for day time sedation. Falls risk elevated:  patient to use call light to get nursing assistance to get up, bed and chair alarm. Elevated DVT risk: progressive activities in PT, continue prophylaxis CHAR hose, elevation and Lovenox with goal to discontinue over the next few days. Complex discharge planning:  Weekly team meeting every Thursday to re-assess progress towards goals, discuss and address social, psychological and medical comorbidities and to address difficulties they may be having progressing in therapy. Patient and family education is in progress. The patient is to follow-up with their family physician after discharge.         Complex Active General Medical Issues that complicate care Assess & Plan:      Drug abuse,   Polydrug dependence including opioid type drug with continuous use with complication -is aware of the implications that his drug use has had on his health and that he almost . He is planning on not using or overusing medications in the future. Altered mental status-status post drug overdose apparently was accidental overdose. Patient admits to taking 2 Xanax pills that he bought off on the street. Xanax in this area is notoriously laced with fentanyl. Like that he had inadvertent fentanyl overdose from the medication that he thought was Xanax that he bought on the street. 12-step program as advised. Repeat MRI brain will be scheduled for 2022    Vitamin D deficiency-Add high-dose vitamin D, recheck vitamin D level out after discharge,    Spasm of back muscles-lowest effective dose of muscle spasms  Cerebral edema with seizures-antiseizure medications consult neurology and steroid taper. 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 Liver toxicity likely secondary to polysubstance abuse-recheck LFTs as an outpatient avoid toxic medications    ADHD (attention deficit hyperactivity disorder)-lowest effective dose of stimulant medication    Anxiety/ROSALES (generalized anxiety disorder),   MDD (major depressive disorder), recurrent episode, moderate -emotional support provided daily, vitamin B12, encourage participation in rehabilitation support group and recreational therapy, adjust/add medications ( b Lexapro, Desyrel, Xanax and taper to lowest effective dose) Taper Deltasone-and taper Xanax under the direction of neurology and psychiatry.   Baseline sinus tachycardia-consult cardiology check thyroid studies and cortisol level. LPRD (laryngopharyngeal reflux disease)-consult speech and language pathology    Shift work sleep disorder-patient may need to adjust his therapy times. Focus of today's plan-  Initiate and modify therapuetic plan to meet patients individual needs, add rest breaks as needed, and transition off of Xanax -however patient will also need a 12-step program or he risks going back to buying it on the street with accidental overdose. Transition off of benzodiazepines if okay with neurology.     Electronically signed by Sridhar Rosa DO on 7/13/22 at 8:35 AM EDT       Anny Taveras D.O., PM&R     Attending    [phone]

## 2022-07-15 NOTE — CONSULTS
reprocessed    Lived With: Alone in [address] [place] [address]    Home Layout: One level--Level entry    [employer]: Standard    Has the patient had two or more falls in the past year or any fall with injury in the past year?: No    ADL Assistance: [address]: Independent    Homemaking Responsibilities: Yes    Ambulation Assistance: Independent    Transfer Assistance: Independent    Active : Yes    Type of Occupation: [employer]    Additional Comments: Independent PTA- no medical equipment         Social Determinants of Health     Financial Resource Strain: Low Risk     Difficulty of Paying Living Expenses: Not hard at all   Food Insecurity: No Food Insecurity    Worried About Running Out of Food in the Last Year: Never true    Ran Out of Food in the Last Year: Never true   Transportation Needs: Not on file   Physical Activity: Not on file   Stress: Not on file   Social Connections: Not on file   Intimate Partner Violence: Not on file   Housing Stability: Not on file      [] Unable to obtain due to ventilated and/ or neurologic status      Home Medications:      Medications Prior to Admission: amphetamine-dextroamphetamine (ADDERALL) 15 MG tablet, dextroamphetamine-amphetamine 15 mg tablet  escitalopram (LEXAPRO) 10 MG tablet, Take 15 mg by mouth daily  lisdexamfetamine (VYVANSE) 70 MG capsule, Take 70 mg by mouth every morning. clonazePAM (KLONOPIN) 0.5 MG tablet, Take 1 tablet by mouth 2 times daily as needed for Anxiety for up to 3 days.     Current Hospital Medications:     Scheduled Meds:   verapamil  120 mg Oral Nightly    escitalopram  20 mg Oral Daily    zolpidem  10 mg Oral Nightly    Vitamin D  2,000 Units Oral Dinner    cyanocobalamin  1,000 mcg IntraMUSCular Weekly    coenzyme Q10  100 mg Oral Daily    predniSONE  15 mg Oral Daily    Followed by    Lloyd Solorio ON 7/18/2022] predniSONE  10 mg Oral Daily    Followed by    Lloyd Solorio ON 7/21/2022] predniSONE  5 mg Oral Daily    traZODone  50 mg Oral Nightly    enoxaparin  40 mg SubCUTAneous Daily    sodium chloride flush  5-40 mL IntraVENous 2 times per day     Continuous Infusions:   sodium chloride       PRN Meds:.acetaminophen, lidocaine, polyethylene glycol, bisacodyl, fleet, ALPRAZolam, melatonin, sodium chloride, ondansetron **OR** ondansetron, sodium chloride flush, ibuprofen  . sodium chloride          Allergies: Allergies   Allergen Reactions    Peanut-Containing Drug Products Anaphylaxis     Throat swelling, hives, \"I needed hospital admission last time I had any\"    Nuts [Macadamia Nut Oil] Angioedema    Peanut Oil Other (See Comments)    Shellfish-Derived Products Itching        Review of Systems:       Review of Systems   Constitutional: Negative. Negative for diaphoresis and fatigue. HENT: Negative. Eyes: Negative. Respiratory: Negative. Negative for cough, chest tightness, shortness of breath, wheezing and stridor. Cardiovascular:  Positive for palpitations. Negative for chest pain and leg swelling. Gastrointestinal: Negative. Negative for blood in stool and nausea. Genitourinary: Negative. Musculoskeletal: Negative. Skin: Negative. Neurological: Negative. Negative for dizziness, syncope, weakness and light-headedness. Hematological: Negative. Psychiatric/Behavioral: Negative. Objective Findings:     Vitals:/76   Pulse 100   Temp 98.2 °F (36.8 °C) (Oral)   Resp 16   Wt 160 lb 12.8 oz (72.9 kg)   SpO2 100%   BMI 23.07 kg/m²      Physical Examination:    Physical Exam   Constitutional: He appears healthy. No distress. HENT:   Normal cephalic and Atraumatic   Eyes: Pupils are equal, round, and reactive to light. Neck: Thyroid normal. No JVD present. No neck adenopathy. No thyromegaly present. Cardiovascular: Regular rhythm, normal heart sounds, intact distal pulses and normal pulses. Tachycardia present.    Pulmonary/Chest: Effort normal and breath sounds normal. He has no wheezes. He has no rales. He exhibits no tenderness. Abdominal: Soft. Bowel sounds are normal. There is no abdominal tenderness. Musculoskeletal:         General: No tenderness or edema. Normal range of motion. Cervical back: Normal range of motion and neck supple. Neurological: He is alert and oriented to person, place, and time. Skin: Skin is warm. No cyanosis. Nails show no clubbing. Results/ Medications reviewed 7/15/2022, 4:54 PM     Laboratory, Microbiology, Pathology, Radiology, Cardiology, Medications and Transcriptions reviewed  Scheduled Meds:   verapamil  120 mg Oral Nightly    escitalopram  20 mg Oral Daily    zolpidem  10 mg Oral Nightly    Vitamin D  2,000 Units Oral Dinner    cyanocobalamin  1,000 mcg IntraMUSCular Weekly    coenzyme Q10  100 mg Oral Daily    predniSONE  15 mg Oral Daily    Followed by    Kimberli Mask ON 7/18/2022] predniSONE  10 mg Oral Daily    Followed by    Kimberli Mask ON 7/21/2022] predniSONE  5 mg Oral Daily    traZODone  50 mg Oral Nightly    enoxaparin  40 mg SubCUTAneous Daily    sodium chloride flush  5-40 mL IntraVENous 2 times per day     Continuous Infusions:   sodium chloride         No results for input(s): WBC, HGB, HCT, MCV, PLT in the last 72 hours. No results for input(s): NA, K, CL, CO2, PHOS, BUN, CREATININE, CA in the last 72 hours. No results for input(s): AST, ALT, ALB, BILIDIR, BILITOT, ALKPHOS in the last 72 hours. No results for input(s): LIPASE, AMYLASE in the last 72 hours. No results for input(s): PROT, INR in the last 72 hours. XR CHEST (2 VW)    Result Date: 7/4/2022  Exam: XR CHEST (2 VW) History: Shortness breath Technique: AP portable view of the chest obtained. Comparison: Portable chest radiograph July 1, 2022 Findings: Suboptimal inspiration. The cardiomediastinal silhouette is within normal limits. No significant interval change of patchy left upper lobe and left lower lobe opacities.  No pneumothorax or pleural effusion. No acute osseous abnormality. No significant interval change of left upper lobe and left lower lobe infiltrate and/or atelectasis. XR HIP RIGHT (2-3 VIEWS)    Result Date: 7/6/2022  EXAMINATION: RIGHT HIP  CLINICAL HISTORY: Pain, fall COMPARISON: None  FINDINGS: Two views are  submitted. No acute fractures. No dislocations. No focal bony abnormalities                                                                                   NO ACUTE FRACTURE. CT Head WO Contrast    Result Date: 6/30/2022  CT Brain. Contrast medium:  without contrast.. History: Altered mental status. Technical factors: CT imaging of the brain was obtained and formatted as 5 mm contiguous axial images. 2.5 mm contiguous axial images were obtained through the osseous structures. Sagittal and coronal reconstruction obtained during postprocessing. Comparison:  None. Findings: Extra-axial spaces:  Normal. Intracranial hemorrhage:  None. Ventricular system: [Negative. Basal Cisterns:  Without anomaly. Cerebral Parenchyma: Bilateral, symmetric areas of decreased attenuation exerting no mass effect measuring approximately 9 mm in size since found within the bilateral globus pallidus (series 2, image 17). Midline Shift:  None. Cerebellum:  No anomaly identified. Paranasal sinuses and mastoid air cells:  No anomaly identified. Visualized Orbits:  Negative. Impression: Round symmetric areas decreased attenuation bilateral globus pallidus. This finding may be seen in patients experiencing hypoxia, i.e., carbon monoxide poisoning. All CT scans at this facility use dose modulation, iterative reconstruction, and/or weight based dosing when appropriate to reduce radiation dose to as low as reasonably achievable. MRA HEAD WO CONTRAST    Result Date: 7/2/2022  MRA HEAD WO CONTRAST, MRA NECK WO CONTRAST HISTORY:  Drug abuse. Altered mental status. COMPARISON: MRI brain 7/1/2022. CT head 6/30/2022.  TECHNIQUE: Routine MRA of the of caliber. The proximal ACAs, MCAs and PCAs are patent and within normal limits of caliber and configuration. There is no evidence of focal,  significant stenosis or aneurysm in the visualized vessels. EXTRACRANIAL MRA: Carotid Stenosis: Right Common:  No significant stenosis. Right Internal Plaque:  No significant plaque formation. Right Internal Carotid Stenosis (% by NASCET Criteria):  0% Left Common:  No significant stenosis. Left Internal Carotid Plaque:  No significant plaque formation. Left Internal Carotid Stenosis (% by NASCET Criteria):  0% Cervical Vertebral Arteries: Patency:  Bilateral Dominance:  Left     Patent intracranial and extracranial circulation. MRI BRAIN W WO CONTRAST    Result Date: 7/4/2022  EXAM: MRI of the brain without and with contrast History: Hypoxic ischemic encephalopathy. Technique: Multiplanar multisequence MRI of the brain was performed without and with contrast. Comparison: MRI of the brain July 1, 2022 Findings: Mildly increased edema in the areas of diffusion restriction within the bilateral globi pallidi, splenium of the corpus callosum, and supratentorial white since prior MRI July 1, 2022. No mass effect or midline shift. No acute intracranial hemorrhage. No enhancing mass or pathologic enhancement. Mildly increased edema in the areas of diffusion restriction within the bilateral globi pallidi, splenium of the corpus callosum, and supratentorial white since prior MRI July 1, 2022. MRI BRAIN WO CONTRAST    Result Date: 7/1/2022  MRI BRAIN WO CONTRAST : 7/1/2022 CLINICAL HISTORY:  stroke . COMPARISON: Head CT 6/30/2022. TECHNIQUE: Multiplanar MR imaging of the head was performed without contrast. FINDINGS: Areas of restricted diffusion within the globus pallidus, supratentorial white matter and splenium of the corpus callosum are most consistent with carbon monoxide poisoning/hypoxemia and/or other toxic encephalopathy.  There is no intracranial hemorrhage, mass effect, midline shift, extra-axial collection, significant cerebral volume loss or other complication identified. FINDINGS CONSISTENT WITH CARBON MONOXIDE POISONING/HYPOXEMIA AND/OR OTHER TOXIC ENCEPHALOPATHY. NO INTRACRANIAL HEMORRHAGE OR COMPLICATION IDENTIFIED. MRI CERVICAL THORACIC LUMBAR SPINE WO CONTRAST LIMITED    Result Date: 7/7/2022  MRI CERVICAL THORACIC LUMBAR SPINE WO CONTRAST LIMITED : 7/7/2022 CLINICAL HISTORY:  right foot numb, bowel incontinence, hyperreflexia . COMPARISON: Head MRI 7/4/2022 TECHNIQUE: Limited sagittal and axial imaging of the cervical, thoracic and lumbar spine was performed. FINDINGS: Motion artifact mildly limits some sequences. There is no central spinal stenosis, neural foraminal narrowing, significant disc herniations, compression fractures, subluxation, or epidural fluid collections identified. NEGATIVE MILDLY LIMITED CERVICAL, THORACIC AND LUMBAR SPINE MRI.        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] 07/12/2022     Priority: Medium    Lesion of right sciatic nerve [G57.01] 07/12/2022     Priority: Medium    Impaired mobility and ADLs [Z74.09, Z78.9] 07/12/2022     Priority: Medium     impaired mobility and ADLs dt encephalopathy  [Z74.09, Z78.9] 07/05/2022     Priority: Medium    Vitamin D deficiency [E55.9] 07/05/2022     Priority: Medium    Altered mental status [R41.82]      Priority: Medium    Drug abuse ([facility] [address] [F19.10]      Priority: Medium    Shift work sleep disorder [G47.26] 07/15/2019     Priority: Low    ROSALES (generalized anxiety disorder) [F41.1] 06/04/2019     Priority: Low    MDD (major depressive disorder), recurrent episode, moderate ([facility] [address] [F33.1] 06/04/2019     Priority: Low    Polydrug dependence including opioid type drug with continuous use with complication [facility]) [F19.20] 06/04/2019     Priority: Low    Anxiety [F41.9] 07/13/2015     Priority: Low    ADHD (attention deficit hyperactivity disorder) [F90.9] 12/05/2012     Priority: Low    LPRD (laryngopharyngeal reflux disease) [K21.9] 10/19/2011     Priority: Low         Impression/Plan:   Tachycardia- Obtain ECG. Will order Echo. Start Verapamil     Thank you for allowing us to participate in the care of this patient. Will continue to follow. Please call if questions or concerns arise.     Electronically signed by Fede Velasquez MD on 7/15/2022 at 4:54 PM

## 2022-07-15 NOTE — CARE COORDINATION
Patient has not been accepted to any drug rehab programs at this time. His cognition and the need for 24 hour supervision have been a barrier to the treatment at this time. There has been concern that the patient can't cognitively benefit from a drug rehab program at this time. The patient could benefit from a brain injury unit/home to further recover first. His parents after training were not ready for the patient to come home as they feel he still needs brain work. A referral was made to [facility] a sub-acute neuro rehab that focuses on brain injured patients. Spoke with Magalys Kaur and faxed the referral information. Spoke with out PM&R to update on the discharge barriers that have developed and potential plan. Magalys Kaur from the Neuro facility will evaluate the patient Monday 7/18 at 11:30 AM.  The patient was happy about the potential brain injury unit admission. Electronically signed by Lou Sparks RN on 7/15/22 at 4:23 PM EDT   The patient asked me to update his mom and called Anil Headley.   Electronically signed by Lou Sparks RN on 7/15/22 at 4:28 PM EDT

## 2022-07-15 NOTE — PROGRESS NOTES
OCCUPATIONAL THERAPY  INPATIENT REHAB TREATMENT NOTE  [facility]      NAME: Sydni Mcclellan  : 1990 (33 y.o.)  MRN: [id number]  CODE STATUS: Full Code  Room: E059/Q741-15    Date of Service: 7/15/2022    Referring Physician: Dr. Den Jean Diagnosis: Imp ADLs d/t hypoxic encephalopathy s/p overdose likely secondary to fentanyl laced xanax pill    Restrictions  Restrictions/Precautions  Restrictions/Precautions: Fall Risk       Patient's date of birth confirmed: Yes    SAFETY:  Safety Devices  Safety Devices in place: Yes  Type of devices: All fall risk precautions in place    SUBJECTIVE:  Subjective: These are my parents  Pain: declines      OBJECTIVE:    Pt presented supine in bed. Pt parents present for family observation/training. Education provided regarding CLOF and expected progress. Pt's dad asked: Marcelo Cheeks he be independent at discharge? \" Educated regarding possibility for SUP-I status for basic ADLs. However, voiced concerns and detailed pt's difficulty with household management tasks and IADL d/t cognitive deficits. Pt;s mother upset at times during session- emotional and crying. LBD using AE     Putting On/Taking Off Footwear- SUP  Skilled Clinical Factors: using sock aide RLE    Cues needed to recall technique with AE. Educated pt and family regarding hip kit AE devices      While seated and standing, challenged strength, activity tolerance, ROM, and flexibility for improved ADL performance. Challenged pt through usage of 3# to complete BUE exercises. 2 sets x 10 reps completed. Exercises focused on all UE joints and planes of motion including scapular protraction/retraction, shoulder flexion/extension/rotation/horizontal abduction, elbow flexion/extension, and wrist/digit flexion/extension. IADLs    Money Management Level of Assistance: Konrad    Pt again demo'ed difficulty completing simple math and money management tasks.  Pt able to complete 2/4 amounts correctly before

## 2022-07-15 NOTE — PROGRESS NOTES
Physical Therapy Rehab Treatment Note  [facility]  [facility]       NAME: Natalie Solomon  : 1990 (33 y.o.)  MRN: [id number]  CODE STATUS: Full Code    Date of Service: 7/15/2022       Restrictions:  Restrictions/Precautions: Fall Risk       SUBJECTIVE:   Subjective: \"I am alright\"    Pain  Pain: 0/10 pain pre and post session      OBJECTIVE:      Outcomes Measures:  Dynamic Gait Total Score: 22     Transfers  Surface: To chair with arms  Sit to Stand  Assistance Level: Modified independent  Stand to Sit  Assistance Level: Modified independent  Bed To/From Chair  Assistance Level: Modified independent  Car Transfer  Assistance Level: Modified independent  Floor Transfer  Assistance Level: Supervision  Skilled Clinical Factors: Demonstation provided for proper technique    Ambulation  Surface: Level surface; Uneven surface; Carpet; Ramp  Distance: 275'  Activity: Within Room  Activity Comments: Reciprocal pattern mild antalgia, improved negotiation of turns  Assistance Level: Modified independent  Skilled Clinical Factors: Mild antalgia due to R calf pain continues to show improved safety with negotiqation of turns    Stairs  Stair Height: 6''  Device:  (without handrails)  Number of Stairs: 4  Additional Factors: Reciprocal going up;Non-reciprocal going down  Assistance Level: Supervision  Skilled Clinical Factors: Good safety ascending increased difficulty descending requires increased effort to complete    ASSESSMENT/PROGRESS TOWARDS GOALS:   Assessment  Assessment: Patient is meeting DGI goal completing at . Patient continues to progress safety with gait and transfers requiring decreased assistance to complete  Activity Tolerance: Patient limited by pain  Discharge Recommendations: Outpatient PT; Home independently    Goals:  Long Term Goals  Long term goal 1: Pt to complete bed mobility indep  Long term goal 2: Pt to complete transfers with indep (bed/chair/car)  Long term goal 3: Pt to ambulate >300ft indoor/outdoor without AD indep  Long term goal 4: Pt to achieve 20/24 DGI  Long term goal 5: Pt to complete 5XSTS in 8sec    PLAN OF CARE/Safety:   Safety Devices  Type of Devices:  All fall risk precautions in place      Therapy Time:   Individual   Time In 1030   Time Out 1100   Minutes 30     Minutes:30  Transfer/Bed mobility trainin  Gait training: 15  Neuro re education: DANA Jason, 07/15/22 at 11:43 AM
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
## 2022-07-15 NOTE — PROGRESS NOTES
Assumed care of pt at [address] [place] [address] on 7/14/22. Pt had flat affect and had delayed responses. Cooperative. Pt states that he is bored. Pt was given  sleep meds as scheduled and has been sleeping without any difficulty tonight. Pt had a snack at  and call light in reach and bed alarm on Electronically signed by Darlene Ireland.  Kaycee Newton on 7/15/2022 at 3:47 AM

## 2022-07-15 NOTE — PROGRESS NOTES
Physical Therapy Rehab Treatment Note  Facility/Department: [facility]  Room: [place] [id number]       NAME: Saida Onofre  : 1990 (33 y.o.)  MRN: [id number]  CODE STATUS: Full Code    Date of Service: 7/15/2022       Restrictions:  Restrictions/Precautions: Fall Risk       SUBJECTIVE:   Subjective: \"I am alright\"    Pain  Pain: 0/10 pain pre and post session      OBJECTIVE:      Roll Left  Assistance Level: Independent  Roll Right  Assistance Level: Independent  Sit to Supine  Assistance Level: Independent  Supine to Sit  Assistance Level: Independent  Scooting  Assistance Level: Independent    Transfers  Surface: To chair with arms  Sit to Stand  Assistance Level: Independent  Stand to Sit  Assistance Level: Independent  Bed To/From Chair  Assistance Level: Independent  Car Transfer  Assistance Level: Independent  Skilled Clinical Factors: Performed in family vehicle  Floor Transfer  Assistance Level: Supervision;Modified independent  Skilled Clinical Factors: Demonstation provided for proper technique    Ambulation  Surface: Level surface; Uneven surface; Carpet; Ramp  Distance: 275', 500'  Activity: Within Room  Activity Comments: Reciprocal pattern mild antalgia, improved negotiation of turns  Assistance Level: Modified independent  Skilled Clinical Factors: Mild antalgia due to R calf pain continues to show improved safety with negotiqation of turns.  Obstacle course    Stairs  Stair Height: 6''  Device:  (without handrails)  Number of Stairs: 4  Additional Factors: Reciprocal going up;Non-reciprocal going down  Assistance Level: Supervision  Skilled Clinical Factors: Good safety ascending increased difficulty descending requires increased effort to complete    Neuromuscular Education  Neuromuscular Education: Yes  NDT Treatment: Gait   Neuromuscular Comments: Gait training: Obstacle course with dribbling ball while negotiating obstacles with no LOB and standing catching football promoting reaching OBOS and across midline with mild delay in reaction time. Environmental scanning finding playing cards throughout therapy gym finding cards in order to promote head turns reaching for cards and avoiding obstacles    Activity Tolerance  Activity Tolerance: Patient limited by pain    Education Provided: Family Education  Education  Education Given To: Family  Education Provided: Family Education  Education Provided Comments: Family observed transfers from multiple surfaces including transfer to family truck, gait training obstacle courses with cognitive tasks. Education Method: Demonstration;Verbal  Education Outcome: Verbalized understanding  Skilled Clinical Factors: Family very pleased with patient's progress in physical therapy with gait transfers and balance, family shows concerns with R calf and hip pain        ASSESSMENT/PROGRESS TOWARDS GOALS:   Assessment  Assessment: Patient is meeting DGI goal completing at . Patient continues to progress safety with gait and transfers requiring decreased assistance to complete  Activity Tolerance: Patient limited by pain  Discharge Recommendations: Outpatient PT; Home independently    Goals:  Long Term Goals  Long term goal 1: Pt to complete bed mobility indep  Long term goal 2: Pt to complete transfers with indep (bed/chair/car)  Long term goal 3: Pt to ambulate >300ft indoor/outdoor without AD indep  Long term goal 4: Pt to achieve  DGI  Long term goal 5: Pt to complete 5XSTS in 8sec    PLAN OF CARE/Safety:   Safety Devices  Type of Devices:  All fall risk precautions in place      Therapy Time:   Individual   Time In 1400   Time Out 1500   Minutes 60     Minutes: 60  Transfer/Bed mobility training: 15  Gait trainin  Neuro re education: [id number] Tosin Gomez PTA, 07/15/22 at 3:41 PM

## 2022-07-15 NOTE — PROGRESS NOTES
OCCUPATIONAL THERAPY  INPATIENT REHAB TREATMENT NOTE  [facility]      NAME: Abiel Auguste  : 1990 (33 y.o.)  MRN: [id number]  CODE STATUS: Full Code  Room: T086/V460-14    Date of Service: 7/15/2022    Referring Physician: Dr. Igor Smith Diagnosis: Imp ADLs d/t hypoxic encephalopathy s/p overdose likely secondary to fentanyl laced xanax pill    Restrictions  Restrictions/Precautions  Restrictions/Precautions: Fall Risk         Patient's date of birth confirmed: Yes    SAFETY:  Safety Devices  Safety Devices in place: Yes  Type of devices: All fall risk precautions in place    SUBJECTIVE:  Subjective: I slept better  Pain: 3-10 pain    COGNITION:     Comprehension: Independent  Expression: Independent  Social Interaction: Min A  Problem Solving: Max A  Memory: Mod A    OBJECTIVE:       Feeding  Assistance Level: Independent  Grooming/Oral Hygiene  Assistance Level: Supervision  Upper Extremity Bathing  Assistance Level: Supervision  Lower Extremity Bathing  Assistance Level: Supervision  Upper Extremity Dressing  Assistance Level: Modified independent  Lower Extremity Dressing  Assistance Level: Stand by assist  Skilled Clinical Factors: increased time RLE  Putting On/Taking Off Footwear  Skilled Clinical Factors: using sock aide RLE  Toileting  Skilled Clinical Factors: NT- however posterior hygiene completed with bathing in shower  Tub/Shower Transfers  Type: Shower  Transfer From: Standing without device  Transfer To: Shower chair with back  Assistance Level: Supervision    Cues still needed for sequencing self-care and for improving safety     Instrumental ADL's  Instrumental ADLs: Yes  Light Housekeeping  Light Housekeeping Level: Other (no AD)  Light Housekeeping Level of Assistance: Supervision  Light Housekeeping: clothing gather and transfer without AD. Putting away dirty clothes    Functional Mobility  Device:  (no AD)  Activity: To/From bathroom; Retrieve items;Transport items  Assistance Level: Supervision    Patient engaged in cognitive and visual perceptual activity to increase [place] with ADL/IADL completion by focusing on problem solving, abstract thinking, and sequencing. Pt required to look up answers to 4 written questions concerning facts about different types of birds within medium sized book. Without cue- pt used index glossary to locate pages of birds that the questions concerned. Pt required increased time but was able to correctly locate and write answers for  4/4 questions. Good ability noted to search for needed information within the text. Plan to continue to address cognition     While seated, challenged strength, activity tolerance, ROM, and flexibility for improved ADL performance. Challenged pt through usage of 3# dowel altagracia to complete BUE exercises. 2 sets x 10 reps completed. Exercises focused on all UE joints and planes of motion including scapular protraction/retraction, shoulder flexion/extension/rotation/horizontal abduction, elbow flexion/extension, supination/pronation, and wrist/digit flexion/extension. Completed BUE reciprocal exercises using ergometer arm bike with min resistance. Pt tolerated 3 mins x 2 sets- one forward and one backwards. Rest break needed between sets     HR remained stable during session: 125 to 135 bpm     Education:   ADL/transfer techniques, POC, general safety, exercises     Equipment recommendations:   Sock aide for dressing RLE      ASSESSMENT:  Good participation again today. Pt continues to demo flat affect however he did participate in conversation today. Pt with fair carryover with technique using sock aide.  Some problem solving ability demo'ed during cognitive task    PLAN OF CARE:  Strengthening, Balance training, Functional mobility training, Endurance training, Neuromuscular re-education, Safety education & training, Patient/Caregiver education & training, Equipment evaluation, education, & procurement, Self-Care / ADL, Home management training       Patient goals : \"Get home, get back to life, feel normal\"  Time Frame for Long term goals :  Within 1 weeks, pt to demo progress in the following areas listed below to achieve specific LTGs as stated in the evaluation  Long Term Goal 1: Pt will demo improved overall ADL/IADL status  Long Term Goal 2: Pt will demo improved standing balance and tolerance for self-care completion  Long Term Goal 3: Pt will demo improved activity tolerance for completion of ADL/IADL  Long Term Goal 4: Pt will demo improved overall safety and cognition in order to be able to care for himself with minimal assistance        Therapy Time:   Individual Group Co-Treat   Time In 1100       Time Out 1200         Minutes 60             ADL/IADL trainin minutes  Therapeutic activities: 15 minutes  Cognitive Retraining: 15 minutes     Electronically signed by:    Asim Pfeiffer OT,   7/15/2022, 11:38 AM

## 2022-07-15 NOTE — PROGRESS NOTES
[facility]  Facility/Department: [facility]  Speech Language Pathology   Treatment Note          Anthony Clink  1990  [id number]  [x]   confirmed    Date: 7/15/2022    Rehab Diagnosis: Impaired mobility and ADL's due to hypoxic encephalopathy. [facility] admit [facility]      Restrictions/Precautions: Fall Risk    Weight: 160 lb 12.8 oz (72.9 kg)     ADULT DIET; Regular    SpO2: 100 % (07/15/22 0702)  No active isolations    Speech Dx: Cognitive Linguistic Impairment    Subjective:  Alert, Cooperative, and Pleasant        Interventions used this date:  Cognitive Skill Development    Objective/Assessment:  Patient progressing towards goals:  Short-term Goals  Timeframe for Short-term Goals: 2-3 weeks  Goal 1: To increase safety awareness and judgment for safe completion of ADLs secondary to pt's cognitive deficits, pt will complete abstract reasoning tasks (i.e. Word deduction, convergent and divergent naming, similarities/differences) with 80% accuracy and min cues. Patient completed word deduction task I with 46% accuracy, with min to mod cueing. High level naming abilities were negatively impacted by reduced thought organization. Goal 2: To address pt's cognitive deficits and promote recall of personal and medical information, pt will answer questions addressing (recent, delayed) recall with 80% accuracy and fading cues. Patient completed bingo boogle direction task related to recalling info to decode bingo board and formulate words with multiple verbal repetition I with 50% accuracy. Patient appeared impulsive intermittently during the task. Per patient and parent's he has a history of impulsivity secondary to ADHD. Goal 4: To decrease cognitive deficits and improve attention to tasks for safe completion of ADLs, pt will complete structured tasks addressing alternating and/or divided attention with 80% accuracy and fading cues.  Patient completed task of alternating between 2 visual elements while moving down the page I with 100% accuracy. Per patient's parents the patient is usually for talkative. ST has noted short 1-3 word answers when asked questions. Patient is meeting basic wants and needs without difficulty. ST inquired about the patient's job at the [employer]. Patient presented with 1-2 word answers lacking detail. ST noted increased questions increased length of utterances. New Goal  Pt will be educated on compensatory strategies for word finding deficits in 5/5 given opportunities to help the pt express his/her basic personal, safety, and medical needs and increase length of utterance in the presence of language deficits. Treatment/Activity Tolerance:  Patient tolerated treatment well    Plan:  Continue per POC    Pain Assessment:  Patient does not c/o pain. Pain Re-assessment:  Patient does not c/o pain. Patient/Caregiver Education:  Patient educated on session and progression towards goals. Caregiver education on session and progress towards goals. Safety Devices:  Chair alarm in place      Speech Therapy Level of Assistance Scale    AUDITORY COMPREHENSION  Rating:  Modified Independent-Supervised Assistance    VERBAL EXPRESSION  Rating:  Minimal Assistance    PROBLEM SOLVING  Rating:  Moderate Assistance    MEMORY  Rating:  Minimal Assistance          Therapy Time  SLP Individual Minutes  Time In: 1330  Time Out: 1400  Minutes: 30              Signature: Electronically signed by SOPHIE Goodwin on 7/15/2022 at 2:05 PM

## 2022-07-15 NOTE — PROGRESS NOTES
Patient was referred by Dr. Sandip Trejo due to concerns relating to the patient's overdose with several drugs including Fentanyl and/or Kratom. There is some question as to whether he took Xanax pills laced with Fentanyl (see note by Dr. Sandip Trejo dated 7/7). He had been taking Adderall, Vyvanse, and Klonopin, and the family was aware that he had been abusing Adderall and Vyvanse for years. A bottle of Kratom was found in his apartment, and he acknowledges using Kratom. On admission, he tested positive for benzodiazepines and amphetamines. In any case, he was diagnosed with hypoxic ischemic encephalopathy by Dr. Etta Hall (see his note dated 7/7). Rehab staff have noted apparent difficulties with cognitive functions and short-term memory. (With the patient's permission, I left a message for the mother to call me so I could obtain some corroborating information.)    When interviewed this morning, he was laying in bed and appeared to be lethargic. His affect was restricted and shallow (see below). He appeared somewhat apathetic. His verbal expression was sparse, and he did not initiate much conversation or ask any questions. I frequently had to prompt him to elaborate. His verbal expression was notably concrete. I asked him what his plans were, and he said to go home. He told me  that he lived in an apartment in [place]. I asked him about his work. 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 I asked him if he could go back to work, and he said, \"Probably not\". I asked him why and he said because his leg was sore. I asked him how did that happen, and he said that he fell when he had a seizure. I then asked was that why he came to the hospital, and he said yes. I then asked him what caused the seizure, and he said that he had an overdose. I asked him whether he had problems with drugs, and he said that he did. He did not express any problems about his drug addiction.  We talked about going into rehab, and I told him that was a good idea. He is agreeable to going to rehab. He told me that he had never been in rehab or counseling, but, at this point, I'm not sure that he is an entirely reliable informant. I asked him about his mood, but he couldn't really tell me. I asked him if he felt depressed, and he said yes. When I asked him to elaborate, he said that he felt \"numb\". I asked him if he felt sad, and he said no. I asked him if he felt hopeless, and he said yes. A/P- I consulted with Rehab staff, who were concerned about the  patient's mental status and ability to comprehend his condition and make rational decisions. One of the concerns at this time is whether the patient can meaningfully participate in a drug rehab program. If he is not able to participate, it is possible that the program may let him go. I will contact the parents to obtain corroborating information concerning the patient's current mental status and history.

## 2022-07-16 LAB
CORTISOL COLLECTION INFO: NORMAL
CORTISOL: 12.1 UG/DL (ref 2.7–18.4)

## 2022-07-16 PROCEDURE — 99232 SBSQ HOSP IP/OBS MODERATE 35: CPT | Performed by: PHYSICAL MEDICINE & REHABILITATION

## 2022-07-16 PROCEDURE — 97140 MANUAL THERAPY 1/> REGIONS: CPT

## 2022-07-16 PROCEDURE — 99232 SBSQ HOSP IP/OBS MODERATE 35: CPT | Performed by: INTERNAL MEDICINE

## 2022-07-16 PROCEDURE — 6370000000 HC RX 637 (ALT 250 FOR IP): Performed by: FAMILY MEDICINE

## 2022-07-16 PROCEDURE — 97116 GAIT TRAINING THERAPY: CPT

## 2022-07-16 PROCEDURE — 6360000002 HC RX W HCPCS: Performed by: INTERNAL MEDICINE

## 2022-07-16 PROCEDURE — 97535 SELF CARE MNGMENT TRAINING: CPT

## 2022-07-16 PROCEDURE — 6370000000 HC RX 637 (ALT 250 FOR IP): Performed by: INTERNAL MEDICINE

## 2022-07-16 PROCEDURE — 93005 ELECTROCARDIOGRAM TRACING: CPT | Performed by: INTERNAL MEDICINE

## 2022-07-16 PROCEDURE — 97112 NEUROMUSCULAR REEDUCATION: CPT

## 2022-07-16 PROCEDURE — 6370000000 HC RX 637 (ALT 250 FOR IP): Performed by: PHYSICAL MEDICINE & REHABILITATION

## 2022-07-16 PROCEDURE — 82533 TOTAL CORTISOL: CPT

## 2022-07-16 PROCEDURE — 1180000000 HC REHAB R&B

## 2022-07-16 PROCEDURE — 97530 THERAPEUTIC ACTIVITIES: CPT

## 2022-07-16 RX ADMIN — TRAZODONE HYDROCHLORIDE 50 MG: 50 TABLET ORAL at 20:37

## 2022-07-16 RX ADMIN — ALPRAZOLAM 0.5 MG: 0.25 TABLET ORAL at 12:37

## 2022-07-16 RX ADMIN — ALPRAZOLAM 0.5 MG: 0.25 TABLET ORAL at 20:37

## 2022-07-16 RX ADMIN — Medication 100 MG: at 08:41

## 2022-07-16 RX ADMIN — VERAPAMIL HYDROCHLORIDE 120 MG: 120 TABLET, FILM COATED, EXTENDED RELEASE ORAL at 20:37

## 2022-07-16 RX ADMIN — PREDNISONE 15 MG: 5 TABLET ORAL at 08:41

## 2022-07-16 RX ADMIN — ZOLPIDEM TARTRATE 10 MG: 5 TABLET ORAL at 20:36

## 2022-07-16 RX ADMIN — ENOXAPARIN SODIUM 40 MG: 100 INJECTION SUBCUTANEOUS at 08:41

## 2022-07-16 RX ADMIN — Medication 2000 UNITS: at 16:52

## 2022-07-16 RX ADMIN — ESCITALOPRAM OXALATE 20 MG: 20 TABLET ORAL at 08:41

## 2022-07-16 RX ADMIN — ALPRAZOLAM 0.5 MG: 0.25 TABLET ORAL at 04:39

## 2022-07-16 RX ADMIN — VERAPAMIL HYDROCHLORIDE 120 MG: 120 TABLET, FILM COATED, EXTENDED RELEASE ORAL at 14:49

## 2022-07-16 ASSESSMENT — ENCOUNTER SYMPTOMS
NAUSEA: 0
WHEEZING: 0
EYES NEGATIVE: 1
BLOOD IN STOOL: 0
GASTROINTESTINAL NEGATIVE: 1
RESPIRATORY NEGATIVE: 1
COUGH: 0
SHORTNESS OF BREATH: 0
CHEST TIGHTNESS: 0
STRIDOR: 0

## 2022-07-16 ASSESSMENT — PAIN SCALES - GENERAL: PAINLEVEL_OUTOF10: 0

## 2022-07-16 NOTE — PLAN OF CARE
Problem: Discharge Planning  Goal: Discharge to home or other facility with appropriate resources  Outcome: Progressing     Problem: Safety - Adult  Goal: Free from fall injury  7/16/2022 1114 by Shyann Tripp RN  Outcome: Progressing  7/15/2022 2231 by Luther Gonzalez RN  Outcome: Progressing     Problem: ABCDS Injury Assessment  Goal: Absence of physical injury  7/16/2022 1114 by Shyann Tripp RN  Outcome: Neha Ferro  7/15/2022 2231 by Luther Gonzalez RN  Outcome: Progressing     Problem: Pain  Goal: Verbalizes/displays adequate comfort level or baseline comfort level  Outcome: Progressing     Problem: Neurosensory - Adult  Goal: Achieves maximal functionality and self care  Outcome: Progressing     Problem: Respiratory - Adult  Goal: Achieves optimal ventilation and oxygenation  Outcome: Progressing     Problem: Cardiovascular - Adult  Goal: Maintains optimal cardiac output and hemodynamic stability  Outcome: Progressing     Problem: Skin/Tissue Integrity - Adult  Goal: Skin integrity remains intact  Outcome: Progressing     Problem: Musculoskeletal - Adult  Goal: Return mobility to safest level of function  Outcome: Progressing     Problem: Gastrointestinal - Adult  Goal: Maintains adequate nutritional intake  Outcome: Progressing     Problem: Genitourinary - Adult  Goal: Absence of urinary retention  Outcome: Progressing     Problem: Infection - Adult  Goal: Absence of infection at discharge  Outcome: Progressing     Problem: Metabolic/Fluid and Electrolytes - Adult  Goal: Electrolytes maintained within normal limits  Outcome: Progressing     Problem: Hematologic - Adult  Goal: Maintains hematologic stability  Outcome: Progressing

## 2022-07-16 NOTE — PROGRESS NOTES
Spiritual Care Services     Summary of Visit:  Consult placed by attending. Patient was resting in bed. His parents were at his bedside. Patient's tray table is full of food and there is more food by the window. An arm on his glasses is missing. Mom is very expressive of her love. Dad sits quietly by. Parents whisper to me that the patient has a drug problem and that is why he was hospitalized. Mom is teary when speaking of her son and the past 13 years. Patient is very passive. He suffered a brain injury. He answers most questions with one word answers. My visit with the patient was brief as therapy came to work with him. He is anxious to be well enough to get on to rehab for his drug problem. Per Mom, he is not capable of ordering new glasses at this time (gets frustrated). Patient is not religous. Mom speaks of many prayers being offered for him. This  offered prayer for patient to continue to get well and complete his drug rehab and have the will to carry on without the addiction. Prayer for parents for peace and patience in the journey. Spiritual Assessment/Intervention/Outcomes:    Encounter Summary  Encounter Overview/Reason : Initial Encounter  Service Provided For[de-identified] Patient and family together  Referral/Consult From[de-identified] Physician  Support System: Parent, Family members  Complexity of Encounter: Moderate  Begin Time: 1230  End Time : 1250  Total Time Calculated: 20 min  Encounter   Type: Family Care     Spiritual/Emotional needs  Type: Emotional Distress                    Values / Beliefs  Do You Have Any Ethnic, Cultural, Sacramental, or Spiritual [employer] Needs You Would Like Us To Be Aware of While You Are in the Hospital : No    Care Plan:    Continue to follow and support.     Spiritual Care Services   Electronically signed by Juan Houston on 7/16/22 at 2:47 PM EDT     To reach a  for emotional and spiritual support, place an [facility] consult request.   If a  is needed immediately, dial 0 and ask to page the on-call .

## 2022-07-16 NOTE — PROGRESS NOTES
OCCUPATIONAL THERAPY  INPATIENT REHAB TREATMENT NOTE  [facility]      NAME: Gloriann Kussmaul  : 1990 (33 y.o.)  MRN: [id number]  CODE STATUS: Full Code  Room: O304/P224-78    Date of Service: 2022    Referring Physician: Dr. Lamar Llanos Diagnosis: Imp ADLs d/t hypoxic encephalopathy s/p overdose likely secondary to fentanyl laced xanax pill    Restrictions  Restrictions/Precautions  Restrictions/Precautions: Fall Risk     Patient's date of birth confirmed: Yes    SAFETY:  Safety Devices  Safety Devices in place: Yes  Type of devices: All fall risk precautions in place    SUBJECTIVE:  Subjective: \" Let's be done. \"     Pain at start of treatment:  Yes 3/10    Pain at end of treatment:   Yes 310    Pain Location: R hip  Pain Descriptors: dull ache  Nursing notified: no  Intervention: None    COGNITION:  Orientation  Overall Orientation Status: Within Functional Limits  Cognition  Overall Cognitive Status: Exceptions    OBJECTIVE:    Instrumental ADL's  Instrumental ADLs: Yes  Meal Prep  Meal Prep Level:  (0 AD)  Meal Prep Level of Assistance: Supervision  Meal Preparation: Patient instructed to first wash hands and then to make a microwave personal mug cake following the printed directions. Patient washed hands at Independent. Patient able to retrieve 3 items from upper cabinets and 2 items from drawer at Supervision with 0 AD and requiring verbal confirmation for each step of the instructions. Patient able to follow written instructions with increased time at [facility] requiring verbal confirmation for each step of the instructions. Patient able to transport mug to microwave at [facility]. Patient able to microwave mug cake at [facility]. Patient able to remove mug cake from microwave at [facility].   Light Housekeeping  Light Housekeeping Level:  (0 AD)  Light Housekeeping Level of Assistance: Contact guard assistance;Stand by assistance  Light Housekeeping:   Patient completed vacuuming activity. Patient plugged in vacuum at [facility]. Patient vacuumed carpet at [facility] with CGA for 1 LOB. Patient unplugged vacuum at [facility]. Patient returned vacuum to clean utility closet at [facility]. Patient completed dishwashing activity following mug cake activity. Patient ambulated with 0 AD at [facility] throughout activity. Patient retrieved 2 dishcloths from lower shelf of upper cabinet. Patient able to adjust water temperature and add appropriate amount of dish soap. Patient placed dishes in dishwater. Patient washed and dried dishes. Patient placed dishes in correct cabinets/drawers. Patient let dishwater drain and wiped off sink and counter. Patient dried dishes and put dishes in lower shelf of cabinet and in upper drawer. Patient transported and placed dishcloths in hamper. Health Management  Health Management Level of Assistance: Supervision  Health Management:   Medication Management Simulation Activity:  Patient completed simulation activity utilizing 7 provided medication bottles with written instruction to place a total of 74 beads into the provided weekly medication organizer. Patient with 0 difficulty opening medication bottles. Patient with 0 difficulty opening organizer boxes. Patient placed a total of 67 beads into organizer with 0 verbal cues and a total of 67 being correct. Patient with MIN difficulty manipulating beads. Patient able to sort beads back into correct bottles with 0 errors. Patient able to securely close 7/7 caps on medication bottles. Cognitive Menu Order IADL:  Patient engaged in cognitive activity to increase I with IADL's. Patient provided with a take out menu for various items and a questionnaire. Patient instructed to scan the take out menu and answer the questions appropriately. Patient able to correctly answer 85% of the questions. Patient with 100% legibility with R FM handwriting and 0 spelling errors.     Small Peg Activity:  Patient engaged in B FM coordination/strengthening and cognition to increase I with ADL's, IADL's and transfers. Patient completed small peg design replications with example picture placed at midline. Patient able to reach in lateral planes with 0 difficulty. Patient able to reach in forward planes with 0 difficulty. Patient able to  small pegs from table top with MIN difficulty. Patient able to turn pegs into correct position for placement with 0 difficulty with in hand manipulation. Patient able to place pegs into small pegboard with 0 difficulty. Patient able to follow design with 0 difficulty. Patient completed a simple 30 piece zig zag design with original design placed at midline. Patient placed a total of 30 pegs with 30 being correct on first attempt. Patient completed an intermediate 58 piece straight line design with original design placed at midline. Patient placed a total of 58 pegs with 58 being correct on first attempt. Patient completed a complex 46 piece design with multiple curves with original design placed at midline. Patient placed a total of 46 pegs with 46 being correct on first attempt. Patient able to remove pegs with 0 difficulty. Patient with 0 errors with correct color choices of pegs. ASSESSMENT: Patient pleasant throughout session. Activity Tolerance: Patient tolerated treatment well      PLAN OF CARE:  Strengthening, Balance training, Functional mobility training, Endurance training, Neuromuscular re-education, Safety education & training, Patient/Caregiver education & training, Equipment evaluation, education, & procurement, Self-Care / ADL, Home management training     Continue per OT POC    Patient goals : \"Get home, get back to life, feel normal\"  Time Frame for Long term goals :  Within 1 weeks, pt to demo progress in the following areas listed below to achieve specific LTGs as stated in the evaluation  Long Term Goal 1: Pt will demo improved overall ADL/IADL status  Long Term Goal 2: Pt will demo improved standing balance and tolerance for self-care completion  Long Term Goal 3: Pt will demo improved activity tolerance for completion of ADL/IADL  Long Term Goal 4: Pt will demo improved overall safety and cognition in order to be able to care for himself with minimal assistance        Therapy Time:   Individual Group Co-Treat   Time In 1000       Time Out 1100         Minutes 60                   ADL/IADL trainin minutes  Therapeutic activities: 15 minutes  Cognitive Retrainin minutes     Electronically signed by:    NATHANIEL Rosas,   2022, 2:00 PM

## 2022-07-16 NOTE — PROGRESS NOTES
Social Determinants of Health     Financial Resource Strain: Low Risk     Difficulty of Paying Living Expenses: Not hard at all   Food Insecurity: No Food Insecurity    Worried About [facility] in the Last Year: Never true    [facility] in the Last Year: Never true       Subjective/HPI nonew events. Resting comfortable did PT today.  little better w CCB    EKG:        Review of Systems:   Review of Systems   Constitutional: Negative. Negative for diaphoresis and fatigue. HENT: Negative. Eyes: Negative. Respiratory: Negative. Negative for cough, chest tightness, shortness of breath, wheezing and stridor. Cardiovascular: Negative. Negative for chest pain, palpitations and leg swelling. Gastrointestinal: Negative. Negative for blood in stool and nausea. Genitourinary: Negative. Musculoskeletal: Negative. Skin: Negative. Neurological: Negative. Negative for dizziness, syncope, weakness and light-headedness. Hematological: Negative. Psychiatric/Behavioral: Negative. Physical Examination:    /81   Pulse (!) 108   Temp 97.7 °F (36.5 °C) (Oral)   Resp 18   Ht 5' 10\" (1.778 m)   Wt 160 lb 12.8 oz (72.9 kg)   SpO2 100%   BMI 23.07 kg/m²    Physical Exam   Constitutional: He appears healthy. No distress. HENT:   Normal cephalic and Atraumatic   Eyes: Pupils are equal, round, and reactive to light. Neck: Thyroid normal. No JVD present. No neck adenopathy. No thyromegaly present. Cardiovascular: Regular rhythm, normal heart sounds, intact distal pulses and normal pulses. Tachycardia present. Pulmonary/Chest: Effort normal and breath sounds normal. He has no wheezes. He has no rales. He exhibits no tenderness. Abdominal: Soft. Bowel sounds are normal. There is no abdominal tenderness. Musculoskeletal:         General: No tenderness or edema. Normal range of motion. Cervical back: Normal range of motion and neck supple. Neurological: He is alert and oriented to person, place, and time. Skin: Skin is warm. No cyanosis. Nails show no clubbing.      LABS:  CBC:   Lab Results   Component Value Date/Time    WBC 22.9 07/11/2022 05:14 AM    RBC 4.39 07/11/2022 05:14 AM    HGB 12.6 07/11/2022 05:14 AM    HCT 37.7 07/11/2022 05:14 AM    MCV 85.9 07/11/2022 05:14 AM    MCH 28.6 07/11/2022 05:14 AM    MCHC 33.3 07/11/2022 05:14 AM    RDW 12.9 07/11/2022 05:14 AM     07/11/2022 05:14 AM    MPV 8.5 07/13/2015 04:16 PM     CBC with Differential:    Lab Results   Component Value Date/Time    WBC 22.9 07/11/2022 05:14 AM    RBC 4.39 07/11/2022 05:14 AM    HGB 12.6 07/11/2022 05:14 AM    HCT 37.7 07/11/2022 05:14 AM     07/11/2022 05:14 AM    MCV 85.9 07/11/2022 05:14 AM    MCH 28.6 07/11/2022 05:14 AM    MCHC 33.3 07/11/2022 05:14 AM    RDW 12.9 07/11/2022 05:14 AM    BANDSPCT 1 07/08/2022 05:25 AM    METASPCT 2 07/08/2022 05:25 AM    LYMPHOPCT 10.1 07/11/2022 05:14 AM    MONOPCT 4.9 07/11/2022 05:14 AM    MYELOPCT 1 07/08/2022 05:25 AM    BASOPCT 0.1 07/11/2022 05:14 AM    MONOSABS 1.1 07/11/2022 05:14 AM    LYMPHSABS 2.3 07/11/2022 05:14 AM    EOSABS 0.0 07/11/2022 05:14 AM    BASOSABS 0.0 07/11/2022 05:14 AM     CMP:    Lab Results   Component Value Date/Time     07/11/2022 05:14 AM    K 4.0 07/11/2022 05:14 AM     07/11/2022 05:14 AM    CO2 26 07/11/2022 05:14 AM    BUN 12 07/11/2022 05:14 AM    CREATININE 0.56 07/11/2022 05:14 AM    GFRAA >60.0 07/11/2022 05:14 AM    LABGLOM >60.0 07/11/2022 05:14 AM    GLUCOSE 118 07/11/2022 05:14 AM    PROT 6.5 07/12/2022 05:20 AM    LABALBU 4.0 07/12/2022 05:20 AM    CALCIUM 9.2 07/11/2022 05:14 AM    BILITOT 0.4 07/12/2022 05:20 AM    ALKPHOS 78 07/12/2022 05:20 AM    AST 24 07/12/2022 05:20 AM     07/12/2022 05:20 AM     BMP:    Lab Results   Component Value Date/Time     07/11/2022 05:14 AM    K 4.0 07/11/2022 05:14 AM     07/11/2022 05:14 AM    CO2 26 07/11/2022 05:14 AM    BUN 12 07/11/2022 05:14 AM    LABALBU 4.0 07/12/2022 05:20 AM    CREATININE 0.56 07/11/2022 05:14 AM    CALCIUM 9.2 07/11/2022 05:14 AM    GFRAA >60.0 07/11/2022 05:14 AM    LABGLOM >60.0 07/11/2022 05:14 AM    GLUCOSE 118 07/11/2022 05:14 AM     Magnesium:    Lab Results   Component Value Date/Time    MG 1.9 07/03/2022 04:50 AM     Troponin:  No results found for: [address] [place] [address] Problems    Diagnosis Date Noted    Tachycardia [R00.0] 07/15/2022     Priority: High    Cognitive impairment [R41.89] 07/15/2022     Priority: Medium    Hypoxic encephalopathy (HCC) [G93.1]      Priority: Medium    Spasm of back muscles [M62.830] 07/12/2022     Priority: Medium    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] 07/12/2022     Priority: Medium    Lesion of right sciatic nerve [G57.01] 07/12/2022     Priority: Medium    Impaired mobility and ADLs [Z74.09, Z78.9] 07/12/2022     Priority: Medium     impaired mobility and ADLs dt encephalopathy  [Z74.09, Z78.9] 07/05/2022     Priority: Medium    Vitamin D deficiency [E55.9] 07/05/2022     Priority: Medium    Altered mental status [R41.82]      Priority: Medium    Drug abuse ([facility] [F19.10]      Priority: Medium    Shift work sleep disorder [G47.26] 07/15/2019     Priority: Low    ROSALES (generalized anxiety disorder) [F41.1] 06/04/2019     Priority: Low    MDD (major depressive disorder), recurrent episode, moderate ([facility] [F33.1] 06/04/2019     Priority: Low    Polydrug dependence including opioid type drug with continuous use with complication ([email or web address] [W97.84] 06/04/2019     Priority: Low    Anxiety [F41.9] 07/13/2015     Priority: Low    ADHD (attention deficit hyperactivity disorder) [F90.9] 12/05/2012     Priority: Low    LPRD (laryngopharyngeal reflux disease) [K21.9] 10/19/2011     Priority: Low        Assessment/Plan:  Tachycardia- Obtain ECG.   Verapamil - advance to BID  Echo -P       Electronically signed by Parviz Naidu MD on 7/16/2022 at 2:03 PM

## 2022-07-16 NOTE — PROGRESS NOTES
Patient is resting in bed with no c/o pain or discomfort. Denies chest pain or palpitations and or dizziness at this time. LS CTA.

## 2022-07-16 NOTE — PROGRESS NOTES
Subjective: The patient complains of severe acute on chronic progressive fatigue and ataxia, cognitive slowing, right lower extremity numbness and stiffness partially relieved by rest, medications, PT,  OT, medication titration SLP and rest and exacerbated by recent OD-he was admitted to [facility] on 6/30/2022  with change in mental status and history of possible overdose. Family did a well check after his boss noticed that he had not shown up to work for several days and called the family out of concern. He was initially admitted and to the ICU. He had abnormal liver enzymes, rhabdomyolysis kidney injury. Urine was positive for amphetamines benzos and fentanyl. He was found to also be using Royle kratom overuse, transaminitis, EDGAR, rhabdomyolysis resulting in provoked seizure and cerebral edema. He admits that he took 2 Xanax that he bought on the street. He and I both fear that it may have been laced with fentanyl. Discussed with treatment team and hospitalist and neuro--  MRIs spine were normal..      I am concerned about patients medical complexities and barriers to advancing in rehab goals including right lower extremity stiffness secondary to sciatic neuropathy and nerve injury as well as his history of substance abuse and overuse of benzodiazepines. She plans to take him off of the benzos but closer to discharge. Patient slept much better with the Ambien dosing last night. I reviewed current care and plans for further care with other rehab providers including nursing and case management. According to recent nursing note, \" Patient is resting in bed with no c/o pain or discomfort. Denies chest pain or palpitations and or dizziness at this time. LS CTA. \"      Am concerned about his baseline sinus tachycardia  -I reviewed-consult with cardiology and check thyroid and cortisol levels-Recenmt T4, K and Mag WNL.   Heart rate seems to be somewhat better after starting verapamil-Obtain ECG. Will order Echo. ROS x10: The patient also complains of severely impaired mobility and activities of daily living. Otherwise no new problems with vision, hearing, nose, mouth, throat, dermal, cardiovascular, GI, , pulmonary, musculoskeletal, psychiatric or neurological. See also Acute Rehab PM&R H&P. Vital signs:  /81   Pulse (!) 108   Temp 97.7 °F (36.5 °C) (Oral)   Resp 18   Ht 5' 10\" (1.778 m)   Wt 160 lb 12.8 oz (72.9 kg)   SpO2 100%   BMI 23.07 kg/m²   I/O:   PO/Intake:  fair PO intake,  Adult reg  Diet thin liquids    Bowel:   continent   Bladder: continent   No need for schwartz  General:  Patient is well developed,   adequately nourished, and    well kempt. HEENT:    Pupils equal, hearing intact to loud voice, external inspection of ear and nose benign. Inspection of lips, tongue and gums benign  -cognitive slowing right lower extremity weakness and numbness. Musculoskeletal: No significant change in strength or tone. All joints stable. Inspection and palpation of digits and nails show no clubbing, cyanosis or inflammatory conditions. Neuro/Psychiatric: Affect: flat but pleasant. Alert and oriented to person, place and situation with   min cues. No significant change in deep tendon reflexes or sensation  Lungs:  Diminished, CTA-B. Respiration effort is   normal at rest.     Heart:   S1 = S2,   RRR. Abdomen:  Soft, non-tender, no enlargement of liver or spleen. Extremities:    lower extremity edema  -weakness right lower extremity decreased sciatic neuropathy. Skin:   Intact to general survey,      Rehabilitation:  Physical Therapy:   Bed mobility:  Bed mobility  Rolling to Left: Modified independent (07/12/22 1155)  Rolling to Right: Modified independent (07/12/22 1155)  Supine to Sit: Modified independent;Supervision (07/12/22 1155)  Sit to Supine: Modified independent;Supervision (07/12/22 1155)  Bed Mobility Comments: Mildly impulsive. (07/12/22 1155)  Bed Mobility Training  Bed Mobility Training: Yes (07/13/22 1539)  Overall Level of Assistance: Modified independent (07/13/22 1539)  Interventions: Safety awareness training (07/13/22 1539)  Rolling: Independent (07/13/22 1539)  Supine to Sit: Modified independent (07/13/22 1539)  Sit to Supine: Modified independent (07/13/22 1539)  Scooting: Modified independent (07/13/22 1539)  Roll Left  Assistance Level: Independent (07/15/22 1534)  Roll Right  Assistance Level: Independent (07/15/22 1534)  Sit to Supine  Assistance Level: Independent (07/15/22 1534)  Supine to Sit  Assistance Level: Independent (07/15/22 1534)  Scooting  Assistance Level: Independent (07/15/22 1534)  Transfers:  Transfers  Sit to Stand: Stand by assistance;Contact guard assistance (07/12/22 1156)  Stand to sit: Stand by assistance;Contact guard assistance (07/12/22 1156)  Bed to Chair: Stand by assistance;Contact guard assistance (07/12/22 1156)  Car Transfer: Stand by assistance;Contact guard assistance (07/12/22 1156)  Comment: Varied performance. Pt with increased time to approach chair. Safe technique however inaccurate execution at times. 5XSTS = 10.7sec. (07/12/22 1156)  Transfer Training  Transfer Training: Yes (07/13/22 1539)  Overall Level of Assistance: Supervision (07/13/22 1539)  Interventions: Safety awareness training (07/13/22 1539)  Sit to Stand: Supervision;Stand-by assistance (07/13/22 1539)  Stand to Sit: Supervision;Stand-by assistance (07/13/22 1539)  Stand Pivot Transfers: Stand-by assistance (07/13/22 1539)  Transfers  Surface: To chair with arms (07/15/22 1048)  Sit to Stand  Assistance Level: Independent (07/15/22 1534)  Stand to Sit  Assistance Level: Independent (07/15/22 1534)  Skilled Clinical Factors: Occasionally unstable to approach to chair (07/13/22 1214)  Bed To/From Chair  Assistance Level: Independent (07/15/22 1534)  Car Transfer  Assistance Level:  Independent (07/15/22 3706)  Skilled Clinical Factors: Performed in family vehicle (07/15/22 1534)  Floor Transfer  Assistance Level: Supervision;Modified independent (07/15/22 1534)  Skilled Clinical Factors: Demonstation provided for proper technique (07/15/22 1048)  Gait:   Ambulation  Surface: level tile (07/12/22 1241)  Device: No Device (07/12/22 1241)  Assistance: Stand by assistance;Contact guard assistance (07/12/22 1241)  Quality of Gait: Decreased speed and step length. Challenged with turns and obstacles. (07/12/22 1241)  Distance: 150ft; 75ft; multiple small distances within gym focusing on obstacles and turns. Gaze stabilization introduced. (07/12/22 1241)  Gait Training: Yes (07/13/22 1539)  Overall Level of Assistance: Stand-by assistance (07/13/22 1539)  Distance (ft): 250 Feet (07/13/22 1539)  Assistive Device: Other (comment) (no device) (07/13/22 1539)  Interventions: Safety awareness training (07/13/22 1539)  Base of Support: Widened (07/13/22 1539)  Speed/Radha: Fluctuations (07/13/22 1539)  Gait Abnormalities: Altered arm swing (07/13/22 1539)  Rail Use: Both (07/13/22 1539)  Right Side Weight Bearing: As tolerated (07/13/22 1539)  Left Side Weight Bearing: As tolerated (07/13/22 1539)  Uneven Terrain - Level of Assistance: Stand-by assistance;Contact-guard assistance (07/13/22 1539)  Ambulation  Surface: Level surface; Uneven surface; Carpet; Ramp (07/15/22 1053)  Distance: 275' (07/15/22 1053)  Activity: Within Room (07/15/22 1053)  Activity Comments: Reciprocal pattern mild antalgia, improved negotiation of turns (07/15/22 1053)  Assistance Level: Modified independent (07/15/22 1053)  Skilled Clinical Factors: Mild antalgia due to R calf pain continues to show improved safety with negotiqation of turns (07/15/22 1053)  Stairs:  Stairs/Curb  Stairs?: Yes (07/12/22 1241)  Stairs  # Steps : 4 (07/12/22 1241)  Rails: Right ascending (07/12/22 1241)  Assistance: Stand by assistance (07/12/22 1241)  Comment: Step to pattern (07/12/22 Limits  Verbal Expression:  (Moderate high level naming deficits with divergent naming secondary to impaired thought organization)  Diet/Swallow:        Dysphagia Outcome Severity Scale: Level 6: Within functional limits/Modified independence  Compensatory Swallowing Strategies : Small bites/sips, Upright as possible for all oral intake, Alternate solids and liquids          Lab/X-ray studies reviewed, analyzed and discussed with patient and staff:   Recent Results (from the past 24 hour(s))   T3, Free    Collection Time: 07/15/22  1:21 PM   Result Value Ref Range    T3, Free 2.98 2.02 - 4.43 pg/mL   T4, Free    Collection Time: 07/15/22  1:22 PM   Result Value Ref Range    T4 Free 1.53 0.84 - 1.68 ng/dL   EKG 12 Lead    Collection Time: 07/16/22  4:41 AM   Result Value Ref Range    Ventricular Rate 95 BPM    Atrial Rate 95 BPM    P-R Interval 124 ms    QRS Duration 88 ms    Q-T Interval 362 ms    QTc Calculation (Bazett) 454 ms    P Axis 39 degrees    R Axis 64 degrees    T Axis 45 degrees   Cortisol Total    Collection Time: 07/16/22  5:06 AM   Result Value Ref Range    Cortisol Collection Info UNK      EEG was normal.    XR CHEST   7/4/2022:    No significant interval change of left upper lobe and left lower lobe infiltrate and/or atelectasis. XR HIP RIGHT   7/6/2022  No dislocations. No focal bony abnormalities                                                                                 NO ACUTE FRACTURE. CT Head   6/30/2022 Extra-axial spaces:  Normal. Intracranial hemorrhage:  None. Ventricular system: [Negative. Basal Cisterns:  Without anomaly. Cerebral Parenchyma: Bilateral, symmetric areas of decreased attenuation exerting no mass effect measuring approximately 9 mm in size since found within the bilateral globus pallidus (series 2, image 17). Midline Shift:  None. Cerebellum:  No anomaly identified. Paranasal sinuses and mastoid air cells:  No anomaly identified. Visualized Orbits:  Negative. Impression: Round symmetric areas decreased attenuation bilateral globus pallidus. This finding may be seen in patients experiencing hypoxia, i.e., carbon monoxide poisoning. MRA HEAD : 7/2/2022    Visualized brain: Grossly unchanged from the recent MRI of the brain with multiple areas of restricted diffusion. Please refer to the recent MRI brain report. INTRACRANIAL MRA:    The visualized distal vertebral and basilar arteries are widely patent. The distal ICAs are patent and within normal limits of caliber. The proximal ACAs, MCAs and PCAs are patent and within normal limits of caliber and configuration. There is no evidence of focal,  significant stenosis or aneurysm in the visualized vessels. EXTRACRANIAL MRA: Carotid Stenosis: Right Common:  No significant stenosis. Right Internal Plaque:  No significant plaque formation. Right Internal Carotid Stenosis (% by NASCET Criteria):  0% Left Common:  No significant stenosis. Left Internal Carotid Plaque:  No significant plaque formation. Left Internal Carotid Stenosis (% by NASCET Criteria):  0% Cervical Vertebral Arteries: Patency:  Bilateral Dominance:  Left     Patent intracranial and extracranial circulation. XR CHEST   7/1/2022:    LEFT UPPER LUNG ATELECTASIS/PNEUMONIA. US ABDOMEN   7/5/2022    NEGATIVE RIGHT UPPER QUADRANT ULTRASOUND WITHOUT EVIDENCE OF CHOLELITHIASIS. US DUP LOWER EXTREMITY RIGHT ROBERT 7/3/2022 :    No acute DVT of the right lower extremity veins from the groin to the knee. No acute DVT of the right calf veins. MRA NECK 7/2/2022    Patent intracranial and extracranial circulation. MRI BRAIN  7/4/2022: Mildly increased edema in the areas of diffusion restriction within the bilateral globi pallidi, splenium of the corpus callosum, and supratentorial white since prior MRI July 1, 2022. No mass effect or midline shift. No acute intracranial hemorrhage. No enhancing mass or pathologic enhancement.      Mildly increased edema in the areas of diffusion restriction within the bilateral globi pallidi, splenium of the corpus callosum, and supratentorial white since prior MRI July 1, 2022. MRI BRAIN   7/1/2022   Areas of restricted diffusion within the globus pallidus, supratentorial white matter and splenium of the corpus callosum are most consistent with carbon monoxide poisoning/hypoxemia and/or other toxic encephalopathy. There is no intracranial hemorrhage, mass effect, midline shift, extra-axial collection, significant cerebral volume loss or other complication identified. FINDINGS CONSISTENT WITH CARBON MONOXIDE POISONING/HYPOXEMIA AND/OR OTHER TOXIC ENCEPHALOPATHY. NO INTRACRANIAL HEMORRHAGE OR COMPLICATION IDENTIFIED. MRI CERVICAL THORACIC LUMBAR SPINE  7/7/2022      NEGATIVE MILDLY LIMITED CERVICAL, THORACIC AND LUMBAR SPINE MRI. Previous extensive, complex labs, notes and diagnostics reviewed and analyzed. ALLERGIES:    Allergies as of 07/11/2022 - Fully Reviewed 07/11/2022   Allergen Reaction Noted    Peanut-containing drug products Anaphylaxis 12/06/2018    Nuts [macadamia nut oil] Angioedema 07/01/2022    Peanut oil Other (See Comments) 02/02/2015    Shellfish-derived products Itching 07/01/2022      (please also verify by checking MAR)       I have encouraged patient to attend their adjustment to rehabilitation support group with rec therapy and rehabilitation psychology in order to improve their adjustments, well-being, and help their spiritual and cognitive recovery. 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   Functional and medical status reassessed regarding patients ability to participate in therapies and patient found to be able to participate in acute intensive comprehensive inpatient rehabilitation program including PT/OT to improve balance, ambulation, ADLs, and to improve the P/AROM. Therapeutic modifications regarding activities in therapies, place, amount of time per day and intensity of therapy made daily. In bed therapies or bedside therapies prn. Bowel and Bladder dysfunction  , Neurogenic bowel and bladder:  frequent toileting, ambulate to bathroom with assistance, check post void residuals. Check for C.difficile x1 if >2 loose stools in 24 hours, continue bowel & bladder program.  Monitor bowel and bladder function. Lactinex 2 PO every AC. MOM prn, Brown Bomb prn, Glycerin suppository prn, enema prn. Encourage therapy and nursing to co-treat and problem solve re continence. Severe RLE pain as well as generalized OA pain: reassess pain every shift and prior to and after each therapy session, give prn Tylenol and consider scheduled Tylenol, modalities prn in therapy, masage, Lidoderm, K-pad prn. Consider scheduled AM pain meds. Skin healing   and breakdown risk:  continue pressure relief program.  Daily skin exams and reports from nursing. Fatigue due to nutritional and hydration deficiency: Add and titrate vitamin B12 vitamin D and CoQ10 continue to monitor I&Os, calorie counts prn, dietary consult prn. Add healthy snack at night. Acute episodic insomnia with situational adjustment disorder:  prn Ambien, monitor for day time sedation. Falls risk elevated:  patient to use call light to get nursing assistance to get up, bed and chair alarm. Elevated DVT risk: progressive activities in PT, continue prophylaxis CHAR hose, elevation and Lovenox with goal to discontinue over the next few days.   Complex discharge planning:  Weekly team meeting every Thursday to re-assess progress towards goals, discuss and address social, psychological and medical comorbidities and to address medications ( b Lexapro, Desyrel, Xanax and taper to lowest effective dose) Taper Deltasone-and taper Xanax under the direction of neurology and psychiatry. Baseline sinus tachycardia-  cardiology sinus tachycardia  -I reviewed-consult with cardiology and check thyroid and cortisol levels-Recenmt T4, K and Mag WNL. Heart rate seems to be somewhat better after starting verapamil-Obtain ECG. Will order Echo. LPRD (laryngopharyngeal reflux disease)-consult speech and language pathology    Shift work sleep disorder-patient may need to adjust his therapy times. Focus of today's plan-  Initiate and modify therapuetic plan to meet patients individual needs, add rest breaks as needed, and transition off of Xanax -however patient will also need a specialized sub acute rehab and combined drug recovery program after DC.     Electronically signed by Darnell Way DO on 7/13/22 at 8:35 AM KATHY Simons D.O., PM&R     Attending    [address]

## 2022-07-16 NOTE — PROGRESS NOTES
Physical Therapy Rehab Treatment Note  Facility/Department: [facility]  Room: R242/R242-01       NAME: Sergio Santana  : 1990 (33 y.o.)  MRN: [id number]  CODE STATUS: Full Code    Date of Service: 2022       Restrictions:  Restrictions/Precautions: Fall Risk       SUBJECTIVE:   Subjective: \"I'm ready to go\"    Pain  Pain: continues to have pain in right lower extremity. not rated      OBJECTIVE:   Orientation  Overall Orientation Status: Within Functional Limits  Cognition  Overall Cognitive Status: Exceptions  Arousal/Alertness: Delayed responses to stimuli  Following Commands: Follows one step commands consistently  Attention Span: Attends with cues to redirect  Memory: Decreased recall of recent events  Safety Judgement: Decreased awareness of need for safety         Bed Mobility Training  Bed Mobility Training: Yes  Overall Level of Assistance: Independent  Interventions: Demonstration  Rolling: Independent  Supine to Sit: Independent  Sit to Supine: Independent  Scooting: Independent    Transfer Training  Transfer Training: Yes  Overall Level of Assistance: Independent  Interventions: Safety awareness training  Sit to Stand: Independent;Supervision  Stand to Sit: Independent;Supervision  Stand Pivot Transfers: Supervision; Independent  Bed to Chair: Modified independent    Gait Training: Yes  Overall Level of Assistance: Stand-by assistance  Distance (ft): 250 Feet  Assistive Device: Other (comment) (no device needed)  Interventions: Safety awareness training  Base of Support: Widened  Speed/Radha: Fluctuations  Step Length: Right shortened  Swing Pattern: Left asymmetrical  Gait Abnormalities: Altered arm swing  Rail Use: Left  Right Side Weight Bearing: As tolerated  Left Side Weight Bearing: As tolerated  Uneven Terrain - Level of Assistance: Stand-by assistance           PT Exercises  PROM Exercises: manual rom and stretches to right leg to help decreased pain.  pt with decreased tolerance of stretches to right leg. pain with hooklying position and external rotation of right leg. attempted to stretch right piriformis but pt could not tolerate. was able to complete stretches on left side without increased pain. Albanian ball supine exercises. Bridges, lateral flexion, and knee flexion. ASSESSMENT/PROGRESS TOWARDS GOALS: pt continues to struggle with right leg pain and cognitive issues. Goals:  Short Term Goals  Short term goal 1: Indep HEP for symptom management  Short term goal 2: Indep to supervision bed mobility  Short term goal 3: Indep to supervision transfers sit to stand and bed to chair  Short term goal 4: Amb with approp device 100 ft safe with supervision  Short term goal 5: Improve LE strength 1/2 grade to assist with above goals.     PLAN OF CARE/Safety:          Therapy Time:   Individual   Time In 1300   Time Out 1330   Minutes 30     Minutes:30  Transfer/Bed mobility training:10  Gait trainin  Neuro re education:0  Therapeutic ex:0   Manual: 15      [facility] EARNEST SUTTON PTA, 22 at 2:04 PM

## 2022-07-16 NOTE — PROGRESS NOTES
Physical Therapy Rehab Treatment Note  Facility/Department: [facility]  Room: R242/R242-01       NAME: Simona Núñez  : 1990 (33 y.o.)  MRN: [id number]  CODE STATUS: Full Code    Date of Service: 2022       Restrictions:  Restrictions/Precautions: Fall Risk       SUBJECTIVE:   Subjective: \"i'm doing ok\"    Pain  Pain: pain with weight bearing , feels like calf is buldging. OBJECTIVE:   Orientation  Overall Orientation Status: Within Functional Limits  Cognition  Overall Cognitive Status: Exceptions         Bed Mobility Training  Bed Mobility Training: Yes  Overall Level of Assistance: Independent  Interventions: Demonstration  Rolling: Independent  Supine to Sit: Independent  Sit to Supine: Independent  Scooting: Independent    Transfer Training  Transfer Training: Yes  Overall Level of Assistance: Independent  Interventions: Safety awareness training  Sit to Stand: Independent;Supervision  Stand to Sit: Independent;Supervision  Stand Pivot Transfers: Supervision; Independent    Gait Training: Yes  Overall Level of Assistance: Stand-by assistance  Distance (ft): 250 Feet  Assistive Device: Other (comment) (no device needed)  Interventions: Safety awareness training  Base of Support: Widened  Speed/Radha: Fluctuations  Step Length: Right shortened  Swing Pattern: Left asymmetrical  Gait Abnormalities: Altered arm swing  Rail Use: Left  Right Side Weight Bearing: As tolerated  Left Side Weight Bearing: As tolerated  Uneven Terrain - Level of Assistance: Stand-by assistance    Stairs - Level of Assistance: Supervision  Number of Stairs Trained: 24         Neuromuscular Education  Neuromuscular Comments: path finding in hospital. pt with improved balance despite pain in right leg with ambulation. improved scanning of environment. pt does not have working glasses here so he is experiencing headaches frequently. ambulating on different terrain without lob. carrying objects to focus on balance.  pt able to step up on 6 inch block with letters on it and step on them in alphabetical order and reverse. pt able to do this easier with right leg stepping on block versus left due to pain with right weightbearing. Pt able to go to  and get himself coffee and water to put into it and then ambulated without spilling it. ASSESSMENT/PROGRESS TOWARDS GOALS: pt with less lob with all activity this date. Seemed more aware of surroundings and increased his scanning. Was able to find his way from rehab suite to front of hospital and back to rehab suite. Goals:  Short Term Goals  Short term goal 1: Indep HEP for symptom management  Short term goal 2: Indep to supervision bed mobility  Short term goal 3: Indep to supervision transfers sit to stand and bed to chair  Short term goal 4: Amb with approp device 100 ft safe with supervision  Short term goal 5: Improve LE strength 1/2 grade to assist with above goals.     PLAN OF CARE/Safety: ongoing         Therapy Time:   Individual   Time In 0900   Time Out 1000   Minutes 60     Minutes:60  Transfer/Bed mobility training:10  Gait trainin  Neuro re education:30  Therapeutic ex:0      Ashleigh Bound, PTA, 22 at 9:41 AM

## 2022-07-16 NOTE — PROGRESS NOTES
OCCUPATIONAL THERAPY  INPATIENT REHAB TREATMENT NOTE  [facility]      NAME: Reno Steven  : 1990 (33 y.o.)  MRN: [id number]  CODE STATUS: Full Code  Room: A764/O515-27    Date of Service: 2022    Referring Physician: Dr. Governor Cruz Diagnosis: Imp ADLs d/t hypoxic encephalopathy s/p overdose likely secondary to fentanyl laced xanax pill    Restrictions  Restrictions/Precautions  Restrictions/Precautions: Fall Risk     Patient's date of birth confirmed: Yes    SAFETY:  Safety Devices  Safety Devices in place: Yes  Type of devices: All fall risk precautions in place    SUBJECTIVE:  Subjective: \" We can just sit here. \"     Pain at start of treatment:  Yes 4/10    Pain at end of treatment:   No 0/10    Pain Location: R calf  Pain Descriptors: sharp shooting  Nursing notified: no  Intervention: None      COGNITION:  Orientation  Overall Orientation Status: Within Functional Limits  Cognition  Overall Cognitive Status: Exceptions    OBJECTIVE:    FM Coordination:  IQ Fit Activity:  Patient engaged in B FM coordination activity with cognition incorporated to increase I with fasteners and small objects for ADL's and IADL's. Patient able to  IQ Fit pieces with MIN difficulty. Patient with MOD difficulty with in hand manipulation turning pieces into correct position for placement. Patient able to place pieces into IQ Fit board with MIN difficulty. Patient able to remove pieces from board with MIN difficulty. Patient with MIN-MOD problem solving difficulty identifying correct placement of pieces. The first IQ Fit puzzle completed was at the starter level missing 2 pieces. Patient was provided with the completed picture to identify where the 2 pieces were to be placed. Patient with 0 difficulty placing the 2 missing pieces. The second IQ Fit puzzle completed was at the starter level missing 3 pieces.   Patient was provided with the incompleted picture to recall where the beginning pieces were placed if needed. Patient with MOD difficulty placing the 3 missing pieces requiring verbal cues. The third IQ Fit puzzle completed was at the duglas level missing 4 pieces. Patient was provided with the incompleted picture to recall where the beginning pieces were placed if needed. Patient with MIN difficulty placing the 4 missing pieces. The fourth IQ Fit puzzle completed was at the duglas level missing 5 pieces. Patient was provided with the incompleted picture to recall where the beginning pieces were placed if needed. Patient with MOD difficulty placing the 5 missing pieces requiring verbal cues and MIN A. FM Strengthening:  Patient engaged in B FM strengthening and coordination to increase I with fasteners and small objects for ADL's and IADL's. Patient utilized the graded blue clip (6 lb pressure) to  marbles 1 at a time from container. Patient with 0 difficulty pinching open graded clip. Patient able to  marbles with MIN difficulty. Patient able to release marbles onto pegboard with MIN difficulty. Patient able to  marbles 5 at a time from pegboard without graded clip with MIN difficulty. Patient able to release marbles into container with 0 difficulty. Rest breaks as needed. ASSESSMENT: Patient worked elise fast and steady pace. Activity Tolerance: Patient tolerated treatment well      PLAN OF CARE:  Strengthening, Balance training, Functional mobility training, Endurance training, Neuromuscular re-education, Safety education & training, Patient/Caregiver education & training, Equipment evaluation, education, & procurement, Self-Care / ADL, Home management training     Continue per OT POC    Patient goals : \"Get home, get back to life, feel normal\"  Time Frame for Long term goals :  Within 1 weeks, pt to demo progress in the following areas listed below to achieve specific LTGs as stated in the evaluation  Long Term Goal 1: Pt will demo improved overall ADL/IADL status  Long Term Goal 2: Pt will demo improved standing balance and tolerance for self-care completion  Long Term Goal 3: Pt will demo improved activity tolerance for completion of ADL/IADL  Long Term Goal 4: Pt will demo improved overall safety and cognition in order to be able to care for himself with minimal assistance        Therapy Time:   Individual Group Co-Treat   Time In 1410       Time Out 1440         Minutes 30                   Therapeutic activities: 30 minutes     Electronically signed by:    NATHANIEL Scott,   7/16/2022, 3:11 PM

## 2022-07-17 PROCEDURE — 99232 SBSQ HOSP IP/OBS MODERATE 35: CPT | Performed by: INTERNAL MEDICINE

## 2022-07-17 PROCEDURE — 6370000000 HC RX 637 (ALT 250 FOR IP): Performed by: FAMILY MEDICINE

## 2022-07-17 PROCEDURE — 99232 SBSQ HOSP IP/OBS MODERATE 35: CPT | Performed by: PHYSICAL MEDICINE & REHABILITATION

## 2022-07-17 PROCEDURE — 6360000002 HC RX W HCPCS: Performed by: INTERNAL MEDICINE

## 2022-07-17 PROCEDURE — 6370000000 HC RX 637 (ALT 250 FOR IP): Performed by: PHYSICAL MEDICINE & REHABILITATION

## 2022-07-17 PROCEDURE — 93005 ELECTROCARDIOGRAM TRACING: CPT | Performed by: INTERNAL MEDICINE

## 2022-07-17 PROCEDURE — 6370000000 HC RX 637 (ALT 250 FOR IP): Performed by: INTERNAL MEDICINE

## 2022-07-17 PROCEDURE — 1180000000 HC REHAB R&B

## 2022-07-17 RX ADMIN — ZOLPIDEM TARTRATE 10 MG: 5 TABLET ORAL at 20:24

## 2022-07-17 RX ADMIN — TRAZODONE HYDROCHLORIDE 50 MG: 50 TABLET ORAL at 20:24

## 2022-07-17 RX ADMIN — Medication 2000 UNITS: at 16:04

## 2022-07-17 RX ADMIN — VERAPAMIL HYDROCHLORIDE 120 MG: 120 TABLET, FILM COATED, EXTENDED RELEASE ORAL at 08:17

## 2022-07-17 RX ADMIN — ENOXAPARIN SODIUM 40 MG: 100 INJECTION SUBCUTANEOUS at 08:16

## 2022-07-17 RX ADMIN — Medication 3 MG: at 20:25

## 2022-07-17 RX ADMIN — PREDNISONE 15 MG: 5 TABLET ORAL at 08:17

## 2022-07-17 RX ADMIN — VERAPAMIL HYDROCHLORIDE 120 MG: 120 TABLET, FILM COATED, EXTENDED RELEASE ORAL at 20:24

## 2022-07-17 RX ADMIN — ESCITALOPRAM OXALATE 20 MG: 20 TABLET ORAL at 08:17

## 2022-07-17 RX ADMIN — ALPRAZOLAM 0.5 MG: 0.25 TABLET ORAL at 16:04

## 2022-07-17 RX ADMIN — Medication 100 MG: at 08:16

## 2022-07-17 RX ADMIN — ALPRAZOLAM 0.5 MG: 0.25 TABLET ORAL at 08:16

## 2022-07-17 ASSESSMENT — ENCOUNTER SYMPTOMS
NAUSEA: 0
EYES NEGATIVE: 1
COUGH: 0
BLOOD IN STOOL: 0
SHORTNESS OF BREATH: 0
RESPIRATORY NEGATIVE: 1
STRIDOR: 0
CHEST TIGHTNESS: 0
WHEEZING: 0
GASTROINTESTINAL NEGATIVE: 1

## 2022-07-17 NOTE — PLAN OF CARE
Problem: Discharge Planning  Goal: Discharge to home or other facility with appropriate resources  7/17/2022 1147 by Eligio Núñez RN  Outcome: Progressing  7/17/2022 0025 by Ernesto Rosales RN  Outcome: Progressing     Problem: Safety - Adult  Goal: Free from fall injury  7/17/2022 1147 by Eligio Núñez RN  Outcome: Progressing  7/17/2022 0025 by Ernesto Rosales RN  Outcome: Progressing     Problem: ABCDS Injury Assessment  Goal: Absence of physical injury  7/17/2022 1147 by Eligio Núñez RN  Outcome: Progressing  7/17/2022 0025 by Ernesto Rosales RN  Outcome: Progressing  Flowsheets (Taken 7/16/2022 2000)  Absence of Physical Injury: Implement safety measures based on patient assessment     Problem: Pain  Goal: Verbalizes/displays adequate comfort level or baseline comfort level  7/17/2022 1147 by Eligio Núñez RN  Outcome: Progressing  7/17/2022 0025 by Ernesto Rosales RN  Outcome: Progressing     Problem: Neurosensory - Adult  Goal: Achieves maximal functionality and self care  7/17/2022 1147 by Eligio Núñez RN  Outcome: Progressing  7/17/2022 0025 by Ernesto Rosales RN  Outcome: Progressing     Problem: Respiratory - Adult  Goal: Achieves optimal ventilation and oxygenation  7/17/2022 1147 by Eligio Núñez RN  Outcome: Progressing  7/17/2022 0025 by Ernesto Rosales RN  Outcome: Progressing     Problem: Cardiovascular - Adult  Goal: Maintains optimal cardiac output and hemodynamic stability  7/17/2022 1147 by Eligio Núñez RN  Outcome: Progressing  7/17/2022 0025 by Ernesto Rosales RN  Outcome: Progressing     Problem: Skin/Tissue Integrity - Adult  Goal: Skin integrity remains intact  7/17/2022 1147 by Eligio Núñez RN  Outcome: Progressing  7/17/2022 0025 by Ernesto Rosales RN  Outcome: Progressing  Flowsheets (Taken 7/16/2022 2000)  Skin Integrity Remains Intact: Monitor for areas of redness and/or skin breakdown     Problem: Musculoskeletal - Adult  Goal: Return mobility to safest level of function  7/17/2022 1147 by Angelito Ochoa RN  Outcome: Progressing  7/17/2022 0025 by Janina Marino RN  Outcome: Progressing     Problem: Gastrointestinal - Adult  Goal: Maintains adequate nutritional intake  7/17/2022 1147 by Angelito Ochoa RN  Outcome: Progressing  7/17/2022 0025 by Janina Marino RN  Outcome: Progressing     Problem: Genitourinary - Adult  Goal: Absence of urinary retention  7/17/2022 1147 by Angelito Ochoa RN  Outcome: Progressing  7/17/2022 0025 by Janina Marino RN  Outcome: Progressing     Problem: Infection - Adult  Goal: Absence of infection at discharge  7/17/2022 1147 by Angelito Ochoa RN  Outcome: Progressing  7/17/2022 0025 by Janina Marino RN  Outcome: Progressing     Problem: Metabolic/Fluid and Electrolytes - Adult  Goal: Electrolytes maintained within normal limits  7/17/2022 1147 by Angelito Ochoa RN  Outcome: Progressing  7/17/2022 0025 by Janina Marino RN  Outcome: Progressing     Problem: Hematologic - Adult  Goal: Maintains hematologic stability  7/17/2022 1147 by Angelito Ochoa RN  Outcome: Progressing  7/17/2022 0025 by Janina Marino RN  Outcome: Progressing

## 2022-07-17 NOTE — PROGRESS NOTES
Subjective: The patient complains of severe acute on chronic progressive fatigue and ataxia, cognitive slowing, right lower extremity numbness and stiffness partially relieved by rest, medications, PT,  OT, medication titration SLP and rest and exacerbated by recent OD-he was admitted to [facility] on 6/30/2022  with change in mental status and history of possible overdose. Family did a well check after his boss noticed that he had not shown up to work for several days and called the family out of concern. He was initially admitted and to the ICU. He had abnormal liver enzymes, rhabdomyolysis kidney injury. Urine was positive for amphetamines benzos and fentanyl. He was found to also be using Royle kratom overuse, transaminitis, EDGAR, rhabdomyolysis resulting in provoked seizure and cerebral edema. He admits that he took 2 Xanax that he bought on the street. He and I both fear that it may have been laced with fentanyl. Discussed with treatment team and hospitalist and neuro--  MRIs spine were normal..      I am concerned about patients medical complexities and barriers to advancing in rehab goals including right lower extremity stiffness secondary to sciatic neuropathy and nerve injury as well as his history of substance abuse and overuse of benzodiazepines. She plans to take him off of the benzos but closer to discharge. Patient again slept much better with the Ambien dosing last night. I reviewed current care and plans for further care with other rehab providers including nursing and case management. According to recent nursing note, \" Patient slept since 2230- call light with in reach and bedsidetable in reach. No signs of distress. \"       I am glad to see that his heart rate has come down with the medications that have been ordered-he is now on Calan SR. His CK levels are coming down steadily would like to see if we can decrease his frequency of lab's.   Thyroid studies and cortisol level within normal limits. ROS x10: The patient also complains of severely impaired mobility and activities of daily living. Otherwise no new problems with vision, hearing, nose, mouth, throat, dermal, cardiovascular, GI, , pulmonary, musculoskeletal, psychiatric or neurological. See also Acute Rehab PM&R H&P. Vital signs:  /72   Pulse 87   Temp 98.1 °F (36.7 °C)   Resp 16   Ht 5' 10\" (1.778 m)   Wt 160 lb 12.8 oz (72.9 kg)   SpO2 100%   BMI 23.07 kg/m²   I/O:   PO/Intake:  fair PO intake,  Adult reg  Diet thin liquids    Bowel:   continent   Bladder: continent   No need for schwartz  General:  Patient is well developed,   adequately nourished, and    well kempt. HEENT:    Pupils equal, hearing intact to loud voice, external inspection of ear and nose benign. Inspection of lips, tongue and gums benign  -cognitive slowing right lower extremity weakness and numbness. Musculoskeletal: No significant change in strength or tone. All joints stable. Inspection and palpation of digits and nails show no clubbing, cyanosis or inflammatory conditions. Neuro/Psychiatric: Affect: flat but pleasant. Alert and oriented to person, place and situation with   min cues. No significant change in deep tendon reflexes or sensation  Lungs:  Diminished, CTA-B. Respiration effort is   normal at rest.     Heart:   S1 = S2,   RRR. Abdomen:  Soft, non-tender, no enlargement of liver or spleen. Extremities:    lower extremity edema  -weakness right lower extremity decreased sciatic neuropathy.   Skin:   Intact to general survey,      Rehabilitation:  Physical Therapy:   Bed mobility:  Bed mobility  Rolling to Left: Modified independent (07/12/22 1155)  Rolling to Right: Modified independent (07/12/22 1155)  Supine to Sit: Modified independent;Supervision (07/12/22 1155)  Sit to Supine: Modified independent;Supervision (07/12/22 1155)  Bed Mobility Comments: Mildly impulsive. (07/12/22 1155)  Bed Mobility Training  Bed Mobility Training: Yes (07/16/22 1400)  Overall Level of Assistance: Independent (07/16/22 0927)  Interventions: Demonstration (07/16/22 3280)  Rolling: Independent (07/16/22 0927)  Supine to Sit: Independent (07/16/22 0927)  Sit to Supine: Independent (07/16/22 0927)  Scooting: Independent (07/16/22 0927)  Roll Left  Assistance Level: Independent (07/15/22 1534)  Roll Right  Assistance Level: Independent (07/15/22 1534)  Sit to Supine  Assistance Level: Independent (07/15/22 1534)  Supine to Sit  Assistance Level: Independent (07/15/22 1534)  Scooting  Assistance Level: Independent (07/15/22 1534)  Transfers:  Transfers  Sit to Stand: Stand by assistance;Contact guard assistance (07/12/22 1156)  Stand to sit: Stand by assistance;Contact guard assistance (07/12/22 1156)  Bed to Chair: Stand by assistance;Contact guard assistance (07/12/22 1156)  Car Transfer: Stand by assistance;Contact guard assistance (07/12/22 1156)  Comment: Varied performance. Pt with increased time to approach chair. Safe technique however inaccurate execution at times. 5XSTS = 10.7sec. (07/12/22 1156)  Transfer Training  Transfer Training: Yes (07/16/22 1400)  Overall Level of Assistance: Independent (07/16/22 1400)  Interventions: Safety awareness training (07/16/22 1400)  Sit to Stand: Independent;Supervision (07/16/22 1400)  Stand to Sit: Independent;Supervision (07/16/22 1400)  Stand Pivot Transfers: Supervision; Independent (07/16/22 1400)  Bed to Chair: Modified independent (07/16/22 1400)  Transfers  Surface: To chair with arms (07/15/22 1048)  Sit to Stand  Assistance Level: Independent (07/15/22 1534)  Stand to Sit  Assistance Level: Independent (07/15/22 1534)  Skilled Clinical Factors: Occasionally unstable to approach to chair (07/13/22 1214)  Bed To/From Chair  Assistance Level: Independent (07/15/22 1534)  Car Transfer  Assistance Level:  Independent (07/15/22 1534)  Skilled Clinical Factors: assistance (07/12/22 1241)  Comment: Step to pattern (07/12/22 1241)  Stairs - Level of Assistance: Supervision (07/16/22 5301)  Number of Stairs Trained: 24 (07/16/22 0927)  Stairs  Stair Height: 6'' (07/15/22 1053)  Device:  (without handrails) (07/15/22 1053)  Number of Stairs: 4 (07/15/22 1053)  Additional Factors: Reciprocal going up;Non-reciprocal going down (07/15/22 1053)  Assistance Level: Supervision (07/15/22 1053)  Skilled Clinical Factors: Good safety ascending increased difficulty descending requires increased effort to complete (07/15/22 1053)  W/C mobility:  Wheelchair Management  Wheelchair Management: No (07/13/22 1539)    Occupational Therapy:   Hand Dominance: Right  ADL  Feeding: Setup (07/12/22 1018)  Grooming: Modified independent  (07/12/22 1018)  Grooming Skilled Clinical Factors: oral hygiene, comb hair (07/12/22 1018)  UE Bathing: Supervision (07/12/22 1018)  UE Bathing Skilled Clinical Factors: cues to attend to all areas, wash hair, thoroughness (07/12/22 1018)  LE Bathing: Minimal assistance (07/12/22 1018)  LE Bathing Skilled Clinical Factors: assist to reach RLE and for posterior hygiene (07/12/22 1018)  UE Dressing Skilled Clinical Factors: gown only (07/12/22 1018)  LE Dressing: Minimal assistance (07/12/22 1018)  LE Dressing Skilled Clinical Factors: assist with R sock, pt declined to don pants/bref (07/12/22 1018)  Toileting: Minimal assistance (07/12/22 1018)  Additional Comments: Shower completed. Pt impulsive at times (07/12/22 1018)  Toilet Transfers  Toilet - Technique: Ambulating (07/12/22 1022)  Equipment Used: Grab bars (07/12/22 1022)  Toilet Transfer: Contact guard assistance (07/12/22 1022)     Shower Transfers  Shower - Transfer From: Travee (07/12/22 1022)  Shower - Transfer Type: To and From (07/12/22 1022)  Shower - Transfer To:  Shower seat with back (07/12/22 1022)  Shower - Technique: Stand pivot (07/12/22 1022)  Shower Transfers: Contact Guard (07/12/22 1022)    Speech Therapy:      Comprehension: Within Functional Limits  Verbal Expression:  (Moderate high level naming deficits with divergent naming secondary to impaired thought organization)  Diet/Swallow:        Dysphagia Outcome Severity Scale: Level 6: Within functional limits/Modified independence  Compensatory Swallowing Strategies : Small bites/sips, Upright as possible for all oral intake, Alternate solids and liquids          Lab/X-ray studies reviewed, analyzed and discussed with patient and staff:   Recent Results (from the past 24 hour(s))   EKG 12 Lead    Collection Time: 07/17/22  5:46 AM   Result Value Ref Range    Ventricular Rate 94 BPM    Atrial Rate 94 BPM    P-R Interval 136 ms    QRS Duration 88 ms    Q-T Interval 366 ms    QTc Calculation (Bazett) 457 ms    P Axis 65 degrees    R Axis 61 degrees    T Axis 53 degrees     EEG was normal.    XR CHEST   7/4/2022:  No significant interval change of left upper lobe and left lower lobe infiltrate and/or atelectasis. XR HIP RIGHT   7/6/2022  No dislocations. No focal bony abnormalities                                                                                 NO ACUTE FRACTURE. CT Head   6/30/2022 Extra-axial spaces:  Normal. Intracranial hemorrhage:  None. Ventricular system: [Negative. Basal Cisterns:  Without anomaly. Cerebral Parenchyma: Bilateral, symmetric areas of decreased attenuation exerting no mass effect measuring approximately 9 mm in size since found within the bilateral globus pallidus (series 2, image 17). Midline Shift:  None. Cerebellum:  No anomaly identified. Paranasal sinuses and mastoid air cells:  No anomaly identified. Visualized Orbits:  Negative. Impression: Round symmetric areas decreased attenuation bilateral globus pallidus. This finding may be seen in patients experiencing hypoxia, i.e., carbon monoxide poisoning.           MRA HEAD : 7/2/2022    Visualized brain: Grossly unchanged from the recent MRI of the brain with multiple areas of restricted diffusion. Please refer to the recent MRI brain report. INTRACRANIAL MRA:    The visualized distal vertebral and basilar arteries are widely patent. The distal ICAs are patent and within normal limits of caliber. The proximal ACAs, MCAs and PCAs are patent and within normal limits of caliber and configuration. There is no evidence of focal,  significant stenosis or aneurysm in the visualized vessels. EXTRACRANIAL MRA: Carotid Stenosis: Right Common:  No significant stenosis. Right Internal Plaque:  No significant plaque formation. Right Internal Carotid Stenosis (% by NASCET Criteria):  0% Left Common:  No significant stenosis. Left Internal Carotid Plaque:  No significant plaque formation. Left Internal Carotid Stenosis (% by NASCET Criteria):  0% Cervical Vertebral Arteries: Patency:  Bilateral Dominance:  Left     Patent intracranial and extracranial circulation. XR CHEST   7/1/2022:    LEFT UPPER LUNG ATELECTASIS/PNEUMONIA. US ABDOMEN   7/5/2022    NEGATIVE RIGHT UPPER QUADRANT ULTRASOUND WITHOUT EVIDENCE OF CHOLELITHIASIS. US DUP LOWER EXTREMITY RIGHT ROBERT 7/3/2022 :    No acute DVT of the right lower extremity veins from the groin to the knee. No acute DVT of the right calf veins. MRA NECK 7/2/2022    Patent intracranial and extracranial circulation. MRI BRAIN  7/4/2022: Mildly increased edema in the areas of diffusion restriction within the bilateral globi pallidi, splenium of the corpus callosum, and supratentorial white since prior MRI July 1, 2022. No mass effect or midline shift. No acute intracranial hemorrhage. No enhancing mass or pathologic enhancement. Mildly increased edema in the areas of diffusion restriction within the bilateral globi pallidi, splenium of the corpus callosum, and supratentorial white since prior MRI July 1, 2022.        MRI BRAIN   7/1/2022   Areas of restricted diffusion within the globus pallidus, supratentorial white matter and splenium of the corpus callosum are most consistent with carbon monoxide poisoning/hypoxemia and/or other toxic encephalopathy. There is no intracranial hemorrhage, mass effect, midline shift, extra-axial collection, significant cerebral volume loss or other complication identified. FINDINGS CONSISTENT WITH CARBON MONOXIDE POISONING/HYPOXEMIA AND/OR OTHER TOXIC ENCEPHALOPATHY. NO INTRACRANIAL HEMORRHAGE OR COMPLICATION IDENTIFIED. MRI CERVICAL THORACIC LUMBAR SPINE  7/7/2022      NEGATIVE MILDLY LIMITED CERVICAL, THORACIC AND LUMBAR SPINE MRI. Previous extensive, complex labs, notes and diagnostics reviewed and analyzed. 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 Functional and medical status reassessed regarding patients ability to participate in therapies and patient found to be able to participate in acute intensive comprehensive inpatient rehabilitation program including PT/OT to improve balance, ambulation, ADLs, and to improve the P/AROM. Therapeutic modifications regarding activities in therapies, place, amount of time per day and intensity of therapy made daily. In bed therapies or bedside therapies prn.    Bowel and Bladder dysfunction  , Neurogenic bowel and bladder:  frequent toileting, ambulate to bathroom with assistance, check post void residuals. Check for C.difficile x1 if >2 loose stools in 24 hours, continue bowel & bladder program.  Monitor bowel and bladder function. Lactinex 2 PO every AC. MOM prn, Brown Bomb prn, Glycerin suppository prn, enema prn. Encourage therapy and nursing to co-treat and problem solve re continence. Severe RLE pain as well as generalized OA pain: reassess pain every shift and prior to and after each therapy session, give prn Tylenol and consider scheduled Tylenol, modalities prn in therapy, masage, Lidoderm, K-pad prn. Consider scheduled AM pain meds. Skin healing   and breakdown risk:  continue pressure relief program.  Daily skin exams and reports from nursing. Fatigue due to nutritional and hydration deficiency: Add and titrate vitamin B12 vitamin D and CoQ10 continue to monitor I&Os, calorie counts prn, dietary consult prn. Add healthy snack at night. Acute episodic insomnia with situational adjustment disorder:  prn Ambien, monitor for day time sedation. Falls risk elevated:  patient to use call light to get nursing assistance to get up, bed and chair alarm. Elevated DVT risk: progressive activities in PT, continue prophylaxis CHAR hose, elevation and Lovenox with goal to discontinue over the next few days. Complex discharge planning:  Weekly team meeting every Thursday to re-assess progress towards goals, discuss and address social, psychological and medical comorbidities and to address difficulties they may be having progressing in therapy. Patient and family education is in progress. The patient is to follow-up with their family physician after discharge.    -however patient will also need a specialized sub acute rehab and combined drug recovery program after DC.     Complex Active General Medical Issues that complicate care Assess & Plan:      Drug abuse,   Polydrug dependence including opioid type drug with continuous use with complication -is aware of the implications that his drug use has had on his health and that he almost . He is planning on not using or overusing medications in the future. Altered mental status-status post drug overdose apparently was accidental overdose. Patient admits to taking 2 Xanax pills that he bought off on the street. Xanax in this area is notoriously laced with fentanyl. Like that he had inadvertent fentanyl overdose from the medication that he thought was Xanax that he bought on the street. 12-step program as advised. Repeat MRI brain will be scheduled for 2022    Vitamin D deficiency-Add high-dose vitamin D, recheck vitamin D level out after discharge,    Spasm of back muscles-lowest effective dose of muscle spasms  Cerebral edema with seizures-antiseizure medications consult neurology and steroid taper. 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 Liver toxicity likely secondary to polysubstance abuse-recheck LFTs as an outpatient avoid toxic medications    ADHD (attention deficit hyperactivity disorder)-lowest effective dose of stimulant medication    Anxiety/ROSALES (generalized anxiety disorder),   MDD (major depressive disorder), recurrent episode, moderate -emotional support provided daily, vitamin B12, encourage participation in rehabilitation support group and recreational therapy, adjust/add medications ( b Lexapro, Desyrel, Xanax and taper to lowest effective dose) Taper Deltasone-and taper Xanax under the direction of neurology and psychiatry. Baseline sinus tachycardia-  cardiology sinus tachycardia  -I reviewed-consult with cardiology and check thyroid and cortisol levels-Recenmt T4, K and Mag WNL. Heart rate seems to be somewhat better after starting verapamil-Obtain ECG. Will order Echo. LPRD (laryngopharyngeal reflux disease)-consult speech and language pathology    Shift work sleep disorder-patient may need to adjust his therapy times. Focus of today's plan-  Initiate and modify therapuetic plan to meet patients individual needs, add rest breaks as needed, and transition off of Xanax. Focus on energy conservation, patient and family education, and bowel and bladder care.       Electronically signed by Kanchan Arreola DO on 7/13/22 at 8:35 AM KATHY Do D.O., PM&R     Attending    [address]

## 2022-07-17 NOTE — PROGRESS NOTES
Progress Note  Patient: Simona Labor  Unit/Bed: U107/C443-84  YOB: 1990  MRN: [id number]  Acct: [de-identified]   Admitting Diagnosis: Septo-optic dysplasia ([facility] [Q04.4]  Impaired mobility and ADLs [Z74.09, Z78.9]  Admit Date:  7/11/2022  Hospital Day: 6    Chief Complaint: Tachy    Histories:  Past Medical History:   Diagnosis Date    ADHD (attention deficit hyperactivity disorder)     was initiated on treatment by Dr. Schofield Staff. any testing was done here by Dr. Schofield Staff, no formal psych eval.      Anxiety     Drug abuse [facility])     MDD (major depressive disorder), recurrent episode, moderate ([facility] 6/4/2019    Neuropathy of right peroneal nerve 7/12/2022     Past Surgical History:   Procedure Laterality Date    TONSILLECTOMY AND ADENOIDECTOMY      WISDOM TOOTH EXTRACTION       Family History   Problem Relation Age of Onset    Depression Mother     Heart Disease Father     High Blood Pressure Father     High Cholesterol Father      Social History     Socioeconomic History    Marital status: Single     Spouse name: None    Number of children: None    Years of education: None    Highest education level: None   Tobacco Use    Smoking status: Never    Smokeless tobacco: Never   Substance and Sexual Activity    Alcohol use: Yes     Comment: occasional    Drug use: No   Social History Narrative    Works full time at [employer]    Being evicted dt not paying rent, Car was reprocessed    Lived With: Alone in [address]: One level--Level entry    [employer]: Standard    Has the patient had two or more falls in the past year or any fall with injury in the past year?: No    ADL Assistance: Independent    Homemaking Assistance: Independent    Homemaking Responsibilities: Yes    Ambulation Assistance: Independent    Transfer Assistance: Independent    Active : Yes    Type of Occupation: [employer]    Additional Comments:  Independent PTA- no medical equipment Social Determinants of Health     Financial Resource Strain: Low Risk     Difficulty of Paying Living Expenses: Not hard at all   Food Insecurity: No Food Insecurity    Worried About [address] [facility] in the Last Year: Never true    [facility] in the Last Year: Never true       Subjective/HPI nonew events. Resting comfortable did PT today. HR 87 can tell HR is slower. EKG:        Review of Systems:   Review of Systems   Constitutional: Negative. Negative for diaphoresis and fatigue. HENT: Negative. Eyes: Negative. Respiratory: Negative. Negative for cough, chest tightness, shortness of breath, wheezing and stridor. Cardiovascular: Negative. Negative for chest pain, palpitations and leg swelling. Gastrointestinal: Negative. Negative for blood in stool and nausea. Genitourinary: Negative. Musculoskeletal: Negative. Skin: Negative. Neurological: Negative. Negative for dizziness, syncope, weakness and light-headedness. Hematological: Negative. Psychiatric/Behavioral: Negative. Physical Examination:    /72   Pulse 87   Temp 98.1 °F (36.7 °C)   Resp 16   Ht 5' 10\" (1.778 m)   Wt 160 lb 12.8 oz (72.9 kg)   SpO2 100%   BMI 23.07 kg/m²    Physical Exam   Constitutional: He appears healthy. No distress. HENT:   Normal cephalic and Atraumatic   Eyes: Pupils are equal, round, and reactive to light. Neck: Thyroid normal. No JVD present. No neck adenopathy. No thyromegaly present. Cardiovascular: Regular rhythm, normal heart sounds, intact distal pulses and normal pulses. Tachycardia present. Pulmonary/Chest: Effort normal and breath sounds normal. He has no wheezes. He has no rales. He exhibits no tenderness. Abdominal: Soft. Bowel sounds are normal. There is no abdominal tenderness. Musculoskeletal:         General: No tenderness or edema. Normal range of motion. Cervical back: Normal range of motion and neck supple.    Neurological: He is alert and oriented to person, place, and time. Skin: Skin is warm. No cyanosis. Nails show no clubbing.      LABS:  CBC:   Lab Results   Component Value Date/Time    WBC 22.9 07/11/2022 05:14 AM    RBC 4.39 07/11/2022 05:14 AM    HGB 12.6 07/11/2022 05:14 AM    HCT 37.7 07/11/2022 05:14 AM    MCV 85.9 07/11/2022 05:14 AM    MCH 28.6 07/11/2022 05:14 AM    MCHC 33.3 07/11/2022 05:14 AM    RDW 12.9 07/11/2022 05:14 AM     07/11/2022 05:14 AM    MPV 8.5 07/13/2015 04:16 PM     CBC with Differential:    Lab Results   Component Value Date/Time    WBC 22.9 07/11/2022 05:14 AM    RBC 4.39 07/11/2022 05:14 AM    HGB 12.6 07/11/2022 05:14 AM    HCT 37.7 07/11/2022 05:14 AM     07/11/2022 05:14 AM    MCV 85.9 07/11/2022 05:14 AM    MCH 28.6 07/11/2022 05:14 AM    MCHC 33.3 07/11/2022 05:14 AM    RDW 12.9 07/11/2022 05:14 AM    BANDSPCT 1 07/08/2022 05:25 AM    METASPCT 2 07/08/2022 05:25 AM    LYMPHOPCT 10.1 07/11/2022 05:14 AM    MONOPCT 4.9 07/11/2022 05:14 AM    MYELOPCT 1 07/08/2022 05:25 AM    BASOPCT 0.1 07/11/2022 05:14 AM    MONOSABS 1.1 07/11/2022 05:14 AM    LYMPHSABS 2.3 07/11/2022 05:14 AM    EOSABS 0.0 07/11/2022 05:14 AM    BASOSABS 0.0 07/11/2022 05:14 AM     CMP:    Lab Results   Component Value Date/Time     07/11/2022 05:14 AM    K 4.0 07/11/2022 05:14 AM     07/11/2022 05:14 AM    CO2 26 07/11/2022 05:14 AM    BUN 12 07/11/2022 05:14 AM    CREATININE 0.56 07/11/2022 05:14 AM    GFRAA >60.0 07/11/2022 05:14 AM    LABGLOM >60.0 07/11/2022 05:14 AM    GLUCOSE 118 07/11/2022 05:14 AM    PROT 6.5 07/12/2022 05:20 AM    LABALBU 4.0 07/12/2022 05:20 AM    CALCIUM 9.2 07/11/2022 05:14 AM    BILITOT 0.4 07/12/2022 05:20 AM    ALKPHOS 78 07/12/2022 05:20 AM    AST 24 07/12/2022 05:20 AM     07/12/2022 05:20 AM     BMP:    Lab Results   Component Value Date/Time     07/11/2022 05:14 AM    K 4.0 07/11/2022 05:14 AM     07/11/2022 05:14 AM    CO2 26 07/11/2022 05:14 AM BUN 12 07/11/2022 05:14 AM    LABALBU 4.0 07/12/2022 05:20 AM    CREATININE 0.56 07/11/2022 05:14 AM    CALCIUM 9.2 07/11/2022 05:14 AM    GFRAA >60.0 07/11/2022 05:14 AM    LABGLOM >60.0 07/11/2022 05:14 AM    GLUCOSE 118 07/11/2022 05:14 AM     Magnesium:    Lab Results   Component Value Date/Time    MG 1.9 07/03/2022 04:50 AM     Troponin:  No results found for: [address] [place] [address] Problems    Diagnosis Date Noted    Tachycardia [R00.0] 07/15/2022     Priority: High    Cognitive impairment [R41.89] 07/15/2022     Priority: Medium    Hypoxic encephalopathy (HCC) [G93.1]      Priority: Medium    Spasm of back muscles [M62.830] 07/12/2022     Priority: Medium    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] 07/12/2022     Priority: Medium    Lesion of right sciatic nerve [G57.01] 07/12/2022     Priority: Medium    Impaired mobility and ADLs [Z74.09, Z78.9] 07/12/2022     Priority: Medium     impaired mobility and ADLs dt encephalopathy  [Z74.09, Z78.9] 07/05/2022     Priority: Medium    Vitamin D deficiency [E55.9] 07/05/2022     Priority: Medium    Altered mental status [R41.82]      Priority: Medium    Drug abuse ([facility] [F19.10]      Priority: Medium    Shift work sleep disorder [G47.26] 07/15/2019     Priority: Low    ROSALES (generalized anxiety disorder) [F41.1] 06/04/2019     Priority: Low    MDD (major depressive disorder), recurrent episode, moderate ([facility] [F33.1] 06/04/2019     Priority: Low    Polydrug dependence including opioid type drug with continuous use with complication ([facility] [L56.29] 06/04/2019     Priority: Low    Anxiety [F41.9] 07/13/2015     Priority: Low    ADHD (attention deficit hyperactivity disorder) [F90.9] 12/05/2012     Priority: Low    LPRD (laryngopharyngeal reflux disease) [K21.9] 10/19/2011     Priority: Low        Assessment/Plan:  Tachycardia-   Verapamil - advance to BID - stable.  Continue same  Echo -P       Electronically signed by Kyler Amaya MD on 7/17/2022 at 11:19 AM

## 2022-07-17 NOTE — PLAN OF CARE
Problem: Discharge Planning  Goal: Discharge to home or other facility with appropriate resources  7/17/2022 0025 by Malick Craig RN  Outcome: Progressing  7/16/2022 1114 by Oli Akers RN  Outcome: Progressing     Problem: Safety - Adult  Goal: Free from fall injury  7/17/2022 0025 by Malick Craig RN  Outcome: Progressing  7/16/2022 1114 by Oli Akers RN  Outcome: Progressing     Problem: ABCDS Injury Assessment  Goal: Absence of physical injury  7/17/2022 0025 by Malick Craig RN  Outcome: Progressing  7/16/2022 1114 by Oli Akers RN  Outcome: Progressing     Problem: Pain  Goal: Verbalizes/displays adequate comfort level or baseline comfort level  7/17/2022 0025 by Malick Craig RN  Outcome: Progressing  7/16/2022 1114 by Oli Akers RN  Outcome: Progressing     Problem: Neurosensory - Adult  Goal: Achieves maximal functionality and self care  7/17/2022 0025 by Malick Craig RN  Outcome: Progressing  7/16/2022 1114 by Oli Akers RN  Outcome: Progressing     Problem: Respiratory - Adult  Goal: Achieves optimal ventilation and oxygenation  7/17/2022 0025 by Malick Craig RN  Outcome: Progressing  7/16/2022 1114 by Oli Akers RN  Outcome: Progressing     Problem: Cardiovascular - Adult  Goal: Maintains optimal cardiac output and hemodynamic stability  7/17/2022 0025 by Malick Craig RN  Outcome: Progressing  7/16/2022 1114 by Oli Akers RN  Outcome: Progressing     Problem: Skin/Tissue Integrity - Adult  Goal: Skin integrity remains intact  7/17/2022 0025 by Malick Craig RN  Outcome: Progressing  Flowsheets (Taken 7/16/2022 2000)  Skin Integrity Remains Intact: Monitor for areas of redness and/or skin breakdown  7/16/2022 1114 by Oli Akers RN  Outcome: Progressing     Problem: Musculoskeletal - Adult  Goal: Return mobility to safest level of function  7/17/2022 0025 by Malick Craig RN  Outcome: Progressing  7/16/2022 1114 by Rayne Nicolas RN  Outcome: Progressing     Problem: Gastrointestinal - Adult  Goal: Maintains adequate nutritional intake  7/17/2022 0025 by Kayla Marcano RN  Outcome: Progressing  7/16/2022 1114 by Rayne Nicolas RN  Outcome: Progressing     Problem: Genitourinary - Adult  Goal: Absence of urinary retention  7/17/2022 0025 by Kayla Marcano RN  Outcome: Progressing  7/16/2022 1114 by Rayne Nicolas RN  Outcome: Progressing     Problem: Infection - Adult  Goal: Absence of infection at discharge  7/17/2022 0025 by Kayla Marcano RN  Outcome: Progressing  7/16/2022 1114 by Rayne Nicolas RN  Outcome: Progressing     Problem: Metabolic/Fluid and Electrolytes - Adult  Goal: Electrolytes maintained within normal limits  7/17/2022 0025 by Kayla Marcano RN  Outcome: Progressing  7/16/2022 1114 by Rayne Nicolas RN  Outcome: Progressing     Problem: Hematologic - Adult  Goal: Maintains hematologic stability  7/17/2022 0025 by Kayla Marcano RN  Outcome: Progressing  7/16/2022 1114 by Rayne Nicolas RN  Outcome: Progressing

## 2022-07-18 ENCOUNTER — APPOINTMENT (OUTPATIENT)
Dept: MRI IMAGING | Age: 32
DRG: 812 | End: 2022-07-18
Attending: PHYSICAL MEDICINE & REHABILITATION
Payer: COMMERCIAL

## 2022-07-18 LAB
EKG ATRIAL RATE: 107 BPM
EKG ATRIAL RATE: 90 BPM
EKG ATRIAL RATE: 94 BPM
EKG ATRIAL RATE: 95 BPM
EKG P AXIS: 20 DEGREES
EKG P AXIS: 39 DEGREES
EKG P AXIS: 65 DEGREES
EKG P AXIS: 68 DEGREES
EKG P-R INTERVAL: 124 MS
EKG P-R INTERVAL: 126 MS
EKG P-R INTERVAL: 132 MS
EKG P-R INTERVAL: 136 MS
EKG Q-T INTERVAL: 344 MS
EKG Q-T INTERVAL: 362 MS
EKG Q-T INTERVAL: 366 MS
EKG Q-T INTERVAL: 366 MS
EKG QRS DURATION: 88 MS
EKG QRS DURATION: 88 MS
EKG QRS DURATION: 90 MS
EKG QRS DURATION: 90 MS
EKG QTC CALCULATION (BAZETT): 447 MS
EKG QTC CALCULATION (BAZETT): 454 MS
EKG QTC CALCULATION (BAZETT): 457 MS
EKG QTC CALCULATION (BAZETT): 459 MS
EKG R AXIS: 61 DEGREES
EKG R AXIS: 64 DEGREES
EKG R AXIS: 66 DEGREES
EKG R AXIS: 70 DEGREES
EKG T AXIS: 41 DEGREES
EKG T AXIS: 45 DEGREES
EKG T AXIS: 53 DEGREES
EKG T AXIS: 56 DEGREES
EKG VENTRICULAR RATE: 107 BPM
EKG VENTRICULAR RATE: 90 BPM
EKG VENTRICULAR RATE: 94 BPM
EKG VENTRICULAR RATE: 95 BPM
LV EF: 65 %
LVEF MODALITY: NORMAL
TOTAL CK: 203 U/L (ref 0–190)

## 2022-07-18 PROCEDURE — A9579 GAD-BASE MR CONTRAST NOS,1ML: HCPCS | Performed by: NURSE PRACTITIONER

## 2022-07-18 PROCEDURE — 6360000002 HC RX W HCPCS: Performed by: INTERNAL MEDICINE

## 2022-07-18 PROCEDURE — 1180000000 HC REHAB R&B

## 2022-07-18 PROCEDURE — 93005 ELECTROCARDIOGRAM TRACING: CPT | Performed by: INTERNAL MEDICINE

## 2022-07-18 PROCEDURE — 93306 TTE W/DOPPLER COMPLETE: CPT

## 2022-07-18 PROCEDURE — 93010 ELECTROCARDIOGRAM REPORT: CPT | Performed by: INTERNAL MEDICINE

## 2022-07-18 PROCEDURE — 97535 SELF CARE MNGMENT TRAINING: CPT

## 2022-07-18 PROCEDURE — 6370000000 HC RX 637 (ALT 250 FOR IP): Performed by: INTERNAL MEDICINE

## 2022-07-18 PROCEDURE — 97129 THER IVNTJ 1ST 15 MIN: CPT

## 2022-07-18 PROCEDURE — 6370000000 HC RX 637 (ALT 250 FOR IP): Performed by: PHYSICAL MEDICINE & REHABILITATION

## 2022-07-18 PROCEDURE — 82550 ASSAY OF CK (CPK): CPT

## 2022-07-18 PROCEDURE — 97530 THERAPEUTIC ACTIVITIES: CPT

## 2022-07-18 PROCEDURE — 36415 COLL VENOUS BLD VENIPUNCTURE: CPT

## 2022-07-18 PROCEDURE — 99233 SBSQ HOSP IP/OBS HIGH 50: CPT | Performed by: PHYSICAL MEDICINE & REHABILITATION

## 2022-07-18 PROCEDURE — 6360000004 HC RX CONTRAST MEDICATION: Performed by: NURSE PRACTITIONER

## 2022-07-18 PROCEDURE — 6370000000 HC RX 637 (ALT 250 FOR IP): Performed by: FAMILY MEDICINE

## 2022-07-18 PROCEDURE — 97116 GAIT TRAINING THERAPY: CPT

## 2022-07-18 PROCEDURE — 99231 SBSQ HOSP IP/OBS SF/LOW 25: CPT | Performed by: NURSE PRACTITIONER

## 2022-07-18 PROCEDURE — 97130 THER IVNTJ EA ADDL 15 MIN: CPT

## 2022-07-18 PROCEDURE — 70553 MRI BRAIN STEM W/O & W/DYE: CPT

## 2022-07-18 RX ADMIN — Medication 3 MG: at 21:12

## 2022-07-18 RX ADMIN — VERAPAMIL HYDROCHLORIDE 120 MG: 120 TABLET, FILM COATED, EXTENDED RELEASE ORAL at 08:18

## 2022-07-18 RX ADMIN — ALPRAZOLAM 0.5 MG: 0.25 TABLET ORAL at 08:21

## 2022-07-18 RX ADMIN — ACETAMINOPHEN 325 MG: 325 TABLET ORAL at 08:21

## 2022-07-18 RX ADMIN — ESCITALOPRAM OXALATE 20 MG: 20 TABLET ORAL at 08:17

## 2022-07-18 RX ADMIN — VERAPAMIL HYDROCHLORIDE 120 MG: 120 TABLET, FILM COATED, EXTENDED RELEASE ORAL at 20:09

## 2022-07-18 RX ADMIN — ENOXAPARIN SODIUM 40 MG: 100 INJECTION SUBCUTANEOUS at 08:17

## 2022-07-18 RX ADMIN — Medication 100 MG: at 08:17

## 2022-07-18 RX ADMIN — GADOTERIDOL 15 ML: 279.3 INJECTION, SOLUTION INTRAVENOUS at 14:58

## 2022-07-18 RX ADMIN — TRAZODONE HYDROCHLORIDE 50 MG: 50 TABLET ORAL at 21:12

## 2022-07-18 RX ADMIN — Medication 2000 UNITS: at 16:36

## 2022-07-18 RX ADMIN — ZOLPIDEM TARTRATE 10 MG: 5 TABLET ORAL at 21:12

## 2022-07-18 RX ADMIN — ALPRAZOLAM 0.5 MG: 0.25 TABLET ORAL at 16:38

## 2022-07-18 RX ADMIN — PREDNISONE 10 MG: 10 TABLET ORAL at 08:17

## 2022-07-18 ASSESSMENT — ENCOUNTER SYMPTOMS
CHEST TIGHTNESS: 0
TROUBLE SWALLOWING: 0
SHORTNESS OF BREATH: 0
COUGH: 0
COLOR CHANGE: 0
WHEEZING: 0
VOMITING: 0
NAUSEA: 0
ABDOMINAL PAIN: 0

## 2022-07-18 ASSESSMENT — PAIN SCALES - GENERAL
PAINLEVEL_OUTOF10: 3
PAINLEVEL_OUTOF10: 0

## 2022-07-18 ASSESSMENT — PAIN DESCRIPTION - ORIENTATION: ORIENTATION: RIGHT

## 2022-07-18 ASSESSMENT — PAIN DESCRIPTION - LOCATION: LOCATION: HIP

## 2022-07-18 ASSESSMENT — PAIN DESCRIPTION - DESCRIPTORS: DESCRIPTORS: ACHING

## 2022-07-18 NOTE — PROGRESS NOTES
Patient asked for his meds at 2030. Explained could give him meds except xanax not time for- he  verbalized understanding. Sleepers and verapamil were given. Pt asleep now call light within reach. Bedside table in reach.  No distress noted

## 2022-07-18 NOTE — PROGRESS NOTES
OCCUPATIONAL THERAPY  INPATIENT REHAB TREATMENT NOTE  [facility]      NAME: Saida Onofre  : 1990 (33 y.o.)  MRN: [id number]  CODE STATUS: Full Code  Room: K240/X315-01    Date of Service: 2022    Referring Physician: Dr. Jan Galvan Diagnosis: Imp ADLs d/t hypoxic encephalopathy s/p overdose likely secondary to fentanyl laced xanax pill    Restrictions  Restrictions/Precautions  Restrictions/Precautions: Fall Risk              Patient's date of birth confirmed: Yes    SAFETY:  Safety Devices  Safety Devices in place: Yes  Type of devices: All fall risk precautions in place    SUBJECTIVE:  Subjective: That was fun  Pain: declines    COGNITION:     Cues continue to be needed for safety, memory, problem solving    OBJECTIVE:    IADLs     Engaged pt in community reintegration to improve overall function and return to daily tasks. Pt assisted in making trip to coffee shop to purchase a drink for himself and another therapist- challenging facility orientation, socialization, memory, and problem solving. Pt overall with good ability to complete this task     Instrumental ADL's  Instrumental ADLs: Yes  Money Management  Money Management Level of Assistance: Supervision  Commmunity Re-entry  Community Re-Entry Level: Other (no AD)  Community Re-entry Level of Assistance: Supervision    SUP fx mobility- cues needed to attend to signs      Patient engaged in fine motor task with cognitive and visual perceptual components to increase [place] with ADL/IADL completion. Challenged pt to assemble small peg board into pattern according to a visual model presented on a piece of paper. Pt completed 1 pattern of significant difficulty with 100% accuracy with increased time.       Education:  Education  Education Given To: Patient  Education Provided: IADL Function;Precautions  Education Method: Demonstration;Verbal  Education Outcome: Demonstrated understanding;Continued education needed;Verbalized understanding    ASSESSMENT:   Continue to address cognition in setting ADLs/IADLs    PLAN OF CARE:  Strengthening, Balance training, Functional mobility training, Endurance training, Neuromuscular re-education, Safety education & training, Patient/Caregiver education & training, Equipment evaluation, education, & procurement, Self-Care / ADL, Home management training       Patient goals : \"Get home, get back to life, feel normal\"  Time Frame for Long term goals :  Within 1 weeks, pt to demo progress in the following areas listed below to achieve specific LTGs as stated in the evaluation  Long Term Goal 1: Pt will demo improved overall ADL/IADL status  Long Term Goal 2: Pt will demo improved standing balance and tolerance for self-care completion  Long Term Goal 3: Pt will demo improved activity tolerance for completion of ADL/IADL  Long Term Goal 4: Pt will demo improved overall safety and cognition in order to be able to care for himself with minimal assistance    Therapy Time:   Individual Group Co-Treat   Time In 1030       Time Out 1100         Minutes 30             ADL/IADL trainin minutes  Therapeutic activities: 10 minutes     Electronically signed by:    Kevan Nyhan, OT,   2022, 10:57 AM

## 2022-07-18 NOTE — PROGRESS NOTES
Physical Therapy Rehab Treatment Note  [facility]       NAME: Abiel Auguste  : 1990 (33 y.o.)  MRN: [id number]  CODE STATUS: Full Code    Date of Service: 2022       Restrictions:  Restrictions/Precautions: Fall Risk       SUBJECTIVE:   Subjective: ' I am doing a little better\"    Pain  Pain: No pain pre and post session      OBJECTIVE:     Roll Left  Assistance Level: Independent  Roll Right  Assistance Level: Independent  Sit to Supine  Assistance Level: Independent  Supine to Sit  Assistance Level: Independent  Scooting  Assistance Level: Independent    Sit to Stand  Assistance Level: Independent  Skilled Clinical Factors: 5x STS = 8.09 sec  Stand to Sit  Assistance Level: Independent  Bed To/From Chair  Assistance Level: Independent    Ambulation  Surface: Level surface; Uneven surface; Carpet; Ramp (Outdoors)  Distance: 1000'+, 500'  Activity: Within Unit; Other (Comment)  Activity Comments: Reciprocal pattern good safety transitioning from surface to surface. Assistance Level: Independent    Stairs  Stair Height: 6''  Device: One handrail  Number of Stairs: 22  Additional Factors: Reciprocal going up;Reciprocal going down  Assistance Level: Independent    ASSESSMENT/PROGRESS TOWARDS GOALS:   Assessment: Patient continues to show good safety with gait training on multiple surfaces including outdoors. Patient continues to show ability to path find back to therapy department  Activity Tolerance: Patient tolerated treatment well  Discharge Recommendations: Outpatient PT; Home independently    Goals:  Long Term Goals  Long term goal 1: Pt to complete bed mobility indep  Long term goal 2: Pt to complete transfers with indep (bed/chair/car)  Long term goal 3: Pt to ambulate >300ft indoor/outdoor without AD indep  Long term goal 4: Pt to achieve 20/24 DGI  Long term goal 5: Pt to complete 5XSTS in 8sec    PLAN OF CARE/Safety:   Safety Devices  Type of Devices:  All fall risk precautions in place      Therapy Time:   Individual   Time In 1530   Time Out 1600   Minutes 30     Minutes: 30  Transfer/Bed mobility training: 10  Gait training: Kael Hall PTA, 07/18/22 at 4:28 PM

## 2022-07-18 NOTE — PLAN OF CARE
Problem: Discharge Planning  Goal: Discharge to home or other facility with appropriate resources  7/17/2022 2243 by Vidya Henry RN  Outcome: Progressing  7/17/2022 1147 by Yumi Nascimento RN  Outcome: Progressing     Problem: Safety - Adult  Goal: Free from fall injury  7/17/2022 2243 by Vidya Henry RN  Outcome: Progressing  7/17/2022 1147 by Yumi Nascimento RN  Outcome: Progressing     Problem: ABCDS Injury Assessment  Goal: Absence of physical injury  7/17/2022 2243 by Vidya Henry RN  Outcome: Progressing  7/17/2022 1147 by Yumi Nascimento RN  Outcome: Progressing     Problem: Pain  Goal: Verbalizes/displays adequate comfort level or baseline comfort level  7/17/2022 2243 by Vidya Henry RN  Outcome: Progressing  7/17/2022 1147 by Yumi Nascimento RN  Outcome: Progressing     Problem: Neurosensory - Adult  Goal: Achieves maximal functionality and self care  7/17/2022 2243 by Vidya Henry RN  Outcome: Progressing  7/17/2022 1147 by Yumi Nascimento RN  Outcome: Progressing     Problem: Respiratory - Adult  Goal: Achieves optimal ventilation and oxygenation  7/17/2022 2243 by Vidya Henry RN  Outcome: Progressing  7/17/2022 1147 by Yumi Nascimento RN  Outcome: Progressing     Problem: Cardiovascular - Adult  Goal: Maintains optimal cardiac output and hemodynamic stability  7/17/2022 2243 by Vidya Henry RN  Outcome: Progressing  7/17/2022 1147 by Yumi Nascimento RN  Outcome: Progressing     Problem: Skin/Tissue Integrity - Adult  Goal: Skin integrity remains intact  7/17/2022 2243 by Vidya Henry RN  Outcome: Progressing  7/17/2022 1147 by Yumi Nascimento RN  Outcome: Progressing     Problem: Musculoskeletal - Adult  Goal: Return mobility to safest level of function  7/17/2022 2243 by Vidya Henry RN  Outcome: Progressing  7/17/2022 1147 by Yumi Nascimento RN  Outcome: Progressing     Problem: Gastrointestinal - Adult  Goal: Maintains adequate nutritional intake  7/17/2022 2243 by Anahy Son RN  Outcome: Progressing  7/17/2022 1147 by Gabbie Alba RN  Outcome: Progressing     Problem: Genitourinary - Adult  Goal: Absence of urinary retention  7/17/2022 2243 by Anahy Son RN  Outcome: Progressing  7/17/2022 1147 by Gabbie Alba RN  Outcome: Progressing     Problem: Infection - Adult  Goal: Absence of infection at discharge  7/17/2022 2243 by Anahy Son RN  Outcome: Progressing  7/17/2022 1147 by Gabbie Alba RN  Outcome: Progressing     Problem: Metabolic/Fluid and Electrolytes - Adult  Goal: Electrolytes maintained within normal limits  7/17/2022 2243 by Anahy Son RN  Outcome: Progressing  7/17/2022 1147 by Gabbie Alba RN  Outcome: Progressing     Problem: Hematologic - Adult  Goal: Maintains hematologic stability  7/17/2022 2243 by Anahy Son RN  Outcome: Progressing  7/17/2022 1147 by Gabbie Alba RN  Outcome: Progressing

## 2022-07-18 NOTE — PROGRESS NOTES
INDIVIDUALIZED OVERALL REHAB PLAN OF CARE  ADDENDUM TO REHAB PROGRESS NOTE-for audit purposes must also refer to this day's clinical note and combine the information      Date: 2022  Patient Name: Gloriann Kussmaul   Room: F974/B971-60    MRN: [id number]    : 1990  (33 y.o.)  Gender: male       Today 2022 during weekly team meeting, I reviewed the patient Gloriann Kussmaul in detail with the therapists and nurses involved in patient's care gathering complex physiatric data regarding current medical issues, progress in therapies, factors limiting progress, social issues, psychological issues, ongoing therapeutic plans and discharge planning. Legend:  I= independent Im =Modified independent  S=Supervised SB=stand by WONG=set up CG=contact horacio Min= minimal Mod=Moderate Max=maximal Max of 2 =maximal assist of 2 people      CURRENT FUNCTIONAL STATUS:    Barriers to progress and discharge complex medical conditions and complex social situations      NURSING ISSUES:    Addicted previously to opio ds--now --in past 5 years taking Kratom--and buying Xanax on the streets. Patient slept till now to complete EKG. Pt never asked for xanax. No dose since 1604- . Nursing will continue to focus on bowel and bladder continence transitioning toward independence by time of discharge. Monitoring post void residuals monitoring for severe constipation and bowel obstruction.     Bowel function- continent/normal  Plans to address- scheduled voids     Bladder function-  continent    Plans to address- schedule voids before bed and therapy     Skin deficits- dressing changes   Plans to address- continue to monitor closely for infection     Hydration/Nutritional deficits- monitoring for dysphagia  Plans to address-Push PO, assist with feeds as needed     Low BP- continue to monitor Ortho BPs  Plans to address- check ortho BPs before therapies-and use abdominal binder and TEDs as needed    Pt and Family training goals-  teach home technology options like Rachel use and home assistive devices      Focus on achieving ADL goals with co-treating with OT when possible. Focus on cognition and co treat with SLP when possible. PHYSICAL THERAPY  Bed mobility:  Bed mobility  Rolling to Left: Modified independent (07/12/22 1155)  Rolling to Right: Modified independent (07/12/22 1155)  Supine to Sit: Modified independent;Supervision (07/12/22 1155)  Sit to Supine: Modified independent;Supervision (07/12/22 1155)  Bed Mobility Comments: Mildly impulsive. (07/12/22 1155)  Bed Mobility Training  Bed Mobility Training: Yes (07/16/22 1400)  Overall Level of Assistance: Independent (07/16/22 0927)  Interventions: Demonstration (07/16/22 9825)  Rolling: Independent (07/16/22 0927)  Supine to Sit: Independent (07/16/22 0927)  Sit to Supine: Independent (07/16/22 0927)  Scooting: Independent (07/16/22 0927)  Roll Left  Assistance Level: Independent (07/18/22 1009)  Roll Right  Assistance Level: Independent (07/18/22 1009)  Sit to Supine  Assistance Level: Independent (07/18/22 1009)  Supine to Sit  Assistance Level: Independent (07/18/22 1009)  Scooting  Assistance Level: Independent (07/18/22 1009)  Transfers:  Transfers  Sit to Stand: Stand by assistance;Contact guard assistance (07/12/22 1156)  Stand to sit: Stand by assistance;Contact guard assistance (07/12/22 1156)  Bed to Chair: Stand by assistance;Contact guard assistance (07/12/22 1156)  Car Transfer: Stand by assistance;Contact guard assistance (07/12/22 1156)  Comment: Varied performance. Pt with increased time to approach chair. Safe technique however inaccurate execution at times.  5XSTS = 10.7sec. (07/12/22 1156)  Transfer Training  Transfer Training: Yes (07/16/22 1400)  Overall Level of Assistance: Independent (07/16/22 1400)  Interventions: Safety awareness training (07/16/22 1400)  Sit to Stand: Independent;Supervision (07/16/22 1400)  Stand to Sit: Independent;Supervision (07/16/22 1400)  Stand Pivot Transfers: Supervision; Independent (07/16/22 1400)  Bed to Chair: Modified independent (07/16/22 1400)  Transfers  Surface: To chair with arms (07/15/22 1048)  Sit to Stand  Assistance Level: Independent (07/18/22 1009)  Skilled Clinical Factors: 5x STS = 8.09 sec (07/18/22 1009)  Stand to Sit  Assistance Level: Independent (07/18/22 1009)  Skilled Clinical Factors: Occasionally unstable to approach to chair (07/13/22 1214)  Bed To/From Chair  Assistance Level: Independent (07/18/22 1009)  Car Transfer  Assistance Level: Independent (07/18/22 1009)  Skilled Clinical Factors: Performed in family vehicle (07/15/22 1534)  Floor Transfer  Assistance Level: Supervision;Modified independent (07/15/22 1534)  Skilled Clinical Factors: Demonstation provided for proper technique (07/15/22 1048)  Gait:   Ambulation  Surface: level tile (07/12/22 1241)  Device: No Device (07/12/22 1241)  Assistance: Stand by assistance;Contact guard assistance (07/12/22 1241)  Quality of Gait: Decreased speed and step length. Challenged with turns and obstacles. (07/12/22 1241)  Distance: 150ft; 75ft; multiple small distances within gym focusing on obstacles and turns. Gaze stabilization introduced.  (07/12/22 1241)  Gait Training: Yes (07/16/22 1400)  Overall Level of Assistance: Stand-by assistance (07/16/22 1400)  Distance (ft): 250 Feet (07/16/22 0927)  Assistive Device: Other (comment) (no device needed) (07/16/22 0927)  Interventions: Safety awareness training (07/16/22 1400)  Base of Support: Widened (07/16/22 0927)  Speed/Radha: Fluctuations (07/16/22 0927)  Step Length: Right shortened (07/16/22 0927)  Swing Pattern: Left asymmetrical (07/16/22 0927)  Gait Abnormalities: Altered arm swing (07/16/22 0927)  Rail Use: Left (07/16/22 0927)  Right Side Weight Bearing: As tolerated (07/16/22 1400)  Left Side Weight Bearing: As tolerated (07/16/22 1400)  Uneven Terrain - Level of Assistance: Stand-by assistance (07/16/22 3788)  Ambulation  Surface: Level surface; Uneven surface; Carpet; Ramp (07/18/22 1020)  Distance: 300' x 2 500' x 1 (07/18/22 1020)  Activity: Within Unit (07/18/22 1020)  Activity Comments: Reciprocal pattern (07/18/22 1020)  Assistance Level: Independent (07/18/22 1020)  Skilled Clinical Factors: Mild antalgia due to R calf pain continues to show improved safety with negotiqation of turns (07/15/22 1053)  Stairs:  Stairs/Curb  Stairs?: Yes (07/12/22 1241)  Stairs  # Steps : 4 (07/12/22 1241)  Rails: Right ascending (07/12/22 1241)  Assistance: Stand by assistance (07/12/22 1241)  Comment: Step to pattern (07/12/22 1241)  Stairs - Level of Assistance: Supervision (07/16/22 0927)  Number of Stairs Trained: 24 (07/16/22 0927)  Stairs  Stair Height: 6'' (07/18/22 1020)  Device: Bilateral handrails (07/18/22 1020)  Number of Stairs: 12 (07/18/22 1020)  Additional Factors: Reciprocal going up;Non-reciprocal going down (07/15/22 1053)  Assistance Level: Modified independent (07/18/22 1020)  Skilled Clinical Factors: Good safety ascending increased difficulty descending requires increased effort to complete (07/15/22 1053)  W/C mobility:  Wheelchair Management  Wheelchair Management: No (07/13/22 1539)        Pt and Family training goals-  Focus on sequencing and endurance        OCCUPATIONAL THERAPY  Feeding  Assistance Level:  Independent (07/15/22 1114)  Grooming/Oral Hygiene  Assistance Level: Supervision (07/15/22 1114)  Skilled Clinical Factors: cues needed for sequencing and completion of ADLs (07/14/22 0944)  Upper Extremity Bathing  Assistance Level: Supervision (07/15/22 1114)  Lower Extremity Bathing  Assistance Level: Supervision (07/15/22 1114)  Skilled Clinical Factors: assist to reach posterior region for hygiene (07/13/22 0901)  Upper Extremity Dressing  Assistance Level: Modified independent (07/15/22 1114)  Lower Extremity Dressing  Assistance Level: Stand by assist (07/15/22 5292)  Skilled Clinical Factors: increased time RLE (07/15/22 1114)  Putting On/Taking Off Footwear  Assistance Level: Minimal assistance (07/14/22 0944)  Skilled Clinical Factors: using sock aide RLE (07/15/22 1114)  Toileting  Assistance Level: Supervision (07/13/22 1325)  Skilled Clinical Factors: NT- however posterior hygiene completed with bathing in shower (07/15/22 1114)  Tub/Shower Transfers  Type: Shower (07/15/22 1114)  Transfer From: Standing without device (07/15/22 1114)  Transfer To: Shower chair with back (07/15/22 1114)  Assistance Level: Supervision (07/15/22 1114)      Plans for addressing ADL deficits affecting: Focus on sequencing. Bladder function disrupting functional progress- continent      Plans to address- coordinate scheduled voids before bed and therapy with nursing     Skin deficits- skin tears   Plans to address- coordinate patient dressing changes education with nursing as part of ADL training                SPEECH THERAPY/SLP     Comprehension:  (Intermittent repetition of directions is required during tasks secondary to reduced recall and memory deficits)  Verbal Expression:  (Reduced utterance length has been noted during conversation. Improvement noted this date compared to 07/15/2022 session.  Pt presents with flat affect and high level naming deficits)    Plans for cognitive and communication issues affecting addressing:   Pt and Family training goals-  teach patient family memory strategies      Diet/Swallow:        Dysphagia Outcome Severity Scale: Level 6: Within functional limits/Modified independence    Compensatory Swallowing Strategies : Small bites/sips, Upright as possible for all oral intake, Alternate solids and liquids             COGNITION  OT:    SP:        Social History     Socioeconomic History    Marital status: Single     Spouse name: Not on file    Number of children: Not on file    Years of education: Not on file    Highest education level: Not on file   Occupational History    Not on file   Tobacco Use    Smoking status: Never    Smokeless tobacco: Never   Substance and Sexual Activity    Alcohol use: Yes     Comment: occasional    Drug use: No    Sexual activity: Not on file   Other Topics Concern    Not on file   Social History Narrative    Works full time at [employer]    Being evicted dt not paying rent, Car was reprocessed    Lived With: Alone in [address]: One level--Level entry    [address]: Standard    Has the patient had two or more falls in the past year or any fall with injury in the past year?: No    ADL Assistance: [address]: Independent    Homemaking Responsibilities: Yes    Ambulation Assistance: Independent    Transfer Assistance: Independent    Active : Yes    Type of Occupation: [employer] work    Additional Comments: Independent PTA- no medical equipment         Social Determinants of Health     Financial Resource Strain: Low Risk     Difficulty of Paying Living Expenses: Not hard at all   Food Insecurity: No Food Insecurity    Worried About Running Out of Food in the Last Year: Never true    Ran Out of Food in the Last Year: Never true   Transportation Needs: Not on file   Physical Activity: Not on file   Stress: Not on file   Social Connections: Not on file   Intimate Partner Violence: Not on file   Housing Stability: Not on file           THERAPY, MEDICAL AND NURSING COORDINATION:    [x]  Pain medication before therapies     [x]  Check orthostatic BP and monitor heart rate and medications effects with therapy      [x]  Ambulate to the bathroom in room    [x]  Add scheduled rest beaks     [x]  In room therapies as needed      Discharge date set for:              [place] [address] with: To drug rehab if accepted and then home with mother  with help from   mother            And:      Home Health Care:     [x]  PT    [x]  OT    [x]  ST   [x]  Aide   []  SW [x]  RN               Or preferably     Outpatient Therapy:  []  PT    []  OT    []  ST   []  Rehab Psych                 Equipment:  WW      At D/C their function is goaled at:   PT:Long term goal 1: Pt to complete bed mobility indep  Long term goal 2: Pt to complete transfers with indep (bed/chair/car)  Long term goal 3: Pt to ambulate >300ft indoor/outdoor without AD indep  Long term goal 4: Pt to achieve 20/24 DGI  Long term goal 5: Pt to complete 5XSTS in [address] Needed: Setup or clean-up assistance  CARE Score: 5  Discharge Goal: Independent, Oral Hygiene  Assistance Needed: Independent  CARE Score: 6  Discharge Goal: Independent, [address] needed: Partial/moderate assistance  CARE Score: 3  Discharge Goal: Independent, Shower/Bathe Self  Assistance Needed: Partial/moderate assistance  CARE Score: 3  Discharge Goal: Independent  Upper Body Dressing  Reason if not Attempted: Not attempted due to environmental limitations  CARE Score: 10  Discharge Goal: Independent, Lower Body Dressing  Assistance Needed: Partial/moderate assistance  CARE Score: 3  Discharge Goal: Independent, Putting On/Taking Off Footwear  Assistance Needed: Partial/moderate assistance  CARE Score: 3  Discharge Goal: Independent, Toilet Transfer  Assistance needed: Supervision or touching assistance  CARE Score: 4  Discharge Goal: Independent  SP:Long-term Goals  Timeframe for Long-term Goals: 2-3 weeks for LOS or until goals met  Goal 1: Pt will demonstrate functional cognitive-linguistic abilities in all opportunities with modified independence in order to safely complete ADLs.               From a cognitive standpoint they will need:        24 hr supervision  --progress to occasional           Significant problems/ barriers to functional progress include: Pt is at a high risk for functional loss,      [x]  Acute infection/UTI       [x]  poor endurance    [x]  Severe pain exacerbation--RLE     [x]

## 2022-07-18 NOTE — CARE COORDINATION
[address]  UPDATE NOTE  Room: R242/R242-01  Admit Date: 2022       Date: 2022  Patient Name: William Bay        MRN: [id number]    : 1990  (33 y.o.)  Gender: male        Anticipated Discharge Plan: Patient to discharge  or earlier to [facility] for additional rehab focusing on pt's brain injury. This is pending precert by [facility]. REHAB DIAGNOSIS:   Diagnosis: Impaired mobility and ADL's due to hypoxic encephalopathy. [facility] admit [facility]    CO MORBIDITIES:      Past Medical History:   Diagnosis Date    ADHD (attention deficit hyperactivity disorder)     was initiated on treatment by Dr. Gillian Mccracken. any testing was done here by Dr. Gillian Mccracken, no formal psych eval.      Anxiety     Drug abuse [facility])     MDD (major depressive disorder), recurrent episode, moderate ([address] 2019    Neuropathy of right peroneal nerve 2022     Past Surgical History:   Procedure Laterality Date    TONSILLECTOMY AND ADENOIDECTOMY      WISDOM TOOTH EXTRACTION          Last set of vitals:  Vital Signs  Temp: 98.2 °F (36.8 °C)  Temp Source: Oral  Heart Rate: 92  Heart Rate Source: Monitor  Resp: 13  BP: 102/66  BP Location: Right upper arm  BP Method: Automatic  MAP (Calculated): 78  Patient Position: Supine  Level of Consciousness: Alert (0)  MEWS Score: 2    Results/Findings:   Labs:   No results for input(s): WBC, HGB, HCT, PLT in the last 72 hours. No results for input(s): NA, K, CL, CO2, BUN, CREATININE, CALCIUM, PHOS in the last 72 hours. Invalid input(s): MAGNES  No results for input(s): AST, ALT, BILIDIR, BILITOT, ALKPHOS in the last 72 hours. No results for input(s): INR in the last 72 hours.   Recent Labs     22  0457   CKTOTAL 203*       Urinalysis:      Lab Results   Component Value Date/Time    NITRU Negative 2022 04:00 PM    WBCUA 3-5 2022 04:00 PM    BACTERIA Negative 2022 04:00 PM    RBCUA 3-5 06/30/2022 04:00 PM    BLOODU LARGE 06/30/2022 04:00 PM    SPECGRAV 1.033 06/30/2022 04:00 PM    GLUCOSEU Negative 06/30/2022 04:00 PM       Radiology:  MRI BRAIN WO CONTRAST    (Results Pending)      Restrictions  Restrictions/Precautions: Fall Risk  CASE MANAGEMENT    Social/Functional History  Social/Functional History  Lives With: Alone  Type of Home: Apartment  Home Layout: One level  Home Access: Level entry  Bathroom Shower/Tub: Tub/Shower unit, Curtain  Bathroom Toilet: Standard  Home Equipment:  (NA)  Has the patient had two or more falls in the past year or any fall with injury in the past year?: No  ADL Assistance: [address]: Independent  Homemaking Responsibilities: Yes  Ambulation Assistance: Independent  Transfer Assistance: Independent  Active : Yes  Education: bachelors - public health. Was also a case-worker  Occupation: Full time employment  Type of Occupation: [employer] work  Leisure & Hobbies: golf, football  IADL Comments: Laundry is in patients unit. Additional Comments: Independent PTA- no medical equipment. Parents live 10 mins away from patient       COVERAGE INFORMATION:Payor: [facility] / Plan: Opal Sheehan / Product Type: *No Product type* /       NURSING  Weight: 160 lb 12.8 oz (72.9 kg) / Body mass index is 23.07 kg/m². ADULT DIET; Regular    SpO2: 97 % (07/18/22 0723)  No active isolations    Skin Issues: No    Pain Managed: Yes    Bladder continence: Yes    Bowel continence: Yes      Other: Lovenox      PHYSICAL THERAPY  Initial status:           Bed mobility:  Bed mobility  Rolling to Left: Modified independent  Rolling to Right: Modified independent  Supine to Sit: Stand by assistance  Sit to Supine: Stand by assistance  Bed Mobility Comments: Mildly impulsive.   Transfers:  Transfers  Sit to Stand: Stand by assistance;Contact guard assistance  Stand to sit: Stand by assistance;Contact guard assistance  Bed to Chair: Stand by assistance;Contact guard assistance  Car Transfer: Stand by assistance;Contact guard assistance  Comment: Varied performance. Pt with increased time to approach chair. Safe technique however inaccurate execution at times. 5XSTS = 10.7sec. Gait:   Ambulation  Surface: level tile  Device: No Device  Assistance: Stand by assistance;Contact guard assistance  Quality of Gait: Decreased speed and step length. Challenged with turns and obstacles. Distance: 150ft; 75ft; multiple small distances within gym focusing on obstacles and turns. Gaze stabilization introduced. Stairs:  Stairs  # Steps : 4  Rails: Right ascending  Assistance: Stand by assistance  Comment: Step to pattern               Current status:   Bed mobility:  Roll Left  Assistance Level: Independent  Roll Right  Assistance Level: Independent  Sit to Supine  Assistance Level: Independent  Supine to Sit  Assistance Level: Independent  Scooting  Assistance Level: Independent  Transfers:  Sit to Stand  Assistance Level: Independent  Skilled Clinical Factors: 5x STS = 8.09 sec  Stand to Sit  Assistance Level: Independent  Bed To/From Chair  Assistance Level: Independent  Car Transfer  Assistance Level: Independent  Gait:   Ambulation  Surface: Level surface; Uneven surface; Carpet; Ramp  Distance: 300' x 2 500' x 1  Activity: Within Unit  Activity Comments: Reciprocal pattern  Assistance Level:  Independent  Stairs:  Stairs  Stair Height: 6''  Device: Bilateral handrails  Number of Stairs: 12  Assistance Level: Modified independent         Assessment: Pt has made excellent gains in all areas    LTG:  Long term goal 1: Pt to complete bed mobility indep  Long term goal 2: Pt to complete transfers with indep (bed/chair/car)  Long term goal 3: Pt to ambulate >300ft indoor/outdoor without AD indep  Long term goal 4: Pt to achieve 20/24 DGI  Long term goal 5: Pt to complete 5XSTS in 8sec    Signature: Electronically signed by Kalani Moraes PT on 7/18/22 at 11:59 AM EDT OCCUPATIONAL THERAPY   Initial Self Care Status:  ADL  Feeding: Setup  Grooming: Modified independent   Grooming Skilled Clinical Factors: oral hygiene, comb hair  UE Bathing: Supervision  UE Bathing Skilled Clinical Factors: cues to attend to all areas, wash hair, thoroughness  LE Bathing: Minimal assistance  LE Bathing Skilled Clinical Factors: assist to reach RLE and for posterior hygiene  UE Dressing Skilled Clinical Factors: gown only  LE Dressing: Minimal assistance  LE Dressing Skilled Clinical Factors: assist with R sock, pt declined to don pants/bref  Toileting: Minimal assistance  Additional Comments: Shower completed. Pt impulsive at times  Toilet Transfers  Toilet - Technique: Ambulating  Equipment Used: Grab bars  Toilet Transfer: Contact guard assistance  Shower Transfers  Shower - Transfer From: [place] - Transfer Type: To and From  Shower - Transfer To: Shower seat with back  Shower - Technique: Stand pivot  Shower Transfers: Contact Guard      Current Self Care Status:  Feeding  Assistance Level: Independent (07/15/22 1114)  Grooming/Oral Hygiene  Assistance Level: Supervision (07/15/22 1114)  Upper Extremity Bathing  Assistance Level: Supervision (07/15/22 1114)  Lower Extremity Bathing  Assistance Level: Supervision (07/15/22 1114)  Upper Extremity Dressing  Assistance Level: Modified independent (07/15/22 1114)  Lower Extremity Dressing  Assistance Level: Stand by assist (07/15/22 1114)  Putting On/Taking Off Footwear  Assistance Level: Minimal assistance using AE RLE  Toileting  Assistance Level: Supervision (07/13/22 1325)  Tub/Shower Transfers  Type: Shower (07/15/22 1114)  Transfer From: Standing without device (07/15/22 1114)  Transfer To: Shower chair with back (07/15/22 1114)  Assistance Level: Supervision (07/15/22 1114)    Pt has continued to make progress with POC. Pt now SUP for most self-care items.  Pt still needing assist at times with the RLE during dressing tasks and is agreeable to using AE to improve independence. Pt still needing cues for safety, sequencing, and thoroughness with ADLs. Cognitive deficits very apparent during IADL type tasks as pt continues to work on problem solving and organization. Pt remains below functional baseline. Recommend continued OT at this time. Within a few days:    Patient will complete self care as followed using the recommended adaptive equipment and/or adaptive techniques as instructed: Bathing: Independent  UE Dressing: Independent  LE Dressing: Supervision  Toileting: Independent  Toilet Transfer: Independent  Shower/Tub Transfer: Independent    Signature: Electronically signed by Zakia Varner OT on 22 at 10:07 AM EDT      SPEECH THERAPY    Initial Status:  Diet:   Regular solids with thin liquids  Dysphagia Outcome Severity Scale:  Ratin    Speech Therapy Level of Assistance Scale: Auditory Comprehension:  Rating: Modified Independent  Verbal Expression:  Rating:Independent-Modified Independent  Motor Speech:  Rating: Independent  Problem Solving:  Rating: Supervised Assistance-Minimal Assistance  Memory:  Rating: Supervised Assistance-Minimal Assistance      Long Term Goals:  Long-term Goals  Timeframe for Long-term Goals: 2-3 weeks for LOS or until goals met  Goal 1: Pt will demonstrate functional cognitive-linguistic abilities in all opportunities with modified independence in order to safely complete ADLs. Patient's Response to Therapy:  Patient continues to require min to mod assistance with memory and high level problem solving tasks including recent and delayed recall, sequencing, divided attention and executive function tasks. Patient continues to present with flat affect and reduced utterance length when communicating with staff and family. ST did note improved timing of responses this date.        It is expected that patient would benefit from ongoing speech therapy, as he has responded positively to skilled intervention, thus far. Current Status:  Diet:   ADULT DIET; Regular  Compensatory Swallowing Strategies : Small bites/sips, Upright as possible for all oral intake, Alternate solids and liquids  Dysphagia Outcome Severity Scale:  Ratin    Speech Therapy Level of Assistance Scale:   Auditory Comprehension:  Rating: Modified Independent-Supervised Assistance  Verbal Expression:  Rating:Minimal Assistance  Motor Speech:  Rating: Independent  Problem Solving:  Rating: Minimal Assistance-Moderate Assistance  Memory:  Rating: Minimal Assistance            Signature: Electronically signed by Electronically signed by SOPHIE Goodwin on 22 at 11:44 AM EDT     Electronically signed by LENNY Hui LSW on 2022 at 4:09 PM

## 2022-07-18 NOTE — PROGRESS NOTES
OCCUPATIONAL THERAPY  INPATIENT REHAB TREATMENT NOTE  [facility]      NAME: Bryce Lewis  : 1990 (33 y.o.)  MRN: [id number]  CODE STATUS: Full Code  Room: O552/Z444-04    Date of Service: 2022    Referring Physician: Dr. Sergey Jaurez Diagnosis: Imp ADLs d/t hypoxic encephalopathy s/p overdose likely secondary to fentanyl laced xanax pill    Restrictions  Restrictions/Precautions  Restrictions/Precautions: Fall Risk              Patient's date of birth confirmed: Yes    SAFETY:       SUBJECTIVE:  Subjective: I am getting out of the 20th  Pain: declines      OBJECTIVE:    Pt presented supine in bed- pt agreeable to tx. Pt reported already having a shower this AM.       While seated edge of mat, challenged strength, activity tolerance, ROM, and flexibility for improved ADL performance. Issued HEP handout. Went through handout with SUP. Pt able to demo fair carryover of techniques at the end of the session. Challenged pt through usage of 2# weight to complete BUE exercises. 2 sets x 10 reps completed. Exercises focused on all UE joints and planes of motion including scapular protraction/retraction, shoulder flexion/extension/rotation/horizontal abduction, elbow flexion/extension, supination/pronation, and wrist/digit flexion/extension. Vitals monitored- pt noted to again be on telemetry. O2: 100% and -120    ADL    SUP for pt retrieving and filling his own water cup at drinking fountain- no AD     Education:   HEP, POC, ADL safety    ASSESSMENT:       PLAN OF CARE:  Strengthening, Balance training, Functional mobility training, Endurance training, Neuromuscular re-education, Safety education & training, Patient/Caregiver education & training, Equipment evaluation, education, & procurement, Self-Care / ADL, Home management training       Patient goals : \"Get home, get back to life, feel normal\"  Time Frame for Long term goals :  Within 1 weeks, pt to demo progress in the following areas listed below to achieve specific LTGs as stated in the evaluation  Long Term Goal 1: Pt will demo improved overall ADL/IADL status  Long Term Goal 2: Pt will demo improved standing balance and tolerance for self-care completion  Long Term Goal 3: Pt will demo improved activity tolerance for completion of ADL/IADL  Long Term Goal 4: Pt will demo improved overall safety and cognition in order to be able to care for himself with minimal assistance        Therapy Time:   Individual Group Co-Treat   Time In 930       Time Out 1000         Minutes 30             ADL/IADL trainin minutes  Therapeutic activities: 25 minutes     Electronically signed by:    Madalyn Stark OT,   2022, 9:40 AM

## 2022-07-18 NOTE — PROGRESS NOTES
Sg    Acute  Rehabilitation  MUSIC THERAPY      Date:  7/18/2022        Patient Name: Abiel Auguste       MRN: [id number]        YOB: 1990 (33 y.o.)       Gender: male  Diagnosis: Imp ADLs d/t hypoxic encephalopathy s/p overdose likely secondary to fentanyl laced xanax pill  Referring Practitioner: Dr. Reyes Guardian    RESTRICTIONS/PRECAUTIONS:  Restrictions/Precautions: Fall Risk  Vision: Impaired  Hearing: Within functional limits    Subjective/Observation:   Patient declined participating in individual music therapy session at 11:45am stating he was not interested. Assessment/Plan:      [] Patient would like to have music therapy, just not today. [facility] will try to see pt again, if time allows. [] Patient would like to have music therapy another time, however their D/C is before [facility] will be back on unit. *If patient is still on rehab unit, or comes back to rehab unit, [facility] will attempt to see patient then if time allows. [x] Patient does not want music therapy. [facility] will not attempt to see pt again unless pt specifically requests.        DEQUAN Eldridge    7/18/2022  Electronically signed by Maria Guadalupe Paz on 7/18/2022 at 11:49 AM

## 2022-07-18 NOTE — CARE COORDINATION
[address] [place] [address] NOTE  Room: R242/R242-01  Admit Date: 2022       Date: 2022  Patient Name: Antonia Myers        MRN: [id number]    : 1990  (33 y.o.)  Gender: male        REHAB DIAGNOSIS:   Diagnosis: Impaired mobility and ADL's due to hypoxic encephalopathy. [facility] admit [facility]    CO MORBIDITIES:      Past Medical History:   Diagnosis Date    ADHD (attention deficit hyperactivity disorder)     was initiated on treatment by Dr. Reji Cota. any testing was done here by Dr. Reji Cota, no formal psych eval.      Anxiety     Drug abuse [facility])     MDD (major depressive disorder), recurrent episode, moderate ([facility] [address] 2019    Neuropathy of right peroneal nerve 2022     Past Surgical History:   Procedure Laterality Date    TONSILLECTOMY AND ADENOIDECTOMY      WISDOM TOOTH EXTRACTION          Restrictions  Restrictions/Precautions: Fall Risk  CASE MANAGEMENT    Social/Functional History  Social/Functional History  Lives With: Alone  Type of Home: Apartment  Home Layout: One level  Home Access: Level entry  Bathroom Shower/Tub: Tub/Shower unit, Curtain  Bathroom Toilet: Standard  Home Equipment:  (NA)  Has the patient had two or more falls in the past year or any fall with injury in the past year?: No  ADL Assistance: [facility]: Independent  Homemaking Responsibilities: Yes  Ambulation Assistance: Independent  Transfer Assistance: Independent  Active : Yes  Education: bachelors - public health. Was also a case-worker  Occupation: Full time employment  Type of Occupation: [employer] work  Leisure & Hobbies: golf, football  IADL Comments: Laundry is in patients unit. Additional Comments: Independent PTA- no medical equipment.  Parents live 10 mins away from patient       Pts personal preferences: goes by Nereyda Shelton    Pts assets/resources/support system: parents, sibling    COVERAGE INFORMATION:Payor: Kiara Swift / Plan: [facility] MEDICAID / Product Type: *No Product type* /       NURSING  No active isolations    Weight: 160 lb 12.8 oz (72.9 kg) / Body mass index is 23.07 kg/m². ADULT DIET; Regular    SpO2: 97 % (07/18/22 0723)    Oxygen to be continued upon discharge: No  Home-going needs (nocturnal Pox, sleep study, RX, equipment): No    Skin Issues: No  Home-going needs (education, training, RX, Wound RN): No    Pain Managed: No    Bladder continence: Yes  Discharging with Schwartz: No   Training done: No   Urology following: No   Plan/date to remove schwartz: No   Bladder retraining started: No    Bowel continence:  Yes  Last BM date: 7/17/22  Need for bowel program: No    Anticoagulants: lovenox  Home-going needs (Education, labs): No    Antibiotics: n/a  Stop date: n/a  Home-going needs (education, RX, PICC): No      Other:       PHYSICAL THERAPY  Bed mobility:  Bed mobility  Rolling to Left: Modified independent (07/12/22 1155)  Rolling to Right: Modified independent (07/12/22 1155)  Supine to Sit: Modified independent;Supervision (07/12/22 1155)  Sit to Supine: Modified independent;Supervision (07/12/22 1155)  Bed Mobility Comments: Mildly impulsive. (07/12/22 1155)  Bed Mobility Training  Bed Mobility Training: Yes (07/16/22 1400)  Overall Level of Assistance: Independent (07/16/22 0927)  Interventions: Demonstration (07/16/22 0927)  Rolling: Independent (07/16/22 0927)  Supine to Sit: Independent (07/16/22 0927)  Sit to Supine: Independent (07/16/22 0927)  Scooting: Independent (07/16/22 0927)  Roll Left  Assistance Level: Independent (07/15/22 1534)  Roll Right  Assistance Level: Independent (07/15/22 1534)  Sit to Supine  Assistance Level: Independent (07/15/22 1534)  Supine to Sit  Assistance Level: Independent (07/15/22 1534)  Scooting  Assistance Level:  Independent (07/15/22 1534)  Transfers:  Transfers  Sit to Stand: Stand by assistance;Contact guard assistance (07/12/22 1156)  Stand to sit: Stand by [id number])  Assistive Device: Other (comment) (no device needed) (07/16/22 0927)  Interventions: Safety awareness training (07/16/22 1400)  Base of Support: Widened (07/16/22 0927)  Speed/Radha: Fluctuations (07/16/22 0927)  Step Length: Right shortened (07/16/22 0927)  Swing Pattern: Left asymmetrical (07/16/22 0927)  Gait Abnormalities: Altered arm swing (07/16/22 0927)  Rail Use: Left (07/16/22 0927)  Right Side Weight Bearing: As tolerated (07/16/22 1400)  Left Side Weight Bearing: As tolerated (07/16/22 1400)  Uneven Terrain - Level of Assistance: Stand-by assistance (07/16/22 0927)  Ambulation  Surface: Level surface; Uneven surface; Carpet; Ramp (07/15/22 1053)  Distance: 275' (07/15/22 1053)  Activity: Within Room (07/15/22 1053)  Activity Comments: Reciprocal pattern mild antalgia, improved negotiation of turns (07/15/22 1053)  Assistance Level: Modified independent (07/15/22 1053)  Skilled Clinical Factors: Mild antalgia due to R calf pain continues to show improved safety with negotiqation of turns (07/15/22 1053)  Stairs:  Stairs/Curb  Stairs?: Yes (07/12/22 1241)  Stairs  # Steps : 4 (07/12/22 1241)  Rails: Right ascending (07/12/22 1241)  Assistance: Stand by assistance (07/12/22 1241)  Comment: Step to pattern (07/12/22 1241)  Stairs - Level of Assistance: Supervision (07/16/22 0927)  Number of Stairs Trained: 24 (07/16/22 0927)  Stairs  Stair Height: 6'' (07/15/22 1053)  Device:  (without handrails) (07/15/22 1053)  Number of Stairs: 4 (07/15/22 1053)  Additional Factors: Reciprocal going up;Non-reciprocal going down (07/15/22 1053)  Assistance Level: Supervision (07/15/22 1053)  Skilled Clinical Factors: Good safety ascending increased difficulty descending requires increased effort to complete (07/15/22 2063)  W/C mobility:  Wheelchair Management  Wheelchair Management: No (07/13/22 9171)  LTG:  Long term goal 1: Pt to complete bed mobility indep  Long term goal 2: Pt to complete transfers with indep (bed/chair/car)  Long term goal 3: Pt to ambulate >300ft indoor/outdoor without AD indep  Long term goal 4: Pt to achieve 20/24 DGI  Long term goal 5: Pt to complete 5XSTS in 8sec  PT Treatment Time:  1.0 hrs      OCCUPATIONAL THERAPY    EVALUATION SELF CARE STATUS:  Hand Dominance: Right  Feeding: Setup (07/12/22 1018)  Grooming: Modified independent  (07/12/22 1018)  Grooming Skilled Clinical Factors: oral hygiene, comb hair (07/12/22 1018)  UE Bathing: Supervision (07/12/22 1018)  UE Bathing Skilled Clinical Factors: cues to attend to all areas, wash hair, thoroughness (07/12/22 1018)  LE Bathing: Minimal assistance (07/12/22 1018)  LE Bathing Skilled Clinical Factors: assist to reach RLE and for posterior hygiene (07/12/22 1018)  UE Dressing Skilled Clinical Factors: gown only (07/12/22 1018)  LE Dressing: Minimal assistance (07/12/22 1018)  LE Dressing Skilled Clinical Factors: assist with R sock, pt declined to don pants/bref (07/12/22 1018)  Toileting: Minimal assistance (07/12/22 1018)  Additional Comments: Shower completed. Pt impulsive at times (07/12/22 1018)             CURRENT SELF CARE:  Feeding  Assistance Level:  Independent (07/15/22 1114)  Grooming/Oral Hygiene  Assistance Level: Supervision (07/15/22 1114)  Skilled Clinical Factors: cues needed for sequencing and completion of ADLs (07/14/22 0944)  Upper Extremity Bathing  Assistance Level: Supervision (07/15/22 1114)  Lower Extremity Bathing  Assistance Level: Supervision (07/15/22 1114)  Skilled Clinical Factors: assist to reach posterior region for hygiene (07/13/22 0901)  Upper Extremity Dressing  Assistance Level: Modified independent (07/15/22 1114)  Lower Extremity Dressing  Assistance Level: Stand by assist (07/15/22 1114)  Skilled Clinical Factors: increased time RLE (07/15/22 1114)  Putting On/Taking Off Footwear  Assistance Level: Minimal assistance (07/14/22 0944)  Skilled Clinical Factors: using sock aide RLE (07/15/22 1114)  Toileting  Assistance Level: Supervision (07/13/22 1325)  Skilled Clinical Factors: NT- however posterior hygiene completed with bathing in shower (07/15/22 1114)  Tub/Shower Transfers  Type: Shower (07/15/22 1114)  Transfer From: Standing without device (07/15/22 1114)  Transfer To:  Shower chair with back (07/15/22 1114)  Assistance Level: Supervision (07/15/22 1114)        LTG:  Eating  Assistance Needed: Setup or clean-up assistance  CARE Score: 5  Discharge Goal: Independent, Oral Hygiene  Assistance Needed: Independent  CARE Score: 6  Discharge Goal: Independent, [address] needed: Partial/moderate assistance  CARE Score: 3  Discharge Goal: Independent, Shower/Bathe Self  Assistance Needed: Partial/moderate assistance  CARE Score: 3  Discharge Goal: Independent  Upper Body Dressing  Reason if not Attempted: Not attempted due to environmental limitations  CARE Score: 10  Discharge Goal: Independent, Lower Body Dressing  Assistance Needed: Partial/moderate assistance  CARE Score: 3  Discharge Goal: Independent, Putting On/Taking Off Footwear  Assistance Needed: Partial/moderate assistance  CARE Score: 3  Discharge Goal: Independent, Toilet Transfer  Assistance needed: Supervision or touching assistance  CARE Score: 4  Discharge Goal: Independent  OT Treatment Time: 2.0 hrs      SPEECH THERAPY       Comprehension: Within Functional Limits  Verbal Expression:  (Moderate high level naming deficits with divergent naming secondary to impaired thought organization)      Diet/Swallow:        Dysphagia Outcome Severity Scale: Level 6: Within functional limits/Modified independence    Compensatory Swallowing Strategies : Small bites/sips, Upright as possible for all oral intake, Alternate solids and liquids         LTG:  Long-term Goals  Timeframe for Long-term Goals: 2-3 weeks for LOS or until goals met  Goal 1: Pt will demonstrate functional cognitive-linguistic abilities in all opportunities with modified independence in order to safely complete ADLs. COGNITION  OT:    SP:        RECREATIONAL THERAPY  Attendance to recreational therapy programs:    []  Pet Therapy  [] Music Therapy  [] Art Therapy    [] Recreation Therapy Group [] Support Group           Patient social interaction (mood, participation): good, motivated    Patient strengths: independent prior, good family support    Patients goal: discharge to [facility] subacute rehab    Problems/Barriers: cognitive deficits, insurance approval for subacute stay, needs 24/7 supervision at this time        1. Safety:          - Intervention / Plan:    [x]  falls protocol     [x]  PT/OT    [x]  SP        - Results:         2. Potential DME needs:         - Intervention / Plan:  [x]  PT/OT     [x]  Assess equipment needs/access       - Results:         3. Weakness:          - Intervention / Plan:  [x]  PT/OT      []  Other:         - Results:         4. Discharge planning needs:          - Intervention / Plan:  [x]  Weekly team conference      [x]  family training        - Results:         5.            - Intervention / Plan:          - Results:         6.            - Intervention / Plan:         - Results:         7.            - Intervention / Plan:         - Results:           Discharge Plan   Estimated Length of Stay: 10 days    Tentative Discharge date: 7/20/22 or earlier if precert is received for [facility]      Anticipated Discharge Destination:   [facility] in [address] [place] [address]  (subacute )      Team recommendations:    1. Follow up Therapy :    PT  OT  SLP  RN  Social Work  [facility]    2. Therapy at [facility]    Other:     Equipment needed at Discharge:  Other: none at this time      Team Members Present at Conference:    Physician: Dr. Ignacio Ochoa Worker: Moon Duran, MSW, LSW  RN: Valeria Rivera RN  Physical Therapist: Gen Ramirez PT  Occupational Therapist: Peri Raymond OTR  Speech Therapist: Luis Eduardo Ha, SLP     Electronically signed by LENNY Valdivia, LSW on 7/18/2022 at 2:10 PM

## 2022-07-18 NOTE — PROGRESS NOTES
[facility] Neurology Daily Progress Note  Name: Natalie Solomon  Age: 33 y.o. Gender: male  CodeStatus: Full Code  Allergies: Peanut-Containing Drug Products  Nuts [Macadamia Nut Oil]  Peanut Oil  Shellfish-Derived Products    Chief Complaint:No chief complaint on file. Primary Care Provider: YING Arechiga CNP  InpatientTreatment Team: Treatment Team: Attending Provider: Gordo Aden DO; Consulting Physician: Lauryn Reyes MD; Consulting Physician: Rommel Dave, PhD; Consulting Physician: Halle Ayers MD; Consulting Physician: Cathy Hatchet, DO; Consulting Physician: Liliana Palomino MD; Registered Nurse: Db Rhodes RN; Occupational Therapist: Eric Yip OT; Occupational Therapist: Rashad Lanza OTR/L; Occupational Therapist Assistant: NATHANIEL Sigala; : LENNY Cantu, W  Admission Date: 7/11/2022      HPI   Pt seen and examined on rehab unit for neurology follow-up. Currently alert and oriented x3, no acute distress, cooperative. Still with some concentration issues and forgetfulness. Right lower extremity weakness noted at 4 out of 5 with numbness/paresthesia to the right foot and right hip. Right lower extremity weakness noted to be improved today. Repeat MRI of the brain pending for today    Vitals:    07/18/22 0723   BP: 102/66   Pulse: 92   Resp: 13   Temp: 98.2 °F (36.8 °C)   SpO2: 97%      Review of Systems   Constitutional:  Negative for fatigue and fever. HENT:  Negative for hearing loss and trouble swallowing. Eyes:  Negative for visual disturbance. Respiratory:  Negative for cough, chest tightness, shortness of breath and wheezing. Cardiovascular:  Negative for chest pain, palpitations and leg swelling. Gastrointestinal:  Negative for abdominal pain, nausea and vomiting. Skin:  Negative for color change and rash.    Neurological:  Negative for dizziness, tremors, seizures, syncope, facial asymmetry, speech difficulty, weakness, light-headedness, numbness and headaches. Psychiatric/Behavioral:  Positive for decreased concentration. Negative for agitation, confusion and hallucinations. The patient is not nervous/anxious. Physical Exam  Vitals and nursing note reviewed. Constitutional:       General: He is not in acute distress. Appearance: He is not diaphoretic. HENT:      Head: Normocephalic and atraumatic. Eyes:      General: No visual field deficit. Extraocular Movements: Extraocular movements intact. Pupils: Pupils are equal, round, and reactive to light. Cardiovascular:      Rate and Rhythm: Normal rate and regular rhythm. Pulmonary:      Effort: Pulmonary effort is normal. No respiratory distress. Breath sounds: Normal breath sounds. Abdominal:      General: Bowel sounds are normal. There is no distension. Palpations: Abdomen is soft. Tenderness: There is no abdominal tenderness. Skin:     General: Skin is warm and dry. Neurological:      Mental Status: He is alert and oriented to person, place, and time. Cranial Nerves: No cranial nerve deficit, dysarthria or facial asymmetry. Motor: Weakness present. No tremor, atrophy, abnormal muscle tone, seizure activity or pronator drift. Coordination: Coordination normal.      Deep Tendon Reflexes: Reflexes abnormal.      Reflex Scores:       Patellar reflexes are 3+ on the right side and 3+ on the left side. Semination unchanged but he has some right-sided weakness in the leg which we have known since he was admitted and this is central in origin.       Medications:  Reviewed    Infusion Medications:    sodium chloride       Scheduled Medications:    verapamil  120 mg Oral BID    escitalopram  20 mg Oral Daily    zolpidem  10 mg Oral Nightly    Vitamin D  2,000 Units Oral Dinner    cyanocobalamin  1,000 mcg IntraMUSCular Weekly    coenzyme Q10  100 mg Oral Daily    predniSONE  10 mg Oral Daily    Followed by    Dimitry Crawford ON 7/21/2022] predniSONE  5 mg Oral Daily    traZODone  50 mg Oral Nightly    enoxaparin  40 mg SubCUTAneous Daily    sodium chloride flush  5-40 mL IntraVENous 2 times per day     PRN Meds: acetaminophen, lidocaine, polyethylene glycol, bisacodyl, fleet, ALPRAZolam, melatonin, sodium chloride, ondansetron **OR** ondansetron, sodium chloride flush, ibuprofen    Labs:   No results for input(s): WBC, HGB, HCT, PLT in the last 72 hours. No results for input(s): NA, K, CL, CO2, BUN, CREATININE, CALCIUM, PHOS in the last 72 hours. Invalid input(s): MAGNES  No results for input(s): AST, ALT, BILIDIR, BILITOT, ALKPHOS in the last 72 hours. No results for input(s): INR in the last 72 hours. Recent Labs     07/18/22  0457   CKTOTAL 203*         Urinalysis:   Lab Results   Component Value Date/Time    NITRU Negative 06/30/2022 04:00 PM    WBCUA 3-5 06/30/2022 04:00 PM    BACTERIA Negative 06/30/2022 04:00 PM    RBCUA 3-5 06/30/2022 04:00 PM    BLOODU LARGE 06/30/2022 04:00 PM    SPECGRAV 1.033 06/30/2022 04:00 PM    GLUCOSEU Negative 06/30/2022 04:00 PM       Radiology:   Most recent    EEG No valid procedures specified. MRI of Brain Results for orders placed during the hospital encounter of 06/30/22    MRI BRAIN W WO CONTRAST    Narrative  EXAM: MRI of the brain without and with contrast    History: Hypoxic ischemic encephalopathy. Technique: Multiplanar multisequence MRI of the brain was performed without and with contrast.    Comparison: MRI of the brain July 1, 2022    Findings:    Mildly increased edema in the areas of diffusion restriction within the bilateral globi pallidi, splenium of the corpus callosum, and supratentorial white since prior MRI July 1, 2022. No mass effect or midline shift. No acute intracranial hemorrhage. No enhancing mass or pathologic enhancement.     Impression  Mildly increased edema in the areas of diffusion restriction within the bilateral globi pallidi, splenium of the corpus callosum, and supratentorial white since prior MRI July 1, 2022. Results for orders placed during the hospital encounter of 06/30/22    MRI BRAIN WO CONTRAST    Narrative  MRI BRAIN WO CONTRAST : 7/1/2022    CLINICAL HISTORY:  stroke . COMPARISON: Head CT 6/30/2022. TECHNIQUE: Multiplanar MR imaging of the head was performed without contrast.      FINDINGS:    Areas of restricted diffusion within the globus pallidus, supratentorial white matter and splenium of the corpus callosum are most consistent with carbon monoxide poisoning/hypoxemia and/or other toxic encephalopathy. There is no intracranial hemorrhage, mass effect, midline shift, extra-axial collection, significant cerebral volume loss or other complication identified. Impression  FINDINGS CONSISTENT WITH CARBON MONOXIDE POISONING/HYPOXEMIA AND/OR OTHER TOXIC ENCEPHALOPATHY. NO INTRACRANIAL HEMORRHAGE OR COMPLICATION IDENTIFIED. MRA of the Head and Neck: No results found for this or any previous visit. No results found for this or any previous visit. Results for orders placed during the hospital encounter of 06/30/22    MRA HEAD WO CONTRAST    Narrative  MRA HEAD WO CONTRAST, MRA NECK WO CONTRAST    HISTORY:  Drug abuse. Altered mental status. COMPARISON: MRI brain 7/1/2022. CT head 6/30/2022. TECHNIQUE: Routine MRA of the head without contrast with 3-D time-of-flight images and dedicated reconstructions. Routine MRA of the neck with 3-D and 2-D time-of-flight images and dedicated reconstructions. RESULT:    Visualized brain: Grossly unchanged from the recent MRI of the brain with multiple areas of restricted diffusion. Please refer to the recent MRI brain report. INTRACRANIAL MRA:    The visualized distal vertebral and basilar arteries are widely patent. The distal ICAs are patent and within normal limits of caliber.    The proximal ACAs, MCAs and PCAs are patent and within normal limits of caliber and configuration. There is no  evidence of focal,  significant stenosis or aneurysm in the visualized vessels. EXTRACRANIAL MRA:    Carotid Stenosis:    Right Common:  No significant stenosis. Right Internal Plaque:  No significant plaque formation. Right Internal Carotid Stenosis (% by NASCET Criteria):  0%    Left Common:  No significant stenosis. Left Internal Carotid Plaque:  No significant plaque formation. Left Internal Carotid Stenosis (% by NASCET Criteria):  0%    Cervical Vertebral Arteries:    Patency:  Bilateral  Dominance:  Left    Impression  Patent intracranial and extracranial circulation. CT of the Head: Results for orders placed during the hospital encounter of 06/30/22    CT Head WO Contrast    Narrative  CT Brain. Contrast medium:  without contrast.. History: Altered mental status. Technical factors: CT imaging of the brain was obtained and formatted as 5 mm contiguous axial images. 2.5 mm contiguous axial images were obtained through the osseous structures. Sagittal and coronal reconstruction obtained during postprocessing. Comparison:  None. Findings:    Extra-axial spaces:  Normal.    Intracranial hemorrhage:  None. Ventricular system: [Negative. Basal Cisterns:  Without anomaly. Cerebral Parenchyma: Bilateral, symmetric areas of decreased attenuation exerting no mass effect measuring approximately 9 mm in size since found within the bilateral globus pallidus (series 2, image 17). Midline Shift:  None. Cerebellum:  No anomaly identified. Paranasal sinuses and mastoid air cells:  No anomaly identified. Visualized Orbits:  Negative. Impression  Impression:    Round symmetric areas decreased attenuation bilateral globus pallidus. This finding may be seen in patients experiencing hypoxia, i.e., carbon monoxide poisoning.       All CT scans at this facility use dose modulation, iterative reconstruction, and/or weight based dosing when appropriate to reduce radiation dose to as low as reasonably achievable. No results found for this or any previous visit. No results found for this or any previous visit. Carotid duplex: No results found for this or any previous visit. No results found for this or any previous visit. No results found for this or any previous visit. Echo No results found for this or any previous visit. 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 We will have to see how much of this he recovers. May be some permanent deficits. This was discussed with patient and family. Decadron discontinued by another provider and he has been placed on a steroid taper. Repeat MRI brain will be scheduled for 7/18/2022  Patient with ongoing right lower extremity weakness, numbness and bowel and bladder dysfunction. Screening MRI of the spine was negative. Insomnia, continue trazodone. Expect that this will improve as steroids are tapered off. Discharge planning pending for drug rehabilitation versus behavioral health. 7/15/2022:  Encephalopathy improved. Still with some concentration issues and forgetfulness. He continues on prednisone taper  Repeat MRI of the brain pending for 7/18/2022    I have personally performed a face to face diagnostic evaluation on this patient, reviewed all data and investigations, and am the sole provider of all clinical decisions on the neurological status of this patient. And examined on weekly rehab rounds. Patient is improved though is still quite flattened affect and is likely has some cognitive impairment which may require neuropsychometric testing. We will repeat his MRI for cerebral edema and this represents mostly hypoxic ischemic encephalopathy and truly patient may had a seizure. Patient also is very anxious and will continue on Xanax while in the hospital as we have been called on this several times. We will start tapering his prednisone. Repeat MRI on Monday. 60% time spent on evaluating this patient. Patient has right lower extremity weakness which is central in origin.   We have been aware of this    7/18/2022:  Repeat MRI of the brain pending for today  Right lower extremity weakness improving  Continue with therapies      Collaborating physicians: Dr Laureano Roque    Electronically signed by YING Duran CNP on 7/18/2022 at 12:53 PM

## 2022-07-18 NOTE — PROGRESS NOTES
Subjective: The patient complains of severe acute on chronic progressive fatigue and ataxia, cognitive slowing, right lower extremity numbness and stiffness partially relieved by rest, medications, PT,  OT, medication titration SLP and rest and exacerbated by recent OD-he was admitted to [facility] on 6/30/2022  with change in mental status and history of possible overdose. Family did a well check after his boss noticed that he had not shown up to work for several days and called the family out of concern. He was initially admitted and to the ICU. He had abnormal liver enzymes, rhabdomyolysis kidney injury. Urine was positive for amphetamines benzos and fentanyl. He was found to also be using Royle kratom overuse, transaminitis, EDGAR, rhabdomyolysis resulting in provoked seizure and cerebral edema. He admits that he took 2 Xanax that he bought on the street. He and I both fear that it may have been laced with fentanyl. Discussed with treatment team and hospitalist and neuro--  MRIs spine were normal..      I am concerned about patients medical complexities and barriers to advancing in rehab goals including right lower extremity stiffness secondary to sciatic neuropathy and nerve injury as well as his history of substance abuse and overuse of benzodiazepines. She plans to take him off of the benzos but closer to discharge. Patient again slept much better with the Ambien dosing last night. I reviewed current care and plans for further care with other rehab providers including nursing and case management. According to recent nursing note, \" Patient slept till now to complete EKG. Pt never asked for xanax. No dose since 1604- 7/17. \"       I am glad to see that his heart rate has come down with the medications that have been ordered-he is now on Calan SR. His CK levels are coming down steadily would like to see if we can decrease his frequency of lab's.   Thyroid studies and cortisol level within normal limits. ROS x10: The patient also complains of severely impaired mobility and activities of daily living. Otherwise no new problems with vision, hearing, nose, mouth, throat, dermal, cardiovascular, GI, , pulmonary, musculoskeletal, psychiatric or neurological. See also Acute Rehab PM&R H&P. Vital signs:  /66   Pulse 92   Temp 98.2 °F (36.8 °C) (Oral)   Resp 13   Ht 5' 10\" (1.778 m)   Wt 160 lb 12.8 oz (72.9 kg)   SpO2 97%   BMI 23.07 kg/m²   I/O:   PO/Intake:  fair PO intake,  Adult reg  Diet thin liquids    Bowel:   continent   Bladder: continent   No need for schwartz  General:  Patient is well developed,   adequately nourished, and    well kempt. HEENT:    Pupils equal, hearing intact to loud voice, external inspection of ear and nose benign. Inspection of lips, tongue and gums benign  -cognitive slowing right lower extremity weakness and numbness. Musculoskeletal: No significant change in strength or tone. All joints stable. Inspection and palpation of digits and nails show no clubbing, cyanosis or inflammatory conditions. Neuro/Psychiatric: Affect: flat but pleasant. Alert and oriented to person, place and situation with   min cues. No significant change in deep tendon reflexes or sensation  Lungs:  Diminished, CTA-B. Respiration effort is   normal at rest.     Heart:   S1 = S2,   RRR. Abdomen:  Soft, non-tender, no enlargement of liver or spleen. Extremities:    lower extremity edema  -weakness right lower extremity decreased sciatic neuropathy.   Skin:   Intact to general survey,      Rehabilitation:  Physical Therapy:   Bed mobility:  Bed mobility  Rolling to Left: Modified independent (07/12/22 1155)  Rolling to Right: Modified independent (07/12/22 1155)  Supine to Sit: Modified independent;Supervision (07/12/22 1155)  Sit to Supine: Modified independent;Supervision (07/12/22 1155)  Bed Mobility Comments: Mildly impulsive. (07/12/22 1155)  Bed Mobility Training  Bed Mobility Training: Yes (07/16/22 1400)  Overall Level of Assistance: Independent (07/16/22 0927)  Interventions: Demonstration (07/16/22 1216)  Rolling: Independent (07/16/22 0927)  Supine to Sit: Independent (07/16/22 0927)  Sit to Supine: Independent (07/16/22 0927)  Scooting: Independent (07/16/22 0927)  Roll Left  Assistance Level: Independent (07/18/22 1009)  Roll Right  Assistance Level: Independent (07/18/22 1009)  Sit to Supine  Assistance Level: Independent (07/18/22 1009)  Supine to Sit  Assistance Level: Independent (07/18/22 1009)  Scooting  Assistance Level: Independent (07/18/22 1009)  Transfers:  Transfers  Sit to Stand: Stand by assistance;Contact guard assistance (07/12/22 1156)  Stand to sit: Stand by assistance;Contact guard assistance (07/12/22 1156)  Bed to Chair: Stand by assistance;Contact guard assistance (07/12/22 1156)  Car Transfer: Stand by assistance;Contact guard assistance (07/12/22 1156)  Comment: Varied performance. Pt with increased time to approach chair. Safe technique however inaccurate execution at times. 5XSTS = 10.7sec. (07/12/22 1156)  Transfer Training  Transfer Training: Yes (07/16/22 1400)  Overall Level of Assistance: Independent (07/16/22 1400)  Interventions: Safety awareness training (07/16/22 1400)  Sit to Stand: Independent;Supervision (07/16/22 1400)  Stand to Sit: Independent;Supervision (07/16/22 1400)  Stand Pivot Transfers: Supervision; Independent (07/16/22 1400)  Bed to Chair: Modified independent (07/16/22 1400)  Transfers  Surface: To chair with arms (07/15/22 1048)  Sit to Stand  Assistance Level: Independent (07/18/22 1009)  Skilled Clinical Factors: 5x STS = 8.09 sec (07/18/22 1009)  Stand to Sit  Assistance Level: Independent (07/18/22 1009)  Skilled Clinical Factors: Occasionally unstable to approach to chair (07/13/22 1214)  Bed To/From Chair  Assistance Level:  Independent (07/18/22 1009)  Car Transfer  Assistance Level: Independent (07/18/22 1009)  Skilled Clinical Factors: Performed in family vehicle (07/15/22 1534)  Floor Transfer  Assistance Level: Supervision;Modified independent (07/15/22 1534)  Skilled Clinical Factors: Demonstation provided for proper technique (07/15/22 1048)  Gait:   Ambulation  Surface: level tile (07/12/22 1241)  Device: No Device (07/12/22 1241)  Assistance: Stand by assistance;Contact guard assistance (07/12/22 1241)  Quality of Gait: Decreased speed and step length. Challenged with turns and obstacles. (07/12/22 1241)  Distance: 150ft; 75ft; multiple small distances within gym focusing on obstacles and turns. Gaze stabilization introduced. (07/12/22 1241)  Gait Training: Yes (07/16/22 1400)  Overall Level of Assistance: Stand-by assistance (07/16/22 1400)  Distance (ft): 250 Feet (07/16/22 0927)  Assistive Device: Other (comment) (no device needed) (07/16/22 0927)  Interventions: Safety awareness training (07/16/22 1400)  Base of Support: Widened (07/16/22 0927)  Speed/Radha: Fluctuations (07/16/22 0927)  Step Length: Right shortened (07/16/22 0927)  Swing Pattern: Left asymmetrical (07/16/22 0927)  Gait Abnormalities: Altered arm swing (07/16/22 0927)  Rail Use: Left (07/16/22 0927)  Right Side Weight Bearing: As tolerated (07/16/22 1400)  Left Side Weight Bearing: As tolerated (07/16/22 1400)  Uneven Terrain - Level of Assistance: Stand-by assistance (07/16/22 0927)  Ambulation  Surface: Level surface; Uneven surface; Carpet; Ramp (07/18/22 1020)  Distance: 300' x 2 500' x 1 (07/18/22 1020)  Activity: Within Unit (07/18/22 1020)  Activity Comments: Reciprocal pattern (07/18/22 1020)  Assistance Level:  Independent (07/18/22 1020)  Skilled Clinical Factors: Mild antalgia due to R calf pain continues to show improved safety with negotiqation of turns (07/15/22 1053)  Stairs:  Stairs/Curb  Stairs?: Yes (07/12/22 1241)  Stairs  # Steps : 4 (07/12/22 1241)  Rails: Right ascending (07/12/22 [id number])  Assistance: Stand by assistance (07/12/22 1241)  Comment: Step to pattern (07/12/22 1241)  Stairs - Level of Assistance: Supervision (07/16/22 0927)  Number of Stairs Trained: 24 (07/16/22 0927)  Stairs  Stair Height: 6'' (07/18/22 1020)  Device: Bilateral handrails (07/18/22 1020)  Number of Stairs: 12 (07/18/22 1020)  Additional Factors: Reciprocal going up;Non-reciprocal going down (07/15/22 1053)  Assistance Level: Modified independent (07/18/22 1020)  Skilled Clinical Factors: Good safety ascending increased difficulty descending requires increased effort to complete (07/15/22 1053)  W/C mobility:  Wheelchair Management  Wheelchair Management: No (07/13/22 1539)    Occupational Therapy:   Hand Dominance: Right  ADL  Feeding: Setup (07/12/22 1018)  Grooming: Modified independent  (07/12/22 1018)  Grooming Skilled Clinical Factors: oral hygiene, comb hair (07/12/22 1018)  UE Bathing: Supervision (07/12/22 1018)  UE Bathing Skilled Clinical Factors: cues to attend to all areas, wash hair, thoroughness (07/12/22 1018)  LE Bathing: Minimal assistance (07/12/22 1018)  LE Bathing Skilled Clinical Factors: assist to reach RLE and for posterior hygiene (07/12/22 1018)  UE Dressing Skilled Clinical Factors: gown only (07/12/22 1018)  LE Dressing: Minimal assistance (07/12/22 1018)  LE Dressing Skilled Clinical Factors: assist with R sock, pt declined to don pants/bref (07/12/22 1018)  Toileting: Minimal assistance (07/12/22 1018)  Additional Comments: Shower completed. Pt impulsive at times (07/12/22 1018)  Toilet Transfers  Toilet - Technique: Ambulating (07/12/22 1022)  Equipment Used: Grab bars (07/12/22 1022)  Toilet Transfer: Contact guard assistance (07/12/22 1022)     Shower Transfers  Shower - Transfer From: [facility] (07/12/22 1022)  Shower - Transfer Type: To and From (07/12/22 1022)  Shower - Transfer To:  Shower seat with back (07/12/22 1022)  Shower - Technique: Stand pivot (07/12/22 1022)  Shower identified. Visualized Orbits:  Negative. Impression: Round symmetric areas decreased attenuation bilateral globus pallidus. This finding may be seen in patients experiencing hypoxia, i.e., carbon monoxide poisoning. MRA HEAD : 7/2/2022    Visualized brain: Grossly unchanged from the recent MRI of the brain with multiple areas of restricted diffusion. Please refer to the recent MRI brain report. INTRACRANIAL MRA:    The visualized distal vertebral and basilar arteries are widely patent. The distal ICAs are patent and within normal limits of caliber. The proximal ACAs, MCAs and PCAs are patent and within normal limits of caliber and configuration. There is no evidence of focal,  significant stenosis or aneurysm in the visualized vessels. EXTRACRANIAL MRA: Carotid Stenosis: Right Common:  No significant stenosis. Right Internal Plaque:  No significant plaque formation. Right Internal Carotid Stenosis (% by NASCET Criteria):  0% Left Common:  No significant stenosis. Left Internal Carotid Plaque:  No significant plaque formation. Left Internal Carotid Stenosis (% by NASCET Criteria):  0% Cervical Vertebral Arteries: Patency:  Bilateral Dominance:  Left     Patent intracranial and extracranial circulation. XR CHEST   7/1/2022:    LEFT UPPER LUNG ATELECTASIS/PNEUMONIA. US ABDOMEN   7/5/2022    NEGATIVE RIGHT UPPER QUADRANT ULTRASOUND WITHOUT EVIDENCE OF CHOLELITHIASIS. US DUP LOWER EXTREMITY RIGHT ROBERT 7/3/2022 :    No acute DVT of the right lower extremity veins from the groin to the knee. No acute DVT of the right calf veins. MRA NECK 7/2/2022    Patent intracranial and extracranial circulation. MRI BRAIN  7/4/2022: Mildly increased edema in the areas of diffusion restriction within the bilateral globi pallidi, splenium of the corpus callosum, and supratentorial white since prior MRI July 1, 2022. No mass effect or midline shift. No acute intracranial hemorrhage.  No enhancing mass or pathologic enhancement. Mildly increased edema in the areas of diffusion restriction within the bilateral globi pallidi, splenium of the corpus callosum, and supratentorial white since prior MRI July 1, 2022. MRI BRAIN   7/1/2022   Areas of restricted diffusion within the globus pallidus, supratentorial white matter and splenium of the corpus callosum are most consistent with carbon monoxide poisoning/hypoxemia and/or other toxic encephalopathy. There is no intracranial hemorrhage, mass effect, midline shift, extra-axial collection, significant cerebral volume loss or other complication identified. FINDINGS CONSISTENT WITH CARBON MONOXIDE POISONING/HYPOXEMIA AND/OR OTHER TOXIC ENCEPHALOPATHY. NO INTRACRANIAL HEMORRHAGE OR COMPLICATION IDENTIFIED. MRI CERVICAL THORACIC LUMBAR SPINE  7/7/2022      NEGATIVE MILDLY LIMITED CERVICAL, THORACIC AND LUMBAR SPINE MRI. Previous extensive, complex labs, notes and diagnostics reviewed and analyzed. ALLERGIES:    Allergies as of 07/11/2022 - Fully Reviewed 07/11/2022   Allergen Reaction Noted    Peanut-containing drug products Anaphylaxis 12/06/2018    Nuts [macadamia nut oil] Angioedema 07/01/2022    Peanut oil Other (See Comments) 02/02/2015    Shellfish-derived products Itching 07/01/2022      (please also verify by checking MAR)     Today I evaluated this patient for periodic reassessment of medical and functional status. The patient was discussed in detail at the treatment team meeting focusing on current medical issues, progress in therapies, social issues, psychological issues, barriers to progress and strategies to address these barriers, and discharge planning. See the addendum to rehab progress note-as a second progress note in the chart.   The patient continues to be high risk for future disability and their medical and rehabilitation prognosis continue to be good and therefore, we will continue the patient's rehabilitation course as planned. The patient's tentative discharge date was set. Patient and family education was discussed. The patient was made aware of the team discussion regarding their progress. 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 Functional and medical status reassessed regarding patients ability to participate in therapies and patient found to be able to participate in acute intensive comprehensive inpatient rehabilitation program including PT/OT to improve balance, ambulation, ADLs, and to improve the P/AROM. Therapeutic modifications regarding activities in therapies, place, amount of time per day and intensity of therapy made daily. In bed therapies or bedside therapies prn. Bowel and Bladder dysfunction  , Neurogenic bowel and bladder:  frequent toileting, ambulate to bathroom with assistance, check post void residuals. Check for C.difficile x1 if >2 loose stools in 24 hours, continue bowel & bladder program.  Monitor bowel and bladder function. Lactinex 2 PO every AC. MOM prn, Brown Bomb prn, Glycerin suppository prn, enema prn. Encourage therapy and nursing to co-treat and problem solve re continence. Severe RLE pain as well as generalized OA pain: reassess pain every shift and prior to and after each therapy session, give prn Tylenol and consider scheduled Tylenol, modalities prn in therapy, masage, Lidoderm, K-pad prn. Consider scheduled AM pain meds. Skin healing   and breakdown risk:  continue pressure relief program.  Daily skin exams and reports from nursing.   Fatigue due to nutritional and hydration deficiency: Add and titrate vitamin B12 vitamin D and CoQ10 continue to monitor I&Os, calorie counts prn, dietary consult prn. Add healthy snack at night. Acute episodic insomnia with situational adjustment disorder:  prn Ambien, monitor for day time sedation. Falls risk elevated:  patient to use call light to get nursing assistance to get up, bed and chair alarm. Elevated DVT risk: progressive activities in PT, continue prophylaxis CHAR hose, elevation and Lovenox with goal to discontinue over the next few days. Complex discharge planning:  DC 20- to rehab at sub acute with substance abuse focus. Weekly team meeting every Thursday to re-assess progress towards goals, discuss and address social, psychological and medical comorbidities and to address difficulties they may be having progressing in therapy. Patient and family education is in progress. The patient is to follow-up with their family physician after discharge.    -however patient will also need a specialized sub acute rehab and combined drug recovery program after DC. Complex Active General Medical Issues that complicate care Assess & Plan:      Drug abuse,   Polydrug dependence including opioid type drug with continuous use with complication -is aware of the implications that his drug use has had on his health and that he almost . He is planning on not using or overusing medications in the future. Altered mental status-status post drug overdose apparently was accidental overdose. Patient admits to taking 2 Xanax pills that he bought off on the street. Xanax in this area is notoriously laced with fentanyl. Like that he had inadvertent fentanyl overdose from the medication that he thought was Xanax that he bought on the street. 12-step program as advised. Repeat MRI brain will be scheduled for 2022    Vitamin D deficiency-Add high-dose vitamin D, recheck vitamin D level out after discharge,    Spasm of back muscles-lowest effective dose of muscle spasms  Cerebral edema with seizures-antiseizure medications consult neurology and steroid taper. 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 Liver toxicity likely secondary to polysubstance abuse-recheck LFTs as an outpatient avoid toxic medications    ADHD (attention deficit hyperactivity disorder)-lowest effective dose of stimulant medication    Anxiety/ROSALES (generalized anxiety disorder),   MDD (major depressive disorder), recurrent episode, moderate -emotional support provided daily, vitamin B12, encourage participation in rehabilitation support group and recreational therapy, adjust/add medications ( b Lexapro, Desyrel, Xanax and taper to lowest effective dose) Taper Deltasone-and taper Xanax under the direction of neurology and psychiatry. Baseline sinus tachycardia-  cardiology sinus tachycardia  -I reviewed-consult with cardiology and check thyroid and cortisol levels-Recenmt T4, K and Mag WNL. Heart rate seems to be somewhat better after starting verapamil-Obtain ECG. Will order Echo. LPRD (laryngopharyngeal reflux disease)-consult speech and language pathology    Shift work sleep disorder-patient may need to adjust his therapy times. Focus of today's plan-  Initiate and modify therapuetic plan to meet patients individual needs, add rest breaks as needed, and transition off of Xanax. Focus on energy conservation, patient and family education, and bowel and bladder care.       Electronically signed by Agueda Weinstein DO on 7/13/22 at 8:35 AM KATHY Morales D.O., PM&R     Attending    [facility] Sandie Giang

## 2022-07-18 NOTE — PROGRESS NOTES
Hospitalist Progress Note      PCP: YING Membreno CNP    Date of Admission: 7/11/2022    Subjective: :  Patient admitted on 6/30 after he was found unresponsive in the motel room 2/2 suspected drug overdose. He had rhabdomyolysis, abnormal LFTs 2/2 ischemic hepatitis and ischemic encephalopathy. He was discharged to rehab on 7/11/22    Interval history:   Patient seen and examined on follow-up. Resting in bed at time of exam.  Denies headache or dizziness. Denies chest pain or shortness of breath. No abdominal pain or nausea. Having bowel movements. Working with therapy. Medications:  Reviewed    Infusion Medications    sodium chloride       Scheduled Medications    verapamil  120 mg Oral BID    escitalopram  20 mg Oral Daily    zolpidem  10 mg Oral Nightly    Vitamin D  2,000 Units Oral Dinner    cyanocobalamin  1,000 mcg IntraMUSCular Weekly    coenzyme Q10  100 mg Oral Daily    predniSONE  10 mg Oral Daily    Followed by    Stacey Pacheco ON 7/21/2022] predniSONE  5 mg Oral Daily    traZODone  50 mg Oral Nightly    enoxaparin  40 mg SubCUTAneous Daily    sodium chloride flush  5-40 mL IntraVENous 2 times per day     PRN Meds: acetaminophen, lidocaine, polyethylene glycol, bisacodyl, fleet, ALPRAZolam, melatonin, sodium chloride, ondansetron **OR** ondansetron, sodium chloride flush, ibuprofen    No intake or output data in the 24 hours ending 07/18/22 1213      Exam:    /66   Pulse 92   Temp 98.2 °F (36.8 °C) (Oral)   Resp 13   Ht 5' 10\" (1.778 m)   Wt 160 lb 12.8 oz (72.9 kg)   SpO2 97%   BMI 23.07 kg/m²     General appearance: No apparent distress, sitting up in chair  HEENT: Conjunctivae/corneas clear. Neck: Supple, with full range of motion. No jugular venous distention. Trachea midline. Respiratory:  Normal respiratory effort. Clear to auscultation, bilaterally without Rales/Wheezes/Rhonchi.   Cardiovascular: Regular rate and rhythm with normal S1/S2 without murmurs, rubs or gallops. Abdomen: Soft, non-tender, non-distended with normal bowel sounds. Musculoskeletal: No clubbing, cyanosis or edema bilaterally. Neuro: Non Focal.  Alert, moving all extremities      Labs:   No results for input(s): WBC, HGB, HCT, PLT in the last 72 hours. No results for input(s): NA, K, CL, CO2, BUN, CREATININE, CALCIUM, PHOS in the last 72 hours. Invalid input(s): MAGNES  No results for input(s): AST, ALT, BILIDIR, BILITOT, ALKPHOS in the last 72 hours. No results for input(s): INR in the last 72 hours.   Recent Labs     07/18/22  0457   CKTOTAL 203*         Urinalysis:      Lab Results   Component Value Date/Time    NITRU Negative 06/30/2022 04:00 PM    WBCUA 3-5 06/30/2022 04:00 PM    BACTERIA Negative 06/30/2022 04:00 PM    RBCUA 3-5 06/30/2022 04:00 PM    BLOODU LARGE 06/30/2022 04:00 PM    SPECGRAV 1.033 06/30/2022 04:00 PM    GLUCOSEU Negative 06/30/2022 04:00 PM       Radiology:  MRI BRAIN WO CONTRAST    (Results Pending)         Assessment/Plan:    Active Hospital Problems    Diagnosis Date Noted    Tachycardia [R00.0] 07/15/2022     Priority: High    Cognitive impairment [R41.89] 07/15/2022     Priority: Medium    Hypoxic encephalopathy (HCC) [G93.1]      Priority: Medium    Spasm of back muscles [M62.830] 07/12/2022     Priority: Medium    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] 07/12/2022     Priority: Medium    Lesion of right sciatic nerve [G57.01] 07/12/2022     Priority: Medium    Impaired mobility and ADLs [Z74.09, Z78.9] 07/12/2022     Priority: Medium     impaired mobility and ADLs dt encephalopathy  [Z74.09, Z78.9] 07/05/2022     Priority: Medium    Vitamin D deficiency [E55.9] 07/05/2022     Priority: Medium    Altered mental status [R41.82]      Priority: Medium    Drug abuse ([facility] [address] [F19.10]      Priority: Medium    Shift work sleep disorder [G47.26] 07/15/2019    ROSALES (generalized anxiety disorder) [F41.1] 06/04/2019    MDD (major depressive disorder), recurrent episode, moderate (HCC) [F33.1] 06/04/2019    Polydrug dependence including opioid type drug with continuous use with complication ([facility] [address] [Q92.95] 06/04/2019    Anxiety [F41.9] 07/13/2015    ADHD (attention deficit hyperactivity disorder) [F90.9] 12/05/2012    LPRD (laryngopharyngeal reflux disease) [K21.9] 10/19/2011        Hypoxic ischemic encephalopathy 2/2 status epilepticus with polysubstance use-MRI of the brain showed findings consistent with hypoxic encephalopathy. EEG was normal.  He is on prednisone for cerebral edema. management per neurology. Repeat MRI of the brain is pending. Rhabdomyolysis-CK has improved. Abnormal LFTs, transaminitis-likely 2/2 ischemic hepatitis. LFTs are much improved. Tachycardia-continue verapamil. Cardiology following. Echo pending  ADHD-previously on Adderall. Will defer to primary team, psychiatry, neurology      Diet: ADULT DIET;  Regular    Code Status: Full Code      Electronically signed by YING Almonte CNP on 7/18/2022 at 12:13 PM

## 2022-07-18 NOTE — PROGRESS NOTES
OCCUPATIONAL THERAPY  INPATIENT REHAB TREATMENT NOTE  [facility]      NAME: Fabiola Freedman  : 1990 (33 y.o.)  MRN: [id number]  CODE STATUS: Full Code  Room: Q075/P552-77    Date of Service: 2022    Referring Physician: Dr. Zhang Jacobo Diagnosis: Imp ADLs d/t hypoxic encephalopathy s/p overdose likely secondary to fentanyl laced xanax pill    Restrictions  Restrictions/Precautions  Restrictions/Precautions: Fall Risk         Patient's date of birth confirmed: Yes    SAFETY:  Safety Devices  Safety Devices in place: Yes  Type of devices: All fall risk precautions in place    SUBJECTIVE:  Subjective: I had pizza  Pain: declines    OBJECTIVE:    While seated, completed fine motor task with focus on coordination, activity tolerance, cognition, and strength in order to improve general function. Also completed activity requiring pt to select matching screws and nuts and then place into horizontal and elevated peg board. Task required pt to place one hand on top of board to hand nut and one hand below to support the screw for further proprioceptive and tactile challenge. Cued pt to alter placement/job of each hand. Pt completed the task with 100% accuracy     Patient completed fine motor and cognitive activity of Tetris. Patient able to fill 20 X 10 vertical Tetris board with various shaped Tetris pieces leaving a total of 20 of approx 200 spaces empty. Some difficulty noted with this task with decreased overall attention     Completed fine motor task with pt required to complete scratch off coloring task focusing on attention to detail and thoroughness     IADL    Pt engaged in coffee making task using commercial coffee machine. Pt needing step by step cues for proper completion.  Pt also seeks approval for most steps of the task and is quick to seek assistance rather than problem solving on his own   Fx mobility completed throughout therapy gym and holder at [facility] level  Pt able to then transport coffee from kitchen to therapy gym without spilling     Education:  Education  Education Given To: Patient  Education Provided: IADL Function;Precautions  Education Method: Demonstration;Verbal  Education Outcome: Demonstrated understanding;Continued education needed;Verbalized understanding    ASSESSMENT:   Pt continues to demo good participation in sessions     PLAN OF CARE:  Strengthening, Balance training, Functional mobility training, Endurance training, Neuromuscular re-education, Safety education & training, Patient/Caregiver education & training, Equipment evaluation, education, & procurement, Self-Care / ADL, Home management training       Patient goals : \"Get home, get back to life, feel normal\"  Time Frame for Long term goals :  Within 1 weeks, pt to demo progress in the following areas listed below to achieve specific LTGs as stated in the evaluation  Long Term Goal 1: Pt will demo improved overall ADL/IADL status  Long Term Goal 2: Pt will demo improved standing balance and tolerance for self-care completion  Long Term Goal 3: Pt will demo improved activity tolerance for completion of ADL/IADL  Long Term Goal 4: Pt will demo improved overall safety and cognition in order to be able to care for himself with minimal assistance        Therapy Time:   Individual Group Co-Treat   Time In 1300       Time Out 1400         Minutes 60             ADL/IADL training: 15 minutes  Therapeutic activities: 45 minutes     Electronically signed by:    Vanessa Ramsay OT,   7/18/2022, 1:28 PM

## 2022-07-18 NOTE — PROGRESS NOTES
Physical Therapy Rehab Treatment Note  Facility/Department: [facility]  Room: [place] [address]       NAME: Saida Onofre  : 1990 (33 y.o.)  MRN: [id number]  CODE STATUS: Full Code    Date of Service: 2022       Restrictions:  Restrictions/Precautions: Fall Risk       SUBJECTIVE:   Subjective: ' I am doing a little better\"    Pain  Pain: continues to have pain in right lower extremity. not rated      OBJECTIVE:     Roll Left  Assistance Level: Independent  Roll Right  Assistance Level: Independent  Sit to Supine  Assistance Level: Independent  Supine to Sit  Assistance Level: Independent  Scooting  Assistance Level: Independent    Sit to Stand  Assistance Level: Independent  Skilled Clinical Factors: 5x STS = 8.09 sec  Stand to Sit  Assistance Level: Independent  Bed To/From Chair  Assistance Level: Independent  Car Transfer  Assistance Level: Independent    Ambulation  Surface: Level surface; Uneven surface; Carpet; Ramp  Distance: 300' x 2 500' x 1  Activity: Within Unit  Activity Comments: Reciprocal pattern  Assistance Level: Independent    Stairs  Stair Height: 6''  Device: Bilateral handrails  Number of Stairs: 12  Assistance Level: Modified independent    ASSESSMENT/PROGRESS TOWARDS GOALS:   Assessment: Patient is meeting all goals at this time. Patient dcores 23/24 on DGI and shows good safety with all mobility  Therapy Prognosis: Good  Assessment  Discharge Recommendations: Outpatient PT; Home independently    Goals:  Long Term Goals  Long term goal 1: Pt to complete bed mobility indep  Long term goal 2: Pt to complete transfers with indep (bed/chair/car)  Long term goal 3: Pt to ambulate >300ft indoor/outdoor without AD indep  Long term goal 4: Pt to achieve 20/24 DGI  Long term goal 5: Pt to complete 5XSTS in 8sec    PLAN OF CARE/Safety:   Safety Devices  Type of Devices:  All fall risk precautions in place      Therapy Time:   Individual   Time In 1000   Time Out 1030   Minutes 30     Minutes:

## 2022-07-18 NOTE — PROGRESS NOTES
cueing 100% accuracy. ST noted an increase in length of utterances this date compared to session 07/15/2022. Treatment/Activity Tolerance:  Patient tolerated treatment well    Plan:  Continue per POC    Pain Assessment:  Patient does not c/o pain. Pain Re-assessment:  Patient does not c/o pain. Patient/Caregiver Education:  Patient educated on session and progression towards goals.     Safety Devices:  Bed alarm in place and Call light within reach    Speech Therapy Level of Assistance Scale    AUDITORY COMPREHENSION  Rating:  Modified Independent    PROBLEM SOLVING  Rating: Minimal Assistance-Moderate Assistance    MEMORY  Rating:  Minimal Assistance          Therapy Time  SLP Individual Minutes  Time In: 0900  Time Out: 0930  Minutes: 30              Signature: Electronically signed by SOPHIE Galloway on 7/18/2022 at 11:20 AM

## 2022-07-19 LAB
EKG ATRIAL RATE: 88 BPM
EKG P AXIS: 43 DEGREES
EKG P-R INTERVAL: 126 MS
EKG Q-T INTERVAL: 372 MS
EKG QRS DURATION: 88 MS
EKG QTC CALCULATION (BAZETT): 450 MS
EKG R AXIS: 70 DEGREES
EKG T AXIS: 40 DEGREES
EKG VENTRICULAR RATE: 88 BPM

## 2022-07-19 PROCEDURE — 93005 ELECTROCARDIOGRAM TRACING: CPT | Performed by: INTERNAL MEDICINE

## 2022-07-19 PROCEDURE — 97116 GAIT TRAINING THERAPY: CPT

## 2022-07-19 PROCEDURE — 97130 THER IVNTJ EA ADDL 15 MIN: CPT

## 2022-07-19 PROCEDURE — 97530 THERAPEUTIC ACTIVITIES: CPT

## 2022-07-19 PROCEDURE — 6360000002 HC RX W HCPCS: Performed by: PHYSICAL MEDICINE & REHABILITATION

## 2022-07-19 PROCEDURE — 97535 SELF CARE MNGMENT TRAINING: CPT

## 2022-07-19 PROCEDURE — 93010 ELECTROCARDIOGRAM REPORT: CPT | Performed by: INTERNAL MEDICINE

## 2022-07-19 PROCEDURE — 6370000000 HC RX 637 (ALT 250 FOR IP): Performed by: FAMILY MEDICINE

## 2022-07-19 PROCEDURE — 97129 THER IVNTJ 1ST 15 MIN: CPT

## 2022-07-19 PROCEDURE — 99232 SBSQ HOSP IP/OBS MODERATE 35: CPT | Performed by: PHYSICAL MEDICINE & REHABILITATION

## 2022-07-19 PROCEDURE — 1180000000 HC REHAB R&B

## 2022-07-19 PROCEDURE — 6360000002 HC RX W HCPCS: Performed by: INTERNAL MEDICINE

## 2022-07-19 PROCEDURE — 6370000000 HC RX 637 (ALT 250 FOR IP): Performed by: INTERNAL MEDICINE

## 2022-07-19 PROCEDURE — 6370000000 HC RX 637 (ALT 250 FOR IP): Performed by: PHYSICAL MEDICINE & REHABILITATION

## 2022-07-19 RX ADMIN — ESCITALOPRAM OXALATE 20 MG: 20 TABLET ORAL at 08:24

## 2022-07-19 RX ADMIN — ALPRAZOLAM 0.5 MG: 0.25 TABLET ORAL at 16:58

## 2022-07-19 RX ADMIN — VERAPAMIL HYDROCHLORIDE 120 MG: 120 TABLET, FILM COATED, EXTENDED RELEASE ORAL at 20:58

## 2022-07-19 RX ADMIN — ZOLPIDEM TARTRATE 10 MG: 5 TABLET ORAL at 20:58

## 2022-07-19 RX ADMIN — VERAPAMIL HYDROCHLORIDE 120 MG: 120 TABLET, FILM COATED, EXTENDED RELEASE ORAL at 08:23

## 2022-07-19 RX ADMIN — Medication 2000 UNITS: at 16:58

## 2022-07-19 RX ADMIN — PREDNISONE 10 MG: 10 TABLET ORAL at 08:23

## 2022-07-19 RX ADMIN — CYANOCOBALAMIN 1000 MCG: 1000 INJECTION, SOLUTION INTRAMUSCULAR; SUBCUTANEOUS at 08:27

## 2022-07-19 RX ADMIN — ENOXAPARIN SODIUM 40 MG: 100 INJECTION SUBCUTANEOUS at 08:24

## 2022-07-19 RX ADMIN — Medication 100 MG: at 08:24

## 2022-07-19 RX ADMIN — Medication 3 MG: at 20:58

## 2022-07-19 RX ADMIN — TRAZODONE HYDROCHLORIDE 50 MG: 50 TABLET ORAL at 20:58

## 2022-07-19 RX ADMIN — ALPRAZOLAM 0.5 MG: 0.25 TABLET ORAL at 08:23

## 2022-07-19 ASSESSMENT — PAIN SCALES - GENERAL: PAINLEVEL_OUTOF10: 0

## 2022-07-19 NOTE — PROGRESS NOTES
Subjective: The patient complains of severe acute on chronic progressive fatigue and ataxia, cognitive slowing, right lower extremity numbness and stiffness partially relieved by rest, medications, PT,  OT, medication titration SLP and rest and exacerbated by recent OD-he was admitted to [facility] on 6/30/2022  with change in mental status and history of possible overdose. Family did a well check after his boss noticed that he had not shown up to work for several days and called the family out of concern. He was initially admitted and to the ICU. He had abnormal liver enzymes, rhabdomyolysis kidney injury. Urine was positive for amphetamines benzos and fentanyl. He was found to also be using Royle kratom overuse, transaminitis, EDGAR, rhabdomyolysis resulting in provoked seizure and cerebral edema. He admits that he took 2 Xanax that he bought on the street. He and I both fear that it may have been laced with fentanyl. Discussed with treatment team and hospitalist and neuro--  MRIs spine were normal..      I am concerned about patients medical complexities and barriers to advancing in rehab goals including right lower extremity stiffness secondary to sciatic neuropathy and nerve injury as well as his history of substance abuse and overuse of benzodiazepines. She plans to take him off of the benzos but closer to discharge. Patient again slept much better with the Ambien dosing last night. I reviewed current care and plans for further care with other rehab providers including nursing and case management. According to recent nursing note, \" Patient is resting in bed with no c/o pain or discomfort noted at this time. Denies dizziness, chest pain or palpitations at this time\"       Discussed his elevated heart rate is back down I think that he can come off his telemetry if okay with cardiology. ROS x10:   The patient also complains of severely impaired mobility and activities of daily living. Otherwise no new problems with vision, hearing, nose, mouth, throat, dermal, cardiovascular, GI, , pulmonary, musculoskeletal, psychiatric or neurological. See also Acute Rehab PM&R H&P. Vital signs:  /73   Pulse 94   Temp 98.2 °F (36.8 °C) (Oral)   Resp 18   Ht 5' 10\" (1.778 m)   Wt 160 lb 12.8 oz (72.9 kg)   SpO2 100%   BMI 23.07 kg/m²   I/O:   PO/Intake:  fair PO intake,  Adult reg  Diet thin liquids    Bowel:   continent   Bladder: continent   No need for schwartz  General:  Patient is well developed,   adequately nourished, and    well kempt. HEENT:    Pupils equal, hearing intact to loud voice, external inspection of ear and nose benign. Inspection of lips, tongue and gums benign  -cognitive slowing right lower extremity weakness and numbness. Musculoskeletal: No significant change in strength or tone. All joints stable. Inspection and palpation of digits and nails show no clubbing, cyanosis or inflammatory conditions. Neuro/Psychiatric: Affect: flat but pleasant. Alert and oriented to person, place and situation with   min cues. No significant change in deep tendon reflexes or sensation  Lungs:  Diminished, CTA-B. Respiration effort is   normal at rest.     Heart:   S1 = S2,   RRR. Abdomen:  Soft, non-tender, no enlargement of liver or spleen. Extremities:    lower extremity edema  -weakness right lower extremity decreased sciatic neuropathy.   Skin:   Intact to general survey,      Rehabilitation:  Physical Therapy:   Bed mobility:  Bed mobility  Rolling to Left: Modified independent (07/12/22 1155)  Rolling to Right: Modified independent (07/12/22 1155)  Supine to Sit: Modified independent;Supervision (07/12/22 1155)  Sit to Supine: Modified independent;Supervision (07/12/22 1155)  Bed Mobility Comments: Mildly impulsive. (07/12/22 1155)  Bed Mobility Training  Bed Mobility Training: Yes (07/16/22 1400)  Overall Level of Assistance: Independent (07/16/22 9996)  Interventions: Demonstration (07/16/22 5056)  Rolling: Independent (07/16/22 0927)  Supine to Sit: Independent (07/16/22 0927)  Sit to Supine: Independent (07/16/22 8387)  Scooting: Independent (07/16/22 0927)  Roll Left  Assistance Level: Independent (07/18/22 1625)  Roll Right  Assistance Level: Independent (07/18/22 1625)  Sit to Supine  Assistance Level: Independent (07/18/22 1625)  Supine to Sit  Assistance Level: Independent (07/18/22 1625)  Scooting  Assistance Level: Independent (07/18/22 1625)  Transfers:  Transfers  Sit to Stand: Stand by assistance;Contact guard assistance (07/12/22 1156)  Stand to sit: Stand by assistance;Contact guard assistance (07/12/22 1156)  Bed to Chair: Stand by assistance;Contact guard assistance (07/12/22 1156)  Car Transfer: Stand by assistance;Contact guard assistance (07/12/22 1156)  Comment: Varied performance. Pt with increased time to approach chair. Safe technique however inaccurate execution at times. 5XSTS = 10.7sec. (07/12/22 1156)  Transfer Training  Transfer Training: Yes (07/16/22 1400)  Overall Level of Assistance: Independent (07/16/22 1400)  Interventions: Safety awareness training (07/16/22 1400)  Sit to Stand: Independent;Supervision (07/16/22 1400)  Stand to Sit: Independent;Supervision (07/16/22 1400)  Stand Pivot Transfers: Supervision; Independent (07/16/22 1400)  Bed to Chair: Modified independent (07/16/22 1400)  Transfers  Surface: To chair with arms (07/15/22 1048)  Sit to Stand  Assistance Level: Independent (07/18/22 1625)  Skilled Clinical Factors: 5x STS = 8.09 sec (07/18/22 1009)  Stand to Sit  Assistance Level: Independent (07/18/22 1625)  Skilled Clinical Factors: Occasionally unstable to approach to chair (07/13/22 1214)  Bed To/From Chair  Assistance Level: Independent (07/18/22 1625)  Car Transfer  Assistance Level:  Independent (07/18/22 1009)  Skilled Clinical Factors: Performed in family vehicle (07/15/22 1534)  Floor Transfer  Assistance Level: Supervision;Modified independent (07/15/22 1534)  Skilled Clinical Factors: Demonstation provided for proper technique (07/15/22 1048)  Gait:   Ambulation  Surface: level tile (07/12/22 1241)  Device: No Device (07/12/22 1241)  Assistance: Stand by assistance;Contact guard assistance (07/12/22 1241)  Quality of Gait: Decreased speed and step length. Challenged with turns and obstacles. (07/12/22 1241)  Distance: 150ft; 75ft; multiple small distances within gym focusing on obstacles and turns. Gaze stabilization introduced. (07/12/22 1241)  Gait Training: Yes (07/16/22 1400)  Overall Level of Assistance: Stand-by assistance (07/16/22 1400)  Distance (ft): 250 Feet (07/16/22 0927)  Assistive Device: Other (comment) (no device needed) (07/16/22 0927)  Interventions: Safety awareness training (07/16/22 1400)  Base of Support: Widened (07/16/22 0927)  Speed/Radha: Fluctuations (07/16/22 0927)  Step Length: Right shortened (07/16/22 0927)  Swing Pattern: Left asymmetrical (07/16/22 0927)  Gait Abnormalities: Altered arm swing (07/16/22 0927)  Rail Use: Left (07/16/22 0927)  Right Side Weight Bearing: As tolerated (07/16/22 1400)  Left Side Weight Bearing: As tolerated (07/16/22 1400)  Uneven Terrain - Level of Assistance: Stand-by assistance (07/16/22 0927)  Ambulation  Surface: Level surface; Uneven surface; Carpet; Ramp (Outdoors) (07/18/22 1625)  Distance: 1000'+, 500' (07/18/22 1625)  Activity: Within Unit; Other (Comment) (07/18/22 1625)  Activity Comments: Reciprocal pattern good safety transitioning from surface to surface. (07/18/22 1625)  Assistance Level:  Independent (07/18/22 1625)  Skilled Clinical Factors: Mild antalgia due to R calf pain continues to show improved safety with negotiqation of turns (07/15/22 1053)  Stairs:  Stairs/Curb  Stairs?: Yes (07/12/22 1241)  Stairs  # Steps : 4 (07/12/22 1241)  Rails: Right ascending (07/12/22 1241)  Assistance: Stand by assistance (07/12/22 1241)  Comment: Step to pattern (07/12/22 1241)  Stairs - Level of Assistance: Supervision (07/16/22 8539)  Number of Stairs Trained: 24 (07/16/22 0947)  Stairs  Stair Height: 6'' (07/18/22 1625)  Device: One handrail (07/18/22 1625)  Number of Stairs: 22 (07/18/22 1625)  Additional Factors: Reciprocal going up;Reciprocal going down (07/18/22 1625)  Assistance Level: Independent (07/18/22 1625)  Skilled Clinical Factors: Good safety ascending increased difficulty descending requires increased effort to complete (07/15/22 1053)  W/C mobility:  Wheelchair Management  Wheelchair Management: No (07/13/22 1539)    Occupational Therapy:   Hand Dominance: Right  ADL  Feeding: Setup (07/12/22 1018)  Grooming: Modified independent  (07/12/22 1018)  Grooming Skilled Clinical Factors: oral hygiene, comb hair (07/12/22 1018)  UE Bathing: Supervision (07/12/22 1018)  UE Bathing Skilled Clinical Factors: cues to attend to all areas, wash hair, thoroughness (07/12/22 1018)  LE Bathing: Minimal assistance (07/12/22 1018)  LE Bathing Skilled Clinical Factors: assist to reach RLE and for posterior hygiene (07/12/22 1018)  UE Dressing Skilled Clinical Factors: gown only (07/12/22 1018)  LE Dressing: Minimal assistance (07/12/22 1018)  LE Dressing Skilled Clinical Factors: assist with R sock, pt declined to don pants/bref (07/12/22 1018)  Toileting: Minimal assistance (07/12/22 1018)  Additional Comments: Shower completed. Pt impulsive at times (07/12/22 1018)  Toilet Transfers  Toilet - Technique: Ambulating (07/12/22 1022)  Equipment Used: Grab bars (07/12/22 1022)  Toilet Transfer: Contact guard assistance (07/12/22 1022)     Shower Transfers  Shower - Transfer From: Chuck Starks (07/12/22 1022)  Shower - Transfer Type: To and From (07/12/22 1022)  Shower - Transfer To:  Shower seat with back (07/12/22 1022)  Shower - Technique: Stand pivot (07/12/22 1022)  Shower Transfers: Contact Guard (07/12/22 1022)    Speech Therapy: Comprehension:  (Intermittent repetition of directions is required during tasks secondary to reduced recall and memory deficits)  Verbal Expression:  (Reduced utterance length has been noted during conversation. Improvement noted this date compared to 07/15/2022 session. Pt presents with flat affect and high level naming deficits)  Diet/Swallow:        Dysphagia Outcome Severity Scale: Level 6: Within functional limits/Modified independence  Compensatory Swallowing Strategies : Small bites/sips, Upright as possible for all oral intake, Alternate solids and liquids          Lab/X-ray studies reviewed, analyzed and discussed with patient and staff:   Recent Results (from the past 24 hour(s))   EKG 12 Lead    Collection Time: 07/19/22  6:50 AM   Result Value Ref Range    Ventricular Rate 88 BPM    Atrial Rate 88 BPM    P-R Interval 126 ms    QRS Duration 88 ms    Q-T Interval 372 ms    QTc Calculation (Bazett) 450 ms    P Axis 43 degrees    R Axis 70 degrees    T Axis 40 degrees     EEG was normal.    XR CHEST   7/4/2022:  No significant interval change of left upper lobe and left lower lobe infiltrate and/or atelectasis. XR HIP RIGHT   7/6/2022  No dislocations. No focal bony abnormalities                                                                                 NO ACUTE FRACTURE. CT Head   6/30/2022 Extra-axial spaces:  Normal. Intracranial hemorrhage:  None. Ventricular system: [Negative. Basal Cisterns:  Without anomaly. Cerebral Parenchyma: Bilateral, symmetric areas of decreased attenuation exerting no mass effect measuring approximately 9 mm in size since found within the bilateral globus pallidus (series 2, image 17). Midline Shift:  None. Cerebellum:  No anomaly identified. Paranasal sinuses and mastoid air cells:  No anomaly identified. Visualized Orbits:  Negative. Impression: Round symmetric areas decreased attenuation bilateral globus pallidus.  This finding may be seen in patients experiencing hypoxia, i.e., carbon monoxide poisoning. MRA HEAD : 7/2/2022    Visualized brain: Grossly unchanged from the recent MRI of the brain with multiple areas of restricted diffusion. Please refer to the recent MRI brain report. INTRACRANIAL MRA:    The visualized distal vertebral and basilar arteries are widely patent. The distal ICAs are patent and within normal limits of caliber. The proximal ACAs, MCAs and PCAs are patent and within normal limits of caliber and configuration. There is no evidence of focal,  significant stenosis or aneurysm in the visualized vessels. EXTRACRANIAL MRA: Carotid Stenosis: Right Common:  No significant stenosis. Right Internal Plaque:  No significant plaque formation. Right Internal Carotid Stenosis (% by NASCET Criteria):  0% Left Common:  No significant stenosis. Left Internal Carotid Plaque:  No significant plaque formation. Left Internal Carotid Stenosis (% by NASCET Criteria):  0% Cervical Vertebral Arteries: Patency:  Bilateral Dominance:  Left     Patent intracranial and extracranial circulation. XR CHEST   7/1/2022:    LEFT UPPER LUNG ATELECTASIS/PNEUMONIA. US ABDOMEN   7/5/2022    NEGATIVE RIGHT UPPER QUADRANT ULTRASOUND WITHOUT EVIDENCE OF CHOLELITHIASIS. US DUP LOWER EXTREMITY RIGHT ROBERT 7/3/2022 :    No acute DVT of the right lower extremity veins from the groin to the knee. No acute DVT of the right calf veins. MRA NECK 7/2/2022    Patent intracranial and extracranial circulation. MRI BRAIN  7/4/2022: Mildly increased edema in the areas of diffusion restriction within the bilateral globi pallidi, splenium of the corpus callosum, and supratentorial white since prior MRI July 1, 2022. No mass effect or midline shift. No acute intracranial hemorrhage. No enhancing mass or pathologic enhancement.      Mildly increased edema in the areas of diffusion restriction within the bilateral globi pallidi, splenium of the corpus callosum, and supratentorial white since prior MRI July 1, 2022. MRI BRAIN   7/1/2022   Areas of restricted diffusion within the globus pallidus, supratentorial white matter and splenium of the corpus callosum are most consistent with carbon monoxide poisoning/hypoxemia and/or other toxic encephalopathy. There is no intracranial hemorrhage, mass effect, midline shift, extra-axial collection, significant cerebral volume loss or other complication identified. FINDINGS CONSISTENT WITH CARBON MONOXIDE POISONING/HYPOXEMIA AND/OR OTHER TOXIC ENCEPHALOPATHY. NO INTRACRANIAL HEMORRHAGE OR COMPLICATION IDENTIFIED. MRI CERVICAL THORACIC LUMBAR SPINE  7/7/2022      NEGATIVE MILDLY LIMITED CERVICAL, THORACIC AND LUMBAR SPINE MRI. Previous extensive, complex labs, notes and diagnostics reviewed and analyzed. ALLERGIES:    Allergies as of 07/11/2022 - Fully Reviewed 07/11/2022   Allergen Reaction Noted    Peanut-containing drug products Anaphylaxis 12/06/2018    Nuts [macadamia nut oil] Angioedema 07/01/2022    Peanut oil Other (See Comments) 02/02/2015    Shellfish-derived products Itching 07/01/2022      (please also verify by checking MAR)      Recently, I evaluated this patient for periodic reassessment of medical and functional status. The patient was discussed in detail at the treatment team meeting focusing on current medical issues, progress in therapies, social issues, psychological issues, barriers to progress and strategies to address these barriers, and discharge planning. See the hand written addendum to rehab progress note. The patient continues to be high risk for future disability and their medical and rehabilitation prognosis continue to be good and therefore, we will continue the patient's rehabilitation course as planned. The patient's tentative discharge date was set. Patient and family education was discussed. The patient was made aware of the team discussion regarding their progress. Discharge plans were discussed along with barriers to progress and strategies to address these barriers, patient encouraged to continue to discuss discharge plans with . 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 Functional and medical status reassessed regarding patients ability to participate in therapies and patient found to be able to participate in acute intensive comprehensive inpatient rehabilitation program including PT/OT to improve balance, ambulation, ADLs, and to improve the P/AROM. Therapeutic modifications regarding activities in therapies, place, amount of time per day and intensity of therapy made daily. In bed therapies or bedside therapies prn. Bowel and Bladder dysfunction  , Neurogenic bowel and bladder:  frequent toileting, ambulate to bathroom with assistance, check post void residuals. Check for C.difficile x1 if >2 loose stools in 24 hours, continue bowel & bladder program.  Monitor bowel and bladder function. Lactinex 2 PO every AC. MOM prn, Brown Bomb prn, Glycerin suppository prn, enema prn. Encourage therapy and nursing to co-treat and problem solve re continence. Severe RLE pain as well as generalized OA pain: reassess pain every shift and prior to and after each therapy session, give prn Tylenol and consider scheduled Tylenol, modalities prn in therapy, masage, Lidoderm, K-pad prn. Consider scheduled AM pain meds. Skin healing   and breakdown risk:  continue pressure relief program.  Daily skin exams and reports from nursing.   Fatigue due to nutritional and hydration deficiency: Add and titrate vitamin B12 vitamin D and CoQ10 continue to monitor I&Os, calorie counts prn, dietary consult prn. Add healthy snack at night. Acute episodic insomnia with situational adjustment disorder:  prn Ambien, monitor for day time sedation. Falls risk elevated:  patient to use call light to get nursing assistance to get up, bed and chair alarm. Elevated DVT risk: progressive activities in PT, continue prophylaxis CHAR hose, elevation and Lovenox with goal to discontinue over the next few days. Complex discharge planning:  DC 22- to rehab at sub acute with substance abuse focus. Weekly team meeting every Thursday to re-assess progress towards goals, discuss and address social, psychological and medical comorbidities and to address difficulties they may be having progressing in therapy. Patient and family education is in progress. The patient is to follow-up with their family physician after discharge.    -however patient will also need a specialized sub acute rehab and combined drug recovery program after DC. Complex Active General Medical Issues that complicate care Assess & Plan:      Drug abuse,   Polydrug dependence including opioid type drug with continuous use with complication -is aware of the implications that his drug use has had on his health and that he almost . He is planning on not using or overusing medications in the future. Altered mental status-status post drug overdose apparently was accidental overdose. Patient admits to taking 2 Xanax pills that he bought off on the street. Xanax in this area is notoriously laced with fentanyl. Like that he had inadvertent fentanyl overdose from the medication that he thought was Xanax that he bought on the street. 12-step program as advised. Vitamin D deficiency-Add high-dose vitamin D, recheck vitamin D level out after discharge,    Spasm of back muscles-lowest effective dose of muscle spasms  Cerebral edema with seizures-antiseizure medications consult neurology and steroid taper.     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 Liver toxicity likely secondary to polysubstance abuse-recheck LFTs as an outpatient avoid toxic medications    ADHD (attention deficit hyperactivity disorder)-lowest effective dose of stimulant medication    Anxiety/ROSALES (generalized anxiety disorder),   MDD (major depressive disorder), recurrent episode, moderate -emotional support provided daily, vitamin B12, encourage participation in rehabilitation support group and recreational therapy, adjust/add medications ( b Lexapro, Desyrel, Xanax and taper to lowest effective dose) Taper Deltasone-and taper Xanax under the direction of neurology and psychiatry. Baseline sinus tachycardia-  cardiology sinus tachycardia  -I reviewed-consult with cardiology and check thyroid and cortisol levels-Recenmt T4, K and Mag WNL. Heart rate seems to be somewhat better after starting verapamil-Obtain ECG. Will order Echo. LPRD (laryngopharyngeal reflux disease)-consult speech and language pathology    Shift work sleep disorder-patient may need to adjust his therapy times. Focus on coordinating dc plans with patient family and care givers and order necessary equipment. Discontinue telemetry if okay with cardiology. Also discontinue daily EKGs as we are preparing for discharge. Check with neurology regarding that a repeat MRI. Previously they noted -repeat MRI brain will be scheduled for 7/18/2022-looks like it was done but there are no results yet.         Electronically signed by Nicole Rey DO on 7/13/22 at 8:35 AM EDT       Kiki Calderon D.O., PM&R     Attending    [address]

## 2022-07-19 NOTE — PROGRESS NOTES
Patient is resting in bed with no c/o pain or discomfort noted at this time. Denies dizziness, chest pain or palpitations at this time.

## 2022-07-19 NOTE — CARE COORDINATION
LSW spoke to Zulma Hernandez from [facility] and she stated that precert was submitted on 7/18 and is currently pending. Zulma Hernandez said that they would call again today to check on the status and will keep LSW updated.  Electronically signed by LENNY Camara, LSW on 7/19/2022 at 11:22 AM

## 2022-07-19 NOTE — PROGRESS NOTES
[facility]  Facility/Department: [facility]  Speech Language Pathology   Treatment Note  Reno Steven  1990  [id number]  [x]   confirmed    Date: 2022    Rehab Diagnosis: Impaired mobility and ADL's due to hypoxic encephalopathy. [facility] admit [facility]      Restrictions/Precautions: Fall Risk  Weight: 160 lb 12.8 oz (72.9 kg)   ADULT DIET; Regular  SpO2: 100 % (22 0751)  No active isolations    Speech Dx: Cognitive Linguistic Impairment    Subjective:  Alert, Cooperative, and Pleasant      Increased affect noted this date. Pt laughed with SLP and asked many questions. Interventions used this date:  Cognitive Skill Development and Instruction in Compensatory Strategies    Objective/Assessment:  Patient progressing towards goals:    Short-term Goals for Cognition:   Goal 1: To increase safety awareness and judgment for safe completion of ADLs secondary to pt's cognitive deficits, pt will complete abstract reasoning tasks (i.e. Word deduction, convergent and divergent naming, similarities/differences) with 80% accuracy and min cues. Abstract divergent naming with alternating attention: 73% acc independently, increasing to 100% acc given mild-mod verbal prompts    Goal 2: To address pt's cognitive deficits and promote recall of personal and medical information, pt will answer questions addressing (recent, delayed) recall with 80% accuracy and fading cues. Not addressed. Goal 3: To increase safety awareness and judgment for safe completion of ADLs secondary to pt's cognitive deficits,  pt will complete high level problem solving tasks related to ADLs (e.g. medication, time, finance management) with 80% accuracy and fading cues. Deduction puzzle: 70% acc, increasing to 80% acc given mild verbal prompts    Goal 4:  To decrease cognitive deficits and improve attention to tasks for safe completion of ADLs, pt will complete structured tasks addressing alternating and/or divided attention with 80% accuracy and fading cues. Divided attention: 5-stack exercise: 100% acc given min verbal/visual prompts for initiation of task    Goal 5: To increase independence for functional activities for home and community, pt will complete mid level executive functioning tasks (i.e. Path finding, scheduling appointments, prioritizing tasks) with 80% accuracy and min cues. Path finding to room from [address] [place] [address] area: 100% acc. Goal 6: Pt will be educated on compensatory strategies for word finding deficits in 5/5 given opportunities to help the pt express his/her basic personal, safety, and medical needs and increase length of utterance in the presence of language deficits. SLP educated pt on use of circumlocution strategy. Pt was able to implement in structured tasks with ~80% acc given min-mild verbal prompts. No instance of word finding trouble observed during conversation this date. Treatment/Activity Tolerance:  Patient tolerated treatment well    Plan:  Continue per POC    Pain Assessment:  Patient does not c/o pain. Pain Re-assessment:  Patient does not c/o pain. Patient/Caregiver Education:  Patient educated on session and progression towards goals. Patient stated verbal understanding of directions.     Safety Devices:  Call light within reach      Speech Therapy Level of Assistance Scale    AUDITORY COMPREHENSION  Rating:  Modified Independent    VERBAL EXPRESSION  Rating:  Supervised Assistance-Minimal Assistance    MOTOR SPEECH  Rating: Independent    PROBLEM SOLVING  Rating: Minimal Assistance      Therapy Time  SLP Individual Minutes  Time In: 5407  Time Out: 1100  Minutes: 30        Signature: Electronically signed by SOPHIE Guo on 7/19/2022 at 11:21 AM

## 2022-07-19 NOTE — PLAN OF CARE
Problem: Discharge Planning  Goal: Discharge to home or other facility with appropriate resources  Outcome: Progressing Towards Goal     Problem: Safety - Adult  Goal: Free from fall injury  Outcome: Progressing Towards Goal  Flowsheets (Taken 7/19/2022 1142)  Free From Fall Injury: Based on caregiver fall risk screen, instruct family/caregiver to ask for assistance with transferring infant if caregiver noted to have fall risk factors     Problem: ABCDS Injury Assessment  Goal: Absence of physical injury  7/19/2022 1143 by Héctor Virk RN  Outcome: Progressing Towards Goal  Flowsheets (Taken 7/19/2022 1142)  Absence of Physical Injury: Implement safety measures based on patient assessment  7/18/2022 2230 by Jennifer Phillip RN  Outcome: Progressing Towards Goal     Problem: Pain  Goal: Verbalizes/displays adequate comfort level or baseline comfort level  7/19/2022 1143 by Héctor Virk RN  Outcome: Progressing Towards Goal  7/18/2022 2230 by Jennifer Phillip RN  Outcome: Progressing Towards Goal     Problem: Neurosensory - Adult  Goal: Achieves maximal functionality and self care  Outcome: Progressing Towards Goal     Problem: Respiratory - Adult  Goal: Achieves optimal ventilation and oxygenation  Outcome: Progressing Towards Goal     Problem: Cardiovascular - Adult  Goal: Maintains optimal cardiac output and hemodynamic stability  Outcome: Progressing Towards Goal     Problem: Skin/Tissue Integrity - Adult  Goal: Skin integrity remains intact  Outcome: Progressing Towards Goal  Flowsheets (Taken 7/19/2022 1142)  Skin Integrity Remains Intact: Monitor for areas of redness and/or skin breakdown     Problem: Musculoskeletal - Adult  Goal: Return mobility to safest level of function  Outcome: Progressing Towards Goal     Problem: Gastrointestinal - Adult  Goal: Maintains adequate nutritional intake  Outcome: Progressing Towards Goal     Problem: Genitourinary - Adult  Goal: Absence of urinary retention  Outcome: Progressing Towards Goal     Problem: Infection - Adult  Goal: Absence of infection at discharge  Outcome: Progressing Towards Goal     Problem: Metabolic/Fluid and Electrolytes - Adult  Goal: Electrolytes maintained within normal limits  Outcome: Progressing Towards Goal     Problem: Hematologic - Adult  Goal: Maintains hematologic stability  Outcome: Progressing Towards Goal

## 2022-07-19 NOTE — PLAN OF CARE
Problem: ABCDS Injury Assessment  Goal: Absence of physical injury  Outcome: Progressing Towards Goal     Problem: Pain  Goal: Verbalizes/displays adequate comfort level or baseline comfort level  Outcome: Progressing Towards Goal

## 2022-07-19 NOTE — PROGRESS NOTES
OCCUPATIONAL THERAPY  INPATIENT REHAB TREATMENT NOTE  [facility]      NAME: Abiel Auguste  : 1990 (33 y.o.)  MRN: [id number]  CODE STATUS: Full Code  Room: 94E936-82    Date of Service: 2022    Referring Physician: Dr. Igor Smith Diagnosis: Imp ADLs d/t hypoxic encephalopathy s/p overdose likely secondary to fentanyl laced xanax pill    Restrictions  Restrictions/Precautions  Restrictions/Precautions: Fall Risk       Patient's date of birth confirmed: Yes    SAFETY:  Safety Devices  Safety Devices in place: Yes  Type of devices: All fall risk precautions in place    SUBJECTIVE:  Subjective: I had pizza  Pain: No pain    OBJECTIVE:    Pt presented seated in w/c - agreeable to OT    IADL    Pt engaged in self-retrieving beverage from kitchenette on rehab wing    Independent for fx mobility and retrieval of item     While seated, completed fine motor task with focus on coordination, activity tolerance, and strength in order to improve general function. Also completed [place] game while standing for fine motor and cognitive challenge. Pt required to build tower following 3 blocks per layer rule. Pt with good ability to build tower. Decreased problem solving and attention noted while removing pieces (playing game). Pt only able to remove 8 pieces prior to allowing the tower to fall over. Completed card sorting and matching task. Pt required to manipulate, grasp, and place large cards by reaching/placing in cards into correct suites. Pt then placed all cards in order. Pt able to correctly separate the entire deck with only one mistake- good categorization and attention     While seated, challenged strength, activity tolerance, ROM, and flexibility for improved ADL performance. Completed BUE reciprocal exercises using ergometer arm bike with mod resistance. Pt tolerated 3 mins x 2 sets. Pt tolerated well- rest breaks needed.  Vitals stable during exercise     Challenged pt through usage of 3# dowel altagracia to complete BUE exercises. 2 sets x 20 reps completed. Exercises focused on all UE joints and planes of motion including scapular protraction/retraction, shoulder flexion/extension/rotation/horizontal abduction, elbow flexion/extension, and wrist/digit flexion/extension. Patient engaged in cognitive and visual perceptual activity to increase [place] with ADL/IADL completion. Completed task focusing on attention, problem solving, and differentiation using \"Find it\" tool. Pt required to rotate/shake/manipulate cylinder shaped container filled with various colored beads and small everyday items (push-pin, small lightbulbs, string etc.). Pt required to find all items and them highlight list of items on paper associated with the test. Task completed as a fine motor / cognitive challenge and as a visual ability challenge. Pt found 27 of 47 possible items. Fair ability to locate and identify items noted. Education:   Safe IADL techniques    ASSESSMENT:   Good participation. PLAN OF CARE:  Strengthening, Balance training, Functional mobility training, Endurance training, Neuromuscular re-education, Safety education & training, Patient/Caregiver education & training, Equipment evaluation, education, & procurement, Self-Care / ADL, Home management training       Patient goals : \"Get home, get back to life, feel normal\"  Time Frame for Long term goals :  Within 1 weeks, pt to demo progress in the following areas listed below to achieve specific LTGs as stated in the evaluation  Long Term Goal 1: Pt will demo improved overall ADL/IADL status  Long Term Goal 2: Pt will demo improved standing balance and tolerance for self-care completion  Long Term Goal 3: Pt will demo improved activity tolerance for completion of ADL/IADL  Long Term Goal 4: Pt will demo improved overall safety and cognition in order to be able to care for himself with minimal assistance    Therapy Time:   Individual Group Co-Treat   Time In 1400       Time Out 1500         Minutes 60         ADL/IADL training: 10 minutes  Therapeutic activities: 40 minutes  Cognitive Retraining: 10 minutes     Electronically signed by:    Eugene Litten, OT,   7/19/2022, 2:07 PM

## 2022-07-19 NOTE — PROGRESS NOTES
OCCUPATIONAL THERAPY  INPATIENT REHAB TREATMENT NOTE  [facility]      NAME: Natalie Solomon  : 1990 (33 y.o.)  MRN: [id number]  CODE STATUS: Full Code  Room: N961/G065-96    Date of Service: 2022    Referring Physician: Dr. Samaria Galindo Diagnosis: Imp ADLs d/t hypoxic encephalopathy s/p overdose likely secondary to fentanyl laced xanax pill    Restrictions  Restrictions/Precautions  Restrictions/Precautions: Fall Risk         Patient's date of birth confirmed: Yes    SAFETY:  Safety Devices  Safety Devices in place: Yes  Type of devices: All fall risk precautions in place    SUBJECTIVE:  Subjective: I got a shirt  Pain: declines    COGNITION:     Comprehension: Independent  Expression: Independent  Social Interaction: Supervision  Problem Solving: Min A  Memory: Supervision    OBJECTIVE:     Feeding  Assistance Level: Independent  Grooming/Oral Hygiene  Assistance Level: Independent  Upper Extremity Bathing  Assistance Level: Independent  Lower Extremity Bathing  Assistance Level: Independent  Upper Extremity Dressing  Assistance Level: Independent  Lower Extremity Dressing  Assistance Level: Independent  Putting On/Taking Off Footwear  Assistance Level: Modified independent  Skilled Clinical Factors: using sock aide RLE  Toileting  Assistance Level: Independent  Toilet Transfers  Technique:  (ambulating)  Equipment: Grab bars  Assistance Level: Modified independent  Tub/Shower Transfers  Type: Shower  Transfer From: Standing without device  Transfer To: Shower chair with back  Assistance Level: Supervision  Skilled Clinical Factors: cues to improve safety    Instrumental ADL's  Instrumental ADLs: Yes  Meal Prep  Meal Prep Level of Assistance: Supervision  Meal Preparation: pt prepared self cup of coffee- only cue needed for closing refrig. door.   Light Housekeeping  Light Housekeeping Level of Assistance: Supervision  Light Housekeeping: putting away clothes, organizing room    Functional Mobility  Device:  (No AD)  Activity: To/From bathroom; Retrieve items;Transport items  Assistance Level: Independent    While seated, challenged strength, activity tolerance, ROM, and flexibility for improved ADL performance. Pt able to go through entire handout and follow exercises @ modified independent level w/ good technique     Challenged pt through usage of 3# to complete BUE exercises. 2 sets x 10 reps completed. Exercises focused on all UE joints and planes of motion including scapular protraction/retraction, shoulder flexion/extension/rotation/horizontal abduction, elbow flexion/extension, supination/pronation, and wrist/digit flexion/extension. Also engaged pt in cognitive puzzle - mind oconnell- focusing on deductive reasoning skills. Pt required initial cues to follow directions and for intermittent difficulty. Overall-ModA needed to complete the task. Education:   ADL and transfer techniques, fall prevention, POC, general safety    Equipment rec:  Sock aide, shower chair     ASSESSMENT:   Good participation in d/c ADL assessment. Pt progressing to independent level for most ADLs- only a few cues needed overall today. PLAN OF CARE:  Strengthening, Balance training, Functional mobility training, Endurance training, Neuromuscular re-education, Safety education & training, Patient/Caregiver education & training, Equipment evaluation, education, & procurement, Self-Care / ADL, Home management training       Patient goals : \"Get home, get back to life, feel normal\"  Time Frame for Long term goals :  Within 1 weeks, pt to demo progress in the following areas listed below to achieve specific LTGs as stated in the evaluation  Long Term Goal 1: Pt will demo improved overall ADL/IADL status  Long Term Goal 2: Pt will demo improved standing balance and tolerance for self-care completion  Long Term Goal 3: Pt will demo improved activity tolerance for completion of ADL/IADL  Long Term Goal 4: Pt will demo improved overall safety and cognition in order to be able to care for himself with minimal assistance  Therapy Time:   Individual Group Co-Treat   Time In 08       Time Out 0930         Minutes 60             ADL/IADL trainin minutes  Therapeutic activities: 25 minutes  Cognitive Retrainin minutes     Electronically signed by:    Jose De Jesus [place] OT,   2022, 9:05 AM

## 2022-07-19 NOTE — CARE COORDINATION
WAYNEW was notified by Quoc Cota at [facility] that she cannot perform the assessment in person and has to complete it over the phone. Samantha plans to start precert today and does not foresee any issues with their ability to accept him. LSW called pt's mom and updated her, LSW also noted that Quoc Cota would be calling her as well. LSW then updated pt, he is in agreement.

## 2022-07-19 NOTE — PROGRESS NOTES
Physical Therapy Rehab Treatment Note  Facility/Department: [facility]  Room: R242/R242-01       NAME: Ana Diop  : 1990 (33 y.o.)  MRN: [id number]  CODE STATUS: Full Code    Date of Service: 2022       Restrictions:  Restrictions/Precautions: Fall Risk       SUBJECTIVE:   Subjective: \" I am going home tomorrow\"    Pain  Pain: Denies pain pre and post session\"      OBJECTIVE:     Roll Left  Assistance Level: Independent  Roll Right  Assistance Level: Independent  Sit to Supine  Assistance Level: Independent  Supine to Sit  Assistance Level: Independent  Scooting  Assistance Level: Independent    Sit to Stand  Assistance Level: Independent  Skilled Clinical Factors: 5x STS = 6.59 sec  Stand to Sit  Assistance Level: Independent  Bed To/From Chair  Assistance Level: Independent  Car Transfer  Assistance Level: Independent  Floor Transfer  Assistance Level: Independent    Ambulation  Surface: Level surface; Uneven surface; Carpet; Ramp  Distance: 1000' 500'  Activity: Within Unit  Activity Comments: Reciprocal pattern good safety transitioning from surface to surface. Assistance Level: Independent    Stairs  Stair Height: 6''  Device: One handrail  Number of Stairs: 12  Additional Factors: Reciprocal going up;Reciprocal going down  Assistance Level: Independent  Curb  Curb Height: 6''  Number of Curbs: 1  Assistance Level: Independent    Neuromuscular Education  Neuromuscular Education: Yes  NDT Treatment: Gait   Facilitation techniques: Onstacles course training stepping on non complaint surfaces over and around obstacles no LOB. Picking up object off floor:  Independent with all activities    ASSESSMENT/PROGRESS TOWARDS GOALS:  Assessment  Assessment: Patient continues to meet all goals    Goals:  Long Term Goals  Long term goal 1: Pt to complete bed mobility indep  Long term goal 2: Pt to complete transfers with indep (bed/chair/car)  Long term goal 3: Pt to ambulate >300ft indoor/outdoor without AD indep  Long term goal 4: Pt to achieve 20/24 DGI  Long term goal 5: Pt to complete 5XSTS in 8sec    PLAN OF CARE/Safety:   Safety Devices  Type of Devices:  All fall risk precautions in place      Therapy Time:   Individual   Time In 1000   Time Out 1030   Minutes 30     Minutes: 30  Transfer/Bed mobility training: 10  Gait training: 15  Therapeutic ex: Angela Brizuela PTA, 07/19/22 at 12:08 PM

## 2022-07-20 LAB — TOTAL CK: 216 U/L (ref 0–190)

## 2022-07-20 PROCEDURE — 97130 THER IVNTJ EA ADDL 15 MIN: CPT

## 2022-07-20 PROCEDURE — 97116 GAIT TRAINING THERAPY: CPT

## 2022-07-20 PROCEDURE — 99232 SBSQ HOSP IP/OBS MODERATE 35: CPT | Performed by: INTERNAL MEDICINE

## 2022-07-20 PROCEDURE — 6370000000 HC RX 637 (ALT 250 FOR IP): Performed by: FAMILY MEDICINE

## 2022-07-20 PROCEDURE — 6370000000 HC RX 637 (ALT 250 FOR IP): Performed by: PHYSICAL MEDICINE & REHABILITATION

## 2022-07-20 PROCEDURE — 99232 SBSQ HOSP IP/OBS MODERATE 35: CPT | Performed by: PHYSICAL MEDICINE & REHABILITATION

## 2022-07-20 PROCEDURE — 97129 THER IVNTJ 1ST 15 MIN: CPT

## 2022-07-20 PROCEDURE — 99232 SBSQ HOSP IP/OBS MODERATE 35: CPT | Performed by: NURSE PRACTITIONER

## 2022-07-20 PROCEDURE — 93005 ELECTROCARDIOGRAM TRACING: CPT | Performed by: INTERNAL MEDICINE

## 2022-07-20 PROCEDURE — 97110 THERAPEUTIC EXERCISES: CPT

## 2022-07-20 PROCEDURE — 97530 THERAPEUTIC ACTIVITIES: CPT

## 2022-07-20 PROCEDURE — 6370000000 HC RX 637 (ALT 250 FOR IP): Performed by: INTERNAL MEDICINE

## 2022-07-20 PROCEDURE — 36415 COLL VENOUS BLD VENIPUNCTURE: CPT

## 2022-07-20 PROCEDURE — 97535 SELF CARE MNGMENT TRAINING: CPT

## 2022-07-20 PROCEDURE — 82550 ASSAY OF CK (CPK): CPT

## 2022-07-20 PROCEDURE — 1180000000 HC REHAB R&B

## 2022-07-20 RX ORDER — ACETAMINOPHEN 325 MG/1
325 TABLET ORAL EVERY 6 HOURS PRN
Status: DISCONTINUED | OUTPATIENT
Start: 2022-07-20 | End: 2022-07-25 | Stop reason: HOSPADM

## 2022-07-20 RX ORDER — ESCITALOPRAM OXALATE 20 MG/1
20 TABLET ORAL DAILY
Qty: 30 TABLET | Refills: 3 | Status: SHIPPED | OUTPATIENT
Start: 2022-07-20

## 2022-07-20 RX ORDER — TRAZODONE HYDROCHLORIDE 50 MG/1
50 TABLET ORAL NIGHTLY
Qty: 30 TABLET | Refills: 1 | Status: SHIPPED | OUTPATIENT
Start: 2022-07-20

## 2022-07-20 RX ORDER — PREDNISONE 1 MG/1
5 TABLET ORAL DAILY
Qty: 3 TABLET | Refills: 0 | Status: SHIPPED | OUTPATIENT
Start: 2022-07-21 | End: 2022-07-24

## 2022-07-20 RX ORDER — ZOLPIDEM TARTRATE 10 MG/1
10 TABLET ORAL NIGHTLY
Qty: 14 TABLET | Refills: 0 | Status: SHIPPED | OUTPATIENT
Start: 2022-07-20 | End: 2022-08-03

## 2022-07-20 RX ADMIN — ALPRAZOLAM 0.5 MG: 0.25 TABLET ORAL at 11:17

## 2022-07-20 RX ADMIN — TRAZODONE HYDROCHLORIDE 50 MG: 50 TABLET ORAL at 20:28

## 2022-07-20 RX ADMIN — PREDNISONE 10 MG: 10 TABLET ORAL at 11:17

## 2022-07-20 RX ADMIN — VERAPAMIL HYDROCHLORIDE 120 MG: 120 TABLET, FILM COATED, EXTENDED RELEASE ORAL at 11:17

## 2022-07-20 RX ADMIN — ALPRAZOLAM 0.5 MG: 0.25 TABLET ORAL at 20:28

## 2022-07-20 RX ADMIN — ZOLPIDEM TARTRATE 10 MG: 5 TABLET ORAL at 20:28

## 2022-07-20 RX ADMIN — VERAPAMIL HYDROCHLORIDE 120 MG: 120 TABLET, FILM COATED, EXTENDED RELEASE ORAL at 20:28

## 2022-07-20 RX ADMIN — ESCITALOPRAM OXALATE 20 MG: 20 TABLET ORAL at 11:17

## 2022-07-20 ASSESSMENT — ENCOUNTER SYMPTOMS
COLOR CHANGE: 0
NAUSEA: 0
CHEST TIGHTNESS: 0
SHORTNESS OF BREATH: 0
GASTROINTESTINAL NEGATIVE: 1
VOMITING: 0
EYES NEGATIVE: 1
COUGH: 0
TROUBLE SWALLOWING: 0
WHEEZING: 0
BLOOD IN STOOL: 0
ABDOMINAL PAIN: 0
STRIDOR: 0
RESPIRATORY NEGATIVE: 1

## 2022-07-20 ASSESSMENT — PAIN SCALES - GENERAL: PAINLEVEL_OUTOF10: 0

## 2022-07-20 ASSESSMENT — PAIN SCALES - WONG BAKER: WONGBAKER_NUMERICALRESPONSE: 0

## 2022-07-20 NOTE — PROGRESS NOTES
[id number] [facility] Neurology Daily Progress Note  Name: Melia Spivey  Age: 33 y.o. Gender: male  CodeStatus: Full Code  Allergies: Peanut-Containing Drug Products  Nuts [Macadamia Nut Oil]  Peanut Oil  Shellfish-Derived Products    Chief Complaint:No chief complaint on file. Primary Care Provider: YING Stokes CNP  InpatientTreatment Team: Treatment Team: Attending Provider: Carmen Broussard DO; Consulting Physician: Hira Cabral MD; Consulting Physician: Artem Blunt, PhD; Consulting Physician: Raphael Adamson MD; Consulting Physician: Madison Womack DO; Registered Nurse: Marc Workman RN; Patient Care Tech: Clive Terrell; Registered Nurse: Blanca Clemente, JEFF; Respiratory Therapist (Day): Tez Perez RCP; Occupational Therapist: Teena Pedraza OT; Occupational Therapist: SONIA Vaca/WATSON; Occupational Therapist Assistant: NATHANIEL Bruno  Admission Date: 7/11/2022      HPI   Pt seen and examined on rehab unit for neurology follow-up. Currently alert and oriented x3, no acute distress, cooperative. Still with some concentration issues and forgetfulness. Right lower extremity weakness and numbness improved. Weakness of dorsiflexion resolved. Repeat MRI of the brain shows improving cerebral edema. No new focal deficits. No seizure activity. Tachycardic at times. Verapamil increased. Echo normal.  Taking alprazolam 2-3 times a day for anxiety. Vitals:    07/20/22 1233   BP: 119/73   Pulse: 87   Resp: 18   Temp: 97.5 °F (36.4 °C)   SpO2: 98%      Review of Systems   Constitutional:  Negative for fatigue and fever. HENT:  Negative for hearing loss and trouble swallowing. Eyes:  Negative for visual disturbance. Respiratory:  Negative for cough, chest tightness, shortness of breath and wheezing. Cardiovascular:  Negative for chest pain, palpitations and leg swelling. Gastrointestinal:  Negative for abdominal pain, nausea and vomiting.    Skin:  Negative for color change and rash. Neurological:  Negative for dizziness, tremors, seizures, syncope, facial asymmetry, speech difficulty, weakness, light-headedness, numbness and headaches. Psychiatric/Behavioral:  Positive for decreased concentration. Negative for agitation, confusion and hallucinations. The patient is not nervous/anxious. Physical Exam  Vitals and nursing note reviewed. Constitutional:       General: He is not in acute distress. Appearance: He is not diaphoretic. HENT:      Head: Normocephalic and atraumatic. Eyes:      General: No visual field deficit. Extraocular Movements: Extraocular movements intact. Pupils: Pupils are equal, round, and reactive to light. Cardiovascular:      Rate and Rhythm: Normal rate and regular rhythm. Pulmonary:      Effort: Pulmonary effort is normal. No respiratory distress. Breath sounds: Normal breath sounds. Abdominal:      General: Bowel sounds are normal. There is no distension. Palpations: Abdomen is soft. Tenderness: There is no abdominal tenderness. Skin:     General: Skin is warm and dry. Neurological:      Mental Status: He is alert and oriented to person, place, and time. Cranial Nerves: No cranial nerve deficit, dysarthria or facial asymmetry. Motor: Weakness present. No tremor, atrophy, abnormal muscle tone, seizure activity or pronator drift. Coordination: Coordination normal.      Deep Tendon Reflexes: Reflexes abnormal.      Reflex Scores:       Patellar reflexes are 3+ on the right side and 3+ on the left side.         Medications:  Reviewed    Infusion Medications:       Scheduled Medications:    verapamil  120 mg Oral BID    escitalopram  20 mg Oral Daily    zolpidem  10 mg Oral Nightly    [START ON 7/21/2022] predniSONE  5 mg Oral Daily    traZODone  50 mg Oral Nightly     PRN Meds: acetaminophen, lidocaine, ALPRAZolam, ondansetron **OR** [DISCONTINUED] ondansetron, ibuprofen    Labs:   No results for input(s): WBC, HGB, HCT, PLT in the last 72 hours. No results for input(s): NA, K, CL, CO2, BUN, CREATININE, CALCIUM, PHOS in the last 72 hours. Invalid input(s): MAGNES  No results for input(s): AST, ALT, BILIDIR, BILITOT, ALKPHOS in the last 72 hours. No results for input(s): INR in the last 72 hours. Recent Labs     07/18/22  0457 07/20/22  0436   CKTOTAL 203* 216*         Urinalysis:   Lab Results   Component Value Date/Time    NITRU Negative 06/30/2022 04:00 PM    WBCUA 3-5 06/30/2022 04:00 PM    BACTERIA Negative 06/30/2022 04:00 PM    RBCUA 3-5 06/30/2022 04:00 PM    BLOODU LARGE 06/30/2022 04:00 PM    SPECGRAV 1.033 06/30/2022 04:00 PM    GLUCOSEU Negative 06/30/2022 04:00 PM       Radiology:   Most recent    EEG No valid procedures specified. MRI of Brain Results for orders placed during the hospital encounter of 06/30/22    MRI BRAIN W WO CONTRAST    Narrative  EXAM: MRI of the brain without and with contrast    History: Hypoxic ischemic encephalopathy. Technique: Multiplanar multisequence MRI of the brain was performed without and with contrast.    Comparison: MRI of the brain July 1, 2022    Findings:    Mildly increased edema in the areas of diffusion restriction within the bilateral globi pallidi, splenium of the corpus callosum, and supratentorial white since prior MRI July 1, 2022. No mass effect or midline shift. No acute intracranial hemorrhage. No enhancing mass or pathologic enhancement. Impression  Mildly increased edema in the areas of diffusion restriction within the bilateral globi pallidi, splenium of the corpus callosum, and supratentorial white since prior MRI July 1, 2022. Results for orders placed during the hospital encounter of 06/30/22    MRI BRAIN WO CONTRAST    Narrative  MRI BRAIN WO CONTRAST : 7/1/2022    CLINICAL HISTORY:  stroke . COMPARISON: Head CT 6/30/2022.     TECHNIQUE: Multiplanar MR imaging of the head was performed without contrast.      FINDINGS:    Areas of restricted diffusion within the globus pallidus, supratentorial white matter and splenium of the corpus callosum are most consistent with carbon monoxide poisoning/hypoxemia and/or other toxic encephalopathy. There is no intracranial hemorrhage, mass effect, midline shift, extra-axial collection, significant cerebral volume loss or other complication identified. Impression  FINDINGS CONSISTENT WITH CARBON MONOXIDE POISONING/HYPOXEMIA AND/OR OTHER TOXIC ENCEPHALOPATHY. NO INTRACRANIAL HEMORRHAGE OR COMPLICATION IDENTIFIED. MRA of the Head and Neck: No results found for this or any previous visit. No results found for this or any previous visit. Results for orders placed during the hospital encounter of 06/30/22    MRA HEAD WO CONTRAST    Narrative  MRA HEAD WO CONTRAST, MRA NECK WO CONTRAST    HISTORY:  Drug abuse. Altered mental status. COMPARISON: MRI brain 7/1/2022. CT head 6/30/2022. TECHNIQUE: Routine MRA of the head without contrast with 3-D time-of-flight images and dedicated reconstructions. Routine MRA of the neck with 3-D and 2-D time-of-flight images and dedicated reconstructions. RESULT:    Visualized brain: Grossly unchanged from the recent MRI of the brain with multiple areas of restricted diffusion. Please refer to the recent MRI brain report. INTRACRANIAL MRA:    The visualized distal vertebral and basilar arteries are widely patent. The distal ICAs are patent and within normal limits of caliber. The proximal ACAs, MCAs and PCAs are patent and within normal limits of caliber and configuration. There is no  evidence of focal,  significant stenosis or aneurysm in the visualized vessels. EXTRACRANIAL MRA:    Carotid Stenosis:    Right Common:  No significant stenosis. Right Internal Plaque:  No significant plaque formation.   Right Internal Carotid Stenosis (% by NASCET Criteria):  0%    Left Common:  No significant stenosis. Left Internal Carotid Plaque:  No significant plaque formation. Left Internal Carotid Stenosis (% by NASCET Criteria):  0%    Cervical Vertebral Arteries:    Patency:  Bilateral  Dominance:  Left    Impression  Patent intracranial and extracranial circulation. CT of the Head: Results for orders placed during the hospital encounter of 06/30/22    CT Head WO Contrast    Narrative  CT Brain. Contrast medium:  without contrast.. History: Altered mental status. Technical factors: CT imaging of the brain was obtained and formatted as 5 mm contiguous axial images. 2.5 mm contiguous axial images were obtained through the osseous structures. Sagittal and coronal reconstruction obtained during postprocessing. Comparison:  None. Findings:    Extra-axial spaces:  Normal.    Intracranial hemorrhage:  None. Ventricular system: [Negative. Basal Cisterns:  Without anomaly. Cerebral Parenchyma: Bilateral, symmetric areas of decreased attenuation exerting no mass effect measuring approximately 9 mm in size since found within the bilateral globus pallidus (series 2, image 17). Midline Shift:  None. Cerebellum:  No anomaly identified. Paranasal sinuses and mastoid air cells:  No anomaly identified. Visualized Orbits:  Negative. Impression  Impression:    Round symmetric areas decreased attenuation bilateral globus pallidus. This finding may be seen in patients experiencing hypoxia, i.e., carbon monoxide poisoning. All CT scans at this facility use dose modulation, iterative reconstruction, and/or weight based dosing when appropriate to reduce radiation dose to as low as reasonably achievable. No results found for this or any previous visit. No results found for this or any previous visit. Carotid duplex: No results found for this or any previous visit. No results found for this or any previous visit.   No results found for this or any previous visit. Echo No results found for this or any previous visit. 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 We will have to see how much of this he recovers. May be some permanent deficits. This was discussed with patient and family. Decadron discontinued by another provider and he has been placed on a steroid taper. Repeat MRI brain will be scheduled for 7/18/2022  Patient with ongoing right lower extremity weakness, numbness and bowel and bladder dysfunction. Screening MRI of the spine was negative. Insomnia, continue trazodone. Expect that this will improve as steroids are tapered off. Discharge planning pending for drug rehabilitation versus behavioral health. 7/15/2022:  Encephalopathy improved. Still with some concentration issues and forgetfulness. He continues on prednisone taper  Repeat MRI of the brain pending for 7/18/2022    I have personally performed a face to face diagnostic evaluation on this patient, reviewed all data and investigations, and am the sole provider of all clinical decisions on the neurological status of this patient. And examined on weekly rehab rounds. Patient is improved though is still quite flattened affect and is likely has some cognitive impairment which may require neuropsychometric testing. We will repeat his MRI for cerebral edema and this represents mostly hypoxic ischemic encephalopathy and truly patient may had a seizure. Patient also is very anxious and will continue on Xanax while in the hospital as we have been called on this several times. We will start tapering his prednisone. Repeat MRI on Monday. 60% time spent on evaluating this patient. Patient has right lower extremity weakness which is central in origin.   We have been aware of this    7/18/2022:  Repeat MRI of the brain pending for today  Right lower extremity weakness improving  Continue with therapies    7/20/2022:  Repeat MRI of the brain shows improving cerebral edema. Results reviewed with patient and his father. Right lower extremity weakness improved. Insomnia improved. Tachycardic at times with normal echo. Verapamil increased per cardiology. Anxious at times. Continues on as needed Xanax with plans to taper off. Continues on Lexapro.   Plan is for patient to be discharged to a neuro restorative program  Collaborating physicians: Dr Kayla Phillips    Electronically signed by YING Nelson CNP on 7/20/2022 at 1:01 PM

## 2022-07-20 NOTE — PROGRESS NOTES
OCCUPATIONAL THERAPY  INPATIENT REHAB TREATMENT NOTE  [facility]      NAME: Justa Barakat  : 1990 (33 y.o.)  MRN: [id number]  CODE STATUS: Full Code  Room: Q122/J991-86    Date of Service: 2022    Referring Physician: Dr. Addis Jimenez  Rehab Diagnosis: Imp ADLs d/t hypoxic encephalopathy s/p overdose likely secondary to fentanyl laced xanax pill    Restrictions  Restrictions/Precautions  Restrictions/Precautions: Fall Risk      Patient's date of birth confirmed: Yes    SAFETY:     SUBJECTIVE:  Subjective: I was looking for you  Pain: Slight R hip pain- 210    OBJECTIVE:     While seated edge of mat, challenged strength, activity tolerance, ROM, and flexibility for improved ADL performance. Completed BUE HEP    Challenged pt through usage of 3# weight to complete BUE exercises. 2 sets x 10 reps completed. Exercises focused on all UE joints and planes of motion including scapular protraction/retraction, shoulder flexion/extension/rotation/horizontal abduction, elbow flexion/extension, supination/pronation, and wrist/digit flexion/extension. Patient engaged in cognitive and visual perceptual activity to increase [place] with ADL/IADL completion. Block assembly    Challenged pt to assemble blocks of different colors into various 3D structures according to a visual model presented on a piece of paper. Pt completed 4 patterns. Pt with some difficulty on the first pattern however technique improved after first pattern- able to complete others with little difficulty- good problem solving . Pieces were neatly and correctly placed. Kitchen [employer] mobility task completed without AD. Fx mobility completed throughout gym to kitchen at independent level. Objects from appliances, drawers, and cabinets scattered throughout the countertop. Pt required to search through areas and put objects back into correct places.  Pt able to use environmental/contact clues with very little cuing  Pt able to complete task in fair amount of time @ SUP level. No LOBs noted     Education:  HEP, POC    ASSESSMENT:   Possible d/c today - awaiting precert for d/c to neuro- restorative programming     PLAN OF CARE:  Strengthening, Balance training, Functional mobility training, Endurance training, Neuromuscular re-education, Safety education & training, Patient/Caregiver education & training, Equipment evaluation, education, & procurement, Self-Care / ADL, Home management training       Patient goals : \"Get home, get back to life, feel normal\"  Time Frame for Long term goals :  Within 1 weeks, pt to demo progress in the following areas listed below to achieve specific LTGs as stated in the evaluation  Long Term Goal 1: Pt will demo improved overall ADL/IADL status  Long Term Goal 2: Pt will demo improved standing balance and tolerance for self-care completion  Long Term Goal 3: Pt will demo improved activity tolerance for completion of ADL/IADL  Long Term Goal 4: Pt will demo improved overall safety and cognition in order to be able to care for himself with minimal assistance    Therapy Time:   Individual Group Co-Treat   Time In 0930       Time Out 1000         Minutes 30             ADL/IADL training: 10 minutes  Therapeutic activities: 20 minutes     Electronically signed by:    Estrellita Hodgkin, OT,   7/20/2022, 9:37 AM

## 2022-07-20 NOTE — PROGRESS NOTES
Progress Note  Patient: Tonio Riley  Unit/Bed: Q624/A101-07  YOB: 1990  MRN: [id number]  Acct: [de-identified]   Admitting Diagnosis: Septo-optic dysplasia ([address] [facility] [Q04.4]  Impaired mobility and ADLs [Z74.09, Z78.9]  Admit Date:  7/11/2022  Hospital Day: 9    Chief Complaint: Tachy    Histories:  Past Medical History:   Diagnosis Date    ADHD (attention deficit hyperactivity disorder)     was initiated on treatment by Dr. Gopal Maurer. any testing was done here by Dr. Gopal Maurer, no formal psych eval.      Anxiety     Drug abuse [facility])     MDD (major depressive disorder), recurrent episode, moderate ([facility] [address] 6/4/2019    Neuropathy of right peroneal nerve 7/12/2022     Past Surgical History:   Procedure Laterality Date    TONSILLECTOMY AND ADENOIDECTOMY      WISDOM TOOTH EXTRACTION       Family History   Problem Relation Age of Onset    Depression Mother     Heart Disease Father     High Blood Pressure Father     High Cholesterol Father      Social History     Socioeconomic History    Marital status: Single     Spouse name: None    Number of children: None    Years of education: None    Highest education level: None   Tobacco Use    Smoking status: Never    Smokeless tobacco: Never   Substance and Sexual Activity    Alcohol use: Yes     Comment: occasional    Drug use: No   Social History Narrative    Works full time at [employer]    Being evicted dt not paying rent, Car was reprocessed    Lived With: Alone in [address]: One level--Level entry    [address]: Standard    Has the patient had two or more falls in the past year or any fall with injury in the past year?: No    ADL Assistance: Independent    Homemaking Assistance: Independent    Homemaking Responsibilities: Yes    Ambulation Assistance: Independent    Transfer Assistance: Independent    Active : Yes    Type of Occupation: [employer] work    Additional Comments:  Independent PTA- no medical equipment Social Determinants of Health     Financial Resource Strain: Low Risk     Difficulty of Paying Living Expenses: Not hard at all   Food Insecurity: No Food Insecurity    Worried About [address] in the Last Year: Never true    Ran Out of Food in the Last Year: Never true       Subjective/HPI no new events. Resting comfortable did PT today. HR 80s can tell HR is slower. Feels better. Getting stronger. EKG: SR 85        Review of Systems:   Review of Systems   Constitutional: Negative. Negative for diaphoresis and fatigue. HENT: Negative. Eyes: Negative. Respiratory: Negative. Negative for cough, chest tightness, shortness of breath, wheezing and stridor. Cardiovascular: Negative. Negative for chest pain, palpitations and leg swelling. Gastrointestinal: Negative. Negative for blood in stool and nausea. Genitourinary: Negative. Musculoskeletal: Negative. Skin: Negative. Neurological: Negative. Negative for dizziness, syncope, weakness and light-headedness. Hematological: Negative. Psychiatric/Behavioral: Negative. Physical Examination:    /65   Pulse 91   Temp 98.6 °F (37 °C) (Oral)   Resp 20   Ht 5' 10\" (1.778 m)   Wt 160 lb 12.8 oz (72.9 kg)   SpO2 100%   BMI 23.07 kg/m²    Physical Exam   Constitutional: He appears healthy. No distress. HENT:   Normal cephalic and Atraumatic   Eyes: Pupils are equal, round, and reactive to light. Neck: Thyroid normal. No JVD present. No neck adenopathy. No thyromegaly present. Cardiovascular: Regular rhythm, normal heart sounds, intact distal pulses and normal pulses. Tachycardia present. Pulmonary/Chest: Effort normal and breath sounds normal. He has no wheezes. He has no rales. He exhibits no tenderness. Abdominal: Soft. Bowel sounds are normal. There is no abdominal tenderness. Musculoskeletal:         General: No tenderness or edema. Normal range of motion.       Cervical back: Normal range of motion and neck supple. Neurological: He is alert and oriented to person, place, and time. Skin: Skin is warm. No cyanosis. Nails show no clubbing.      LABS:  CBC:   Lab Results   Component Value Date/Time    WBC 22.9 07/11/2022 05:14 AM    RBC 4.39 07/11/2022 05:14 AM    HGB 12.6 07/11/2022 05:14 AM    HCT 37.7 07/11/2022 05:14 AM    MCV 85.9 07/11/2022 05:14 AM    MCH 28.6 07/11/2022 05:14 AM    MCHC 33.3 07/11/2022 05:14 AM    RDW 12.9 07/11/2022 05:14 AM     07/11/2022 05:14 AM    MPV 8.5 07/13/2015 04:16 PM     CBC with Differential:    Lab Results   Component Value Date/Time    WBC 22.9 07/11/2022 05:14 AM    RBC 4.39 07/11/2022 05:14 AM    HGB 12.6 07/11/2022 05:14 AM    HCT 37.7 07/11/2022 05:14 AM     07/11/2022 05:14 AM    MCV 85.9 07/11/2022 05:14 AM    MCH 28.6 07/11/2022 05:14 AM    MCHC 33.3 07/11/2022 05:14 AM    RDW 12.9 07/11/2022 05:14 AM    BANDSPCT 1 07/08/2022 05:25 AM    METASPCT 2 07/08/2022 05:25 AM    LYMPHOPCT 10.1 07/11/2022 05:14 AM    MONOPCT 4.9 07/11/2022 05:14 AM    MYELOPCT 1 07/08/2022 05:25 AM    BASOPCT 0.1 07/11/2022 05:14 AM    MONOSABS 1.1 07/11/2022 05:14 AM    LYMPHSABS 2.3 07/11/2022 05:14 AM    EOSABS 0.0 07/11/2022 05:14 AM    BASOSABS 0.0 07/11/2022 05:14 AM     CMP:    Lab Results   Component Value Date/Time     07/11/2022 05:14 AM    K 4.0 07/11/2022 05:14 AM     07/11/2022 05:14 AM    CO2 26 07/11/2022 05:14 AM    BUN 12 07/11/2022 05:14 AM    CREATININE 0.56 07/11/2022 05:14 AM    GFRAA >60.0 07/11/2022 05:14 AM    LABGLOM >60.0 07/11/2022 05:14 AM    GLUCOSE 118 07/11/2022 05:14 AM    PROT 6.5 07/12/2022 05:20 AM    LABALBU 4.0 07/12/2022 05:20 AM    CALCIUM 9.2 07/11/2022 05:14 AM    BILITOT 0.4 07/12/2022 05:20 AM    ALKPHOS 78 07/12/2022 05:20 AM    AST 24 07/12/2022 05:20 AM     07/12/2022 05:20 AM     BMP:    Lab Results   Component Value Date/Time     07/11/2022 05:14 AM    K 4.0 07/11/2022 05:14 AM

## 2022-07-20 NOTE — PROGRESS NOTES
Physical Therapy Rehab Treatment Note  Facility/Department: [facility]  Room: H389/J154-85       NAME: Sergio Santana  : 1990 (33 y.o.)  MRN: [id number]  CODE STATUS: Full Code    Date of Service: 2022       Pain  Right hip 4/10       OBJECTIVE:     Roll Left  Assistance Level: Independent  Roll Right  Assistance Level: Independent  Sit to Supine  Assistance Level: Independent  Supine to Sit  Assistance Level: Independent  Scooting  Assistance Level: Independent    Sit to Stand  Assistance Level: Independent  Stand to Sit  Assistance Level: Independent  Bed To/From Chair  Assistance Level: Independent  Car Transfer  Assistance Level: Independent  Floor Transfer  Assistance Level: Independent    Ambulation  Surface: Level surface; Uneven surface; Carpet; Ramp  Distance: 250 feet- distance not focus  Activity: Within Unit  Assistance Level: Independent  Skilled Clinical Factors: mild antalgia, decreased heel strike on right    Stairs  Stair Height: 6''  Device: One handrail  Number of Stairs: 12  Additional Factors: Reciprocal going up;Reciprocal going down  Assistance Level: Independent    PT Exercises  Exercise Treatment: Supine: SLR, bridges, S/L abduction, clams x20 ea, standing sink exercises x20. Reviewed exercises for HEP. Provided paper copy. Patient verbalizes good understanding. ASSESSMENT/PROGRESS TOWARDS GOALS: Patient continues to complete functional mobility at Independent level. Reviewed exercises for HEP. Goals:  Short Term Goals  Short term goal 1: Indep HEP for symptom management  Short term goal 2: Indep to supervision bed mobility  Short term goal 3: Indep to supervision transfers sit to stand and bed to chair  Short term goal 4: Amb with approp device 100 ft safe with supervision  Short term goal 5: Improve LE strength 1/2 grade to assist with above goals.       Therapy Time:   Individual   Time In 0900   Time Out 0930   Minutes 30     Minutes:30  Gait training:15  Therapeutic ex:15      Mina Rosen PTA, 07/20/22 at 12:18 PM

## 2022-07-20 NOTE — PROGRESS NOTES
Patient is resting in bed with no c/o pain or discomfort noted at this time. Denies numbness and tingling to extremities. No anxiety noted.

## 2022-07-20 NOTE — PROGRESS NOTES
OCCUPATIONAL THERAPY  INPATIENT REHAB TREATMENT NOTE  [facility]      NAME: Lennie Coronel  : 1990 (33 y.o.)  MRN: [id number]  CODE STATUS: Full Code  Room: A406/Q565-95    Date of Service: 2022    Referring Physician: Dr. Arsen Shah Diagnosis: Imp ADLs d/t hypoxic encephalopathy s/p overdose likely secondary to fentanyl laced xanax pill    Restrictions  Restrictions/Precautions  Restrictions/Precautions: Fall Risk              Patient's date of birth confirmed: Yes    SAFETY:       SUBJECTIVE:  Subjective: What are we doing  Pain: none    COGNITION:         Problem Solving: Mod A  Memory: Min A    OBJECTIVE:    Engaged pt in cognitive tasks called mind oconnell- focusing on deductive reasoning and problem solving     Pt needed Max assistance with following steps and problem solving both worksheets. Pt failed to recognize or capitalize off lessons learned during first test when moving to the second. Toileting  Assistance Level: Independent    Instrumental ADL's  Instrumental ADLs: Yes  Meal Prep  Meal Prep Level: Other (No AD)  Meal Prep Level of Assistance: Supervision  Meal Preparation: Pt prepared pot of coffee: Even though pt has completed this task prior- completed again to assess carryover. Pt continues to need step by step cues for sequencing and seeks verification for most actions. Functional Mobility  Activity: To/From therapy gym;Transport items; Retrieve items  Assistance Level: Independent        Education:   IADL techniques, general safety, environmental awareness, POC    ASSESSMENT:   Pt continues to need cues and assistance for sequencing during IADL tasks. Problem solving also continues to break down when the patient is challenge. Encouragement needed to continue task instead of getting frustrated. Pt has yet to be approved for d.c- will continue to address goals.  Cognition, safety    PLAN OF CARE:  Strengthening, Balance training, Functional mobility training, Endurance training, Neuromuscular re-education, Safety education & training, Patient/Caregiver education & training, Equipment evaluation, education, & procurement, Self-Care / ADL, Home management training       Patient goals : \"Get home, get back to life, feel normal\"  Time Frame for Long term goals :  Within 1 weeks, pt to demo progress in the following areas listed below to achieve specific LTGs as stated in the evaluation  Long Term Goal 1: Pt will demo improved overall ADL/IADL status  Long Term Goal 2: Pt will demo improved standing balance and tolerance for self-care completion  Long Term Goal 3: Pt will demo improved activity tolerance for completion of ADL/IADL  Long Term Goal 4: Pt will demo improved overall safety and cognition in order to be able to care for himself with minimal assistance    Therapy Time:   Individual Group Co-Treat   Time In 1500       Time Out 1535         Minutes 35             ADL/IADL training: 15 minutes  Therapeutic activities: 20 minutes     Electronically signed by:    Nena Figueroa OT,   7/20/2022, 3:49 PM

## 2022-07-20 NOTE — CARE COORDINATION
Still waiting on precert for [facility] per Charmaine Newell from their admissions office.   Electronically signed by Fox Perry RN on 7/20/22 at 8:26 AM EDT

## 2022-07-20 NOTE — DISCHARGE INSTR - COC
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    Impaired mobility and ADLs Z74.09, Z78.9    Hypoxic encephalopathy (HCC) G93.1    Tachycardia R00.0    Cognitive impairment R41.89       Isolation/Infection:   Isolation            No Isolation          Patient Infection Status       Infection Onset Added Last Indicated Last Indicated By Review Planned Expiration Resolved Resolved By    None active    Resolved    COVID-19 21 POCT COVID-19, Antigen   01/10/22             Nurse Assessment:  Last Vital Signs: /65   Pulse 91   Temp 98.6 °F (37 °C) (Oral)   Resp 20   Ht 5' 10\" (1.778 m)   Wt 160 lb 12.8 oz (72.9 kg)   SpO2 100%   BMI 23.07 kg/m²     Last documented pain score (0-10 scale): Pain Level: 0  Last Weight:   Wt Readings from Last 1 Encounters:   22 160 lb 12.8 oz (72.9 kg)     Mental Status:  oriented, alert, and coherent    IV Access:  - None    Nursing Mobility/ADLs:  Walking   Independent  Transfer  Independent  Bathing  Independent  Dressing  Independent  Toileting  Independent  Feeding  Independent  Med Admin  Assisted  Med Delivery   whole    Wound Care Documentation and Therapy:  Wound 22 Ankle Anterior; Left (Active)   Wound Etiology Other 22 2100   Wound Cleansed Not Cleansed 22 0950   Dressing/Treatment Open to air 22 2100   Wound Assessment Eschar dry 22 2100   Drainage Amount None 22 2100   Odor None 22 2100   Liza-wound Assessment Blanchable erythema 22 2100   Margins Defined edges 22   Number of days: 19       Wound 22 Pretibial Distal;Left Reddened, blistered (Active)   Wound Etiology Other 22 2100   Wound Cleansed Not Cleansed 22 0950   Dressing/Treatment Open to air 22 2100   Wound Assessment Eschar dry 22 0830   Drainage Amount None 22 2100   Drainage Description Sanguinous 22 1106   Odor None 22 2100   Liza-wound Assessment Blanchable erythema 07/19/22 2100   Margins Defined edges 07/19/22 2100   Number of days: 19       Wound 06/30/22 Pedal Anterior;Right Reddened, non-blanchable (Active)   Wound Etiology Other 07/19/22 2100   Dressing/Treatment Open to air 07/19/22 2100   Wound Assessment Eschar dry 07/19/22 0830   Drainage Amount None 07/19/22 2100   Odor None 07/19/22 2100   Liza-wound Assessment Blanchable erythema; Intact; Warm 07/19/22 2100   Number of days: 19        Elimination:  Continence: Bowel: Yes  Bladder: Yes  Urinary Catheter: None   Colostomy/Ileostomy/Ileal Conduit: No       Date of Last BM: 7/20/2022      Intake/Output Summary (Last 24 hours) at 7/20/2022 1039  Last data filed at 7/19/2022 2100  Gross per 24 hour   Intake 240 ml   Output --   Net 240 ml     I/O last 3 completed shifts: In: 480 [P.O.:480]  Out: -     Safety Concerns:     None    Impairments/Disabilities:      Vision    Nutrition Therapy:  Current Nutrition Therapy:   - Oral Diet:  General    Routes of Feeding: Oral  Liquids: Thin Liquids  Daily Fluid Restriction: no  Last Modified Barium Swallow with Video (Video Swallowing Test): not done    Treatments at the Time of Hospital Discharge:   Respiratory Treatments: ***  Oxygen Therapy:  is not on home oxygen therapy.   Ventilator:    - No ventilator support    Rehab Therapies: per facility  Weight Bearing Status/Restrictions: No weight bearing restrictions  Other Medical Equipment (for information only, NOT a DME order):  {EQUIPMENT:[id number]}  Other Treatments: ***    Patient's personal belongings (please select all that are sent with patient):  None    RN SIGNATURE:  Electronically signed by Miladys Crews RN on 7/22/22 at 3:03 PM EDT    CASE MANAGEMENT/SOCIAL WORK SECTION    Inpatient Status Date: 7/11/2022    Readmission Risk Assessment Score:  Readmission Risk              Risk of Unplanned Readmission:  [id number]           Discharging to Facility/ Agency   Name: [facility] [id number] [facility]    Dialysis Facility (if applicable)   Name:  Address:  Dialysis Schedule:  Phone:  Fax:    / signature: Electronically signed by Elizabeth Durant RN on 7/20/22 at 10:44 AM EDT    PHYSICIAN SECTION    Prognosis: Good    Condition at Discharge: Stable    Rehab Potential (if transferring to Rehab): Good    Recommended Labs or Other Treatments After Discharge:      Physician Certification: I certify the above information and transfer of Destiny Moscoso  is necessary for the continuing treatment of the diagnosis listed and that he requires Sub-Acute Rehab/ Brain injury for less 30 days.      Update Admission H&P: No change in H&P    PHYSICIAN SIGNATURE:  Jarret Alonzo DO  Electronically signed by Elizabeth Durant RN on 7/22/22 at 2:35 PM EDT

## 2022-07-20 NOTE — PROGRESS NOTES
[facility] OCCUPATIONAL THERAPY DISCHARGE SUMMARY- REHAB     Date: 2022  Patient Name: Sergio Santana        MRN: [id number]  Account: [de-identified]   : 1990  (27 y.o.)  Room: [place] [address]    Diagnosis:  Imp ADLs d/t hypoxic encephalopathy s/p overdose likely secondary to fentanyl laced xanax pill    Past Medical History:   Diagnosis Date    ADHD (attention deficit hyperactivity disorder)     was initiated on treatment by Dr. Fadi Abbasi. any testing was done here by Dr. Fadi Abbasi, no formal psych eval.      Anxiety     Drug abuse [facility])     MDD (major depressive disorder), recurrent episode, moderate ([facility] [address] 2019    Neuropathy of right peroneal nerve 2022     Past Surgical History:   Procedure Laterality Date    TONSILLECTOMY AND ADENOIDECTOMY      WISDOM TOOTH EXTRACTION         Precautions:   Restrictions/Precautions: Fall Risk     Social/Functional History:  Social/Functional History  Lives With: Alone  Type of Home: Apartment  Home Layout: One level  Home Access: Level entry  Bathroom Shower/Tub: Tub/Shower unit, Curtain  Bathroom Toilet: Standard  Home Equipment:  (NA)  Has the patient had two or more falls in the past year or any fall with injury in the past year?: No  ADL Assistance: [facility] [address]: Independent  Homemaking Responsibilities: Yes  Ambulation Assistance: Independent  Transfer Assistance: Independent  Active : Yes  Education: bachelors - public health. Was also a case-worker  Occupation: Full time employment  Type of Occupation: [employer] work  Leisure & Hobbies: golf, football  IADL Comments: Laundry is in patients unit. Additional Comments: Independent PTA- no medical equipment.  Parents live 10 mins away from patient    Current Functional Status:    ADL  Feeding: Independent  Grooming: Independent  Grooming Skilled Clinical Factors: oral hygiene, comb hair  UE Bathing: Independent  UE Bathing Skilled Clinical Factors: cues to attend to all areas, wash hair, thoroughness  LE Bathing: Modified independent   LE Bathing Skilled Clinical Factors: assist to reach RLE and for posterior hygiene  UE Dressing: Independent  UE Dressing Skilled Clinical Factors: gown only  LE Dressing: Modified independent   LE Dressing Skilled Clinical Factors: assist with R sock, pt declined to don pants/bref  Toileting: Independent  Additional Comments: Shower completed. Pt impulsive at times  Toilet Transfers  Toilet - Technique: Ambulating  Equipment Used: Grab bars  Toilet Transfer: Modified independent     Shower Transfers  Shower - Transfer From: Other (no AD)  Shower - Transfer Type: To and From  Shower - Transfer To: Shower seat with back  Shower - Technique: Ambulating  Shower Transfers: Modified independence    Pt made good progress with ADLs- progressing from needing significant cuing to being MI with ADLs. SUP/cuing still needed for IADL safety     Orientation Status:   WFL    Cognition Status:  Cognition  Overall Cognitive Status: Exceptions  Arousal/Alertness: Delayed responses to stimuli  Following Commands:  Follows one step commands consistently  Attention Span: Attends with cues to redirect  Memory: Decreased recall of recent events  Safety Judgement: Decreased awareness of need for safety  Problem Solving: Assistance required to generate solutions  Insights: Decreased awareness of deficits  Initiation: Requires cues for some    Perception Status:  Perception  Overall Perceptual Status: WFL    Sensation Status:  Sensation  Overall Sensation Status: Impaired  Light Touch: Partial deficits in the RLE (distal RLE numbness)    Vision and Hearing Status:  Vision  Vision: Impaired  Vision Exceptions: Wears glasses at all times  Hearing  Hearing: Within functional limits     UE Function Status:    ROM:   LUE AROM (degrees)  LUE AROM : WFL  RUE AROM (degrees)  RUE AROM : WFL    Strength:  LUE Strength  Gross LUE Strength: Exceptions to [facility] Hand General: 4+/5  LUE Strength Comment: 4+/5  RUE Strength  Gross RUE Strength: Exceptions to [facility]  R Hand General: 4+/5  RUE Strength Comment: 4+/5    Coordination, Tone, Quality of Movement:    Tone RUE  RUE Tone: Normotonic  Tone LUE  LUE Tone: Normotonic  Coordination  Movements Are Fluid And Coordinated: Yes    D/C Recommendations:  Assist IADLs needed  Pt transitioning to [facility]     Equipment Recommendations:   Sock aide, shower chair     OT Follow Up:  OT D/C RECOMMENDATIONS  REQUIRES OT FOLLOW-UP: Yes    Home Exercise Program Provided: [x] Yes [] No  If yes, type of HEP: BUE strengthening      Electronically signed by:    Annamaria Gowers, OT,    7/25/2022, 11:44 AM

## 2022-07-20 NOTE — PROGRESS NOTES
[facility]  Facility/Department: [facility]  Speech Language Pathology   Treatment Note          Shadi Bernard  1990  [id number]  [x]   confirmed    Date: 2022    Rehab Diagnosis: Impaired mobility and ADL's due to hypoxic encephalopathy. [facility] admit [facility]      Restrictions/Precautions: Fall Risk    Weight: 160 lb 12.8 oz (72.9 kg)     ADULT DIET; Regular    SpO2: 98 % (22 1233)  No active isolations    Speech Dx: Cognitive Linguistic Impairment    Subjective:  Alert and Cooperative        Interventions used this date:  Cognitive Skill Development    Objective/Assessment:  Patient progressing towards goals:  Goal 1: To increase safety awareness and judgment for safe completion of ADLs secondary to pt's cognitive deficits, pt will complete abstract reasoning tasks (i.e. Word deduction, convergent and divergent naming, similarities/differences) with 80% accuracy and min cues. Goal 2: To address pt's cognitive deficits and promote recall of personal and medical information, pt will answer questions addressing (recent, delayed) recall with 80% accuracy and fading cues. Goal 3: To increase safety awareness and judgment for safe completion of ADLs secondary to pt's cognitive deficits,  pt will complete high level problem solving tasks related to ADLs (e.g. medication, time, finance management) with 80% accuracy and fading cues. Goal 4: To decrease cognitive deficits and improve attention to tasks for safe completion of ADLs, pt will complete structured tasks addressing alternating and/or divided attention with 80% accuracy and fading cues. Pt performed high level executive functions task involving utilizing partial key to convert numbers to letters with mod cues fading to min cues. Pt converted number to correct letter with 81% accuracy. Pt had difficulty using partial key and would have to count starting with letter A each time.   Pt retained letters to recall word it spelled with 75% accuracy. Pt followed mid level written directions with 70% accuracy. 2 errors related to decreased attention to detail and 1 error related to identifying incorrect word from word list.  Goal 5: To increase independence for functional activities for home and community, pt will complete mid level executive functioning tasks (i.e. Path finding, scheduling appointments, prioritizing tasks) with 80% accuracy and min cues. Goal 6: Pt will be educated on compensatory strategies for word finding deficits in 5/5 given opportunities to help the pt express his/her basic personal, safety, and medical needs and increase length of utterance in the presence of language deficits. Treatment/Activity Tolerance:  Patient tolerated treatment well    Plan:  Discharge planned for 7/21. Pain Assessment:  Patient does not c/o pain. Pain Re-assessment:  Patient does not c/o pain. Patient/Caregiver Education:  Patient educated on session and progression towards goals. Patient stated verbal understanding of directions. Safety Devices:  Bed alarm in place and Call light within reach        Speech Therapy Level of Assistance Scale    AUDITORY COMPREHENSION  Rating:  Modified Independent    VERBAL EXPRESSION  Rating:  Supervised Assistance    MOTOR SPEECH  Rating: Independent    PROBLEM SOLVING  Rating: Minimal Assistance    MEMORY  Rating:  Minimal Assistance          Therapy Time  SLP Individual Minutes  Time In: 1400  Time Out: 1430  Minutes: 30              Signature: Electronically signed by Maurilio Marroquin.  SOPHIE Gómez on 7/20/2022 at 3:21 PM

## 2022-07-20 NOTE — PROGRESS NOTES
CLINICAL PHARMACY NOTE: MEDS TO BEDS    Total # of Prescriptions Filled: 1   The following medications were delivered to the patient:  Prednisone 5 mg Tab    Additional Documentation:

## 2022-07-20 NOTE — PROGRESS NOTES
OCCUPATIONAL THERAPY  INPATIENT REHAB TREATMENT NOTE  [facility]      NAME: Anthony Riggins  : 1990 (33 y.o.)  MRN: [id number]  CODE STATUS: Full Code  Room: P978/L029-98    Date of Service: 2022    Referring Physician: Dr. Jean Paul Torres Diagnosis: Imp ADLs d/t hypoxic encephalopathy s/p overdose likely secondary to fentanyl laced xanax pill    Restrictions  Restrictions/Precautions  Restrictions/Precautions: Fall Risk     Patient's date of birth confirmed: Yes    SAFETY:  Safety Devices  Safety Devices in place: Yes  Type of devices: All fall risk precautions in place    SUBJECTIVE:  SUBJECTIVE:  Subjective: \" I don't know. \"     Pain at start of treatment:  No 0/10    Pain at end of treatment:   No 0/10    COGNITION:  Orientation  Overall Orientation Status: Within Functional Limits  Cognition  Overall Cognitive Status: Exceptions    OBJECTIVE:    Health Management  Health Management Level of Assistance: Minimal assistance  Health Management:   Medication Refill Activity; Patient engaged in prescription refill therapeutic activity to increase [place] with IADL's. Patient provided with 5 medication bottles with prescription details on labels. Patient completed the coordinating worksheets to prescription refills I-V. Patient with 0% accuracy identifying name of medications. Patient with 0% accuracy identifying generic name of medications. Patient with 80% accuracy identifying medication strength. Patient with 100% accuracy identifying Rx numbers. Patient with 100% accuracy identifying medication instructions. Patient with 100% accuracy identifying name of prescribing doctors. Patient with 100% accuracy identifying prescribing doctors phone numbers. Patient with 100% accuracy identifying number of available refills. Patient with 100% accuracy identifying dates refills to be ordered by.   Patient with 100% accuracy identifying number of 'pills' remaining in medication bottles. Patient with 100% accuracy identifying name of pharmacies. Patient with 100% accuracy identifying pharmacies phone numbers. Patient with 60% accuracy identifying what steps need to be taken next to refill medications. Patient with 80% legibility with R FM handwriting. Patient required 0 verbal cues throughout activity. Cognitive Headbandz Activity:  Patient engaged in cognitive activity to increase problem solving for safety with ADL's and IADL's. Patient participated in 3 rounds of [place]. Patient able to secure plastic headband around head with MIN difficulty. Patient able to pick a card from the deck with 0 difficulty. Patient able to place card in headband on therapists head with 0 difficulty. Patient able to ask appropriate yes/no questions to identify card in their headband 100% of the time. Patient needed to reference sample questions 20% of the time. Patient able to identify 3/3 cards in their headband (rabbit, spider, skunk). Patient able to appropriately answer therapists questions 100% of the time to assist therapist in identify their card. Patient required MIN verbal cues throughout game. Patient with 0 difficulty following rules of game. .  Cognitive Scattegories Activity:  Patient engaged in cognitive activity to increase problem solving for safety with ADL's and IADL's. Patient participated in 3 rounds of [address] Patient able to  lettered die with 0 difficulty. Patient able to roll die with 0 difficulty. Patient able to appropriately write answers for 5/12 topics from card number 7 beginning with letter T. Patient able to appropriately write 7 answers for remaining 7 topics of card with MIN verbal cues. Patient able to appropriately write answers for 1/12 topics from card number 6 beginning with letter C. Patient able to appropriately write 4 answers for remaining 5 topics of card with MIN verbal cues.  Patient able to appropriately write answers for 5/12 topics from card number 3 beginning with letter A. Patient able to appropriately write 6 answers for remaining 7 topics of card with MIN verbal cues. Patient with 50% legibility with R FM handwriting and MIN spelling errors. ASSESSMENT: Patient worked at a [address] Activity Tolerance: Patient tolerated treatment well      PLAN OF CARE:  Strengthening, Balance training, Functional mobility training, Endurance training, Neuromuscular re-education, Safety education & training, Patient/Caregiver education & training, Equipment evaluation, education, & procurement, Self-Care / ADL, Home management training     Continue per OT POC    Patient goals : \"Get home, get back to life, feel normal\"  Time Frame for Long term goals :  Within 1 weeks, pt to demo progress in the following areas listed below to achieve specific LTGs as stated in the evaluation  Long Term Goal 1: Pt will demo improved overall ADL/IADL status  Long Term Goal 2: Pt will demo improved standing balance and tolerance for self-care completion  Long Term Goal 3: Pt will demo improved activity tolerance for completion of ADL/IADL  Long Term Goal 4: Pt will demo improved overall safety and cognition in order to be able to care for himself with minimal assistance        Therapy Time:   Individual Group Co-Treat   Time In 1300       Time Out 1400         Minutes 60                   ADL/IADL trainin minutes  Cognitive Retrainin minutes     Electronically signed by:    NATHANIEL Rosas,   2022, 2:49 PM

## 2022-07-20 NOTE — PLAN OF CARE
Problem: Safety - Adult  Goal: Free from fall injury  7/19/2022 2251 by Adelita Smart RN  Outcome: Progressing     Problem: ABCDS Injury Assessment  Goal: Absence of physical injury  7/19/2022 2251 by Adelita Smart RN  Outcome: Progressing

## 2022-07-20 NOTE — PROGRESS NOTES
Subjective: The patient complains of severe acute on chronic progressive fatigue and ataxia, cognitive slowing, right lower extremity numbness and stiffness partially relieved by rest, medications, PT,  OT, medication titration SLP and rest and exacerbated by recent OD-he was admitted to [facility] on 6/30/2022  with change in mental status and history of possible overdose. Family did a well check after his boss noticed that he had not shown up to work for several days and called the family out of concern. He was initially admitted and to the ICU. He had abnormal liver enzymes, rhabdomyolysis kidney injury. Urine was positive for amphetamines benzos and fentanyl. He was found to also be using Royle kratom overuse, transaminitis, EDGAR, rhabdomyolysis resulting in provoked seizure and cerebral edema. He admits that he took 2 Xanax that he bought on the street. He and I both fear that it may have been laced with fentanyl. Discussed with treatment team and hospitalist and neuro--  MRIs spine were normal..      I have been concerned about patients medical complexities and barriers to advancing in rehab goals including right lower extremity stiffness secondary to sciatic neuropathy and nerve injury as well as his history of substance abuse and overuse of benzodiazepines. His cognition is greatly improved we were able to slowly transition him off of sedatives. He still runs tachycardic at times and is requiring Calan SR at dose of 120 mg. He has been supervised by cardiology. He is for discharge possibly today to his new substance abuse facility to also address cognitive changes he has after his overdose. We will go ahead and discontinue his Lovenox. Hospital Course: The patient was admitted to the Rehabilitation Unit to address medical, ADL and mobility deficits as detailed above and below.   The patient was enrolled in acute therapy program including but not limited to PT, OT program.  Weekly team meetings were held to assess functional progress toward their goals. The patient's medical issues were addressed daily. The patient progressed in the rehab program and is now ready for discharge. Refer to FIM scores summary report for detailed functional status. Patient was enrolled in an acute course Rehab program and progressed well-but still needs skilled level rehab to address their mobility and ADL adeficits. Their progress in the recovery process is now at a skilled level and pace. Patient will be discharged Skilled nursing facility to complete their rehabilitation process and achieve greater independence. Greater than 35 minutes was spent on coordinating patients discharge including follow-up care, medications and patient/family education. Extended time needed because of the potential use of controlled medications are high risk medications and a high risk population individual.  Patient and family were instructed to use lowest effective dose of these medications and slowly titrate off over the next 2 to 4 weeks. They are not to combine opiates with sedatives. I reviewed her [facility] prescription monitoring service data sheets in hopes of eliminating polypharmacy and weaning to the lowest effective dose of pain medications and eliminating the concomitant use of benzodiazepines. I see no medications of concern. I see no habits of combining sedatives and narcotics. I reviewed current care and plans for further care with other rehab providers including nursing and case management. According to recent nursing note, \" Patient is resting in bed with no c/o pain or discomfort noted at this time. Denies dizziness, chest pain or palpitations at this time\"       Discussed his elevated heart rate is back down I think that he can come off his telemetry if okay with cardiology. ROS x10: The patient also complains of severely impaired mobility and activities of daily living. Otherwise no new problems with vision, hearing, nose, mouth, throat, dermal, cardiovascular, GI, , pulmonary, musculoskeletal, psychiatric or neurological. See also Acute Rehab PM&R H&P. Vital signs:  /73   Pulse 87   Temp 97.5 °F (36.4 °C) (Oral)   Resp 18   Ht 5' 10\" (1.778 m)   Wt 160 lb 12.8 oz (72.9 kg)   SpO2 98%   BMI 23.07 kg/m²   I/O:   PO/Intake:  fair PO intake,  Adult reg  Diet thin liquids    Bowel:   continent   Bladder: continent   No need for schwartz  General:  Patient is well developed,   adequately nourished, and    well kempt. HEENT:    Pupils equal, hearing intact to loud voice, external inspection of ear and nose benign. Inspection of lips, tongue and gums benign  -cognitive slowing right lower extremity weakness and numbness. Musculoskeletal: No significant change in strength or tone. All joints stable. Inspection and palpation of digits and nails show no clubbing, cyanosis or inflammatory conditions. Neuro/Psychiatric: Affect: flat but pleasant. Alert and oriented to person, place and situation with   min cues. No significant change in deep tendon reflexes or sensation  Lungs:  Diminished, CTA-B. Respiration effort is   normal at rest.     Heart:   S1 = S2,   RRR. Abdomen:  Soft, non-tender, no enlargement of liver or spleen. Extremities:    lower extremity edema  -weakness right lower extremity decreased sciatic neuropathy.   Skin:   Intact to general survey,      Rehabilitation:  Physical Therapy:   Bed mobility:  Bed mobility  Rolling to Left: Modified independent (07/12/22 1155)  Rolling to Right: Modified independent (07/12/22 1155)  Supine to Sit: Modified independent;Supervision (07/12/22 1155)  Sit to Supine: Modified independent;Supervision (07/12/22 1155)  Bed Mobility Comments: Mildly impulsive. (07/12/22 1155)  Bed Mobility Training  Bed Mobility Training: Yes (07/16/22 1400)  Overall Level of Assistance: Independent (07/16/22 9899)  Interventions: Demonstration (07/16/22 9262)  Rolling: Independent (07/16/22 0927)  Supine to Sit: Independent (07/16/22 0927)  Sit to Supine: Independent (07/16/22 4101)  Scooting: Independent (07/16/22 0927)  Roll Left  Assistance Level: Independent (07/20/22 1203)  Roll Right  Assistance Level: Independent (07/20/22 1203)  Sit to Supine  Assistance Level: Independent (07/20/22 1203)  Supine to Sit  Assistance Level: Independent (07/20/22 1203)  Scooting  Assistance Level: Independent (07/20/22 1203)  Transfers:  Transfers  Sit to Stand: Stand by assistance;Contact guard assistance (07/12/22 1156)  Stand to sit: Stand by assistance;Contact guard assistance (07/12/22 1156)  Bed to Chair: Stand by assistance;Contact guard assistance (07/12/22 1156)  Car Transfer: Stand by assistance;Contact guard assistance (07/12/22 1156)  Comment: Varied performance. Pt with increased time to approach chair. Safe technique however inaccurate execution at times. 5XSTS = 10.7sec. (07/12/22 1156)  Transfer Training  Transfer Training: Yes (07/16/22 1400)  Overall Level of Assistance: Independent (07/16/22 1400)  Interventions: Safety awareness training (07/16/22 1400)  Sit to Stand: Independent;Supervision (07/16/22 1400)  Stand to Sit: Independent;Supervision (07/16/22 1400)  Stand Pivot Transfers: Supervision; Independent (07/16/22 1400)  Bed to Chair: Modified independent (07/16/22 1400)  Transfers  Surface: To chair with arms (07/15/22 1048)  Sit to Stand  Assistance Level: Independent (07/20/22 1203)  Skilled Clinical Factors: 5x STS = 6.59 sec (07/20/22 1203)  Stand to Sit  Assistance Level: Independent (07/20/22 1203)  Skilled Clinical Factors: Occasionally unstable to approach to chair (07/13/22 1214)  Bed To/From Chair  Assistance Level: Independent (07/20/22 1203)  Car Transfer  Assistance Level:  Independent (07/20/22 1203)  Skilled Clinical Factors: Performed in family vehicle (07/15/22 1534)  Floor Transfer  Assistance Level: Independent (07/20/22 1203)  Skilled Clinical Factors: Demonstation provided for proper technique (07/15/22 1048)  Gait:   Ambulation  Surface: level tile (07/12/22 1241)  Device: No Device (07/12/22 1241)  Assistance: Stand by assistance;Contact guard assistance (07/12/22 1241)  Quality of Gait: Decreased speed and step length. Challenged with turns and obstacles. (07/12/22 1241)  Distance: 150ft; 75ft; multiple small distances within gym focusing on obstacles and turns. Gaze stabilization introduced. (07/12/22 1241)  Gait Training: Yes (07/16/22 1400)  Overall Level of Assistance: Stand-by assistance (07/16/22 1400)  Distance (ft): 250 Feet (07/16/22 0927)  Assistive Device: Other (comment) (no device needed) (07/16/22 0927)  Interventions: Safety awareness training (07/16/22 1400)  Base of Support: Widened (07/16/22 0927)  Speed/Radha: Fluctuations (07/16/22 0927)  Step Length: Right shortened (07/16/22 0927)  Swing Pattern: Left asymmetrical (07/16/22 0927)  Gait Abnormalities: Altered arm swing (07/16/22 0927)  Rail Use: Left (07/16/22 0927)  Right Side Weight Bearing: As tolerated (07/16/22 1400)  Left Side Weight Bearing: As tolerated (07/16/22 1400)  Uneven Terrain - Level of Assistance: Stand-by assistance (07/16/22 0927)  Ambulation  Surface: Level surface; Uneven surface; Carpet; Ramp (07/20/22 1203)  Distance: 250 feet- distance not focus (07/20/22 1203)  Activity: Within Unit (07/20/22 1203)  Activity Comments: [place] good safey transitioning from surface to surface. (07/20/22 1203)  Assistance Level:  Independent (07/20/22 1203)  Skilled Clinical Factors: mild antalgia, decreased heel strike on right (07/20/22 1203)  Stairs:  Stairs/Curb  Stairs?: Yes (07/12/22 1241)  Stairs  # Steps : 4 (07/12/22 1241)  Rails: Right ascending (07/12/22 1241)  Assistance: Stand by assistance (07/12/22 1241)  Comment: Step to pattern (07/12/22 1241)  Stairs - Level of Assistance: Supervision (07/16/22 4223)  Number of Stairs Trained: 24 (07/16/22 0927)  Stairs  Stair Height: 6'' (07/20/22 1203)  Device: One handrail (07/20/22 1203)  Number of Stairs: 12 (07/20/22 1203)  Additional Factors: Reciprocal going up;Reciprocal going down (07/20/22 1203)  Assistance Level: Independent (07/20/22 1203)  Skilled Clinical Factors: Good safety ascending increased difficulty descending requires increased effort to complete (07/15/22 1053)  Curb  Curb Height: 6'' (07/19/22 1028)  Number of Curbs: 1 (07/19/22 1028)  Assistance Level: Independent (07/19/22 1028)  W/C mobility:  Wheelchair Management  Wheelchair Management: No (07/13/22 1539)    Occupational Therapy:   Hand Dominance: Right  ADL  Feeding: Setup (07/12/22 1018)  Grooming: Modified independent  (07/12/22 1018)  Grooming Skilled Clinical Factors: oral hygiene, comb hair (07/12/22 1018)  UE Bathing: Supervision (07/12/22 1018)  UE Bathing Skilled Clinical Factors: cues to attend to all areas, wash hair, thoroughness (07/12/22 1018)  LE Bathing: Minimal assistance (07/12/22 1018)  LE Bathing Skilled Clinical Factors: assist to reach RLE and for posterior hygiene (07/12/22 1018)  UE Dressing Skilled Clinical Factors: gown only (07/12/22 1018)  LE Dressing: Minimal assistance (07/12/22 1018)  LE Dressing Skilled Clinical Factors: assist with R sock, pt declined to don pants/bref (07/12/22 1018)  Toileting: Minimal assistance (07/12/22 1018)  Additional Comments: Shower completed. Pt impulsive at times (07/12/22 1018)  Toilet Transfers  Toilet - Technique: Ambulating (07/12/22 1022)  Equipment Used: Grab bars (07/12/22 1022)  Toilet Transfer: Contact guard assistance (07/12/22 1022)     Shower Transfers  Shower - Transfer From: Lynnda Leventhal (07/12/22 1022)  Shower - Transfer Type: To and From (07/12/22 1022)  Shower - Transfer To:  Shower seat with back (07/12/22 1022)  Shower - Technique: Stand pivot (07/12/22 1022)  Shower Transfers: Contact Guard Negative. Impression: Round symmetric areas decreased attenuation bilateral globus pallidus. This finding may be seen in patients experiencing hypoxia, i.e., carbon monoxide poisoning. MRA HEAD : 7/2/2022    Visualized brain: Grossly unchanged from the recent MRI of the brain with multiple areas of restricted diffusion. Please refer to the recent MRI brain report. INTRACRANIAL MRA:    The visualized distal vertebral and basilar arteries are widely patent. The distal ICAs are patent and within normal limits of caliber. The proximal ACAs, MCAs and PCAs are patent and within normal limits of caliber and configuration. There is no evidence of focal,  significant stenosis or aneurysm in the visualized vessels. EXTRACRANIAL MRA: Carotid Stenosis: Right Common:  No significant stenosis. Right Internal Plaque:  No significant plaque formation. Right Internal Carotid Stenosis (% by NASCET Criteria):  0% Left Common:  No significant stenosis. Left Internal Carotid Plaque:  No significant plaque formation. Left Internal Carotid Stenosis (% by NASCET Criteria):  0% Cervical Vertebral Arteries: Patency:  Bilateral Dominance:  Left     Patent intracranial and extracranial circulation. XR CHEST   7/1/2022:    LEFT UPPER LUNG ATELECTASIS/PNEUMONIA. US ABDOMEN   7/5/2022    NEGATIVE RIGHT UPPER QUADRANT ULTRASOUND WITHOUT EVIDENCE OF CHOLELITHIASIS. US DUP LOWER EXTREMITY RIGHT ROBERT 7/3/2022 :    No acute DVT of the right lower extremity veins from the groin to the knee. No acute DVT of the right calf veins. MRA NECK 7/2/2022    Patent intracranial and extracranial circulation. MRI BRAIN  7/4/2022: Mildly increased edema in the areas of diffusion restriction within the bilateral globi pallidi, splenium of the corpus callosum, and supratentorial white since prior MRI July 1, 2022. No mass effect or midline shift. No acute intracranial hemorrhage. No enhancing mass or pathologic enhancement. date was set. Patient and family education was discussed. The patient was made aware of the team discussion regarding their progress. Discharge plans were discussed along with barriers to progress and strategies to address these barriers, patient encouraged to continue to discuss discharge plans with . 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 Functional and medical status   . Greatly status postacute rehab at [facility].  Bowel and Bladder dysfunction  , Neurogenic bowel and bladder:  frequent toileting, ambulate to bathroom with assistance, check post void residuals. Check for C.difficile x1 if >2 loose stools in 24 hours, continue bowel & bladder program.  Monitor bowel and bladder function. Lactinex 2 PO every AC. MOM prn, Brown Bomb prn, Glycerin suppository prn, enema prn. Encourage therapy and nursing to co-treat and problem solve re continence. Severe RLE pain as well as generalized OA pain: reassess pain every shift and prior to and after each therapy session, give prn Tylenol and consider scheduled Tylenol, modalities prn in therapy, masage, Lidoderm, K-pad prn. Consider scheduled AM pain meds. Skin healing   and breakdown risk:  continue pressure relief program.  Daily skin exams and reports from nursing. Fatigue due to nutritional and hydration deficiency: Add and titrate vitamin B12 vitamin D and CoQ10 continue to monitor I&Os, calorie counts prn, dietary consult prn. Add healthy snack at night. Acute episodic insomnia with situational adjustment disorder:  prn Ambien, monitor for day time sedation.   Falls risk elevated:  patient to use call light to get nursing assistance to get up, bed and chair alarm. Elevated DVT risk: progressive activities in PT, continue prophylaxis CHAR hose, elevation and Lovenox with goal to discontinue over the next few days. Complex discharge planning:  DC 22- to rehab at sub acute with substance abuse focus. Weekly team meeting every Thursday to re-assess progress towards goals, discuss and address social, psychological and medical comorbidities and to address difficulties they may be having progressing in therapy. Patient and family education is in progress. The patient is to follow-up with their family physician after discharge.    -however patient will also need a specialized sub acute rehab and combined drug recovery program after DC. Complex Active General Medical Issues that complicated  :      Drug abuse,   Polydrug dependence including opioid type drug with continuous use with complication -is aware of the implications that his drug use has had on his health and that he almost . He is planning on not using or overusing medications in the future. Altered mental status-status post drug overdose apparently was accidental overdose. Patient admits to taking 2 Xanax pills that he bought off on the street. Xanax in this area is notoriously laced with fentanyl. Like that he had inadvertent fentanyl overdose from the medication that he thought was Xanax that he bought on the street. 12-step program as advised. Vitamin D deficiency-Add high-dose vitamin D, recheck vitamin D level out after discharge,    Spasm of back muscles-lowest effective dose of muscle spasms  Cerebral edema with seizures-antiseizure medications consult neurology and steroid taper.     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 Liver toxicity likely secondary to polysubstance abuse-recheck LFTs as an outpatient avoid toxic medications    ADHD (attention deficit hyperactivity disorder)-lowest effective dose of stimulant medication    Anxiety/ROSALES (generalized anxiety disorder),   MDD (major depressive disorder), recurrent episode, moderate -emotional support provided daily, vitamin B12, encourage participation in rehabilitation support group and recreational therapy, adjust/add medications ( b Lexapro, Desyrel, Xanax and taper to lowest effective dose) Taper Deltasone-and taper Xanax under the direction of neurology and psychiatry. Baseline sinus tachycardia-  cardiology sinus tachycardia  -I reviewed-consult with cardiology and check thyroid and cortisol levels-Recenmt T4, K and Mag WNL. Heart rate seems to be somewhat better after starting verapamil-Obtain ECG. Will order Echo. LPRD (laryngopharyngeal reflux disease)-consult speech and language pathology    Shift work sleep disorder-patient may need to adjust his therapy times. Focus on coordinating dc plans with patient family and care givers and order necessary equipment. Discontinue telemetry if okay with cardiology. Also discontinue daily EKGs as we are preparing for discharge. Check with neurology regarding that a repeat MRI. Previously they noted -repeat MRI brain will be scheduled for 7/18/2022-looks like it was done but there are no results yet.         Electronically signed by Gordo Aden DO on 7/13/22 at 8:35 AM KATHY Rosa D.O., PM&R     Attending    [facility] Sandie Giang

## 2022-07-21 LAB
EKG ATRIAL RATE: 79 BPM
EKG ATRIAL RATE: 90 BPM
EKG P AXIS: 43 DEGREES
EKG P AXIS: 78 DEGREES
EKG P-R INTERVAL: 124 MS
EKG P-R INTERVAL: 142 MS
EKG Q-T INTERVAL: 374 MS
EKG Q-T INTERVAL: 378 MS
EKG QRS DURATION: 90 MS
EKG QRS DURATION: 90 MS
EKG QTC CALCULATION (BAZETT): 433 MS
EKG QTC CALCULATION (BAZETT): 457 MS
EKG R AXIS: 66 DEGREES
EKG R AXIS: 78 DEGREES
EKG T AXIS: 44 DEGREES
EKG T AXIS: 55 DEGREES
EKG VENTRICULAR RATE: 79 BPM
EKG VENTRICULAR RATE: 90 BPM

## 2022-07-21 PROCEDURE — 97116 GAIT TRAINING THERAPY: CPT

## 2022-07-21 PROCEDURE — 97130 THER IVNTJ EA ADDL 15 MIN: CPT

## 2022-07-21 PROCEDURE — 93010 ELECTROCARDIOGRAM REPORT: CPT | Performed by: INTERNAL MEDICINE

## 2022-07-21 PROCEDURE — 1180000000 HC REHAB R&B

## 2022-07-21 PROCEDURE — 97129 THER IVNTJ 1ST 15 MIN: CPT

## 2022-07-21 PROCEDURE — 97535 SELF CARE MNGMENT TRAINING: CPT

## 2022-07-21 PROCEDURE — 6370000000 HC RX 637 (ALT 250 FOR IP): Performed by: INTERNAL MEDICINE

## 2022-07-21 PROCEDURE — 99233 SBSQ HOSP IP/OBS HIGH 50: CPT | Performed by: PHYSICAL MEDICINE & REHABILITATION

## 2022-07-21 PROCEDURE — 97530 THERAPEUTIC ACTIVITIES: CPT

## 2022-07-21 PROCEDURE — 6370000000 HC RX 637 (ALT 250 FOR IP): Performed by: PHYSICAL MEDICINE & REHABILITATION

## 2022-07-21 PROCEDURE — 93005 ELECTROCARDIOGRAM TRACING: CPT | Performed by: INTERNAL MEDICINE

## 2022-07-21 PROCEDURE — 6370000000 HC RX 637 (ALT 250 FOR IP): Performed by: FAMILY MEDICINE

## 2022-07-21 RX ADMIN — ESCITALOPRAM OXALATE 20 MG: 20 TABLET ORAL at 09:09

## 2022-07-21 RX ADMIN — TRAZODONE HYDROCHLORIDE 50 MG: 50 TABLET ORAL at 20:50

## 2022-07-21 RX ADMIN — ALPRAZOLAM 0.5 MG: 0.25 TABLET ORAL at 09:11

## 2022-07-21 RX ADMIN — ZOLPIDEM TARTRATE 10 MG: 5 TABLET ORAL at 20:50

## 2022-07-21 RX ADMIN — PREDNISONE 5 MG: 5 TABLET ORAL at 09:09

## 2022-07-21 RX ADMIN — VERAPAMIL HYDROCHLORIDE 120 MG: 120 TABLET, FILM COATED, EXTENDED RELEASE ORAL at 20:50

## 2022-07-21 RX ADMIN — VERAPAMIL HYDROCHLORIDE 120 MG: 120 TABLET, FILM COATED, EXTENDED RELEASE ORAL at 09:09

## 2022-07-21 RX ADMIN — ALPRAZOLAM 0.5 MG: 0.25 TABLET ORAL at 20:50

## 2022-07-21 NOTE — PROGRESS NOTES
carryover and attention to signs after pt was assisted there the other day for community reintegration training. Pt with improved ability today to safely navigate the halls. No cues needed to find the coffee shop and then walk back to therapy gym     Education:   POC    ASSESSMENT:   Still awaiting precert for d/c to neuro restorative. Pt continues to participate well. Improving overall awareness, socialization, and problem solving noted today       PLAN OF CARE:  Strengthening, Balance training, Functional mobility training, Endurance training, Neuromuscular re-education, Safety education & training, Patient/Caregiver education & training, Equipment evaluation, education, & procurement, Self-Care / ADL, Home management training       Patient goals : \"Get home, get back to life, feel normal\"  Time Frame for Long term goals :  Within 1 weeks, pt to demo progress in the following areas listed below to achieve specific LTGs as stated in the evaluation  Long Term Goal 1: Pt will demo improved overall ADL/IADL status  Long Term Goal 2: Pt will demo improved standing balance and tolerance for self-care completion  Long Term Goal 3: Pt will demo improved activity tolerance for completion of ADL/IADL  Long Term Goal 4: Pt will demo improved overall safety and cognition in order to be able to care for himself with minimal assistance    Therapy Time:   Individual Group Co-Treat   Time In 0930       Time Out 1030         Minutes 60             ADL/IADL training: 10 minutes  Therapeutic activities: 50 minutes     Electronically signed by:    Teena Pedraza OT,   7/21/2022, 9:39 AM

## 2022-07-21 NOTE — PLAN OF CARE
Problem: Discharge Planning  Goal: Discharge to home or other facility with appropriate resources  7/20/2022 2150 by Janina Marino RN  Outcome: Progressing  Flowsheets (Taken 7/20/2022 1957)  Discharge to home or other facility with appropriate resources: Identify barriers to discharge with patient and caregiver  7/20/2022 1420 by Angelito Ochoa RN  Outcome: Progressing     Problem: Safety - Adult  Goal: Free from fall injury  7/20/2022 2150 by Janina Marino RN  Outcome: Progressing  7/20/2022 1420 by Angelito Ochoa RN  Outcome: Progressing     Problem: ABCDS Injury Assessment  Goal: Absence of physical injury  7/20/2022 2150 by Janina Marino RN  Outcome: Progressing  7/20/2022 1420 by Angelito Ochoa RN  Outcome: Progressing     Problem: Pain  Goal: Verbalizes/displays adequate comfort level or baseline comfort level  7/20/2022 2150 by Janina Marino RN  Outcome: Progressing  7/20/2022 1420 by Angelito Ochoa RN  Outcome: Progressing     Problem: Neurosensory - Adult  Goal: Achieves maximal functionality and self care  7/20/2022 2150 by Janina Marino RN  Outcome: Progressing  7/20/2022 1420 by Angelito Ochoa RN  Outcome: Progressing     Problem: Respiratory - Adult  Goal: Achieves optimal ventilation and oxygenation  7/20/2022 2150 by Janina Marino RN  Outcome: Progressing  7/20/2022 1420 by Angelito Ochoa RN  Outcome: Progressing     Problem: Cardiovascular - Adult  Goal: Maintains optimal cardiac output and hemodynamic stability  7/20/2022 2150 by Janina Marino RN  Outcome: Progressing  7/20/2022 1420 by Angelito Ochoa RN  Outcome: Progressing     Problem: Skin/Tissue Integrity - Adult  Goal: Skin integrity remains intact  7/20/2022 2150 by Janina Marino RN  Outcome: Progressing  Flowsheets (Taken 7/20/2022 1957)  Skin Integrity Remains Intact: Monitor for areas of redness and/or skin breakdown  7/20/2022 1420 by Angelito Ochoa RN  Outcome: Progressing     Problem: Musculoskeletal - Adult  Goal: Return mobility to safest level of function  7/20/2022 2150 by Laura Godwin RN  Outcome: Progressing  7/20/2022 1420 by Eduar Kennedy RN  Outcome: Progressing     Problem: Gastrointestinal - Adult  Goal: Maintains adequate nutritional intake  7/20/2022 2150 by Laura Godwin RN  Outcome: Progressing  7/20/2022 1420 by Eduar Kennedy RN  Outcome: Progressing     Problem: Genitourinary - Adult  Goal: Absence of urinary retention  7/20/2022 2150 by Laura Godwin RN  Outcome: Progressing  7/20/2022 1420 by Eduar Kennedy RN  Outcome: Progressing     Problem: Infection - Adult  Goal: Absence of infection at discharge  7/20/2022 2150 by Laura Godwin RN  Outcome: Progressing  7/20/2022 1420 by Eduar Kennedy RN  Outcome: Progressing     Problem: Metabolic/Fluid and Electrolytes - Adult  Goal: Electrolytes maintained within normal limits  7/20/2022 2150 by Laura Godwin RN  Outcome: Progressing  7/20/2022 1420 by Eduar Kennedy RN  Outcome: Progressing     Problem: Hematologic - Adult  Goal: Maintains hematologic stability  7/20/2022 2150 by Laura Godwin RN  Outcome: Progressing  7/20/2022 1420 by Eduar Kennedy RN  Outcome: Progressing

## 2022-07-21 NOTE — PROGRESS NOTES
See full assessment from 1957. With med pass at 2035 pt asking why no melatonin - explained was D/C'd this AM. Pt ok with that. Pt is independent and walks in the holder. Pt denies any pain.

## 2022-07-21 NOTE — PROGRESS NOTES
Subjective: The patient complains of severe acute on chronic progressive fatigue and ataxia, cognitive slowing, right lower extremity numbness and stiffness partially relieved by rest, medications, PT,  OT, medication titration SLP and rest and exacerbated by recent OD-he was admitted to [facility] on 6/30/2022  with change in mental status and history of possible overdose. Family did a well check after his boss noticed that he had not shown up to work for several days and called the family out of concern. He was initially admitted and to the ICU. He had abnormal liver enzymes, rhabdomyolysis kidney injury. Urine was positive for amphetamines benzos and fentanyl. He was found to also be using Royle kratom overuse, transaminitis, EDGAR, rhabdomyolysis resulting in provoked seizure and cerebral edema. He admits that he took 2 Xanax that he bought on the street. He and I both fear that it may have been laced with fentanyl. Discussed with treatment team and hospitalist and neuro--  MRIs spine were normal..      I have been concerned about patients medical complexities and barriers to advancing in rehab goals including right lower extremity stiffness secondary to sciatic neuropathy and nerve injury as well as his history of substance abuse and overuse of benzodiazepines. His cognition is greatly improved we were able to slowly transition him off of sedatives. He still runs tachycardic at times and is requiring Calan SR at dose of 120 mg. He has been supervised by cardiology. He is for discharge possibly today to his new substance abuse facility to also address cognitive changes he has after his overdose. We will go ahead and discontinue his Lovenox. Greater than 35 minutes was spent on coordinating patients discharge including follow-up care, medications and patient/family education.   Extended time needed because of the potential use of controlled medications are high risk medications and a high risk population individual.  Patient and family were instructed to use lowest effective dose of these medications and slowly titrate off over the next 2 to 4 weeks. They are not to combine opiates with sedatives. I reviewed her [facility] prescription monitoring service data sheets in hopes of eliminating polypharmacy and weaning to the lowest effective dose of pain medications and eliminating the concomitant use of benzodiazepines. I see no medications of concern. I see no habits of combining sedatives and narcotics. I reviewed current care and plans for further care with other rehab providers including nursing and case management. According to recent nursing note, \" With med pass at 2035 pt asking why no melatonin - explained was D/C'd this AM. Pt ok with that. Pt is independent and walks in the holder. Pt denies any pain\"       Discussed his elevated heart rate is back down I think that he can come off his telemetry if okay with cardiology. ROS x10: The patient also complains of severely impaired mobility and activities of daily living. Otherwise no new problems with vision, hearing, nose, mouth, throat, dermal, cardiovascular, GI, , pulmonary, musculoskeletal, psychiatric or neurological. See also Acute Rehab PM&R H&P. Vital signs:  /72   Pulse 88   Temp 98.6 °F (37 °C) (Oral)   Resp 17   Ht 5' 10\" (1.778 m)   Wt 160 lb 12.8 oz (72.9 kg)   SpO2 99%   BMI 23.07 kg/m²   I/O:   PO/Intake:  fair PO intake,  Adult reg  Diet thin liquids    Bowel:   continent   Bladder: continent   No need for schwartz  General:  Patient is well developed,   adequately nourished, and    well kempt. HEENT:    Pupils equal, hearing intact to loud voice, external inspection of ear and nose benign. Inspection of lips, tongue and gums benign  -cognitive slowing right lower extremity weakness and numbness. Musculoskeletal: No significant change in strength or tone. All joints stable. Inspection and palpation of digits and nails show no clubbing, cyanosis or inflammatory conditions. Neuro/Psychiatric: Affect: flat but pleasant. Alert and oriented to person, place and situation with   min cues. No significant change in deep tendon reflexes or sensation  Lungs:  Diminished, CTA-B. Respiration effort is   normal at rest.     Heart:   S1 = S2,   RRR. Abdomen:  Soft, non-tender, no enlargement of liver or spleen. Extremities:    lower extremity edema  -weakness right lower extremity decreased sciatic neuropathy. Skin:   Intact to general survey,      Rehabilitation:  Physical Therapy:   Bed mobility:  Bed mobility  Rolling to Left: Modified independent (07/12/22 1155)  Rolling to Right: Modified independent (07/12/22 1155)  Supine to Sit: Modified independent;Supervision (07/12/22 1155)  Sit to Supine: Modified independent;Supervision (07/12/22 1155)  Bed Mobility Comments: Mildly impulsive. (07/12/22 1155)  Bed Mobility Training  Bed Mobility Training: Yes (07/16/22 1400)  Overall Level of Assistance: Independent (07/16/22 0927)  Interventions: Demonstration (07/16/22 0927)  Rolling: Independent (07/16/22 0927)  Supine to Sit: Independent (07/16/22 0927)  Sit to Supine: Independent (07/16/22 0927)  Scooting: Independent (07/16/22 0927)  Roll Left  Assistance Level: Independent (07/20/22 1203)  Roll Right  Assistance Level: Independent (07/20/22 1203)  Sit to Supine  Assistance Level: Independent (07/20/22 1203)  Supine to Sit  Assistance Level: Independent (07/20/22 1203)  Scooting  Assistance Level: Independent (07/20/22 1203)  Transfers:  Transfers  Sit to Stand: Stand by assistance;Contact guard assistance (07/12/22 1156)  Stand to sit: Stand by assistance;Contact guard assistance (07/12/22 1156)  Bed to Chair: Stand by assistance;Contact guard assistance (07/12/22 1156)  Car Transfer: Stand by assistance;Contact guard assistance (07/12/22 1156)  Comment: Varied performance.  Pt with increased time to approach chair. Safe technique however inaccurate execution at times. 5XSTS = 10.7sec. (07/12/22 1156)  Transfer Training  Transfer Training: Yes (07/16/22 1400)  Overall Level of Assistance: Independent (07/16/22 1400)  Interventions: Safety awareness training (07/16/22 1400)  Sit to Stand: Independent;Supervision (07/16/22 1400)  Stand to Sit: Independent;Supervision (07/16/22 1400)  Stand Pivot Transfers: Supervision; Independent (07/16/22 1400)  Bed to Chair: Modified independent (07/16/22 1400)  Transfers  Surface: To chair with arms (07/15/22 1048)  Sit to Stand  Assistance Level: Independent (07/20/22 1203)  Skilled Clinical Factors: 5x STS = 6.59 sec (07/20/22 1203)  Stand to Sit  Assistance Level: Independent (07/20/22 1203)  Skilled Clinical Factors: Occasionally unstable to approach to chair (07/13/22 1214)  Bed To/From Chair  Assistance Level: Independent (07/20/22 1203)  Car Transfer  Assistance Level: Independent (07/20/22 1203)  Skilled Clinical Factors: Performed in family vehicle (07/15/22 1534)  Floor Transfer  Assistance Level: Independent (07/20/22 1203)  Skilled Clinical Factors: Demonstation provided for proper technique (07/15/22 1048)  Gait:   Ambulation  Surface: level tile (07/12/22 1241)  Device: No Device (07/12/22 1241)  Assistance: Stand by assistance;Contact guard assistance (07/12/22 1241)  Quality of Gait: Decreased speed and step length. Challenged with turns and obstacles. (07/12/22 1241)  Distance: 150ft; 75ft; multiple small distances within gym focusing on obstacles and turns. Gaze stabilization introduced.  (07/12/22 1241)  Gait Training: Yes (07/16/22 1400)  Overall Level of Assistance: Stand-by assistance (07/16/22 1400)  Distance (ft): 250 Feet (07/16/22 0927)  Assistive Device: Other (comment) (no device needed) (07/16/22 0306)  Interventions: Safety awareness training (07/16/22 1400)  Base of Support: Widened (07/16/22 1161)  Speed/Radha: Fluctuations (07/16/22 0927)  Step Length: Right shortened (07/16/22 0927)  Swing Pattern: Left asymmetrical (07/16/22 0927)  Gait Abnormalities: Altered arm swing (07/16/22 0927)  Rail Use: Left (07/16/22 0927)  Right Side Weight Bearing: As tolerated (07/16/22 1400)  Left Side Weight Bearing: As tolerated (07/16/22 1400)  Uneven Terrain - Level of Assistance: Stand-by assistance (07/16/22 0927)  Ambulation  Surface: Level surface; Uneven surface; Carpet; Ramp (07/20/22 1203)  Distance: 250 feet- distance not focus (07/20/22 1203)  Activity: Within Unit (07/20/22 1203)  Activity Comments: Reciprocalpattern good safey transitioning from surface to surface. (07/20/22 1203)  Assistance Level: Independent (07/20/22 1203)  Skilled Clinical Factors: mild antalgia, decreased heel strike on right (07/20/22 1203)  Stairs:  Stairs/Curb  Stairs?: Yes (07/12/22 1241)  Stairs  # Steps : 4 (07/12/22 1241)  Rails: Right ascending (07/12/22 1241)  Assistance: Stand by assistance (07/12/22 1241)  Comment: Step to pattern (07/12/22 1241)  Stairs - Level of Assistance: Supervision (07/16/22 0927)  Number of Stairs Trained: 24 (07/16/22 0927)  Stairs  Stair Height: 6'' (07/20/22 1203)  Device: One handrail (07/20/22 1203)  Number of Stairs: 12 (07/20/22 1203)  Additional Factors: Reciprocal going up;Reciprocal going down (07/20/22 1203)  Assistance Level: Independent (07/20/22 1203)  Skilled Clinical Factors: Good safety ascending increased difficulty descending requires increased effort to complete (07/15/22 1053)  Curb  Curb Height: 6'' (07/19/22 1028)  Number of Curbs: 1 (07/19/22 1028)  Assistance Level:  Independent (07/19/22 1028)  W/C mobility:  Wheelchair Management  Wheelchair Management: No (07/13/22 1539)    Occupational Therapy:   Hand Dominance: Right  ADL  Feeding: Setup (07/12/22 1018)  Grooming: Modified independent  (07/12/22 1018)  Grooming Skilled Clinical Factors: oral hygiene, comb hair (07/12/22 1018)  UE Bathing: Supervision (07/12/22 1018)  UE Bathing Skilled Clinical Factors: cues to attend to all areas, wash hair, thoroughness (07/12/22 1018)  LE Bathing: Minimal assistance (07/12/22 1018)  LE Bathing Skilled Clinical Factors: assist to reach RLE and for posterior hygiene (07/12/22 1018)  UE Dressing Skilled Clinical Factors: gown only (07/12/22 1018)  LE Dressing: Minimal assistance (07/12/22 1018)  LE Dressing Skilled Clinical Factors: assist with R sock, pt declined to don pants/bref (07/12/22 1018)  Toileting: Minimal assistance (07/12/22 1018)  Additional Comments: Shower completed. Pt impulsive at times (07/12/22 1018)  Toilet Transfers  Toilet - Technique: Ambulating (07/12/22 1022)  Equipment Used: Grab bars (07/12/22 1022)  Toilet Transfer: Contact guard assistance (07/12/22 1022)     Shower Transfers  Shower - Transfer From: Jase Rendon (07/12/22 1022)  Shower - Transfer Type: To and From (07/12/22 1022)  Shower - Transfer To: Shower seat with back (07/12/22 1022)  Shower - Technique: Stand pivot (07/12/22 1022)  Shower Transfers: Contact Guard (07/12/22 1022)    Speech Therapy:      Comprehension:  (Intermittent repetition of directions is required during tasks secondary to reduced recall and memory deficits)  Verbal Expression:  (Reduced utterance length has been noted during conversation. Improvement noted this date compared to 07/15/2022 session.  Pt presents with flat affect and high level naming deficits)  Diet/Swallow:        Dysphagia Outcome Severity Scale: Level 6: Within functional limits/Modified independence  Compensatory Swallowing Strategies : Small bites/sips, Upright as possible for all oral intake, Alternate solids and liquids          Lab/X-ray studies reviewed, analyzed and discussed with patient and staff:   Recent Results (from the past 24 hour(s))   EKG 12 Lead    Collection Time: 07/21/22  6:06 AM   Result Value Ref Range    Ventricular Rate 90 BPM    Atrial Rate 90 BPM    P-R Interval 142 ms QRS Duration 90 ms    Q-T Interval 374 ms    QTc Calculation (Bazett) 457 ms    P Axis 78 degrees    R Axis 78 degrees    T Axis 55 degrees     EEG was normal.    XR CHEST   7/4/2022:  No significant interval change of left upper lobe and left lower lobe infiltrate and/or atelectasis. XR HIP RIGHT   7/6/2022  No dislocations. No focal bony abnormalities                                                                                 NO ACUTE FRACTURE. CT Head   6/30/2022 Extra-axial spaces:  Normal. Intracranial hemorrhage:  None. Ventricular system: [Negative. Basal Cisterns:  Without anomaly. Cerebral Parenchyma: Bilateral, symmetric areas of decreased attenuation exerting no mass effect measuring approximately 9 mm in size since found within the bilateral globus pallidus (series 2, image 17). Midline Shift:  None. Cerebellum:  No anomaly identified. Paranasal sinuses and mastoid air cells:  No anomaly identified. Visualized Orbits:  Negative. Impression: Round symmetric areas decreased attenuation bilateral globus pallidus. This finding may be seen in patients experiencing hypoxia, i.e., carbon monoxide poisoning. MRA HEAD : 7/2/2022    Visualized brain: Grossly unchanged from the recent MRI of the brain with multiple areas of restricted diffusion. Please refer to the recent MRI brain report. INTRACRANIAL MRA:    The visualized distal vertebral and basilar arteries are widely patent. The distal ICAs are patent and within normal limits of caliber. The proximal ACAs, MCAs and PCAs are patent and within normal limits of caliber and configuration. There is no evidence of focal,  significant stenosis or aneurysm in the visualized vessels. EXTRACRANIAL MRA: Carotid Stenosis: Right Common:  No significant stenosis. Right Internal Plaque:  No significant plaque formation. Right Internal Carotid Stenosis (% by NASCET Criteria):  0% Left Common:  No significant stenosis.  Left Internal Carotid Plaque:  No significant plaque formation. Left Internal Carotid Stenosis (% by NASCET Criteria):  0% Cervical Vertebral Arteries: Patency:  Bilateral Dominance:  Left     Patent intracranial and extracranial circulation. XR CHEST   7/1/2022:  LEFT UPPER LUNG ATELECTASIS/PNEUMONIA. US ABDOMEN   7/5/2022    NEGATIVE RIGHT UPPER QUADRANT ULTRASOUND WITHOUT EVIDENCE OF CHOLELITHIASIS. US DUP LOWER EXTREMITY RIGHT ROBERT 7/3/2022 :  No acute DVT of the right lower extremity veins from the groin to the knee. No acute DVT of the right calf veins. MRA NECK 7/2/2022    Patent intracranial and extracranial circulation. MRI BRAIN  7/4/2022: Mildly increased edema in the areas of diffusion restriction within the bilateral globi pallidi, splenium of the corpus callosum, and supratentorial white since prior MRI July 1, 2022. No mass effect or midline shift. No acute intracranial hemorrhage. No enhancing mass or pathologic enhancement. Mildly increased edema in the areas of diffusion restriction within the bilateral globi pallidi, splenium of the corpus callosum, and supratentorial white since prior MRI July 1, 2022. MRI BRAIN   7/1/2022   Areas of restricted diffusion within the globus pallidus, supratentorial white matter and splenium of the corpus callosum are most consistent with carbon monoxide poisoning/hypoxemia and/or other toxic encephalopathy. There is no intracranial hemorrhage, mass effect, midline shift, extra-axial collection, significant cerebral volume loss or other complication identified. FINDINGS CONSISTENT WITH CARBON MONOXIDE POISONING/HYPOXEMIA AND/OR OTHER TOXIC ENCEPHALOPATHY. NO INTRACRANIAL HEMORRHAGE OR COMPLICATION IDENTIFIED. MRI CERVICAL THORACIC LUMBAR SPINE  7/7/2022      NEGATIVE MILDLY LIMITED CERVICAL, THORACIC AND LUMBAR SPINE MRI. Previous extensive, complex labs, notes and diagnostics reviewed and analyzed.      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 Functional and medical status   . Greatly status postacute rehab at [facility].  Bowel and Bladder dysfunction  , Neurogenic bowel and bladder:  frequent toileting, ambulate to bathroom with assistance, check post void residuals. Check for C.difficile x1 if >2 loose stools in 24 hours, continue bowel & bladder program.  Monitor bowel and bladder function. Lactinex 2 PO every AC. MOM prn, Brown Bomb prn, Glycerin suppository prn, enema prn. Encourage therapy and nursing to co-treat and problem solve re continence. Severe RLE pain as well as generalized OA pain: reassess pain every shift and prior to and after each therapy session, give prn Tylenol and consider scheduled Tylenol, modalities prn in therapy, masage, Lidoderm, K-pad prn. Consider scheduled AM pain meds. Skin healing   and breakdown risk:  continue pressure relief program.  Daily skin exams and reports from nursing. Fatigue due to nutritional and hydration deficiency: Add and titrate vitamin B12 vitamin D and CoQ10 continue to monitor I&Os, calorie counts prn, dietary consult prn. Add healthy snack at night. Acute episodic insomnia with situational adjustment disorder:  prn Ambien, monitor for day time sedation.   Falls risk elevated:  patient to use call light to get nursing assistance to get up, bed monitor pulmonary status. Liver toxicity likely secondary to polysubstance abuse-recheck LFTs as an outpatient avoid toxic medications    ADHD (attention deficit hyperactivity disorder)-lowest effective dose of stimulant medication    Anxiety/ROSALES (generalized anxiety disorder),   MDD (major depressive disorder), recurrent episode, moderate -emotional support provided daily, vitamin B12, encourage participation in rehabilitation support group and recreational therapy, adjust/add medications ( b Lexapro, Desyrel, Xanax and taper to lowest effective dose) Taper Deltasone-and taper Xanax under the direction of neurology and psychiatry. Baseline sinus tachycardia-  cardiology sinus tachycardia  -I reviewed-consult with cardiology and check thyroid and cortisol levels-Recenmt T4, K and Mag WNL. Heart rate seems to be somewhat better after starting verapamil-Obtain ECG. Will order Echo. LPRD (laryngopharyngeal reflux disease)-consult speech and language pathology    Shift work sleep disorder-patient may need to adjust his therapy times. Focus on coordinating dc plans with patient family and care givers and order necessary equipment. Discontinue telemetry if okay with cardiology. Also discontinue daily EKGs as we are preparing for discharge. Check with neurology regarding that a repeat MRI. Previously they noted -repeat MRI brain will be scheduled for 7/18/2022-looks like it was done but there are no results yet.         Electronically signed by Mina Lea DO on 7/13/22 at 8:35 AM KATHY Whelan D.O., PM&R     Attending    [address]

## 2022-07-21 NOTE — PROGRESS NOTES
Hospitalist Progress Note      PCP: YING Otoole CNP    Date of Admission: 7/11/2022    Subjective: :  Patient admitted on 6/30 after he was found unresponsive in the motel room 2/2 suspected drug overdose. He had rhabdomyolysis, abnormal LFTs 2/2 ischemic hepatitis and ischemic encephalopathy. He was discharged to rehab on 7/11/22    Interval history:   Patient seen and examined on follow-up. Seen during PT session. Discharge tentatively planned for today. Medications:  Reviewed    Infusion Medications       Scheduled Medications    verapamil  120 mg Oral BID    escitalopram  20 mg Oral Daily    zolpidem  10 mg Oral Nightly    predniSONE  5 mg Oral Daily    traZODone  50 mg Oral Nightly     PRN Meds: acetaminophen, lidocaine, ALPRAZolam, ondansetron **OR** [DISCONTINUED] ondansetron, ibuprofen    No intake or output data in the 24 hours ending 07/21/22 1144      Exam:    /72   Pulse 88   Temp 98.6 °F (37 °C) (Oral)   Resp 17   Ht 5' 10\" (1.778 m)   Wt 160 lb 12.8 oz (72.9 kg)   SpO2 99%   BMI 23.07 kg/m²     General appearance: No apparent distress, sitting up in chair  HEENT: Conjunctivae/corneas clear. Neck: Supple, with full range of motion. No jugular venous distention. Trachea midline. Respiratory:  Normal respiratory effort. Clear to auscultation, bilaterally without Rales/Wheezes/Rhonchi. Cardiovascular: Regular rate and rhythm with normal S1/S2 without murmurs, rubs or gallops. Abdomen: Soft, non-tender, non-distended with normal bowel sounds. Musculoskeletal: No clubbing, cyanosis or edema bilaterally. Neuro: Non Focal.  Alert, moving all extremities      Labs:   No results for input(s): WBC, HGB, HCT, PLT in the last 72 hours. No results for input(s): NA, K, CL, CO2, BUN, CREATININE, CALCIUM, PHOS in the last 72 hours. Invalid input(s): MAGNES  No results for input(s): AST, ALT, BILIDIR, BILITOT, ALKPHOS in the last 72 hours.     No results for input(s): INR in the last 72 hours. Recent Labs     07/20/22  0436   CKTOTAL 216*       Urinalysis:      Lab Results   Component Value Date/Time    NITRU Negative 06/30/2022 04:00 PM    WBCUA 3-5 06/30/2022 04:00 PM    BACTERIA Negative 06/30/2022 04:00 PM    RBCUA 3-5 06/30/2022 04:00 PM    BLOODU LARGE 06/30/2022 04:00 PM    SPECGRAV 1.033 06/30/2022 04:00 PM    GLUCOSEU Negative 06/30/2022 04:00 PM       Radiology:  MRI BRAIN W WO CONTRAST   Final Result      When compared to previous studies there is persistent signal abnormality and enhancement in the bilateral globi pallidi which has been present on the previous examination. There is been interval improvement of the previously noted edema. There are no new lesions.                   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 [address] [F19.10]      Priority: Medium    Shift work sleep disorder [G47.26] 07/15/2019    ROSALES (generalized anxiety disorder) [F41.1] 06/04/2019    MDD (major depressive disorder), recurrent episode, moderate ([facility] [address] [F33.1] 06/04/2019    Polydrug dependence including opioid type drug with continuous use with complication [facility]) [A03.19] 06/04/2019    Anxiety [F41.9] 07/13/2015    ADHD (attention deficit hyperactivity disorder) [F90.9] 12/05/2012    LPRD (laryngopharyngeal reflux disease) [K21.9] 10/19/2011        Hypoxic ischemic encephalopathy 2/2 status epilepticus with polysubstance use-MRI of the brain showed findings consistent with hypoxic encephalopathy. EEG was normal.  He is on prednisone for cerebral edema. management per neurology. Repeat MRI of the brain showed improving cerebral edema. Rhabdomyolysis-CK has improved. Abnormal LFTs, transaminitis-likely 2/2 ischemic hepatitis. LFTs are much improved. Tachycardia-continue verapamil. Cardiology following. Echo pending  ADHD-previously on Adderall. Will defer to primary team, psychiatry, neurology      Diet: ADULT DIET;  Regular    Code Status: Full Code      Electronically signed by YING Hernandez NP on 7/21/2022 at 11:44 AM

## 2022-07-21 NOTE — CARE COORDINATION
Spoke with Devante Swain and the precert is still pending.  Updated team.  Electronically signed by Narayan Bowen RN on 7/21/22 at 4:38 PM EDT

## 2022-07-21 NOTE — PLAN OF CARE
Problem: Discharge Planning  Goal: Discharge to home or other facility with appropriate resources  7/21/2022 1147 by Miladis Ortiz RN  Outcome: Progressing  7/20/2022 2150 by Tammy Faith RN  Outcome: Progressing  Flowsheets (Taken 7/20/2022 1957)  Discharge to home or other facility with appropriate resources: Identify barriers to discharge with patient and caregiver     Problem: Safety - Adult  Goal: Free from fall injury  7/21/2022 1147 by Miladis Ortiz RN  Outcome: Progressing  7/20/2022 2150 by Tammy Faith RN  Outcome: Progressing     Problem: ABCDS Injury Assessment  Goal: Absence of physical injury  7/21/2022 1147 by Miladis Ortiz RN  Outcome: Progressing  7/20/2022 2150 by Tammy Faith RN  Outcome: Progressing     Problem: Pain  Goal: Verbalizes/displays adequate comfort level or baseline comfort level  7/21/2022 1147 by Miladis Ortiz RN  Outcome: Progressing  7/20/2022 2150 by Tammy Faith RN  Outcome: Progressing     Problem: Neurosensory - Adult  Goal: Achieves maximal functionality and self care  7/21/2022 1147 by Miladis Ortiz RN  Outcome: Progressing  7/20/2022 2150 by Tammy Faith RN  Outcome: Progressing     Problem: Respiratory - Adult  Goal: Achieves optimal ventilation and oxygenation  7/21/2022 1147 by Miladis Ortiz RN  Outcome: Progressing  7/20/2022 2150 by Tammy Faith RN  Outcome: Progressing     Problem: Cardiovascular - Adult  Goal: Maintains optimal cardiac output and hemodynamic stability  7/21/2022 1147 by Miladis Ortiz RN  Outcome: Progressing  7/20/2022 2150 by Tammy Faith RN  Outcome: Progressing     Problem: Skin/Tissue Integrity - Adult  Goal: Skin integrity remains intact  7/21/2022 1147 by Miladis Ortiz RN  Outcome: Progressing  Flowsheets (Taken 7/20/2022 2151 by Tammy Faith RN)  Skin Integrity Remains Intact: Monitor for areas of redness and/or skin breakdown  7/20/2022 2150 by Ana Lilia Castrejon

## 2022-07-21 NOTE — PROGRESS NOTES
Physical Therapy Rehab Treatment Note  Facility/Department: [facility]  Room: R242/R242-01       NAME: Kiki Shukla  : 1990 (33 y.o.)  MRN: [id number]  CODE STATUS: Full Code    Date of Service: 2022       Restrictions:  Restrictions/Precautions: Fall Risk       SUBJECTIVE:   Subjective: \" I guess I am going to [place]\"    Pain  Pain: Denies pain pre and post session\"      OBJECTIVE:     Bed Mobility  Overall Assistance Level: Independent  Roll Left  Assistance Level: Independent  Roll Right  Assistance Level: Independent  Sit to Supine  Assistance Level: Independent  Supine to Sit  Assistance Level: Independent  Scooting  Assistance Level: Independent    Sit to Stand  Assistance Level: Independent  Stand to Sit  Assistance Level: Independent  Bed To/From Chair  Assistance Level: Independent  Car Transfer  Assistance Level: Independent  Floor Transfer  Assistance Level: Independent    Ambulation  Surface: Level surface; Uneven surface; Carpet; Ramp  Distance: 1000'+ x 2  Activity: Within Unit  Activity Comments: Reciprocalpattern good safey transitioning from surface to surface. Focus on path finding skills returning to  Assistance Level: Independent  Skilled Clinical Factors: mild antalgia, decreased heel strike on right    Stairs  Stair Height: 8''  Device: One handrail  Number of Stairs: 12  Additional Factors: Reciprocal going up;Reciprocal going down  Assistance Level: Independent    ASSESSMENT/PROGRESS TOWARDS GOALS:   Assessment  Assessment: Patient continues to meet all goals. Good ability to complete path finding throughout hospital and return to therapy gym. Good safety noted with floor transfer. Activity Tolerance: Patient tolerated treatment well  Discharge Recommendations: Outpatient PT; Home independently    Goals:  Long Term Goals  Long term goal 1: Pt to complete bed mobility indep  Long term goal 2: Pt to complete transfers with indep (bed/chair/car)  Long term goal 3: Pt to ambulate >300ft indoor/outdoor without AD indep  Long term goal 4: Pt to achieve 20/24 DGI  Long term goal 5: Pt to complete 5XSTS in 8sec    PLAN OF CARE/Safety:   Safety Devices  Type of Devices:  All fall risk precautions in place      Therapy Time:   Individual   Time In 1100   Time Out 1130   Minutes 30     Minutes: 30  Transfer/Bed mobility training: 10  Gait trainin [place] Shari [place], PTA, 22 at 12:15 PM

## 2022-07-21 NOTE — PROGRESS NOTES
OCCUPATIONAL THERAPY  INPATIENT REHAB TREATMENT NOTE  [facility]      NAME: Matilde Ramos  : 1990 (33 y.o.)  MRN: [id number]  CODE STATUS: Full Code  Room: I587/R214-88    Date of Service: 2022    Referring Physician: Dr. Patel Holm Diagnosis: Imp ADLs d/t hypoxic encephalopathy s/p overdose likely secondary to fentanyl laced xanax pill    Restrictions  Restrictions/Precautions  Restrictions/Precautions: Fall Risk       Patient's date of birth confirmed: Yes    SAFETY:  Safety Devices  Safety Devices in place: Yes  Type of devices: All fall risk precautions in place    SUBJECTIVE:  Subjective: I had pizza  Pain: no pain    OBJECTIVE:     Functional Mobility  Device:  (no AD)  Activity: To/From therapy gym;Transport items; Retrieve items  Assistance Level: Independent    ADLs- pt requesting PM shower. *Improved planning today. Pt placed rowels and washclothes in needed areas prior to    Grooming: Independent standing at sink   Dressing: UB- indep, LB- SUP. Pt requesting assistance with donning shorts. Cued pt to sit down and don using ernestina dressing technique (pt able to then complete without assistance  Bathing: Modified independent using shower chair     With instruction- pt able to pickup all dirty towels, plan in bag, and take to laundry services with SUP      Patient engaged in cognitive and visual perceptual activity to increase [place] with ADL/IADL completion. Pt disassembled a 14 piece model car and was then required to reassemble from visual memory. Pt able to reassemble the car with 100% accuracy however required increased time to complete. Car made of several different types of materials of different shapes/textures. Pt required to manage circular wooden wheels, screws, wing nuts, wooden blocks, and pieces of metal.    Pt required pt to select, match, and assemble botls, wing nuts, and wooden blocks with holes into 3D structure.  Pt required to follow visual model presented on piece of paper and required to replicate it. Pt with fair ability to manage all pieces    Patient with no difficulty inserting bolts into blocks. Patient with MIN difficulty threading wing nuts onto bolts. Patient noted to require 1 verbal cues throughout activity. Patient with rest breaks as needed. Patient required assistance to put structure into correct positioning to resemble visual model    Engaged pt in reflection on progression with therapy and goal setting to identify patients thoughts on strengths and weaknesses  3 things pt believes he has improved- coordination, balance, problem solving  3 things he has learned- memory strategies, sock aide, exercises   3 things he wants to improve on- talking to people, completing home making tasks, not walking with a limp     While seated, challenged strength, activity tolerance, ROM, and flexibility for improved ADL performance. Completed BUE reciprocal exercises using ergometer arm bike with moderate resistance. Pt tolerated 3 mins x 2 sets. Fatigue noted with activity. Education:   POC, exercises, general safety    ASSESSMENT:  Good participation. Impulsive behaviors demo'ed at times. Spoke to patients father just prior to session- who reported no word yet regarding patients insurance and pre-cert for neuro restorative. Pt does continue to improve. Plan for ADL session tomorrow AM     PLAN OF CARE:  Strengthening, Balance training, Functional mobility training, Endurance training, Neuromuscular re-education, Safety education & training, Patient/Caregiver education & training, Equipment evaluation, education, & procurement, Self-Care / ADL, Home management training     Patient goals : \"Get home, get back to life, feel normal\"  Time Frame for Long term goals :  Within 1 weeks, pt to demo progress in the following areas listed below to achieve specific LTGs as stated in the evaluation  Long Term Goal 1: Pt will demo improved overall ADL/IADL status  Long Term Goal 2: Pt will demo improved standing balance and tolerance for self-care completion  Long Term Goal 3: Pt will demo improved activity tolerance for completion of ADL/IADL  Long Term Goal 4: Pt will demo improved overall safety and cognition in order to be able to care for himself with minimal assistance    Therapy Time:   Individual Group Co-Treat   Time In 1330       Time Out 1430         Minutes 60         ADL/IADL trainin minutes  Therapeutic activities: 15 minutes  Cognitive Retraining: 15 minutes     Electronically signed by:    Bushra Collins OT,   2022, 1:35 PM

## 2022-07-21 NOTE — PROGRESS NOTES
[facility]  Facility/Department: [facility]  Speech Language Pathology   Treatment Note          Justa Barakat  1990  [id number]  [x]   confirmed    Date: 2022    Rehab Diagnosis: Impaired mobility and ADL's due to hypoxic encephalopathy. [facility] admit [facility]      Restrictions/Precautions: Fall Risk    Weight: 160 lb 12.8 oz (72.9 kg)     ADULT DIET; Regular    SpO2: 99 % (22)  No active isolations    Speech Dx: Cognitive Linguistic Impairment    Subjective:  Alert, Cooperative, and Pleasant        Interventions used this date:  Cognitive Skill Development    Objective/Assessment:  Patient progressing towards goals:  Short-term Goals  Timeframe for Short-term Goals: 2-3 weeks  Goal 1: To increase safety awareness and judgment for safe completion of ADLs secondary to pt's cognitive deficits, pt will complete abstract reasoning tasks (i.e. Word deduction, convergent and divergent naming, similarities/differences) with 80% accuracy and min cues. Patient completed What doesn't belong task with category naming with the following accuracy  What doesn't [place]% I accuracy  Category: I 100% accuracy    Goal 2: To address pt's cognitive deficits and promote recall of personal and medical information, pt will answer questions addressing (recent, delayed) recall with 80% accuracy and fading cues. Patient and ST discussed memory strategies to improve recall at discharge including a notebook, sticky notes, thinking out loud, asking caregivers to repeat info, pill organizer, and placing important items in the same location. Goal 3: To increase safety awareness and judgment for safe completion of ADLs secondary to pt's cognitive deficits,  pt will complete high level problem solving tasks related to ADLs (e.g. medication, time, finance management) with 80% accuracy and fading cues. Patient completed mixed math task on the Ipad I with 100% accuracy.     Patient completed high level shopping mall map activity I with 40% accuracy, with mod cueing 70% accuracy, with min cueing 80% accuracy. Treatment/Activity Tolerance:  Patient tolerated treatment well    Plan:  Continue per POC    Pain Assessment:  Patient does not c/o pain. Pain Re-assessment:  Patient does not c/o pain. Patient/Caregiver Education:  Patient educated on session and progression towards goals. Safety Devices:   Independent in the room      Speech Therapy Level of Assistance Scale    MEMORY  Rating:  Minimal Assistance-Moderate Assistance          Therapy Time  SLP Individual Minutes  Time In: 1030  Time Out: 1100  Minutes: 30              Signature: Electronically signed by SOPHIE Cuenca on 7/21/2022 at 11:11 AM

## 2022-07-21 NOTE — PROGRESS NOTES
Nutrition Assessment     Type and Reason for Visit: Reassess    Nutrition Recommendations/Plan:   Continue regular diet     Malnutrition Assessment:  Malnutrition Status: No malnutrition    Nutrition Assessment:  Pt continues to be stable from a nutritional standpoint, with verbalized good appetite/noted good intake at meals, with no nutritional complaints. Nutrition Related Findings:   PMH-ADHD, MDD, substance abuse. Labs/meds reviewed. Pt and family at bedside reports good intakes. % consumed for lunch and pt eating snacks at bedside. Pt denies nutritional concerns at this time. Wound Type: None    Current Nutrition Therapies:    ADULT DIET; Regular    Anthropometric Measures:  Height: 5' 10\" (177.8 cm)  Current Body Wt: 160 lb (72.6 kg)   BMI: 23    Nutrition Diagnosis:   No nutrition diagnosis at this time    Nutrition Interventions:   Food and/or Nutrient Delivery: Continue Current Diet  Nutrition Education/Counseling: Education not indicated  Coordination of Nutrition Care: Continue to monitor while inpatient       Goals:     Goals: Meet at least 75% of estimated needs, PO intake 50% or greater       Nutrition Monitoring and Evaluation:   Behavioral-Environmental Outcomes: None Identified  Food/Nutrient Intake Outcomes: Food and Nutrient Intake  Physical Signs/Symptoms Outcomes: Biochemical Data, Meal Time Behavior, Weight    Discharge Planning:     Too soon to determine     Michell Saavedra RD, LD

## 2022-07-22 LAB
EKG ATRIAL RATE: 90 BPM
EKG P AXIS: 19 DEGREES
EKG P-R INTERVAL: 130 MS
EKG Q-T INTERVAL: 372 MS
EKG QRS DURATION: 90 MS
EKG QTC CALCULATION (BAZETT): 455 MS
EKG R AXIS: 60 DEGREES
EKG T AXIS: 32 DEGREES
EKG VENTRICULAR RATE: 90 BPM

## 2022-07-22 PROCEDURE — 97129 THER IVNTJ 1ST 15 MIN: CPT

## 2022-07-22 PROCEDURE — 6370000000 HC RX 637 (ALT 250 FOR IP): Performed by: INTERNAL MEDICINE

## 2022-07-22 PROCEDURE — 6370000000 HC RX 637 (ALT 250 FOR IP): Performed by: PHYSICAL MEDICINE & REHABILITATION

## 2022-07-22 PROCEDURE — 97116 GAIT TRAINING THERAPY: CPT

## 2022-07-22 PROCEDURE — 97130 THER IVNTJ EA ADDL 15 MIN: CPT

## 2022-07-22 PROCEDURE — 97112 NEUROMUSCULAR REEDUCATION: CPT

## 2022-07-22 PROCEDURE — APPSS15 APP SPLIT SHARED TIME 0-15 MINUTES: Performed by: NURSE PRACTITIONER

## 2022-07-22 PROCEDURE — 93010 ELECTROCARDIOGRAM REPORT: CPT | Performed by: INTERNAL MEDICINE

## 2022-07-22 PROCEDURE — 99233 SBSQ HOSP IP/OBS HIGH 50: CPT | Performed by: PSYCHIATRY & NEUROLOGY

## 2022-07-22 PROCEDURE — 6370000000 HC RX 637 (ALT 250 FOR IP): Performed by: FAMILY MEDICINE

## 2022-07-22 PROCEDURE — 97535 SELF CARE MNGMENT TRAINING: CPT

## 2022-07-22 PROCEDURE — 1180000000 HC REHAB R&B

## 2022-07-22 PROCEDURE — 93005 ELECTROCARDIOGRAM TRACING: CPT | Performed by: INTERNAL MEDICINE

## 2022-07-22 PROCEDURE — 99232 SBSQ HOSP IP/OBS MODERATE 35: CPT | Performed by: PHYSICAL MEDICINE & REHABILITATION

## 2022-07-22 PROCEDURE — 97530 THERAPEUTIC ACTIVITIES: CPT

## 2022-07-22 RX ORDER — ALPRAZOLAM 0.25 MG/1
0.25 TABLET ORAL EVERY 12 HOURS PRN
Qty: 6 TABLET | Refills: 0 | Status: SHIPPED | OUTPATIENT
Start: 2022-07-22 | End: 2022-07-25

## 2022-07-22 RX ORDER — ALPRAZOLAM 0.25 MG/1
0.25 TABLET ORAL EVERY 12 HOURS PRN
Status: DISCONTINUED | OUTPATIENT
Start: 2022-07-22 | End: 2022-07-25 | Stop reason: HOSPADM

## 2022-07-22 RX ADMIN — ALPRAZOLAM 0.5 MG: 0.25 TABLET ORAL at 08:57

## 2022-07-22 RX ADMIN — ESCITALOPRAM OXALATE 20 MG: 20 TABLET ORAL at 08:55

## 2022-07-22 RX ADMIN — TRAZODONE HYDROCHLORIDE 50 MG: 50 TABLET ORAL at 20:51

## 2022-07-22 RX ADMIN — VERAPAMIL HYDROCHLORIDE 120 MG: 120 TABLET, FILM COATED, EXTENDED RELEASE ORAL at 20:52

## 2022-07-22 RX ADMIN — ALPRAZOLAM 0.25 MG: 0.25 TABLET ORAL at 20:54

## 2022-07-22 RX ADMIN — ZOLPIDEM TARTRATE 10 MG: 5 TABLET ORAL at 20:53

## 2022-07-22 RX ADMIN — PREDNISONE 5 MG: 5 TABLET ORAL at 08:55

## 2022-07-22 RX ADMIN — VERAPAMIL HYDROCHLORIDE 120 MG: 120 TABLET, FILM COATED, EXTENDED RELEASE ORAL at 08:55

## 2022-07-22 ASSESSMENT — ENCOUNTER SYMPTOMS
NAUSEA: 0
COLOR CHANGE: 0
COUGH: 0
SHORTNESS OF BREATH: 0
VOMITING: 0
TROUBLE SWALLOWING: 0
ABDOMINAL PAIN: 0
WHEEZING: 0
CHEST TIGHTNESS: 0

## 2022-07-22 NOTE — PROGRESS NOTES
Subjective: The patient complains of severe acute on chronic progressive fatigue and ataxia, cognitive slowing, right lower extremity numbness and stiffness partially relieved by rest, medications, PT,  OT, medication titration SLP and rest and exacerbated by recent OD-he was admitted to [facility] on 6/30/2022  with change in mental status and history of possible overdose. Family did a well check after his boss noticed that he had not shown up to work for several days and called the family out of concern. He was initially admitted and to the ICU. He had abnormal liver enzymes, rhabdomyolysis kidney injury. Urine was positive for amphetamines benzos and fentanyl. He was found to also be using Royle kratom overuse, transaminitis, EDGAR, rhabdomyolysis resulting in provoked seizure and cerebral edema. He admits that he took 2 Xanax that he bought on the street. He and I both fear that it may have been laced with fentanyl. Discussed with treatment team and hospitalist and neuro--  MRIs spine were normal..      I have been concerned about patients medical complexities and barriers to advancing in rehab goals including right lower extremity stiffness secondary to sciatic neuropathy and nerve injury as well as his history of substance abuse and overuse of benzodiazepines. His cognition is greatly improved we were able to slowly transition him off of sedatives. He still runs tachycardic at times and is requiring Calan SR at dose of 120 mg. He has been supervised by cardiology. He is for discharge possibly today to his new substance abuse facility to also address cognitive changes he has after his overdose. We will go ahead and discontinue his Lovenox. Greater than 35 minutes was spent on coordinating patients discharge including follow-up care, medications and patient/family education.   Extended time needed because of the potential use of controlled medications are high risk medications and a high risk population individual.  Patient and family were instructed to use lowest effective dose of these medications and slowly titrate off over the next 2 to 4 weeks. They are not to combine opiates with sedatives. Is been pending and awaiting approval for his insurance for his skilled rehab that would incorporate substance abuse issues. I have discussed and agreed with nursing, patient, psychiatry will continue to wean off of benzodiazepines completely and will continue to avoid stimulant in the form of the ADHD medications which she has been off of. I reviewed her [address] prescription monitoring service data sheets in hopes of eliminating polypharmacy and weaning to the lowest effective dose of pain medications and eliminating the concomitant use of benzodiazepines. I see no medications of concern. I see no habits of combining sedatives and narcotics. I reviewed current care and plans for further care with other rehab providers including nursing and case management. According to recent nursing note, \" With med pass at 2035 pt asking why no melatonin - explained was D/C'd this AM. Pt ok with that. Pt is independent and walks in the holder. Pt denies any pain\"       Discussed his elevated heart rate is back down I think that he can come off his telemetry if okay with cardiology. ROS x10: The patient also complains of severely impaired mobility and activities of daily living. Otherwise no new problems with vision, hearing, nose, mouth, throat, dermal, cardiovascular, GI, , pulmonary, musculoskeletal, psychiatric or neurological. See also Acute Rehab PM&R H&P.        Vital signs:  /76   Pulse 97   Temp 97.5 °F (36.4 °C) (Oral)   Resp 18   Ht 5' 10\" (1.778 m)   Wt 160 lb 12.8 oz (72.9 kg)   SpO2 100%   BMI 23.07 kg/m²   I/O:   PO/Intake:  fair PO intake,  Adult reg  Diet thin liquids    Bowel:   continent   Bladder: continent   No need for schwartz  General:  Patient is 1119)  Supine to Sit  Assistance Level: Independent (07/21/22 1119)  Scooting  Assistance Level: Independent (07/21/22 1119)  Transfers:  Transfers  Sit to Stand: Stand by assistance;Contact guard assistance (07/12/22 1156)  Stand to sit: Stand by assistance;Contact guard assistance (07/12/22 1156)  Bed to Chair: Stand by assistance;Contact guard assistance (07/12/22 1156)  Car Transfer: Stand by assistance;Contact guard assistance (07/12/22 1156)  Comment: Varied performance. Pt with increased time to approach chair. Safe technique however inaccurate execution at times. 5XSTS = 10.7sec. (07/12/22 1156)  Transfer Training  Transfer Training: Yes (07/16/22 1400)  Overall Level of Assistance: Independent (07/16/22 1400)  Interventions: Safety awareness training (07/16/22 1400)  Sit to Stand: Independent;Supervision (07/16/22 1400)  Stand to Sit: Independent;Supervision (07/16/22 1400)  Stand Pivot Transfers: Supervision; Independent (07/16/22 1400)  Bed to Chair: Modified independent (07/16/22 1400)  Transfers  Surface: To chair with arms (07/15/22 1048)  Sit to Stand  Assistance Level: Independent (07/21/22 1119)  Skilled Clinical Factors: 5x STS = 6.59 sec (07/20/22 1203)  Stand to Sit  Assistance Level: Independent (07/21/22 1119)  Skilled Clinical Factors: Occasionally unstable to approach to chair (07/13/22 1214)  Bed To/From Chair  Assistance Level: Independent (07/21/22 1119)  Car Transfer  Assistance Level: Independent (07/21/22 1119)  Skilled Clinical Factors: Performed in family vehicle (07/15/22 1534)  Floor Transfer  Assistance Level: Independent (07/21/22 1119)  Skilled Clinical Factors: Demonstation provided for proper technique (07/15/22 1048)  Gait:   Ambulation  Surface: level tile (07/12/22 1241)  Device: No Device (07/12/22 1241)  Assistance: Stand by assistance;Contact guard assistance (07/12/22 1241)  Quality of Gait: Decreased speed and step length. Challenged with turns and obstacles.  (07/12/22 [id number])  Distance: 150ft; 75ft; multiple small distances within gym focusing on obstacles and turns. Gaze stabilization introduced. (07/12/22 1241)  Gait Training: Yes (07/16/22 1400)  Overall Level of Assistance: Stand-by assistance (07/16/22 1400)  Distance (ft): 250 Feet (07/16/22 0927)  Assistive Device: Other (comment) (no device needed) (07/16/22 0927)  Interventions: Safety awareness training (07/16/22 1400)  Base of Support: Widened (07/16/22 0927)  Speed/Radha: Fluctuations (07/16/22 0927)  Step Length: Right shortened (07/16/22 0927)  Swing Pattern: Left asymmetrical (07/16/22 0927)  Gait Abnormalities: Altered arm swing (07/16/22 0927)  Rail Use: Left (07/16/22 0927)  Right Side Weight Bearing: As tolerated (07/16/22 1400)  Left Side Weight Bearing: As tolerated (07/16/22 1400)  Uneven Terrain - Level of Assistance: Stand-by assistance (07/16/22 0927)  Ambulation  Surface: Level surface; Uneven surface; Carpet; Ramp (07/21/22 1122)  Distance: 1000'+ x 2 (07/21/22 1122)  Activity: Within Unit (07/21/22 1122)  Activity Comments: Reciprocalpattern good safey transitioning from surface to surface. Focus on path finding skills returning to (07/21/22 1122)  Assistance Level: Independent (07/21/22 1122)  Skilled Clinical Factors: mild antalgia, decreased heel strike on right (07/21/22 1122)  Stairs:  Stairs/Curb  Stairs?: Yes (07/12/22 1241)  Stairs  # Steps : 4 (07/12/22 1241)  Rails: Right ascending (07/12/22 1241)  Assistance: Stand by assistance (07/12/22 1241)  Comment: Step to pattern (07/12/22 1241)  Stairs - Level of Assistance: Supervision (07/16/22 9497)  Number of Stairs Trained: 24 (07/16/22 0927)  Stairs  Stair Height: 8'' (07/21/22 1122)  Device: One handrail (07/21/22 1122)  Number of Stairs: 12 (07/21/22 1122)  Additional Factors: Reciprocal going up;Reciprocal going down (07/21/22 1122)  Assistance Level:  Independent (07/21/22 1122)  Skilled Clinical Factors: Good safety ascending increased difficulty descending requires increased effort to complete (07/15/22 1053)  Curb  Curb Height: 6'' (07/19/22 1028)  Number of Curbs: 1 (07/19/22 1028)  Assistance Level: Independent (07/19/22 1028)  W/C mobility:  Wheelchair Management  Wheelchair Management: No (07/13/22 1539)    Occupational Therapy:   Hand Dominance: Right  ADL  Feeding: Setup (07/12/22 1018)  Grooming: Modified independent  (07/12/22 1018)  Grooming Skilled Clinical Factors: oral hygiene, comb hair (07/12/22 1018)  UE Bathing: Supervision (07/12/22 1018)  UE Bathing Skilled Clinical Factors: cues to attend to all areas, wash hair, thoroughness (07/12/22 1018)  LE Bathing: Minimal assistance (07/12/22 1018)  LE Bathing Skilled Clinical Factors: assist to reach RLE and for posterior hygiene (07/12/22 1018)  UE Dressing Skilled Clinical Factors: gown only (07/12/22 1018)  LE Dressing: Minimal assistance (07/12/22 1018)  LE Dressing Skilled Clinical Factors: assist with R sock, pt declined to don pants/bref (07/12/22 1018)  Toileting: Minimal assistance (07/12/22 1018)  Additional Comments: Shower completed. Pt impulsive at times (07/12/22 1018)  Toilet Transfers  Toilet - Technique: Ambulating (07/12/22 1022)  Equipment Used: Grab bars (07/12/22 1022)  Toilet Transfer: Contact guard assistance (07/12/22 1022)     Shower Transfers  Shower - Transfer From: Karlienda Leventhal (07/12/22 1022)  Shower - Transfer Type: To and From (07/12/22 1022)  Shower - Transfer To: Shower seat with back (07/12/22 1022)  Shower - Technique: Stand pivot (07/12/22 1022)  Shower Transfers: Contact Guard (07/12/22 1022)    Speech Therapy:      Comprehension:  (Intermittent repetition of directions is required during tasks secondary to reduced recall and memory deficits)  Verbal Expression:  (Reduced utterance length has been noted during conversation. Improvement noted this date compared to 07/15/2022 session.  Pt presents with flat affect and high level naming deficits)  Diet/Swallow:        Dysphagia Outcome Severity Scale: Level 6: Within functional limits/Modified independence  Compensatory Swallowing Strategies : Small bites/sips, Upright as possible for all oral intake, Alternate solids and liquids          Lab/X-ray studies reviewed, analyzed and discussed with patient and staff:   Recent Results (from the past 24 hour(s))   EKG 12 Lead    Collection Time: 07/22/22  5:38 AM   Result Value Ref Range    Ventricular Rate 90 BPM    Atrial Rate 90 BPM    P-R Interval 130 ms    QRS Duration 90 ms    Q-T Interval 372 ms    QTc Calculation (Bazett) 455 ms    P Axis 19 degrees    R Axis 60 degrees    T Axis 32 degrees     EEG was normal.    XR CHEST   7/4/2022:  No significant interval change of left upper lobe and left lower lobe infiltrate and/or atelectasis. XR HIP RIGHT   7/6/2022  No dislocations. No focal bony abnormalities                                                                                 NO ACUTE FRACTURE. CT Head   6/30/2022 Extra-axial spaces:  Normal. Intracranial hemorrhage:  None. Ventricular system: [Negative. Basal Cisterns:  Without anomaly. Cerebral Parenchyma: Bilateral, symmetric areas of decreased attenuation exerting no mass effect measuring approximately 9 mm in size since found within the bilateral globus pallidus (series 2, image 17). Midline Shift:  None. Cerebellum:  No anomaly identified. Paranasal sinuses and mastoid air cells:  No anomaly identified. Visualized Orbits:  Negative. Impression: Round symmetric areas decreased attenuation bilateral globus pallidus. This finding may be seen in patients experiencing hypoxia, i.e., carbon monoxide poisoning. MRA HEAD : 7/2/2022    Visualized brain: Grossly unchanged from the recent MRI of the brain with multiple areas of restricted diffusion. Please refer to the recent MRI brain report.  INTRACRANIAL MRA:    The visualized distal vertebral and basilar arteries are widely patent. The distal ICAs are patent and within normal limits of caliber. The proximal ACAs, MCAs and PCAs are patent and within normal limits of caliber and configuration. There is no evidence of focal,  significant stenosis or aneurysm in the visualized vessels. EXTRACRANIAL MRA: Carotid Stenosis: Right Common:  No significant stenosis. Right Internal Plaque:  No significant plaque formation. Right Internal Carotid Stenosis (% by NASCET Criteria):  0% Left Common:  No significant stenosis. Left Internal Carotid Plaque:  No significant plaque formation. Left Internal Carotid Stenosis (% by NASCET Criteria):  0% Cervical Vertebral Arteries: Patency:  Bilateral Dominance:  Left     Patent intracranial and extracranial circulation. XR CHEST   7/1/2022:  LEFT UPPER LUNG ATELECTASIS/PNEUMONIA. US ABDOMEN   7/5/2022    NEGATIVE RIGHT UPPER QUADRANT ULTRASOUND WITHOUT EVIDENCE OF CHOLELITHIASIS. US DUP LOWER EXTREMITY RIGHT ROBERT 7/3/2022 :  No acute DVT of the right lower extremity veins from the groin to the knee. No acute DVT of the right calf veins. MRA NECK 7/2/2022    Patent intracranial and extracranial circulation. MRI BRAIN  7/4/2022: Mildly increased edema in the areas of diffusion restriction within the bilateral globi pallidi, splenium of the corpus callosum, and supratentorial white since prior MRI July 1, 2022. No mass effect or midline shift. No acute intracranial hemorrhage. No enhancing mass or pathologic enhancement. Mildly increased edema in the areas of diffusion restriction within the bilateral globi pallidi, splenium of the corpus callosum, and supratentorial white since prior MRI July 1, 2022. MRI BRAIN   7/1/2022   Areas of restricted diffusion within the globus pallidus, supratentorial white matter and splenium of the corpus callosum are most consistent with carbon monoxide poisoning/hypoxemia and/or other toxic encephalopathy.  There is no intracranial hemorrhage, mass effect, midline shift, extra-axial collection, significant cerebral volume loss or other complication identified. FINDINGS CONSISTENT WITH CARBON MONOXIDE POISONING/HYPOXEMIA AND/OR OTHER TOXIC ENCEPHALOPATHY. NO INTRACRANIAL HEMORRHAGE OR COMPLICATION IDENTIFIED. MRI CERVICAL THORACIC LUMBAR SPINE  7/7/2022      NEGATIVE MILDLY LIMITED CERVICAL, THORACIC AND LUMBAR SPINE MRI. Previous extensive, complex labs, notes and diagnostics reviewed and analyzed. 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 Functional and medical status   . Greatly status postacute rehab at [facility].  Bowel and Bladder dysfunction  , Neurogenic bowel and bladder:  frequent toileting, ambulate to bathroom with assistance, check post void residuals. Check for C.difficile x1 if >2 loose stools in 24 hours, continue bowel & bladder program.  Monitor bowel and bladder function. Lactinex 2 PO every AC. MOM prn, Brown Bomb prn, Glycerin suppository prn, enema prn. Encourage therapy and nursing to co-treat and problem solve re continence. Severe RLE pain as well as generalized OA pain: reassess pain every shift and prior to and after each therapy session, give prn Tylenol and consider scheduled Tylenol, modalities prn in therapy, masage, Lidoderm, K-pad prn.  Consider scheduled AM pain meds.  Skin healing   and breakdown risk:  continue pressure relief program.  Daily skin exams and reports from nursing. Fatigue due to nutritional and hydration deficiency: Add and titrate vitamin B12 vitamin D and CoQ10 continue to monitor I&Os, calorie counts prn, dietary consult prn. Add healthy snack at night. Acute episodic insomnia with situational adjustment disorder:  prn Ambien, monitor for day time sedation. Falls risk elevated:  patient to use call light to get nursing assistance to get up, bed and chair alarm. Elevated DVT risk: progressive activities in PT, continue prophylaxis CHAR hose, elevation and Lovenox with goal to discontinue over the next few days. Complex discharge planning:  DC 22-pending insurance approval  to rehab at sub acute with substance abuse focus. Weekly team meeting every Thursday to re-assess progress towards goals, discuss and address social, psychological and medical comorbidities and to address difficulties they may be having progressing in therapy. Patient and family education is in progress. The patient is to follow-up with their family physician after discharge.    -however patient will also need a specialized sub acute rehab and combined drug recovery program after DC. Complex Active General Medical Issues that complicated  :      Drug abuse,   Polydrug dependence including opioid type drug with continuous use with complication -is aware of the implications that his drug use has had on his health and that he almost . He is planning on not using or overusing medications in the future. Altered mental status-status post drug overdose apparently was accidental overdose. Patient admits to taking 2 Xanax pills that he bought off on the street. Xanax in this area is notoriously laced with fentanyl. Like that he had inadvertent fentanyl overdose from the medication that he thought was Xanax that he bought on the street. 12-step program as advised. Vitamin D deficiency-Add high-dose vitamin D, recheck vitamin D level out after discharge,    Spasm of back muscles-lowest effective dose of muscle spasms  Cerebral edema with seizures-antiseizure medications consult neurology and steroid taper. 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 Liver toxicity likely secondary to polysubstance abuse-recheck LFTs as an outpatient avoid toxic medications    ADHD (attention deficit hyperactivity disorder)-lowest effective dose of stimulant medication    Anxiety/ROSALES (generalized anxiety disorder),   MDD (major depressive disorder), recurrent episode, moderate -emotional support provided daily, vitamin B12, encourage participation in rehabilitation support group and recreational therapy, adjust/add medications ( b Lexapro, Desyrel, Xanax and taper to lowest effective dose) Taper Deltasone-and taper Xanax under the direction of neurology and psychiatry. Baseline sinus tachycardia-  cardiology sinus tachycardia  -I reviewed-consult with cardiology and check thyroid and cortisol levels-Recenmt T4, K and Mag WNL. Heart rate seems to be somewhat better after starting verapamil-Obtain ECG. Will order Echo. LPRD (laryngopharyngeal reflux disease)-consult speech and language pathology    Shift work sleep disorder-patient may need to adjust his therapy times. Focus on coordinating dc plans with patient family and care givers and order necessary equipment. Discontinue telemetry if okay with cardiology. Also discontinue daily EKGs as we are preparing for discharge. Check with neurology regarding that a repeat MRI.   Previously they noted -repeat MRI brain will be scheduled for 7/18/2022-looks like it was done but there are no results yet.         Electronically signed by Sridhar Rosa DO on 7/13/22 at 8:35 AM EDT       Luna Burks D.O., PM&R     Attending    [address]

## 2022-07-22 NOTE — PROGRESS NOTES
OCCUPATIONAL THERAPY  INPATIENT REHAB TREATMENT NOTE  [facility]      NAME: Destiny Moscoso  : 1990 (27 y.o.)  MRN: [id number]  CODE STATUS: Full Code  Room: C567/F559-32    Date of Service: 2022    Referring Physician: Dr. Cherelle Stevenson Diagnosis: Imp ADLs d/t hypoxic encephalopathy s/p overdose likely secondary to fentanyl laced xanax pill    Restrictions  Restrictions/Precautions  Restrictions/Precautions: Fall Risk              Patient's date of birth confirmed: Yes    SAFETY:  Safety Devices  Safety Devices in place: Yes  Type of devices: All fall risk precautions in place    SUBJECTIVE:  Subjective: \" I was supposed to leave two days ago. \"  Pain: no pain    Pain at start of treatment: No    Pain at end of treatment: No    Location:   Nursing notified: Not Applicable  RN:   Intervention: None    COGNITION:  Orientation  Overall Orientation Status: Within Functional Limits  Orientation Level: Oriented to place;Oriented to time;Oriented to person  Cognition  Overall Cognitive Status: Exceptions  Arousal/Alertness: Delayed responses to stimuli  Following Commands: Follows one step commands consistently  Attention Span: Attends with cues to redirect  Memory: Decreased recall of recent events  Safety Judgement: Decreased awareness of need for safety  Insights: Decreased awareness of deficits  Initiation: Requires cues for some          OBJECTIVE:     Pt provided with dollar and change worksheet with easier amounts ranging from 1.25 and less. Pt instructed to place how many quarters, dimes, nickels and pennies it would take to make the amount shown. Pt did very good with all amounts, and got 1 out of 16 wrong by 1 juani. Pt then was provided with a \"check\" worksheet. Pt was asked if he makes checks out, and pt replied he does not. Pt was asked if he knows how to complete a check if he needed to complete one, and pt replied, \" I do a little bit. \"  Therefore, to continue progressing with life management skills, pt was taught how to complete a check, and did so after one was completed in front of him. It was suggested to pt to make sure he writes larger so there isnt any question of the check is legible to decrease the risk of a bank possibly not cashing it. Pt was then given 36 questions on safety and \"what you do in this situation\" worksheet. Pt read through the questions out loud, and then provided an answer to each question to the best of his ability. Pt demonstrates decreased safety with knowing what to do in the event if a person he does not know appears and is acting suspicious. Pt stated he would approach them and ask them what they are doing; pt was educated to call the police (this was in reference to 2 questions). Pt was also asked what he would do if he got bit by a dog? Pt answered he would call 911, and if the bite was not that bad, to place a band-aid on it. Pt was advised to go by the maliciousness of the bite. If it not bad and can be driven to the ER to go there to have it properly assessed, but if it is bad, then yes, call 911. Pt was given info to take away with him through these question and answer problems in hopes they will assist him in life if he comes up to any of the situations discussed. Pt completed all of these tasks to improve pt reasoning and problem solving ability and increase functional re-entry into society to improve safety and Ind with functional task performance skills.                            Education: see note above       Equipment recommendations: n/a         ASSESSMENT:  Assessment: Pt demonstrates a flat effect during therapy session (does not smile much), and through cognitive activity demonstrates decrese safety  Activity Tolerance: Patient tolerated treatment well      PLAN OF CARE:  Strengthening, Balance training, Functional mobility training, Endurance training, Neuromuscular re-education, Safety education & training, Patient/Caregiver education & training, Equipment evaluation, education, & procurement, Self-Care / ADL, Home management training  Continue POC    Patient goals : \"Get home, get back to life, feel normal\"  Time Frame for Long term goals : Within 1 weeks, pt to demo progress in the following areas listed below to achieve specific LTGs as stated in the evaluation  Long Term Goal 1: Pt will demo improved overall ADL/IADL status  Long Term Goal 2: Pt will demo improved standing balance and tolerance for self-care completion  Long Term Goal 3: Pt will demo improved activity tolerance for completion of ADL/IADL  Long Term Goal 4: Pt will demo improved overall safety and cognition in order to be able to care for himself with minimal assistance        Therapy Time:   Individual Group Co-Treat   Time In 1400       Time Out 1500         Minutes 60                   Cognitive Retrainin minutes     Electronically signed by:     NATHANIEL León,   2022, 3:12 PM

## 2022-07-22 NOTE — PROGRESS NOTES
Physical Therapy Rehab Treatment Note  Facility/Department: [facility]  Room: [facility]/X891-16       NAME: Sergio Santana  : 1990 (27 y.o.)  MRN: [id number]  CODE STATUS: Full Code    Date of Service: 2022       Restrictions:  Restrictions/Precautions: Fall Risk       SUBJECTIVE:   Subjective: \"Today [birth year] my birthday\"    Pain  Pain: Denies pain pre and post session      OBJECTIVE:     Outcomes Measures:  Timed Up and Go: 4.66 (with carrying glass of water)        Bed Mobility  Overall Assistance Level: Independent    Sit to Stand  Assistance Level: Independent  Stand to Sit  Assistance Level: Independent  Bed To/From Chair  Technique: Stand step  Assistance Level: Independent    Ambulation  Surface: Level surface; Uneven surface; Carpet; Ramp  Distance: 1000'+  Activity: Within Unit  Activity Comments: Reciprocalpattern good safey transitioning from surface to surface. Assistance Level: Independent  Skilled Clinical Factors: mild antalgia, decreased heel strike on right    Stairs  Stair Height: 6''  Device: One handrail  Number of Stairs: 12  Additional Factors: Reciprocal going up;Reciprocal going down  Assistance Level: Independent    Neuromuscular Education  Neuromuscular Education: Yes  Neuromuscular Comments: 4 square step test  with cognitive task 5.97 seconds, gait ladder naming colors and numbers while stepping 2 feet per square No lob with all activities    ASSESSMENT/PROGRESS TOWARDS GOALS:   Assessment  Assessment: Patient with no LOB with 4 quare test and gait ladder tasks. Patient continues to show good safety with gait and stair negotiation  Activity Tolerance: Patient tolerated treatment well  Discharge Recommendations: Outpatient PT; Home independently    Goals:  Long Term Goals  Long term goal 1: Pt to complete bed mobility indep  Long term goal 2: Pt to complete transfers with indep (bed/chair/car)  Long term goal 3: Pt to ambulate >300ft indoor/outdoor without AD indep  Long term goal 4: Pt to achieve 20/24 DGI  Long term goal 5: Pt to complete 5XSTS in [address]:          Therapy Time:   Individual   Time In 1100   Time Out 1130   Minutes 30     Minutes: 30  Transfer/Bed mobility trainin  Gait training: 10  Neuro re education: [address], [place], 22 at 11:53 AM

## 2022-07-22 NOTE — PLAN OF CARE
Problem: Discharge Planning  Goal: Discharge to home or other facility with appropriate resources  7/21/2022 2220 by Cindy Vann RN  Outcome: Progressing  7/21/2022 1147 by Danielle Bell RN  Outcome: Progressing     Problem: Safety - Adult  Goal: Free from fall injury  7/21/2022 2220 by Cindy Vann RN  Outcome: Progressing  7/21/2022 1147 by Danielle Bell RN  Outcome: Progressing     Problem: ABCDS Injury Assessment  Goal: Absence of physical injury  7/21/2022 2220 by Cindy Vann RN  Outcome: Progressing  7/21/2022 1147 by Danielle Bell RN  Outcome: Progressing     Problem: Pain  Goal: Verbalizes/displays adequate comfort level or baseline comfort level  7/21/2022 2220 by Cindy Vann RN  Outcome: Progressing  7/21/2022 1147 by Danielle Bell RN  Outcome: Progressing     Problem: Neurosensory - Adult  Goal: Achieves maximal functionality and self care  7/21/2022 2220 by Cindy Vann RN  Outcome: Progressing  7/21/2022 1147 by Danielle Bell RN  Outcome: Progressing     Problem: Respiratory - Adult  Goal: Achieves optimal ventilation and oxygenation  7/21/2022 2220 by Cindy Vann RN  Outcome: Progressing  7/21/2022 1147 by Danielle Bell RN  Outcome: Progressing     Problem: Cardiovascular - Adult  Goal: Maintains optimal cardiac output and hemodynamic stability  7/21/2022 2220 by Cindy Vann RN  Outcome: Progressing  7/21/2022 1147 by Danielle Bell RN  Outcome: Progressing     Problem: Skin/Tissue Integrity - Adult  Goal: Skin integrity remains intact  7/21/2022 2220 by Cindy Vann RN  Outcome: Progressing  7/21/2022 1147 by Danielle Bell RN  Outcome: Progressing  Flowsheets (Taken 7/20/2022 2151 by Cindy Vann RN)  Skin Integrity Remains Intact: Monitor for areas of redness and/or skin breakdown     Problem: Musculoskeletal - Adult  Goal: Return mobility to safest level of function  7/21/2022 2220 by Cindy Vann RN  Outcome: Progressing  7/21/2022 1147 by Mary Briceño RN  Outcome: Progressing     Problem: Gastrointestinal - Adult  Goal: Maintains adequate nutritional intake  7/21/2022 2220 by Jeevan Dobbins RN  Outcome: Progressing  7/21/2022 1147 by Mary Briceño RN  Outcome: Progressing     Problem: Genitourinary - Adult  Goal: Absence of urinary retention  7/21/2022 2220 by Jeevan Dobbins RN  Outcome: Progressing  7/21/2022 1147 by Mary Briceño RN  Outcome: Progressing     Problem: Infection - Adult  Goal: Absence of infection at discharge  7/21/2022 2220 by Jeevan Dobbins RN  Outcome: Progressing  7/21/2022 1147 by Mary Briceño RN  Outcome: Progressing     Problem: Metabolic/Fluid and Electrolytes - Adult  Goal: Electrolytes maintained within normal limits  7/21/2022 2220 by Jeevan Dobbins RN  Outcome: Progressing  7/21/2022 1147 by Mary Briceño RN  Outcome: Progressing     Problem: Hematologic - Adult  Goal: Maintains hematologic stability  7/21/2022 2220 by Jeevan Dobbins RN  Outcome: Progressing  7/21/2022 1147 by Mary Briceño RN  Outcome: Progressing

## 2022-07-22 NOTE — PROGRESS NOTES
Patient asleep easily awaken for 0600 meds- medicated with tylenol for pain of \"7\" though pt looks comfortable  no tremors noted. Eyes reclosed.

## 2022-07-22 NOTE — PROGRESS NOTES
[facility] Neurology Daily Progress Note  Name: Natalie Solomon  Age: 27 y.o. Gender: male  CodeStatus: Full Code  Allergies: Peanut-Containing Drug Products  Nuts [Macadamia Nut Oil]  Peanut Oil  Shellfish-Derived Products    Chief Complaint:No chief complaint on file. Primary Care Provider: YING Arechiga CNP  InpatientTreatment Team: Treatment Team: Attending Provider: Gordo Aden DO; Consulting Physician: Lauryn Reyes MD; Consulting Physician: Rommel Dave, PhD; Consulting Physician: Halle Ayers MD; Consulting Physician: Cathy Hatchet, DO; Registered Nurse: Richard Mcclure RN; Occupational Therapist Assistant: NATHANIEL Sigala  Admission Date: 7/11/2022      HPI   Pt seen and examined on rehab unit for neurology follow-up. Currently alert and oriented x3, no acute distress, cooperative. Still with some concentration issues and forgetfulness. Right lower extremity weakness and numbness improved. Weakness of dorsiflexion resolved. Repeat MRI of the brain shows improving cerebral edema. No new focal deficits. No seizure activity. Rehab evaluations noted. MRI of the brain was reviewed and shows decreasing edema. He still has some cognitive issues which may require treatment. Vitals:    07/22/22 0802   BP: (!) 107/58   Pulse: 97   Resp: 17   Temp: 97.3 °F (36.3 °C)   SpO2: 99%      Review of Systems   Constitutional:  Negative for fatigue and fever. HENT:  Negative for hearing loss and trouble swallowing. Eyes:  Negative for visual disturbance. Respiratory:  Negative for cough, chest tightness, shortness of breath and wheezing. Cardiovascular:  Negative for chest pain, palpitations and leg swelling. Gastrointestinal:  Negative for abdominal pain, nausea and vomiting. Skin:  Negative for color change and rash.    Neurological:  Negative for dizziness, tremors, seizures, syncope, facial asymmetry, speech difficulty, weakness, light-headedness, numbness and headaches. Psychiatric/Behavioral:  Positive for decreased concentration. Negative for agitation, confusion and hallucinations. The patient is not nervous/anxious. Physical Exam  Vitals and nursing note reviewed. Constitutional:       General: He is not in acute distress. Appearance: He is not diaphoretic. HENT:      Head: Normocephalic and atraumatic. Eyes:      General: No visual field deficit. Extraocular Movements: Extraocular movements intact. Pupils: Pupils are equal, round, and reactive to light. Cardiovascular:      Rate and Rhythm: Normal rate and regular rhythm. Pulmonary:      Effort: Pulmonary effort is normal. No respiratory distress. Breath sounds: Normal breath sounds. Abdominal:      General: Bowel sounds are normal. There is no distension. Palpations: Abdomen is soft. Tenderness: There is no abdominal tenderness. Skin:     General: Skin is warm and dry. Neurological:      Mental Status: He is alert and oriented to person, place, and time. Cranial Nerves: No cranial nerve deficit, dysarthria or facial asymmetry. Motor: Weakness present. No tremor, atrophy, abnormal muscle tone, seizure activity or pronator drift. Coordination: Coordination normal.      Deep Tendon Reflexes: Reflexes abnormal.      Reflex Scores:       Patellar reflexes are 3+ on the right side and 3+ on the left side. Exam as noted above and evaluation of rehab      Medications:  Reviewed    Infusion Medications:       Scheduled Medications:    verapamil  120 mg Oral BID    escitalopram  20 mg Oral Daily    zolpidem  10 mg Oral Nightly    predniSONE  5 mg Oral Daily    traZODone  50 mg Oral Nightly     PRN Meds: ALPRAZolam, acetaminophen, lidocaine, ondansetron **OR** [DISCONTINUED] ondansetron, ibuprofen    Labs:   No results for input(s): WBC, HGB, HCT, PLT in the last 72 hours.   No results for input(s): NA, K, CL, CO2, BUN, CREATININE, CALCIUM, PHOS in the last 72 hours.    Invalid input(s): MAGNES  No results for input(s): AST, ALT, BILIDIR, BILITOT, ALKPHOS in the last 72 hours. No results for input(s): INR in the last 72 hours. Recent Labs     07/20/22  0436   CKTOTAL 216*         Urinalysis:   Lab Results   Component Value Date/Time    NITRU Negative 06/30/2022 04:00 PM    WBCUA 3-5 06/30/2022 04:00 PM    BACTERIA Negative 06/30/2022 04:00 PM    RBCUA 3-5 06/30/2022 04:00 PM    BLOODU LARGE 06/30/2022 04:00 PM    SPECGRAV 1.033 06/30/2022 04:00 PM    GLUCOSEU Negative 06/30/2022 04:00 PM       Radiology:   Most recent    EEG No valid procedures specified. MRI of Brain Results for orders placed during the hospital encounter of 06/30/22    MRI BRAIN W WO CONTRAST    Narrative  EXAM: MRI of the brain without and with contrast    History: Hypoxic ischemic encephalopathy. Technique: Multiplanar multisequence MRI of the brain was performed without and with contrast.    Comparison: MRI of the brain July 1, 2022    Findings:    Mildly increased edema in the areas of diffusion restriction within the bilateral globi pallidi, splenium of the corpus callosum, and supratentorial white since prior MRI July 1, 2022. No mass effect or midline shift. No acute intracranial hemorrhage. No enhancing mass or pathologic enhancement. Impression  Mildly increased edema in the areas of diffusion restriction within the bilateral globi pallidi, splenium of the corpus callosum, and supratentorial white since prior MRI July 1, 2022. Results for orders placed during the hospital encounter of 06/30/22    MRI BRAIN WO CONTRAST    Narrative  MRI BRAIN WO CONTRAST : 7/1/2022    CLINICAL HISTORY:  stroke . COMPARISON: Head CT 6/30/2022.     TECHNIQUE: Multiplanar MR imaging of the head was performed without contrast.      FINDINGS:    Areas of restricted diffusion within the globus pallidus, supratentorial white matter and splenium of the corpus callosum are most consistent with carbon monoxide poisoning/hypoxemia and/or other toxic encephalopathy. There is no intracranial hemorrhage, mass effect, midline shift, extra-axial collection, significant cerebral volume loss or other complication identified. Impression  FINDINGS CONSISTENT WITH CARBON MONOXIDE POISONING/HYPOXEMIA AND/OR OTHER TOXIC ENCEPHALOPATHY. NO INTRACRANIAL HEMORRHAGE OR COMPLICATION IDENTIFIED. MRA of the Head and Neck: No results found for this or any previous visit. No results found for this or any previous visit. Results for orders placed during the hospital encounter of 06/30/22    MRA HEAD WO CONTRAST    Narrative  MRA HEAD WO CONTRAST, MRA NECK WO CONTRAST    HISTORY:  Drug abuse. Altered mental status. COMPARISON: MRI brain 7/1/2022. CT head 6/30/2022. TECHNIQUE: Routine MRA of the head without contrast with 3-D time-of-flight images and dedicated reconstructions. Routine MRA of the neck with 3-D and 2-D time-of-flight images and dedicated reconstructions. RESULT:    Visualized brain: Grossly unchanged from the recent MRI of the brain with multiple areas of restricted diffusion. Please refer to the recent MRI brain report. INTRACRANIAL MRA:    The visualized distal vertebral and basilar arteries are widely patent. The distal ICAs are patent and within normal limits of caliber. The proximal ACAs, MCAs and PCAs are patent and within normal limits of caliber and configuration. There is no  evidence of focal,  significant stenosis or aneurysm in the visualized vessels. EXTRACRANIAL MRA:    Carotid Stenosis:    Right Common:  No significant stenosis. Right Internal Plaque:  No significant plaque formation. Right Internal Carotid Stenosis (% by NASCET Criteria):  0%    Left Common:  No significant stenosis. Left Internal Carotid Plaque:  No significant plaque formation.   Left Internal Carotid Stenosis (% by NASCET Criteria):  0%    Cervical Vertebral Arteries:    Patency:  Bilateral  Dominance:  Left    Impression  Patent intracranial and extracranial circulation. CT of the Head: Results for orders placed during the hospital encounter of 06/30/22    CT Head WO Contrast    Narrative  CT Brain. Contrast medium:  without contrast.. History: Altered mental status. Technical factors: CT imaging of the brain was obtained and formatted as 5 mm contiguous axial images. 2.5 mm contiguous axial images were obtained through the osseous structures. Sagittal and coronal reconstruction obtained during postprocessing. Comparison:  None. Findings:    Extra-axial spaces:  Normal.    Intracranial hemorrhage:  None. Ventricular system: [Negative. Basal Cisterns:  Without anomaly. Cerebral Parenchyma: Bilateral, symmetric areas of decreased attenuation exerting no mass effect measuring approximately 9 mm in size since found within the bilateral globus pallidus (series 2, image 17). Midline Shift:  None. Cerebellum:  No anomaly identified. Paranasal sinuses and mastoid air cells:  No anomaly identified. Visualized Orbits:  Negative. Impression  Impression:    Round symmetric areas decreased attenuation bilateral globus pallidus. This finding may be seen in patients experiencing hypoxia, i.e., carbon monoxide poisoning. All CT scans at this facility use dose modulation, iterative reconstruction, and/or weight based dosing when appropriate to reduce radiation dose to as low as reasonably achievable. No results found for this or any previous visit. No results found for this or any previous visit. Carotid duplex: No results found for this or any previous visit. No results found for this or any previous visit. No results found for this or any previous visit. Echo No results found for this or any previous visit.             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 Results reviewed with patient and his father. Right lower extremity weakness improved. Insomnia improved. Tachycardic at times with normal echo. Verapamil increased per cardiology. Anxious at times. Continues on as needed Xanax with plans to taper off. Continues on Lexapro. Plan is for patient to be discharged to a neuro restorative program    7/22/22:  Possible discharge to neuro restorative program pending for today. Okay from neurology standpoint. Follow-up 4 to 6 weeks. Collaborating physicians: Dr Etta Hall    I have personally performed a face to face diagnostic evaluation on this patient, reviewed all data and investigations, and am the sole provider of all clinical decisions on the neurological status of this patient. Patient seen on weekly rehab rounds. Patient's MRI of the brain was reviewed and shows decreasing lesions or edema. Patient though has sustained some degree of cognitive issues and will require neuropsychometric testing at some point in time. He is going to a skilled nursing facility. More than 60% time spent on evaluating this patient      Rayray Hall MD, [address], American Board of Psychiatry & Neurology  Board Certified in Vascular Neurology  Board Certified in Neuromuscular Medicine  Certified in Neurorehabilitation       Electronically signed by YING Sosa - [id number] on 7/22/2022 at 1:09 PM

## 2022-07-22 NOTE — FLOWSHEET NOTE
Patient assessment is complete. RN talked with patient's father regarding discharge. RN was told by the  that we are still waiting for a precert.

## 2022-07-22 NOTE — PROGRESS NOTES
Complete assessment done at 1940-see flow. At 1935 pt up in holder and kitchen barefoot. Encouraged pt to put socks on and use call light for anything in kitchen. pt back in rm at time of assessment. Pt denies any pain or distress. Call light within reach as well as bedside table. Pt is independent in rm.

## 2022-07-22 NOTE — PROGRESS NOTES
OCCUPATIONAL THERAPY  INPATIENT REHAB TREATMENT NOTE  [facility]      NAME: Melia Spivey  : 1990 ([address] y.o[address]  MRN: [id number]  CODE STATUS: Full Code  Room: Y392/Q081-08    Date of Service: 2022    Referring Physician: Dr. Mendel Landsberg Diagnosis: Imp ADLs d/t hypoxic encephalopathy s/p overdose likely secondary to fentanyl laced xanax pill    Restrictions  Restrictions/Precautions  Restrictions/Precautions: Fall Risk         Patient's date of birth confirmed: Yes    SAFETY:  Safety Devices  Safety Devices in place: Yes  Type of devices: All fall risk precautions in place    SUBJECTIVE:  Subjective: I slept well  Pain: no pain    COGNITION:     Comprehension: Independent  Expression: Supervision  Social Interaction: Supervision  Problem Solving: Min A  Memory: Supervision    OBJECTIVE:       Grooming/Oral Hygiene  Assistance Level: Independent  Upper Extremity Bathing  Assistance Level: Independent  Lower Extremity Bathing  Assistance Level: Independent  Upper Extremity Dressing  Assistance Level: Independent  Lower Extremity Dressing  Assistance Level: Independent  Putting On/Taking Off Footwear  Assistance Level: Modified independent  Skilled Clinical Factors: using sock aide RLE  Toileting  Assistance Level: Independent  Toilet Transfers  Technique:  (ambulating)  Equipment: Grab bars  Assistance Level: Modified independent  Tub/Shower Transfers  Type: Shower  Transfer From: Standing without device  Transfer To: Shower chair with back  Assistance Level: Modified independent       Functional Mobility  Activity: To/From therapy gym;Transport items; Retrieve items  Assistance Level: Independent     IADL    Pt engaged in IADL task requiring him to simulate bringing home a bag of goods from the grocery store. Pt required to put all items away in the correct areas/organize. Pt able to complete tasks with only 1 cue- 100% accuracy.  Pt placed all supplies in appropriate areas     Then challenged pt to create himself a list for the grocery store + toiletries + cleaning supplies. Pt with good overall ability to complete whole list with pt instructed to pretend he was returning home and in need of all types of items. Pt noted to be using immediate surroundings to help him compile the list     While seated edge of mat, challenged strength, activity tolerance, ROM, and flexibility for improved ADL performance. Challenged pt through usage of 3# weight to complete BUE exercises. 2 sets x 10 reps completed. Exercises focused on all UE joints and planes of motion including scapular protraction/retraction, shoulder flexion/extension/rotation/horizontal abduction, elbow flexion/extension, supination/pronation, and wrist/digit flexion/extension. Education:  Education  Education Given To: Patient  Education Provided: IADL Function;Precautions  Education Method: Demonstration;Verbal  Education Outcome: Demonstrated understanding;Continued education needed;Verbalized understanding    Equipment recommendations:   Sock aide, shower chair     ASSESSMENT:       PLAN OF CARE:  Strengthening, Balance training, Functional mobility training, Endurance training, Neuromuscular re-education, Safety education & training, Patient/Caregiver education & training, Equipment evaluation, education, & procurement, Self-Care / ADL, Home management training       Patient goals : \"Get home, get back to life, feel normal\"  Time Frame for Long term goals :  Within 1 weeks, pt to demo progress in the following areas listed below to achieve specific LTGs as stated in the evaluation  Long Term Goal 1: Pt will demo improved overall ADL/IADL status  Long Term Goal 2: Pt will demo improved standing balance and tolerance for self-care completion  Long Term Goal 3: Pt will demo improved activity tolerance for completion of ADL/IADL  Long Term Goal 4: Pt will demo improved overall safety and cognition in order to be able to care for himself with minimal assistance    Therapy Time:   Individual Group Co-Treat   Time In 1000       Time Out 1100         Minutes 60             ADL/IADL trainin minutes  Therapeutic activities: 10 minutes     Electronically signed by:    Romi Goff OT,   2022, 10:24 AM

## 2022-07-22 NOTE — PROGRESS NOTES
sentences I with 63% accuracy, with min cueing 100% accuracy. Patient continues to answer questions with 1-2 word answers. Treatment/Activity Tolerance:  Patient tolerated treatment well    Plan:  Continue per POC    Pain Assessment:  Patient does not c/o pain. Pain Re-assessment:  Patient does not c/o pain. Patient/Caregiver Education:  Patient educated on session and progression towards goals. Safety Devices:   Independent in the room    Speech Therapy Level of Assistance Scale    VERBAL EXPRESSION  Rating:  Minimal Assistance    PROBLEM SOLVING  Rating: Minimal Assistance    MEMORY  Rating:  Minimal Assistance          Therapy Time  SLP Individual Minutes  Time In: 1300  Time Out: 1330  Minutes: 30              Signature: Electronically signed by SOPHIE Jalloh on 7/22/2022 at 1:40 PM

## 2022-07-22 NOTE — CARE COORDINATION
Patients Mother called and stated that [facility] has agreed to transport the patient to [facility] if the facility gets precert today. Devika Petersen from facility will call when they get precert.   Electronically signed by Sivakumar Jeffers RN on 7/22/22 at 10:27 AM EDT

## 2022-07-22 NOTE — PLAN OF CARE
Problem: Discharge Planning  Goal: Discharge to home or other facility with appropriate resources  7/22/2022 1124 by Cassie Pineda RN  Outcome: Progressing  7/21/2022 2220 by Nikki Cox RN  Outcome: Progressing     Problem: Safety - Adult  Goal: Free from fall injury  7/22/2022 1124 by Cassie Pineda RN  Outcome: Progressing  7/21/2022 2220 by Nikki Cox RN  Outcome: Progressing

## 2022-07-22 NOTE — PROGRESS NOTES
Patient asleep easily aroused to obtain EKG. If pt not D/C'D today can we get EKG's D/C'd RE: Cardiology signed off 7/19. Pt with no needs at this time- pleasant and cooperative.

## 2022-07-23 PROCEDURE — 1180000000 HC REHAB R&B

## 2022-07-23 PROCEDURE — 6370000000 HC RX 637 (ALT 250 FOR IP): Performed by: INTERNAL MEDICINE

## 2022-07-23 PROCEDURE — 6370000000 HC RX 637 (ALT 250 FOR IP): Performed by: FAMILY MEDICINE

## 2022-07-23 PROCEDURE — 6370000000 HC RX 637 (ALT 250 FOR IP): Performed by: PHYSICAL MEDICINE & REHABILITATION

## 2022-07-23 RX ADMIN — ESCITALOPRAM OXALATE 20 MG: 20 TABLET ORAL at 08:14

## 2022-07-23 RX ADMIN — TRAZODONE HYDROCHLORIDE 50 MG: 50 TABLET ORAL at 21:22

## 2022-07-23 RX ADMIN — ALPRAZOLAM 0.25 MG: 0.25 TABLET ORAL at 08:52

## 2022-07-23 RX ADMIN — PREDNISONE 5 MG: 5 TABLET ORAL at 08:14

## 2022-07-23 RX ADMIN — VERAPAMIL HYDROCHLORIDE 120 MG: 120 TABLET, FILM COATED, EXTENDED RELEASE ORAL at 08:14

## 2022-07-23 RX ADMIN — ZOLPIDEM TARTRATE 10 MG: 5 TABLET ORAL at 21:22

## 2022-07-23 RX ADMIN — ALPRAZOLAM 0.25 MG: 0.25 TABLET ORAL at 21:51

## 2022-07-23 RX ADMIN — VERAPAMIL HYDROCHLORIDE 120 MG: 120 TABLET, FILM COATED, EXTENDED RELEASE ORAL at 21:31

## 2022-07-23 NOTE — PLAN OF CARE
Problem: Discharge Planning  Goal: Discharge to home or other facility with appropriate resources  7/23/2022 0819 by Jd Graham RN  Outcome: Progressing  7/22/2022 2247 by Doron Ellis. Higinio Carmichael RN  Outcome: Progressing     Problem: Safety - Adult  Goal: Free from fall injury  7/23/2022 0819 by Jd Graham RN  Outcome: Progressing  7/22/2022 2247 by Doron Ellis. Higinio Carmichael RN  Outcome: Progressing     Problem: ABCDS Injury Assessment  Goal: Absence of physical injury  7/23/2022 0819 by Jd Graham RN  Outcome: Progressing  7/22/2022 2247 by Doron Ellis. Higinio Carmichael RN  Outcome: Progressing     Problem: Pain  Goal: Verbalizes/displays adequate comfort level or baseline comfort level  7/23/2022 0819 by Jd Graham RN  Outcome: Progressing  7/22/2022 2247 by Doron Ellis. Higinio Carmichael RN  Outcome: Progressing     Problem: Neurosensory - Adult  Goal: Achieves maximal functionality and self care  7/23/2022 0819 by Jd Graham RN  Outcome: Progressing  7/22/2022 2247 by Doron Ellis. Higinio Carmichael RN  Outcome: Progressing     Problem: Respiratory - Adult  Goal: Achieves optimal ventilation and oxygenation  7/23/2022 0819 by Jd Graham RN  Outcome: Progressing  7/22/2022 2247 by Doron Ellis. Higinio Carmichael RN  Outcome: Progressing     Problem: Cardiovascular - Adult  Goal: Maintains optimal cardiac output and hemodynamic stability  7/23/2022 0819 by Jd Graham RN  Outcome: Progressing  7/22/2022 2247 by Doron Ellis. Higinio Carmichael RN  Outcome: Progressing     Problem: Skin/Tissue Integrity - Adult  Goal: Skin integrity remains intact  7/23/2022 0819 by Jd Graham RN  Outcome: Progressing  7/22/2022 2247 by Doron Ellis. Higinio Carmichael RN  Outcome: Progressing     Problem: Musculoskeletal - Adult  Goal: Return mobility to safest level of function  7/23/2022 0819 by Jd Graham RN  Outcome: Progressing  7/22/2022 2247 by Doron Ellis.  Higinio Carmichael RN  Outcome: Progressing     Problem: Gastrointestinal - Adult  Goal: Maintains adequate nutritional intake  7/23/2022 0819 by Keven Wright RN  Outcome: Progressing  7/22/2022 2247 by Rayshawn Rogers. Santana Zhao RN  Outcome: Progressing     Problem: Genitourinary - Adult  Goal: Absence of urinary retention  7/23/2022 0819 by Keven Wright RN  Outcome: Progressing  7/22/2022 2247 by Rayshawn Rogers. Santana Zhao RN  Outcome: Progressing     Problem: Infection - Adult  Goal: Absence of infection at discharge  7/23/2022 0819 by Keven Wright RN  Outcome: Progressing  7/22/2022 2247 by Rayshawn Rogers. Santana Zhao RN  Outcome: Progressing     Problem: Metabolic/Fluid and Electrolytes - Adult  Goal: Electrolytes maintained within normal limits  7/23/2022 0819 by Keven Wright RN  Outcome: Progressing  7/22/2022 2247 by Rayshawn Rogers. Santana Zhao RN  Outcome: Progressing     Problem: Hematologic - Adult  Goal: Maintains hematologic stability  7/23/2022 0819 by Keven Wright RN  Outcome: Progressing  7/22/2022 2247 by Rayshawn Rogers.  Santana Zhao RN  Outcome: Progressing

## 2022-07-23 NOTE — PROGRESS NOTES
Medicated with prn Xanax this morning per patient request. Educated patient that dose had changed from 0.5mg to 0.25mg . Discharge pending to [facility].  Electronically signed by Pam Kennedy RN on 7/23/22 at 10:07 AM EDT

## 2022-07-23 NOTE — PLAN OF CARE
Problem: Discharge Planning  Goal: Discharge to home or other facility with appropriate resources  7/22/2022 2247 by Nella Pan. Keke Abbasi RN  Outcome: Progressing  7/22/2022 1124 by Poli Nix RN  Outcome: Progressing     Problem: Safety - Adult  Goal: Free from fall injury  7/22/2022 2247 by Nella Pan. Keke Abbasi RN  Outcome: Progressing  7/22/2022 1124 by Poli Nix RN  Outcome: Progressing     Problem: ABCDS Injury Assessment  Goal: Absence of physical injury  7/22/2022 2247 by Nella Pan. Keke Abbasi RN  Outcome: Progressing  7/22/2022 1124 by Poli Nix RN  Outcome: Progressing  Flowsheets (Taken 7/22/2022 0033 by Neftali Reyes RN)  Absence of Physical Injury: Implement safety measures based on patient assessment     Problem: Pain  Goal: Verbalizes/displays adequate comfort level or baseline comfort level  7/22/2022 2247 by Nella Pan. Keke Abbasi RN  Outcome: Progressing  7/22/2022 1124 by Poli Nix RN  Outcome: Progressing     Problem: Neurosensory - Adult  Goal: Achieves maximal functionality and self care  7/22/2022 2247 by Nella Pan. Keke Abbasi RN  Outcome: Progressing  7/22/2022 1124 by Poli Nix RN  Outcome: Progressing     Problem: Respiratory - Adult  Goal: Achieves optimal ventilation and oxygenation  7/22/2022 2247 by Nella Pan. Keke Abbasi RN  Outcome: Progressing  7/22/2022 1124 by Poli Nix RN  Outcome: Progressing     Problem: Cardiovascular - Adult  Goal: Maintains optimal cardiac output and hemodynamic stability  7/22/2022 2247 by Nella Pan. Vlad Bronson RN  Outcome: Progressing  7/22/2022 1124 by Poli Nix RN  Outcome: Progressing     Problem: Skin/Tissue Integrity - Adult  Goal: Skin integrity remains intact  7/22/2022 2247 by Nella Pan.  Keke Abbasi RN  Outcome: Progressing  7/22/2022 1124 by Poli Nix RN  Outcome: Progressing     Problem: Musculoskeletal - Adult  Goal: Return mobility to safest level of function  7/22/2022 2247 by Nella Pan. Margi Gonzales RN  Outcome: Progressing  7/22/2022 1124 by Leonora Mast RN  Outcome: Progressing     Problem: Gastrointestinal - Adult  Goal: Maintains adequate nutritional intake  7/22/2022 2247 by Hustle Cushing. Margi Gonzales RN  Outcome: Progressing  7/22/2022 1124 by Leonora Mast RN  Outcome: Progressing     Problem: Genitourinary - Adult  Goal: Absence of urinary retention  7/22/2022 2247 by Hustle Cushing. Margi Gonzales RN  Outcome: Progressing  7/22/2022 1124 by Leonora Mast RN  Outcome: Progressing     Problem: Infection - Adult  Goal: Absence of infection at discharge  7/22/2022 2247 by Hustle Cushing. Margi Gonzales RN  Outcome: Progressing  7/22/2022 1124 by Leonora Mast RN  Outcome: Progressing     Problem: Metabolic/Fluid and Electrolytes - Adult  Goal: Electrolytes maintained within normal limits  7/22/2022 2247 by Hustle Cushing. Margi Gonzales RN  Outcome: Progressing  7/22/2022 1124 by Leonora Mast RN  Outcome: Progressing     Problem: Hematologic - Adult  Goal: Maintains hematologic stability  7/22/2022 2247 by Rosi Cushing.  Margi Gonzales RN  Outcome: Progressing  7/22/2022 1124 by Leonora Mast RN  Outcome: Progressing

## 2022-07-23 NOTE — PROGRESS NOTES
Subjective: The patient complains of severe acute on chronic progressive fatigue and ataxia, cognitive slowing, right lower extremity numbness and stiffness partially relieved by rest, medications, PT,  OT, medication titration SLP and rest and exacerbated by recent OD-he was admitted to [facility] on 6/30/[facility]22  with change in mental status and history of possible overdose. Family did a well check after his boss noticed that he had not shown up to work for several days and called the family out of concern. He was initially admitted and to the ICU. He had abnormal liver enzymes, rhabdomyolysis kidney injury. Urine was positive for amphetamines benzos and fentanyl. He was found to also be using Royle kratom overuse, transaminitis, EDGAR, rhabdomyolysis resulting in provoked seizure and cerebral edema. He admits that he took 2 Xanax that he bought on the street. He and I both fear that it may have been laced with fentanyl. Discussed with treatment team and hospitalist and neuro--  MRIs spine were normal..    Precert's  is still pending for neuro unit specialty care       ROS x10: The patient also complains of severely impaired mobility and activities of daily living. Otherwise no new problems with vision, hearing, nose, mouth, throat, dermal, cardiovascular, GI, , pulmonary, musculoskeletal, psychiatric or neurological. See also Acute Rehab PM&R H&P. Vital signs:  /73   Pulse (!) 101   Temp 98.3 °F (36.8 °C) (Oral)   Resp 18   Ht 5' 10\" (1.778 m)   Wt 160 lb 12.8 oz (72.9 kg)   SpO2 100%   BMI 23.07 kg/m²   I/O:   PO/Intake:  fair PO intake,  Adult reg  Diet thin liquids    Bowel:   continent   Bladder: continent   No need for schwartz  General:  Patient is well developed,   adequately nourished, and    well kempt. HEENT:    Pupils equal, hearing intact to loud voice, external inspection of ear and nose benign.   Inspection of lips, tongue and gums benign  -cognitive slowing right lower extremity weakness and numbness. Musculoskeletal: No significant change in strength or tone. All joints stable. Inspection and palpation of digits and nails show no clubbing, cyanosis or inflammatory conditions. Neuro/Psychiatric: Affect: flat but pleasant. Alert and oriented to person, place and situation with   min cues. No significant change in deep tendon reflexes or sensation  Lungs:  Diminished, CTA-B. Respiration effort is   normal at rest.     Heart:   S1 = S2,   RRR. Abdomen:  Soft, non-tender, no enlargement of liver or spleen. Extremities:    lower extremity edema  -weakness right lower extremity decreased sciatic neuropathy. Skin:   Intact to general survey,      Rehabilitation:  Physical Therapy:   Bed mobility:  Bed mobility  Rolling to Left: Modified independent (07/12/22 1155)  Rolling to Right: Modified independent (07/12/22 1155)  Supine to Sit: Modified independent;Supervision (07/12/22 1155)  Sit to Supine: Modified independent;Supervision (07/12/22 1155)  Bed Mobility Comments: Mildly impulsive. (07/12/22 1155)  Bed Mobility Training  Bed Mobility Training: Yes (07/16/22 1400)  Overall Level of Assistance: Independent (07/16/22 0927)  Interventions: Demonstration (07/16/22 2012)  Rolling: Independent (07/16/22 0927)  Supine to Sit: Independent (07/16/22 0927)  Sit to Supine: Independent (07/16/22 7793)  Scooting: Independent (07/16/22 0927)  Bed Mobility  Overall Assistance Level: Independent (07/22/22 1141)  Overall Assistance Level: Independent (07/22/22 1141)  Roll Left  Assistance Level: Independent (07/21/22 1119)  Roll Right  Assistance Level: Independent (07/21/22 1119)  Sit to Supine  Assistance Level: Independent (07/21/22 1119)  Supine to Sit  Assistance Level: Independent (07/21/22 1119)  Scooting  Assistance Level:  Independent (07/21/22 1119)  Transfers:  Transfers  Sit to Stand: Stand by assistance;Contact guard assistance (07/12/22 1156)  Stand to sit: Stand by assistance;Contact guard assistance (07/12/22 1156)  Bed to Chair: Stand by assistance;Contact guard assistance (07/12/22 1156)  Car Transfer: Stand by assistance;Contact guard assistance (07/12/22 1156)  Comment: Varied performance. Pt with increased time to approach chair. Safe technique however inaccurate execution at times. 5XSTS = 10.7sec. (07/12/22 1156)  Transfer Training  Transfer Training: Yes (07/16/22 1400)  Overall Level of Assistance: Independent (07/16/22 1400)  Interventions: Safety awareness training (07/16/22 1400)  Sit to Stand: Independent;Supervision (07/16/22 1400)  Stand to Sit: Independent;Supervision (07/16/22 1400)  Stand Pivot Transfers: Supervision; Independent (07/16/22 1400)  Bed to Chair: Modified independent (07/16/22 1400)  Transfers  Surface: To chair with arms (07/15/22 1048)  Sit to Stand  Assistance Level: Independent (07/22/22 1141)  Skilled Clinical Factors: 5x STS = 6.59 sec (07/20/22 1203)  Stand to Sit  Assistance Level: Independent (07/22/22 1141)  Skilled Clinical Factors: Occasionally unstable to approach to chair (07/13/22 1214)  Bed To/From Chair  Technique: Stand step (07/22/22 1141)  Assistance Level: Independent (07/22/22 1141)  Car Transfer  Assistance Level: Independent (07/21/22 1119)  Skilled Clinical Factors: Performed in family vehicle (07/15/22 1534)  Floor Transfer  Assistance Level: Independent (07/21/22 1119)  Skilled Clinical Factors: Demonstation provided for proper technique (07/15/22 1048)  Gait:   Ambulation  Surface: level tile (07/12/22 1241)  Device: No Device (07/12/22 1241)  Assistance: Stand by assistance;Contact guard assistance (07/12/22 1241)  Quality of Gait: Decreased speed and step length. Challenged with turns and obstacles. (07/12/22 1241)  Distance: 150ft; 75ft; multiple small distances within gym focusing on obstacles and turns. Gaze stabilization introduced.  (07/12/22 1241)  Gait Training: Yes (07/16/22 1400)  Overall Level of Assistance: Stand-by assistance (07/16/22 1400)  Distance (ft): 250 Feet (07/16/22 0927)  Assistive Device: Other (comment) (no device needed) (07/16/22 0927)  Interventions: Safety awareness training (07/16/22 1400)  Base of Support: Widened (07/16/22 0927)  Speed/Radha: Fluctuations (07/16/22 0927)  Step Length: Right shortened (07/16/22 0927)  Swing Pattern: Left asymmetrical (07/16/22 0927)  Gait Abnormalities: Altered arm swing (07/16/22 0927)  Rail Use: Left (07/16/22 0927)  Right Side Weight Bearing: As tolerated (07/16/22 1400)  Left Side Weight Bearing: As tolerated (07/16/22 1400)  Uneven Terrain - Level of Assistance: Stand-by assistance (07/16/22 0927)  Ambulation  Surface: Level surface; Uneven surface; Carpet; Ramp (07/22/22 1142)  Distance: 1000'+ (07/22/22 1142)  Activity: Within Unit (07/22/22 1142)  Activity Comments: Reciprocalpattern good safey transitioning from surface to surface. (07/22/22 1142)  Assistance Level: Independent (07/22/22 1142)  Skilled Clinical Factors: mild antalgia, decreased heel strike on right (07/22/22 1142)  Stairs:  Stairs/Curb  Stairs?: Yes (07/12/22 1241)  Stairs  # Steps : 4 (07/12/22 1241)  Rails: Right ascending (07/12/22 1241)  Assistance: Stand by assistance (07/12/22 1241)  Comment: Step to pattern (07/12/22 1241)  Stairs - Level of Assistance: Supervision (07/16/22 1773)  Number of Stairs Trained: 24 (07/16/22 0927)  Stairs  Stair Height: 6'' (07/22/22 1142)  Device: One handrail (07/22/22 1142)  Number of Stairs: 12 (07/22/22 1142)  Additional Factors: Reciprocal going up;Reciprocal going down (07/22/22 1142)  Assistance Level: Independent (07/22/22 1142)  Skilled Clinical Factors: Good safety ascending increased difficulty descending requires increased effort to complete (07/15/22 1053)  Curb  Curb Height: 6'' (07/19/22 1028)  Number of Curbs: 1 (07/19/22 1028)  Assistance Level:  Independent (07/19/22 1028)  W/C mobility:  Wheelchair Management  Wheelchair Management: No (07/13/22 1539)    Occupational Therapy:   Hand Dominance: Right  ADL  Feeding: Setup (07/12/22 1018)  Grooming: Modified independent  (07/12/22 1018)  Grooming Skilled Clinical Factors: oral hygiene, comb hair (07/12/22 1018)  UE Bathing: Supervision (07/12/22 1018)  UE Bathing Skilled Clinical Factors: cues to attend to all areas, wash hair, thoroughness (07/12/22 1018)  LE Bathing: Minimal assistance (07/12/22 1018)  LE Bathing Skilled Clinical Factors: assist to reach RLE and for posterior hygiene (07/12/22 1018)  UE Dressing Skilled Clinical Factors: gown only (07/12/22 1018)  LE Dressing: Minimal assistance (07/12/22 1018)  LE Dressing Skilled Clinical Factors: assist with R sock, pt declined to don pants/bref (07/12/22 1018)  Toileting: Minimal assistance (07/12/22 1018)  Additional Comments: Shower completed. Pt impulsive at times (07/12/22 1018)  Toilet Transfers  Toilet - Technique: Ambulating (07/12/22 1022)  Equipment Used: Grab bars (07/12/22 1022)  Toilet Transfer: Contact guard assistance (07/12/22 1022)     Shower Transfers  Shower - Transfer From: Jase Rendon (07/12/22 1022)  Shower - Transfer Type: To and From (07/12/22 1022)  Shower - Transfer To: Shower seat with back (07/12/22 1022)  Shower - Technique: Stand pivot (07/12/22 1022)  Shower Transfers: Contact Guard (07/12/22 1022)    Speech Therapy:      Comprehension:  (Intermittent repetition of directions is required during tasks secondary to reduced recall and memory deficits)  Verbal Expression:  (Reduced utterance length has been noted during conversation. Improvement noted this date compared to 07/15/2022 session.  Pt presents with flat affect and high level naming deficits)  Diet/Swallow:        Dysphagia Outcome Severity Scale: Level 6: Within functional limits/Modified independence  Compensatory Swallowing Strategies : Small bites/sips, Upright as possible for all oral intake, Alternate solids and liquids          Lab/X-ray studies reviewed, analyzed and discussed with patient and staff:   No results found for this or any previous visit (from the past 24 hour(s)). EEG was normal.    XR CHEST   7/4/2022:  No significant interval change of left upper lobe and left lower lobe infiltrate and/or atelectasis. XR HIP RIGHT   7/6/2022  No dislocations. No focal bony abnormalities                                                                                 NO ACUTE FRACTURE. CT Head   6/30/2022 Extra-axial spaces:  Normal. Intracranial hemorrhage:  None. Ventricular system: [Negative. Basal Cisterns:  Without anomaly. Cerebral Parenchyma: Bilateral, symmetric areas of decreased attenuation exerting no mass effect measuring approximately 9 mm in size since found within the bilateral globus pallidus (series 2, image 17). Midline Shift:  None. Cerebellum:  No anomaly identified. Paranasal sinuses and mastoid air cells:  No anomaly identified. Visualized Orbits:  Negative. Impression: Round symmetric areas decreased attenuation bilateral globus pallidus. This finding may be seen in patients experiencing hypoxia, i.e., carbon monoxide poisoning. MRA HEAD : 7/2/2022    Visualized brain: Grossly unchanged from the recent MRI of the brain with multiple areas of restricted diffusion. Please refer to the recent MRI brain report. INTRACRANIAL MRA:    The visualized distal vertebral and basilar arteries are widely patent. The distal ICAs are patent and within normal limits of caliber. The proximal ACAs, MCAs and PCAs are patent and within normal limits of caliber and configuration. There is no evidence of focal,  significant stenosis or aneurysm in the visualized vessels. EXTRACRANIAL MRA: Carotid Stenosis: Right Common:  No significant stenosis. Right Internal Plaque:  No significant plaque formation. Right Internal Carotid Stenosis (% by NASCET Criteria):  0% Left Common:  No significant stenosis. Left Internal Carotid Plaque:  No significant plaque formation. Left Internal Carotid Stenosis (% by NASCET Criteria):  0% Cervical Vertebral Arteries: Patency:  Bilateral Dominance:  Left     Patent intracranial and extracranial circulation. XR CHEST   7/1/2022:  LEFT UPPER LUNG ATELECTASIS/PNEUMONIA. US ABDOMEN   7/5/2022    NEGATIVE RIGHT UPPER QUADRANT ULTRASOUND WITHOUT EVIDENCE OF CHOLELITHIASIS. US DUP LOWER EXTREMITY RIGHT ROBERT 7/3/2022 :  No acute DVT of the right lower extremity veins from the groin to the knee. No acute DVT of the right calf veins. MRA NECK 7/2/2022    Patent intracranial and extracranial circulation. MRI BRAIN  7/4/2022: Mildly increased edema in the areas of diffusion restriction within the bilateral globi pallidi, splenium of the corpus callosum, and supratentorial white since prior MRI July 1, 2022. No mass effect or midline shift. No acute intracranial hemorrhage. No enhancing mass or pathologic enhancement. Mildly increased edema in the areas of diffusion restriction within the bilateral globi pallidi, splenium of the corpus callosum, and supratentorial white since prior MRI July 1, 2022. MRI BRAIN   7/1/2022   Areas of restricted diffusion within the globus pallidus, supratentorial white matter and splenium of the corpus callosum are most consistent with carbon monoxide poisoning/hypoxemia and/or other toxic encephalopathy. There is no intracranial hemorrhage, mass effect, midline shift, extra-axial collection, significant cerebral volume loss or other complication identified. FINDINGS CONSISTENT WITH CARBON MONOXIDE POISONING/HYPOXEMIA AND/OR OTHER TOXIC ENCEPHALOPATHY. NO INTRACRANIAL HEMORRHAGE OR COMPLICATION IDENTIFIED. MRI CERVICAL THORACIC LUMBAR SPINE  7/7/2022      NEGATIVE MILDLY LIMITED CERVICAL, THORACIC AND LUMBAR SPINE MRI. Previous extensive, complex labs, notes and diagnostics reviewed and analyzed.      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 Functional and medical status   . Greatly status postacute rehab at [facility].  Bowel and Bladder dysfunction  , Neurogenic bowel and bladder:  frequent toileting, ambulate to bathroom with assistance, check post void residuals. Check for C.difficile x1 if >2 loose stools in 24 hours, continue bowel & bladder program.  Monitor bowel and bladder function. Lactinex 2 PO every AC. MOM prn, Brown Bomb prn, Glycerin suppository prn, enema prn. Encourage therapy and nursing to co-treat and problem solve re continence. Severe RLE pain as well as generalized OA pain: reassess pain every shift and prior to and after each therapy session, give prn Tylenol and consider scheduled Tylenol, modalities prn in therapy, masage, Lidoderm, K-pad prn. Consider scheduled AM pain meds. Skin healing   and breakdown risk:  continue pressure relief program.  Daily skin exams and reports from nursing. Fatigue due to nutritional and hydration deficiency: Add and titrate vitamin B12 vitamin D and CoQ10 continue to monitor I&Os, calorie counts prn, dietary consult prn. Add healthy snack at night. Acute episodic insomnia with situational adjustment disorder:  prn Ambien, monitor for day time sedation.   Falls risk elevated:  patient to use call light to get nursing assistance to get up, bed and chair alarm. Elevated DVT risk: progressive activities in PT, continue prophylaxis CHAR hose, elevation and Lovenox with goal to discontinue over the next few days. Complex discharge planning:  DC 22-pending insurance approval  to rehab at sub acute with substance abuse focus. Weekly team meeting every Thursday to re-assess progress towards goals, discuss and address social, psychological and medical comorbidities and to address difficulties they may be having progressing in therapy. Patient and family education is in progress. The patient is to follow-up with their family physician after discharge.    -however patient will also need a specialized sub acute rehab and combined drug recovery program after DC. Complex Active General Medical Issues that complicated  :      Drug abuse,   Polydrug dependence including opioid type drug with continuous use with complication -is aware of the implications that his drug use has had on his health and that he almost . He is planning on not using or overusing medications in the future. Altered mental status-status post drug overdose apparently was accidental overdose. Patient admits to taking 2 Xanax pills that he bought off on the street. Xanax in this area is notoriously laced with fentanyl. Like that he had inadvertent fentanyl overdose from the medication that he thought was Xanax that he bought on the street. 12-step program as advised. Vitamin D deficiency-Add high-dose vitamin D, recheck vitamin D level out after discharge,    Spasm of back muscles-lowest effective dose of muscle spasms  Cerebral edema with seizures-antiseizure medications consult neurology and steroid taper.     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 Liver toxicity likely secondary to polysubstance abuse-recheck LFTs as an outpatient avoid toxic medications    ADHD (attention deficit hyperactivity disorder)-lowest effective dose of stimulant medication    Anxiety/ROSALES (generalized anxiety disorder),   MDD (major depressive disorder), recurrent episode, moderate -emotional support provided daily, vitamin B12, encourage participation in rehabilitation support group and recreational therapy, adjust/add medications ( b Lexapro, Desyrel, Xanax and taper to lowest effective dose) Taper Deltasone-and taper Xanax under the direction of neurology and psychiatry. Baseline sinus tachycardia-  cardiology sinus tachycardia  -I reviewed-consult with cardiology and check thyroid and cortisol levels-Recenmt T4, K and Mag WNL. Heart rate seems to be somewhat better after starting verapamil-Obtain ECG. Will order Echo. LPRD (laryngopharyngeal reflux disease)-consult speech and language pathology    Shift work sleep disorder-patient may need to adjust his therapy times.        Will hold discharge for now and await pre-CERT and bed in specialty unit given complex rehabilitation needs    Vahe Burch D.O., PM&R     Attending    [facility] Giang

## 2022-07-24 PROCEDURE — 1180000000 HC REHAB R&B

## 2022-07-24 PROCEDURE — 6370000000 HC RX 637 (ALT 250 FOR IP): Performed by: PHYSICAL MEDICINE & REHABILITATION

## 2022-07-24 PROCEDURE — 6370000000 HC RX 637 (ALT 250 FOR IP): Performed by: INTERNAL MEDICINE

## 2022-07-24 PROCEDURE — 6370000000 HC RX 637 (ALT 250 FOR IP): Performed by: FAMILY MEDICINE

## 2022-07-24 RX ADMIN — TRAZODONE HYDROCHLORIDE 50 MG: 50 TABLET ORAL at 20:27

## 2022-07-24 RX ADMIN — VERAPAMIL HYDROCHLORIDE 120 MG: 120 TABLET, FILM COATED, EXTENDED RELEASE ORAL at 20:27

## 2022-07-24 RX ADMIN — ALPRAZOLAM 0.25 MG: 0.25 TABLET ORAL at 12:34

## 2022-07-24 RX ADMIN — ZOLPIDEM TARTRATE 10 MG: 5 TABLET ORAL at 20:27

## 2022-07-24 RX ADMIN — VERAPAMIL HYDROCHLORIDE 120 MG: 120 TABLET, FILM COATED, EXTENDED RELEASE ORAL at 07:53

## 2022-07-24 RX ADMIN — ESCITALOPRAM OXALATE 20 MG: 20 TABLET ORAL at 07:53

## 2022-07-24 NOTE — PROGRESS NOTES
Subjective: The patient complains of severe acute on chronic progressive fatigue and ataxia, cognitive slowing, right lower extremity numbness and stiffness partially relieved by rest, medications, PT,  OT, medication titration SLP and rest and exacerbated by recent OD-he was admitted to [facility] on 6/30/2022  with change in mental status and history of possible overdose. Family did a well check after his boss noticed that he had not shown up to work for several days and called the family out of concern. He was initially admitted and to the ICU. He had abnormal liver enzymes, rhabdomyolysis kidney injury. Urine was positive for amphetamines benzos and fentanyl. He was found to also be using Royle kratom overuse, transaminitis, EDGAR, rhabdomyolysis resulting in provoked seizure and cerebral edema. He admits that he took 2 Xanax that he bought on the street. He and I both fear that it may have been laced with fentanyl. Discussed with treatment team and hospitalist and neuro--  MRIs spine were normal..    Precert's  is still pending for neuro unit specialty care       ROS x10: The patient also complains of severely impaired mobility and activities of daily living. Otherwise no new problems with vision, hearing, nose, mouth, throat, dermal, cardiovascular, GI, , pulmonary, musculoskeletal, psychiatric or neurological. See also Acute Rehab PM&R H&P. Vital signs:  /76   Pulse 91   Temp 98.1 °F (36.7 °C) (Oral)   Resp 20   Ht 5' 10\" (1.778 m)   Wt 167 lb 3.2 oz (75.8 kg)   SpO2 99%   BMI 23.99 kg/m²   I/O:   PO/Intake:  fair PO intake,  Adult reg  Diet thin liquids    Bowel:   continent   Bladder: continent   No need for schwartz  General:  Patient is well developed,   adequately nourished, and    well kempt. HEENT:    Pupils equal, hearing intact to loud voice, external inspection of ear and nose benign.   Inspection of lips, tongue and gums benign  -cognitive slowing right lower extremity weakness and numbness. Musculoskeletal: No significant change in strength or tone. All joints stable. Inspection and palpation of digits and nails show no clubbing, cyanosis or inflammatory conditions. Neuro/Psychiatric: Affect: flat but pleasant. Alert and oriented to person, place and situation with   min cues. No significant change in deep tendon reflexes or sensation  Lungs:  Diminished, CTA-B. Respiration effort is   normal at rest.     Heart:   S1 = S2,   RRR. Abdomen:  Soft, non-tender, no enlargement of liver or spleen. Extremities:    lower extremity edema  -weakness right lower extremity decreased sciatic neuropathy. Skin:   Intact to general survey,      Rehabilitation:  Physical Therapy:   Bed mobility:  Bed mobility  Rolling to Left: Modified independent (07/12/22 1155)  Rolling to Right: Modified independent (07/12/22 1155)  Supine to Sit: Modified independent;Supervision (07/12/22 1155)  Sit to Supine: Modified independent;Supervision (07/12/22 1155)  Bed Mobility Comments: Mildly impulsive. (07/12/22 1155)  Bed Mobility Training  Bed Mobility Training: Yes (07/16/22 1400)  Overall Level of Assistance: Independent (07/16/22 0927)  Interventions: Demonstration (07/16/22 0010)  Rolling: Independent (07/16/22 0927)  Supine to Sit: Independent (07/16/22 0927)  Sit to Supine: Independent (07/16/22 2339)  Scooting: Independent (07/16/22 0927)  Bed Mobility  Overall Assistance Level: Independent (07/22/22 1141)  Overall Assistance Level: Independent (07/22/22 1141)  Roll Left  Assistance Level: Independent (07/21/22 1119)  Roll Right  Assistance Level: Independent (07/21/22 1119)  Sit to Supine  Assistance Level: Independent (07/21/22 1119)  Supine to Sit  Assistance Level: Independent (07/21/22 1119)  Scooting  Assistance Level:  Independent (07/21/22 1119)  Transfers:  Transfers  Sit to Stand: Stand by assistance;Contact guard assistance (07/12/22 1156)  Stand to sit: Stand by assistance;Contact guard assistance (07/12/22 1156)  Bed to Chair: Stand by assistance;Contact guard assistance (07/12/22 1156)  Car Transfer: Stand by assistance;Contact guard assistance (07/12/22 1156)  Comment: Varied performance. Pt with increased time to approach chair. Safe technique however inaccurate execution at times. 5XSTS = 10.7sec. (07/12/22 1156)  Transfer Training  Transfer Training: Yes (07/16/22 1400)  Overall Level of Assistance: Independent (07/16/22 1400)  Interventions: Safety awareness training (07/16/22 1400)  Sit to Stand: Independent;Supervision (07/16/22 1400)  Stand to Sit: Independent;Supervision (07/16/22 1400)  Stand Pivot Transfers: Supervision; Independent (07/16/22 1400)  Bed to Chair: Modified independent (07/16/22 1400)  Transfers  Surface: To chair with arms (07/15/22 1048)  Sit to Stand  Assistance Level: Independent (07/22/22 1141)  Skilled Clinical Factors: 5x STS = 6.59 sec (07/20/22 1203)  Stand to Sit  Assistance Level: Independent (07/22/22 1141)  Skilled Clinical Factors: Occasionally unstable to approach to chair (07/13/22 1214)  Bed To/From Chair  Technique: Stand step (07/22/22 1141)  Assistance Level: Independent (07/22/22 1141)  Car Transfer  Assistance Level: Independent (07/21/22 1119)  Skilled Clinical Factors: Performed in family vehicle (07/15/22 1534)  Floor Transfer  Assistance Level: Independent (07/21/22 1119)  Skilled Clinical Factors: Demonstation provided for proper technique (07/15/22 1048)  Gait:   Ambulation  Surface: level tile (07/12/22 1241)  Device: No Device (07/12/22 1241)  Assistance: Stand by assistance;Contact guard assistance (07/12/22 1241)  Quality of Gait: Decreased speed and step length. Challenged with turns and obstacles. (07/12/22 1241)  Distance: 150ft; 75ft; multiple small distances within gym focusing on obstacles and turns. Gaze stabilization introduced.  (07/12/22 1241)  Gait Training: Yes (07/16/22 1400)  Overall Level of Assistance: Stand-by assistance (07/16/22 1400)  Distance (ft): 250 Feet (07/16/22 0927)  Assistive Device: Other (comment) (no device needed) (07/16/22 0927)  Interventions: Safety awareness training (07/16/22 1400)  Base of Support: Widened (07/16/22 0927)  Speed/Radha: Fluctuations (07/16/22 0927)  Step Length: Right shortened (07/16/22 0927)  Swing Pattern: Left asymmetrical (07/16/22 0927)  Gait Abnormalities: Altered arm swing (07/16/22 0927)  Rail Use: Left (07/16/22 0927)  Right Side Weight Bearing: As tolerated (07/16/22 1400)  Left Side Weight Bearing: As tolerated (07/16/22 1400)  Uneven Terrain - Level of Assistance: Stand-by assistance (07/16/22 0927)  Ambulation  Surface: Level surface; Uneven surface; Carpet; Ramp (07/22/22 1142)  Distance: 1000'+ (07/22/22 1142)  Activity: Within Unit (07/22/22 1142)  Activity Comments: Reciprocalpattern good safey transitioning from surface to surface. (07/22/22 1142)  Assistance Level: Independent (07/22/22 1142)  Skilled Clinical Factors: mild antalgia, decreased heel strike on right (07/22/22 1142)  Stairs:  Stairs/Curb  Stairs?: Yes (07/12/22 1241)  Stairs  # Steps : 4 (07/12/22 1241)  Rails: Right ascending (07/12/22 1241)  Assistance: Stand by assistance (07/12/22 1241)  Comment: Step to pattern (07/12/22 1241)  Stairs - Level of Assistance: Supervision (07/16/22 7410)  Number of Stairs Trained: 24 (07/16/22 0927)  Stairs  Stair Height: 6'' (07/22/22 1142)  Device: One handrail (07/22/22 1142)  Number of Stairs: 12 (07/22/22 1142)  Additional Factors: Reciprocal going up;Reciprocal going down (07/22/22 1142)  Assistance Level: Independent (07/22/22 1142)  Skilled Clinical Factors: Good safety ascending increased difficulty descending requires increased effort to complete (07/15/22 1053)  Curb  Curb Height: 6'' (07/19/22 1028)  Number of Curbs: 1 (07/19/22 1028)  Assistance Level:  Independent (07/19/22 1028)  W/C mobility:  Wheelchair Management  Wheelchair Management: No (07/13/22 1539)    Occupational Therapy:   Hand Dominance: Right  ADL  Feeding: Setup (07/12/22 1018)  Grooming: Modified independent  (07/12/22 1018)  Grooming Skilled Clinical Factors: oral hygiene, comb hair (07/12/22 1018)  UE Bathing: Supervision (07/12/22 1018)  UE Bathing Skilled Clinical Factors: cues to attend to all areas, wash hair, thoroughness (07/12/22 1018)  LE Bathing: Minimal assistance (07/12/22 1018)  LE Bathing Skilled Clinical Factors: assist to reach RLE and for posterior hygiene (07/12/22 1018)  UE Dressing Skilled Clinical Factors: gown only (07/12/22 1018)  LE Dressing: Minimal assistance (07/12/22 1018)  LE Dressing Skilled Clinical Factors: assist with R sock, pt declined to don pants/bref (07/12/22 1018)  Toileting: Minimal assistance (07/12/22 1018)  Additional Comments: Shower completed. Pt impulsive at times (07/12/22 1018)  Toilet Transfers  Toilet - Technique: Ambulating (07/12/22 1022)  Equipment Used: Grab bars (07/12/22 1022)  Toilet Transfer: Contact guard assistance (07/12/22 1022)     Shower Transfers  Shower - Transfer From: Godwin Dessert (07/12/22 1022)  Shower - Transfer Type: To and From (07/12/22 1022)  Shower - Transfer To: Shower seat with back (07/12/22 1022)  Shower - Technique: Stand pivot (07/12/22 1022)  Shower Transfers: Contact Guard (07/12/22 1022)    Speech Therapy:      Comprehension:  (Intermittent repetition of directions is required during tasks secondary to reduced recall and memory deficits)  Verbal Expression:  (Reduced utterance length has been noted during conversation. Improvement noted this date compared to 07/15/2022 session.  Pt presents with flat affect and high level naming deficits)  Diet/Swallow:        Dysphagia Outcome Severity Scale: Level 6: Within functional limits/Modified independence  Compensatory Swallowing Strategies : Small bites/sips, Upright as possible for all oral intake, Alternate solids and liquids          Lab/X-ray studies reviewed, analyzed and discussed with patient and staff:   No results found for this or any previous visit (from the past 24 hour(s)). EEG was normal.    XR CHEST   7/4/2022:  No significant interval change of left upper lobe and left lower lobe infiltrate and/or atelectasis. XR HIP RIGHT   7/6/2022  No dislocations. No focal bony abnormalities                                                                                 NO ACUTE FRACTURE. CT Head   6/30/2022 Extra-axial spaces:  Normal. Intracranial hemorrhage:  None. Ventricular system: [Negative. Basal Cisterns:  Without anomaly. Cerebral Parenchyma: Bilateral, symmetric areas of decreased attenuation exerting no mass effect measuring approximately 9 mm in size since found within the bilateral globus pallidus (series 2, image 17). Midline Shift:  None. Cerebellum:  No anomaly identified. Paranasal sinuses and mastoid air cells:  No anomaly identified. Visualized Orbits:  Negative. Impression: Round symmetric areas decreased attenuation bilateral globus pallidus. This finding may be seen in patients experiencing hypoxia, i.e., carbon monoxide poisoning. MRA HEAD : 7/2/2022    Visualized brain: Grossly unchanged from the recent MRI of the brain with multiple areas of restricted diffusion. Please refer to the recent MRI brain report. INTRACRANIAL MRA:    The visualized distal vertebral and basilar arteries are widely patent. The distal ICAs are patent and within normal limits of caliber. The proximal ACAs, MCAs and PCAs are patent and within normal limits of caliber and configuration. There is no evidence of focal,  significant stenosis or aneurysm in the visualized vessels. EXTRACRANIAL MRA: Carotid Stenosis: Right Common:  No significant stenosis. Right Internal Plaque:  No significant plaque formation. Right Internal Carotid Stenosis (% by NASCET Criteria):  0% Left Common:  No significant stenosis.  Left Internal Carotid Plaque:  No significant plaque formation. Left Internal Carotid Stenosis (% by NASCET Criteria):  0% Cervical Vertebral Arteries: Patency:  Bilateral Dominance:  Left     Patent intracranial and extracranial circulation. XR CHEST   7/1/2022:  LEFT UPPER LUNG ATELECTASIS/PNEUMONIA. US ABDOMEN   7/5/2022    NEGATIVE RIGHT UPPER QUADRANT ULTRASOUND WITHOUT EVIDENCE OF CHOLELITHIASIS. US DUP LOWER EXTREMITY RIGHT ROBERT 7/3/2022 :  No acute DVT of the right lower extremity veins from the groin to the knee. No acute DVT of the right calf veins. MRA NECK 7/2/2022    Patent intracranial and extracranial circulation. MRI BRAIN  7/4/2022: Mildly increased edema in the areas of diffusion restriction within the bilateral globi pallidi, splenium of the corpus callosum, and supratentorial white since prior MRI July 1, 2022. No mass effect or midline shift. No acute intracranial hemorrhage. No enhancing mass or pathologic enhancement. Mildly increased edema in the areas of diffusion restriction within the bilateral globi pallidi, splenium of the corpus callosum, and supratentorial white since prior MRI July 1, 2022. MRI BRAIN   7/1/2022   Areas of restricted diffusion within the globus pallidus, supratentorial white matter and splenium of the corpus callosum are most consistent with carbon monoxide poisoning/hypoxemia and/or other toxic encephalopathy. There is no intracranial hemorrhage, mass effect, midline shift, extra-axial collection, significant cerebral volume loss or other complication identified. FINDINGS CONSISTENT WITH CARBON MONOXIDE POISONING/HYPOXEMIA AND/OR OTHER TOXIC ENCEPHALOPATHY. NO INTRACRANIAL HEMORRHAGE OR COMPLICATION IDENTIFIED. MRI CERVICAL THORACIC LUMBAR SPINE  7/7/2022      NEGATIVE MILDLY LIMITED CERVICAL, THORACIC AND LUMBAR SPINE MRI. Previous extensive, complex labs, notes and diagnostics reviewed and analyzed.      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 Functional and medical status   . Greatly status postacute rehab at [facility].  Bowel and Bladder dysfunction  , Neurogenic bowel and bladder:  frequent toileting, ambulate to bathroom with assistance, check post void residuals. Check for C.difficile x1 if >2 loose stools in 24 hours, continue bowel & bladder program.  Monitor bowel and bladder function. Lactinex 2 PO every AC. MOM prn, Brown Bomb prn, Glycerin suppository prn, enema prn. Encourage therapy and nursing to co-treat and problem solve re continence. Severe RLE pain as well as generalized OA pain: reassess pain every shift and prior to and after each therapy session, give prn Tylenol and consider scheduled Tylenol, modalities prn in therapy, masage, Lidoderm, K-pad prn. Consider scheduled AM pain meds. Skin healing   and breakdown risk:  continue pressure relief program.  Daily skin exams and reports from nursing. Fatigue due to nutritional and hydration deficiency: Add and titrate vitamin B12 vitamin D and CoQ10 continue to monitor I&Os, calorie counts prn, dietary consult prn. Add healthy snack at night. Acute episodic insomnia with situational adjustment disorder:  prn Ambien, monitor for day time sedation.   Falls risk elevated:  patient to use call light to get nursing assistance to get up, bed and chair alarm. Elevated DVT risk: progressive activities in PT, continue prophylaxis CHAR hose, elevation and Lovenox with goal to discontinue over the next few days. Complex discharge planning:  DC 22-pending insurance approval  to rehab at sub acute with substance abuse focus. Weekly team meeting every Thursday to re-assess progress towards goals, discuss and address social, psychological and medical comorbidities and to address difficulties they may be having progressing in therapy. Patient and family education is in progress. The patient is to follow-up with their family physician after discharge.    -however patient will also need a specialized sub acute rehab and combined drug recovery program after DC. Complex Active General Medical Issues that complicated  :      Drug abuse,   Polydrug dependence including opioid type drug with continuous use with complication -is aware of the implications that his drug use has had on his health and that he almost . He is planning on not using or overusing medications in the future. Altered mental status-status post drug overdose apparently was accidental overdose. Patient admits to taking 2 Xanax pills that he bought off on the street. Xanax in this area is notoriously laced with fentanyl. Like that he had inadvertent fentanyl overdose from the medication that he thought was Xanax that he bought on the street. 12-step program as advised. Vitamin D deficiency-Add high-dose vitamin D, recheck vitamin D level out after discharge,    Spasm of back muscles-lowest effective dose of muscle spasms  Cerebral edema with seizures-antiseizure medications consult neurology and steroid taper.     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 Liver toxicity likely secondary to polysubstance abuse-recheck LFTs as an outpatient avoid toxic medications    ADHD (attention deficit hyperactivity disorder)-lowest effective dose of stimulant medication    Anxiety/ROSALES (generalized anxiety disorder),   MDD (major depressive disorder), recurrent episode, moderate -emotional support provided daily, vitamin B12, encourage participation in rehabilitation support group and recreational therapy, adjust/add medications ( b Lexapro, Desyrel, Xanax and taper to lowest effective dose) Taper Deltasone-and taper Xanax under the direction of neurology and psychiatry. Baseline sinus tachycardia-  cardiology sinus tachycardia  -I reviewed-consult with cardiology and check thyroid and cortisol levels-Recenmt T4, K and Mag WNL. Heart rate seems to be somewhat better after starting verapamil-Obtain ECG. Will order Echo. LPRD (laryngopharyngeal reflux disease)-consult speech and language pathology    Shift work sleep disorder-patient may need to adjust his therapy times.        Will hold discharge for now and await pre-CERT and bed in specialty unit given complex rehabilitation needs  Cont all therapies until ttransfer    Monica Montes D.O., PM&R     Attending    [address]

## 2022-07-24 NOTE — PLAN OF CARE
Problem: Discharge Planning  Goal: Discharge to home or other facility with appropriate resources  Outcome: Progressing     Problem: Safety - Adult  Goal: Free from fall injury  Outcome: Progressing     Problem: ABCDS Injury Assessment  Goal: Absence of physical injury  Outcome: Progressing     Problem: Pain  Goal: Verbalizes/displays adequate comfort level or baseline comfort level  Outcome: Progressing     Problem: Neurosensory - Adult  Goal: Achieves maximal functionality and self care  Outcome: Progressing     Problem: Respiratory - Adult  Goal: Achieves optimal ventilation and oxygenation  Outcome: Progressing     Problem: Cardiovascular - Adult  Goal: Maintains optimal cardiac output and hemodynamic stability  Outcome: Progressing     Problem: Skin/Tissue Integrity - Adult  Goal: Skin integrity remains intact  Outcome: Progressing     Problem: Musculoskeletal - Adult  Goal: Return mobility to safest level of function  Outcome: Progressing     Problem: Gastrointestinal - Adult  Goal: Maintains adequate nutritional intake  Outcome: Progressing     Problem: Genitourinary - Adult  Goal: Absence of urinary retention  Outcome: Progressing     Problem: Infection - Adult  Goal: Absence of infection at discharge  Outcome: Progressing     Problem: Metabolic/Fluid and Electrolytes - Adult  Goal: Electrolytes maintained within normal limits  Outcome: Progressing     Problem: Hematologic - Adult  Goal: Maintains hematologic stability  Outcome: Progressing 1000mg

## 2022-07-25 VITALS
WEIGHT: 167.2 LBS | SYSTOLIC BLOOD PRESSURE: 126 MMHG | HEIGHT: 70 IN | TEMPERATURE: 98.1 F | HEART RATE: 92 BPM | RESPIRATION RATE: 18 BRPM | OXYGEN SATURATION: 100 % | BODY MASS INDEX: 23.94 KG/M2 | DIASTOLIC BLOOD PRESSURE: 82 MMHG

## 2022-07-25 PROCEDURE — 6370000000 HC RX 637 (ALT 250 FOR IP): Performed by: INTERNAL MEDICINE

## 2022-07-25 PROCEDURE — 97530 THERAPEUTIC ACTIVITIES: CPT

## 2022-07-25 PROCEDURE — 6370000000 HC RX 637 (ALT 250 FOR IP): Performed by: PHYSICAL MEDICINE & REHABILITATION

## 2022-07-25 PROCEDURE — 97535 SELF CARE MNGMENT TRAINING: CPT

## 2022-07-25 PROCEDURE — 99239 HOSP IP/OBS DSCHRG MGMT >30: CPT | Performed by: PHYSICAL MEDICINE & REHABILITATION

## 2022-07-25 RX ADMIN — ESCITALOPRAM OXALATE 20 MG: 20 TABLET ORAL at 09:30

## 2022-07-25 RX ADMIN — VERAPAMIL HYDROCHLORIDE 120 MG: 120 TABLET, FILM COATED, EXTENDED RELEASE ORAL at 09:30

## 2022-07-25 RX ADMIN — ALPRAZOLAM 0.25 MG: 0.25 TABLET ORAL at 06:00

## 2022-07-25 NOTE — PROGRESS NOTES
Facility/Department: [facility]  Physical Therapy Acute Rehab Discharge Summary  [place] [facility] [place]    NAME: Gloriann Kussmaul  : 1990  MRN: [id number]    Admission Date: 2022  5:31 PM  Discharge Date: 22    Rehab Diagnosis(es): Impaired mobility and ADL's due to hypoxic encephalopathy. [facility] admit 22  Patient Active Problem List    Diagnosis Date Noted    Tachycardia 07/15/2022    Cognitive impairment 07/15/2022    Hypoxic encephalopathy (HCC)     Acute retention of urine 2022    Diverticulitis of colon 2022    Fatigue 2022    Recurrent aphthous ulcer 2022    Spasm of back muscles 2022    Neuropathy of right peroneal nerve 2022    Lesion of right sciatic nerve 2022    Impaired mobility and ADLs 2022    Cerebral edema (HCC)     Weakness of right lower extremity      impaired mobility and ADLs dt encephalopathy  2022    Abdominal pain 2022    Depressive disorder 2022    Vitamin D deficiency 2022    Benzodiazepine dependence ([address] 2022    Right foot drop 2022    Altered mental status     Drug abuse ([address]     Acute encephalopathy 2022    Shift work sleep disorder 07/15/2019    ROSALES (generalized anxiety disorder) 2019    MDD (major depressive disorder), recurrent episode, moderate ([address] 2019    Polydrug dependence including opioid type drug with continuous use with complication ([address]     Anxiety 2015    ADHD (attention deficit hyperactivity disorder) 2012    Attention deficit hyperactivity disorder (ADHD) 2012    LPRD (laryngopharyngeal reflux disease) 10/19/2011       Past Medical History:   Diagnosis Date    ADHD (attention deficit hyperactivity disorder)     was initiated on treatment by Dr. Gertrude Pabon.   any testing was done here by Dr. Gertrude Pabon, no formal psych eval.      Anxiety     Drug abuse ([address]     MDD (major depressive disorder), recurrent episode, moderate ([address] 6/4/2019    Neuropathy of right peroneal nerve 7/12/2022     Past Surgical History:   Procedure Laterality Date    TONSILLECTOMY AND ADENOIDECTOMY      WISDOM TOOTH EXTRACTION         Indications for Skilled Intervention: Decreased functional mobility , Decreased ROM, Decreased ADL status, Decreased strength, Decreased posture, Increased pain, Decreased balance, Decreased coordination, Decreased safe awareness, Decreased vision/visual deficit    GOALS: MET ALL  Long Term Goals  Long term goal 1: Pt to complete bed mobility indep  Long term goal 2: Pt to complete transfers with indep (bed/chair/car)  Long term goal 3: Pt to ambulate >300ft indoor/outdoor without AD indep  Long term goal 4: Pt to achieve 20/24 DGI (achieves 23/24)  Long term goal 5: Pt to complete 5XSTS in 8sec (completes in 6.59sec)      Summary of POC: Throughout rehab stay, pt received skilled physical therapy treatment 0.5-1.5 hours daily for 2 weeks. Skilled Services Provided: Strengthening, ROM, Gait training, Stair training, Functional mobility training, Balance training, Transfer training, Neuromuscular re-education, Home exercise program, Safety education & training, Cognitive/Perceptual training, Endurance training, ADL/Self-care training, Manual Therapy - Soft Tissue Mobilization, Equipment evaluation, education, & procurement, Patient/Caregiver education & training (Please refer to daily notes for all treatment details)    ASSESSMENT: Pt achieves goals as outlined above. States readiness for DC. DC plan to subacute TBI facility for continued cognitive therapy. Discharge Plan: DC from acute rehab PT program at indep level of function.      Susana Westbrook, PT, 07/25/22 at 1:40 PM

## 2022-07-25 NOTE — PROGRESS NOTES
Discharge instructions provided quick assessment also complete. Pt. Denies pain or discomfort. Bowel movement this morning. Scripts provided to the accepting facility.pt. folder also supplied with prednisone. Parents were updated via . Report to be called to receiving facility. [id number]  Pt and personal belongings transported by [employer].   Father also assisted pt. And belongings.

## 2022-07-25 NOTE — PROGRESS NOTES
Assessment completed. VSS. Denies pain. Received scheduled meds per mar. LBM 7/22. No distress noted. Call light within reach.   Electronically signed by Lenny Graham RN on 7/24/2022 at 10:06 PM

## 2022-07-25 NOTE — DISCHARGE SUMMARY
Subjective: The patient complains of severe acute on chronic progressive fatigue and ataxia, cognitive slowing, right lower extremity numbness and stiffness partially relieved by rest, medications, PT,  OT, medication titration SLP and rest and exacerbated by recent OD-he was admitted to [facility] on 6/30/2022  with change in mental status and history of possible overdose. Family did a well check after his boss noticed that he had not shown up to work for several days and called the family out of concern. He was initially admitted and to the ICU. He had abnormal liver enzymes, rhabdomyolysis kidney injury. Urine was positive for amphetamines benzos and fentanyl. He was found to also be using Royle kratom overuse, transaminitis, EDGAR, rhabdomyolysis resulting in provoked seizure and cerebral edema. He admits that he took 2 Xanax that he bought on the street. He and I both fear that it may have been laced with fentanyl. Discussed with treatment team and hospitalist and neuro--  MRIs spine were normal..      I have been concerned about patients medical complexities and barriers to advancing in rehab goals including right lower extremity stiffness secondary to sciatic neuropathy and nerve injury as well as his history of substance abuse and overuse of benzodiazepines. His cognition is greatly improved we were able to slowly transition him off of sedatives. He still runs tachycardic at times and is requiring Calan SR at dose of 120 mg. He has been supervised by cardiology. He is for discharge possibly today to his new substance abuse facility to also address cognitive changes he has after his overdose. We will go ahead and discontinue his Lovenox. I have discussed and agreed with nursing, patient, psychiatry will continue to wean off of benzodiazepines completely and will continue to avoid stimulant in the form of the ADHD medications which she has been off of.     DISCHARGE SUMMARY    Hospital Course: The patient was admitted to the Rehabilitation Unit to address ADL and mobility deficits-as detailed above and below. The patient was enrolled in acute PT, OT program.  Weekly team meetings were held to assess functional progress toward their goals-and modify the therapy program.  The patient's medical, emotional, psychosocial and functional issues were addressed. The patient progressed in the rehab program and is now ready for discharge home. Refer to functional assessments summary report for detailed functional status. Refer to the medical problem list below to see the medical issues addressed. The social and DC complexities are detailed in the DC planning section below. Greater than  35 minutes was spent on coordinating patients discharge including follow-up care, medications and patient/family education. Extended time needed because of the potential use of controlled medications are high risk medications and a high risk population individual.  Patient and family were instructed to use lowest effective dose of these medications and slowly titrate off over the next 2 to 4 weeks. They are not to combine opiates with sedatives. I reviewed her [facility] prescription monitoring service data sheets in hopes of eliminating polypharmacy and weaning to the lowest effective dose of pain medications and eliminating the concomitant use of benzodiazepines. I see BZD medications of concern. I see habits of combining sedatives and narcotics. He has been weaned to lowest effective dose of benzodiazepine agrees to come off  -he will continue to wean off as an outpatient. I reviewed current care and plans for further care with other rehab providers including nursing and case management. According to recent nursing note, \"  VSS. Denies pain. Received scheduled meds per mar. LBM 7/22. No distress noted. Call light within reach. \"       Discussed his elevated heart rate is back down I Comments: Mildly impulsive. (07/12/22 1155)  Bed Mobility Training  Bed Mobility Training: Yes (07/16/22 1400)  Overall Level of Assistance: Independent (07/16/22 0927)  Interventions: Demonstration (07/16/22 2664)  Rolling: Independent (07/16/22 0927)  Supine to Sit: Independent (07/16/22 0927)  Sit to Supine: Independent (07/16/22 0267)  Scooting: Independent (07/16/22 0927)  Bed Mobility  Overall Assistance Level: Independent (07/22/22 1141)  Overall Assistance Level: Independent (07/22/22 1141)  Roll Left  Assistance Level: Independent (07/21/22 1119)  Roll Right  Assistance Level: Independent (07/21/22 1119)  Sit to Supine  Assistance Level: Independent (07/21/22 1119)  Supine to Sit  Assistance Level: Independent (07/21/22 1119)  Scooting  Assistance Level: Independent (07/21/22 1119)  Transfers:  Transfers  Sit to Stand: Stand by assistance;Contact guard assistance (07/12/22 1156)  Stand to sit: Stand by assistance;Contact guard assistance (07/12/22 1156)  Bed to Chair: Stand by assistance;Contact guard assistance (07/12/22 1156)  Car Transfer: Stand by assistance;Contact guard assistance (07/12/22 1156)  Comment: Varied performance. Pt with increased time to approach chair. Safe technique however inaccurate execution at times. 5XSTS = 10.7sec. (07/12/22 1156)  Transfer Training  Transfer Training: Yes (07/16/22 1400)  Overall Level of Assistance: Independent (07/16/22 1400)  Interventions: Safety awareness training (07/16/22 1400)  Sit to Stand: Independent;Supervision (07/16/22 1400)  Stand to Sit: Independent;Supervision (07/16/22 1400)  Stand Pivot Transfers: Supervision; Independent (07/16/22 1400)  Bed to Chair: Modified independent (07/16/22 1400)  Transfers  Surface: To chair with arms (07/15/22 1048)  Sit to Stand  Assistance Level: Independent (07/22/22 1141)  Skilled Clinical Factors: 5x STS = 6.59 sec (07/20/22 1203)  Stand to Sit  Assistance Level:  Independent (07/22/22 1141)  Skilled Clinical Factors: Occasionally unstable to approach to chair (07/13/22 1214)  Bed To/From Chair  Technique: Stand step (07/22/22 1141)  Assistance Level: Independent (07/22/22 1141)  Car Transfer  Assistance Level: Independent (07/21/22 1119)  Skilled Clinical Factors: Performed in family vehicle (07/15/22 1534)  Floor Transfer  Assistance Level: Independent (07/21/22 1119)  Skilled Clinical Factors: Demonstation provided for proper technique (07/15/22 1048)  Gait:   Ambulation  Surface: level tile (07/12/22 1241)  Device: No Device (07/12/22 1241)  Assistance: Stand by assistance;Contact guard assistance (07/12/22 1241)  Quality of Gait: Decreased speed and step length. Challenged with turns and obstacles. (07/12/22 1241)  Distance: 150ft; 75ft; multiple small distances within gym focusing on obstacles and turns. Gaze stabilization introduced. (07/12/22 1241)  Gait Training: Yes (07/16/22 1400)  Overall Level of Assistance: Stand-by assistance (07/16/22 1400)  Distance (ft): 250 Feet (07/16/22 0927)  Assistive Device: Other (comment) (no device needed) (07/16/22 0927)  Interventions: Safety awareness training (07/16/22 1400)  Base of Support: Widened (07/16/22 0927)  Speed/Radha: Fluctuations (07/16/22 0927)  Step Length: Right shortened (07/16/22 0927)  Swing Pattern: Left asymmetrical (07/16/22 0927)  Gait Abnormalities: Altered arm swing (07/16/22 0927)  Rail Use: Left (07/16/22 0927)  Right Side Weight Bearing: As tolerated (07/16/22 1400)  Left Side Weight Bearing: As tolerated (07/16/22 1400)  Uneven Terrain - Level of Assistance: Stand-by assistance (07/16/22 0927)  Ambulation  Surface: Level surface; Uneven surface; Carpet; Ramp (07/22/22 1142)  Distance: 1000'+ (07/22/22 1142)  Activity: Within Unit (07/22/22 1142)  Activity Comments: Reciprocalpattern good safey transitioning from surface to surface. (07/22/22 1142)  Assistance Level:  Independent (07/22/22 1142)  Skilled Clinical Factors: mild antalgia, decreased heel strike on right (07/22/22 1142)  Stairs:  Stairs/Curb  Stairs?: Yes (07/12/22 1241)  Stairs  # Steps : 4 (07/12/22 1241)  Rails: Right ascending (07/12/22 1241)  Assistance: Stand by assistance (07/12/22 1241)  Comment: Step to pattern (07/12/22 1241)  Stairs - Level of Assistance: Supervision (07/16/22 7275)  Number of Stairs Trained: 24 (07/16/22 0927)  Stairs  Stair Height: 6'' (07/22/22 1142)  Device: One handrail (07/22/22 1142)  Number of Stairs: 12 (07/22/22 1142)  Additional Factors: Reciprocal going up;Reciprocal going down (07/22/22 1142)  Assistance Level: Independent (07/22/22 1142)  Skilled Clinical Factors: Good safety ascending increased difficulty descending requires increased effort to complete (07/15/22 1053)  Curb  Curb Height: 6'' (07/19/22 1028)  Number of Curbs: 1 (07/19/22 1028)  Assistance Level: Independent (07/19/22 1028)  W/C mobility:  Wheelchair Management  Wheelchair Management: No (07/13/22 1539)    Occupational Therapy:   Hand Dominance: Right  ADL  Feeding: Setup (07/12/22 1018)  Grooming: Modified independent  (07/12/22 1018)  Grooming Skilled Clinical Factors: oral hygiene, comb hair (07/12/22 1018)  UE Bathing: Supervision (07/12/22 1018)  UE Bathing Skilled Clinical Factors: cues to attend to all areas, wash hair, thoroughness (07/12/22 1018)  LE Bathing: Minimal assistance (07/12/22 1018)  LE Bathing Skilled Clinical Factors: assist to reach RLE and for posterior hygiene (07/12/22 1018)  UE Dressing Skilled Clinical Factors: gown only (07/12/22 1018)  LE Dressing: Minimal assistance (07/12/22 1018)  LE Dressing Skilled Clinical Factors: assist with R sock, pt declined to don pants/bref (07/12/22 1018)  Toileting: Minimal assistance (07/12/22 1018)  Additional Comments: Shower completed.  Pt impulsive at times (07/12/22 1018)  Toilet Transfers  Toilet - Technique: Ambulating (07/12/22 1022)  Equipment Used: Grab bars (07/12/22 1022)  Toilet Transfer: Contact guard assistance (07/12/22 1022)     Shower Transfers  Shower - Transfer From: Ronny Bejarano (07/12/22 1022)  Shower - Transfer Type: To and From (07/12/22 1022)  Shower - Transfer To: Shower seat with back (07/12/22 1022)  Shower - Technique: Stand pivot (07/12/22 1022)  Shower Transfers: Contact Guard (07/12/22 1022)    Speech Therapy:      Comprehension:  (Intermittent repetition of directions is required during tasks secondary to reduced recall and memory deficits)  Verbal Expression:  (Reduced utterance length has been noted during conversation. Improvement noted this date compared to 07/15/2022 session. Pt presents with flat affect and high level naming deficits)  Diet/Swallow:        Dysphagia Outcome Severity Scale: Level 6: Within functional limits/Modified independence  Compensatory Swallowing Strategies : Small bites/sips, Upright as possible for all oral intake, Alternate solids and liquids          Lab/X-ray studies reviewed, analyzed and discussed with patient and staff:   No results found for this or any previous visit (from the past 24 hour(s)). EEG was normal.    XR CHEST   7/4/2022:  No significant interval change of left upper lobe and left lower lobe infiltrate and/or atelectasis. XR HIP RIGHT   7/6/2022  No dislocations. No focal bony abnormalities                                                                                 NO ACUTE FRACTURE. CT Head   6/30/2022 Extra-axial spaces:  Normal. Intracranial hemorrhage:  None. Ventricular system: [Negative. Basal Cisterns:  Without anomaly. Cerebral Parenchyma: Bilateral, symmetric areas of decreased attenuation exerting no mass effect measuring approximately 9 mm in size since found within the bilateral globus pallidus (series 2, image 17). Midline Shift:  None. Cerebellum:  No anomaly identified. Paranasal sinuses and mastoid air cells:  No anomaly identified. Visualized Orbits:  Negative.      Impression: Round symmetric areas decreased attenuation bilateral globus pallidus. This finding may be seen in patients experiencing hypoxia, i.e., carbon monoxide poisoning. MRA HEAD : 7/2/2022    Visualized brain: Grossly unchanged from the recent MRI of the brain with multiple areas of restricted diffusion. Please refer to the recent MRI brain report. INTRACRANIAL MRA:    The visualized distal vertebral and basilar arteries are widely patent. The distal ICAs are patent and within normal limits of caliber. The proximal ACAs, MCAs and PCAs are patent and within normal limits of caliber and configuration. There is no evidence of focal,  significant stenosis or aneurysm in the visualized vessels. EXTRACRANIAL MRA: Carotid Stenosis: Right Common:  No significant stenosis. Right Internal Plaque:  No significant plaque formation. Right Internal Carotid Stenosis (% by NASCET Criteria):  0% Left Common:  No significant stenosis. Left Internal Carotid Plaque:  No significant plaque formation. Left Internal Carotid Stenosis (% by NASCET Criteria):  0% Cervical Vertebral Arteries: Patency:  Bilateral Dominance:  Left     Patent intracranial and extracranial circulation. XR CHEST   7/1/2022:  LEFT UPPER LUNG ATELECTASIS/PNEUMONIA. US ABDOMEN   7/5/2022    NEGATIVE RIGHT UPPER QUADRANT ULTRASOUND WITHOUT EVIDENCE OF CHOLELITHIASIS. US DUP LOWER EXTREMITY RIGHT ROBERT 7/3/2022 :  No acute DVT of the right lower extremity veins from the groin to the knee. No acute DVT of the right calf veins. MRA NECK 7/2/2022    Patent intracranial and extracranial circulation. MRI BRAIN  7/4/2022: Mildly increased edema in the areas of diffusion restriction within the bilateral globi pallidi, splenium of the corpus callosum, and supratentorial white since prior MRI July 1, 2022. No mass effect or midline shift. No acute intracranial hemorrhage. No enhancing mass or pathologic enhancement.      Mildly increased edema in the areas of diffusion restriction within the bilateral patient was made aware of the team discussion regarding their progress. 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 Functional and medical status   . Greatly status postacute rehab at [facility].  We will transition to skilled rehabilitation with focus on recovery from substance abuse. Bowel and Bladder dysfunction  , Neurogenic bowel and bladder:  frequent toileting, ambulate to bathroom with assistance, check post void residuals. Check for C.difficile x1 if >2 loose stools in 24 hours, continue bowel & bladder program.  Monitor bowel and bladder function. Lactinex 2 PO every AC. MOM prn, Brown Bomb prn, Glycerin suppository prn, enema prn. Encourage therapy and nursing to co-treat and problem solve re continence. Severe RLE pain as well as generalized OA pain: reassess pain every shift and prior to and after each therapy session, give prn Tylenol and consider scheduled Tylenol, modalities prn in therapy, masage, Lidoderm, K-pad prn. Consider scheduled AM pain meds. Skin healing   and breakdown risk:  continue pressure relief program.  Daily skin exams and reports from nursing. Fatigue due to nutritional and hydration deficiency: Add and titrate vitamin B12 vitamin D and CoQ10 continue to monitor I&Os, calorie counts prn, dietary consult prn. Add healthy snack at night. Acute episodic insomnia with situational adjustment disorder:  prn Ambien, monitor for day time sedation. Falls risk elevated:  patient to use call light to get nursing assistance to get up, bed and chair alarm.   Elevated DVT risk: progressive activities in PT, continue prophylaxis CHAR hose, elevation and Lovenox with goal to discontinue over the next few days. Complex discharge planning:  DC 22-pending insurance approval  to rehab at sub acute with substance abuse focus. SP weekly team meeting every Thursday to re-assess progress towards goals, discuss and address social, psychological and medical comorbidities and to address difficulties they may be having progressing in therapy. Patient and family education is in progress. The patient is to follow-up with their family physician after discharge. -  patient will also need a specialized sub acute rehab and combined drug recovery program after DC. We had to hold his discharge pending his certification with that specific facility through his insurance which has been done. Complex Active General Medical Issues that complicated  : Active chronic drug abuse,   Polydrug dependence including opioid type drug with continuous use with complication -is aware of the implications that his drug use has had on his health and that he almost . He is planning on not using or overusing medications in the future. He is been able to come off of his ADD medications and wean substantially off of his benzodiazepines. Altered mental status-status post drug overdose apparently was accidental overdose. Patient admits to taking 2 Xanax pills that he bought off on the street. Xanax in this area is notoriously laced with fentanyl. Like that he had inadvertent fentanyl overdose from the medication that he thought was Xanax that he bought on the street. 12-step program as advised. Vitamin D deficiency-Add high-dose vitamin D, recheck vitamin D level out after discharge,    Spasm of back muscles-lowest effective dose of muscle spasms  Cerebral edema with seizures-antiseizure medications consult neurology and steroid taper.     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 Liver toxicity likely secondary to polysubstance abuse-recheck LFTs as an outpatient avoid toxic medications    ADHD (attention deficit hyperactivity disorder)-lowest effective dose of stimulant medication    Anxiety/ROSALES (generalized anxiety disorder),   MDD (major depressive disorder), recurrent episode, moderate -emotional support provided daily, vitamin B12, encourage participation in rehabilitation support group and recreational therapy, adjust/add medications ( Vit b Lexapro, Desyrel, Xanax and taper to lowest effective dose) Taper Deltasone-and taper Xanax under the direction of neurology and psychiatry. Baseline sinus tachycardia-  cardiology sinus tachycardia  -I reviewed-consult with cardiology and check thyroid and cortisol levels-Recenmt T4, K and Mag WNL. Heart rate seems to be somewhat better after starting verapamil-Obtain ECG. Will order Echo. LPRD (laryngopharyngeal reflux disease)-consult speech and language pathology    Shift work sleep disorder-patient may need to adjust his therapy times. Focus on endurance, activity pacing, reassessing rehab goals and discharge planning.           Electronically signed by Prabhjot Lund DO on 7/13/22 at 8:35 AM EDT       Satish Montes D.O., PM&R     Attending    [address]

## 2022-07-25 NOTE — PROGRESS NOTES
[facility]   [facility]  Speech Language Pathology  Discharge Report        Patient: Justa Barakat  : 1990    Date: 2022    Initial Status:  Diet:   Regular solids with thin liquids  Dysphagia Outcome Severity Scale:  Ratin    Speech Therapy Level of Assistance Scale: Auditory Comprehension:  Rating: Modified Independent  Verbal Expression:  Rating:Independent-Modified Independent  Motor Speech:  Rating: Independent  Problem Solving:  Rating: Supervised Assistance-Minimal Assistance  Memory:  Rating: Supervised Assistance-Minimal Assistance      Long Term Goals:  Long-term Goals  Timeframe for Long-term Goals: 2-3 weeks for LOS or until goals met  Goal 1: Pt will demonstrate functional cognitive-linguistic abilities in all opportunities with modified independence in order to safely complete ADLs. Patient's Response to Therapy:  Patient required an increase in assist level with verbal expression, problem solving and memory secondary to increased difficulty level with task compared to initial eval.  Patient reports he is at a 7/10 level with his cognition compared to a 3/10 when he was admitted to [facility]. Mild to mild to moderate cognitive-linguistic deficits continue to be noted with high level problem solving, executive function, memory and high level naming. Patient continues to present with reduced length of utterance when answering questions. Patient would benefit from continued cognitive-linguistic therapy. Discharge Status:  Diet:   No diet orders on file  Compensatory Swallowing Strategies : Small bites/sips, Upright as possible for all oral intake, Alternate solids and liquids  Dysphagia Outcome Severity Scale:  Ratin    Speech Therapy Level of Assistance Scale:   Auditory Comprehension:  Rating: Modified Independent  Verbal Expression:  Rating:Minimal Assistance  Motor Speech:  Rating: Independent  Problem Solving:  Rating: Minimal Assistance  Memory:  Rating: Minimal Assistance        Functional Status at time of Discharge:    Cognition: Patient demonstrates mild-moderate cognitive deficits. Language: Patient demonstrates mild-moderate language deficits. Motor Speech: Patient demonstrates no motor speech deficits. Swallow: Patient demonstrates no dysphagia.                                  Patient is discharged to [facility]               [x] Recommend continued speech therapy   [] Speech Therapy is no longer warranted      Signature:  Electronically signed by SOPHIE Goodwin on 7/25/2022 at 3:08 PM

## 2022-07-25 NOTE — PLAN OF CARE
Problem: Discharge Planning  Goal: Discharge to home or other facility with appropriate resources  7/25/2022 0920 by Lane Mcnulty RN  Outcome: Completed  7/24/2022 2159 by Nieves Garcia RN  Outcome: Progressing     Problem: Safety - Adult  Goal: Free from fall injury  7/25/2022 0920 by Lane Mcnulty RN  Outcome: Completed  7/24/2022 2159 by Nieves Garcia RN  Outcome: Progressing     Problem: ABCDS Injury Assessment  Goal: Absence of physical injury  7/25/2022 0920 by Lane Mcnulty RN  Outcome: Completed  7/24/2022 2159 by Nieves Garcia RN  Outcome: Progressing     Problem: Pain  Goal: Verbalizes/displays adequate comfort level or baseline comfort level  7/25/2022 0920 by Lane Mcnulty RN  Outcome: Completed  7/24/2022 2159 by Nieves Garcia RN  Outcome: Progressing     Problem: Neurosensory - Adult  Goal: Achieves maximal functionality and self care  7/25/2022 0920 by Lane Mcnulty RN  Outcome: Completed  7/24/2022 2159 by Nieves Garcia RN  Outcome: Progressing     Problem: Respiratory - Adult  Goal: Achieves optimal ventilation and oxygenation  7/25/2022 0920 by Lane Mcnulty RN  Outcome: Completed  7/24/2022 2159 by Nieves Garcia RN  Outcome: Progressing     Problem: Cardiovascular - Adult  Goal: Maintains optimal cardiac output and hemodynamic stability  7/25/2022 0920 by Lane Mcnulty RN  Outcome: Completed  7/24/2022 2159 by Nieves Garcia RN  Outcome: Progressing     Problem: Skin/Tissue Integrity - Adult  Goal: Skin integrity remains intact  7/25/2022 0920 by Lane Mcnulty RN  Outcome: Completed  Flowsheets (Taken 7/24/2022 2201 by Nieves Garcia RN)  Skin Integrity Remains Intact: Monitor for areas of redness and/or skin breakdown  7/24/2022 2159 by Nieves Garcia RN  Outcome: Progressing     Problem: Musculoskeletal - Adult  Goal: Return mobility to safest level of function  7/25/2022 0920 by Lane Mcnulty RN  Outcome: Completed  7/24/2022 2159 by Jean Mcqueen RN  Outcome: Progressing     Problem: Gastrointestinal - Adult  Goal: Maintains adequate nutritional intake  7/25/2022 0920 by Vinicio Odom RN  Outcome: Completed  7/24/2022 2159 by Jean Mcqueen RN  Outcome: Progressing     Problem: Genitourinary - Adult  Goal: Absence of urinary retention  7/25/2022 0920 by Vinicio Odom RN  Outcome: Completed  7/24/2022 2159 by Jean Mcqueen RN  Outcome: Progressing     Problem: Infection - Adult  Goal: Absence of infection at discharge  7/25/2022 0920 by Vinicio Odom RN  Outcome: Completed  7/24/2022 2159 by Jean Mcqueen RN  Outcome: Progressing     Problem: Metabolic/Fluid and Electrolytes - Adult  Goal: Electrolytes maintained within normal limits  7/25/2022 0920 by Vinicio Odom RN  Outcome: Completed  7/24/2022 2159 by Jean Mcqueen RN  Outcome: Progressing     Problem: Hematologic - Adult  Goal: Maintains hematologic stability  7/25/2022 0920 by Vinicio Odom RN  Outcome: Completed  7/24/2022 2159 by Jean Mcqueen RN  Outcome: Progressing

## 2022-07-25 NOTE — PROGRESS NOTES
OCCUPATIONAL THERAPY  INPATIENT REHAB TREATMENT NOTE  [facility]      NAME: Matilde Ramos  : 1990 (27 y.o.)  MRN: [id number]  CODE STATUS: Full Code  Room: G408/H043-78    Date of Service: 2022    Referring Physician: Dr. Patel Holm Diagnosis: Imp ADLs d/t hypoxic encephalopathy s/p overdose likely secondary to fentanyl laced xanax pill    Restrictions  Restrictions/Precautions  Restrictions/Precautions: Fall Risk              Patient's date of birth confirmed: Yes    SAFETY:  Safety Devices  Safety Devices in place: Yes  Type of devices: All fall risk precautions in place    SUBJECTIVE:  Subjective: \"I'm leaving today. \"  Pain: 3/10 R hip    Pain at start of treatment: Yes: 3/10    Pain at end of treatment: Yes: 3/10    Location: R hip   Nursing notified: Declined      COGNITION:         Pt's current cognitive status is:  Comprehension: Independent  Expression: Supervision  Social Interaction: Supervision  Problem Solving: Min A  Memory: Supervision    OBJECTIVE:     Pt completed shower ADL at the below level. Pt packed items in suit case, completing item transport/retrieval gathering all items from bathroom and personal belongings around room independently. Pt completed BUE strengthening task with 1# wrist weights assembling structure from visual model with 100% accuracy. Pt reached repetitively across table top level to retrieve and assemble pieces. Feeding  Assistance Level: Independent  Grooming/Oral Hygiene  Assistance Level: Independent  Upper Extremity Bathing  Assistance Level: Independent  Lower Extremity Bathing  Assistance Level: Independent  Upper Extremity Dressing  Assistance Level:  Independent  Lower Extremity Dressing  Assistance Level: Modified independent  Skilled Clinical Factors: SA for RLE sock  Putting On/Taking Off Footwear  Assistance Level: Modified independent  Toileting  Skilled Clinical Factors: NT did not need to go  Toilet Transfers  Technique: (ambulating)  Equipment: Grab bars  Assistance Level: Modified independent  Tub/Shower Transfers  Type: Shower  Transfer From: Standing without device  Transfer To: Shower chair with back  Assistance Level: Modified independent  Skilled Clinical Factors: slight LOB upon exiting shower however pt able to self correct with UE support on GB         Functional Mobility  Device:  (no device)  Activity: To/From bathroom; Retrieve items;Transport items  Assistance Level: Independent  Sit to Stand  Assistance Level: Modified independent  Stand to Sit  Assistance Level: Modified independent             ASSESSMENT:  Activity Tolerance: Patient tolerated treatment well      PLAN OF CARE:  Strengthening, Balance training, Functional mobility training, Endurance training, Neuromuscular re-education, Safety education & training, Patient/Caregiver education & training, Equipment evaluation, education, & procurement, Self-Care / ADL, Home management training  Continue POC    Patient goals : \"Get home, get back to life, feel normal\"  Time Frame for Long term goals :  Within 1 weeks, pt to demo progress in the following areas listed below to achieve specific LTGs as stated in the evaluation  Long Term Goal 1: Pt will demo improved overall ADL/IADL status  Long Term Goal 2: Pt will demo improved standing balance and tolerance for self-care completion  Long Term Goal 3: Pt will demo improved activity tolerance for completion of ADL/IADL  Long Term Goal 4: Pt will demo improved overall safety and cognition in order to be able to care for himself with minimal assistance        Therapy Time:   Individual Group Co-Treat   Time In 0830       Time Out 0925         Minutes 55                   ADL/IADL trainin minutes  Therapeutic activities: 10 minutes     Electronically signed by:    Abhishek Davey OT,   2022, 9:35 AM

## 2022-07-25 NOTE — CARE COORDINATION
[facility] received precert and is OK for DC. Called to update the patient's mother and she said she knew and has arranged transport with Mr Cherie Thapa from [facility] [employer]. She said he will be there to transport the patient in an hour. Discussed the POA with the patient and family and Lev Gomes from [facility] will have the LSW at their facility take care of it for them. Confirmed the address with Lev Gomes from [facility] for the  and his Mother. Updated RN. Called therapy, and PM&R. The patient is packing and getting ready to go. Called his father to update on plan as well.   Electronically signed by Hamida Haji RN on 7/25/22 at 9:24 AM EDT

## 2022-07-25 NOTE — PLAN OF CARE
Hematologic - Adult  Goal: Maintains hematologic stability  7/24/2022 2159 by Yary Sprague RN  Outcome: Progressing

## 2022-11-03 ENCOUNTER — HOSPITAL ENCOUNTER (OUTPATIENT)
Dept: SPEECH THERAPY | Age: 32
Setting detail: THERAPIES SERIES
Discharge: HOME | End: 2022-11-03
Payer: COMMERCIAL

## 2022-11-03 ENCOUNTER — HOSPITAL ENCOUNTER (OUTPATIENT)
Dept: PHYSICAL THERAPY | Age: 32
Setting detail: THERAPIES SERIES
Discharge: HOME | End: 2022-11-03
Payer: COMMERCIAL

## 2022-11-03 PROCEDURE — 96125 COGNITIVE TEST BY HC PRO: CPT

## 2022-11-03 PROCEDURE — 97161 PT EVAL LOW COMPLEX 20 MIN: CPT

## 2022-11-03 PROCEDURE — 92523 SPEECH SOUND LANG COMPREHEN: CPT

## 2022-11-03 NOTE — PROGRESS NOTES
[address]  PHYSICAL THERAPY EVALUATION    Physical Therapy: Initial Evaluation    Patient: Obdulio Montague (1 y.o.     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 Chula Quintanilla.   any testing was done here by Dr. Chula Quintanilla, no formal psych eval.      Anxiety     Drug abuse [facility])     MDD (major depressive disorder), recurrent episode, moderate ([facility] [address] 2019    Neuropathy of right peroneal nerve 2022     Surgical History:   Past Surgical History:   Procedure Laterality Date    TONSILLECTOMY AND ADENOIDECTOMY      WISDOM TOOTH EXTRACTION         Medications:   Current Outpatient Medications:     escitalopram (LEXAPRO) 20 MG tablet, Take 1 tablet by mouth in the morning., Disp: 30 tablet, Rfl: 3    traZODone (DESYREL) 50 MG tablet, Take 1 tablet by mouth nightly, Disp: 30 tablet, Rfl: 1    verapamil (CALAN SR) 120 MG extended release tablet, Take 1 tablet by mouth in the morning and at bedtime, Disp: 60 tablet, Rfl: 1  Allergies: Peanut-containing drug products, Nuts [macadamia nut oil], Peanut oil, and Shellfish-derived products      SUBJECTIVE EXAMINATION     History obtained from[de-identified] Patient,      Family/Caregiver Present: No    Subjective History: Onset Date:  (June 24, 2022)  Subjective: Pt stated to PT due to R LE weakness after seizure and laid on floor X 2 days against a wall.   No fxs.; Pt to PT completing walking and stretching- gastroc, hamstring- decreased stiffness approx 2 wks ago- pt stated he left the program.  Additional Pertinent Hx (if applicable): seizure, ADHA, depression, anxiety, neuropathy R peroneal nerve         Pain Screening    Pain Screening  Patient Currently in Pain: Denies (\"stiffness R hip and R ankle\")    Functional Status    Social History:    Social History  Lives With: Parent  Type of Home: House  Home Layout: One level  Home Access: Level entry    Occupation/Interests:   Occupation: Full time employment  Type of Occupation: [employer]- worked prior to the seizures    Prior Level of Function:     Independent     Current Level of Function:   ADL Assistance: Independent  Homemaking Assistance: Independent  Homemaking Responsibilities: Yes  Ambulation Assistance: Independent  Transfer Assistance: Independent  Active : No (was driving prior to June)  Patient's  Info: father  Additional Comments: pt reports no recent falls    OBJECTIVE EXAMINATION   Palpation:   Right Hip Palpation: no pain with palpation    Mobility:   Ambulation  Surface: Carpet  Device: No Device  Assistance: Independent  Quality of Gait: increased trunk sway; intermittent decreased toe clearance R during swing R  Distance: short distances in gym  Stairs/Curb  Stairs?: Yes  Stairs  # Steps : 4  Stairs Height: 6\"  Rails: None  Device: No Device  Assistance: Independent  Comment: reciprocal; no evidence of weakness    Balance Screen:   Single Leg Stance  Right Leg Eyes Open: 5 seconds  Left Leg Eyes Open: 30 seconds    Neuro Screen: Sensation  Overall Sensation Status: Impaired (numbness R anterior lower leg which is constant)    Left AROM  Right AROM          WNL    AROM RLE (degrees)  R Hip Flexion 0-125: 100  R Hip Extension 0-10: 5  R Hip ABduction 0-45: 30  R Hip ADduction 0-10: 10  R Hip External Rotation 0-45: 30  R Hip Internal Rotation 0-45: 25   General AROM LE: Left WNL    Left Strength  Right Strength      General Strength Testing LE: Left WNL    General Strength Testing LE: Left WNL  Strength RLE  R Hip Flexion: 3+/5  R Hip Extension: 3+/5  R Hip ABduction: 3+/5  R Hip ADduction: 4+/5  R Hip Internal Rotation: 3+/5  R Hip External Rotation: 3+/5     Outcomes Score:  Exam: DGI 24/24    Treatment:  Exercises:   Exercises  Exercise 1: piriformis stretch 20 sec X 3 R/L  Exercise 2: hamstring stretch/gastroc stretch/ITB stretch/quad stretch*  Exercise 3: SLR*  Exercise 4: s/l hip abd/ clam/ reverse clam*  Exercise 5: prone hip ext, hip ext with knee flexion, hamstring curl*  Exercise 6: step ups fwd/lat*  Exercise 7: SLS on foam R*/ SLS HR*  Exercise 8: vectors*  Exercise 9: TG*  Exercise 10: TM*/bike*  Exercise 11: core strength*  Exercise 12: HR/TR*  Exercise 13: squats/lunges*  Exercise 14: sensory re-education R peroneal nerve*  Exercise 20: HEP: piriformis stretch     Manual:  Manual Therapy  Soft Tissue Mobilizaton: R hip*  Other: PROM R hip*  *Indicates exercise,modality, or manual techniques to be initiated when appropriate       ASSESSMENT     Impression: Assessment: Pt is 27 y.o. male to PT due to weakness R LE and abnormal gait pattern after seizure and fall. Pt exhibits impairments including decreased R hip AROM, decreased R LE strength, decreased R lower leg sensation, and decreased balance which is preventing pt from returning to work and returning to normal function.   Pt requires continued PT to address these impairments, progress strength and ROM, and provide pt with HEP for self management of symptoms for return to normalized gait pattern. Body Structures, Functions, Activity Limitations Requiring Skilled Therapeutic Intervention: Decreased functional mobility , Decreased ROM, Decreased strength, Decreased sensation, Decreased balance    Statement of Medical Necessity: Physical Therapy is both indicated and medically necessary as outlined in the POC to increase the likelihood of meeting the functionally related goals stated below. Patient's Activity Tolerance: Patient tolerated evaluation without incident      Patient's rehabilitation potential/prognosis is considered to be: Excellent    Factors which may impact rehabilitation potential include: None  Measures taken to address barrier(s): N/A  Patient Education: Goals, PT Role, Plan of Care, Evaluative findings, Home Exercise Program, Insurance      GOALS   Patient Goal(s): Patient Goals : \"walking without a limp\"    Short Term Goals Completed by 1 wk Goal Status   Pt will demonstrate improved R hip AROM for improved movement of R hip during functional activity. New   Pt will demosntrate improved R LE strength >/= 4+/5 for return to normalized gait pattern. New     Long Term Goals Completed by 3 wks Goal Status   LTG 1 Pt will demonstrate indep and 100% compliance with HEP for return to PLOF. New   LTG 2 Pt will demonstrate amb community distances without gait deviations 100% of the time.  New        TREATMENT PLAN     Treatment may include any combination of the following: Strengthening, ROM, Balance training, Functional mobility training, Transfer training, Endurance training, Gait training, Stair training, Neuromuscular re-education, Manual Therapy - Soft Tissue Mobilization, Pain management, Home exercise program, Safety education & training, Patient/Caregiver education & training, Equipment evaluation, education, & procurement, Modalities, Positioning, Therapeutic activities     Frequency / Duration:  Patient to be seen 2 xs/wk times per week for 2-3 weeks  Plan Comment:    none          Eval Complexity:   Decision Making: Low Complexity  History: Personal Factors and/or Comorbidities Impacting POC: High  History: seizure, ADHA, depression, anxiety, neuropathy R peroneal nerve  Examination of body system(s) including body structures and functions, activity limitations, and/or participation restrictions: Low  Exam: DGI 24/24  Clinical Presentation: Low  Clinical Presentation: stable    POST-PAIN     Pain Rating (0-10 pain scale):   0/10  Location and pain description same as pre-treatment unless indicated. Action: [x] NA  [] Call Physician  [] Perform HEP  [] Meds as prescribed    Evaluation and patient rights have been reviewed and patient agrees with plan of care. Yes  [x]  No  []   Explain:     Anne Fall Risk Assessment  Risk Factor Scale  Score   History of Falls [] Yes  [x] No 25  0 0   Secondary Diagnosis [] Yes  [x] No 15  0 0   Ambulatory Aid [] Furniture  [] Crutches/cane/walker  [x] None/bedrest/wheelchair/nurse 30  15  0 0   IV/Heparin Lock [] Yes  [x] No 20  0 0   Gait/Transferring [] Impaired  [] Weak  [x] Normal/bedrest/immobile 20  10  0 0   Mental Status [] Forgets limitations  [x] Oriented to own ability 15  0 0      Total:0     Based on the Assessment score: check the appropriate box.   [x]  No intervention needed   Low =   Score of 0-24  []  Use standard prevention interventions Moderate =  Score of 24-44   [] Discuss fall prevention strategies   [] Indicate moderate falls risk on eval  []  Use high risk prevention interventions High = Score of 45 and higher   [] Discuss fall prevention strategies   [] Provide supervision during treatment time      Minutes:  PT Individual Minutes  Time In: 0903  Time Out: [address] [facility].  Minutes: 52  Timed Code Treatment Minutes: 0 Minutes  Procedure Minutes: eval X 52 min    Electronically signed by Darvin Irwin PT on 11/3/22 at 9:54 AM EDT

## 2022-11-03 NOTE — PROGRESS NOTES
[address]     Ph: [phone]  Fax: [phone]      [] Certification  [] Recertification [x]  Plan of Care  [] Progress Note [] Discharge      Referring Provider: India Ryan DO Referring Provider (secondary): none   From:  Yun Sampson, PT  Patient: Suzanne Friedman (5 y.o. 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] R LE weakness Diagnosis: R LE weakness   Treatment Diagnosis: decreased R hip AROM, decreased R LE strength, decreased R lower leg sensation, and decreased balance which is preventing pt from returning to work and returning to normal function    Plan of Care/Certification Expiration Date: :     Progress Report Period from:  11/3/2022  to 11/3/2022    Visits to Date: 1 No Show: 0 Cancelled Appts: 0    OBJECTIVE:   Short Term Goals - Time Frame for Short Term Goals: 1 wk    Goals Current/Discharge status  Status   Short Term Goal 1: Pt will demonstrate improved R hip AROM for improved movement of R hip during functional activity. AROM RLE (degrees)  R Hip Flexion 0-125: 100  R Hip Extension 0-10: 5  R Hip ABduction 0-45: 30  R Hip ADduction 0-10: 10  R Hip External Rotation 0-45: 30  R Hip Internal Rotation 0-45: 25    New   Short Term Goal 2: Pt will demosntrate improved R LE strength >/= 4+/5 for return to normalized gait pattern. Strength RLE  R Hip Flexion: 3+/5  R Hip Extension: 3+/5  R Hip ABduction: 3+/5  R Hip ADduction: 4+/5  R Hip Internal Rotation: 3+/5  R Hip External Rotation: 3+/5         General Strength Testing LE: Left WNL   New     Long Term Goals - Time Frame for Long Term Goals : 3 wks  Goals Current/ Discharge status Status   Long Term Goal 1: Pt will demonstrate indep and 100% compliance with HEP for return to PLOF.  HEP initiated New   Long Term Goal 2: Pt will demonstrate amb community distances without gait deviations 100% of the time. Mild increased lateral trunk sway during gait New     Body Structures, Functions, Activity Limitations Requiring Skilled Therapeutic Intervention: Decreased functional mobility , Decreased ROM, Decreased strength, Decreased sensation, Decreased balance  Assessment: Pt is 27 y.o. male to PT due to weakness R LE and abnormal gait pattern after seizure and fall. Pt exhibits impairments including decreased R hip AROM, decreased R LE strength, decreased R lower leg sensation, and decreased balance which is preventing pt from returning to work and returning to normal function. Pt requires continued PT to address these impairments, progress strength and ROM, and provide pt with HEP for self management of symptoms for return to normalized gait pattern. Therapy Prognosis: Excellent    PT Education: Goals;PT Role;Plan of Care;Evaluative findings;Home Exercise Program;Insurance    PLAN: [x] Evaluate only  Frequency/Duration:  Plan Frequency: 2 xs/wk  Plan weeks: 2-3  Current Treatment Recommendations: Strengthening, ROM, Balance training, Functional mobility training, Transfer training, Endurance training, Gait training, Stair training, Neuromuscular re-education, Manual Therapy - Soft Tissue Mobilization, Pain management, Home exercise program, Safety education & training, Patient/Caregiver education & training, Equipment evaluation, education, & procurement, Modalities, Positioning, Therapeutic activities  Additional Comments: none                  Patient Status:[x] Continue/ Initiate plan of Care    [] Discharge PT. Recommend pt continue with HEP. [] Additional visits requested, Please re-certify for additional visits:    [] Hold         Signature: Electronically signed by Osiris Castillo PT on 11/3/22 at 9:57 AM EDT      If you have any questions or concerns, please don't hesitate to call.   Thank you for your referral.    I have reviewed this plan of care and certify a need for medically necessary rehabilitation services.     Physician Signature:__________________________________________________________  Date:  Please sign and return

## 2022-11-03 NOTE — PROGRESS NOTES
[facility] Dept              [address] [place] [address], [address] [place] [address]                 Phone: [phone]                                  Fax:  [phone]                           [facility] Outpatient  Speech Language Pathology  Speech Language/Cognitive Evaluation    NAME:Michael Tamayo  : 1990 (27 y.o.)   [x]   confirmed    MRN: [id number]  PATIENT DIAGNOSIS(ES): Anoxic encephalopathy ([facility] [address] [G93.1]  Referring Provider: Javy Hernandez DO  No chief complaint on file.     Patient Active Problem List    Diagnosis Date Noted    Tachycardia 07/15/2022    Cognitive impairment 07/15/2022    Hypoxic encephalopathy (HCC)     Acute retention of urine 2022    Diverticulitis of colon 2022    Fatigue 2022    Recurrent aphthous ulcer 2022    Spasm of back muscles 2022    Neuropathy of right peroneal nerve 2022    Lesion of right sciatic nerve 2022    Impaired mobility and ADLs 2022    Cerebral edema (HCC)     Weakness of right lower extremity      impaired mobility and ADLs dt encephalopathy  2022    Abdominal pain 2022    Depressive disorder 2022    Vitamin D deficiency 2022    Benzodiazepine dependence ([address] 2022    Right foot drop 2022    Altered mental status     Drug abuse ([address]     Acute encephalopathy 2022    Shift work sleep disorder 07/15/2019    ROSALES (generalized anxiety disorder) 2019    MDD (major depressive disorder), recurrent episode, moderate ([address] 2019    Polydrug dependence including opioid type drug with continuous use with complication ([address] 5075    Anxiety 2015    ADHD (attention deficit hyperactivity disorder) 2012    Attention deficit hyperactivity disorder (ADHD) 2012    LPRD (laryngopharyngeal reflux disease) 10/19/2011     Past Medical History:   Diagnosis Date    ADHD (attention deficit hyperactivity disorder)     was initiated on treatment by Dr. Sander Galarza. any testing was done here by Dr. Sander Galarza, no formal psych eval.      Anxiety     Drug abuse [facility])     MDD (major depressive disorder), recurrent episode, moderate ([address] 6/4/2019    Neuropathy of right peroneal nerve 7/12/2022     Past Surgical History:   Procedure Laterality Date    TONSILLECTOMY AND ADENOIDECTOMY      WISDOM TOOTH EXTRACTION       Allergies   Allergen Reactions    Peanut-Containing Drug Products Anaphylaxis     Throat swelling, hives, \"I needed hospital admission last time I had any\"    Nuts [Macadamia Nut Oil] Angioedema    Peanut Oil Other (See Comments)    Shellfish-Derived Products Itching       Date of Evaluation: 11/3/2022   Evaluating Therapist: SOPHIE Lozano    Date of Onset:  July 2022  Treatment Diagnosis and ICD-10 code:   G31.84 Mild Cognitive Impairment        Diagnosis: Patient demonstrates mild cognitive-lingusitic difficulties charcterized by diffiulty with high level problem solving skills, executive function, abreatract reasoning and divergent naming skills. Requires SLP Intervention: Yes          General  Chart reviewed: Yes  Subjective/Behavior:Patient is a 27year old male who came in for a cognitive evaluation, Patient was in hospital of  July 2022 for suspected drug overdose and then spent 8 weeks at [facility]. Patient received Speech Therapy services at [facility] and [facility] for cognition. Oxygen: Room Air  Previous level of function and limitations:  Independent       Vision and Hearing  Vision  Vision: Within Functional Limits  Hearing  Hearing: Within functional limits     Oral/Motor  Oral Motor   Labial: No impairment  Dentition: Full  Lingual: No impairment  Velum: No Impairment  Mandible: No impairment  Gag: No Impairment    Motor Speech  Motor Speech  Apraxic Characteristics: None  Dysarthric Characteristics: None  Intelligibility: No impairment  Intonation: No Impairment  Rate: No impairment  Prosody: No Impairment  Overall Impairment Severity: None    Comprehension  Auditory Comprehension  Comprehension: Within Functional Limits    Expression  Expression  Primary Mode of Expression: Verbal  Verbal Expression  Verbal Expression: Within functional limits  Written Expression  Written Expression: Unable to assess    Cognition  Orientation  Overall Orientation Status: Within Normal Limits  Attention  Attention: Within Functional Limits  Memory  Memory: Within Functional Limits  Problem Solving  Problem Solving: Exceptions to [facility]  Simple Functional Tasks: [facility]  Verbal Reasoning Skills: Mild  Initiation: WF  Sequencing: [facility]  Executive Function Skills: Mild  Managing Medications: [facility]  Numeric Reasoning  Numeric Reasoning: Within Functional Limits  Abstract Reasoning  Abstract Reasoning: Exceptions to [facility]  Convergent Thinking: WFL  Divergent Thinking: Mild  Safety/Judgment  Safety/Judgment: Within Functional Limits    Additional Assessments    Objective:  CLQT (Cognitive Linguistic Quick Test) was administered which assesses the areas listed below. Results were as follows:    Domain Score Severity   Attention 204 WNL   Memory 195  WNL   Executive Function 28 WNL   Language 37 WNL   Visuospatial Skills 92  WNL   Clock Drawing Severity 13 : WNL      Composite Severity Ratin.0 WNL    Informal testing was also given with patient demonstrating difficulty with problem solving, executive functioning, abstract reasoning and divergent naming skills.   Recommendations  Requires SLP Intervention: Yes  Duration of Treatment: 1x week for 4 weeks  Frequency of Treatment: 1x week for 4 weeks    Prognosis  Speech Therapy Prognosis  Prognosis: Good  Prognosis Considerations: Age    Education  Individuals consulted  Consulted and agree with results and recommendations: Patient    Treatment/Goals  Short Term Goals  Time Frame for Short Term Goals: 1 time a week for 4 weeks  Goal 1: To increase safety awareness and judgment for safe completion of ADLs secondary to pt's cognitive deficits,  pt will complete High level problem solving tasks related to ADLs  with 80% accuracy and min verbal cues. Goal 2: To increase independence for functional activities for home and community, pt will complete mid level executive functioning tasks (i.e. Path finding, scheduling appointments, prioritizing tasks) with 80% accuracy given min cues. Goal 3: To increase safety awareness and judgement for aactivities in home and community, pt will coemplete abstract reasoing tasks with 80% accruacy given min verbal cues. Goal 4: TO increase independence in home and community enviornments, patient will complete divergent naming tasks with 90% accuracy independently. Long Term Goals  Time Frame for Long Term Goals: 4 weeks  Goal 1: Pt will improve his/her cognitive linguistic abilities to a Nicholas level (from Mild level) in order to safely complete ADLs in all opportunities. Patient's goals: \" To improve thought process\"    Pain Assessment  Patient does not c/o pain. Pain Re-assessment  Patient does not c/o pain.     Speech Therapy Level of Assistance Scale:    AUDITORY COMPREHENSION  Rating: Independent    VERBAL EXPRESSION  Rating: Modified Independent-Supervised Assistance    MOTOR SPEECH  Rating: Independent    PROBLEM SOLVING  Rating: Minimal Assistance    MEMORY  Rating: Independent          MODIFIED GRACE FALL RISK ASSESSMENT:    History of Falling (has patient fallen in the past 30 days?):    No (0 points)    Secondary Diagnosis (is there more than 1 medical diagnosis in patients medical history?):    No (0 points)    Ambulatory Aid:    No device is used (0 points)    Gait:    Normal/bedrest/wheelchair (0 points)    Mental Status:    Oriented to own ability (0 points)      Total points = 0    Fall Risk Level:  Low Risk  0 - 24: Low Risk - implement low risk fall prevention interventions    25 - 44: Medium risk  45 and higher: High Risk       Therapy Time  SLP Individual Minutes  Time In: 1000  Time Out: 1040  Minutes: 40                 Signature: Electronically signed by Billie Santacruz M.A. CCC-SLP on 11/3/2022 at 2:21 PM             The following patient has been evaluated for speech therapy services and for therapy to continue, insurance requires physician review of the treatment plan initially and every 30 days. Please review the attached evaluation and/or summary of the patient's plan of care, and verify that you agree therapy should continue by signing the attached document and sending it back to our office. If you have any questions or concerns, please don't hesitate to call.   Thank you for your referral.        Physician Signature:________________________________Date:__________________  By signing above, therapists plan is approved by physician

## 2022-11-11 ENCOUNTER — TELEPHONE (OUTPATIENT)
Dept: NEUROLOGY | Age: 32
End: 2022-11-11

## 2022-11-11 NOTE — TELEPHONE ENCOUNTER
Patient called, wanting to know if you can refill scripts that were started when he was inpt in the summer, he was d/c and went to [facility] therefor his follow up was cancelled. He is now back in the area and is scheduled for a follow up on 3/6/2023. He is reqeusting fills on Verapamil 120mg, Ambien 10mg , Trazadone and xanax. 25 mg. Please advise.        Tere Hill

## 2022-11-14 ENCOUNTER — HOSPITAL ENCOUNTER (OUTPATIENT)
Dept: PHYSICAL THERAPY | Age: 32
Setting detail: THERAPIES SERIES
Discharge: HOME | End: 2022-11-14
Payer: COMMERCIAL

## 2022-11-14 ENCOUNTER — HOSPITAL ENCOUNTER (OUTPATIENT)
Dept: SPEECH THERAPY | Age: 32
Setting detail: THERAPIES SERIES
Discharge: HOME | End: 2022-11-14
Payer: COMMERCIAL

## 2022-11-14 PROCEDURE — 97130 THER IVNTJ EA ADDL 15 MIN: CPT

## 2022-11-14 PROCEDURE — 97129 THER IVNTJ 1ST 15 MIN: CPT

## 2022-11-14 PROCEDURE — 97110 THERAPEUTIC EXERCISES: CPT

## 2022-11-14 NOTE — TELEPHONE ENCOUNTER
Pts father called back today and states that he was told that you would take care of the medications until he was seen for follow up appt in march. Pt does not have primary care doctor. Can you fill the scripts for him until his appt and then decide if anything needs changed.

## 2022-11-14 NOTE — PROGRESS NOTES
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]       Treatment Diagnosis: decreased R hip AROM, decreased R LE strength, decreased R lower leg sensation, and decreased balance which is preventing pt from returning to work and returning to normal function    Visit Information:  Insurance: Payor: Clau Berumen / Plan: Benito Acosta / Product Type: *No Product type* /   PT Visit Information  Onset Date:  (2022)  PT Insurance Information: [employer]  Total # of Visits Approved:  (eval only)  Total # of Visits to Date: 1  Plan of Care/Certification Expiration Date: 22  No Show: 0  Canceled Appointment: 0  Progress Note Counter:  (3/24 units -)    Subjective Information:  Subjective: Pt has no new reports since last visit. States nothing is difficult for him currently and has no issues performing his HEP. HEP Compliance:  [x] Good [] Fair [] Poor [] Reports not doing due to:    Pain Screening  Patient Currently in Pain: Denies    Treatment:  Exercises:  Exercises  Exercise 1: piriformis stretch 30 sec X 3 R/L  Exercise 3: SLR RLE 3\" hold x10  Exercise 4: S/L hip abd, clam/reverse clam, hip ext, hip circles cw/ccw x10 ea LLE - requires AAROM for rev clams  Exercise 5: prone hip ext, hip ext with knee flexion, hamstring curl x10 ea LLE  Exercise 6: step ups fwd/lat 6\" x10 ea leading with LLE  Exercise 7: SLS on foam escobar LE x30\" x3, only able to maintain x1 trial without 1 UE support  Exercise 8: vectors SLS RLE x10  Exercise 10: bike x5 min  Exercise 11: bridges 5\" hold x10, H/L marches with abdominal iso x10  Exercise 12:  HR/TR x15 ea on foam  Exercise 13: squats on foam x10 reps x2 sets, fwd lunges x2 laps         *Indicates exercise, modality, or manual techniques to be initiated when appropriate    Objective Measures:       Strength: [x] NT  [] MMT completed:     ROM: [x] NT  [] ROM measurements:      Assessment: Body Structures, Functions, Activity Limitations Requiring Skilled Therapeutic Intervention: Decreased functional mobility , Decreased ROM, Decreased strength, Decreased sensation, Decreased balance  Assessment: Pt demonstrates progression toward goals this session secondary to performing multiple exercises without increased pain or difficulty. Pt experiencing most difficulty with rev clam exercise on RLE as well as SLS on foam on BLE. Pt only able to maintain ~15\" on RLE before requiring 1 UE assist for balance recovery. One trial, pt able to maintain x30\" on BLE. Cues required throughout ther ex for proper technique for max benefit of exercises. HEP provided to pt with exercises including s/l hip abd, clam/rev clams, h/l bridges, and also prone hip ext with knee ext and knee flx. Recommend pt to perform HEP to continue to progress toward goals. Treatment Diagnosis: decreased R hip AROM, decreased R LE strength, decreased R lower leg sensation, and decreased balance which is preventing pt from returning to work and returning to normal function      Post-Pain Assessment:       Pain Rating (0-10 pain scale):  0 /10   Location and pain description same as pre-treatment unless indicated. Action: [] NA   [x] Perform HEP  [] Meds as prescribed  [] Modalities as prescribed   [] Call Physician     GOALS   Patient Goal(s): Patient Goals : \"walking without a limp\"    Short Term Goals Completed by 1 wk Goal Status   STG 1 Pt will demonstrate improved R hip AROM for improved movement of R hip during functional activity. In progress   STG 2 Pt will demosntrate improved R LE strength >/= 4+/5 for return to normalized gait pattern.  In progress       Long Term Goals Completed by 3 wks Goal Status   LTG 1 Pt will demonstrate indep and 100% compliance with HEP for return to PLOF. In progress   LTG 2 Pt will demonstrate amb community distances without gait deviations 100% of the time. In progress            Plan:  Frequency/Duration:  Plan  Plan Frequency: 2 xs/wk  Plan weeks: 2-3  Current Treatment Recommendations: Strengthening, ROM, Balance training, Functional mobility training, Transfer training, Endurance training, Gait training, Stair training, Neuromuscular re-education, Manual Therapy - Soft Tissue Mobilization, Pain management, Home exercise program, Safety education & training, Patient/Caregiver education & training, Equipment evaluation, education, & procurement, Modalities, Positioning, Therapeutic activities  Additional Comments: Cont. POC  Pt to continue current HEP. See objective section for any therapeutic exercise changes, additions or modifications this date.     Therapy Time:      PT Individual Minutes  Time In: 1000  Time Out: 4914  Minutes: 38  Timed Code Treatment Minutes: 38 Minutes  Procedure Minutes:0  Timed Activity Minutes Units   Ther Ex 38 3     Electronically signed by Balir Young PTA on 11/14/22 at 10:00 AM EST

## 2022-11-14 NOTE — TELEPHONE ENCOUNTER
Patient got a fill for Vyvanse on 10/27/2022 from Dr. Deidre Moore, who is a PCP. Per verbal conversation with Dr. Liberyt Cowan, due to the nature of what he was seen for in the hospital and that Dr. Liberty Cowan did not prescribe any of the listed medications he cannot do these refills. Reported pts functional status to nurse Powell and Dr. Umana. Both made aware of pts plan of care

## 2022-11-14 NOTE — PROGRESS NOTES
[facility]  Speech Language Pathology  Adult Daily Note    Mary Washington  : 1990  [x]   confirmed      Date: 2022    Visit Information:  Visit Information  SLP Insurance Information: Maria D Jaquez  Total # of Visits Approved: 16  Total # of Visits to Date: 1  Timeframe Approved From[de-identified] 22  Timeframe Approved To[de-identified] 22  No Show: 0  Canceled Appointment: 0     Plan of care signed (Y/N):     No  Faxed    Certification Period: 2022-2022  Plan of Care Visit #1      Interventions used this date:  Expressive Language, Cognitive Skill Development, and Instruction in Compensatory Strategies    Subjective: Patient reports that he previously worked at [employer], 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 Patient reports he does not do much during the day currently, likely playing golf, watching, football, and going to friends' houses. SLP asked patient specific areas he has difficulty with regarding his cognitive-communication, however patient unable to state any areas of difficulty. Behavior:  Alert, Cooperative, and Pleasant       Objective/Assessment:   Patient progressing towards goals:    Goal 1: To increase safety awareness and judgment for safe completion of ADLs secondary to pt's cognitive deficits, pt will complete high level problem solving tasks related to ADLs  with 80% accuracy and min verbal cues. Not addressed  Goal 2: To increase independence for functional activities for home and community, pt will complete mid level executive functioning tasks (i.e. Path finding, scheduling appointments, prioritizing tasks) with 80% accuracy given min cues. Patient completed descriptive language tasks related to golf and football processes with 100% accuracy with verbal prompting needed occasionally, though pt rated this task as 6/10 for difficulty.    Goal 3: To increase safety awareness and judgement for aactivities in home and community, pt will coemplete abstract reasoning tasks with 80% accruacy given min verbal cues. Patient completed deductive reasoning task with 50% accuracy independently, increased to 90% accuracy with min verbal cues. Patient rated this task as 6/10 for difficulty. Goal 4: To increase independence in home and community enviornments, patient will complete divergent naming tasks with 90% accuracy independently. Patient completed divergent naming task related to golf items with 100% accuracy independently. Pain Assessment:  Initial Assessment:  Patient does not c/o pain. Re-assessment:  Patient does not c/o pain. Plan:  Continue with current goals    Patient/Caregiver Education:  Patient/Caregiver educated on session. Patient/Caregiver provided with home program: descriptive language task, note any areas of difficulty for next session  Patient/Caregiver stated verbal understanding of directions.     Time in: 0915  Time out: 1000  Minutes seen: 45      Signature:  Electronically signed by SOPHIE Arguello on 11/14/2022 at 4:33 PM

## 2022-11-21 ENCOUNTER — HOSPITAL ENCOUNTER (OUTPATIENT)
Dept: SPEECH THERAPY | Age: 32
Setting detail: THERAPIES SERIES
Discharge: HOME | End: 2022-11-21
Payer: COMMERCIAL

## 2022-11-21 NOTE — PROGRESS NOTES
Therapy                            Cancellation/No-show Note      Date:  2022  Patient Name:  Maggy Smith  :  1990   MRN:  [id number]      Visit Information:  Visit Information  SLP [facility]  Total # of Visits Approved: 16  Total # of Visits to Date: 1  Timeframe Approved From[de-identified] 22  Timeframe Approved To[de-identified] 22  No Show: 0  Canceled Appointment: 1    For today's appointment patient:  Cancelled    Reason given by patient:  Conflicting appointment    Follow-up needed:  Pt has future appointments scheduled, no follow up needed      Signature: Electronically signed by SOPHIE Gomez on 22 at 7:53 AM EST

## 2022-11-23 ENCOUNTER — HOSPITAL ENCOUNTER (OUTPATIENT)
Dept: PHYSICAL THERAPY | Age: 32
Setting detail: THERAPIES SERIES
Discharge: HOME | End: 2022-11-23
Payer: COMMERCIAL

## 2022-11-23 PROCEDURE — 97110 THERAPEUTIC EXERCISES: CPT

## 2022-11-23 NOTE — PROGRESS NOTES
[facility]  Outpatient Physical Therapy    Treatment Note        Date: 2022  Patient: Erwin Crowley  : 1990   Confirmed: Yes  MRN: [id number]  Referring Provider: Evelyne Bashir DO    Medical Diagnosis: Nontraumatic lumbosacral plexopathy [G54.1]       Treatment Diagnosis: decreased R hip AROM, decreased R LE strength, decreased R lower leg sensation, and decreased balance which is preventing pt from returning to work and returning to normal function    Visit Information:  Insurance: Payor: Meera Peguero / Plan: Maddy Flores / Product Type: *No Product type* /   PT Visit Information  Onset Date:  (2022)  PT Insurance Information: [employer]  Total # of Visits Approved:  (eval only)  Total # of Visits to Date: 2  Plan of Care/Certification Expiration Date: 22  No Show: 0  Canceled Appointment: 0  Progress Note Counter:  ( units -)    Subjective Information:  Subjective: Patient reports he is doing ok, having issues with long distance activities d/t endurance  HEP Compliance:  [x] Good [] Fair [] Poor [] Reports not doing due to:    Pain Screening  Patient Currently in Pain: Denies    Treatment:  Exercises:  Exercises  Exercise 4: clams w/ YTB 1 set x 20 reps  Exercise 5: prone hamstring curl 1set x 20 reps x 1# ankle weight ; LAQ 1 set x 20 reps x 3-5 sec holds x 1# ankle weight  Exercise 8: 4-way vectors w/ slider 1 set x 5 reps escobar  Exercise 9: TG @8  Double leg squats 1 set x 20 ; eccentric RLE leg press 1 set x 20  Exercise 10: bike seat at L5 x5 min  Exercise 11: Bridge 1 set x 20 reps ;  H/L March 1 set x 20 reps ; bridge w/ march 1 set x 20 reps  Exercise 15: standing 4-way hip RTB 1 set x 15 reps escobar  Exercise 20: HEP: cont current + 4- way hip     *Indicates exercise, modality, or manual techniques to be initiated when appropriate    Objective Measures:      Strength: [x] NT  [] MMT completed:     ROM: [x] NT  [] ROM measurements: Assessment: Body Structures, Functions, Activity Limitations Requiring Skilled Therapeutic Intervention: Decreased functional mobility , Decreased ROM, Decreased strength, Decreased sensation, Decreased balance  Assessment: Progress activity this date to include more WB LE strengthening and balance activities to further improve stability and functional mobility during ambulation. patient struggled with R LE SLS activities however tolerated progression of activity well. Frequent verbal cuing required on proper mechanics of acitivty, to allow patient achieve greatest benefit of activity. Progress as tolerated with addition of more dynamic mobility. Treatment Diagnosis: decreased R hip AROM, decreased R LE strength, decreased R lower leg sensation, and decreased balance which is preventing pt from returning to work and returning to normal function     Activity Tolerance  Activity Tolerance: Patient tolerated treatment well    Post-Pain Assessment:       Pain Rating (0-10 pain scale):  0 /10   Location and pain description same as pre-treatment unless indicated. Action: [] NA   [x] Perform HEP  [] Meds as prescribed  [] Modalities as prescribed   [] Call Physician     GOALS   Patient Goal(s): Patient Goals : \"walking without a limp\"    Short Term Goals Completed by 1 wk Goal Status   STG 1 Pt will demonstrate improved R hip AROM for improved movement of R hip during functional activity. In progress   STG 2 Pt will demosntrate improved R LE strength >/= 4+/5 for return to normalized gait pattern. In progress     Long Term Goals Completed by 3 wks Goal Status   LTG 1 Pt will demonstrate indep and 100% compliance with HEP for return to PLOF. In progress   LTG 2 Pt will demonstrate amb community distances without gait deviations 100% of the time.  In progress          Plan:  Frequency/Duration:  Plan  Plan Frequency: 2 xs/wk  Plan weeks: 2-3  Current Treatment Recommendations: Strengthening, ROM, Balance training, Functional mobility training, Transfer training, Endurance training, Gait training, Stair training, Neuromuscular re-education, Manual Therapy - Soft Tissue Mobilization, Pain management, Home exercise program, Safety education & training, Patient/Caregiver education & training, Equipment evaluation, education, & procurement, Modalities, Positioning, Therapeutic activities  Pt to continue current HEP. See objective section for any therapeutic exercise changes, additions or modifications this date.     Therapy Time:      PT Individual Minutes  Time In: 1000  Time Out: 6802  Minutes: 38  Timed Code Treatment Minutes: 38 Minutes  Procedure Minutes:  Timed Activity Minutes Units   Ther Ex 38 3   Electronically signed by Vega Pham PT on 11/23/22 at 12:08 PM EST

## 2022-11-28 ENCOUNTER — HOSPITAL ENCOUNTER (OUTPATIENT)
Dept: SPEECH THERAPY | Age: 32
Setting detail: THERAPIES SERIES
Discharge: HOME | End: 2022-11-28
Payer: COMMERCIAL

## 2022-11-28 ENCOUNTER — HOSPITAL ENCOUNTER (OUTPATIENT)
Dept: PHYSICAL THERAPY | Age: 32
Setting detail: THERAPIES SERIES
End: 2022-11-28
Payer: COMMERCIAL

## 2022-11-28 NOTE — PROGRESS NOTES
Therapy                            Cancellation/No-show Note      Date:  2022  Patient Name:  Mary Washington  :  1990   MRN:  [id number]      Visit Information:  Visit Information  SLP [facility] Information: [facility]  Total # of Visits Approved: 16  Total # of Visits to Date: 1  Timeframe Approved From[de-identified] 22  Timeframe Approved To[de-identified] 22  No Show: 0  Canceled Appointment: 2    For today's appointment patient:  Cancelled    Reason given by patient:  Other: patient's physical therapy appointment is cancelled for today and does not want to come for 1 appointment    Follow-up needed:  Pt has future appointments scheduled, no follow up needed      Signature: Electronically signed by Langston Holter, SLP on 22 at 8:02 AM EST

## 2022-11-30 ENCOUNTER — HOSPITAL ENCOUNTER (OUTPATIENT)
Dept: PHYSICAL THERAPY | Age: 32
Setting detail: THERAPIES SERIES
Discharge: HOME | End: 2022-11-30
Payer: COMMERCIAL

## 2022-12-01 NOTE — PROGRESS NOTES
Therapy                            Cancellation/No-show Note    Date: 2022  Patient: Obdulio Montague (51 y.o. 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 Vitals were normal. Initially  pt was pale but regained color in 1-2 minutes. Declined call for assistance driving home. Session canceled. [] Pt has future appointments scheduled, no follow up needed  [] Pt requests to be on hold. Reason:   If > 2 weeks please discuss with therapist.  [] Therapist to call pt for follow up     Comments:   Pt was asked to schedule more sessions but did not follow through. Note left on speech appt. Notes.      Signature: Electronically signed by Mitzi Hare PTA on 22 at 2:56 PM EST

## 2022-12-05 ENCOUNTER — HOSPITAL ENCOUNTER (OUTPATIENT)
Dept: SPEECH THERAPY | Age: 32
Setting detail: THERAPIES SERIES
Discharge: HOME | End: 2022-12-05
Payer: COMMERCIAL

## 2022-12-05 PROCEDURE — 97130 THER IVNTJ EA ADDL 15 MIN: CPT

## 2022-12-05 PROCEDURE — 97129 THER IVNTJ 1ST 15 MIN: CPT

## 2022-12-05 NOTE — PROGRESS NOTES
[facility]  Speech Language Pathology  Adult Daily Note    José Miguel Kelly  : 1990  [x]   confirmed      Date: 2022    Visit Information:  Visit Information  SLP Insurance Information: [facility]  Total # of Visits Approved: 16  Total # of Visits to Date: 2  Timeframe Approved From[de-identified] 22  Timeframe Approved To[de-identified] 22  No Show: 0  Canceled Appointment: 2     Plan of care signed (Y/N):     No  Faxed    Certification Period: 2022-2022  Plan of Care Visit #2      Interventions used this date:  Expressive Language, Cognitive Skill Development, and Instruction in Compensatory Strategies    Subjective: Patient reports no changes over the past few weeks. He continues to report \"slow thinking\" but cannot think of any specific situations when this occurs. 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 Patient aware he needs to scheduled additional PT and ST visits. Behavior:  Alert, Cooperative, and Pleasant       Objective/Assessment:   Patient progressing towards goals:    Goal 1: To increase safety awareness and judgment for safe completion of ADLs secondary to pt's cognitive deficits, pt will complete high level problem solving tasks related to ADLs  with 80% accuracy and min verbal cues. Logic questions: 60% accuracy independently, increased to 100% accuracy with min verbal cues. Patient rated this task as 6/10 for difficulty. Patient provided solutions to missing equipment problems with 90% accuracy independently. Patient rated this task as 4/10 for difficulty. Goal 2: To increase independence for functional activities for home and community, pt will complete mid level executive functioning tasks (i.e. Path finding, scheduling appointments, prioritizing tasks) with 80% accuracy given min cues.   Patient completed descriptive language task related to a single object with 70% accuracy independently, increased to 90% accuracy with min verbal cues. Patient rated this task as 6/10 for difficulty. Goal 3: To increase safety awareness and judgement for activities in home and community, pt will coemplete abstract reasoning tasks with 80% accruacy given min verbal cues. Patient provided 2 similarities and 2 differences of a given pair of words with 100% accuracy independently, though often needed extended time. Patient rated this task as 7/10 for difficulty, \"pretty hard. \"   Patient completed literal vs abstract reasoning task with 70% accuracy independently, increased to 100% accuracy with min verbal cues. Patient rated this task as 5/10 for difficulty. Patient completed fact vs opinion task with 80% accuracy independently. Patient rated this task as 7/10 for difficulty. Goal 4: To increase independence in home and community enviornments, patient will complete divergent naming tasks with 90% accuracy independently. Not addressed    Patient reported \"coming up with how to describe things\" as the most difficult for him with all tasks this date. Pain Assessment:  Initial Assessment:  Patient does not c/o pain. Re-assessment:  Patient does not c/o pain. Plan:  Continue with current goals    Patient/Caregiver Education:  Patient/Caregiver educated on session. Patient/Caregiver provided with home program: descriptive language task during ADLs  Patient/Caregiver stated verbal understanding of directions.     Time in: 0915  Time out: 1000  Minutes seen: 45      Signature:  Electronically signed by SOPHIE Elkins on 12/5/2022 at 11:11 AM

## 2022-12-05 NOTE — PROGRESS NOTES
[facility]          [address], [address]              Phone: [phone]           Fax: [phone]         [facility]  Speech Language Pathology  Adult Progress Note                            Physician: Dagmar Shepard DO   From: Zuleika Washington, SLP, MA,CCC-SLP   Patient: Carmel Cortez     : 1990  Treatment Diagnosis: ICD10 R41. 841 Cognitive communication deficit     Date: 2022  Date of Evaluation: 11/3/2022      Plan of Care/Treatment to date: Cognitive Skill Development and Instruction in Compensatory Strategies    Date range from 2022 to 2022. Subjective:   Patient has attended 2/4 visits during this progress period. He continues to report \"slow thinking\" but cannot think of any specific situations when this occurs. Patient reports coming up with how to describe things\" as the most difficult for him, typically. Progress toward Short-Term Goals:  Goal 1: To increase safety awareness and judgment for safe completion of ADLs secondary to pt's cognitive deficits, pt will complete high level problem solving tasks related to ADLs  with 80% accuracy and min verbal cues. Progress Made  Goal 2: To increase independence for functional activities for home and community, pt will complete mid level executive functioning tasks (i.e. Path finding, scheduling appointments, prioritizing tasks) with 80% accuracy given min cues. Goal Met  Goal 3: To increase safety awareness and judgement for aactivities in home and community, pt will complete abstract reasoning tasks with 80% accruacy given min verbal cues. Progress Made  Goal 4: TO increase independence in home and community enviornments, patient will complete divergent naming tasks with 90% accuracy independently.   Goal Met    Updated Short-Term Goals:    Goal 1: To increase safety awareness and judgment for safe completion of ADLs secondary to pt's cognitive deficits, pt will complete high level problem solving tasks related to ADLs with 80% accuracy and min verbal cues. Goal 2: To increase independence for functional activities for home and community, pt will complete mid level executive functioning tasks (i.e. Path finding, scheduling appointments, prioritizing tasks) with 90% accuracy with fading verbal cues. Goal 3: To increase safety awareness and judgment for safe completion of ADLs secondary to pt's cognitive deficits, pt will complete abstract reasoning tasks (i.e. Word deduction, abstract convergent and divergent naming, similarities/differences) with 80% accuracy and min verbal cues. Frequency/Duration of Treatment:   Days: 1 day/week  Length of Session:  45 minutes  Weeks: 4 Weeks     Rehab Potential: Excellent    Prognostic Factors:  Age  Participation level  Severity of impairment     Goal Status: Partially Achieved      Patient Status: Additional visits requested, Please re-certify for additional visits:    Discharge Plan: To be re-assessed following this plan of care          This patients condition is expected to improve within the treatment timeframe. MODIFIED GRACE FALL RISK ASSESSMENT:    History of Falling (has patient fallen in the past 30 days?):    No (0 points)    Secondary Diagnosis (is there more than 1 medical diagnosis in patients medical history?):    No (0 points)    Ambulatory Aid:    No device is used (0 points)    Gait:    Normal/bedrest/wheelchair (0 points)    Mental Status:    Oriented to own ability (0 points)      Total points = 0    Fall Risk Level: Low Risk    0 - 24: Low Risk - implement low risk fall prevention interventions    25 - 44: Medium risk  45 and higher: High Risk        Electronically signed by:  Electronically signed by SOPHIE Patterson on 12/5/2022 at 11:12 AM       If you have any questions or concerns, please don't hesitate to call.   Thank you for your referral.      Physician Signature:________________________________Date:__________________  By signing above, therapists plan is approved by physician

## 2022-12-15 ENCOUNTER — APPOINTMENT (OUTPATIENT)
Dept: SPEECH THERAPY | Age: 32
End: 2022-12-15
Payer: COMMERCIAL

## 2022-12-15 NOTE — PROGRESS NOTES
Therapy                            Cancellation/No-show Note      Date:  12/15/2022  Patient Name:  Tamar Dhillon  :  1990   MRN:  [id number]      Visit Information:  Visit Information  SLP [facility]  Total # of Visits Approved: 16  Total # of Visits to Date: 3  Timeframe Approved From[de-identified] 22  Timeframe Approved To[de-identified] 22  No Show: 0  Canceled Appointment: 3    For today's appointment patient:  Cancelled    Reason given by patient:  Other: Not able to make it    Follow-up needed:  Pt has future appointments scheduled, no follow up needed    Comments:       Signature: Electronically signed by SOPHIE Malone on 12/15/22 at 8:14 AM EST

## 2022-12-21 ENCOUNTER — HOSPITAL ENCOUNTER (OUTPATIENT)
Dept: PHYSICAL THERAPY | Age: 32
Setting detail: THERAPIES SERIES
Discharge: HOME | End: 2022-12-21
Payer: COMMERCIAL

## 2022-12-21 PROCEDURE — 97110 THERAPEUTIC EXERCISES: CPT

## 2022-12-22 ENCOUNTER — APPOINTMENT (OUTPATIENT)
Dept: SPEECH THERAPY | Age: 32
End: 2022-12-22
Payer: COMMERCIAL

## 2022-12-22 NOTE — PROGRESS NOTES
Therapy                            Cancellation/No-show Note      Date:  2022  Patient Name:  Obdulio Montague  :  1990   MRN:  [id number]      Visit Information:  Visit Information  SLP Insurance Information: [employer]  Total # of Visits Approved: 16  Total # of Visits to Date: 3  Timeframe Approved From[de-identified] 22  Timeframe Approved To[de-identified] 22  No Show: 0  Canceled Appointment: 4  *updated visit approval timeframe     For today's appointment patient:  Cancelled    Reason given by patient:  Conflicting appointment    Follow-up needed:  Pt has future appointments scheduled, no follow up needed    Comments:       Signature: Electronically signed by SOPHIE Thompson on 22 at 8:20 AM EST

## 2022-12-30 ENCOUNTER — HOSPITAL ENCOUNTER (OUTPATIENT)
Dept: PHYSICAL THERAPY | Age: 32
Setting detail: THERAPIES SERIES
Discharge: HOME | End: 2022-12-30
Payer: COMMERCIAL

## 2022-12-30 ENCOUNTER — APPOINTMENT (OUTPATIENT)
Dept: SPEECH THERAPY | Age: 32
End: 2022-12-30
Payer: COMMERCIAL

## 2022-12-30 NOTE — PROGRESS NOTES
Therapy                            Cancellation/No-show Note      Date: 2022  Patient: Bryce Lewis (1 y.o. 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     Reason:   If > 2 weeks please discuss with therapist.  [] Therapist to call pt for follow up     Comments:       Signature: Electronically signed by Jayde Zacarias PTA on 22 at 8:46 AM EST

## 2022-12-30 NOTE — PROGRESS NOTES
Therapy                            Cancellation/No-show Note      Date:  2022  Patient Name:  Tonio Riley  :  1990   MRN:  [id number]      Visit Information:  Visit Information  SLP Insurance Information: [employer]  Total # of Visits Approved: 16  Total # of Visits to Date: 4  Timeframe Approved From[de-identified] 22  Timeframe Approved To[de-identified] 23  No Show: 0  Canceled Appointment: 4    For today's appointment patient:  Cancelled    Reason given by patient:  Other:    Follow-up needed:  Pt has future appointments scheduled, no follow up needed    Comments:   \"Not able to make it. \"    Signature: Electronically signed by SOPHIE Mart on 22 at 9:10 AM EST

## 2023-01-05 ENCOUNTER — HOSPITAL ENCOUNTER (OUTPATIENT)
Dept: PHYSICAL THERAPY | Age: 33
Setting detail: THERAPIES SERIES
Discharge: HOME | End: 2023-01-05
Payer: COMMERCIAL

## 2023-01-05 ENCOUNTER — HOSPITAL ENCOUNTER (OUTPATIENT)
Dept: SPEECH THERAPY | Age: 33
Setting detail: THERAPIES SERIES
Discharge: HOME | End: 2023-01-05
Payer: COMMERCIAL

## 2023-01-05 PROCEDURE — 97129 THER IVNTJ 1ST 15 MIN: CPT

## 2023-01-05 PROCEDURE — 97110 THERAPEUTIC EXERCISES: CPT

## 2023-01-05 PROCEDURE — 97130 THER IVNTJ EA ADDL 15 MIN: CPT

## 2023-01-05 PROCEDURE — 97140 MANUAL THERAPY 1/> REGIONS: CPT

## 2023-01-05 NOTE — PROGRESS NOTES
[facility]  Speech Language Pathology  Adult Daily Note    Bryce Lewis  : 1990  [x]   confirmed      Date: 2023    Visit Information:  Visit Information  SLP Insurance Information: [employer]  Total # of Visits Approved: 16  Total # of Visits to Date: 1  Timeframe Approved From[de-identified] 22  Timeframe Approved To[de-identified] 23  No Show: 0  Canceled Appointment: 0     Plan of care signed (Y/N):     No  Faxed    Certification Period: 22-23  Plan of Care Visit #1      Interventions used this date:  Expressive Language, Cognitive Skill Development, and Instruction in Compensatory Strategies    Subjective: Patient reports no changes over the past few weeks. He continues to report \"slow thinking\". Pt reports he has a f/u appointment with Dr. Princess Sanon in March. He still isn't driving and is currently looking into disability. Behavior:  Alert, Cooperative, and Pleasant       Objective/Assessment:   Patient progressing towards goals:    Goal 1: To increase safety awareness and judgment for safe completion of ADLs secondary to pt's cognitive deficits, pt will complete high level problem solving tasks related to ADLs with 80% accuracy and min verbal cues. Goal 2: To increase independence for functional activities for home and community, pt will complete mid level executive functioning tasks (i.e. Path finding, scheduling appointments, prioritizing tasks) with 90% accuracy with fading verbal cues. Pt completed mid-moderate level scheduling task with 100% Mod I-standby cues. Pt included all needed parts of schedule but didn't spread some of the weekly and misc. Tasks out to the weekend. Pt reported difficulty with alternating back and forth between materials.   Pt reports using his phone calendar for his schedule and appointments and hasn't missed one   Goal 3: To increase safety awareness and judgment for safe completion of ADLs secondary to pt's cognitive deficits, pt will complete abstract reasoning tasks (i.e. Word deduction, abstract convergent and divergent naming, similarities/differences) with 80% accuracy and min verbal cues. Pt wrote literal and abstract meanings of idioms with with 90% acc Ind         Pain Assessment:  Initial Assessment:  Patient does not c/o pain. Re-assessment:  Patient does not c/o pain. Plan:  Continue with current goals    Patient/Caregiver Education:  Patient/Caregiver educated on session. Patient/Caregiver provided with home program:   Patient/Caregiver stated verbal understanding of directions.     Time in: 0830  Time out: 0915  Minutes seen: 39      Signature:  Electronically signed by SOPHIE James on 1/5/2023 at 9:14 AM

## 2023-01-05 NOTE — PROGRESS NOTES
[facility]          [address], [address]              Phone: [phone]           Fax: [phone]                         [facility]  Speech Language Pathology  Adult Progress Note                          Physician: Vicente Shukla DO  From: Chadwick Kaye, SLP, MA,CCC-SLP   Patient: Ana Diop       : 1990  Diagnosis: Cognitive Impairment     Date: 2023  Treatment Diagnosis: ICD 10 R41.89  Date of Evaluation: 11/3/22      Plan of Care/Treatment to date: Cognitive Skill Development and Instruction in Compensatory Strategies    Date range from 22 to 22. Subjective: Pt has attended 1/4 scheduled treatment sessions during this progress period and had 3 cancellations secondary to conflicting appointments. Pt reports that he still has \"slow thinking\". Progress toward Short-Term Goals:   Goal 1: To increase safety awareness and judgment for safe completion of ADLs secondary to pt's cognitive deficits, pt will complete high level problem solving tasks related to ADLs with 80% accuracy and min verbal cues. Not targeted this period  Goal 2: To increase independence for functional activities for home and community, pt will complete mid level executive functioning tasks (i.e. Path finding, scheduling appointments, prioritizing tasks) with 90% accuracy with fading verbal cues. Progress made  Goal 3: To increase safety awareness and judgment for safe completion of ADLs secondary to pt's cognitive deficits, pt will complete abstract reasoning tasks (i.e. Word deduction, abstract convergent and divergent naming, similarities/differences) with 80% accuracy and min verbal cues.   Progress made    Updated Short-Term Goals:  Continue aforementioned goals    Frequency/Duration of Treatment:   Days: 1 day/week  Length of Session:  45 minutes  Weeks: 4 Weeks  and 6 Weeks    Rehab Potential: Good    Prognostic Factors: Motivation  Age  Participation level       Goal Status: Did Not Achieve       Patient Status: Continue per initial Plan of Care    Discharge Plan: Re-assess continued need for skilled therapeutic services at the end of this plan of care. This patients condition is expected to improve within the treatment timeframe. MODIFIED GRACE FALL RISK ASSESSMENT:    History of Falling (has patient fallen in the past 30 days?):    No (0 points)    Secondary Diagnosis (is there more than 1 medical diagnosis in patients medical history?):    No (0 points)    Ambulatory Aid:    No device is used (0 points)    Gait:    Normal/bedrest/wheelchair (0 points)    Mental Status:    Oriented to own ability (0 points)      Total points = 0    Fall Risk Level: low    0 - 24: Low Risk - implement low risk fall prevention interventions    25 - 44: Medium risk  45 and higher: High Risk        Electronically signed by:  Electronically signed by SOPHIE Elaine on 1/5/2023 at 11:58 AM        If you have any questions or concerns, please don't hesitate to call.   Thank you for your referral.      Physician Signature:________________________________Date:__________________  By signing above, therapists plan is approved by physician

## 2023-01-05 NOTE — PROGRESS NOTES
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]       Treatment Diagnosis: Treatment Diagnosis: decreased R hip AROM, decreased R LE strength, decreased R lower leg sensation, and decreased balance which is preventing pt from returning to work and returning to normal function    Visit Information:  Insurance: Payor: Berta Mercedes / Plan: Delfina Banuelos / Product Type: *No Product type* /   PT Visit Information  Onset Date:  (2022)  PT Insurance Information: [employer]  Total # of Visits Approved:  (eval only)  Total # of Visits to Date: 4  Plan of Care/Certification Expiration Date: 23  No Show: 0  Canceled Appointment: 2  Progress Note Counter:  ( units -)    Subjective Information:  Subjective: . No pain since last session.   HEP Compliance:  [x] Good [] Fair [] Poor [] Reports not doing due to:  Pt reports completing HEP every other day       Treatment:  Exercises:  Exercises  Exercise 8: 4-way vectors w/ slider 1 set x 10 reps escobar- SLS R required unilat UE support- max cues for technique  Exercise 16: squats X 10- cues for increased ROM and technique without UE support  Exercise 17: walking lunges 2 X 5  Exercise 18: glut medius strength- hip hike and isometric against wall X 10-15 each R/L  Exercise 19: LE D1 flexion in supine X 10 ea  Exercise 20: HEP: squat, walking lunge, hip hike, isometric glut medius, D1 flex in supine       Manual:   Manual Therapy  Other: goal assessment X 10 min  *Indicates exercise, modality, or manual techniques to be initiated when appropriate    Objective Measures:   Ambulation  Surface: Carpet, Level tile  Device: No Device  Assistance: Independent  Quality of Gait: mild increased trunk sway intermittently  Distance: 200ft  More Ambulation?: No    Stairs  # Steps : 12  Stairs Height: 8\"  Rails: None  Device: No Device  Assistance: Independent  Comment: reciprocal; mild double knee bend R LE during ascend    Strength: [] NT  [x] MMT completed:  Strength RLE  R Hip Flexion: 4+/5  R Hip Extension: 5/5  R Hip ABduction: 4+/5  R Hip ADduction: 5/5  R Hip Internal Rotation: 4+/5  R Hip External Rotation: 4+/5    ROM: [] NT  [x] ROM measurements:  AROM RLE (degrees)  R Hip Flexion (0-125): 125  R Hip Extension (0-10): 10  R Hip ABduction (0-45): 45  R Hip ADduction (0-10): 10  R Hip External Rotation (0-45): 45  R Hip Internal Rotation (0-45): 30     Assessment: Body Structures, Functions, Activity Limitations Requiring Skilled Therapeutic Intervention: Decreased functional mobility , Decreased ROM, Decreased strength, Decreased sensation, Decreased balance  Assessment: Pt to session with continued c/o R hip/knee weakness during amb >1 mile, golfing, squating, high level household chores, hopping, and running. All goals assessed this date due to insurance date expires 1/6. Pt requesting additional PT time due to continued R LE deficits. Pt exhibiting improved R hip AROM nearing normalized ROM, improved R LE strength nearing normalized strength. Pt reports no falls. Pt exhibits nearing normalized gait pattern and stair negotiation. Pt rating functional level at 85%. Continued PT required to progress pt to PLOF. Treatment Diagnosis: Treatment Diagnosis: decreased R hip AROM, decreased R LE strength, decreased R lower leg sensation, and decreased balance which is preventing pt from returning to work and returning to normal function  Therapy Prognosis: Excellent    Post-Pain Assessment:       Pain Rating (0-10 pain scale):   0/10   Location and pain description same as pre-treatment unless indicated.    Action: [x] NA   [] Perform HEP  [] Meds as prescribed  [] Modalities as prescribed   [] Call Physician     GOALS   Patient Goal(s): Patient Goals : \"walking without a limp\"    Short Term Goals Completed by 1 wk Goal Status   STG 1 Pt will demonstrate improved R hip AROM for improved movement of R hip during functional activity. In progress, Partially met   STG 2 Pt will demosntrate improved R LE strength >/= 4+/5 for return to normalized gait pattern. In progress, Partially met     Long Term Goals Completed by 3 wks Goal Status   LTG 1 Pt will demonstrate indep and 100% compliance with HEP for return to PLOF. In progress, Partially met   LTG 2 Pt will demonstrate amb community distances without gait deviations 100% of the time. In progress, Partially met   LTG 3 Pt will demonstrate improved score on LEFS >/= 70/80 for return to PLOF. New     Plan:  Frequency/Duration:  Plan  Plan Frequency: 1x/wk  Plan weeks: 4  Current Treatment Recommendations: Strengthening, ROM, Balance training, Functional mobility training, Transfer training, Endurance training, Gait training, Stair training, Neuromuscular re-education, Manual, Home exercise program, Safety education & training, Patient/Caregiver education & training, Equipment evaluation, education, & procurement, Modalities, Therapeutic activities  Additional Comments: consider adding high level core strengthening to NV  Pt to continue current HEP. See objective section for any therapeutic exercise changes, additions or modifications this date.     Therapy Time:   PT Individual Minutes  Time In: 1084  Time Out: 1000  Minutes: 44  Timed Code Treatment Minutes: 44 Minutes  Procedure Minutes:0 min    Timed Activity Minutes Units   Ther Ex 34 2   Manual  10 1     Electronically signed by Ramana Kamara PT on 1/5/23 at 10:08 AM EST

## 2023-01-05 NOTE — PROGRESS NOTES
[address]     Ph: [phone]  Fax: [phone]      [] Certification  [] Recertification []  Plan of Care  [x] Progress Note [] Discharge      Referring Provider: Mimi Dietz DO     From:  Eddy Magaña, PT  Patient: Sydni Mcclellan (31 y.o. male) : 1990 Date: 2023  Medical Diagnosis: Nontraumatic lumbosacral plexopathy [G54.1]       Treatment Diagnosis: Treatment Diagnosis: decreased R hip AROM, decreased R LE strength, decreased R lower leg sensation, and decreased balance which is preventing pt from returning to work and returning to normal function    Plan of Care/Certification Expiration Date: : 23   Progress Report Period from:  11/3/2022 to 2023    Visits to Date: 4 No Show: 0 Cancelled Appts: 2    OBJECTIVE:   Short Term Goals - Time Frame for Short Term Goals: 1 wk    Goals Current/Discharge status  Status   Short Term Goal 1: Pt will demonstrate improved R hip AROM for improved movement of R hip during functional activity. AROM RLE (degrees)  R Hip Flexion (0-125): 125  R Hip Extension (0-10): 10  R Hip ABduction (0-45): 45  R Hip ADduction (0-10): 10  R Hip External Rotation (0-45): 45  R Hip Internal Rotation (0-45): 30      Deficit of R hip IR only   In progress, Partially met   Short Term Goal 2: Pt will demosntrate improved R LE strength >/= 4+/5 for return to normalized gait pattern. Strength RLE  R Hip Flexion: 4+/5  R Hip Extension: 5/5  R Hip ABduction: 4+/5  R Hip ADduction: 5/5  R Hip Internal Rotation: 4+/5  R Hip External Rotation: 4+/5   In progress, Partially met     Long Term Goals - Time Frame for Long Term Goals : 3 wks  Goals Current/ Discharge status Status   Long Term Goal 1: Pt will demonstrate indep and 100% compliance with HEP for return to PLOF.  Completes HEP every other day per pt report- progressions ongoing In progress, Partially met Long Term Goal 2: Pt will demonstrate amb community distances without gait deviations 100% of the time. Amb 1 mile without difficulty; mild gait deviation of mild increased trunk sway intermittently In progress, Partially met   Long Term Goal 3: Pt will demonstrate improved score on LEFS >/= 70/80 for return to PLOF. LEFS 58/80 New     Body Structures, Functions, Activity Limitations Requiring Skilled Therapeutic Intervention: Decreased functional mobility , Decreased ROM, Decreased strength, Decreased sensation, Decreased balance  Assessment: Pt to session with continued c/o R hip/knee weakness during amb >1 mile, golfing, squating, high level household chores, hopping, and running. All goals assessed this date due to insurance date expires 1/6. Pt requesting additional PT time due to continued R LE deficits. Pt exhibiting improved R hip AROM nearing normalized ROM, improved R LE strength nearing normalized strength. Pt reports no falls. Pt exhibits nearing normalized gait pattern and stair negotiation. Pt rating functional level at 85%. Continued PT required to progress pt to PLOF. Therapy Prognosis: Excellent      PLAN: [x]   Frequency/Duration:  Plan Frequency: 1x/wk  Plan weeks: 4  Current Treatment Recommendations: Strengthening, ROM, Balance training, Functional mobility training, Transfer training, Endurance training, Gait training, Stair training, Neuromuscular re-education, Manual, Home exercise program, Safety education & training, Patient/Caregiver education & training, Equipment evaluation, education, & procurement, Modalities, Therapeutic activities  Additional Comments: consider adding high level core strengthening to NV     Patient Status:[x] Continue/ Initiate plan of Care    [] Discharge PT. Recommend pt continue with HEP.      [] Additional visits requested, Please re-certify for additional visits:    [] Hold         Signature: Electronically signed by Bert Berry PT on 1/5/23 at 10:13 AM EST      If you have any questions or concerns, please don't hesitate to call. Thank you for your referral.    I have reviewed this plan of care and certify a need for medically necessary rehabilitation services.     Physician Signature:__________________________________________________________  Date:  Please sign and return

## 2023-01-06 ENCOUNTER — TELEPHONE (OUTPATIENT)
Dept: NEUROLOGY | Age: 33
End: 2023-01-06

## 2023-01-06 NOTE — TELEPHONE ENCOUNTER
Patients dad called requesting a referral for him to have [facility] again in [facility]. Per dad patient just isn't getting better and they have noticed he is drinking alcohol as well as stating that the outpatient speech and physical therapy are not working. Pt was initially seen in pt from 7/13/2022-7/22/2022 and was scheduled for a hospital follow up 8/19/2022 which his mother cancelled because he was in the rehab facility in [facility]. She then called in October and was given an appointment in March of 2023. Please advise if patient needs to be seen in office prior to referral being given.      Referral can be faxed to [facility] in [address] [place] [address]: [phone]  Fax: [phone]

## 2023-01-09 NOTE — TELEPHONE ENCOUNTER
Spoke with dad and he states that patient does not want to go back there so we do not need to send information

## 2023-01-18 ENCOUNTER — APPOINTMENT (OUTPATIENT)
Dept: PHYSICAL THERAPY | Age: 33
End: 2023-01-18
Payer: COMMERCIAL

## 2023-01-18 ENCOUNTER — APPOINTMENT (OUTPATIENT)
Dept: SPEECH THERAPY | Age: 33
End: 2023-01-18
Payer: COMMERCIAL

## 2023-01-18 PROCEDURE — 97130 THER IVNTJ EA ADDL 15 MIN: CPT

## 2023-01-18 PROCEDURE — 97110 THERAPEUTIC EXERCISES: CPT

## 2023-01-18 PROCEDURE — 97129 THER IVNTJ 1ST 15 MIN: CPT

## 2023-01-18 NOTE — PROGRESS NOTES
[facility]  Speech Language Pathology  Adult Daily Note    Lilian Padilla  : 1990  [x]   confirmed      Date: 2023    Visit Information:  Visit Information  SLP Insurance Information: [employer]  Total # of Visits Approved: 16  Total # of Visits to Date: 2  Timeframe Approved From[de-identified] 22  Timeframe Approved To[de-identified] 23  No Show: 0  Canceled Appointment: 0     Plan of care signed (Y/N):     No  Faxed    Certification Period: 2023-2023  Plan of Care Visit #1      Interventions used this date:  Expressive Language, Cognitive Skill Development, and Instruction in Compensatory Strategies    Subjective:   Pt arrived on time and alone. He reports no changes, but states he does not feel like his baseline in terms of cognition. Behavior:  Alert, Cooperative, and Pleasant       Objective/Assessment:   Patient progressing towards goals:    Goal 1: To increase safety awareness and judgment for safe completion of ADLs secondary to pt's cognitive deficits, pt will complete high level problem solving tasks related to ADLs with 80% accuracy and min verbal cues. High level deduction puzzle: 59% acc independently, increasing to 68% acc given mild verbal cues. Goal 2: To increase independence for functional activities for home and community, pt will complete mid level executive functioning tasks (i.e. Path finding, scheduling appointments, prioritizing tasks) with 90% accuracy with fading verbal cues. Mid level deduction puzzle: 100% acc  Schedulin% acc independently    Goal 3: To increase safety awareness and judgment for safe completion of ADLs secondary to pt's cognitive deficits, pt will complete abstract reasoning tasks (i.e. Word deduction, abstract convergent and divergent naming, similarities/differences) with 80% accuracy and min verbal cues. 91% acc with mixed abstract and concrete category matrix.       Pain Assessment:  Initial Assessment:  Patient does not c/o pain. Re-assessment:  Patient does not c/o pain. Plan:  Continue with current goals    Patient/Caregiver Education:  Patient/Caregiver educated on session. Patient/Caregiver provided with home program:   Patient/Caregiver stated verbal understanding of directions.     Time in: 1000  Time out: [address] [facility]  Minutes seen: 39      Signature:  Electronically signed by Cheli Myers SLP on 1/18/2023 at 11:57 AM

## 2023-01-18 NOTE — PROGRESS NOTES
[facility]  Outpatient Physical Therapy    Treatment Note        Date: 2023  Patient: Katya Munoz  : 1990   Confirmed: Yes  MRN: [id number]  Referring Provider: Ellie Lynne DO    Medical Diagnosis: Nontraumatic lumbosacral plexopathy [G54.1]       Treatment Diagnosis: Treatment Diagnosis: decreased R hip AROM, decreased R LE strength, decreased R lower leg sensation, and decreased balance which is preventing pt from returning to work and returning to normal function    Visit Information:  Insurance: Payor: [employer] / Plan: Aldo Balling / Product Type: *No Product type* /   PT Visit Information  Onset Date:  (2022)  PT Insurance Information: [employer]  Total # of Visits Approved:  (eval only)  Total # of Visits to Date: 5  Plan of Care/Certification Expiration Date: 23  No Show: 0  Canceled Appointment: 2  Progress Note Counter: 5/10 (3/16 units from 1-9 thru 2-3)    Subjective Information:  Subjective: . No pain since last session. HEP Compliance:  [x] Good [] Fair [] Poor [] Reports not doing due to:         Treatment:  Exercises:  Exercises  Exercise 1: monster walk x 3 laps, RTB, outside //bars  Exercise 2: SLS w/ reach and SLS w/ rotation x 15, b/l  Exercise 3: CC diagonal pulls x 15 w/ 4 pl, b/l  Exercise 4: reverse crunches x 30 w/ ball between knees  Exercise 5: 4-way SLR x 10, b/l, RTB  Exercise 8: vectors 3 way x 15, b/l  Exercise 18: glut medius strength- hip hike x 15, b/l and isometric against wall, 10 sec x 15, b/l     *Indicates exercise, modality, or manual techniques to be initiated when appropriate    Objective Measures:      Strength: [x] NT  [] MMT completed:     ROM: [x] NT  [] ROM measurements:         Assessment:    Body Structures, Functions, Activity Limitations Requiring Skilled Therapeutic Intervention: Decreased functional mobility , Decreased ROM, Decreased strength, Decreased sensation, Decreased balance  Assessment: continued to progress to patient tolerance, added, reverse crunches w/ ball between knees, monster walk w/ RTB, SLS w/ reach and SLS w/ rotation, occasional toe tap down to maintain balance, vectors, 3-way, unsupported and 4-way SLR, b/l w/ RTB, good tolerance to all, no c/o pain. Treatment Diagnosis: Treatment Diagnosis: decreased R hip AROM, decreased R LE strength, decreased R lower leg sensation, and decreased balance which is preventing pt from returning to work and returning to normal function  Therapy Prognosis: Excellent      Post-Pain Assessment:       Pain Rating (0-10 pain scale):   0/10   Location and pain description same as pre-treatment unless indicated. Action: [] NA   [x] Perform HEP  [] Meds as prescribed  [] Modalities as prescribed   [] Call Physician     GOALS   Patient Goal(s): Patient Goals : \"walking without a limp\"    Short Term Goals Completed by 1 wk Goal Status   STG 1 Pt will demonstrate improved R hip AROM for improved movement of R hip during functional activity. In progress, Partially met   STG 2 Pt will demosntrate improved R LE strength >/= 4+/5 for return to normalized gait pattern. In progress, Partially met       Long Term Goals Completed by 3 wks Goal Status   LTG 1 Pt will demonstrate indep and 100% compliance with HEP for return to PLOF. In progress, Partially met   LTG 2 Pt will demonstrate amb community distances without gait deviations 100% of the time. In progress, Partially met   LTG 3 Pt will demonstrate improved score on LEFS >/= 70/80 for return to PLOF.  New            Plan:  Frequency/Duration:  Plan  Plan Frequency: 1x/wk  Plan weeks: 4  Current Treatment Recommendations: Strengthening, ROM, Balance training, Functional mobility training, Transfer training, Endurance training, Gait training, Stair training, Neuromuscular re-education, Manual, Home exercise program, Safety education & training, Patient/Caregiver education & training, Equipment evaluation, education, & procurement, Modalities, Therapeutic activities  Additional Comments: consider adding high level core strengthening to NV  Pt to continue current HEP. See objective section for any therapeutic exercise changes, additions or modifications this date.     Therapy Time:      PT Individual Minutes  Time In: 3099  Time Out: 9413  Minutes: 38  Timed Code Treatment Minutes: 38 Minutes  Procedure Minutes:0  Timed Activity Minutes Units   Ther Ex 38 3     Electronically signed by Irene Paredes PTA on 1/18/23 at 9:28 AM EST

## 2023-01-25 ENCOUNTER — APPOINTMENT (OUTPATIENT)
Dept: PHYSICAL THERAPY | Age: 33
End: 2023-01-25
Payer: COMMERCIAL

## 2023-01-25 ENCOUNTER — APPOINTMENT (OUTPATIENT)
Dept: SPEECH THERAPY | Age: 33
End: 2023-01-25
Payer: COMMERCIAL

## 2023-01-25 PROCEDURE — 97130 THER IVNTJ EA ADDL 15 MIN: CPT

## 2023-01-25 PROCEDURE — 97129 THER IVNTJ 1ST 15 MIN: CPT

## 2023-01-25 PROCEDURE — 97110 THERAPEUTIC EXERCISES: CPT

## 2023-01-25 NOTE — PROGRESS NOTES
[facility]  Outpatient Physical Therapy    Treatment Note        Date: 2023  Patient: Winifred Iyer  : 1990   Confirmed: Yes  MRN: [id number]  Referring Provider: Ron Minor DO    Medical Diagnosis: Nontraumatic lumbosacral plexopathy [G54.1]       Treatment Diagnosis: Treatment Diagnosis: decreased R hip AROM, decreased R LE strength, decreased R lower leg sensation, and decreased balance which is preventing pt from returning to work and returning to normal function    Visit Information:  Insurance: Payor: Fernando Torres / Plan: [employer] / Product Type: *No Product type* /   PT Visit Information  Onset Date:  (2022)  PT Insurance Information: [employer]  Total # of Visits Approved:  (eval only)  Total # of Visits to Date: 6  Plan of Care/Certification Expiration Date: 23  No Show: 0  Canceled Appointment: 2  Progress Note Counter: 6/10 ( units from 1-9 thru 2-3)    Subjective Information:  Subjective: no pain currently, I feel 80-85% functional  HEP Compliance:  [x] Good [] Fair [] Poor [] Reports not doing due to:    Pain Screening  Patient Currently in Pain: Denies    Treatment:  Exercises:  Exercises  Exercise 1: monster walk x 3 laps, RTB, outside //bars  Exercise 2: SLS w/ reach and SLS w/ rotation x 20, b/l  Exercise 4: reverse crunches x 30 w/ ball between knees  Exercise 5: 4-way SLR x 10, b/l, GTB  Exercise 6: level-2 marches x 30  Exercise 7: SLS on BOSU ball, 30 sec x 3, b/l  Exercise 8: vectors 3 way x 15, b/l  Exercise 10: Sci-Fit, L-3.0, 5 min  Exercise 18: glut medius strength- hip hike x 15, b/l and isometric against wall, 10 sec x 15, b/l     *Indicates exercise, modality, or manual techniques to be initiated when appropriate    Objective Measures:      Strength: [x] NT  [] MMT completed:      ROM: [x] NT  [] ROM measurements:   Assessment:    Body Structures, Functions, Activity Limitations Requiring Skilled Therapeutic Intervention: Decreased functional mobility , Decreased ROM, Decreased strength, Decreased sensation, Decreased balance  Assessment: continued to progress exercises to patient tolerance, increased 4-way SLR to GTB and reps w/ SLS ex, added, SLS on BOSU ball, slight frontal plane instability noted, b/l, occasional finger hold used,  level-2 marches to improve core, good tolerance to session. Treatment Diagnosis: Treatment Diagnosis: decreased R hip AROM, decreased R LE strength, decreased R lower leg sensation, and decreased balance which is preventing pt from returning to work and returning to normal function  Therapy Prognosis: Excellent          Post-Pain Assessment:       Pain Rating (0-10 pain scale):  0 /10   Location and pain description same as pre-treatment unless indicated. Action: [] NA   [x] Perform HEP  [] Meds as prescribed  [] Modalities as prescribed   [] Call Physician     GOALS   Patient Goal(s): Patient Goals : \"walking without a limp\"    Short Term Goals Completed by 1 wk Goal Status   STG 1 Pt will demonstrate improved R hip AROM for improved movement of R hip during functional activity. In progress, Partially met   STG 2 Pt will demosntrate improved R LE strength >/= 4+/5 for return to normalized gait pattern. In progress, Partially met       Long Term Goals Completed by 3 wks Goal Status   LTG 1 Pt will demonstrate indep and 100% compliance with HEP for return to PLOF. In progress, Partially met   LTG 2 Pt will demonstrate amb community distances without gait deviations 100% of the time. In progress, Partially met   LTG 3 Pt will demonstrate improved score on LEFS >/= 70/80 for return to PLOF.  New            Plan:  Frequency/Duration:  Plan  Plan Frequency: 1x/wk  Plan weeks: 4  Current Treatment Recommendations: Strengthening, ROM, Balance training, Functional mobility training, Transfer training, Endurance training, Gait training, Stair training, Neuromuscular re-education, Manual, Home exercise program, Safety education & training, Patient/Caregiver education & training, Equipment evaluation, education, & procurement, Modalities, Therapeutic activities  Additional Comments: consider adding high level core strengthening to NV  Pt to continue current HEP. See objective section for any therapeutic exercise changes, additions or modifications this date.     Therapy Time:      PT Individual Minutes  Time In: 8627  Time Out: 6945  Minutes: 38  Timed Code Treatment Minutes: 38 Minutes  Procedure Minutes:0  Timed Activity Minutes Units   Ther Ex 38 3     Electronically signed by Rocio Chisholm PTA on 1/25/23 at 9:23 AM EST

## 2023-01-25 NOTE — PROGRESS NOTES
[facility]  Speech Language Pathology  Adult Daily Note    Riri Zapata  : 1990  [x]   confirmed      Date: 2023    Visit Information:  Visit Information  SLP Insurance Information: [employer]  Total # of Visits Approved: 10  Total # of Visits to Date: 2  Timeframe Approved From[de-identified] 23  Timeframe Approved To[de-identified] 03/10/23  No Show: 0  Canceled Appointment: 0     Plan of care signed (Y/N):     No  Faxed    Certification Period: 2023-2023  Plan of Care Visit #2      Interventions used this date:  Expressive Language, Cognitive Skill Development, and Instruction in Compensatory Strategies    Subjective:   Pt arrived on time and alone. He reports that he is not yet driving, and is planning to go back to work. He reports that he is unsure what kind of work he will be doing. Pt brought back homework completed. SLP reviewed homework, and he completed deduction puzzle x2 with 100% acc each. He reported he \"felt good that day\" he completed his homework. SLP reminded pt that next week's session is moved to [facility]. SLP answered all questions. Behavior:  Alert, Cooperative, and Pleasant       Objective/Assessment:   Patient progressing towards goals:    Goal 1: To increase safety awareness and judgment for safe completion of ADLs secondary to pt's cognitive deficits, pt will complete high level problem solving tasks related to ADLs with 80% accuracy and min verbal cues. Deduction puzzle: 100% acc independently    Goal 2: To increase independence for functional activities for home and community, pt will complete mid level executive functioning tasks (i.e. Path finding, scheduling appointments, prioritizing tasks) with 90% accuracy with fading verbal cues. Time management: 80% acc independently.    Math calculations: 100% acc independently    Goal 3: To increase safety awareness and judgment for safe completion of ADLs secondary to pt's cognitive deficits, pt will complete abstract reasoning tasks (i.e. Word deduction, abstract convergent and divergent naming, similarities/differences) with 80% accuracy and min verbal cues. Double meaning words: 70% acc independently; intermittent vague responses given with need for mild cues  Category exclusion - abstract: 92% acc independently      Pain Assessment:  Initial Assessment:  Patient does not c/o pain. Re-assessment:  Patient does not c/o pain. Plan:  Continue with current goals    Patient/Caregiver Education:  Patient/Caregiver educated on session. Patient/Caregiver provided with home program: Money calculations & deduction puzzle  Patient/Caregiver stated verbal understanding of directions.     Time in: 1000  Time out: [address]  Minutes seen: 39      Signature:  Electronically signed by SOPHIE Davis on 1/25/2023 at 11:45 AM

## 2023-02-01 ENCOUNTER — HOSPITAL ENCOUNTER (OUTPATIENT)
Dept: SPEECH THERAPY | Age: 33
Setting detail: THERAPIES SERIES
Discharge: HOME | End: 2023-02-01
Payer: COMMERCIAL

## 2023-02-01 ENCOUNTER — HOSPITAL ENCOUNTER (OUTPATIENT)
Dept: PHYSICAL THERAPY | Age: 33
Setting detail: THERAPIES SERIES
Discharge: HOME | End: 2023-02-01
Payer: COMMERCIAL

## 2023-02-01 PROCEDURE — 97130 THER IVNTJ EA ADDL 15 MIN: CPT

## 2023-02-01 PROCEDURE — 97129 THER IVNTJ 1ST 15 MIN: CPT

## 2023-02-01 PROCEDURE — 97110 THERAPEUTIC EXERCISES: CPT

## 2023-02-01 NOTE — PROGRESS NOTES
[facility]  Speech Language Pathology  Adult Daily Note    Ade Suarez  : 1990  [x]   confirmed      Date: 2023    Visit Information:  Visit Information  SLP Insurance Information: [employer]  Total # of Visits Approved: 10  Total # of Visits to Date: 3  Timeframe Approved From[de-identified] 23  Timeframe Approved To[de-identified] 03/10/23  No Show: 0  Canceled Appointment: 0     Plan of care signed (Y/N):     No  Faxed    Certification Period: 2023-2023  Plan of Care Visit #3      Interventions used this date:  Expressive Language, Cognitive Skill Development, and Instruction in Compensatory Strategies    Subjective:   Pt brought back homework completed and stated it was not too difficult for him to complete. He reported that today was his last day of physical therapy, secondary to insurance. SLP clarified that pt still has visits remaining until March. Pt reported he did not think his father would give him a ride to therapy, if it was only for one discipline. SLP stated verbal understanding and provided education regarding progress thus far. Pt is performing well on high level tasks. SLP is comfortable with pt discharge and clinically believes pt will succeed in community and work environments. Pt reports his only concern is for social interactions in large groups, secondary to anxiety. SLP provided education and comfort. SLP directed such concerns to physician, as pt has skills needed to participate socially, however, is impeded by anxiety. SLP provided pt with handouts regarding verbal reasoning, and handout detailing apps that can be purchased/downloaded onto his phone/tablet, that target various areas of cognition. Pt stated verbal understanding.     Behavior:  Alert, Cooperative, and Pleasant       Objective/Assessment:   Patient progressing towards goals:    Goal 1: To increase safety awareness and judgment for safe completion of ADLs secondary to pt's cognitive deficits, pt will complete high level problem solving tasks related to ADLs with 80% accuracy and min verbal cues. Budgeting task: 100% acc independently    Goal 2: To increase independence for functional activities for home and community, pt will complete mid level executive functioning tasks (i.e. Path finding, scheduling appointments, prioritizing tasks) with 90% accuracy with fading verbal cues. Functional math: 92% acc    Goal 3: To increase safety awareness and judgment for safe completion of ADLs secondary to pt's cognitive deficits, pt will complete abstract reasoning tasks (i.e. Word deduction, abstract convergent and divergent naming, similarities/differences) with 80% accuracy and min verbal cues. Anagrams: 87% acc independently. Pain Assessment:  Initial Assessment:  Patient does not c/o pain. Re-assessment:  Patient does not c/o pain. Plan:  Discharge    Patient/Caregiver Education:  Patient/Caregiver educated on session. Patient/Caregiver stated verbal understanding of directions.     Time in: 1000  Time out: [address]  Minutes seen: 39      Signature:  Electronically signed by SOPHIE Motta on 2/1/2023 at 11:52 AM

## 2023-02-01 NOTE — PROGRESS NOTES
[facility]          [address], [address]             Phone: [phone]          Fax: [phone]         ______________________________________________________________________     [facility] Pathology  Adult Discharge Note                          Physician: Oseas Yonug DO     From: Rebeca Abdullahi, SLP, MA,CCC-SLP   Patient: Carmel Cortez       : 1990  MR#  [id number]       Date: 2023   Diagnosis: Cognitive Impairment     Treatment Diagnosis: ICD 10 R41.89  Secondary Diagnoses: N/A  Date of Evaluation: 11/3/2022      TREATMENT TO DATE: Cognitive Skill Development    PROGRESS TOWARDS GOALS:   Goal 1: To increase safety awareness and judgment for safe completion of ADLs secondary to pt's cognitive deficits, pt will complete high level problem solving tasks related to ADLs with 80% accuracy and min verbal cues. Mastered. Goal 2: To increase independence for functional activities for home and community, pt will complete mid level executive functioning tasks (i.e. Path finding, scheduling appointments, prioritizing tasks) with 90% accuracy with fading verbal cues. Mastered. Goal 3: To increase safety awareness and judgment for safe completion of ADLs secondary to pt's cognitive deficits, pt will complete abstract reasoning tasks (i.e. Word deduction, abstract convergent and divergent naming, similarities/differences) with 80% accuracy and min verbal cues. Progress made. ACHIEVEMENT OF GOALS:  Achieved goals: and Made progress towards goals:    REASON FOR DISCHARGE: Other: He reported that he was discharged, secondary to insurance. SLP clarified that pt still has visits remaining until March. Pt reported he did not think his father would give him a ride to therapy, if it was only for one discipline. SLP stated verbal understanding and provided education regarding progress thus far. Pt is performing well on high level tasks. SLP is comfortable with pt discharge and clinically believes pt will succeed in community and work environments. Pt reports his only concern is for social interactions in large groups, secondary to anxiety. SLP provided education and comfort. SLP directed such concerns to physician, as pt has skills needed to participate socially, however, is impeded by anxiety.     DISCHARGE EDUCATION/RECOMMENDATIONS: Continue home exercise program as directed and Refer back to physician for follow-up appointment      Electronically signed by:  Electronically signed by SOPHIE Holt on 2/1/2023 at 12:01 PM

## 2023-02-01 NOTE — PROGRESS NOTES
[address]     Ph: [phone]  Fax: [phone]    [] Certification  [] Recertification []  Plan of Care  [] Progress Note [x] Discharge      Referring Provider: Nany Crane DO    From: John Paul Nino, PT  Patient: Ruthy Blackwood (67 y.o. male) : 1990 Date: 2023   Medical Diagnosis: Nontraumatic lumbosacral plexopathy [G54.1]    Treatment Diagnosis: Treatment Diagnosis: decreased R hip AROM, decreased R LE strength, decreased R lower leg sensation, and decreased balance which is preventing pt from returning to work and returning to normal function    Plan of Care/Certification Expiration Date: 23   Progress Report Period from: 11/3/2021  to 2023    Visits to Date: 7 No Show: 0 Cancelled Appts: 2    OBJECTIVE:   Short Term Goals - Time Frame for Short Term Goals: 1 wk    Goals Current/Discharge status  Status   Short Term Goal 1: Pt will demonstrate improved R hip AROM for improved movement of R hip during functional activity. STG 1 Current Status[de-identified] Pt demonstrates hip AROM within functional limits for all functional acitivity and demonstrates no limitations. Met   Short Term Goal 2: Pt will demosntrate improved R LE strength >/= 4+/5 for return to normalized gait pattern. STG 2 Current Status[de-identified] Patient demonstrates improved R LE strength 5/5 for normalized gait pattern and improved functional mobility.     Strength LLE  L Hip Flexion: 5/5  L Hip Extension: 5/5  L Hip ABduction: 5/5  L Hip ADduction: 5/5  L Hip Internal Rotation: 5/5  L Hip External Rotation: 5/5  Strength RLE  R Hip Flexion: 5/5  R Hip Extension: 5/5  R Hip ABduction: 5/5  R Hip ADduction: 5/5  R Hip Internal Rotation: 5/5  R Hip External Rotation: 5/5  Met     Long Term Goals - Time Frame for Long Term Goals : 3 wks  Goals Current/ Discharge status Status   Long Term Goal 1: Pt will demonstrate indep and 100% compliance with HEP for return to PLOF. LTG 1 Current Status[de-identified] Pt reports compliance with HEP. Met   Long Term Goal 2: Pt will demonstrate amb community distances without gait deviations 100% of the time. LTG 2 Current Status[de-identified] Pt able to ambulate community distances with no gait deviations noted. Ambulation  Surface: Carpet, Level tile  Device: No Device  Assistance: Independent  Quality of Gait: mild trunk sway intermittently noted  Distance: 250ft Met   Long Term Goal 3: Pt will demonstrate improved score on LEFS >/= 70/80 for return to PLOF. LTG 3 Current Status[de-identified] Pt still reporting difficulty on LEFS for runnning activities, but able to complete all other acitivities with no to little difficulty. LEFS = 60/80   Not Met     Body Structures, Functions, Activity Limitations Requiring Skilled Therapeutic Intervention: Decreased functional mobility , Decreased ROM, Decreased strength, Decreased sensation, Decreased balance  Assessment: Reassessed patients goals this date and discussed discharging this date due to meeting majority of goals. Patient continues to demonstrate improvement in lower extremity strength with improved functional mobility and strength. Patient continues to demonstrate difficulty with running dt weakness in hip. Patient comfortable with discharging this date and compliant with HEP. Therapist instructed patient to reach out if any questions or things change, pt demonstrates good understanding. Therapy Prognosis: Excellent    PLAN: [x] discharge       Patient Status:[] Continue/ Initiate plan of Care     [x] Discharge PT. Recommend pt continue with HEP. [] Additional visits requested, Please re-certify for additional visits:     [] Hold     Signature:  Electronically signed by Bethany Ashton PTA on 2/1/23 at 10:58 AM EST          If you have any questions or concerns, please don't hesitate to call.   Thank you for your referral.    I have reviewed this plan of care and certify a need for medically necessary rehabilitation services.     Physician Signature:__________________________________________________________  Date:  Please sign and return

## 2023-02-01 NOTE — PROGRESS NOTES
[facility]  Outpatient Physical Therapy    Treatment Note        Date: 2023  Patient: Tanner Godinez  : 1990   Confirmed: Yes  MRN: [id number]  Referring Provider: Nica Plaza DO    Medical Diagnosis: Nontraumatic lumbosacral plexopathy [G54.1]       Treatment Diagnosis: Treatment Diagnosis: decreased R hip AROM, decreased R LE strength, decreased R lower leg sensation, and decreased balance which is preventing pt from returning to work and returning to normal function    Visit Information:  Insurance: Payor: Lani Jefferson / Plan: Víctor Truong / Product Type: *No Product type* /   PT Visit Information  PT Insurance Information: [employer]  Total # of Visits to Date: 7  Plan of Care/Certification Expiration Date: 23  No Show: 0  Canceled Appointment: 2  Progress Note Counter: 7/10 (/16 units from 1-9 thru 2-3)    Subjective Information:  Subjective: Pt reports right hip still feels weak when running and walking. Pt reports it feels \"not as strong\". Pt reports compliant with HEP. HEP Compliance:  [x] Good [] Fair [] Poor [] Reports not doing due to:    Pain Screening  Patient Currently in Pain: Denies    Treatment:  Exercises:  Exercises  Exercise 3: step ups F/L x12  Exercise 6: gait: marching and hold x2 lap  Exercise 10:  Sci-Fit, L-3.0, 5 min  Exercise 13: squats on foam x15 reps x2 sets  Exercise 15: standing 4-way hip RTB 1 set x 15 reps escobar  Exercise 20: HEP: squat, walking lunge, hip hike, isometric glut medius, D1 flex in supine    Objective Measures:   Ambulation  Surface: Carpet, Level tile  Device: No Device  Assistance: Independent  Quality of Gait: mild trunk sway intermittently noted  Distance: 250ft    Strength: [] NT  [x] MMT completed:  Strength RLE  R Hip Flexion: 5/5  R Hip Extension: 5/5  R Hip ABduction: 5/5  R Hip ADduction: 5/5  R Hip Internal Rotation: 5/5  R Hip External Rotation: 5/5  Strength LLE  L Hip Flexion: 5/5  L Hip Extension: 5/5  L Hip ABduction: 5/5  L Hip ADduction: 5/5  L Hip Internal Rotation: 5/5  L Hip External Rotation: 5/5    ROM: [x] NT  [] ROM measurements:    Assessment: Body Structures, Functions, Activity Limitations Requiring Skilled Therapeutic Intervention: Decreased functional mobility , Decreased ROM, Decreased strength, Decreased sensation, Decreased balance  Assessment: Reassessed patients goals this date and discussed discharging this date due to meeting majority of goals. Patient continues to demonstrate improvement in lower extremity strength with improved functional mobility and strength. Patient continues to demonstrate difficulty with running dt weakness in hip. Patient comfortable with discharging this date and compliant with HEP. Therapist instructed patient to reach out if any questions or things change, pt demonstrates good understanding. Treatment Diagnosis: Treatment Diagnosis: decreased R hip AROM, decreased R LE strength, decreased R lower leg sensation, and decreased balance which is preventing pt from returning to work and returning to normal function  Therapy Prognosis: Excellent     Post-Pain Assessment:       Pain Rating (0-10 pain scale):   0/10   Location and pain description same as pre-treatment unless indicated. Action: [] NA   [x] Perform HEP  [] Meds as prescribed  [] Modalities as prescribed   [] Call Physician     GOALS   Patient Goal(s): Patient Goals : \"walking without a limp\"    Short Term Goals Completed by 1 wk Goal Status   STG 1 Pt will demonstrate improved R hip AROM for improved movement of R hip during functional activity. Met   STG 2 Pt will demosntrate improved R LE strength >/= 4+/5 for return to normalized gait pattern. Met     Long Term Goals Completed by 3 wks Goal Status   LTG 1 Pt will demonstrate indep and 100% compliance with HEP for return to PLOF. Met   LTG 2 Pt will demonstrate amb community distances without gait deviations 100% of the time.  Met   LTG 3 Pt will demonstrate improved score on LEFS >/= 70/80 for return to PLOF.  Not Met          Plan:  Frequency/Duration:  Plan  Plan Frequency: discharge    Therapy Time:   PT Individual Minutes  Time In: 5071  Time Out: 4273  Minutes: 30  Timed Code Treatment Minutes: 30 Minutes    Procedure Minutes:0  Timed Activity Minutes Units   Ther Ex 30 2     Electronically signed by Stephan Rodriguez PTA on 2/1/23 at 10:56 AM EST

## 2023-02-08 ENCOUNTER — APPOINTMENT (OUTPATIENT)
Dept: SPEECH THERAPY | Age: 33
End: 2023-02-08
Payer: COMMERCIAL

## 2023-02-08 ENCOUNTER — APPOINTMENT (OUTPATIENT)
Dept: PHYSICAL THERAPY | Age: 33
End: 2023-02-08
Payer: COMMERCIAL

## 2023-02-15 ENCOUNTER — APPOINTMENT (OUTPATIENT)
Dept: SPEECH THERAPY | Age: 33
End: 2023-02-15
Payer: COMMERCIAL

## 2023-02-15 ENCOUNTER — APPOINTMENT (OUTPATIENT)
Dept: PHYSICAL THERAPY | Age: 33
End: 2023-02-15
Payer: COMMERCIAL

## 2023-02-22 ENCOUNTER — APPOINTMENT (OUTPATIENT)
Dept: PHYSICAL THERAPY | Age: 33
End: 2023-02-22
Payer: COMMERCIAL

## 2023-02-22 ENCOUNTER — APPOINTMENT (OUTPATIENT)
Dept: SPEECH THERAPY | Age: 33
End: 2023-02-22
Payer: COMMERCIAL

## 2023-02-24 PROBLEM — K29.70 INFLAMMATION OF STOMACH AND INTESTINE: Status: ACTIVE | Noted: 2023-02-24

## 2023-02-24 PROBLEM — K52.9 INFLAMMATION OF STOMACH AND INTESTINE: Status: ACTIVE | Noted: 2023-02-24

## 2023-03-06 ENCOUNTER — OFFICE VISIT (OUTPATIENT)
Dept: NEUROLOGY | Age: 33
End: 2023-03-06

## 2023-03-06 VITALS
BODY MASS INDEX: 30.85 KG/M2 | SYSTOLIC BLOOD PRESSURE: 120 MMHG | WEIGHT: 215 LBS | DIASTOLIC BLOOD PRESSURE: 80 MMHG | HEART RATE: 74 BPM

## 2023-03-06 DIAGNOSIS — F19.10 DRUG ABUSE (HCC): ICD-10-CM

## 2023-03-06 DIAGNOSIS — G93.40 ACUTE ENCEPHALOPATHY: ICD-10-CM

## 2023-03-06 DIAGNOSIS — R41.89 COGNITIVE IMPAIRMENT: ICD-10-CM

## 2023-03-06 DIAGNOSIS — R29.898 WEAKNESS OF RIGHT LOWER EXTREMITY: ICD-10-CM

## 2023-03-06 DIAGNOSIS — F51.01 PRIMARY INSOMNIA: ICD-10-CM

## 2023-03-06 DIAGNOSIS — G93.1 HYPOXIC ENCEPHALOPATHY (HCC): Primary | ICD-10-CM

## 2023-03-06 DIAGNOSIS — G93.6 CEREBRAL EDEMA (HCC): ICD-10-CM

## 2023-03-06 RX ORDER — TRAZODONE HYDROCHLORIDE 50 MG/1
50 TABLET ORAL NIGHTLY
Qty: 30 TABLET | Refills: 3 | Status: SHIPPED | OUTPATIENT
Start: 2023-03-06

## 2023-03-06 RX ORDER — CLONAZEPAM 0.5 MG/1
TABLET ORAL
COMMUNITY
Start: 2023-02-23

## 2023-03-06 RX ORDER — ZOLPIDEM TARTRATE 10 MG/1
TABLET ORAL
Qty: 30 TABLET | Refills: 0 | Status: SHIPPED | OUTPATIENT
Start: 2023-03-06 | End: 2023-04-06

## 2023-03-06 RX ORDER — ZOLPIDEM TARTRATE 10 MG/1
TABLET ORAL
COMMUNITY
Start: 2023-02-19 | End: 2023-03-06 | Stop reason: SDUPTHER

## 2023-03-06 ASSESSMENT — ENCOUNTER SYMPTOMS
PHOTOPHOBIA: 0
CHOKING: 0
NAUSEA: 0
TROUBLE SWALLOWING: 0
BACK PAIN: 0
VOMITING: 0
SHORTNESS OF BREATH: 0
COLOR CHANGE: 0

## 2023-03-06 NOTE — PROGRESS NOTES
Subjective:      Patient ID: Saida Onofre is a 27 y.o. male who presents today for:  Chief Complaint   Patient presents with    Follow-Up from Hospital     Pt states things have been going well. Pt just finished therapy. Pt has noticed a big weight gain that he is concerned about. Pt has noticed some cognitive issues, pt has noticed issues with talking and trying to describe things. Pt has noticed some slight memory issues. Pt would like to discuss getting off the verapamil. HPI 23-year-old man was admitted with hypoxic ischemic encephalopathy secondary to status epilepticus from polysubstance use of multiple medications listed in our records. Patient had a prolonged confusion and we were not quite sure if true damage was done. The patient was on over-the-counter medications and is initial CT scan showed bilateral globus pallidus attenuation consistent hypoxemia MRI was done in July 1 and showed areas of restriction diffusion in the globus pallidus splenis capitis consistent hypoxic toxic encephalopathy. Had mild degree of edema and we had treated this with oral dexamethasone and then he had a prolonged rehabilitation stay repeat MRI was done on 18 July and showed improvement and we have not seen him since we had a spine limited series which did not show anything significant his MRA was normal.  Finish therapy and weight gain and has some minor cognitive issues. Verapamil and I am not quite sure when this was added  Patient was then referred for neuro rehabilitation unit in [place]. He was there for quite some time and has not returned back home. Maribell Clamp with his parents. He is functioning well except for the memory issues. Had right lower extremity weakness and numbness which in fact later improved. Weakness on dorsiflexion resolved. He is now ambulatory    Past Medical History:   Diagnosis Date    ADHD (attention deficit hyperactivity disorder)     was initiated on treatment by Dr. Sueann Buerger. any testing was done here by Dr. Marco Antonio Alfonso, no formal psych eval.      Anxiety     Drug abuse [facility])     MDD (major depressive disorder), recurrent episode, moderate ([address] 6/4/2019    Neuropathy of right peroneal nerve 7/12/2022     Past Surgical History:   Procedure Laterality Date    TONSILLECTOMY AND ADENOIDECTOMY      WISDOM TOOTH EXTRACTION       Social History     Socioeconomic History    Marital status: Single     Spouse name: Not on file    Number of children: Not on file    Years of education: Not on file    Highest education level: Not on file   Occupational History    Not on file   Tobacco Use    Smoking status: Never    Smokeless tobacco: Never   Substance and Sexual Activity    Alcohol use: Yes     Comment: occasional    Drug use: No    Sexual activity: Not on file   Other Topics Concern    Not on file   Social History Narrative    Works full time at [employer]    Being evicted dt not paying rent, Car was reprocessed    Lived With: Alone in [address]: One level--Level entry    [address]: Standard    Has the patient had two or more falls in the past year or any fall with injury in the past year?: No    ADL Assistance: [address]: Independent    Homemaking Responsibilities: Yes    Ambulation Assistance: Independent    Transfer Assistance: Independent    Active : Yes    Type of Occupation: [employer] work    Additional Comments:  Independent PTA- no medical equipment         Social Determinants of Health     Financial Resource Strain: Not on file   Food Insecurity: Not on file   Transportation Needs: Not on file   Physical Activity: Not on file   Stress: Not on file   Social Connections: Not on file   Intimate Partner Violence: Not on file   Housing Stability: Not on file     Family History   Problem Relation Age of Onset    Depression Mother     Heart Disease Father     High Blood Pressure Father     High Cholesterol Father     Allergies   Allergen Reactions    Peanut-Containing Drug Products Anaphylaxis     Throat swelling, hives, \"I needed hospital admission last time I had any\"    Nuts [Macadamia Nut Oil] Angioedema    Peanut Oil Other (See Comments)    Shellfish-Derived Products Itching       Current Outpatient Medications   Medication Sig Dispense Refill    traZODone (DESYREL) 50 MG tablet Take 1 tablet by mouth nightly 30 tablet 3    zolpidem (AMBIEN) 10 MG tablet TAKE ONE TABLET BY MOUTH EVERY DAY AT BEDTIME 30 tablet 0    escitalopram (LEXAPRO) 20 MG tablet Take 1 tablet by mouth in the morning. 30 tablet 3    verapamil (CALAN SR) 120 MG extended release tablet Take 1 tablet by mouth in the morning and at bedtime 60 tablet 1    clonazePAM (KLONOPIN) 0.5 MG tablet TAKE ONE TABLET BY MOUTH TWICE DAILY AS NEEDED       No current facility-administered medications for this visit.         Review of Systems   Constitutional:  Negative for fever.   HENT:  Negative for ear pain, tinnitus and trouble swallowing.    Eyes:  Negative for photophobia and visual disturbance.   Respiratory:  Negative for choking and shortness of breath.    Cardiovascular:  Negative for chest pain and palpitations.   Gastrointestinal:  Negative for nausea and vomiting.   Musculoskeletal:  Negative for back pain, gait problem, joint swelling, myalgias, neck pain and neck stiffness.   Skin:  Negative for color change.   Allergic/Immunologic: Negative for food allergies.   Neurological:  Negative for dizziness, tremors, seizures, syncope, facial asymmetry, speech difficulty, weakness, light-headedness, numbness and headaches.   Psychiatric/Behavioral:  Negative for behavioral problems, confusion, hallucinations and sleep disturbance.      Objective:   /80 (Site: Right Upper Arm, Position: Sitting, Cuff Size: Medium Adult)   Pulse 74   Wt 215 lb (97.5 kg)   BMI 30.85 kg/m²     Physical Exam  Vitals reviewed.   Eyes:      Pupils: Pupils are equal, round,  and reactive to light. Cardiovascular:      Rate and Rhythm: Normal rate and regular rhythm. Heart sounds: No murmur heard. Pulmonary:      Effort: Pulmonary effort is normal.      Breath sounds: Normal breath sounds. Abdominal:      General: Bowel sounds are normal.   Musculoskeletal:         General: Normal range of motion. Cervical back: Normal range of motion. Skin:     General: Skin is warm. Neurological:      Mental Status: He is alert and oriented to person, place, and time. Cranial Nerves: No cranial nerve deficit. Sensory: No sensory deficit. Motor: No abnormal muscle tone. Coordination: Coordination normal.      Deep Tendon Reflexes: Reflexes are normal and symmetric. Babinski sign absent on the right side. Babinski sign absent on the left side. Psychiatric:         Mood and Affect: Mood normal.       No results found.     Lab Results   Component Value Date/Time    WBC 6.7 02/17/2023 07:40 AM    WBC 22.9 07/11/2022 05:14 AM    RBC 5.11 02/17/2023 07:40 AM    HGB 15.2 02/17/2023 07:40 AM    HCT 45.4 02/17/2023 07:40 AM    MCV 89 02/17/2023 07:40 AM    MCH 28.6 07/11/2022 05:14 AM    MCHC 33.5 02/17/2023 07:40 AM    RDW 12.9 07/11/2022 05:14 AM     02/17/2023 07:40 AM    MPV 8.5 07/13/2015 04:16 PM     Lab Results   Component Value Date/Time     02/17/2023 07:40 AM    K 4.1 02/17/2023 07:40 AM    K 4.0 07/11/2022 05:14 AM     02/17/2023 07:40 AM    CO2 26 07/11/2022 05:14 AM    BUN 12 07/11/2022 05:14 AM    CREATININE 1.03 02/17/2023 07:40 AM    GFRAA >60.0 07/11/2022 05:14 AM    LABGLOM >60.0 07/11/2022 05:14 AM    GLUCOSE 98 02/17/2023 07:40 AM    PROT 7.5 02/17/2023 07:40 AM    LABALBU 4.7 02/17/2023 07:40 AM    CALCIUM 9.9 02/17/2023 07:40 AM    BILITOT 0.5 02/17/2023 07:40 AM    ALKPHOS 68 02/17/2023 07:40 AM    AST 23 02/17/2023 07:40 AM    ALT 37 02/17/2023 07:40 AM     Lab Results   Component Value Date/Time    PROTIME 14.7 07/03/2022 04:50 AM    INR 1.2 07/03/2022 04:50 AM     Lab Results   Component Value Date/Time    TSH 2.01 02/17/2023 07:40 AM    BUDOSBAD97 612 05/16/2019 02:13 PM     Lab Results   Component Value Date/Time    TRIG 245 02/17/2023 07:40 AM    HDL 50.3 02/17/2023 07:40 AM    LDLCALC 85 06/30/2022 04:00 PM    LABVLDL 49 02/17/2023 07:40 AM     Lab Results   Component Value Date/Time    LABAMPH PRESUMPTIVE NEGATIVE 02/15/2023 01:32 PM    BARBSCNU Neg 06/30/2022 04:00 PM    LABBENZ PRESUMPTIVE NEGATIVE 02/15/2023 01:32 PM    LABMETH PRESUMPTIVE NEGATIVE 02/15/2023 01:32 PM    OPIATESCREENURINE Neg 06/30/2022 04:00 PM    PHENCYCLIDINESCREENURINE PRESUMPTIVE NEGATIVE 02/15/2023 01:32 PM    ETOH <10 06/30/2022 04:00 PM     No results found for: LITHIUM, DILFRTOT, VALPROATE    Assessment:       Diagnosis Orders   1. Hypoxic encephalopathy ([address]        2. Acute encephalopathy  zolpidem (AMBIEN) 10 MG tablet      3. Weakness of right lower extremity        4. Drug abuse ([address]        5. Primary insomnia        6. Cognitive impairment        7. Cerebral edema (HCC)        Hypoxic ischemic encephalopathy with abnormal MRI cerebral edema consistent underlying possibility of status epilepticus. Patient was on multiple over-the-counter medications most of this were associated with suggestion of seizures. Patient had initial MRI which showed cerebral edema which after the third MRI had shown improvement. He had significant lower extremity weakness on the right which likely was from the edema which has improved he is now ambulatory. He does have some cognitive issues which we expect with this disorder and I recommended a neuropsychometric testing. Also recommended a follow-up MRI to make sure there is nothing real.  We discussed all the findings as he had no recall of many of the events. He was quite hypertensive in the hospital and therefore he was started on verapamil. He also has significant insomnia and is on Ambien.   We were not the initial right dose of this medication though he does not have any other primary doctor at this time we will refill this for now but we did discuss tapering him off the Ambien at some point in time    Patient records were reviewed with him as he was comatose for quite some time    Rayray Osman MD, Edel Haddad, American Board of Psychiatry & Neurology  Board Certified in Vascular Neurology  Board Certified in Neuromuscular Medicine  Certified in [facility]:      No orders of the defined types were placed in this encounter. Orders Placed This Encounter   Medications    traZODone (DESYREL) 50 MG tablet     Sig: Take 1 tablet by mouth nightly     Dispense:  30 tablet     Refill:  3    zolpidem (AMBIEN) 10 MG tablet     Sig: TAKE ONE TABLET BY MOUTH EVERY DAY AT BEDTIME     Dispense:  30 tablet     Refill:  0       No follow-ups on file.       Paresh Onofre MD

## 2023-03-07 DIAGNOSIS — F41.9 ANXIETY: Primary | ICD-10-CM

## 2023-03-09 ENCOUNTER — TELEPHONE (OUTPATIENT)
Dept: NEUROLOGY | Age: 33
End: 2023-03-09

## 2023-03-09 PROBLEM — F41.9 ANXIETY: Status: ACTIVE | Noted: 2023-03-09

## 2023-03-09 RX ORDER — TRAZODONE HYDROCHLORIDE 100 MG/1
100 TABLET ORAL NIGHTLY
Qty: 30 TABLET | Refills: 2 | Status: SHIPPED | OUTPATIENT
Start: 2023-03-09

## 2023-03-09 RX ORDER — CLONAZEPAM 0.5 MG/1
0.5 TABLET ORAL 2 TIMES DAILY PRN
COMMUNITY
End: 2023-03-22 | Stop reason: SDUPTHER

## 2023-03-09 RX ORDER — CLONAZEPAM 0.5 MG/1
0.5 TABLET ORAL 2 TIMES DAILY PRN
Qty: 60 TABLET | Refills: 0 | OUTPATIENT
Start: 2023-03-09

## 2023-03-09 NOTE — TELEPHONE ENCOUNTER
Patient called stating that refill was sent for Trazodone 50 mg and that he had been taking 150 mg.  I do not see this anywhere in his history or noted in dictation.  Please advise

## 2023-03-14 ENCOUNTER — HOSPITAL ENCOUNTER (OUTPATIENT)
Dept: MRI IMAGING | Age: 33
Discharge: HOME | End: 2023-03-16
Payer: COMMERCIAL

## 2023-03-14 DIAGNOSIS — G93.6 CEREBRAL EDEMA (HCC): ICD-10-CM

## 2023-03-14 DIAGNOSIS — G93.1 HYPOXIC ENCEPHALOPATHY (HCC): ICD-10-CM

## 2023-03-14 PROCEDURE — 70551 MRI BRAIN STEM W/O DYE: CPT

## 2023-03-20 ENCOUNTER — TELEPHONE (OUTPATIENT)
Dept: NEUROLOGY | Age: 33
End: 2023-03-20

## 2023-03-21 NOTE — TELEPHONE ENCOUNTER
Mom called back and is wondering if the scar tissue will effect his memory / brain function    Please advise    Call Cl Granados [phone]

## 2023-03-22 DIAGNOSIS — F41.9 ANXIETY: Primary | ICD-10-CM

## 2023-03-22 NOTE — TELEPHONE ENCOUNTER
Dr. Velasquez patient  Requesting refill for Clonazepam   [facility] in [place]

## 2023-03-23 RX ORDER — CLONAZEPAM 0.5 MG/1
0.5 TABLET ORAL 2 TIMES DAILY PRN
Qty: 60 TABLET | Refills: 0 | Status: SHIPPED | OUTPATIENT
Start: 2023-03-23 | End: 2023-04-22

## 2023-04-20 DIAGNOSIS — F41.9 ANXIETY: ICD-10-CM

## 2023-04-20 NOTE — TELEPHONE ENCOUNTER
Dr Velasquez pt     Pt phoned office and is requesting refill on    Clonazepam     G.E Hermelinda Wilhelm

## 2023-04-22 RX ORDER — CLONAZEPAM 0.5 MG/1
0.5 TABLET ORAL 2 TIMES DAILY PRN
Qty: 60 TABLET | Refills: 0 | OUTPATIENT
Start: 2023-04-22

## 2023-05-10 DIAGNOSIS — G93.40 ACUTE ENCEPHALOPATHY: ICD-10-CM

## 2023-05-10 RX ORDER — ZOLPIDEM TARTRATE 10 MG/1
TABLET ORAL
Qty: 30 TABLET | Refills: 0 | Status: SHIPPED | OUTPATIENT
Start: 2023-05-10 | End: 2023-06-10

## 2023-05-19 DIAGNOSIS — F41.1 GENERALIZED ANXIETY DISORDER: ICD-10-CM

## 2023-05-20 DIAGNOSIS — F41.9 ANXIETY: ICD-10-CM

## 2023-05-21 RX ORDER — CLONAZEPAM 0.5 MG/1
TABLET ORAL
Qty: 60 TABLET | Refills: 0 | Status: SHIPPED | OUTPATIENT
Start: 2023-05-21 | End: 2023-08-17 | Stop reason: SDUPTHER

## 2023-05-21 RX ORDER — CLONAZEPAM 0.5 MG/1
TABLET ORAL
Qty: 30 TABLET | Refills: 0 | OUTPATIENT
Start: 2023-05-21

## 2023-06-12 DIAGNOSIS — G93.40 ACUTE ENCEPHALOPATHY: ICD-10-CM

## 2023-06-12 RX ORDER — ZOLPIDEM TARTRATE 10 MG/1
TABLET ORAL
Qty: 30 TABLET | Refills: 0 | Status: SHIPPED | OUTPATIENT
Start: 2023-06-12 | End: 2023-07-11

## 2023-06-12 RX ORDER — TRAZODONE HYDROCHLORIDE 100 MG/1
100 TABLET ORAL NIGHTLY
Qty: 30 TABLET | Refills: 2 | Status: SHIPPED | OUTPATIENT
Start: 2023-06-12

## 2023-06-19 DIAGNOSIS — F41.9 ANXIETY: ICD-10-CM

## 2023-06-20 ENCOUNTER — PATIENT MESSAGE (OUTPATIENT)
Dept: PRIMARY CARE | Facility: CLINIC | Age: 33
End: 2023-06-20
Payer: COMMERCIAL

## 2023-06-20 DIAGNOSIS — F41.9 ANXIETY: ICD-10-CM

## 2023-06-20 RX ORDER — CLONAZEPAM 0.5 MG/1
TABLET ORAL
Qty: 60 TABLET | Refills: 0 | OUTPATIENT
Start: 2023-06-20

## 2023-06-20 NOTE — TELEPHONE ENCOUNTER
From: Maikel Silva Jr.  To: Ubaldo Velasquez MD  Sent: 6/20/2023 10:27 AM EDT  Subject: Refill    I need my clonopin refilled.   Thank you

## 2023-06-21 DIAGNOSIS — F41.1 GENERALIZED ANXIETY DISORDER: ICD-10-CM

## 2023-06-21 RX ORDER — CLONAZEPAM 0.5 MG/1
TABLET ORAL
Qty: 60 TABLET | Refills: 0 | Status: SHIPPED | OUTPATIENT
Start: 2023-06-21 | End: 2023-07-19

## 2023-06-21 RX ORDER — CLONAZEPAM 0.5 MG/1
0.5 TABLET ORAL 2 TIMES DAILY PRN
Qty: 60 TABLET | Refills: 0 | OUTPATIENT
Start: 2023-06-21

## 2023-07-06 ENCOUNTER — OFFICE VISIT (OUTPATIENT)
Dept: NEUROLOGY | Age: 33
End: 2023-07-06
Payer: COMMERCIAL

## 2023-07-06 VITALS
WEIGHT: 222.4 LBS | HEART RATE: 85 BPM | DIASTOLIC BLOOD PRESSURE: 88 MMHG | SYSTOLIC BLOOD PRESSURE: 134 MMHG | BODY MASS INDEX: 31.91 KG/M2

## 2023-07-06 DIAGNOSIS — G40.901 STATUS EPILEPTICUS (HCC): ICD-10-CM

## 2023-07-06 DIAGNOSIS — G93.1 HYPOXIC ENCEPHALOPATHY (HCC): Primary | ICD-10-CM

## 2023-07-06 DIAGNOSIS — G93.40 ACUTE ENCEPHALOPATHY: ICD-10-CM

## 2023-07-06 DIAGNOSIS — G93.6 CEREBRAL EDEMA (HCC): ICD-10-CM

## 2023-07-06 PROCEDURE — G8427 DOCREV CUR MEDS BY ELIG CLIN: HCPCS | Performed by: PSYCHIATRY & NEUROLOGY

## 2023-07-06 PROCEDURE — G8417 CALC BMI ABV UP PARAM F/U: HCPCS | Performed by: PSYCHIATRY & NEUROLOGY

## 2023-07-06 PROCEDURE — 1036F TOBACCO NON-USER: CPT | Performed by: PSYCHIATRY & NEUROLOGY

## 2023-07-06 PROCEDURE — 99214 OFFICE O/P EST MOD 30 MIN: CPT | Performed by: PSYCHIATRY & NEUROLOGY

## 2023-07-06 ASSESSMENT — ENCOUNTER SYMPTOMS
TROUBLE SWALLOWING: 0
SHORTNESS OF BREATH: 0
CHOKING: 0
VOMITING: 0
PHOTOPHOBIA: 0
BACK PAIN: 0
COLOR CHANGE: 0
NAUSEA: 0

## 2023-07-10 DIAGNOSIS — G93.40 ACUTE ENCEPHALOPATHY: ICD-10-CM

## 2023-07-10 NOTE — TELEPHONE ENCOUNTER
Patient is requesting medication refill.  Please approve or deny this request.    Rx requested:  Requested Prescriptions     Pending Prescriptions Disp Refills    zolpidem (AMBIEN) 10 MG tablet [Pharmacy Med Name: Zolpidem Tartrate Oral Tablet 10 MG] 30 tablet 0     Sig: TAKE ONE TABLET BY MOUTH EVERY DAY AT BEDTIME         Last Office Visit:   7/6/2023      Next Visit Date:  Future Appointments   Date Time Provider [address] [place] [address]   12/7/2023  2:00 PM Rayray Durham MD [facility] Neurology -

## 2023-07-11 RX ORDER — ZOLPIDEM TARTRATE 10 MG/1
TABLET ORAL
Qty: 30 TABLET | Refills: 0 | Status: SHIPPED | OUTPATIENT
Start: 2023-07-11 | End: 2023-08-04 | Stop reason: SDUPTHER

## 2023-07-13 ENCOUNTER — TELEPHONE (OUTPATIENT)
Dept: PRIMARY CARE | Facility: CLINIC | Age: 33
End: 2023-07-13
Payer: COMMERCIAL

## 2023-07-13 DIAGNOSIS — F41.9 ANXIETY: Primary | ICD-10-CM

## 2023-07-13 RX ORDER — ESCITALOPRAM OXALATE 20 MG/1
20 TABLET ORAL DAILY
Qty: 30 TABLET | Refills: 2 | Status: SHIPPED | OUTPATIENT
Start: 2023-07-13 | End: 2023-10-11

## 2023-07-13 NOTE — TELEPHONE ENCOUNTER
PT IS REQUESTING REFILL ON LEXAPRO 20MG  [facility] [address]

## 2023-07-17 DIAGNOSIS — F41.1 GENERALIZED ANXIETY DISORDER: ICD-10-CM

## 2023-07-17 DIAGNOSIS — F41.9 ANXIETY: ICD-10-CM

## 2023-07-18 DIAGNOSIS — F41.1 GENERALIZED ANXIETY DISORDER: ICD-10-CM

## 2023-07-19 RX ORDER — CLONAZEPAM 0.5 MG/1
TABLET ORAL
Qty: 60 TABLET | Refills: 0 | Status: SHIPPED | OUTPATIENT
Start: 2023-07-19 | End: 2023-08-18 | Stop reason: SDUPTHER

## 2023-07-19 RX ORDER — CLONAZEPAM 0.5 MG/1
TABLET ORAL
Qty: 60 TABLET | Refills: 0 | OUTPATIENT
Start: 2023-07-19

## 2023-07-19 RX ORDER — CLONAZEPAM 0.5 MG/1
0.5 TABLET ORAL 2 TIMES DAILY PRN
Qty: 60 TABLET | Refills: 0 | OUTPATIENT
Start: 2023-07-19

## 2023-08-03 ENCOUNTER — TELEPHONE (OUTPATIENT)
Dept: NEUROLOGY | Age: 33
End: 2023-08-03

## 2023-08-03 NOTE — TELEPHONE ENCOUNTER
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

## 2023-08-04 DIAGNOSIS — G93.40 ACUTE ENCEPHALOPATHY: ICD-10-CM

## 2023-08-04 RX ORDER — ZOLPIDEM TARTRATE 10 MG/1
10 TABLET ORAL NIGHTLY
Qty: 30 TABLET | Refills: 0 | Status: SHIPPED | OUTPATIENT
Start: 2023-08-04 | End: 2023-09-02

## 2023-08-04 NOTE — TELEPHONE ENCOUNTER
Pt is requesting medication refill. Please approve or deny this request.    Rx requested:  Requested Prescriptions     Pending Prescriptions Disp Refills    zolpidem (AMBIEN) 10 MG tablet 30 tablet 0     Sig: Take 1 tablet by mouth nightly for 29 days.          Last Office Visit:   7/6/2023      Next Visit Date:  Future Appointments   Date Time Provider [facility] [address]   12/7/2023  2:00 PM Rayray Razo MD [facility] Neurology -

## 2023-08-15 ENCOUNTER — TELEPHONE (OUTPATIENT)
Dept: PRIMARY CARE | Facility: CLINIC | Age: 33
End: 2023-08-15

## 2023-08-15 DIAGNOSIS — F41.1 GENERALIZED ANXIETY DISORDER: ICD-10-CM

## 2023-08-15 RX ORDER — CLONAZEPAM 0.5 MG/1
0.5 TABLET ORAL 2 TIMES DAILY PRN
Qty: 60 TABLET | Refills: 0 | Status: CANCELLED | OUTPATIENT
Start: 2023-08-15

## 2023-08-15 NOTE — TELEPHONE ENCOUNTER
PT OF CB  PT CALLED IN FOR MED REFILL    CLONAZEPAM 0.5MG 2X DAILY     [id number]

## 2023-08-17 ENCOUNTER — PATIENT MESSAGE (OUTPATIENT)
Dept: PRIMARY CARE | Facility: CLINIC | Age: 33
End: 2023-08-17
Payer: COMMERCIAL

## 2023-08-17 DIAGNOSIS — F41.1 GENERALIZED ANXIETY DISORDER: ICD-10-CM

## 2023-08-17 NOTE — TELEPHONE ENCOUNTER
From: Maikel Silva Jr.  To: Ubaldo Velasquez MD  Sent: 8/17/2023 10:00 AM EDT  Subject: Clonazepam Refill    I need this medication refilled at [facility] in [place].    Thanks

## 2023-08-18 DIAGNOSIS — F41.1 GENERALIZED ANXIETY DISORDER: ICD-10-CM

## 2023-08-18 RX ORDER — CLONAZEPAM 0.5 MG/1
0.5 TABLET ORAL 2 TIMES DAILY PRN
Qty: 60 TABLET | Refills: 0 | OUTPATIENT
Start: 2023-08-18

## 2023-08-18 RX ORDER — CLONAZEPAM 0.5 MG/1
TABLET ORAL
Qty: 60 TABLET | Refills: 0 | OUTPATIENT
Start: 2023-08-18

## 2023-08-18 RX ORDER — CLONAZEPAM 0.5 MG/1
0.5 TABLET ORAL 2 TIMES DAILY PRN
Qty: 30 TABLET | Refills: 0 | Status: SHIPPED | OUTPATIENT
Start: 2023-08-18 | End: 2023-08-31 | Stop reason: SDUPTHER

## 2023-08-18 NOTE — TELEPHONE ENCOUNTER
PT OF CB  PT CALLED IN FOR MED REFILL     CLONAZEPAM 0.5MG 2X DAILY      [facility] [place] [id number]       PT ONLY GOT HALF OF SCRIPT (15 DAY SUPPLY) PLEASE SEND REST OVER

## 2023-08-29 DIAGNOSIS — F41.1 GENERALIZED ANXIETY DISORDER: ICD-10-CM

## 2023-08-30 DIAGNOSIS — F41.1 GENERALIZED ANXIETY DISORDER: ICD-10-CM

## 2023-08-31 ENCOUNTER — PATIENT MESSAGE (OUTPATIENT)
Dept: PRIMARY CARE | Facility: CLINIC | Age: 33
End: 2023-08-31
Payer: COMMERCIAL

## 2023-08-31 DIAGNOSIS — F41.1 GENERALIZED ANXIETY DISORDER: ICD-10-CM

## 2023-08-31 RX ORDER — CLONAZEPAM 0.5 MG/1
0.5 TABLET ORAL 2 TIMES DAILY PRN
Qty: 30 TABLET | Refills: 0 | OUTPATIENT
Start: 2023-08-31

## 2023-08-31 RX ORDER — CLONAZEPAM 0.5 MG/1
0.5 TABLET ORAL 2 TIMES DAILY PRN
Qty: 30 TABLET | Refills: 0 | Status: SHIPPED | OUTPATIENT
Start: 2023-08-31 | End: 2023-09-12 | Stop reason: SDUPTHER

## 2023-09-04 DIAGNOSIS — G93.40 ACUTE ENCEPHALOPATHY: ICD-10-CM

## 2023-09-05 RX ORDER — TRAZODONE HYDROCHLORIDE 100 MG/1
100 TABLET ORAL NIGHTLY
Qty: 30 TABLET | Refills: 0 | OUTPATIENT
Start: 2023-09-05

## 2023-09-05 RX ORDER — ZOLPIDEM TARTRATE 10 MG/1
TABLET ORAL
Qty: 30 TABLET | Refills: 0 | Status: SHIPPED | OUTPATIENT
Start: 2023-09-05 | End: 2023-10-02 | Stop reason: SDUPTHER

## 2023-09-05 RX ORDER — TRAZODONE HYDROCHLORIDE 100 MG/1
100 TABLET ORAL NIGHTLY
Qty: 30 TABLET | Refills: 2 | Status: SHIPPED | OUTPATIENT
Start: 2023-09-05

## 2023-09-05 NOTE — TELEPHONE ENCOUNTER
Pharmacy is requesting medication refill. Please approve or deny this request.    Rx requested:  Requested Prescriptions     Pending Prescriptions Disp Refills    zolpidem (AMBIEN) 10 MG tablet [Pharmacy Med Name: Zolpidem Tartrate Oral Tablet 10 MG] 30 tablet 0     Sig: Take 1 tablet by mouth nightly for 30 days.          Last Office Visit:   7/6/2023      Next Visit Date:  Future Appointments   Date Time Provider [address] [place] [address]   12/7/2023  2:00 PM MD Pietro Holder Neurology -

## 2023-09-05 NOTE — TELEPHONE ENCOUNTER
Pt is requesting medication refill.  Please approve or deny this request.    Rx requested:  Requested Prescriptions     Pending Prescriptions Disp Refills    traZODone (DESYREL) 100 MG tablet 30 tablet 2     Sig: Take 1 tablet by mouth nightly    verapamil (CALAN SR) 120 MG extended release tablet 30 tablet 2     Sig: Take 1 tablet by mouth daily         Last Office Visit:   7/6/2023      Next Visit Date:  Future Appointments   Date Time Provider [address] [place] [address]   12/7/2023  2:00 PM MD Nisreen Jarvis Neurology -

## 2023-09-12 ENCOUNTER — OFFICE VISIT (OUTPATIENT)
Dept: PRIMARY CARE | Facility: CLINIC | Age: 33
End: 2023-09-12
Payer: COMMERCIAL

## 2023-09-12 ENCOUNTER — LAB (OUTPATIENT)
Dept: LAB | Facility: LAB | Age: 33
End: 2023-09-12
Payer: COMMERCIAL

## 2023-09-12 VITALS
HEIGHT: 70 IN | BODY MASS INDEX: 32.81 KG/M2 | HEART RATE: 86 BPM | SYSTOLIC BLOOD PRESSURE: 152 MMHG | WEIGHT: 229.2 LBS | OXYGEN SATURATION: 97 % | DIASTOLIC BLOOD PRESSURE: 104 MMHG

## 2023-09-12 DIAGNOSIS — E78.5 DYSLIPIDEMIA: ICD-10-CM

## 2023-09-12 DIAGNOSIS — I10 PRIMARY HYPERTENSION: ICD-10-CM

## 2023-09-12 DIAGNOSIS — E55.9 VITAMIN D DEFICIENCY: ICD-10-CM

## 2023-09-12 DIAGNOSIS — Z79.899 MEDICATION MANAGEMENT: ICD-10-CM

## 2023-09-12 DIAGNOSIS — S09.90XD TRAUMATIC INJURY OF HEAD, SUBSEQUENT ENCOUNTER: ICD-10-CM

## 2023-09-12 DIAGNOSIS — F33.41 RECURRENT MAJOR DEPRESSIVE DISORDER, IN PARTIAL REMISSION (CMS-HCC): ICD-10-CM

## 2023-09-12 DIAGNOSIS — J30.2 SEASONAL ALLERGIES: Primary | ICD-10-CM

## 2023-09-12 DIAGNOSIS — F41.1 GENERALIZED ANXIETY DISORDER: ICD-10-CM

## 2023-09-12 PROBLEM — F98.8 ADD (ATTENTION DEFICIT DISORDER): Status: ACTIVE | Noted: 2023-09-12

## 2023-09-12 PROBLEM — M62.830 BACK SPASM: Status: ACTIVE | Noted: 2023-09-12

## 2023-09-12 PROBLEM — K52.9 GASTROENTERITIS: Status: ACTIVE | Noted: 2023-09-12

## 2023-09-12 PROBLEM — R53.83 FATIGUE: Status: ACTIVE | Noted: 2023-09-12

## 2023-09-12 PROBLEM — F32.A DEPRESSION: Status: ACTIVE | Noted: 2023-09-12

## 2023-09-12 PROBLEM — S09.90XA HEAD TRAUMA: Status: ACTIVE | Noted: 2023-09-12

## 2023-09-12 LAB
CHOLESTEROL (MG/DL) IN SER/PLAS: 287 MG/DL (ref 0–199)
CHOLESTEROL IN HDL (MG/DL) IN SER/PLAS: 50.3 MG/DL
CHOLESTEROL/HDL RATIO: 5.7
LDL: 160 MG/DL (ref 0–99)
NON HDL CHOLESTEROL: 237 MG/DL
TRIGLYCERIDE (MG/DL) IN SER/PLAS: 384 MG/DL (ref 0–149)
VLDL: 77 MG/DL (ref 0–40)

## 2023-09-12 PROCEDURE — 80373 DRUG SCREENING TRAMADOL: CPT

## 2023-09-12 PROCEDURE — 80061 LIPID PANEL: CPT

## 2023-09-12 PROCEDURE — 3077F SYST BP >= 140 MM HG: CPT | Performed by: FAMILY MEDICINE

## 2023-09-12 PROCEDURE — 82306 VITAMIN D 25 HYDROXY: CPT

## 2023-09-12 PROCEDURE — 80361 OPIATES 1 OR MORE: CPT

## 2023-09-12 PROCEDURE — 80365 DRUG SCREENING OXYCODONE: CPT

## 2023-09-12 PROCEDURE — 99214 OFFICE O/P EST MOD 30 MIN: CPT | Performed by: FAMILY MEDICINE

## 2023-09-12 PROCEDURE — 80354 DRUG SCREENING FENTANYL: CPT

## 2023-09-12 PROCEDURE — 80358 DRUG SCREENING METHADONE: CPT

## 2023-09-12 PROCEDURE — 80346 BENZODIAZEPINES1-12: CPT

## 2023-09-12 PROCEDURE — 80368 SEDATIVE HYPNOTICS: CPT

## 2023-09-12 PROCEDURE — 80307 DRUG TEST PRSMV CHEM ANLYZR: CPT

## 2023-09-12 PROCEDURE — 36415 COLL VENOUS BLD VENIPUNCTURE: CPT

## 2023-09-12 PROCEDURE — 3080F DIAST BP >= 90 MM HG: CPT | Performed by: FAMILY MEDICINE

## 2023-09-12 PROCEDURE — 1036F TOBACCO NON-USER: CPT | Performed by: FAMILY MEDICINE

## 2023-09-12 RX ORDER — LISDEXAMFETAMINE DIMESYLATE 50 MG/1
1 CAPSULE ORAL DAILY
COMMUNITY
Start: 2019-09-30 | End: 2023-09-12 | Stop reason: ALTCHOICE

## 2023-09-12 RX ORDER — ZOLPIDEM TARTRATE 10 MG/1
TABLET ORAL
COMMUNITY
Start: 2023-09-05

## 2023-09-12 RX ORDER — CLONAZEPAM 0.5 MG/1
0.5 TABLET ORAL 2 TIMES DAILY PRN
Qty: 30 TABLET | Refills: 0 | Status: SHIPPED | OUTPATIENT
Start: 2023-09-12

## 2023-09-12 RX ORDER — TRAZODONE HYDROCHLORIDE 100 MG/1
100 TABLET ORAL NIGHTLY
COMMUNITY
Start: 2023-09-05

## 2023-09-12 RX ORDER — ALPRAZOLAM 0.25 MG/1
0.25 TABLET ORAL DAILY PRN
COMMUNITY
Start: 2022-10-15 | End: 2023-09-12 | Stop reason: ALTCHOICE

## 2023-09-12 RX ORDER — VERAPAMIL HYDROCHLORIDE 240 MG/1
240 TABLET, FILM COATED, EXTENDED RELEASE ORAL DAILY
Qty: 30 TABLET | Refills: 1 | Status: SHIPPED | OUTPATIENT
Start: 2023-09-12

## 2023-09-12 RX ORDER — VERAPAMIL HYDROCHLORIDE 120 MG/1
120 TABLET, FILM COATED, EXTENDED RELEASE ORAL DAILY
COMMUNITY
Start: 2023-09-05 | End: 2023-09-12 | Stop reason: SDUPTHER

## 2023-09-12 RX ORDER — TIZANIDINE 4 MG/1
TABLET ORAL
COMMUNITY
Start: 2022-03-23 | End: 2023-09-12 | Stop reason: ALTCHOICE

## 2023-09-12 RX ORDER — DEXTROAMPHETAMINE SACCHARATE, AMPHETAMINE ASPARTATE, DEXTROAMPHETAMINE SULFATE AND AMPHETAMINE SULFATE 5; 5; 5; 5 MG/1; MG/1; MG/1; MG/1
1 TABLET ORAL DAILY
COMMUNITY
Start: 2021-12-17 | End: 2023-09-12 | Stop reason: ALTCHOICE

## 2023-09-12 RX ORDER — CHOLECALCIFEROL (VITAMIN D3) 50 MCG
50 TABLET ORAL DAILY
Qty: 90 TABLET | Refills: 1 | Status: SHIPPED | OUTPATIENT
Start: 2023-09-12 | End: 2024-09-11

## 2023-09-12 ASSESSMENT — PATIENT HEALTH QUESTIONNAIRE - PHQ9
SUM OF ALL RESPONSES TO PHQ9 QUESTIONS 1 AND 2: 0
1. LITTLE INTEREST OR PLEASURE IN DOING THINGS: NOT AT ALL
2. FEELING DOWN, DEPRESSED OR HOPELESS: NOT AT ALL

## 2023-09-12 NOTE — PROGRESS NOTES
Subjective   Patient ID: Maikel Silva Jr. is a 33 y.o. male who presents for Anxiety and Depression.    Patient is here for a follow up on his anxiety and depression,states he is feeling well without any concerns, states he will need a refill of his klonopin soon,     Denies any other concerns.          Review of Systems  12 Systems have been reviewed as follows.  Constitutional: Fever, weight gain, weight loss, appetite change, night sweats, fatigue, chills.  Eyes : blurry, double vision, vision, loss, tearing, redness, pain, sensitivity to light, glaucoma.  Ears, nose, mouth, and throat: Hearing loss, ringing in the ears, ear pain, nasal congestion, nasal drainage, nosebleeds, mouth, throat, irritation tooth problem.  Cardiovascular :chest pain, pressure, heart racing, palpitations, sweating, leg swelling, high or low blood pressure  Pulmonary: Cough, yellow or green sputum, blood and sputum, shortness of breath, wheezing  Gastrointestinal: Nausea, vomiting, diarrhea, constipation, pain, blood in stool, or vomitus, heartburn, difficulty swallowing  Genitourinary: incontinence, abnormal bleeding, abnormal discharge, urinary frequency, urinary hesitancy, pain, impotence sexual problem, infection, urinary retention  Musculoskeletal: Pain, stiffness, joint, redness or warmth, arthritis, back pain, weakness, muscle wasting, sprain or fracture  Neuro: Weight weakness, dizziness, change in voice, change in taste change in vision, change in hearing, loss, or change of sensation, trouble walking, balance problems coordination problems, shaking, speech problem  Endocrine , cold or heat intolerance, blood sugar problem, weight gain or loss missed periods hot flashes, sweats, change in body hair, change in libido, increased thirst, increased urination  Heme/lymph: Swelling, bleeding, problem anemia, bruising, enlarged lymph nodes  Allergic/immunologic: H. plus nasal drip, watery itchy eyes, nasal drainage,  "immunosuppressed  The above were reviewed and noted negative except as noted in HPI and Problem List.    Objective   BP (!) 152/104   Pulse 86   Ht 1.778 m (5' 10\")   Wt 104 kg (229 lb 3.2 oz)   SpO2 97%   BMI 32.89 kg/m²     Physical Exam  Constitutional: Well developed, well nourished, alert and in no acute distress   Eyes: Normal external exam. Pupils equally round and reactive to light with normal accommodation and extraocular movements intact.  Neck: Supple, no lymphadenopathy or masses.   Cardiovascular: Regular rate and rhythm, normal S1 and S2, no murmurs, gallops, or rubs. Radial pulses normal. No peripheral edema.  Pulmonary: No respiratory distress, lungs clear to auscultation bilaterally. No wheezes, rhonchi, rales.  Abdomen: soft,non tender, non distended, without masses or HSM  Skin: Warm, well perfused, normal skin turgor and color.   Neurologic: Cranial nerves II-XII grossly intact.   Psychiatric: Mood calm and affect normal  Musculoskeletal: Moving all extremities without restriction    Assessment/Plan   Problem List Items Addressed This Visit       Depression    Relevant Orders    Follow Up In [facility] - PCP - Established    Seasonal allergies - Primary    Relevant Orders    Follow Up In [facility] - PCP - Established    Vitamin D deficiency    Relevant Medications    cholecalciferol (Vitamin D3) 50 MCG (2000 UT) tablet    Other Relevant Orders    Vitamin D 25-Hydroxy,Total (for eval of Vitamin D levels)    Follow Up In [facility] - PCP - Established    Generalized anxiety disorder    Relevant Medications    clonazePAM (KlonoPIN) 0.5 mg tablet    Other Relevant Orders    Opiate/Opioid/Benzo Extended Prescription Compliance    Follow Up In [facility] - PCP - Established    Head trauma    Relevant Orders    Follow Up In [facility] - PCP - Established    Primary hypertension    Relevant Medications    verapamil SR (Calan-SR) 240 mg ER " tablet    Other Relevant Orders    Follow Up In [facility] - PCP - Established     Other Visit Diagnoses       Medication management        Relevant Orders    Drug Screen 9 Panel, Blood with Reflex to Confirmation    Follow Up In [facility] - PCP - Established    Dyslipidemia        Relevant Orders    Lipid Panel    Follow Up In [facility] - PCP - Established          Continue current medications and therapy for chronic medical conditions    UDS now

## 2023-09-13 LAB — CALCIDIOL (25 OH VITAMIN D3) (NG/ML) IN SER/PLAS: 31 NG/ML

## 2023-09-15 LAB
6-ACETYLMORPHINE: <25 NG/ML
7-AMINOCLONAZEPAM: 355 NG/ML
ALPHA-HYDROXYALPRAZOLAM: <25 NG/ML
ALPHA-HYDROXYMIDAZOLAM: <25 NG/ML
ALPRAZOLAM: <25 NG/ML
AMPHETAMINE (PRESENCE) IN URINE BY SCREEN METHOD: ABNORMAL
AMPHETAMINE SCREEN BLOOD: NEGATIVE NG/ML
BARBITURATE SCREEN BLOOD: NEGATIVE NG/ML
BARBITURATES PRESENCE IN URINE BY SCREEN METHOD: ABNORMAL
BENZODIAZEPINES SCREEN BLOOD: NEGATIVE NG/ML
BUPRENORPHINE SCREEN BLOOD: NEGATIVE NG/ML
CANNABINOIDS IN URINE BY SCREEN METHOD: ABNORMAL
CANNABINOIDS SCREEN BLOOD: NEGATIVE NG/ML
CHLORDIAZEPOXIDE: <25 NG/ML
CLONAZEPAM: 27 NG/ML
COCAINE (PRESENCE) IN URINE BY SCREEN METHOD: ABNORMAL
COCAINE SCREEN BLOOD: NEGATIVE NG/ML
CODEINE: <50 NG/ML
CREATINE, URINE FOR DRUG: 121 MG/DL
DIAZEPAM: <25 NG/ML
DRUG SCREEN COMMENT BLOOD: NORMAL
DRUG SCREEN COMMENT URINE: ABNORMAL
EDDP: <25 NG/ML
FENTANYL CONFIRMATION, URINE: <2.5 NG/ML
HYDROCODONE: <25 NG/ML
HYDROMORPHONE: <25 NG/ML
LORAZEPAM: <25 NG/ML
METHADONE CONFIRMATION,URINE: <25 NG/ML
METHADONE SCREEN BLOOD: NEGATIVE NG/ML
METHAMPHETAMINE, BLOOD, SCREEN: NEGATIVE NG/ML
MIDAZOLAM: <25 NG/ML
MORPHINE URINE: <50 NG/ML
NORDIAZEPAM: <25 NG/ML
NORFENTANYL: <2.5 NG/ML
NORHYDROCODONE: <25 NG/ML
NOROXYCODONE: <25 NG/ML
O-DESMETHYLTRAMADOL: <50 NG/ML
OPIATE SCREEN BLOOD: NEGATIVE NG/ML
OXAZEPAM: <25 NG/ML
OXYCODONE SCREEN BLOOD: NEGATIVE NG/ML
OXYCODONE: <25 NG/ML
OXYMORPHONE: <25 NG/ML
PHENCYCLIDINE (PRESENCE) IN URINE BY SCREEN METHOD: ABNORMAL
PHENCYCLIDINE SCREEN BLOOD: NEGATIVE NG/ML
TEMAZEPAM: <25 NG/ML
TRAMADOL: <50 NG/ML
ZOLPIDEM METABOLITE (ZCA): >1000 NG/ML
ZOLPIDEM: 29 NG/ML

## 2023-09-19 ENCOUNTER — OFFICE VISIT (OUTPATIENT)
Dept: FAMILY MEDICINE CLINIC | Age: 33
End: 2023-09-19
Payer: COMMERCIAL

## 2023-09-19 VITALS
HEIGHT: 70 IN | WEIGHT: 229.6 LBS | DIASTOLIC BLOOD PRESSURE: 96 MMHG | HEART RATE: 86 BPM | BODY MASS INDEX: 32.87 KG/M2 | OXYGEN SATURATION: 98 % | SYSTOLIC BLOOD PRESSURE: 130 MMHG

## 2023-09-19 DIAGNOSIS — Z82.49 FAMILY HISTORY OF EARLY CAD: ICD-10-CM

## 2023-09-19 DIAGNOSIS — R60.0 MILD PERIPHERAL EDEMA: ICD-10-CM

## 2023-09-19 DIAGNOSIS — R00.0 TACHYCARDIA: ICD-10-CM

## 2023-09-19 DIAGNOSIS — F33.1 MDD (MAJOR DEPRESSIVE DISORDER), RECURRENT EPISODE, MODERATE (HCC): ICD-10-CM

## 2023-09-19 DIAGNOSIS — F41.1 GAD (GENERALIZED ANXIETY DISORDER): ICD-10-CM

## 2023-09-19 DIAGNOSIS — F19.11 DRUG ABUSE IN REMISSION (HCC): ICD-10-CM

## 2023-09-19 DIAGNOSIS — R60.0 MILD PERIPHERAL EDEMA: Primary | ICD-10-CM

## 2023-09-19 LAB
ALBUMIN SERPL-MCNC: 5.3 G/DL (ref 3.5–4.6)
ALP SERPL-CCNC: 95 U/L (ref 35–104)
ALT SERPL-CCNC: 129 U/L (ref 0–41)
ANION GAP SERPL CALCULATED.3IONS-SCNC: 14 MEQ/L (ref 9–15)
AST SERPL-CCNC: 83 U/L (ref 0–40)
BASOPHILS # BLD: 0.1 K/UL (ref 0–0.2)
BASOPHILS NFR BLD: 1.1 %
BILIRUB SERPL-MCNC: 0.4 MG/DL (ref 0.2–0.7)
BUN SERPL-MCNC: 14 MG/DL (ref 6–20)
CALCIUM SERPL-MCNC: 9.7 MG/DL (ref 8.5–9.9)
CHLORIDE SERPL-SCNC: 97 MEQ/L (ref 95–107)
CO2 SERPL-SCNC: 25 MEQ/L (ref 20–31)
CREAT SERPL-MCNC: 1.02 MG/DL (ref 0.7–1.2)
EOSINOPHIL # BLD: 0.1 K/UL (ref 0–0.7)
EOSINOPHIL NFR BLD: 2.1 %
ERYTHROCYTE [DISTWIDTH] IN BLOOD BY AUTOMATED COUNT: 12.1 % (ref 11.5–14.5)
GLOBULIN SER CALC-MCNC: 2.2 G/DL (ref 2.3–3.5)
GLUCOSE FASTING: 127 MG/DL (ref 70–99)
HCT VFR BLD AUTO: 48.1 % (ref 42–52)
HGB BLD-MCNC: 16.1 G/DL (ref 14–18)
LYMPHOCYTES # BLD: 2.1 K/UL (ref 1–4.8)
LYMPHOCYTES NFR BLD: 31.4 %
MCH RBC QN AUTO: 30.6 PG (ref 27–31.3)
MCHC RBC AUTO-ENTMCNC: 33.5 % (ref 33–37)
MCV RBC AUTO: 91.3 FL (ref 79–92.2)
MONOCYTES # BLD: 0.3 K/UL (ref 0.2–0.8)
MONOCYTES NFR BLD: 5.1 %
NEUTROPHILS # BLD: 4 K/UL (ref 1.4–6.5)
NEUTS SEG NFR BLD: 60 %
PLATELET # BLD AUTO: 309 K/UL (ref 130–400)
POTASSIUM SERPL-SCNC: 4.1 MEQ/L (ref 3.4–4.9)
PROT SERPL-MCNC: 7.5 G/DL (ref 6.3–8)
RBC # BLD AUTO: 5.27 M/UL (ref 4.7–6.1)
SODIUM SERPL-SCNC: 136 MEQ/L (ref 135–144)
TSH SERPL-MCNC: 0.91 UIU/ML (ref 0.44–3.86)
WBC # BLD AUTO: 6.6 K/UL (ref 4.8–10.8)

## 2023-09-19 PROCEDURE — 99204 OFFICE O/P NEW MOD 45 MIN: CPT | Performed by: NURSE PRACTITIONER

## 2023-09-19 RX ORDER — CHOLECALCIFEROL (VITAMIN D3) 125 MCG
1 CAPSULE ORAL DAILY
COMMUNITY
Start: 2023-09-12

## 2023-09-19 SDOH — ECONOMIC STABILITY: INCOME INSECURITY: HOW HARD IS IT FOR YOU TO PAY FOR THE VERY BASICS LIKE FOOD, HOUSING, MEDICAL CARE, AND HEATING?: NOT HARD AT ALL

## 2023-09-19 SDOH — ECONOMIC STABILITY: HOUSING INSECURITY
IN THE LAST 12 MONTHS, WAS THERE A TIME WHEN YOU DID NOT HAVE A STEADY PLACE TO SLEEP OR SLEPT IN A SHELTER (INCLUDING NOW)?: NO

## 2023-09-19 SDOH — ECONOMIC STABILITY: FOOD INSECURITY: WITHIN THE PAST 12 MONTHS, YOU WORRIED THAT YOUR FOOD WOULD RUN OUT BEFORE YOU GOT MONEY TO BUY MORE.: NEVER TRUE

## 2023-09-19 SDOH — ECONOMIC STABILITY: FOOD INSECURITY: WITHIN THE PAST 12 MONTHS, THE FOOD YOU BOUGHT JUST DIDN'T LAST AND YOU DIDN'T HAVE MONEY TO GET MORE.: NEVER TRUE

## 2023-09-19 ASSESSMENT — PATIENT HEALTH QUESTIONNAIRE - PHQ9
SUM OF ALL RESPONSES TO PHQ QUESTIONS 1-9: 9
SUM OF ALL RESPONSES TO PHQ9 QUESTIONS 1 & 2: 1
6. FEELING BAD ABOUT YOURSELF - OR THAT YOU ARE A FAILURE OR HAVE LET YOURSELF OR YOUR FAMILY DOWN: 0
SUM OF ALL RESPONSES TO PHQ QUESTIONS 1-9: 9
4. FEELING TIRED OR HAVING LITTLE ENERGY: 2
1. LITTLE INTEREST OR PLEASURE IN DOING THINGS: 1
9. THOUGHTS THAT YOU WOULD BE BETTER OFF DEAD, OR OF HURTING YOURSELF: 0
8. MOVING OR SPEAKING SO SLOWLY THAT OTHER PEOPLE COULD HAVE NOTICED. OR THE OPPOSITE, BEING SO FIGETY OR RESTLESS THAT YOU HAVE BEEN MOVING AROUND A LOT MORE THAN USUAL: 1
5. POOR APPETITE OR OVEREATING: 1
10. IF YOU CHECKED OFF ANY PROBLEMS, HOW DIFFICULT HAVE THESE PROBLEMS MADE IT FOR YOU TO DO YOUR WORK, TAKE CARE OF THINGS AT HOME, OR GET ALONG WITH OTHER PEOPLE: 0
7. TROUBLE CONCENTRATING ON THINGS, SUCH AS READING THE NEWSPAPER OR WATCHING TELEVISION: 2
SUM OF ALL RESPONSES TO PHQ QUESTIONS 1-9: 9
2. FEELING DOWN, DEPRESSED OR HOPELESS: 0
3. TROUBLE FALLING OR STAYING ASLEEP: 2
SUM OF ALL RESPONSES TO PHQ QUESTIONS 1-9: 9

## 2023-09-19 ASSESSMENT — ENCOUNTER SYMPTOMS
CONSTIPATION: 0
SORE THROAT: 0
CHEST TIGHTNESS: 0
COUGH: 0
VOMITING: 0
BLOOD IN STOOL: 0
SHORTNESS OF BREATH: 1
ABDOMINAL PAIN: 0
NAUSEA: 0
RHINORRHEA: 0
DIARRHEA: 0
TROUBLE SWALLOWING: 0

## 2023-09-19 NOTE — PROGRESS NOTES
General: No swelling. Normal range of motion. Cervical back: Normal range of motion and neck supple. Right lower leg: No edema. Left lower leg: No edema. Lymphadenopathy:      Cervical: No cervical adenopathy. Skin:     General: Skin is warm and dry. Capillary Refill: Capillary refill takes less than 2 seconds. Neurological:      General: No focal deficit present. Mental Status: He is alert and oriented to person, place, and time. Motor: No weakness. Psychiatric:         Mood and Affect: Mood normal.         Behavior: Behavior normal.       Assessment & Plan    Diagnosis Orders   1. Mild peripheral edema  CBC with Auto Differential   No clear etiology, will have the laboratory work completed which was discussed with patient. He is to monitor his weight closely at home and notify provider if there is any changes more than 2 pounds in a day or 5 pounds in a week. We will also be proceeding with cardiac stress test and echocardiogram given significant family history of early onset CAD. TSH    Comprehensive Metabolic Panel, Fasting      2. MDD (major depressive disorder), recurrent episode, moderate (HCC)     Stable, continue on Lexapro and trazodone as prescribed. 3. ROSALES (generalized anxiety disorder)     Stable, continue on Lexapro and Klonopin as prescribed   4. Tachycardia     Controlled continue on verapamil   5. Drug abuse in remission [facility])          Orders Placed This Encounter   Procedures    CBC with Auto Differential     Standing Status:   Future     Number of Occurrences:   1     Standing Expiration Date:   9/19/2024    TSH     Standing Status:   Future     Number of Occurrences:   1     Standing Expiration Date:   9/19/2024    Comprehensive Metabolic Panel, Fasting     Standing Status:   Future     Number of Occurrences:   1     Standing Expiration Date:   9/19/2024     No orders of the defined types were placed in this encounter.     There are no discontinued

## 2023-09-21 DIAGNOSIS — R74.8 ELEVATED LIVER ENZYMES: ICD-10-CM

## 2023-09-21 DIAGNOSIS — R73.09 BLOOD GLUCOSE ABNORMAL: Primary | ICD-10-CM

## 2023-09-25 DIAGNOSIS — R73.09 BLOOD GLUCOSE ABNORMAL: ICD-10-CM

## 2023-09-25 LAB — HBA1C MFR BLD: 5.3 % (ref 4.8–5.9)

## 2023-09-26 ENCOUNTER — APPOINTMENT (OUTPATIENT)
Dept: PRIMARY CARE | Facility: CLINIC | Age: 33
End: 2023-09-26
Payer: COMMERCIAL

## 2023-09-26 DIAGNOSIS — F41.1 GAD (GENERALIZED ANXIETY DISORDER): Primary | ICD-10-CM

## 2023-09-27 ENCOUNTER — PATIENT MESSAGE (OUTPATIENT)
Dept: FAMILY MEDICINE CLINIC | Age: 33
End: 2023-09-27

## 2023-09-28 ENCOUNTER — TELEPHONE (OUTPATIENT)
Dept: FAMILY MEDICINE CLINIC | Age: 33
End: 2023-09-28

## 2023-09-28 RX ORDER — CLONAZEPAM 0.5 MG/1
0.5 TABLET ORAL 2 TIMES DAILY PRN
Qty: 60 TABLET | Refills: 2 | Status: SHIPPED | OUTPATIENT
Start: 2023-09-28 | End: 2023-12-27

## 2023-09-29 ENCOUNTER — PATIENT MESSAGE (OUTPATIENT)
Dept: FAMILY MEDICINE CLINIC | Age: 33
End: 2023-09-29

## 2023-09-29 NOTE — TELEPHONE ENCOUNTER
From: Tommy Gil  To: Mere Hoang  Sent: 9/29/2023 11:12 AM EDT  Subject: Sam Hightower,    Can you call me when you get this please.     Thank you

## 2023-09-29 NOTE — TELEPHONE ENCOUNTER
Patient is requesting a letter that states he is unable to work   for job and family services for him to receive food stamps. He states needs this before Oct 9th.  Please advise

## 2023-10-02 ENCOUNTER — TELEPHONE (OUTPATIENT)
Dept: FAMILY MEDICINE CLINIC | Age: 33
End: 2023-10-02

## 2023-10-02 DIAGNOSIS — G93.40 ACUTE ENCEPHALOPATHY: ICD-10-CM

## 2023-10-02 RX ORDER — ZOLPIDEM TARTRATE 10 MG/1
TABLET ORAL
Qty: 30 TABLET | Refills: 0 | Status: SHIPPED | OUTPATIENT
Start: 2023-10-02 | End: 2023-11-01

## 2023-10-09 ENCOUNTER — HOSPITAL ENCOUNTER (OUTPATIENT)
Dept: CT IMAGING | Age: 33
Discharge: HOME | End: 2023-10-11
Payer: COMMERCIAL

## 2023-10-09 ENCOUNTER — HOSPITAL ENCOUNTER (OUTPATIENT)
Age: 33
Discharge: HOME | End: 2023-10-11
Payer: COMMERCIAL

## 2023-10-09 DIAGNOSIS — Z82.49 FAMILY HISTORY OF EARLY CAD: ICD-10-CM

## 2023-10-09 DIAGNOSIS — R74.8 ELEVATED LIVER ENZYMES: ICD-10-CM

## 2023-10-09 LAB
STRESS BASELINE DIAS BP: 95 MMHG
STRESS BASELINE HR: 86 BPM
STRESS BASELINE ST DEPRESSION: 0 MM
STRESS BASELINE SYS BP: 133 MMHG
STRESS ESTIMATED WORKLOAD: 9.5 METS
STRESS PEAK DIAS BP: 96 MMHG
STRESS PEAK SYS BP: 177 MMHG
STRESS PERCENT HR ACHIEVED: 91 %
STRESS POST PEAK HR: 169 BPM
STRESS RATE PRESSURE PRODUCT: NORMAL BPM*MMHG
STRESS ST DEPRESSION: 0 MM
STRESS TARGET HR: 185 BPM

## 2023-10-09 PROCEDURE — 74177 CT ABD & PELVIS W/CONTRAST: CPT

## 2023-10-09 PROCEDURE — 6360000004 HC RX CONTRAST MEDICATION: Performed by: NURSE PRACTITIONER

## 2023-10-09 PROCEDURE — 93017 CV STRESS TEST TRACING ONLY: CPT

## 2023-10-09 PROCEDURE — 93018 CV STRESS TEST I&R ONLY: CPT | Performed by: INTERNAL MEDICINE

## 2023-10-09 RX ADMIN — IOPAMIDOL 20 ML: 612 INJECTION, SOLUTION INTRAVENOUS at 09:28

## 2023-10-09 RX ADMIN — IOPAMIDOL 50 ML: 612 INJECTION, SOLUTION INTRAVENOUS at 09:29

## 2023-10-12 RX ORDER — ESCITALOPRAM OXALATE 20 MG/1
20 TABLET ORAL DAILY
Qty: 90 TABLET | Refills: 1 | Status: SHIPPED | OUTPATIENT
Start: 2023-10-12

## 2023-10-25 ENCOUNTER — APPOINTMENT (OUTPATIENT)
Dept: PRIMARY CARE | Facility: CLINIC | Age: 33
End: 2023-10-25
Payer: COMMERCIAL

## 2023-10-26 DIAGNOSIS — G93.40 ACUTE ENCEPHALOPATHY: ICD-10-CM

## 2023-10-27 RX ORDER — ZOLPIDEM TARTRATE 10 MG/1
TABLET ORAL
Qty: 30 TABLET | Refills: 0 | Status: SHIPPED | OUTPATIENT
Start: 2023-10-27 | End: 2023-11-25

## 2023-11-09 ENCOUNTER — TELEPHONE (OUTPATIENT)
Dept: FAMILY MEDICINE CLINIC | Age: 33
End: 2023-11-09

## 2023-11-09 NOTE — TELEPHONE ENCOUNTER
Pt states he is taking Verapamil 240mg er and I can't find that on his list of medications     He states he needs a refill     [facility] uses [facility]

## 2023-11-13 RX ORDER — VERAPAMIL HYDROCHLORIDE 240 MG/1
240 TABLET, FILM COATED, EXTENDED RELEASE ORAL DAILY
Qty: 90 TABLET | Refills: 1 | Status: SHIPPED | OUTPATIENT
Start: 2023-11-13

## 2023-11-13 NOTE — TELEPHONE ENCOUNTER
Can we please call patient and verify his current dosage of verapamil, it looks like in chart its 120 mg, however did have previously filled that 240 mg.   Thank you

## 2023-11-17 ENCOUNTER — HOSPITAL ENCOUNTER (OUTPATIENT)
Age: 33
End: 2023-11-17
Payer: COMMERCIAL

## 2023-11-17 VITALS
DIASTOLIC BLOOD PRESSURE: 96 MMHG | SYSTOLIC BLOOD PRESSURE: 130 MMHG | WEIGHT: 229.5 LBS | BODY MASS INDEX: 32.86 KG/M2 | HEIGHT: 70 IN

## 2023-11-17 DIAGNOSIS — Z82.49 FAMILY HISTORY OF EARLY CAD: ICD-10-CM

## 2023-11-17 LAB
ECHO AO ROOT DIAM: 2.8 CM
ECHO AO ROOT INDEX: 1.27 CM/M2
ECHO AV AREA PEAK VELOCITY: 3.4 CM2
ECHO AV AREA VTI: 3.3 CM2
ECHO AV AREA/BSA PEAK VELOCITY: 1.5 CM2/M2
ECHO AV AREA/BSA VTI: 1.5 CM2/M2
ECHO AV CUSP MM: 2.2 CM
ECHO AV MEAN GRADIENT: 3 MMHG
ECHO AV MEAN VELOCITY: 0.8 M/S
ECHO AV PEAK GRADIENT: 5 MMHG
ECHO AV PEAK VELOCITY: 1.1 M/S
ECHO AV VELOCITY RATIO: 1
ECHO AV VTI: 25.1 CM
ECHO BSA: 2.27 M2
ECHO EST RA PRESSURE: 3 MMHG
ECHO LA DIAMETER INDEX: 1.58 CM/M2
ECHO LA DIAMETER: 3.5 CM
ECHO LA TO AORTIC ROOT RATIO: 1.25
ECHO LA VOL A-L A2C: 20 ML (ref 18–58)
ECHO LA VOL A-L A4C: 49 ML (ref 18–58)
ECHO LA VOL MOD A2C: 19 ML (ref 18–58)
ECHO LA VOL MOD A4C: 45 ML (ref 18–58)
ECHO LA VOLUME AREA LENGTH: 34 ML
ECHO LA VOLUME INDEX A-L A2C: 9 ML/M2 (ref 16–34)
ECHO LA VOLUME INDEX A-L A4C: 22 ML/M2 (ref 16–34)
ECHO LA VOLUME INDEX AREA LENGTH: 15 ML/M2 (ref 16–34)
ECHO LA VOLUME INDEX MOD A2C: 9 ML/M2 (ref 16–34)
ECHO LA VOLUME INDEX MOD A4C: 20 ML/M2 (ref 16–34)
ECHO LV E' LATERAL VELOCITY: 10 CM/S
ECHO LV E' SEPTAL VELOCITY: 11 CM/S
ECHO LV EDV A2C: 90 ML
ECHO LV EDV A4C: 88 ML
ECHO LV EDV BP: 93 ML (ref 67–155)
ECHO LV EDV INDEX A4C: 40 ML/M2
ECHO LV EDV INDEX BP: 42 ML/M2
ECHO LV EDV NDEX A2C: 41 ML/M2
ECHO LV EJECTION FRACTION A2C: 66 %
ECHO LV EJECTION FRACTION A4C: 57 %
ECHO LV EJECTION FRACTION BIPLANE: 63 % (ref 55–100)
ECHO LV ESV A2C: 30 ML
ECHO LV ESV A4C: 38 ML
ECHO LV ESV BP: 34 ML (ref 22–58)
ECHO LV ESV INDEX A2C: 14 ML/M2
ECHO LV ESV INDEX A4C: 17 ML/M2
ECHO LV ESV INDEX BP: 15 ML/M2
ECHO LV FRACTIONAL SHORTENING: 32 % (ref 28–44)
ECHO LV INTERNAL DIMENSION DIASTOLE INDEX: 1.86 CM/M2
ECHO LV INTERNAL DIMENSION DIASTOLIC: 4.1 CM (ref 4.2–5.9)
ECHO LV INTERNAL DIMENSION SYSTOLIC INDEX: 1.27 CM/M2
ECHO LV INTERNAL DIMENSION SYSTOLIC: 2.8 CM
ECHO LV IVSD: 1.2 CM (ref 0.6–1)
ECHO LV IVSS: 1.3 CM
ECHO LV MASS 2D: 161.4 G (ref 88–224)
ECHO LV MASS INDEX 2D: 73 G/M2 (ref 49–115)
ECHO LV POSTERIOR WALL DIASTOLIC: 1.1 CM (ref 0.6–1)
ECHO LV POSTERIOR WALL SYSTOLIC: 1.7 CM
ECHO LV RELATIVE WALL THICKNESS RATIO: 0.54
ECHO LVOT AREA: 3.8 CM2
ECHO LVOT AV VTI INDEX: 0.89
ECHO LVOT DIAM: 2.2 CM
ECHO LVOT MEAN GRADIENT: 2 MMHG
ECHO LVOT PEAK GRADIENT: 4 MMHG
ECHO LVOT PEAK VELOCITY: 1.1 M/S
ECHO LVOT STROKE VOLUME INDEX: 38.5 ML/M2
ECHO LVOT SV: 85.1 ML
ECHO LVOT VTI: 22.4 CM
ECHO MV A VELOCITY: 0.48 M/S
ECHO MV E DECELERATION TIME (DT): 192.4 MS
ECHO MV E VELOCITY: 0.73 M/S
ECHO MV E/A RATIO: 1.52
ECHO MV E/E' LATERAL: 7.3
ECHO MV E/E' RATIO (AVERAGED): 6.97
ECHO PV MAX VELOCITY: 1 M/S
ECHO PV PEAK GRADIENT: 4 MMHG
ECHO RIGHT VENTRICULAR SYSTOLIC PRESSURE (RVSP): 23 MMHG
ECHO RV INTERNAL DIMENSION: 3.3 CM
ECHO RVOT PEAK GRADIENT: 2 MMHG
ECHO RVOT PEAK VELOCITY: 0.7 M/S
ECHO TV REGURGITANT MAX VELOCITY: 2.21 M/S
ECHO TV REGURGITANT PEAK GRADIENT: 19 MMHG

## 2023-11-17 PROCEDURE — 93306 TTE W/DOPPLER COMPLETE: CPT

## 2023-11-17 PROCEDURE — 93306 TTE W/DOPPLER COMPLETE: CPT | Performed by: INTERNAL MEDICINE

## 2023-11-24 DIAGNOSIS — G93.40 ACUTE ENCEPHALOPATHY: ICD-10-CM

## 2023-11-24 NOTE — TELEPHONE ENCOUNTER
Patient is requesting medication refill. Please approve or deny this request.    Rx requested:  Requested Prescriptions     Pending Prescriptions Disp Refills    zolpidem (AMBIEN) 10 MG tablet 30 tablet 0     Sig: Take 1 tablet by mouth nightly for 30 days.          Last Office Visit:   7/6/2023      Next Visit Date:  Future Appointments   Date Time Provider [address]   11/27/2023 11:30 AM Jim Hodgkin, APRN - CNP [facility] [place]   12/7/2023  2:00 PM MD Ollie Byrd Neurology -

## 2023-11-27 ENCOUNTER — OFFICE VISIT (OUTPATIENT)
Dept: FAMILY MEDICINE CLINIC | Age: 33
End: 2023-11-27

## 2023-11-27 VITALS
WEIGHT: 234 LBS | SYSTOLIC BLOOD PRESSURE: 138 MMHG | BODY MASS INDEX: 33.5 KG/M2 | HEART RATE: 92 BPM | HEIGHT: 70 IN | OXYGEN SATURATION: 95 % | DIASTOLIC BLOOD PRESSURE: 96 MMHG

## 2023-11-27 DIAGNOSIS — F32.A DEPRESSIVE DISORDER: ICD-10-CM

## 2023-11-27 DIAGNOSIS — F19.11 DRUG ABUSE IN REMISSION (HCC): ICD-10-CM

## 2023-11-27 DIAGNOSIS — F41.1 GAD (GENERALIZED ANXIETY DISORDER): ICD-10-CM

## 2023-11-27 DIAGNOSIS — Z79.899 HIGH RISK MEDICATION USE: ICD-10-CM

## 2023-11-27 DIAGNOSIS — I10 PRIMARY HYPERTENSION: Primary | ICD-10-CM

## 2023-11-27 PROBLEM — J30.2 SEASONAL ALLERGIES: Status: ACTIVE | Noted: 2023-09-12

## 2023-11-27 PROBLEM — F19.20 POLYDRUG DEPENDENCE INCLUDING OPIOID TYPE DRUG WITH CONTINUOUS USE WITH COMPLICATION (HCC): Status: RESOLVED | Noted: 2019-06-04 | Resolved: 2023-11-27

## 2023-11-27 LAB
AMPHET UR QL SCN: NORMAL
BARBITURATES UR QL SCN: NORMAL
BENZODIAZ UR QL SCN: NORMAL
CANNABINOIDS UR QL SCN: NORMAL
COCAINE UR QL SCN: NORMAL
DRUG SCREEN COMMENT UR-IMP: NORMAL
FENTANYL SCREEN, URINE: NORMAL
METHADONE UR QL SCN: NORMAL
OPIATES UR QL SCN: NORMAL
OXYCODONE UR QL SCN: NORMAL
PCP UR QL SCN: NORMAL
PROPOXYPH UR QL SCN: NORMAL

## 2023-11-27 RX ORDER — ZOLPIDEM TARTRATE 10 MG/1
TABLET ORAL
Qty: 30 TABLET | Refills: 0 | Status: SHIPPED | OUTPATIENT
Start: 2023-11-27 | End: 2023-12-23

## 2023-11-27 RX ORDER — HYDROCHLOROTHIAZIDE 25 MG/1
25 TABLET ORAL EVERY MORNING
Qty: 90 TABLET | Refills: 0 | Status: SHIPPED | OUTPATIENT
Start: 2023-11-27 | End: 2024-02-25

## 2023-11-27 ASSESSMENT — ENCOUNTER SYMPTOMS
ABDOMINAL PAIN: 0
RHINORRHEA: 0
CONSTIPATION: 0
CHEST TIGHTNESS: 0
TROUBLE SWALLOWING: 0
SORE THROAT: 0
DIARRHEA: 0
COLOR CHANGE: 0
WHEEZING: 0
NAUSEA: 0
COUGH: 0
SHORTNESS OF BREATH: 0
VOMITING: 0

## 2023-11-27 NOTE — PROGRESS NOTES
psych eval.      Anxiety     Drug abuse (HCC)     MDD (major depressive disorder), recurrent episode, moderate ([address]) 6/4/2019    Neuropathy of right peroneal nerve 7/12/2022    Primary hypertension 9/12/2023    Status epilepticus ([address]) 7/6/2023     Past Surgical History:   Procedure Laterality Date    TONSILLECTOMY AND ADENOIDECTOMY      WISDOM TOOTH EXTRACTION       Social History     Socioeconomic History    Marital status: Single     Spouse name: Not on file    Number of children: Not on file    Years of education: Not on file    Highest education level: Not on file   Occupational History    Not on file   Tobacco Use    Smoking status: Never    Smokeless tobacco: Never   Substance and Sexual Activity    Alcohol use: Yes     Comment: occasional    Drug use: No    Sexual activity: Not Currently     Partners: Female   Other Topics Concern    Not on file   Social History Narrative    Works full time at [employer]    Being evicted dt not paying rent, Car was reprocessed    Lived With: Alone in [address]: One level--Level entry    [address]: Standard    Has the patient had two or more falls in the past year or any fall with injury in the past year?: No    ADL Assistance: Independent    Homemaking Assistance: Independent    Homemaking Responsibilities: Yes    Ambulation Assistance: Independent    Transfer Assistance: Independent    Active : Yes    Type of Occupation: [employer] work    Additional Comments:  Independent PTA- no medical equipment         Social Determinants of Health     Financial Resource Strain: Low Risk  (9/19/2023)    Overall Financial Resource Strain (CARDIA)     Difficulty of Paying Living Expenses: Not hard at all   Food Insecurity: No Food Insecurity (9/19/2023)    Hunger Vital Sign     Worried About Running Out of Food in the Last Year: Never true     [address] in the Last Year: Never true   Transportation Needs: Unknown (9/19/2023)

## 2023-12-04 ENCOUNTER — TELEPHONE (OUTPATIENT)
Dept: NEUROLOGY | Age: 33
End: 2023-12-04

## 2023-12-08 RX ORDER — TRAZODONE HYDROCHLORIDE 100 MG/1
100 TABLET ORAL NIGHTLY
Qty: 30 TABLET | Refills: 2 | Status: SHIPPED | OUTPATIENT
Start: 2023-12-08

## 2023-12-08 NOTE — TELEPHONE ENCOUNTER
Patient is requesting medication refill.  Please approve or deny this request.    Rx requested:  Requested Prescriptions     Pending Prescriptions Disp Refills    traZODone (DESYREL) 100 MG tablet 30 tablet 2     Sig: Take 1 tablet by mouth nightly         Last Office Visit:   7/6/2023      Next Visit Date:  Future Appointments   Date Time Provider [address] [place] [address]   2/27/2024 10:00 AM Sommer Suarez, APRN - CNP [facility] AT [place]   3/18/2024  1:30 PM MD Jairo Higgins Neurology -

## 2023-12-19 DIAGNOSIS — F41.1 GAD (GENERALIZED ANXIETY DISORDER): ICD-10-CM

## 2023-12-19 RX ORDER — CLONAZEPAM 0.5 MG/1
0.5 TABLET ORAL 2 TIMES DAILY PRN
Qty: 60 TABLET | Refills: 2 | OUTPATIENT
Start: 2023-12-19 | End: 2024-03-18

## 2023-12-19 NOTE — TELEPHONE ENCOUNTER
Comments:     Last Office Visit (last PCP visit):   11/27/2023    Next Visit Date:  Future Appointments   Date Time Provider [address]   2/27/2024 10:00 AM Carla Suarez, YING - CNP [facility] AT [place]   3/18/2024  1:30 PM Cari Crocker MD [facility] Neurology -       **If hasn't been seen in over a year OR hasn't followed up according to last diabetes/ADHD visit, make appointment for patient before sending refill to provider. Rx requested:  Requested Prescriptions     Pending Prescriptions Disp Refills    clonazePAM (KLONOPIN) 0.5 MG tablet 60 tablet 2     Sig: Take 1 tablet by mouth 2 times daily as needed for Anxiety for up to 90 days.  Max Daily Amount: 1 mg

## 2023-12-20 DIAGNOSIS — G93.40 ACUTE ENCEPHALOPATHY: ICD-10-CM

## 2023-12-20 NOTE — TELEPHONE ENCOUNTER
Patient is  requesting medication refill. Please approve or deny this request.    Rx requested:  Requested Prescriptions     Pending Prescriptions Disp Refills    zolpidem (AMBIEN) 10 MG tablet 30 tablet 0     Sig: Take 1 tablet by mouth nightly for 30 days.         Last Office Visit:   7/6/2023      Next Visit Date:  Future Appointments   Date Time Provider Department Center   2/27/2024 10:00 AM Lisandro Suarez APRN - CNP [facility]   3/18/2024  1:30 PM Rayray King MD [facility] -

## 2023-12-21 DIAGNOSIS — G93.40 ACUTE ENCEPHALOPATHY: ICD-10-CM

## 2023-12-21 RX ORDER — ZOLPIDEM TARTRATE 10 MG/1
TABLET ORAL
Qty: 30 TABLET | Refills: 0 | OUTPATIENT
Start: 2023-12-21

## 2023-12-21 RX ORDER — ZOLPIDEM TARTRATE 10 MG/1
TABLET ORAL
Qty: 30 TABLET | Refills: 0 | Status: SHIPPED | OUTPATIENT
Start: 2023-12-21 | End: 2024-01-15

## 2024-01-18 DIAGNOSIS — G89.29 INSOMNIA SECONDARY TO CHRONIC PAIN: ICD-10-CM

## 2024-01-18 DIAGNOSIS — G47.01 INSOMNIA SECONDARY TO CHRONIC PAIN: ICD-10-CM

## 2024-01-18 RX ORDER — ZOLPIDEM TARTRATE 10 MG/1
10 TABLET ORAL NIGHTLY
Qty: 14 TABLET | Refills: 0 | Status: SHIPPED | OUTPATIENT
Start: 2024-01-18 | End: 2024-02-05

## 2024-01-18 NOTE — TELEPHONE ENCOUNTER
Patient is  requesting medication refill. Please approve or deny this request.    Rx requested:  Requested Prescriptions     Pending Prescriptions Disp Refills    zolpidem (AMBIEN) 10 MG tablet 14 tablet 0     Sig: Take 1 tablet by mouth nightly for 14 days.         Last Office Visit:   7/6/2023      Next Visit Date:  Future Appointments   Date Time Provider Department Center   2/27/2024 10:00 AM Lisandro Suarez APRN - CNP [facility]   3/18/2024  1:30 PM Rayray King MD [facility] -

## 2024-02-01 DIAGNOSIS — G89.29 INSOMNIA SECONDARY TO CHRONIC PAIN: ICD-10-CM

## 2024-02-01 DIAGNOSIS — G47.01 INSOMNIA SECONDARY TO CHRONIC PAIN: ICD-10-CM

## 2024-02-02 NOTE — TELEPHONE ENCOUNTER
Pharmacy is  requesting medication refill. Please approve or deny this request.    Rx requested:  Requested Prescriptions     Pending Prescriptions Disp Refills    zolpidem (AMBIEN) 10 MG tablet [Pharmacy Med Name: Zolpidem Tartrate Oral Tablet 10 MG] 30 tablet 0     Sig: TAKE ONE TABLET BY MOUTH NIGHTLY FOR 30 DAYS         Last Office Visit:   7/6/2023      Next Visit Date:  Future Appointments   Date Time Provider Department Center   2/27/2024 10:00 AM Lisandro Suarez, YING - CNP [facility]   3/18/2024  1:30 PM Rayray King MD [facility] -

## 2024-02-02 NOTE — TELEPHONE ENCOUNTER
Pt only received a 14 day supply when he filled last and is due for a full 30 day supply please send

## 2024-02-05 RX ORDER — ZOLPIDEM TARTRATE 10 MG/1
TABLET ORAL
Qty: 30 TABLET | Refills: 0 | Status: SHIPPED | OUTPATIENT
Start: 2024-02-05 | End: 2024-02-28 | Stop reason: SDUPTHER

## 2024-02-21 DIAGNOSIS — I10 PRIMARY HYPERTENSION: ICD-10-CM

## 2024-02-22 RX ORDER — HYDROCHLOROTHIAZIDE 25 MG/1
25 TABLET ORAL EVERY MORNING
Qty: 90 TABLET | Refills: 0 | Status: SHIPPED | OUTPATIENT
Start: 2024-02-22 | End: 2024-05-22

## 2024-02-27 ENCOUNTER — OFFICE VISIT (OUTPATIENT)
Dept: FAMILY MEDICINE CLINIC | Age: 34
End: 2024-02-27
Payer: COMMERCIAL

## 2024-02-27 VITALS
BODY MASS INDEX: 33.67 KG/M2 | DIASTOLIC BLOOD PRESSURE: 88 MMHG | HEIGHT: 70 IN | HEART RATE: 112 BPM | SYSTOLIC BLOOD PRESSURE: 126 MMHG | OXYGEN SATURATION: 96 % | WEIGHT: 235.2 LBS

## 2024-02-27 DIAGNOSIS — K21.9 GASTROESOPHAGEAL REFLUX DISEASE WITHOUT ESOPHAGITIS: ICD-10-CM

## 2024-02-27 DIAGNOSIS — F41.1 GAD (GENERALIZED ANXIETY DISORDER): ICD-10-CM

## 2024-02-27 DIAGNOSIS — G93.6 CEREBRAL EDEMA (HCC): ICD-10-CM

## 2024-02-27 DIAGNOSIS — I10 PRIMARY HYPERTENSION: Primary | ICD-10-CM

## 2024-02-27 DIAGNOSIS — F33.1 MDD (MAJOR DEPRESSIVE DISORDER), RECURRENT EPISODE, MODERATE (HCC): ICD-10-CM

## 2024-02-27 DIAGNOSIS — F19.11 DRUG ABUSE IN REMISSION (HCC): ICD-10-CM

## 2024-02-27 PROCEDURE — 99214 OFFICE O/P EST MOD 30 MIN: CPT | Performed by: NURSE PRACTITIONER

## 2024-02-27 PROCEDURE — 3079F DIAST BP 80-89 MM HG: CPT | Performed by: NURSE PRACTITIONER

## 2024-02-27 PROCEDURE — G8427 DOCREV CUR MEDS BY ELIG CLIN: HCPCS | Performed by: NURSE PRACTITIONER

## 2024-02-27 PROCEDURE — G8484 FLU IMMUNIZE NO ADMIN: HCPCS | Performed by: NURSE PRACTITIONER

## 2024-02-27 PROCEDURE — 1036F TOBACCO NON-USER: CPT | Performed by: NURSE PRACTITIONER

## 2024-02-27 PROCEDURE — G8417 CALC BMI ABV UP PARAM F/U: HCPCS | Performed by: NURSE PRACTITIONER

## 2024-02-27 PROCEDURE — 3074F SYST BP LT 130 MM HG: CPT | Performed by: NURSE PRACTITIONER

## 2024-02-27 ASSESSMENT — ANXIETY QUESTIONNAIRES
GAD7 TOTAL SCORE: 8
IF YOU CHECKED OFF ANY PROBLEMS ON THIS QUESTIONNAIRE, HOW DIFFICULT HAVE THESE PROBLEMS MADE IT FOR YOU TO DO YOUR WORK, TAKE CARE OF THINGS AT HOME, OR GET ALONG WITH OTHER PEOPLE: NOT DIFFICULT AT ALL
6. BECOMING EASILY ANNOYED OR IRRITABLE: 0
5. BEING SO RESTLESS THAT IT IS HARD TO SIT STILL: 0
1. FEELING NERVOUS, ANXIOUS, OR ON EDGE: 2
4. TROUBLE RELAXING: 2
7. FEELING AFRAID AS IF SOMETHING AWFUL MIGHT HAPPEN: 1
2. NOT BEING ABLE TO STOP OR CONTROL WORRYING: 1
3. WORRYING TOO MUCH ABOUT DIFFERENT THINGS: 2

## 2024-02-27 ASSESSMENT — PATIENT HEALTH QUESTIONNAIRE - PHQ9
1. LITTLE INTEREST OR PLEASURE IN DOING THINGS: 2
SUM OF ALL RESPONSES TO PHQ QUESTIONS 1-9: 13
8. MOVING OR SPEAKING SO SLOWLY THAT OTHER PEOPLE COULD HAVE NOTICED. OR THE OPPOSITE, BEING SO FIGETY OR RESTLESS THAT YOU HAVE BEEN MOVING AROUND A LOT MORE THAN USUAL: 1
SUM OF ALL RESPONSES TO PHQ QUESTIONS 1-9: 13
5. POOR APPETITE OR OVEREATING: 3
3. TROUBLE FALLING OR STAYING ASLEEP: 3
SUM OF ALL RESPONSES TO PHQ QUESTIONS 1-9: 13
9. THOUGHTS THAT YOU WOULD BE BETTER OFF DEAD, OR OF HURTING YOURSELF: 0
SUM OF ALL RESPONSES TO PHQ QUESTIONS 1-9: 13
6. FEELING BAD ABOUT YOURSELF - OR THAT YOU ARE A FAILURE OR HAVE LET YOURSELF OR YOUR FAMILY DOWN: 0
4. FEELING TIRED OR HAVING LITTLE ENERGY: 3
SUM OF ALL RESPONSES TO PHQ9 QUESTIONS 1 & 2: 2
2. FEELING DOWN, DEPRESSED OR HOPELESS: 0
7. TROUBLE CONCENTRATING ON THINGS, SUCH AS READING THE NEWSPAPER OR WATCHING TELEVISION: 1
10. IF YOU CHECKED OFF ANY PROBLEMS, HOW DIFFICULT HAVE THESE PROBLEMS MADE IT FOR YOU TO DO YOUR WORK, TAKE CARE OF THINGS AT HOME, OR GET ALONG WITH OTHER PEOPLE: 0

## 2024-02-27 NOTE — PROGRESS NOTES
times daily as needed for Anxiety for up to 90 days. Max Daily Amount: 1 mg 60 tablet 2    omeprazole (PRILOSEC) 20 MG delayed release capsule Take 1 capsule by mouth every morning (before breakfast) 90 capsule 0    hydroCHLOROthiazide (HYDRODIURIL) 25 MG tablet Take 1 tablet by mouth every morning 90 tablet 0    traZODone (DESYREL) 100 MG tablet Take 1 tablet by mouth nightly 30 tablet 2    verapamil (CALAN SR) 240 MG extended release tablet Take 1 tablet by mouth daily 90 tablet 1    escitalopram (LEXAPRO) 20 MG tablet Take 1 tablet by mouth daily 90 tablet 1    Cholecalciferol (VITAMIN D3) 50 MCG (2000 UT) TABS Take 1 tablet by mouth daily      zolpidem (AMBIEN) 10 MG tablet TAKE ONE TABLET BY MOUTH NIGHTLY FOR 30 DAYS 30 tablet 0     No current facility-administered medications for this visit.     PMH, Surgical Hx, Family Hx, and Social Hx reviewed and updated.  Health Maintenance reviewed.    Objective  Vitals:    02/27/24 1000 02/27/24 1010   BP: (!) 134/90 126/88   Site: Left Upper Arm Left Upper Arm   Position: Sitting Sitting   Cuff Size: Large Adult Large Adult   Pulse: (!) 112    SpO2: 96%    Weight: 106.7 kg (235 lb 3.2 oz)    Height: 1.778 m (5' 10\")      BP Readings from Last 3 Encounters:   02/27/24 126/88   11/27/23 (!) 138/96   11/17/23 (!) 130/96     Wt Readings from Last 3 Encounters:   02/27/24 106.7 kg (235 lb 3.2 oz)   11/27/23 106.1 kg (234 lb)   11/17/23 104.1 kg (229 lb 8 oz)     Physical Exam  Constitutional:       General: He is not in acute distress.     Appearance: Normal appearance. He is not ill-appearing or diaphoretic.   Eyes:      Extraocular Movements: Extraocular movements intact.      Conjunctiva/sclera: Conjunctivae normal.      Pupils: Pupils are equal, round, and reactive to light.   Neck:      Vascular: No carotid bruit.   Cardiovascular:      Rate and Rhythm: Normal rate and regular rhythm.      Pulses: Normal pulses.      Heart sounds: Normal heart sounds.   Pulmonary:

## 2024-02-28 DIAGNOSIS — G47.01 INSOMNIA SECONDARY TO CHRONIC PAIN: ICD-10-CM

## 2024-02-28 DIAGNOSIS — G89.29 INSOMNIA SECONDARY TO CHRONIC PAIN: ICD-10-CM

## 2024-02-28 RX ORDER — ZOLPIDEM TARTRATE 10 MG/1
TABLET ORAL
Qty: 30 TABLET | Refills: 0 | Status: SHIPPED | OUTPATIENT
Start: 2024-02-28 | End: 2024-03-29

## 2024-02-28 NOTE — TELEPHONE ENCOUNTER
Requested Prescriptions     Pending Prescriptions Disp Refills    zolpidem (AMBIEN) 10 MG tablet 30 tablet 0     Sig: TAKE ONE TABLET BY MOUTH NIGHTLY FOR 30 DAYS

## 2024-02-29 ENCOUNTER — TELEPHONE (OUTPATIENT)
Dept: NEUROLOGY | Age: 34
End: 2024-02-29

## 2024-02-29 NOTE — TELEPHONE ENCOUNTER
DOMINGO for pt to call office back.     He script for ambien was printed and he will need to come to the office and pick it up due to there still being problems with e-scribing with some pharmacies.

## 2024-03-01 RX ORDER — CLONAZEPAM 0.5 MG/1
0.5 TABLET ORAL 2 TIMES DAILY PRN
Qty: 60 TABLET | Refills: 2 | Status: SHIPPED | OUTPATIENT
Start: 2024-03-01 | End: 2024-05-30

## 2024-03-01 RX ORDER — OMEPRAZOLE 20 MG/1
20 CAPSULE, DELAYED RELEASE ORAL
Qty: 90 CAPSULE | Refills: 0 | Status: SHIPPED | OUTPATIENT
Start: 2024-03-01

## 2024-03-04 ASSESSMENT — ENCOUNTER SYMPTOMS
SORE THROAT: 0
CHEST TIGHTNESS: 0
RHINORRHEA: 0
CONSTIPATION: 0
COUGH: 0
COLOR CHANGE: 0
TROUBLE SWALLOWING: 0
BLOOD IN STOOL: 0
VOMITING: 0
NAUSEA: 0
ABDOMINAL PAIN: 0
DIARRHEA: 0
SHORTNESS OF BREATH: 1

## 2024-03-06 NOTE — TELEPHONE ENCOUNTER
Patient Is  requesting medication refill. Please approve or deny this request.    Rx requested:  Requested Prescriptions     Pending Prescriptions Disp Refills    traZODone (DESYREL) 100 MG tablet 30 tablet 0     Sig: Take 1 tablet by mouth nightly         Last Office Visit:   7/6/2023      Next Visit Date:  Future Appointments   Date Time Provider Department Center   3/18/2024  1:30 PM Rayray King MD [facility] -   5/28/2024 10:00 AM Lisandro Suarez, YING - CNP [place] [address] [facility]

## 2024-03-07 RX ORDER — TRAZODONE HYDROCHLORIDE 100 MG/1
100 TABLET ORAL NIGHTLY
Qty: 30 TABLET | Refills: 0 | Status: SHIPPED | OUTPATIENT
Start: 2024-03-07

## 2024-03-12 DIAGNOSIS — F41.1 GAD (GENERALIZED ANXIETY DISORDER): ICD-10-CM

## 2024-03-13 RX ORDER — CHOLECALCIFEROL (VITAMIN D3) 125 MCG
1 CAPSULE ORAL DAILY
Qty: 90 TABLET | Refills: 1 | Status: SHIPPED | OUTPATIENT
Start: 2024-03-13 | End: 2024-09-09

## 2024-03-13 NOTE — TELEPHONE ENCOUNTER
Pt calling would like refill on Klonopin and vitamin D3.  Says he will be out of the Klonopin by the weekend

## 2024-03-13 NOTE — TELEPHONE ENCOUNTER
Comments: last fill 3/1/24    Last Office Visit (last PCP visit):   2/27/2024    Next Visit Date:  Future Appointments   Date Time Provider Department Center   3/18/2024  1:30 PM Rayray King MD [facility] -   5/28/2024 10:00 AM Lisandro Suarez, APRN - CNP [facility]       **If hasn't been seen in over a year OR hasn't followed up according to last diabetes/ADHD visit, make appointment for patient before sending refill to provider.    Rx requested:  Requested Prescriptions     Pending Prescriptions Disp Refills    clonazePAM (KLONOPIN) 0.5 MG tablet 60 tablet 2     Sig: Take 1 tablet by mouth 2 times daily as needed for Anxiety for up to 90 days. Max Daily Amount: 1 mg

## 2024-03-14 RX ORDER — CLONAZEPAM 0.5 MG/1
0.5 TABLET ORAL 2 TIMES DAILY PRN
Qty: 60 TABLET | Refills: 2 | Status: SHIPPED | OUTPATIENT
Start: 2024-03-14 | End: 2024-06-12

## 2024-03-15 RX ORDER — CLONAZEPAM 0.5 MG/1
TABLET ORAL
Qty: 60 TABLET | Refills: 0 | OUTPATIENT
Start: 2024-03-15

## 2024-03-27 ENCOUNTER — OFFICE VISIT (OUTPATIENT)
Dept: NEUROLOGY | Age: 34
End: 2024-03-27
Payer: COMMERCIAL

## 2024-03-27 VITALS
WEIGHT: 241 LBS | HEART RATE: 100 BPM | DIASTOLIC BLOOD PRESSURE: 72 MMHG | BODY MASS INDEX: 34.58 KG/M2 | SYSTOLIC BLOOD PRESSURE: 130 MMHG

## 2024-03-27 DIAGNOSIS — G89.29 INSOMNIA SECONDARY TO CHRONIC PAIN: ICD-10-CM

## 2024-03-27 DIAGNOSIS — Z79.899 ENCOUNTER FOR LONG-TERM (CURRENT) USE OF MEDICATIONS: ICD-10-CM

## 2024-03-27 DIAGNOSIS — F90.0 ATTENTION DEFICIT HYPERACTIVITY DISORDER (ADHD), PREDOMINANTLY INATTENTIVE TYPE: ICD-10-CM

## 2024-03-27 DIAGNOSIS — G93.1 HYPOXIC ENCEPHALOPATHY (HCC): Primary | ICD-10-CM

## 2024-03-27 DIAGNOSIS — G47.01 INSOMNIA SECONDARY TO CHRONIC PAIN: ICD-10-CM

## 2024-03-27 DIAGNOSIS — G40.901 STATUS EPILEPTICUS (HCC): ICD-10-CM

## 2024-03-27 PROCEDURE — G8417 CALC BMI ABV UP PARAM F/U: HCPCS | Performed by: PSYCHIATRY & NEUROLOGY

## 2024-03-27 PROCEDURE — G8484 FLU IMMUNIZE NO ADMIN: HCPCS | Performed by: PSYCHIATRY & NEUROLOGY

## 2024-03-27 PROCEDURE — 3075F SYST BP GE 130 - 139MM HG: CPT | Performed by: PSYCHIATRY & NEUROLOGY

## 2024-03-27 PROCEDURE — 3078F DIAST BP <80 MM HG: CPT | Performed by: PSYCHIATRY & NEUROLOGY

## 2024-03-27 PROCEDURE — 1036F TOBACCO NON-USER: CPT | Performed by: PSYCHIATRY & NEUROLOGY

## 2024-03-27 PROCEDURE — G8427 DOCREV CUR MEDS BY ELIG CLIN: HCPCS | Performed by: PSYCHIATRY & NEUROLOGY

## 2024-03-27 PROCEDURE — 99214 OFFICE O/P EST MOD 30 MIN: CPT | Performed by: PSYCHIATRY & NEUROLOGY

## 2024-03-27 RX ORDER — ZOLPIDEM TARTRATE 10 MG/1
TABLET ORAL
Qty: 30 TABLET | Refills: 0 | Status: SHIPPED | OUTPATIENT
Start: 2024-03-27 | End: 2024-04-26

## 2024-03-27 ASSESSMENT — ENCOUNTER SYMPTOMS
CHOKING: 0
COLOR CHANGE: 0
VOMITING: 0
BACK PAIN: 0
NAUSEA: 0
TROUBLE SWALLOWING: 0
PHOTOPHOBIA: 0
SHORTNESS OF BREATH: 0

## 2024-03-27 NOTE — PROGRESS NOTES
Subjective:      Patient ID: Michael Montes is a 33 y.o. male who presents today for:  Chief Complaint   Patient presents with    Follow-up     Pt states things are pretty good. Pt states he does not think much as change. Pt states he keep gaining weight. No questions or concerns at this time. Last does of ambien was last night        HPI 33-year-old right-handed gentleman with a history of hypoxic ischemic encephalopathy.  Patient sustained hypoxemia secondary to status epilepticus from polysubstance abuse.  Patient had prolonged confusion which over the years has continued to improve.  He has some diffusion-weighted issues in the globus pallidus at that time.  Patient is now left with some minor encephalomalacia.  When last seen we did not have his neuropsychometric testing which was further obtained.  This shows some difficulty with cognitive impairment and processing speed likely suggesting some attention deficit.  Patient is doing well and has nothing much is changed and he has insomnia and responds very well to Ambien    Clinically patient does feel that he has difficulty with attention and concentration complete tasks.  He is not fidgety and not very anxious.    Past Medical History:   Diagnosis Date    ADHD (attention deficit hyperactivity disorder)     was initiated on treatment by Dr. Dodge.  any testing was done here by Dr. Dodge, no formal psych eval.      Anxiety     Drug abuse (HCC)     MDD (major depressive disorder), recurrent episode, moderate (HCC) 6/4/2019    Neuropathy of right peroneal nerve 7/12/2022    Primary hypertension 9/12/2023    Status epilepticus (HCC) 7/6/2023     Past Surgical History:   Procedure Laterality Date    TONSILLECTOMY AND ADENOIDECTOMY      WISDOM TOOTH EXTRACTION       Social History     Socioeconomic History    Marital status: Single     Spouse name: Not on file    Number of children: Not on file    Years of education: Not on file    Highest education level: Not on

## 2024-04-01 LAB
6MAM UR QL: NOT DETECTED
7-AMINOCLONAZEPAM: PRESENT
ALPHA-OH-ALPRAZOLAM: NOT DETECTED
ALPHA-OH-MIDAZOLAM, URINE: NOT DETECTED
ALPRAZOLAM: NOT DETECTED
AMPHET UR QL SCN: NOT DETECTED
BARBITURATES: NEGATIVE
BENZOYLECGONINE: NEGATIVE
BUPRENORPHINE: NOT DETECTED
CARISOPRODOL UR QL: NEGATIVE
CLONAZEPAM UR QL: NOT DETECTED
CODEINE: NOT DETECTED
CREAT UR-MCNC: 159.5 MG/DL (ref 20–400)
DIAZEPAM: NOT DETECTED
ETHYL GLUCURONIDE: NORMAL
FENTANYL UR QL: NOT DETECTED
GABAPENTIN: NOT DETECTED
HYDROCODONE UR QL: NOT DETECTED
HYDROMORPHONE: NOT DETECTED
LORAZEPAM UR QL: NOT DETECTED
MARIJUANA METABOLITE: NEGATIVE
MDA: NOT DETECTED
MDEA: NOT DETECTED
MDMA UR QL: NOT DETECTED
MEPERIDINE: NOT DETECTED
METHADONE: NEGATIVE
METHAMPHETAMINE: NOT DETECTED
METHYLPHENIDATE: NOT DETECTED
MIDAZOLAM UR QL SCN: NOT DETECTED
MORPHINE: NOT DETECTED
NALOXONE: NOT DETECTED
NORBUPRENORPHINE, FREE: NOT DETECTED
NORDIAZEPAM: NOT DETECTED
NORFENTANYL: NOT DETECTED
NORHYDROCODONE, URINE: NOT DETECTED
NOROXYCODONE: NOT DETECTED
NOROXYMORPHONE, URINE: NOT DETECTED
OXAZEPAM UR QL: NOT DETECTED
OXYCODONE UR QL: NOT DETECTED
OXYMORPHONE UR QL: NOT DETECTED
PAIN MANAGEMENT DRUG PANEL: NORMAL
PATHOLOGY STUDY: NORMAL
PCP: NEGATIVE
PHENTERMINE: NOT DETECTED
PREGABALIN: NOT DETECTED
TAPENTADOL, URINE: NOT DETECTED
TAPENTADOL-O-SULFATE, URINE: NOT DETECTED
TEMAZEPAM: NOT DETECTED
TRAMADOL: NEGATIVE
ZOLPIDEM: PRESENT

## 2024-04-09 ENCOUNTER — TELEPHONE (OUTPATIENT)
Dept: NEUROLOGY | Age: 34
End: 2024-04-09

## 2024-04-09 RX ORDER — TRAZODONE HYDROCHLORIDE 100 MG/1
100 TABLET ORAL NIGHTLY
Qty: 30 TABLET | Refills: 0 | Status: SHIPPED | OUTPATIENT
Start: 2024-04-09

## 2024-04-09 NOTE — TELEPHONE ENCOUNTER
Patient is  requesting medication refill. Please approve or deny this request.    Rx requested:  Requested Prescriptions     Pending Prescriptions Disp Refills    traZODone (DESYREL) 100 MG tablet 30 tablet 0     Sig: Take 1 tablet by mouth nightly         Last Office Visit:   3/27/2024      Next Visit Date:  Future Appointments   Date Time Provider Department Center   5/28/2024 10:00 AM Lisandro Suarez APRN - CNP [facility]   10/2/2024  1:15 PM Rayray King MD [facility] -

## 2024-04-09 NOTE — TELEPHONE ENCOUNTER
Patient was seen 03/27/2024 and starting him on a stimulant was discussed. He states that he is now ready to start one and suggested Vyvanse because he has been on it in the past     Please advise       [phone]

## 2024-04-10 RX ORDER — ESCITALOPRAM OXALATE 20 MG/1
20 TABLET ORAL DAILY
Qty: 90 TABLET | Refills: 1 | Status: SHIPPED | OUTPATIENT
Start: 2024-04-10

## 2024-04-10 NOTE — TELEPHONE ENCOUNTER
Comments:     Last Office Visit (last PCP visit):   2/27/2024    Next Visit Date:  Future Appointments   Date Time Provider Department Center   5/28/2024 10:00 AM Lisandro Suarez APRN - CNP [place] [address] [facility]   10/2/2024  1:15 PM Rayray King MD [facility] -       **If hasn't been seen in over a year OR hasn't followed up according to last diabetes/ADHD visit, make appointment for patient before sending refill to provider.    Rx requested:  Requested Prescriptions     Pending Prescriptions Disp Refills    escitalopram (LEXAPRO) 20 MG tablet 90 tablet 1     Sig: Take 1 tablet by mouth daily

## 2024-04-10 NOTE — TELEPHONE ENCOUNTER
CASE MANAGEMENT DISCHARGE SUMMARY
 
 
PATIENT: MARU ASHBY              UNIT: [id number]
ACCOUNT#: [id number]                       ADM DATE: 19
AGE: 27     : [birth year]  SEX: M            ROOM/BED: D.2131    
AUTHOR: NANCY,DOC                             PHYSICIAN:                               
 
REFERRING PHYSICIAN: VINICIUS BETHEA MD                  
DATE OF SERVICE: 19
Discharge Plan
 
 
Patient Name: MARU ASHBY
Facility: [facility]
Encounter #: [id number]
Medical Record #: [id number]
: 1991
Planned Disposition: Home with Home Health
Anticipated Discharge Date: 19
 
Discharge Date: 
Expected LOS: 9
Initial Reviewer: [email or web address]
Initial Review Date: 2019
Generated: 19   6:54 pm 
Comments
 
DCP- Discharge Planning
 
Updated by [email or web address]: Génesis Bojorquez on 19   4:48 pm CT
Patient Name:  MARU ASHBY   
Encounter No:  [id number]   
:  1991   
Primary Insurance:  [employer] [place] [employer]IONS CODI  
Anticipated DC Date: 2019   
Planned Disposition:  Home  
  
  
DCP follow-up note: PATIENT HAS RECEIVED NEW DIALYSIS CLINIC ARRANGEMENT FROM 
PATIENT PATHWAYS COORDINATOR, CM RECEIVED DISCHARGE ORDER.  BEDSIDE NURSE 
INFORMED BARTOLOME THAT PT NO LONGER NEEDS HOME HEALTH FOR WOUND CARE AND DRAIN HAS 
BEEN REMOVED.  BARTOLOME SPOKE TO PT AND MOTHER IN ROOM, BOTH DENIED DISCHARGE NEEDS,
 PT'S MOTHER TO TRANSPORT HOME TODAY.  CM CALLED [facility] AND CANCELLED HOME 
HEALTH REFERRAL.  
  
GÉNESIS BOJORQUEZ CASE ABILIO
DCP- Discharge Planning
 
Updated by [email or web address]: Génesis Bojorquez on 19   4:43 pm CT
 
ORDER RECEIVED: to make arrangements for OP HD in [place].  
  
BARTOLOME spoke to Shonda ZULUAGA who stated the patient is going to be new to 
hemodialysis and his first dialysis will be today post hemosplit placement.  
She stated that the patient will need to go to [place] as that is the 
only facility close to where he lives.  Bartolome asked that she order the hepatitis 
panel and HIV.  She stated they no longer require HIV testing.  Hepatitis 
panel ordered.  Bartolome faxed Admission intake form, facesheet, H&P, CXR, 
Progress 
notes, all hospital labs, with pending hepatitis panel, medication list with 
allergies listed at the top to [facility].  Bartolome left Socorro Llamas a message but 
no return call as it is Saturday and she only works M-F2G.  No operative report 
to fax at this time in the chart. CM will continue to follow and will assist 
as needed with dc plans/needs.   
Chelsie Keating Rn, [facility]
DCP- Discharge Planning
 
Updated by [email or web address]: Génesis Bojorquez on 19   4:22 pm CT
Patient Name: MARU ASHBY                                     
Admission Status: Elective   
Accout number: [id number]                              
Admission Date: 2019   
: 1991                                                        
Admission Diagnosis:   
Attending: VINICIUS BETHEA                                                
Current LOS:  3   
  
Anticipated DC Date: 2019   
Planned Disposition: Home with Home Health   
Primary Insurance: [employer] [place] [employer] OPTIONS COD[place]   
PLANNED EXTERNAL PROVIDER:  [facility]  
  
Discharge Planning Comments:   
CM RECEIVED HOME HEALTH ORDER, MET WITH PT IN ROOM TO DISCUSS DISCHARGE 
PLANNING AND NEEDS.  PT REPORTS LIVING AT HOME INDEPENDENTLY WITH PARENTS AND 
ADULT SISTER.   PT HAS BLOOD PRESSURE MONITOR AND NO MEDICAL EQUIPMENT  
PROVIDER PREFERENCE.  PT HAS NO OUTSIDE SERVICES ASSISTING IN THE HOME. PT IS 
NOT YET DOING OUTPATIENT DIALYSIS, BUT HAD A FISTULA  CREATED FOR WHEN HE MAY 
NEED IT.  CM DISCUSSED AVAILABILITY OF HOME HEALTH, REHAB SERVICES AND 
MEDICAL EQUIPMENT. PT WILL ACCEPT HOME HEALTH, REPORTS H IS SISTER TO BE A 
TEACHABLE CAREGIVER IN HOME.  PT REPORTS HIS PARENTS WILL PICK HIM UP FOR 
DISCHARGE HOME TOMORROW. PT HAS NO PREFERENCE ON HOME HEALTH PROVIDER, CHOICE 
LETTER SIGNED.  PT DENIES FURTHER DISCHARGE NEEDS.  
  
CM SPOKE TO OCTAVIANO OF [facility], DISCUSSED REFERRAL.  OCTAVIANO THINKS PT MAY BE WITHIN 
THE SERVICE AREA FOR [facility].  CM CALLED [facility], [phone], SPOKE TO CHELSIE 
AND PROVIDED REFERRAL INFORMATION.  CM FAXED REFERRAL FOR HOME HEALTH TO [facility] [phone].  
  
CM WAITING ACCEPTANCE DETERMINATION FROM [facility].  
  
: Génesis Bojorquez  
 
  
Appended by Génesis Bojorquez on 2019 17:22 CST:  
CM RECEIVED CALL FROM CHELSIE OF [facility], THEY WILL ACCEPT PT AND 
PLAN FOR HOME HEALTH ADMISSION ON SATURDAY, 19.  
  
FOR DISCHARGE, NOTIFY [facility] -[phone].  FAX DISCHARGE 
INFORMATION TO [facility]  [phone].   
  
GÉNESIS BOJORQUEZ, CASE MANAGEMENT
 DCPIA - Discharge Planning Initial Assessment
 
Updated by [email or web address]: Génesis Bojorquez on 19   1:37 pm
*  Is the patient Alert and Oriented?
Yes
*  How many steps to enter\exit or inside your home? NONE *  PCP VINICIUS BETHEA *  Pharmacy
[facility]
*  Preadmission Environment
Home with Family
*  ADLs
Independent
*  Equipment
Other
*  Other Equipment
BLOOD PRESSURE CUFF  NO MEDICAL EQUIPMENT PROVIDER PREFERENCE
 
*  List name and contact numbers for known caregivers / representatives who 
currently or will assist patient after discharge:
BAR ASHBY, MOTHER, [phone]
*  Verbal permission to speak to the caregivers and representatives has been 
obtained from the patient.
Yes
*  Community resources currently utilized
None
*  Please name any agencies selected above.
NONE
*  Additional services required to return to the preadmission environment?
Yes
*  Can the patient safely return to the preadmission environment?
Yes
*  Has this patient been hospitalized within the prior 30 days at any 
hospital?
No
 
 
 
 
 
Coverage Notice
 
Reviewer: [email or web address] Rhett Bojorquez
 
Notice Issued Date-Time: 2019  11:20
 
Notice Type: Patient Choice Letter
 
Notice Delivered To: Patient
Relationship to Patient: 
Representative Name: 
 
Delivery Method: HAND - Hand Delivered
Minnie Days:
Prior Verbal Notification: 
 
Recipient Understood Notice: Yes
Recipient Signature: Yes
Med Rec Note Co-signed by Attending:
 
Coverage Notice Comment:  NO HOME HEALTH PREFERENCE
 
Last DP export: 19   4:44 pm
Patient Name: MARU ASHBY
Encounter #: [id number]
Page [id number]
 
 
 
 
 
Electronically Signed by MAYELIN CRUZ on 19 at 1754
 
 
 
 
 
 
**All edits/amendments must be made on the electronic document**
 
DICTATION DATE: 19     : JUWAN  19     
RPT#: [id number]                                DC DATE:        
                                               STATUS: ADM IN  
[facility]
[address] [place] [address]
***END OF REPORT*** Patient states that he used to be on 50mg of vyvanse and wants to know if he can start at 50mg instead of 20mg    GE vemilion

## 2024-04-11 DIAGNOSIS — F90.0 ATTENTION DEFICIT HYPERACTIVITY DISORDER (ADHD), PREDOMINANTLY INATTENTIVE TYPE: Primary | ICD-10-CM

## 2024-04-11 RX ORDER — ESCITALOPRAM OXALATE 20 MG/1
20 TABLET ORAL DAILY
Qty: 90 TABLET | Refills: 1 | Status: CANCELLED | OUTPATIENT
Start: 2024-04-11

## 2024-04-11 RX ORDER — ESCITALOPRAM OXALATE 20 MG/1
20 TABLET ORAL DAILY
Qty: 90 TABLET | Refills: 0 | OUTPATIENT
Start: 2024-04-11

## 2024-04-11 RX ORDER — LISDEXAMFETAMINE DIMESYLATE CAPSULES 50 MG/1
50 CAPSULE ORAL DAILY
Qty: 30 CAPSULE | Refills: 0 | Status: SHIPPED | OUTPATIENT
Start: 2024-05-11 | End: 2024-06-10

## 2024-04-25 DIAGNOSIS — G47.01 INSOMNIA SECONDARY TO CHRONIC PAIN: ICD-10-CM

## 2024-04-25 DIAGNOSIS — G89.29 INSOMNIA SECONDARY TO CHRONIC PAIN: ICD-10-CM

## 2024-04-26 RX ORDER — ZOLPIDEM TARTRATE 10 MG/1
TABLET ORAL
Qty: 30 TABLET | Refills: 0 | Status: SHIPPED | OUTPATIENT
Start: 2024-04-26 | End: 2024-05-25

## 2024-05-08 DIAGNOSIS — F90.0 ATTENTION DEFICIT HYPERACTIVITY DISORDER (ADHD), PREDOMINANTLY INATTENTIVE TYPE: ICD-10-CM

## 2024-05-08 RX ORDER — TRAZODONE HYDROCHLORIDE 100 MG/1
100 TABLET ORAL NIGHTLY
Qty: 30 TABLET | Refills: 0 | Status: SHIPPED | OUTPATIENT
Start: 2024-05-08 | End: 2024-06-11 | Stop reason: SDUPTHER

## 2024-05-08 RX ORDER — LISDEXAMFETAMINE DIMESYLATE 50 MG/1
50 CAPSULE ORAL DAILY
Qty: 30 CAPSULE | Refills: 0 | Status: SHIPPED | OUTPATIENT
Start: 2024-05-11 | End: 2024-05-09 | Stop reason: SDUPTHER

## 2024-05-08 NOTE — TELEPHONE ENCOUNTER
Patient is  requesting medication refill. Please approve or deny this request.    Rx requested:  Requested Prescriptions     Pending Prescriptions Disp Refills    traZODone (DESYREL) 100 MG tablet 30 tablet 0     Sig: Take 1 tablet by mouth nightly    lisdexamfetamine (VYVANSE) 50 MG capsule 30 capsule 0     Sig: Take 1 capsule by mouth daily for 30 days. Max Daily Amount: 50 mg         Last Office Visit:   3/27/2024      Next Visit Date:  Future Appointments   Date Time Provider Department Center   5/28/2024 10:00 AM Lisandro Suarez APRN - CNP [facility]   10/2/2024  1:15 PM Rayray King MD [facility] -

## 2024-05-09 DIAGNOSIS — F90.0 ATTENTION DEFICIT HYPERACTIVITY DISORDER (ADHD), PREDOMINANTLY INATTENTIVE TYPE: ICD-10-CM

## 2024-05-09 NOTE — TELEPHONE ENCOUNTER
PATIENT IS due for fill now for vyvanse but the date on script said not to fill until 5/11/2024. I fixed and re pended

## 2024-05-10 RX ORDER — LISDEXAMFETAMINE DIMESYLATE 50 MG/1
50 CAPSULE ORAL DAILY
Qty: 30 CAPSULE | Refills: 0 | Status: SHIPPED | OUTPATIENT
Start: 2024-05-10 | End: 2024-06-05 | Stop reason: SDUPTHER

## 2024-05-14 RX ORDER — VERAPAMIL HYDROCHLORIDE 240 MG/1
240 TABLET, FILM COATED, EXTENDED RELEASE ORAL DAILY
Qty: 90 TABLET | Refills: 1 | Status: SHIPPED | OUTPATIENT
Start: 2024-05-14

## 2024-05-14 NOTE — TELEPHONE ENCOUNTER
Comments:     Last Office Visit (last PCP visit):   2/27/2024    Next Visit Date:  Future Appointments   Date Time Provider Department Center   5/28/2024 10:00 AM [facility]   10/2/2024  1:15 PM Rayray King MD [facility] -       **If hasn't been seen in over a year OR hasn't followed up according to last diabetes/ADHD visit, make appointment for patient before sending refill to provider.    Rx requested:  Requested Prescriptions     Pending Prescriptions Disp Refills    verapamil (CALAN SR) 240 MG extended release tablet 90 tablet 1     Sig: Take 1 tablet by mouth daily

## 2024-05-26 DIAGNOSIS — I10 PRIMARY HYPERTENSION: ICD-10-CM

## 2024-05-26 DIAGNOSIS — K21.9 GASTROESOPHAGEAL REFLUX DISEASE WITHOUT ESOPHAGITIS: ICD-10-CM

## 2024-05-28 ENCOUNTER — OFFICE VISIT (OUTPATIENT)
Dept: FAMILY MEDICINE CLINIC | Age: 34
End: 2024-05-28
Payer: COMMERCIAL

## 2024-05-28 VITALS
HEART RATE: 90 BPM | BODY MASS INDEX: 33.61 KG/M2 | DIASTOLIC BLOOD PRESSURE: 94 MMHG | WEIGHT: 234.8 LBS | HEIGHT: 70 IN | SYSTOLIC BLOOD PRESSURE: 120 MMHG | OXYGEN SATURATION: 97 %

## 2024-05-28 DIAGNOSIS — G47.01 INSOMNIA SECONDARY TO CHRONIC PAIN: ICD-10-CM

## 2024-05-28 DIAGNOSIS — K21.9 GASTROESOPHAGEAL REFLUX DISEASE WITHOUT ESOPHAGITIS: ICD-10-CM

## 2024-05-28 DIAGNOSIS — G89.29 INSOMNIA SECONDARY TO CHRONIC PAIN: ICD-10-CM

## 2024-05-28 DIAGNOSIS — I10 PRIMARY HYPERTENSION: Primary | ICD-10-CM

## 2024-05-28 PROCEDURE — G8417 CALC BMI ABV UP PARAM F/U: HCPCS | Performed by: NURSE PRACTITIONER

## 2024-05-28 PROCEDURE — 3080F DIAST BP >= 90 MM HG: CPT | Performed by: NURSE PRACTITIONER

## 2024-05-28 PROCEDURE — G8427 DOCREV CUR MEDS BY ELIG CLIN: HCPCS | Performed by: NURSE PRACTITIONER

## 2024-05-28 PROCEDURE — 99213 OFFICE O/P EST LOW 20 MIN: CPT | Performed by: NURSE PRACTITIONER

## 2024-05-28 PROCEDURE — 3074F SYST BP LT 130 MM HG: CPT | Performed by: NURSE PRACTITIONER

## 2024-05-28 PROCEDURE — 1036F TOBACCO NON-USER: CPT | Performed by: NURSE PRACTITIONER

## 2024-05-28 RX ORDER — ZOLPIDEM TARTRATE 10 MG/1
TABLET ORAL
Qty: 30 TABLET | Refills: 0 | Status: CANCELLED | OUTPATIENT
Start: 2024-05-28 | End: 2024-06-26

## 2024-05-28 RX ORDER — OMEPRAZOLE 20 MG/1
20 CAPSULE, DELAYED RELEASE ORAL
Qty: 90 CAPSULE | Refills: 1 | Status: SHIPPED | OUTPATIENT
Start: 2024-05-28

## 2024-05-28 RX ORDER — ZOLPIDEM TARTRATE 10 MG/1
10 TABLET ORAL NIGHTLY PRN
Qty: 30 TABLET | Refills: 2 | Status: SHIPPED | OUTPATIENT
Start: 2024-05-28 | End: 2024-08-26

## 2024-05-28 RX ORDER — HYDROCHLOROTHIAZIDE 25 MG/1
25 TABLET ORAL EVERY MORNING
Qty: 90 TABLET | Refills: 1 | Status: SHIPPED | OUTPATIENT
Start: 2024-05-28 | End: 2024-11-24

## 2024-05-28 RX ORDER — HYDROCHLOROTHIAZIDE 25 MG/1
25 TABLET ORAL EVERY MORNING
Qty: 90 TABLET | Refills: 0 | OUTPATIENT
Start: 2024-05-28 | End: 2024-08-26

## 2024-05-28 RX ORDER — OMEPRAZOLE 20 MG/1
20 CAPSULE, DELAYED RELEASE ORAL
Qty: 90 CAPSULE | Refills: 0 | OUTPATIENT
Start: 2024-05-28

## 2024-05-28 NOTE — PROGRESS NOTES
Subjective  Michael Montes, 33 y.o. male presents today with:  Chief Complaint   Patient presents with    Discuss Medications     Patient would like to discuss refilling Ambien. He states he follows up with Dr. King he states he will be traveling.     Hypertension     Patient is here for follow up on hypertension.   Do you check your BP at home? N   Do you exercise? Y   Are you taking your medications as prescribed? Y   Do you have any of these symptoms?   Chest pain? N  Palpitations? N   ROSENTHAL/ SOB? N   Headaches? N   Peripheral Edema? N   Light headed? N    Anxiety     Patient continues on Klonopin 0.5mg and Lexapro 20mg. He states these continue to work well for him denies any side effects.     Health Maintenance     Tetanus- will receive today           I reviewed staff HPI/chief complaint and do agree with above    Reports there have been no changes to her chronic medical conditions, and that her medication remains therapeutic    Was recently started on 50 mg of Vyvanse from his neurologist for treatment of ADHD and states has improvement in ability to finish task all the way through as well as less anxiety type symptoms.  Eating and sleeping as normal.  No heart palpitations noted    History of hypertension.  Blood pressures have been stable, normal blood pressure today in office.  He does continue on hydrochlorothiazide/verapamil.  He denies any headache/vision changes, shortness of breath/troubles breathing from his baseline, chest pain/discomfort, palpitations, dizziness/postural changes, fatigue, nausea/vomiting, or any lower extremity edema.  Patient does attempt to follow a DASH diet as close as possible and is as active as possible     Patient does have a medical past history significant for depression, general anxiety disorder, ADHD, polysubstance abuse.  Was hospitalized 6/2022 for acute encephalopathy due to overdose as well as elevated liver enzymes/rhabdo.  Patient has been doing well since

## 2024-05-29 ASSESSMENT — ENCOUNTER SYMPTOMS
SHORTNESS OF BREATH: 0
TROUBLE SWALLOWING: 0
EYE PAIN: 0
COUGH: 0
SINUS PRESSURE: 0
ABDOMINAL PAIN: 0
CONSTIPATION: 0
SORE THROAT: 0
RHINORRHEA: 0
EYE REDNESS: 0
DIARRHEA: 0
PHOTOPHOBIA: 0
SINUS PAIN: 0
CHEST TIGHTNESS: 0
VOMITING: 0
NAUSEA: 0
BLOOD IN STOOL: 0

## 2024-06-04 DIAGNOSIS — F41.1 GAD (GENERALIZED ANXIETY DISORDER): ICD-10-CM

## 2024-06-04 RX ORDER — CLONAZEPAM 0.5 MG/1
0.5 TABLET ORAL 2 TIMES DAILY PRN
Qty: 60 TABLET | Refills: 2 | Status: SHIPPED | OUTPATIENT
Start: 2024-06-04 | End: 2024-09-02

## 2024-06-05 DIAGNOSIS — F90.0 ATTENTION DEFICIT HYPERACTIVITY DISORDER (ADHD), PREDOMINANTLY INATTENTIVE TYPE: ICD-10-CM

## 2024-06-05 RX ORDER — LISDEXAMFETAMINE DIMESYLATE 50 MG/1
50 CAPSULE ORAL DAILY
Qty: 30 CAPSULE | Refills: 0 | Status: SHIPPED | OUTPATIENT
Start: 2024-06-08 | End: 2024-07-05 | Stop reason: SDUPTHER

## 2024-06-05 NOTE — TELEPHONE ENCOUNTER
Patient is requesting medication refill. Please approve or deny this request.    Rx requested:  Requested Prescriptions     Pending Prescriptions Disp Refills    lisdexamfetamine (VYVANSE) 50 MG capsule 30 capsule 0     Sig: Take 1 capsule by mouth daily for 30 days. Max Daily Amount: 50 mg         Last Office Visit:   3/27/2024      Next Visit Date:  Future Appointments   Date Time Provider Department Center   8/29/2024 10:00 AM Lisandro Suarez APRN - CNP [facility]   10/2/2024  1:15 PM Rayray King MD [facility] -     Last refill request was 05/09/2024

## 2024-06-11 RX ORDER — TRAZODONE HYDROCHLORIDE 100 MG/1
100 TABLET ORAL NIGHTLY
Qty: 30 TABLET | Refills: 0 | Status: SHIPPED | OUTPATIENT
Start: 2024-06-11

## 2024-06-11 NOTE — TELEPHONE ENCOUNTER
Patient is  requesting medication refill. Please approve or deny this request.    Rx requested:  Requested Prescriptions     Pending Prescriptions Disp Refills    traZODone (DESYREL) 100 MG tablet 30 tablet 0     Sig: Take 1 tablet by mouth nightly         Last Office Visit:   3/27/2024      Next Visit Date:  Future Appointments   Date Time Provider Department Center   8/29/2024 10:00 AM Lisandro Suarez APRN - CNP [facility]   10/2/2024  1:15 PM Rayray King MD [facility] -

## 2024-06-20 DIAGNOSIS — G89.29 INSOMNIA SECONDARY TO CHRONIC PAIN: ICD-10-CM

## 2024-06-20 DIAGNOSIS — G47.01 INSOMNIA SECONDARY TO CHRONIC PAIN: ICD-10-CM

## 2024-06-20 RX ORDER — CHOLECALCIFEROL (VITAMIN D3) 125 MCG
1 CAPSULE ORAL DAILY
Qty: 90 TABLET | Refills: 1 | Status: SHIPPED | OUTPATIENT
Start: 2024-06-20 | End: 2024-12-17

## 2024-06-20 NOTE — TELEPHONE ENCOUNTER
Patient is  requesting medication refill. Please approve or deny this request.    Rx requested:  Requested Prescriptions     Pending Prescriptions Disp Refills    zolpidem (AMBIEN) 10 MG tablet 30 tablet 2     Sig: Take 1 tablet by mouth nightly as needed for Sleep for up to 90 days. Max Daily Amount: 10 mg         Last Office Visit:   3/27/2024      Next Visit Date:  Future Appointments   Date Time Provider Department Center   8/29/2024 10:00 AM Lisandro Suarez APRN - CNP [facility]   10/2/2024  1:15 PM Rayray King MD [place] [facility] -

## 2024-06-21 RX ORDER — ZOLPIDEM TARTRATE 10 MG/1
10 TABLET ORAL NIGHTLY PRN
Qty: 30 TABLET | Refills: 2 | Status: SHIPPED | OUTPATIENT
Start: 2024-06-21 | End: 2024-09-19

## 2024-07-05 DIAGNOSIS — F90.0 ATTENTION DEFICIT HYPERACTIVITY DISORDER (ADHD), PREDOMINANTLY INATTENTIVE TYPE: ICD-10-CM

## 2024-07-05 RX ORDER — LISDEXAMFETAMINE DIMESYLATE 50 MG/1
50 CAPSULE ORAL DAILY
Qty: 30 CAPSULE | Refills: 0 | Status: SHIPPED | OUTPATIENT
Start: 2024-07-05 | End: 2024-08-04

## 2024-07-05 NOTE — TELEPHONE ENCOUNTER
Patient is  requesting medication refill. Please approve or deny this request.    Rx requested:  Requested Prescriptions     Pending Prescriptions Disp Refills    lisdexamfetamine (VYVANSE) 50 MG capsule 30 capsule 0     Sig: Take 1 capsule by mouth daily for 30 days. Max Daily Amount: 50 mg         Last Office Visit:   3/27/2024      Next Visit Date:  Future Appointments   Date Time Provider Department Center   8/29/2024 10:00 AM Lisandro Suarez APRN - CNP [facility]   10/2/2024  1:15 PM Rayray King MD [place] [facility] -

## 2024-07-13 RX ORDER — TRAZODONE HYDROCHLORIDE 100 MG/1
100 TABLET ORAL NIGHTLY
Qty: 30 TABLET | Refills: 0 | Status: SHIPPED | OUTPATIENT
Start: 2024-07-13

## 2024-07-29 DIAGNOSIS — F90.0 ATTENTION DEFICIT HYPERACTIVITY DISORDER (ADHD), PREDOMINANTLY INATTENTIVE TYPE: ICD-10-CM

## 2024-07-29 NOTE — TELEPHONE ENCOUNTER
Requested Prescriptions     Pending Prescriptions Disp Refills    lisdexamfetamine (VYVANSE) 50 MG capsule 30 capsule 0     Sig: Take 1 capsule by mouth daily for 30 days. Max Daily Amount: 50 mg

## 2024-07-30 RX ORDER — LISDEXAMFETAMINE DIMESYLATE 50 MG/1
50 CAPSULE ORAL DAILY
Qty: 30 CAPSULE | Refills: 0 | Status: SHIPPED | OUTPATIENT
Start: 2024-08-03 | End: 2024-09-02

## 2024-08-09 RX ORDER — VERAPAMIL HYDROCHLORIDE 240 MG/1
240 TABLET, FILM COATED, EXTENDED RELEASE ORAL DAILY
Qty: 90 TABLET | Refills: 1 | Status: SHIPPED | OUTPATIENT
Start: 2024-08-09

## 2024-08-09 NOTE — TELEPHONE ENCOUNTER
Comments:     Last Office Visit (last PCP visit):   5/28/2024    Next Visit Date:  Future Appointments   Date Time Provider Department Center   8/29/2024 10:00 AM Lisandro Suarez APRN - CNP [facility]   10/2/2024  1:15 PM Rayray King MD [place] [facility] -       **If hasn't been seen in over a year OR hasn't followed up according to last diabetes/ADHD visit, make appointment for patient before sending refill to provider.    Rx requested:  Requested Prescriptions     Pending Prescriptions Disp Refills    verapamil (CALAN SR) 240 MG extended release tablet 90 tablet 1     Sig: Take 1 tablet by mouth daily

## 2024-08-13 NOTE — TELEPHONE ENCOUNTER
Requesting medication refill. Please approve or deny this request.    Rx requested:  Requested Prescriptions     Pending Prescriptions Disp Refills    traZODone (DESYREL) 100 MG tablet 30 tablet 0     Sig: Take 1 tablet by mouth nightly         Last Office Visit:   3/27/2024      Next Visit Date:  Future Appointments   Date Time Provider Department Center   8/29/2024 10:00 AM Lisandro Suarez, YING - CNP [facility]   10/2/2024  1:15 PM Rayray King MD [place] [facility] -               Last refill 7/13/24. Please approve or deny.

## 2024-08-14 RX ORDER — TRAZODONE HYDROCHLORIDE 100 MG/1
100 TABLET ORAL NIGHTLY
Qty: 30 TABLET | Refills: 0 | Status: SHIPPED | OUTPATIENT
Start: 2024-08-14

## 2024-08-27 ENCOUNTER — TELEPHONE (OUTPATIENT)
Dept: NEUROLOGY | Age: 34
End: 2024-08-27

## 2024-08-27 DIAGNOSIS — K21.9 GASTROESOPHAGEAL REFLUX DISEASE WITHOUT ESOPHAGITIS: ICD-10-CM

## 2024-08-27 DIAGNOSIS — I10 PRIMARY HYPERTENSION: ICD-10-CM

## 2024-08-27 NOTE — TELEPHONE ENCOUNTER
Comments:     Last Office Visit (last PCP visit):   5/28/2024    Next Visit Date:  Future Appointments   Date Time Provider Department Center   8/29/2024 10:00 AM Lisandro Suarez APRN - CNP [facility]   10/2/2024  1:15 PM Rayray King MD [place] [facility] -       **If hasn't been seen in over a year OR hasn't followed up according to last diabetes/ADHD visit, make appointment for patient before sending refill to provider.    Rx requested:  Requested Prescriptions     Pending Prescriptions Disp Refills    omeprazole (PRILOSEC) 20 MG delayed release capsule 90 capsule 1     Sig: Take 1 capsule by mouth every morning (before breakfast)    hydroCHLOROthiazide (HYDRODIURIL) 25 MG tablet 90 tablet 1     Sig: Take 1 tablet by mouth every morning

## 2024-08-27 NOTE — TELEPHONE ENCOUNTER
Pt called office requesting an increase for his Vyvanse. Pt currently taking 50mg daily and wanting to increase to 60mg daily. Pt will need a Rx either way. Please advise.

## 2024-08-28 DIAGNOSIS — F41.1 GAD (GENERALIZED ANXIETY DISORDER): ICD-10-CM

## 2024-08-28 DIAGNOSIS — F90.0 ATTENTION DEFICIT HYPERACTIVITY DISORDER (ADHD), PREDOMINANTLY INATTENTIVE TYPE: ICD-10-CM

## 2024-08-28 RX ORDER — HYDROCHLOROTHIAZIDE 25 MG/1
25 TABLET ORAL EVERY MORNING
Qty: 90 TABLET | Refills: 1 | Status: SHIPPED | OUTPATIENT
Start: 2024-08-28 | End: 2025-02-24

## 2024-08-28 RX ORDER — CLONAZEPAM 0.5 MG/1
0.5 TABLET ORAL 2 TIMES DAILY PRN
Qty: 60 TABLET | Refills: 2 | Status: SHIPPED | OUTPATIENT
Start: 2024-08-28 | End: 2024-11-26

## 2024-08-28 NOTE — TELEPHONE ENCOUNTER
Comments:     Last Office Visit (last PCP visit):   5/28/2024    Next Visit Date:  Future Appointments   Date Time Provider Department Center   8/29/2024 10:00 AM Lisandro Suarez APRN - CNP [facility]   10/2/2024  1:15 PM Rayray King MD [place] [facility] -       **If hasn't been seen in over a year OR hasn't followed up according to last diabetes/ADHD visit, make appointment for patient before sending refill to provider.    Rx requested:  Requested Prescriptions     Pending Prescriptions Disp Refills    clonazePAM (KLONOPIN) 0.5 MG tablet 60 tablet 2     Sig: Take 1 tablet by mouth 2 times daily as needed for Anxiety for up to 90 days. Max Daily Amount: 1 mg

## 2024-08-28 NOTE — TELEPHONE ENCOUNTER
Patient is requesting medication refill. Please approve or deny this request.    Rx requested:  Requested Prescriptions     Pending Prescriptions Disp Refills    lisdexamfetamine 60 MG CAPS 30 capsule 0     Sig: Take 50 mg by mouth daily for 30 days. Max Daily Amount: 50 mg         Last Office Visit:   3/27/2024      Next Visit Date:  Future Appointments   Date Time Provider Department Center   8/29/2024 10:00 AM [facility]   10/2/2024  1:15 PM Rayray King MD [place] [facility] -

## 2024-08-29 ENCOUNTER — OFFICE VISIT (OUTPATIENT)
Dept: FAMILY MEDICINE CLINIC | Age: 34
End: 2024-08-29
Payer: COMMERCIAL

## 2024-08-29 VITALS
OXYGEN SATURATION: 95 % | WEIGHT: 220.4 LBS | HEIGHT: 70 IN | DIASTOLIC BLOOD PRESSURE: 86 MMHG | SYSTOLIC BLOOD PRESSURE: 128 MMHG | HEART RATE: 97 BPM | BODY MASS INDEX: 31.55 KG/M2

## 2024-08-29 DIAGNOSIS — G93.6 CEREBRAL EDEMA (HCC): ICD-10-CM

## 2024-08-29 DIAGNOSIS — G93.1 HYPOXIC ENCEPHALOPATHY (HCC): ICD-10-CM

## 2024-08-29 DIAGNOSIS — G89.29 INSOMNIA SECONDARY TO CHRONIC PAIN: ICD-10-CM

## 2024-08-29 DIAGNOSIS — I10 PRIMARY HYPERTENSION: Primary | ICD-10-CM

## 2024-08-29 DIAGNOSIS — E55.9 VITAMIN D DEFICIENCY: ICD-10-CM

## 2024-08-29 DIAGNOSIS — F41.9 ANXIETY: ICD-10-CM

## 2024-08-29 DIAGNOSIS — F32.A DEPRESSIVE DISORDER: ICD-10-CM

## 2024-08-29 DIAGNOSIS — G40.901 STATUS EPILEPTICUS (HCC): ICD-10-CM

## 2024-08-29 DIAGNOSIS — G47.01 INSOMNIA SECONDARY TO CHRONIC PAIN: ICD-10-CM

## 2024-08-29 PROCEDURE — 3074F SYST BP LT 130 MM HG: CPT | Performed by: NURSE PRACTITIONER

## 2024-08-29 PROCEDURE — G8417 CALC BMI ABV UP PARAM F/U: HCPCS | Performed by: NURSE PRACTITIONER

## 2024-08-29 PROCEDURE — G8427 DOCREV CUR MEDS BY ELIG CLIN: HCPCS | Performed by: NURSE PRACTITIONER

## 2024-08-29 PROCEDURE — 1036F TOBACCO NON-USER: CPT | Performed by: NURSE PRACTITIONER

## 2024-08-29 PROCEDURE — 99214 OFFICE O/P EST MOD 30 MIN: CPT | Performed by: NURSE PRACTITIONER

## 2024-08-29 PROCEDURE — 3079F DIAST BP 80-89 MM HG: CPT | Performed by: NURSE PRACTITIONER

## 2024-08-29 RX ORDER — LISDEXAMFETAMINE DIMESYLATE 60 MG/1
50 CAPSULE ORAL DAILY
Qty: 30 CAPSULE | Refills: 0 | Status: SHIPPED | OUTPATIENT
Start: 2024-08-29 | End: 2024-09-28

## 2024-08-29 ASSESSMENT — ENCOUNTER SYMPTOMS
VOMITING: 0
COLOR CHANGE: 0
SHORTNESS OF BREATH: 0
BLOOD IN STOOL: 0
COUGH: 0
NAUSEA: 0
DIARRHEA: 0
CHEST TIGHTNESS: 0
CONSTIPATION: 0
ABDOMINAL PAIN: 0

## 2024-08-29 NOTE — PROGRESS NOTES
present.      Left lower le+ Edema present.   Lymphadenopathy:      Cervical: No cervical adenopathy.   Skin:     General: Skin is warm and dry.      Findings: No erythema or rash.   Neurological:      General: No focal deficit present.      Mental Status: He is alert and oriented to person, place, and time.      Cranial Nerves: No cranial nerve deficit.      Sensory: No sensory deficit.      Motor: No weakness.      Coordination: Coordination normal.      Gait: Gait normal.   Psychiatric:         Attention and Perception: Attention normal. He does not perceive auditory or visual hallucinations.         Mood and Affect: Mood normal. Mood is not anxious, depressed or elated. Affect is not angry or tearful.         Speech: Speech normal.         Behavior: Behavior normal. Behavior is cooperative.         Thought Content: Thought content normal.         Cognition and Memory: Cognition normal.         Judgment: Judgment normal.         ASSESSMENT/PLAN     1. Primary hypertension  Stable, continue on hydrochlorothiazide as prescribed. Continue with dietary and lifestyle modifications as discussed/encouraged   - Lipid Panel; Future  - Basic Metabolic Panel; Future  - CBC with Auto Differential; Future  - Hemoglobin A1C; Future    2. Vitamin D deficiency  Laboratory work discussed and ordered.  - Vitamin D 25 Hydroxy; Future    3. Status epilepticus (HCC)  Stable, no recent exacerbations of condition.  Continue following with neurology as previously scheduled    4. Hypoxic encephalopathy (HCC)    5. Cerebral edema (HCC)    6. Anxiety  Stable, continue on Klonopin as prescribed.  OARRS was reviewed and appropriate.  Urine drug screen up-to-date.  We did update medication agreement today in office.  7. Depressive disorder  Stable, continuing all medications as previously prescribed    8. Insomnia secondary to chronic pain  Stable, continue on Ambien as previously prescribed.  OARRS was reviewed and appropriate.  Urine  drug screen up-to-date.  We did update medication agreement today in office.      No follow-ups on file.    COMMUNICATION:       Electronically signed by YING Holley CNP, MD on 8/29/2024 9:10 PM

## 2024-09-16 DIAGNOSIS — G47.01 INSOMNIA SECONDARY TO CHRONIC PAIN: ICD-10-CM

## 2024-09-16 DIAGNOSIS — G89.29 INSOMNIA SECONDARY TO CHRONIC PAIN: ICD-10-CM

## 2024-09-17 RX ORDER — TRAZODONE HYDROCHLORIDE 100 MG/1
100 TABLET ORAL NIGHTLY
Qty: 30 TABLET | Refills: 0 | Status: SHIPPED | OUTPATIENT
Start: 2024-09-17

## 2024-09-17 RX ORDER — TRAZODONE HYDROCHLORIDE 100 MG/1
100 TABLET ORAL NIGHTLY
Qty: 30 TABLET | Refills: 0 | OUTPATIENT
Start: 2024-09-17

## 2024-09-17 RX ORDER — ZOLPIDEM TARTRATE 10 MG/1
10 TABLET ORAL NIGHTLY PRN
Qty: 30 TABLET | Refills: 2 | Status: SHIPPED | OUTPATIENT
Start: 2024-09-17 | End: 2024-12-16

## 2024-09-23 RX ORDER — CHOLECALCIFEROL (VITAMIN D3) 50 MCG
1 TABLET ORAL DAILY
Qty: 90 TABLET | Refills: 1 | Status: SHIPPED | OUTPATIENT
Start: 2024-09-23 | End: 2025-03-22

## 2024-09-26 DIAGNOSIS — F90.0 ATTENTION DEFICIT HYPERACTIVITY DISORDER (ADHD), PREDOMINANTLY INATTENTIVE TYPE: ICD-10-CM

## 2024-09-26 NOTE — TELEPHONE ENCOUNTER
Requesting medication refill. Please approve or deny this request.    Rx requested:  Requested Prescriptions     Pending Prescriptions Disp Refills    Lisdexamfetamine Dimesylate 60 MG CAPS 30 capsule 0     Sig: Take 50 mg by mouth daily for 30 days. Max Daily Amount: 50 mg         Last Office Visit:   3/27/2024      Next Visit Date:  Future Appointments   Date Time Provider Department Center   10/2/2024  1:15 PM [facility]   11/29/2024 10:00 AM [facility]               Last refill 8/29/24. Please approve or deny.

## 2024-09-27 RX ORDER — LISDEXAMFETAMINE DIMESYLATE 60 MG/1
50 CAPSULE ORAL DAILY
Qty: 30 CAPSULE | Refills: 0 | Status: SHIPPED | OUTPATIENT
Start: 2024-09-27 | End: 2024-09-30 | Stop reason: SDUPTHER

## 2024-09-30 DIAGNOSIS — F90.0 ATTENTION DEFICIT HYPERACTIVITY DISORDER (ADHD), PREDOMINANTLY INATTENTIVE TYPE: ICD-10-CM

## 2024-09-30 RX ORDER — LISDEXAMFETAMINE DIMESYLATE 60 MG/1
60 CAPSULE ORAL DAILY
Qty: 30 CAPSULE | Refills: 0 | Status: SHIPPED | OUTPATIENT
Start: 2024-09-30 | End: 2024-10-30

## 2024-09-30 RX ORDER — LISDEXAMFETAMINE DIMESYLATE 50 MG/1
50 CAPSULE ORAL DAILY
Qty: 30 CAPSULE | Refills: 0 | Status: CANCELLED | OUTPATIENT
Start: 2024-09-30 | End: 2024-10-30

## 2024-09-30 NOTE — TELEPHONE ENCOUNTER
The last Rx was written for 60mg capsules and to take 50mg. Please advise.         Requesting medication refill. Please approve or deny this request.    Rx requested:  Requested Prescriptions     Pending Prescriptions Disp Refills    Lisdexamfetamine Dimesylate 60 MG CAPS 30 capsule 0     Sig: Take 60 mg by mouth daily for 30 days. Max Daily Amount: 60 mg         Last Office Visit:   3/27/2024      Next Visit Date:  Future Appointments   Date Time Provider Department Center   10/2/2024  1:15 PM Rayray King MD [place] [facility] Neurology -   11/29/2024 10:00 AM Lisandro Suarez, APRN - CNP [facility]2 [facility] DEP

## 2024-10-08 RX ORDER — ESCITALOPRAM OXALATE 20 MG/1
20 TABLET ORAL DAILY
Qty: 90 TABLET | Refills: 1 | Status: SHIPPED | OUTPATIENT
Start: 2024-10-08

## 2024-10-08 NOTE — TELEPHONE ENCOUNTER
Comments:     Last Office Visit (last PCP visit):   8/29/2024    Next Visit Date:  Future Appointments   Date Time Provider Department Center   11/29/2024 10:00 AM Lisandro Suarez, YING - CNP [facility]   2/25/2025  3:00 PM Rayray King MD [place] [facility] -       **If hasn't been seen in over a year OR hasn't followed up according to last diabetes/ADHD visit, make appointment for patient before sending refill to provider.    Rx requested:  Requested Prescriptions     Pending Prescriptions Disp Refills    escitalopram (LEXAPRO) 20 MG tablet 90 tablet 1     Sig: Take 1 tablet by mouth daily

## 2024-10-17 RX ORDER — TRAZODONE HYDROCHLORIDE 100 MG/1
100 TABLET ORAL NIGHTLY
Qty: 30 TABLET | Refills: 0 | Status: SHIPPED | OUTPATIENT
Start: 2024-10-17

## 2024-10-17 NOTE — TELEPHONE ENCOUNTER
Requesting medication refill. Please approve or deny this request.    Rx requested:  Requested Prescriptions     Pending Prescriptions Disp Refills    traZODone (DESYREL) 100 MG tablet 30 tablet 0     Sig: Take 1 tablet by mouth nightly         Last Office Visit:   3/27/2024      Next Visit Date:  Future Appointments   Date Time Provider Department Center   11/29/2024 10:00 AM Lisandro Suarez, YING - CNP [facility]   2/25/2025  3:00 PM Rayray King MD [place] [facility] -               Last refill 9/17/24. Please approve or deny.

## 2024-10-24 DIAGNOSIS — F90.0 ATTENTION DEFICIT HYPERACTIVITY DISORDER (ADHD), PREDOMINANTLY INATTENTIVE TYPE: ICD-10-CM

## 2024-10-24 RX ORDER — LISDEXAMFETAMINE DIMESYLATE 60 MG/1
60 CAPSULE ORAL DAILY
Qty: 30 CAPSULE | Refills: 0 | Status: SHIPPED | OUTPATIENT
Start: 2024-10-24 | End: 2024-11-23

## 2024-10-24 NOTE — TELEPHONE ENCOUNTER
Requesting medication refill. Please approve or deny this request.    Rx requested:  Requested Prescriptions     Pending Prescriptions Disp Refills    Lisdexamfetamine Dimesylate 60 MG CAPS 30 capsule 0     Sig: Take 60 mg by mouth daily for 30 days. Max Daily Amount: 60 mg         Last Office Visit:   3/27/2024      Next Visit Date:  Future Appointments   Date Time Provider Department Center   11/29/2024 10:00 AM Lisandro Suarez APRN - CNP [facility]   2/25/2025  3:00 PM Rayray King MD [place] [facility] -               Last refill 9/30/24. Please approve or deny.

## 2024-11-17 DIAGNOSIS — F41.1 GAD (GENERALIZED ANXIETY DISORDER): ICD-10-CM

## 2024-11-18 DIAGNOSIS — F41.1 GAD (GENERALIZED ANXIETY DISORDER): ICD-10-CM

## 2024-11-18 RX ORDER — TRAZODONE HYDROCHLORIDE 100 MG/1
100 TABLET ORAL NIGHTLY
Qty: 30 TABLET | Refills: 0 | Status: SHIPPED | OUTPATIENT
Start: 2024-11-18

## 2024-11-18 NOTE — TELEPHONE ENCOUNTER
Requesting medication refill. Please approve or deny this request.    Rx requested:  Requested Prescriptions     Pending Prescriptions Disp Refills    traZODone (DESYREL) 100 MG tablet 30 tablet 0     Sig: Take 1 tablet by mouth nightly         Last Office Visit:   3/27/2024      Next Visit Date:  Future Appointments   Date Time Provider Department Center   11/29/2024 10:00 AM Lisandro Suarez APRN - CNP [facility]   2/25/2025  3:00 PM Rayray King MD [place] [facility] -               Last refill 10/17/24. Please approve or deny.

## 2024-11-19 RX ORDER — CLONAZEPAM 0.5 MG/1
0.5 TABLET ORAL 2 TIMES DAILY PRN
Qty: 60 TABLET | Refills: 2 | Status: SHIPPED | OUTPATIENT
Start: 2024-11-19 | End: 2025-02-17

## 2024-11-19 RX ORDER — VERAPAMIL HYDROCHLORIDE 240 MG/1
240 TABLET, FILM COATED, EXTENDED RELEASE ORAL DAILY
Qty: 90 TABLET | Refills: 1 | Status: SHIPPED | OUTPATIENT
Start: 2024-11-19

## 2024-11-19 NOTE — TELEPHONE ENCOUNTER
Comments:     Last Office Visit (last PCP visit):   8/29/2024    Next Visit Date:  Future Appointments   Date Time Provider Department Center   11/29/2024 10:00 AM Lisandro Suarez APRN - CNP [facility]   2/25/2025  3:00 PM Rayray King MD [place] [facility] -       **If hasn't been seen in over a year OR hasn't followed up according to last diabetes/ADHD visit, make appointment for patient before sending refill to provider.    Rx requested:  Requested Prescriptions     Pending Prescriptions Disp Refills    verapamil (CALAN SR) 240 MG extended release tablet 90 tablet 1     Sig: Take 1 tablet by mouth daily

## 2024-11-19 NOTE — TELEPHONE ENCOUNTER
Comments:     Last Office Visit (last PCP visit):   8/29/2024    Next Visit Date:  Future Appointments   Date Time Provider Department Center   11/29/2024 10:00 AM Lisandro Suarez, YING - CNP [facility]   2/25/2025  3:00 PM Rayray King MD [place] [facility] -       **If hasn't been seen in over a year OR hasn't followed up according to last diabetes/ADHD visit, make appointment for patient before sending refill to provider.    Rx requested:  Requested Prescriptions     Pending Prescriptions Disp Refills    clonazePAM (KLONOPIN) 0.5 MG tablet 60 tablet 2     Sig: Take 1 tablet by mouth 2 times daily as needed for Anxiety for up to 90 days. Max Daily Amount: 1 mg

## 2024-11-21 DIAGNOSIS — F90.0 ATTENTION DEFICIT HYPERACTIVITY DISORDER (ADHD), PREDOMINANTLY INATTENTIVE TYPE: ICD-10-CM

## 2024-11-21 RX ORDER — LISDEXAMFETAMINE DIMESYLATE 60 MG/1
60 CAPSULE ORAL DAILY
Qty: 30 CAPSULE | Refills: 0 | Status: SHIPPED | OUTPATIENT
Start: 2024-11-21 | End: 2024-12-21

## 2024-11-21 RX ORDER — CLONAZEPAM 0.5 MG/1
TABLET ORAL
Qty: 60 TABLET | Refills: 0 | OUTPATIENT
Start: 2024-11-21

## 2024-11-21 NOTE — TELEPHONE ENCOUNTER
Requesting medication refill. Please approve or deny this request.    Rx requested:  Requested Prescriptions     Pending Prescriptions Disp Refills    Lisdexamfetamine Dimesylate 60 MG CAPS 30 capsule 0     Sig: Take 60 mg by mouth daily for 30 days. Max Daily Amount: 60 mg         Last Office Visit:   3/27/2024      Next Visit Date:  Future Appointments   Date Time Provider Department Center   11/29/2024 10:00 AM Lisandro Suarez APRN - CNP [facility]   2/25/2025  3:00 PM Rayray King MD [place] [facility] -               Last refill 10/24/24. Please approve or deny.

## 2024-11-29 ENCOUNTER — TELEPHONE (OUTPATIENT)
Dept: NEUROLOGY | Age: 34
End: 2024-11-29

## 2024-11-29 NOTE — TELEPHONE ENCOUNTER
Pt would like to know if he could possibly get an afternoon dose of adderall due to working nights. Pt is currently taking 60mg of vyvanse in the morning. Pt also states in the past he has take 30mg of adderall in the afternoon. Pt scheduled for a f/u appt on 2/25/24.

## 2024-12-03 ENCOUNTER — TELEPHONE (OUTPATIENT)
Dept: FAMILY MEDICINE CLINIC | Age: 34
End: 2024-12-03

## 2024-12-03 ENCOUNTER — OFFICE VISIT (OUTPATIENT)
Dept: FAMILY MEDICINE CLINIC | Age: 34
End: 2024-12-03

## 2024-12-03 VITALS
HEART RATE: 97 BPM | DIASTOLIC BLOOD PRESSURE: 84 MMHG | HEIGHT: 70 IN | SYSTOLIC BLOOD PRESSURE: 126 MMHG | WEIGHT: 192.4 LBS | BODY MASS INDEX: 27.54 KG/M2 | OXYGEN SATURATION: 98 %

## 2024-12-03 DIAGNOSIS — F19.11 DRUG ABUSE IN REMISSION (HCC): ICD-10-CM

## 2024-12-03 DIAGNOSIS — F90.0 ATTENTION DEFICIT HYPERACTIVITY DISORDER (ADHD), PREDOMINANTLY INATTENTIVE TYPE: Primary | ICD-10-CM

## 2024-12-03 DIAGNOSIS — F13.20 BENZODIAZEPINE DEPENDENCE (HCC): ICD-10-CM

## 2024-12-03 DIAGNOSIS — I10 PRIMARY HYPERTENSION: Primary | ICD-10-CM

## 2024-12-03 DIAGNOSIS — F90.9 ATTENTION DEFICIT HYPERACTIVITY DISORDER (ADHD), UNSPECIFIED ADHD TYPE: ICD-10-CM

## 2024-12-03 DIAGNOSIS — F33.1 MDD (MAJOR DEPRESSIVE DISORDER), RECURRENT EPISODE, MODERATE (HCC): ICD-10-CM

## 2024-12-03 PROCEDURE — 3079F DIAST BP 80-89 MM HG: CPT | Performed by: NURSE PRACTITIONER

## 2024-12-03 PROCEDURE — 3074F SYST BP LT 130 MM HG: CPT | Performed by: NURSE PRACTITIONER

## 2024-12-03 PROCEDURE — 99214 OFFICE O/P EST MOD 30 MIN: CPT | Performed by: NURSE PRACTITIONER

## 2024-12-03 RX ORDER — DEXTROAMPHETAMINE SACCHARATE, AMPHETAMINE ASPARTATE, DEXTROAMPHETAMINE SULFATE AND AMPHETAMINE SULFATE 5; 5; 5; 5 MG/1; MG/1; MG/1; MG/1
20 TABLET ORAL DAILY
Qty: 30 TABLET | Refills: 0 | Status: CANCELLED | OUTPATIENT
Start: 2024-12-03 | End: 2025-01-02

## 2024-12-03 RX ORDER — DEXTROAMPHETAMINE SACCHARATE, AMPHETAMINE ASPARTATE, DEXTROAMPHETAMINE SULFATE AND AMPHETAMINE SULFATE 5; 5; 5; 5 MG/1; MG/1; MG/1; MG/1
20 TABLET ORAL
Qty: 30 TABLET | Refills: 0 | Status: SHIPPED | OUTPATIENT
Start: 2024-12-03 | End: 2025-01-02

## 2024-12-03 ASSESSMENT — ENCOUNTER SYMPTOMS
ABDOMINAL PAIN: 0
NAUSEA: 0
CHEST TIGHTNESS: 0
VOMITING: 0
COLOR CHANGE: 0
SHORTNESS OF BREATH: 0
BLOOD IN STOOL: 0
CONSTIPATION: 0
DIARRHEA: 0
COUGH: 0

## 2024-12-03 NOTE — PROGRESS NOTES
Date of Visit:  12/3/2024  Patient Name: Michael Montes   Patient :  1990     CHIEF COMPLAINT:     Michael Montes is a 34 y.o. male who presents today for an general visit to be evaluated for the following condition(s):  Chief Complaint   Patient presents with    ADHD     Patient continues on Vyvanse 60mg. He would like to discuss other medication options he states has now started working night shifts and would like additional medication added. He states has no issues on the vyvanse working well for him.     Hypertension     Patient is here for follow up on hypertension.   Do you check your BP at home? N   Do you exercise? Y   Are you taking your medications as prescribed? Y   Do you have any of these symptoms?   Chest pain? N  Palpitations? N   ROSENTHAL/ SOB? N   Headaches? N  Peripheral Edema? N   Light headed ?N    Anxiety       Patient continues on Klonopin 0.5mg and Lexapro 20mg. He states these continue to work well for him denies any side effects.         Health Maintenance     Vaccines- discussed        HISTORY OF PRESENT ILLNESS     I reviewed staff HPI/chief complaint and do agree with above    Reports there have been no changes to her chronic medical conditions, and that her medication remains therapeutic     Patient is on 60 mg of Vyvanse from his neurologist for treatment of ADHD and  has feels like it is only for for 5-6 hours after taking.   has been losing focus and ability to concentrate at work and stay on task which she initially noticed while taking the Vyvanse for the first started.  Eating and sleeping as normal.  No heart palpitations noted     History of hypertension.  Blood pressures have been stable, normal blood pressure today in office.  He does continue on verapamil.  Patient  has been very active with employment and practicing dietary changes which had caused a large weight loss therefore he tried discontinuing his hydrochlorothiazide blood pressure

## 2024-12-03 NOTE — TELEPHONE ENCOUNTER
Pt calling stating that he was seen this morning, was prescribed adderall, but the Neurologist has already call this in for him, pt does not need this filled.please cancel order.

## 2024-12-17 NOTE — TELEPHONE ENCOUNTER
Requesting medication refill. Please approve or deny this request.    Rx requested:  Requested Prescriptions     Pending Prescriptions Disp Refills    traZODone (DESYREL) 100 MG tablet 30 tablet 0     Sig: Take 1 tablet by mouth nightly         Last Office Visit:   3/27/2024      Next Visit Date:  Future Appointments   Date Time Provider Department Center   2/25/2025  3:00 PM Rayray King MD [place] [facility] -               Last refill 11/18/24. Please approve or deny.

## 2024-12-18 DIAGNOSIS — F90.0 ATTENTION DEFICIT HYPERACTIVITY DISORDER (ADHD), PREDOMINANTLY INATTENTIVE TYPE: ICD-10-CM

## 2024-12-18 RX ORDER — LISDEXAMFETAMINE DIMESYLATE 60 MG/1
60 CAPSULE ORAL DAILY
Qty: 30 CAPSULE | Refills: 0 | Status: SHIPPED | OUTPATIENT
Start: 2024-12-18 | End: 2025-01-17

## 2024-12-18 RX ORDER — TRAZODONE HYDROCHLORIDE 100 MG/1
100 TABLET ORAL NIGHTLY
Qty: 30 TABLET | Refills: 0 | Status: SHIPPED | OUTPATIENT
Start: 2024-12-18

## 2024-12-18 NOTE — TELEPHONE ENCOUNTER
Requesting medication refill. Please approve or deny this request.    Rx requested:  Requested Prescriptions     Pending Prescriptions Disp Refills    Lisdexamfetamine Dimesylate 60 MG CAPS 30 capsule 0     Sig: Take 60 mg by mouth daily for 30 days. Max Daily Amount: 60 mg         Last Office Visit:   3/27/2024      Next Visit Date:  Future Appointments   Date Time Provider Department Center   2/25/2025  3:00 PM Rayray King MD [place] [facility] Neurology -               Last refill 11/21/24. Please approve or deny.

## 2024-12-31 DIAGNOSIS — F90.0 ATTENTION DEFICIT HYPERACTIVITY DISORDER (ADHD), PREDOMINANTLY INATTENTIVE TYPE: ICD-10-CM

## 2024-12-31 RX ORDER — DEXTROAMPHETAMINE SACCHARATE, AMPHETAMINE ASPARTATE, DEXTROAMPHETAMINE SULFATE AND AMPHETAMINE SULFATE 5; 5; 5; 5 MG/1; MG/1; MG/1; MG/1
20 TABLET ORAL
Qty: 30 TABLET | Refills: 0 | Status: SHIPPED | OUTPATIENT
Start: 2024-12-31 | End: 2025-01-30

## 2024-12-31 NOTE — TELEPHONE ENCOUNTER
requesting medication refill. Please approve or deny this request.    Rx requested:  Requested Prescriptions     Pending Prescriptions Disp Refills    amphetamine-dextroamphetamine (ADDERALL, 20MG,) 20 MG tablet 30 tablet 0     Sig: Take 1 tablet by mouth Daily with lunch for 30 days. Max Daily Amount: 20 mg         Last Office Visit:   3/27/2024      Next Visit Date:  Future Appointments   Date Time Provider Department Center   2/25/2025  3:00 PM Rayray King MD [place] [facility] Neurology -

## 2025-01-06 DIAGNOSIS — G47.01 INSOMNIA SECONDARY TO CHRONIC PAIN: ICD-10-CM

## 2025-01-06 DIAGNOSIS — G89.29 INSOMNIA SECONDARY TO CHRONIC PAIN: ICD-10-CM

## 2025-01-06 RX ORDER — ZOLPIDEM TARTRATE 10 MG/1
10 TABLET ORAL NIGHTLY PRN
Qty: 30 TABLET | Refills: 0 | Status: SHIPPED | OUTPATIENT
Start: 2025-01-06 | End: 2025-04-06

## 2025-01-06 NOTE — TELEPHONE ENCOUNTER
Requesting medication refill. Please approve or deny this request.    Rx requested:  Requested Prescriptions     Pending Prescriptions Disp Refills    zolpidem (AMBIEN) 10 MG tablet 30 tablet 0     Sig: Take 1 tablet by mouth nightly as needed for Sleep for up to 90 days. Max Daily Amount: 10 mg         Last Office Visit:   3/27/2024      Next Visit Date:  Future Appointments   Date Time Provider Department Center   2/25/2025  3:00 PM Rayray King MD [place] [facility] Neurology -               Last refill 9/17/24. Please approve or deny.

## 2025-01-16 DIAGNOSIS — F90.0 ATTENTION DEFICIT HYPERACTIVITY DISORDER (ADHD), PREDOMINANTLY INATTENTIVE TYPE: ICD-10-CM

## 2025-01-16 RX ORDER — LISDEXAMFETAMINE DIMESYLATE 60 MG/1
60 CAPSULE ORAL DAILY
Qty: 30 CAPSULE | Refills: 0 | Status: SHIPPED | OUTPATIENT
Start: 2025-01-16 | End: 2025-02-15

## 2025-01-16 NOTE — TELEPHONE ENCOUNTER
Patient called for a refill of Vyvanse but wanted to increase to 70mg if possible.  Per Dr. King, he will have to discuss at next appt as he has not been seen since March 2024.

## 2025-01-22 RX ORDER — TRAZODONE HYDROCHLORIDE 100 MG/1
100 TABLET ORAL NIGHTLY
Qty: 30 TABLET | Refills: 0 | Status: SHIPPED | OUTPATIENT
Start: 2025-01-22

## 2025-01-22 NOTE — TELEPHONE ENCOUNTER
Requesting medication refill. Please approve or deny this request.    Rx requested:  Requested Prescriptions     Pending Prescriptions Disp Refills    traZODone (DESYREL) 100 MG tablet [Pharmacy Med Name: traZODone HCl Oral Tablet 100 MG] 30 tablet 0     Sig: Take 1 tablet by mouth nightly.         Last Office Visit:   3/27/2024      Next Visit Date:  Future Appointments   Date Time Provider Department Center   2/25/2025  3:00 PM Rayray King MD [place] [facility] -               Last refill 12/18/24. Please approve or deny.

## 2025-01-27 DIAGNOSIS — F90.0 ATTENTION DEFICIT HYPERACTIVITY DISORDER (ADHD), PREDOMINANTLY INATTENTIVE TYPE: ICD-10-CM

## 2025-01-27 RX ORDER — DEXTROAMPHETAMINE SACCHARATE, AMPHETAMINE ASPARTATE, DEXTROAMPHETAMINE SULFATE AND AMPHETAMINE SULFATE 5; 5; 5; 5 MG/1; MG/1; MG/1; MG/1
20 TABLET ORAL
Qty: 30 TABLET | Refills: 0 | Status: SHIPPED | OUTPATIENT
Start: 2025-01-27 | End: 2025-02-26

## 2025-01-27 NOTE — TELEPHONE ENCOUNTER
Requesting medication refill. Please approve or deny this request.    Rx requested:  Requested Prescriptions     Pending Prescriptions Disp Refills    amphetamine-dextroamphetamine (ADDERALL, 20MG,) 20 MG tablet 30 tablet 0     Sig: Take 1 tablet by mouth Daily with lunch for 30 days. Max Daily Amount: 20 mg         Last Office Visit:   3/27/2024      Next Visit Date:  Future Appointments   Date Time Provider Department Center   2/25/2025  3:00 PM Rayray King MD [place] [facility] Neurology -               Last refill 12/31/24. Please approve or deny.

## 2025-02-04 DIAGNOSIS — G47.01 INSOMNIA SECONDARY TO CHRONIC PAIN: ICD-10-CM

## 2025-02-04 DIAGNOSIS — G89.29 INSOMNIA SECONDARY TO CHRONIC PAIN: ICD-10-CM

## 2025-02-04 NOTE — TELEPHONE ENCOUNTER
Patient is  requesting medication refill. Please approve or deny this request.    Rx requested:  Requested Prescriptions     Pending Prescriptions Disp Refills    zolpidem (AMBIEN) 10 MG tablet 30 tablet 0     Sig: Take 1 tablet by mouth nightly as needed for Sleep for up to 90 days. Max Daily Amount: 10 mg         Last Office Visit:   3/27/2024      Next Visit Date:  Future Appointments   Date Time Provider Department Center   2/25/2025  3:00 PM Rayray King MD [place] NE[facility] Neurology -

## 2025-02-05 RX ORDER — ZOLPIDEM TARTRATE 10 MG/1
10 TABLET ORAL NIGHTLY PRN
Qty: 30 TABLET | Refills: 0 | Status: SHIPPED | OUTPATIENT
Start: 2025-02-05 | End: 2025-05-06

## 2025-02-10 DIAGNOSIS — F41.1 GAD (GENERALIZED ANXIETY DISORDER): ICD-10-CM

## 2025-02-11 DIAGNOSIS — F41.1 GAD (GENERALIZED ANXIETY DISORDER): ICD-10-CM

## 2025-02-11 RX ORDER — CLONAZEPAM 0.5 MG/1
0.5 TABLET ORAL 2 TIMES DAILY PRN
Qty: 60 TABLET | Refills: 0 | Status: SHIPPED | OUTPATIENT
Start: 2025-02-11 | End: 2025-03-13

## 2025-02-11 NOTE — TELEPHONE ENCOUNTER
Comments:     Last Office Visit (last PCP visit):   12/3/2024    Next Visit Date:  Future Appointments   Date Time Provider Department Center   2/25/2025  3:00 PM Rayray King MD [place] [facility] Neurology -       **If hasn't been seen in over a year OR hasn't followed up according to last diabetes/ADHD visit, make appointment for patient before sending refill to provider.    Rx requested:  Requested Prescriptions     Pending Prescriptions Disp Refills    clonazePAM (KLONOPIN) 0.5 MG tablet [Pharmacy Med Name: clonazePAM Oral Tablet 0.5 MG] 60 tablet 0     Sig: TAKE ONE TABLET BY MOUTH TWICE DAILY AS NEEDED FOR ANXIETY.   MAX DAILY AMOUNT: 2 TABLETS

## 2025-02-12 RX ORDER — CLONAZEPAM 0.5 MG/1
0.5 TABLET ORAL 2 TIMES DAILY PRN
Qty: 60 TABLET | Refills: 0 | OUTPATIENT
Start: 2025-02-12 | End: 2025-03-14

## 2025-02-13 ENCOUNTER — TELEPHONE (OUTPATIENT)
Dept: NEUROLOGY | Age: 35
End: 2025-02-13

## 2025-02-13 DIAGNOSIS — F90.9 ATTENTION DEFICIT HYPERACTIVITY DISORDER (ADHD), UNSPECIFIED ADHD TYPE: Primary | ICD-10-CM

## 2025-02-13 NOTE — TELEPHONE ENCOUNTER
Patient called and wanted to now can he have a increase of vyvanse he is currently taking 60mg he wants 70mg. Pt states he feels like the 60 mg is not working as it should     Please advised

## 2025-02-14 RX ORDER — LISDEXAMFETAMINE DIMESYLATE 70 MG/1
70 CAPSULE ORAL DAILY
Qty: 30 CAPSULE | Refills: 0 | Status: SHIPPED | OUTPATIENT
Start: 2025-02-14 | End: 2025-03-16

## 2025-02-17 DIAGNOSIS — F90.9 ATTENTION DEFICIT HYPERACTIVITY DISORDER (ADHD), UNSPECIFIED ADHD TYPE: ICD-10-CM

## 2025-02-17 DIAGNOSIS — F90.0 ATTENTION DEFICIT HYPERACTIVITY DISORDER (ADHD), PREDOMINANTLY INATTENTIVE TYPE: ICD-10-CM

## 2025-02-17 RX ORDER — LISDEXAMFETAMINE DIMESYLATE 60 MG/1
60 CAPSULE ORAL DAILY
Qty: 30 CAPSULE | Refills: 0 | Status: SHIPPED | OUTPATIENT
Start: 2025-02-17 | End: 2025-03-19

## 2025-02-17 NOTE — TELEPHONE ENCOUNTER
Pt states [facility] does not have the generic of the vyvanse 70mg and he does not have insurance to cover the medication. Pt requesting the 60mg be sent instead [facility] does have in stock. Please advise,.             Requesting medication refill. Please approve or deny this request.    Rx requested:  Requested Prescriptions     Pending Prescriptions Disp Refills    Lisdexamfetamine Dimesylate 60 MG CAPS 30 capsule 0     Sig: Take 60 mg by mouth daily for 30 days. Max Daily Amount: 60 mg         Last Office Visit:   3/27/2024      Next Visit Date:  Future Appointments   Date Time Provider Department Center   2/18/2025  9:30 AM Lisandro Suarez, APRN - CNP [facility]   2/25/2025  3:00 PM [facility]               Last refill 1/16/25. Please approve or deny.

## 2025-02-17 NOTE — TELEPHONE ENCOUNTER
Patient called and stated that [facility] did not have the generic brand [facility]       Patient is  requesting medication refill. Please approve or deny this request.    Rx requested:  Requested Prescriptions     Pending Prescriptions Disp Refills    lisdexamfetamine (VYVANSE) 70 MG capsule 30 capsule 0     Sig: Take 1 capsule by mouth daily for 30 days. Max Daily Amount: 70 mg         Last Office Visit:   3/27/2024      Next Visit Date:  Future Appointments   Date Time Provider Department Center   2/18/2025  9:30 AM Lisandro Suarez, YING - CNP [facility]2 [facility]   2/25/2025  3:00 PM Rayray King MD [place] NEURO Neurology -

## 2025-02-18 RX ORDER — LISDEXAMFETAMINE DIMESYLATE 70 MG/1
70 CAPSULE ORAL DAILY
Qty: 30 CAPSULE | Refills: 0 | Status: SHIPPED | OUTPATIENT
Start: 2025-02-18 | End: 2025-03-20

## 2025-02-25 ENCOUNTER — OFFICE VISIT (OUTPATIENT)
Dept: NEUROLOGY | Age: 35
End: 2025-02-25
Payer: MEDICAID

## 2025-02-25 VITALS
SYSTOLIC BLOOD PRESSURE: 126 MMHG | BODY MASS INDEX: 23.96 KG/M2 | DIASTOLIC BLOOD PRESSURE: 88 MMHG | WEIGHT: 167 LBS | HEART RATE: 119 BPM

## 2025-02-25 DIAGNOSIS — Z79.899 ENCOUNTER FOR LONG-TERM (CURRENT) USE OF MEDICATIONS: ICD-10-CM

## 2025-02-25 DIAGNOSIS — K21.9 GASTROESOPHAGEAL REFLUX DISEASE WITHOUT ESOPHAGITIS: ICD-10-CM

## 2025-02-25 DIAGNOSIS — G93.1 HYPOXIC ENCEPHALOPATHY (HCC): Primary | ICD-10-CM

## 2025-02-25 DIAGNOSIS — F90.0 ATTENTION DEFICIT HYPERACTIVITY DISORDER (ADHD), PREDOMINANTLY INATTENTIVE TYPE: ICD-10-CM

## 2025-02-25 PROCEDURE — 99214 OFFICE O/P EST MOD 30 MIN: CPT | Performed by: PSYCHIATRY & NEUROLOGY

## 2025-02-25 PROCEDURE — 3079F DIAST BP 80-89 MM HG: CPT | Performed by: PSYCHIATRY & NEUROLOGY

## 2025-02-25 PROCEDURE — 3074F SYST BP LT 130 MM HG: CPT | Performed by: PSYCHIATRY & NEUROLOGY

## 2025-02-25 RX ORDER — DEXTROAMPHETAMINE SACCHARATE, AMPHETAMINE ASPARTATE, DEXTROAMPHETAMINE SULFATE AND AMPHETAMINE SULFATE 5; 5; 5; 5 MG/1; MG/1; MG/1; MG/1
20 TABLET ORAL
Qty: 30 TABLET | Refills: 0 | Status: SHIPPED | OUTPATIENT
Start: 2025-02-25 | End: 2025-03-27

## 2025-02-25 NOTE — PROGRESS NOTES
01:24 PM    K 4.1 09/19/2023 01:24 PM    K 4.0 07/11/2022 05:14 AM    CL 97 09/19/2023 01:24 PM    CO2 25 09/19/2023 01:24 PM    BUN 14 09/19/2023 01:24 PM    CREATININE 1.02 09/19/2023 01:24 PM    GFRAA >60.0 07/11/2022 05:14 AM    LABGLOM >60.0 09/19/2023 01:24 PM    GLUCOSE 98 02/17/2023 07:40 AM    CALCIUM 9.7 09/19/2023 01:24 PM    BILITOT 0.4 09/19/2023 01:24 PM    ALKPHOS 95 09/19/2023 01:24 PM    AST 83 09/19/2023 01:24 PM     09/19/2023 01:24 PM     Lab Results   Component Value Date/Time    PROTIME 14.7 07/03/2022 04:50 AM    INR 1.2 07/03/2022 04:50 AM     Lab Results   Component Value Date/Time    TSH 0.911 09/19/2023 01:24 PM    TSH 1.150 06/30/2022 04:00 PM    IXKMOJJD37 612 05/16/2019 02:13 PM     Lab Results   Component Value Date/Time    TRIG 245 02/17/2023 07:40 AM    HDL 50.3 02/17/2023 07:40 AM     Lab Results   Component Value Date/Time    BARBSCNU Neg 11/27/2023 06:02 PM    LABBENZ Negative 03/27/2024 02:12 PM    LABMETH Neg 11/27/2023 06:02 PM    ETOH <10 06/30/2022 04:00 PM     No results found for: \"LITHIUM\", \"DILFRTOT\", \"VALPROATE\"    Assessment:       Diagnosis Orders   1. Hypoxic encephalopathy (HCC)        2. Attention deficit hyperactivity disorder (ADHD), predominantly inattentive type        3. 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  Patient actually has concentration issues and we had started him on Vyvanse in the morning and Adderall as a back patient still on Vyvanse and Adderall without side effects he is doing much better processing better in fact he quit drinking alcohol and has lost weight and exercises    Patient does not have any hyperactivity.  Patient has not been seen here for a year and we did discuss with him close follow-up otherwise we may not be able to refill his medications as these are controlled drugs    Rayray King MD, FALAVON  Diplomate, [employer]

## 2025-02-26 NOTE — TELEPHONE ENCOUNTER
Comments:     Last Office Visit (last PCP visit):   12/3/2024    Next Visit Date:  Future Appointments   Date Time Provider Department Center   8/22/2025 10:15 AM Rayray King MD [place] [facility] Neurology -       **If hasn't been seen in over a year OR hasn't followed up according to last diabetes/ADHD visit, make appointment for patient before sending refill to provider.    Rx requested:  Requested Prescriptions     Pending Prescriptions Disp Refills    omeprazole (PRILOSEC) 20 MG delayed release capsule [Pharmacy Med Name: Omeprazole Oral Capsule Delayed Release 20 MG] 90 capsule 0     Sig: Take 1 capsule by mouth every morning before breakfast.

## 2025-02-27 RX ORDER — OMEPRAZOLE 20 MG/1
20 CAPSULE, DELAYED RELEASE ORAL
Qty: 90 CAPSULE | Refills: 0 | Status: SHIPPED | OUTPATIENT
Start: 2025-02-27

## 2025-03-03 DIAGNOSIS — G47.01 INSOMNIA SECONDARY TO CHRONIC PAIN: ICD-10-CM

## 2025-03-03 DIAGNOSIS — G89.29 INSOMNIA SECONDARY TO CHRONIC PAIN: ICD-10-CM

## 2025-03-03 RX ORDER — ZOLPIDEM TARTRATE 10 MG/1
10 TABLET ORAL NIGHTLY PRN
Qty: 30 TABLET | Refills: 0 | Status: SHIPPED | OUTPATIENT
Start: 2025-03-03 | End: 2025-06-01

## 2025-03-03 NOTE — TELEPHONE ENCOUNTER
Requesting medication refill. Please approve or deny this request.    Rx requested:  Requested Prescriptions     Pending Prescriptions Disp Refills    zolpidem (AMBIEN) 10 MG tablet 30 tablet 0     Sig: Take 1 tablet by mouth nightly as needed for Sleep for up to 90 days. Max Daily Amount: 10 mg         Last Office Visit:   2/25/2025      Next Visit Date:  Future Appointments   Date Time Provider Department Center   8/22/2025 10:15 AM Rayray King MD [place] [facility] -               Last refill 2/5/25. Please approve or deny.

## 2025-03-06 ENCOUNTER — TELEPHONE (OUTPATIENT)
Dept: FAMILY MEDICINE CLINIC | Age: 35
End: 2025-03-06

## 2025-03-06 SDOH — ECONOMIC STABILITY: TRANSPORTATION INSECURITY
IN THE PAST 12 MONTHS, HAS THE LACK OF TRANSPORTATION KEPT YOU FROM MEDICAL APPOINTMENTS OR FROM GETTING MEDICATIONS?: NO

## 2025-03-06 SDOH — ECONOMIC STABILITY: FOOD INSECURITY: WITHIN THE PAST 12 MONTHS, YOU WORRIED THAT YOUR FOOD WOULD RUN OUT BEFORE YOU GOT MONEY TO BUY MORE.: NEVER TRUE

## 2025-03-06 SDOH — ECONOMIC STABILITY: INCOME INSECURITY: IN THE LAST 12 MONTHS, WAS THERE A TIME WHEN YOU WERE NOT ABLE TO PAY THE MORTGAGE OR RENT ON TIME?: NO

## 2025-03-06 SDOH — ECONOMIC STABILITY: FOOD INSECURITY: WITHIN THE PAST 12 MONTHS, THE FOOD YOU BOUGHT JUST DIDN'T LAST AND YOU DIDN'T HAVE MONEY TO GET MORE.: NEVER TRUE

## 2025-03-06 SDOH — ECONOMIC STABILITY: TRANSPORTATION INSECURITY
IN THE PAST 12 MONTHS, HAS LACK OF TRANSPORTATION KEPT YOU FROM MEETINGS, WORK, OR FROM GETTING THINGS NEEDED FOR DAILY LIVING?: NO

## 2025-03-07 ENCOUNTER — OFFICE VISIT (OUTPATIENT)
Dept: FAMILY MEDICINE CLINIC | Age: 35
End: 2025-03-07
Payer: COMMERCIAL

## 2025-03-07 VITALS
BODY MASS INDEX: 25.43 KG/M2 | HEART RATE: 67 BPM | HEIGHT: 70 IN | WEIGHT: 177.6 LBS | SYSTOLIC BLOOD PRESSURE: 124 MMHG | DIASTOLIC BLOOD PRESSURE: 76 MMHG | OXYGEN SATURATION: 100 % | TEMPERATURE: 98.2 F

## 2025-03-07 DIAGNOSIS — F13.20 BENZODIAZEPINE DEPENDENCE (HCC): Primary | ICD-10-CM

## 2025-03-07 DIAGNOSIS — G93.1 HYPOXIC ENCEPHALOPATHY (HCC): ICD-10-CM

## 2025-03-07 DIAGNOSIS — F41.1 GAD (GENERALIZED ANXIETY DISORDER): ICD-10-CM

## 2025-03-07 DIAGNOSIS — G93.6 CEREBRAL EDEMA (HCC): ICD-10-CM

## 2025-03-07 DIAGNOSIS — F19.11 DRUG ABUSE IN REMISSION (HCC): ICD-10-CM

## 2025-03-07 PROCEDURE — 3078F DIAST BP <80 MM HG: CPT | Performed by: NURSE PRACTITIONER

## 2025-03-07 PROCEDURE — 99214 OFFICE O/P EST MOD 30 MIN: CPT | Performed by: NURSE PRACTITIONER

## 2025-03-07 PROCEDURE — 3074F SYST BP LT 130 MM HG: CPT | Performed by: NURSE PRACTITIONER

## 2025-03-07 PROCEDURE — G8417 CALC BMI ABV UP PARAM F/U: HCPCS | Performed by: NURSE PRACTITIONER

## 2025-03-07 PROCEDURE — G8427 DOCREV CUR MEDS BY ELIG CLIN: HCPCS | Performed by: NURSE PRACTITIONER

## 2025-03-07 PROCEDURE — 1036F TOBACCO NON-USER: CPT | Performed by: NURSE PRACTITIONER

## 2025-03-07 RX ORDER — CLONAZEPAM 1 MG/1
1 TABLET ORAL 2 TIMES DAILY PRN
Qty: 30 TABLET | Refills: 2 | Status: SHIPPED | OUTPATIENT
Start: 2025-03-07 | End: 2025-06-05

## 2025-03-07 ASSESSMENT — PATIENT HEALTH QUESTIONNAIRE - PHQ9
SUM OF ALL RESPONSES TO PHQ QUESTIONS 1-9: 4
SUM OF ALL RESPONSES TO PHQ QUESTIONS 1-9: 4
10. IF YOU CHECKED OFF ANY PROBLEMS, HOW DIFFICULT HAVE THESE PROBLEMS MADE IT FOR YOU TO DO YOUR WORK, TAKE CARE OF THINGS AT HOME, OR GET ALONG WITH OTHER PEOPLE: NOT DIFFICULT AT ALL
SUM OF ALL RESPONSES TO PHQ QUESTIONS 1-9: 4
4. FEELING TIRED OR HAVING LITTLE ENERGY: SEVERAL DAYS
5. POOR APPETITE OR OVEREATING: NOT AT ALL
7. TROUBLE CONCENTRATING ON THINGS, SUCH AS READING THE NEWSPAPER OR WATCHING TELEVISION: SEVERAL DAYS
6. FEELING BAD ABOUT YOURSELF - OR THAT YOU ARE A FAILURE OR HAVE LET YOURSELF OR YOUR FAMILY DOWN: NOT AT ALL
3. TROUBLE FALLING OR STAYING ASLEEP: NOT AT ALL
1. LITTLE INTEREST OR PLEASURE IN DOING THINGS: NOT AT ALL
2. FEELING DOWN, DEPRESSED OR HOPELESS: SEVERAL DAYS
8. MOVING OR SPEAKING SO SLOWLY THAT OTHER PEOPLE COULD HAVE NOTICED. OR THE OPPOSITE, BEING SO FIGETY OR RESTLESS THAT YOU HAVE BEEN MOVING AROUND A LOT MORE THAN USUAL: SEVERAL DAYS
SUM OF ALL RESPONSES TO PHQ QUESTIONS 1-9: 4
9. THOUGHTS THAT YOU WOULD BE BETTER OFF DEAD, OR OF HURTING YOURSELF: NOT AT ALL

## 2025-03-07 ASSESSMENT — ENCOUNTER SYMPTOMS
CHEST TIGHTNESS: 0
DIARRHEA: 0
NAUSEA: 0
ABDOMINAL PAIN: 0
VOMITING: 0
CONSTIPATION: 0
SHORTNESS OF BREATH: 0

## 2025-03-07 NOTE — PROGRESS NOTES
Date of Visit:  3/7/2025  Patient Name: Michael Montes   Patient :  1990     CHIEF COMPLAINT:     Michael Montes is a 34 y.o. male who presents today for an general visit to be evaluated for the following condition(s):  Chief Complaint   Patient presents with    Discuss Medications     Pt would like to discuss increasing Klonopin medication. No other concerns today. Anxiety has been worse recently.        HISTORY OF PRESENT ILLNESS     I reviewed staff HPI/chief complaint and do agree with above    Subjective:  - Presents today for concerns of increased anxiety recently.  - Continues Lexapro 20 mg  - Has been on current Klonopin dose for a while which has been effective up until recently  - Works night shift, reports sleeping longer but no significant trouble sleeping beyond what is expected with night shift work  - Increased Vyvanse to 70 mg recently with Dr. King, reports it is working better  - Takes Ambien which continues to be effective at bedtime  - Stopped taking trazodone at bedtime  - Recently stopped taking verapamil and hydrochlorothiazide for hypertension and generalized edema.  Blood pressure continues to be stable off medications and no reports of edema per patient            REVIEW OF SYSTEM      Review of Systems   Constitutional:  Negative for activity change, appetite change, diaphoresis, fatigue and fever.   Respiratory:  Negative for chest tightness and shortness of breath.    Cardiovascular:  Negative for chest pain and palpitations.   Gastrointestinal:  Negative for abdominal pain, constipation, diarrhea, nausea and vomiting.   Musculoskeletal:  Negative for neck pain and neck stiffness.   Neurological:  Negative for dizziness, weakness, light-headedness and headaches.   Psychiatric/Behavioral:  Negative for agitation, dysphoric mood, hallucinations, self-injury, sleep disturbance and suicidal ideas. The patient is nervous/anxious.        REVIEWED INFORMATION

## 2025-03-16 ENCOUNTER — APPOINTMENT (OUTPATIENT)
Dept: RADIOLOGY | Facility: HOSPITAL | Age: 35
End: 2025-03-16
Payer: COMMERCIAL

## 2025-03-16 ENCOUNTER — HOSPITAL ENCOUNTER (EMERGENCY)
Facility: HOSPITAL | Age: 35
Discharge: HOME | End: 2025-03-17
Payer: COMMERCIAL

## 2025-03-16 DIAGNOSIS — S68.119A FINGERTIP AMPUTATION, INITIAL ENCOUNTER: Primary | ICD-10-CM

## 2025-03-16 PROCEDURE — 2500000005 HC RX 250 GENERAL PHARMACY W/O HCPCS: Performed by: PHYSICIAN ASSISTANT

## 2025-03-16 PROCEDURE — 96365 THER/PROPH/DIAG IV INF INIT: CPT

## 2025-03-16 PROCEDURE — 96375 TX/PRO/DX INJ NEW DRUG ADDON: CPT

## 2025-03-16 PROCEDURE — 2500000004 HC RX 250 GENERAL PHARMACY W/ HCPCS (ALT 636 FOR OP/ED): Performed by: PHYSICIAN ASSISTANT

## 2025-03-16 PROCEDURE — 90471 IMMUNIZATION ADMIN: CPT | Performed by: PHYSICIAN ASSISTANT

## 2025-03-16 PROCEDURE — 73130 X-RAY EXAM OF HAND: CPT | Mod: RT

## 2025-03-16 PROCEDURE — 73130 X-RAY EXAM OF HAND: CPT | Mod: RIGHT SIDE | Performed by: RADIOLOGY

## 2025-03-16 PROCEDURE — 99284 EMERGENCY DEPT VISIT MOD MDM: CPT | Mod: 25

## 2025-03-16 PROCEDURE — 90715 TDAP VACCINE 7 YRS/> IM: CPT | Performed by: PHYSICIAN ASSISTANT

## 2025-03-16 RX ORDER — IBUPROFEN 600 MG/1
600 TABLET ORAL EVERY 6 HOURS PRN
Qty: 28 TABLET | Refills: 0 | Status: SHIPPED | OUTPATIENT
Start: 2025-03-16 | End: 2025-03-23

## 2025-03-16 RX ORDER — BACITRACIN ZINC 500 UNIT/G
OINTMENT IN PACKET (EA) TOPICAL ONCE
Status: COMPLETED | OUTPATIENT
Start: 2025-03-16 | End: 2025-03-16

## 2025-03-16 RX ORDER — NALOXONE HYDROCHLORIDE 4 MG/.1ML
4 SPRAY NASAL AS NEEDED
Qty: 2 EACH | Refills: 0 | Status: SHIPPED | OUTPATIENT
Start: 2025-03-16

## 2025-03-16 RX ORDER — ONDANSETRON HYDROCHLORIDE 2 MG/ML
4 INJECTION, SOLUTION INTRAVENOUS ONCE
Status: COMPLETED | OUTPATIENT
Start: 2025-03-16 | End: 2025-03-16

## 2025-03-16 RX ORDER — LIDOCAINE HYDROCHLORIDE 10 MG/ML
10 INJECTION, SOLUTION INFILTRATION; PERINEURAL ONCE
Status: DISCONTINUED | OUTPATIENT
Start: 2025-03-16 | End: 2025-03-17 | Stop reason: HOSPADM

## 2025-03-16 RX ORDER — KETOROLAC TROMETHAMINE 30 MG/ML
15 INJECTION, SOLUTION INTRAMUSCULAR; INTRAVENOUS ONCE
Status: COMPLETED | OUTPATIENT
Start: 2025-03-16 | End: 2025-03-16

## 2025-03-16 RX ORDER — CEPHALEXIN 500 MG/1
500 CAPSULE ORAL 2 TIMES DAILY
Qty: 14 CAPSULE | Refills: 0 | Status: SHIPPED | OUTPATIENT
Start: 2025-03-16 | End: 2025-03-23

## 2025-03-16 RX ORDER — FENTANYL CITRATE 50 UG/ML
50 INJECTION, SOLUTION INTRAMUSCULAR; INTRAVENOUS ONCE
Status: COMPLETED | OUTPATIENT
Start: 2025-03-16 | End: 2025-03-16

## 2025-03-16 RX ORDER — HYDROCODONE BITARTRATE AND ACETAMINOPHEN 5; 325 MG/1; MG/1
1 TABLET ORAL EVERY 6 HOURS PRN
Qty: 9 TABLET | Refills: 0 | Status: SHIPPED | OUTPATIENT
Start: 2025-03-16 | End: 2025-03-19

## 2025-03-16 RX ORDER — CEFAZOLIN SODIUM 1 G/50ML
1 SOLUTION INTRAVENOUS ONCE
Status: COMPLETED | OUTPATIENT
Start: 2025-03-16 | End: 2025-03-16

## 2025-03-16 RX ADMIN — FENTANYL CITRATE 50 MCG: 50 INJECTION INTRAMUSCULAR; INTRAVENOUS at 22:17

## 2025-03-16 RX ADMIN — BACITRACIN 1 APPLICATION: 500 OINTMENT TOPICAL at 22:18

## 2025-03-16 RX ADMIN — TETANUS TOXOID, REDUCED DIPHTHERIA TOXOID AND ACELLULAR PERTUSSIS VACCINE, ADSORBED 0.5 ML: 5; 2.5; 8; 8; 2.5 SUSPENSION INTRAMUSCULAR at 21:40

## 2025-03-16 RX ADMIN — CEFAZOLIN SODIUM 1 G: 1 SOLUTION INTRAVENOUS at 21:39

## 2025-03-16 RX ADMIN — KETOROLAC TROMETHAMINE 15 MG: 30 INJECTION, SOLUTION INTRAMUSCULAR at 22:17

## 2025-03-16 RX ADMIN — ONDANSETRON 4 MG: 2 INJECTION INTRAMUSCULAR; INTRAVENOUS at 22:18

## 2025-03-16 ASSESSMENT — LIFESTYLE VARIABLES
TOTAL SCORE: 0
EVER HAD A DRINK FIRST THING IN THE MORNING TO STEADY YOUR NERVES TO GET RID OF A HANGOVER: NO
HAVE YOU EVER FELT YOU SHOULD CUT DOWN ON YOUR DRINKING: NO
HAVE PEOPLE ANNOYED YOU BY CRITICIZING YOUR DRINKING: NO
EVER FELT BAD OR GUILTY ABOUT YOUR DRINKING: NO

## 2025-03-16 ASSESSMENT — COLUMBIA-SUICIDE SEVERITY RATING SCALE - C-SSRS
6. HAVE YOU EVER DONE ANYTHING, STARTED TO DO ANYTHING, OR PREPARED TO DO ANYTHING TO END YOUR LIFE?: NO
1. IN THE PAST MONTH, HAVE YOU WISHED YOU WERE DEAD OR WISHED YOU COULD GO TO SLEEP AND NOT WAKE UP?: NO
2. HAVE YOU ACTUALLY HAD ANY THOUGHTS OF KILLING YOURSELF?: NO

## 2025-03-16 ASSESSMENT — PAIN DESCRIPTION - DESCRIPTORS: DESCRIPTORS: SHOOTING;THROBBING

## 2025-03-16 ASSESSMENT — PAIN DESCRIPTION - PAIN TYPE: TYPE: ACUTE PAIN

## 2025-03-16 ASSESSMENT — PAIN DESCRIPTION - LOCATION: LOCATION: FINGER (COMMENT WHICH ONE)

## 2025-03-16 ASSESSMENT — PAIN - FUNCTIONAL ASSESSMENT: PAIN_FUNCTIONAL_ASSESSMENT: 0-10

## 2025-03-16 ASSESSMENT — PAIN SCALES - GENERAL: PAINLEVEL_OUTOF10: 7

## 2025-03-16 ASSESSMENT — PAIN DESCRIPTION - ORIENTATION: ORIENTATION: RIGHT

## 2025-03-16 NOTE — Clinical Note
Maikel Silva was seen and treated in our emergency department on 3/16/2025.  He may return to work on 03/19/2025.       If you have any questions or concerns, please don't hesitate to call.      Juan De Anda PA-C

## 2025-03-17 ENCOUNTER — TELEPHONE (OUTPATIENT)
Dept: ORTHOPEDIC SURGERY | Facility: CLINIC | Age: 35
End: 2025-03-17
Payer: COMMERCIAL

## 2025-03-17 ENCOUNTER — OFFICE VISIT (OUTPATIENT)
Dept: ORTHOPEDIC SURGERY | Facility: CLINIC | Age: 35
End: 2025-03-17
Payer: COMMERCIAL

## 2025-03-17 ENCOUNTER — TELEPHONE (OUTPATIENT)
Dept: NEUROLOGY | Age: 35
End: 2025-03-17

## 2025-03-17 VITALS
OXYGEN SATURATION: 99 % | BODY MASS INDEX: 24.34 KG/M2 | DIASTOLIC BLOOD PRESSURE: 75 MMHG | SYSTOLIC BLOOD PRESSURE: 126 MMHG | HEIGHT: 70 IN | TEMPERATURE: 97.7 F | WEIGHT: 170 LBS | RESPIRATION RATE: 16 BRPM | HEART RATE: 75 BPM

## 2025-03-17 DIAGNOSIS — F90.9 ATTENTION DEFICIT HYPERACTIVITY DISORDER (ADHD), UNSPECIFIED ADHD TYPE: ICD-10-CM

## 2025-03-17 DIAGNOSIS — G93.40 ACUTE ENCEPHALOPATHY: Primary | ICD-10-CM

## 2025-03-17 DIAGNOSIS — S68.119A FINGERTIP AMPUTATION, INITIAL ENCOUNTER: Primary | ICD-10-CM

## 2025-03-17 PROCEDURE — 99204 OFFICE O/P NEW MOD 45 MIN: CPT | Performed by: STUDENT IN AN ORGANIZED HEALTH CARE EDUCATION/TRAINING PROGRAM

## 2025-03-17 PROCEDURE — 99214 OFFICE O/P EST MOD 30 MIN: CPT | Mod: 57 | Performed by: STUDENT IN AN ORGANIZED HEALTH CARE EDUCATION/TRAINING PROGRAM

## 2025-03-17 RX ORDER — SULFAMETHOXAZOLE AND TRIMETHOPRIM 800; 160 MG/1; MG/1
1 TABLET ORAL 2 TIMES DAILY
Qty: 20 TABLET | Refills: 0 | Status: SHIPPED | OUTPATIENT
Start: 2025-03-17

## 2025-03-17 RX ORDER — TRAZODONE HYDROCHLORIDE 100 MG/1
100 TABLET ORAL NIGHTLY
Qty: 30 TABLET | Refills: 0 | OUTPATIENT
Start: 2025-03-17

## 2025-03-17 RX ORDER — OXYCODONE AND ACETAMINOPHEN 5; 325 MG/1; MG/1
1 TABLET ORAL EVERY 6 HOURS PRN
Qty: 5 TABLET | Refills: 0 | Status: SHIPPED | OUTPATIENT
Start: 2025-03-17 | End: 2025-03-24

## 2025-03-17 NOTE — TELEPHONE ENCOUNTER
Pharmacist called wanting to verify if they should fill the percocet sent in by EO because norco was sent last night by a different provider so they need to know which one to give. The pharmacy was notified to fill and give the percocet.

## 2025-03-17 NOTE — ED PROVIDER NOTES
HPI   Chief Complaint   Patient presents with    Hand Injury     Patient cut off the tip of is finger while working on a [employer] machine.        A 34-year-old male patient comes in the emergency department today with complaints of distal fingertip amputation as he works as a .  He is not sure when his last tetanus shot was.  Rates pain currently 6 out of 10 on the pain scale.  Described as sharp and throbbing.  Patient brought to the of the remaining tip of his finger with them on ice.  He otherwise has no other complaints present time for this purpose comes in the emergency department today for the evaluation.              Patient History   Past Medical History:   Diagnosis Date    Anxiety disorder, unspecified 12/12/2022    Anxiety    Contact with and (suspected) exposure to covid-19 12/02/2020    Suspected 2019 novel coronavirus infection    Encounter for immunization     Encounter for immunization    Other forms of stomatitis 11/09/2019    Oral ulcer    Other retention of urine 05/24/2021    Acute urinary retention    Other seasonal allergic rhinitis 05/30/2022    Seasonal allergies    Personal history of other diseases of the nervous system and sense organs 03/28/2022    History of conjunctivitis    Personal history of other diseases of the respiratory system 04/26/2022    History of bronchitis    Personal history of other mental and behavioral disorders 12/02/2020    History of depression    Personal history of other mental and behavioral disorders     History of attention deficit hyperactivity disorder (ADHD)    Personal history of other specified conditions 08/23/2021    History of abdominal pain    Personal history of other specified conditions 11/09/2019    History of diarrhea    Persons encountering health services in other specified circumstances 10/13/2016    Encounter to establish care     Past Surgical History:   Procedure Laterality Date    ADENOIDECTOMY  10/13/2016    Adenoidectomy     MOUTH SURGERY  10/13/2016    Oral Surgery Tooth Extraction    TONSILLECTOMY  10/13/2016    Tonsillectomy     No family history on file.  Social History     Tobacco Use    Smoking status: Never    Smokeless tobacco: Never   Substance Use Topics    Alcohol use: Not on file    Drug use: Not on file       Physical Exam   ED Triage Vitals [03/16/25 2102]   Temperature Heart Rate Respirations BP   35.6 °C (96 °F) 94 16 135/72      Pulse Ox Temp Source Heart Rate Source Patient Position   97 % Temporal Monitor Sitting      BP Location FiO2 (%)     Left arm --       Physical Exam  Constitutional:       General: He is in acute distress.      Appearance: Normal appearance. He is not ill-appearing or diaphoretic.   HENT:      Head: Normocephalic and atraumatic.      Nose: Nose normal.   Eyes:      Extraocular Movements: Extraocular movements intact.      Conjunctiva/sclera: Conjunctivae normal.      Pupils: Pupils are equal, round, and reactive to light.   Cardiovascular:      Rate and Rhythm: Normal rate and regular rhythm.   Pulmonary:      Effort: Pulmonary effort is normal. No respiratory distress.      Breath sounds: Normal breath sounds. No stridor. No wheezing.   Musculoskeletal:         General: Normal range of motion.      Cervical back: Normal range of motion.      Comments: Patient has complete amputation of the right distal tip of the third digit.  It is cut off clean in a vertical fashion in between the DIP joint and the nail.   Skin:     General: Skin is warm and dry.   Neurological:      General: No focal deficit present.      Mental Status: He is alert and oriented to person, place, and time. Mental status is at baseline.   Psychiatric:         Mood and Affect: Mood normal.           ED Course & MDM   Diagnoses as of 03/16/25 2153   Fingertip amputation, initial encounter                 No data recorded     [place] Coma Scale Score: 15 (03/16/25 2130 : Sara Pérez RN)                            Medical Decision Making  A 34-year-old male patient comes in the emergency department today with complaints of distal fingertip amputation as he works as a .  He is not sure when his last tetanus shot was.  Rates pain currently 6 out of 10 on the pain scale.  Described as sharp and throbbing.  Patient brought to the of the remaining tip of his finger with them on ice.  He otherwise has no other complaints present time for this purpose comes in the emergency department today for the evaluation.    X-ray of the right hand ordered to check for bony involvement, IV Ancef given, tetanus shot ordered and lidocaine to perform a digital block    I called and discussed the case with orthopedic hand surgeon Dr. Schaefer who states that unfortunately the fingertip cannot be anastomosed as the vasculature there is too small to put back together.  He recommends applying a significant amount of bacitracin to the distal tip put patient on p.o. Keflex and bandaged wound with bulky dressing.  Can also put a splint for comfort.  Have patient follow-up within a week    Patient declines a digital block.  Ordered IV fentanyl Toradol Zofran through the IV for discomfort as well as bacitracin to be applied on the fingertip after it is cleansed.  Will discharge patient with close follow-up.  Patient agrees with this plan expressed verbal understanding.  Questions were answered.    Historian is the patient    Diagnosis: Fingertip amputation    Labs Reviewed - No data to display     XR hand right 3+ views    (Results Pending)         Procedure  Procedures     Juan De Anda PA-C  03/16/25 [id number]

## 2025-03-17 NOTE — TELEPHONE ENCOUNTER
Patients mother (Ramila [phone]) called in she is concerned with Timothys behavior. She believes he is being reckless and is requesting a MRI. She believes something is wrong and he needs to be seen.

## 2025-03-17 NOTE — TELEPHONE ENCOUNTER
requesting medication refill. Please approve or deny this request.    Rx requested:  Requested Prescriptions     Pending Prescriptions Disp Refills    lisdexamfetamine (VYVANSE) 70 MG capsule 30 capsule 0     Sig: Take 1 capsule by mouth daily for 30 days. Max Daily Amount: 70 mg         Last Office Visit:   2/25/2025      Next Visit Date:  Future Appointments   Date Time Provider Department Center   6/9/2025  7:30 AM Lisandro Suarez, YING - CNP [facility]2 [facility]   8/22/2025 10:15 [facility]

## 2025-03-17 NOTE — PROGRESS NOTES
History of Present Illness:  Right-hand-dominant male who presents for evaluation of partial long finger amputation.  This occurred when he got caught in a press at work.  Was seen in the emergency room yesterday where tetanus was updated.  He was started on Keflex.  Denies other injuries.  Endorsing pain to the right long finger.    Review of Systems   GENERAL: Negative for malaise, significant weight loss, fever  MUSCULOSKELETAL: see HPI  NEURO:  Negative    The patient's past medical history, family history, social history, and review of systems were reviewed. History is otherwise negative except as stated in the HPI.    Physical Examination:  General: Alert and oriented to person, place, and time.  No acute distress and breathing comfortably: Pleasant and cooperative with examination.  HEENT: Head is normocephalic and atraumatic.  Neck: Supple, no visible swelling.  Cardiovascular: No palpable tachycardia  Lungs: No audible wheezing or labored breathing  Abdomen: Nondistended.    On musculoskeletal examination,   Right long finger with distal phalanx amputation.  This is present to the base of the nailbed.  Bone exposed.  Able to flex at the PIP DIP.  Additionally able to extend at the DIP.  Remainder of the hand without injuries.  Able to make a composite fist.  Able to fully extend the major digits.  Sensation intact to remainder of the hand.     Imaging:  Radiographic notes: Right hand x-rays with partial distal phalanx amputation of the long finger.    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  [facility] injury.  Discussed at length with patient.  Recommend irrigation debridement and revision amputation in order to cover exposed bone and reduce chance of infection.  Goal of surgery is a durable sensate fingertip.  He is on Keflex.  I have called in Bactrim along with Percocet for pain.    Plan:  Right long finger revision amputation Surgery.   The benefit of surgery  would be to stop the progression of symptoms, prevent deformity, with the hope that the symptoms would also resolve. The risks of surgery include, but are not limited to, infection, bleeding/hematoma, nerve/vessel injury, wound dehisence, persistent symptoms, and anesthetic complications. The patient understood the risks/benefits and consented to surgery. I will schedule them in the near future. I have answered all questions regarding the surgery itself and the post-operative course.      Uzma Aleman MD

## 2025-03-18 ENCOUNTER — TELEPHONE (OUTPATIENT)
Dept: NEUROLOGY | Age: 35
End: 2025-03-18

## 2025-03-18 PROCEDURE — 26951 AMPUTATION OF FINGER/THUMB: CPT | Performed by: STUDENT IN AN ORGANIZED HEALTH CARE EDUCATION/TRAINING PROGRAM

## 2025-03-18 RX ORDER — LISDEXAMFETAMINE DIMESYLATE 70 MG/1
70 CAPSULE ORAL DAILY
Qty: 30 CAPSULE | Refills: 0 | Status: SHIPPED | OUTPATIENT
Start: 2025-03-18 | End: 2025-04-08

## 2025-03-18 NOTE — TELEPHONE ENCOUNTER
Mother called back and stated that son is not happy about being taken off of adderall and he doesn't want to discontinue  please advised

## 2025-03-20 DIAGNOSIS — F41.1 GAD (GENERALIZED ANXIETY DISORDER): ICD-10-CM

## 2025-03-21 ENCOUNTER — HOSPITAL ENCOUNTER (OUTPATIENT)
Dept: CARDIOLOGY | Facility: HOSPITAL | Age: 35
Discharge: HOME | End: 2025-03-21
Payer: COMMERCIAL

## 2025-03-21 DIAGNOSIS — F90.0 ATTENTION DEFICIT HYPERACTIVITY DISORDER (ADHD), PREDOMINANTLY INATTENTIVE TYPE: ICD-10-CM

## 2025-03-21 PROCEDURE — 93005 ELECTROCARDIOGRAM TRACING: CPT

## 2025-03-21 RX ORDER — CLONAZEPAM 1 MG/1
1 TABLET ORAL 2 TIMES DAILY PRN
Qty: 60 TABLET | Refills: 2 | Status: SHIPPED | OUTPATIENT
Start: 2025-03-21 | End: 2025-06-19

## 2025-03-21 RX ORDER — DEXTROAMPHETAMINE SACCHARATE, AMPHETAMINE ASPARTATE, DEXTROAMPHETAMINE SULFATE AND AMPHETAMINE SULFATE 5; 5; 5; 5 MG/1; MG/1; MG/1; MG/1
20 TABLET ORAL
Qty: 30 TABLET | Refills: 0 | Status: SHIPPED | OUTPATIENT
Start: 2025-03-21 | End: 2025-04-20

## 2025-03-21 NOTE — TELEPHONE ENCOUNTER
Pt asking for a 30 day supply and that the medication get refilled before the weekend or he will be out.

## 2025-03-21 NOTE — TELEPHONE ENCOUNTER
Requesting medication refill. Please approve or deny this request.    Rx requested:  Requested Prescriptions     Pending Prescriptions Disp Refills    amphetamine-dextroamphetamine (ADDERALL, 20MG,) 20 MG tablet 30 tablet 0     Sig: Take 1 tablet by mouth Daily with lunch for 30 days. Max Daily Amount: 20 mg         Last Office Visit:   2/25/2025      Next Visit Date:  Future Appointments   Date Time Provider Department Center   4/4/2025  2:00 PM [facility] ROOM 1 [facility]   6/9/2025  7:30 AM Lisandro Suarez, APRN - CNP [facility] PC2 [facility] DEP   8/22/2025 10:15 [facility]               Last refill 2/25/25. Please approve or deny.

## 2025-03-21 NOTE — TELEPHONE ENCOUNTER
Comments:     Last Office Visit (last PCP visit):   3/7/2025    Next Visit Date:  Future Appointments   Date Time Provider Department Center   4/4/2025  2:00 PM [facility] ROOM 1 [facility]   6/9/2025  7:30 AM Lisandro Suarez, YING - CNP [facility]2 [facility]   8/22/2025 10:15 AM Rayray King MD [place] [facility] Neurology -       **If hasn't been seen in over a year OR hasn't followed up according to last diabetes/ADHD visit, make appointment for patient before sending refill to provider.    Rx requested:  Requested Prescriptions     Pending Prescriptions Disp Refills    clonazePAM (KLONOPIN) 1 MG tablet 30 tablet 2     Sig: Take 1 tablet by mouth 2 times daily as needed for Anxiety for up to 90 days. Max Daily Amount: 2 mg

## 2025-03-22 LAB
ATRIAL RATE: 104 BPM
Q ONSET: 196 MS
QRS COUNT: 16 BEATS
QRS DURATION: 176 MS
QT INTERVAL: 464 MS
QTC CALCULATION(BAZETT): 576 MS
QTC FREDERICIA: 537 MS
R AXIS: 253 DEGREES
T AXIS: 37 DEGREES
T OFFSET: 428 MS
VENTRICULAR RATE: 93 BPM

## 2025-03-24 ENCOUNTER — OFFICE VISIT (OUTPATIENT)
Dept: ORTHOPEDIC SURGERY | Facility: CLINIC | Age: 35
End: 2025-03-24
Payer: COMMERCIAL

## 2025-03-24 DIAGNOSIS — S68.119A FINGERTIP AMPUTATION, INITIAL ENCOUNTER: Primary | ICD-10-CM

## 2025-03-24 PROCEDURE — 99211 OFF/OP EST MAY X REQ PHY/QHP: CPT | Performed by: STUDENT IN AN ORGANIZED HEALTH CARE EDUCATION/TRAINING PROGRAM

## 2025-03-24 NOTE — PROGRESS NOTES
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 Denies numbness or tingling.     Physical Examination:  The patient appears to be their stated age, is in no apparent distress, and is oriented x3. The patients mood and affect are appropriate. The patients gait is normal. The examination of the limb in question was performed in comparison to the contralateral limb.    Dressing removed  Incision c/d/I  granulation tissue overlying amputation site without erythema warmth or signs of infection.   AIN, PIN, ulnar intact  SILT A/R/U/M  Hand wwp      Assessment:  1 weeks post op    Plan:   Weight bearing status: NWB RUE  Immobilization: maintain soft dressing. Initiate daily dressing changes  Encouraged digit ROM  Pain controlled on anti-inflammatories  Follow up 1 week for clinical evaluation     Uzma Bejarano MD

## 2025-03-31 ENCOUNTER — APPOINTMENT (OUTPATIENT)
Dept: ORTHOPEDIC SURGERY | Facility: CLINIC | Age: 35
End: 2025-03-31
Payer: COMMERCIAL

## 2025-03-31 ENCOUNTER — OFFICE VISIT (OUTPATIENT)
Dept: ORTHOPEDIC SURGERY | Facility: CLINIC | Age: 35
End: 2025-03-31
Payer: COMMERCIAL

## 2025-03-31 DIAGNOSIS — S68.119A FINGERTIP AMPUTATION, INITIAL ENCOUNTER: Primary | ICD-10-CM

## 2025-03-31 PROCEDURE — 99211 OFF/OP EST MAY X REQ PHY/QHP: CPT | Performed by: STUDENT IN AN ORGANIZED HEALTH CARE EDUCATION/TRAINING PROGRAM

## 2025-03-31 PROCEDURE — 1036F TOBACCO NON-USER: CPT | Performed by: STUDENT IN AN ORGANIZED HEALTH CARE EDUCATION/TRAINING PROGRAM

## 2025-03-31 PROCEDURE — 99024 POSTOP FOLLOW-UP VISIT: CPT | Performed by: STUDENT IN AN ORGANIZED HEALTH CARE EDUCATION/TRAINING PROGRAM

## 2025-03-31 NOTE — PROGRESS NOTES
S/p right long revision amputation on 3/18/2025. Doing well. Denies numbness or tingling.     Physical Examination:  The patient appears to be their stated age, is in no apparent distress, and is oriented x3. The patients mood and affect are appropriate. The patients gait is normal. The examination of the limb in question was performed in comparison to the contralateral limb.    Incision well healing, granulation tissue at base of amputation without erythema, warmth, signs of infection  AIN, PIN, ulnar intact  SILT A/R/U/M  Hand wwp    Radiographs  None    Assessment:  2 weeks post op    Plan:   Weight bearing status: Progressed to weightbearing as tolerated  RTW tomorrow- note provided  Immobilization: None  Encouraged digit ROM  Pain controlled on anti-inflammatories  Follow up 3 weeks with XR R long finger     Uzma Bejarano MD

## 2025-03-31 NOTE — LETTER
March 31, 2025     Patient: Maikel Silva Jr.   YOB: 1990   Date of Visit: 3/31/2025       To Whom It May Concern:    It is my medical opinion that Maikel Silva may return to full duty immediately with no restrictions, but must keep finger covered. Return to work on Wednesday, 4/2/2025    If you have any questions or concerns, please don't hesitate to call.         Sincerely,        Uzma Aleman MD    CC: No Recipients

## 2025-04-02 ENCOUNTER — TELEPHONE (OUTPATIENT)
Dept: ORTHOPEDIC SURGERY | Facility: CLINIC | Age: 35
End: 2025-04-02
Payer: COMMERCIAL

## 2025-04-02 NOTE — TELEPHONE ENCOUNTER
Pt called and needed an extension for return to work note, did not notice date to return. Created an updated note per request of patient to return on April 6, 2025.

## 2025-04-04 ENCOUNTER — HOSPITAL ENCOUNTER (OUTPATIENT)
Dept: MRI IMAGING | Age: 35
Discharge: HOME | End: 2025-04-04
Attending: PSYCHIATRY & NEUROLOGY
Payer: COMMERCIAL

## 2025-04-04 DIAGNOSIS — G93.40 ACUTE ENCEPHALOPATHY: ICD-10-CM

## 2025-04-04 PROCEDURE — 70551 MRI BRAIN STEM W/O DYE: CPT

## 2025-04-07 ENCOUNTER — TELEPHONE (OUTPATIENT)
Age: 35
End: 2025-04-07

## 2025-04-07 NOTE — TELEPHONE ENCOUNTER
AIDAN Montes SR. Called and is concerned about his son taking Vyvanse and Adderall, he says that Michael RADER is \"bouncing off the walls\"     Please advise.

## 2025-04-09 ENCOUNTER — TELEPHONE (OUTPATIENT)
Age: 35
End: 2025-04-09

## 2025-04-14 ENCOUNTER — TELEPHONE (OUTPATIENT)
Age: 35
End: 2025-04-14

## 2025-04-14 ENCOUNTER — TELEPHONE (OUTPATIENT)
Dept: FAMILY MEDICINE CLINIC | Age: 35
End: 2025-04-14

## 2025-04-14 DIAGNOSIS — F90.0 ATTENTION DEFICIT HYPERACTIVITY DISORDER (ADHD), PREDOMINANTLY INATTENTIVE TYPE: ICD-10-CM

## 2025-04-14 NOTE — TELEPHONE ENCOUNTER
Pt asking for Lisandro to fill the vyvanse and the adderall that his neurologist prescribes.  But he doesn't see the neurologist but once or twice a year.  He's wondering if you would start filling these for him over his neurologist.  Claims he doesn't see the neurologist anymore, pt states the neurologist told him it was ok for a pcp to fill his meds.  Pt states that he needs the vyvanse refilled now, the adderall he has about 15 more days on it. If you have any questions, feel free to call the pt and discuss.    Ph [phone]

## 2025-04-14 NOTE — TELEPHONE ENCOUNTER
Called no VM set up     It looks like a message was sent to  today also     Patient called for a refill for vyvanse and was told we had to D/C it because family called twice about his behavior and he stated that his family shouldn't be able to call and have any input on his medication because he is a grown man and vyvanse is helping his its keeps him calm. He was going through some depression but it was about hurting his finger at work. He also states you can take him off the adderall if that the case because the vyvanse is working         Please advised

## 2025-04-16 DIAGNOSIS — F90.9 ATTENTION DEFICIT HYPERACTIVITY DISORDER (ADHD), UNSPECIFIED ADHD TYPE: Primary | ICD-10-CM

## 2025-04-16 DIAGNOSIS — F90.9 ATTENTION DEFICIT HYPERACTIVITY DISORDER (ADHD), UNSPECIFIED ADHD TYPE: ICD-10-CM

## 2025-04-16 DIAGNOSIS — F41.1 GAD (GENERALIZED ANXIETY DISORDER): ICD-10-CM

## 2025-04-16 DIAGNOSIS — F90.0 ATTENTION DEFICIT HYPERACTIVITY DISORDER (ADHD), PREDOMINANTLY INATTENTIVE TYPE: ICD-10-CM

## 2025-04-16 RX ORDER — LISDEXAMFETAMINE DIMESYLATE 70 MG/1
70 CAPSULE ORAL EVERY MORNING
Qty: 30 CAPSULE | Refills: 0 | Status: CANCELLED | OUTPATIENT
Start: 2025-04-16 | End: 2025-05-16

## 2025-04-16 RX ORDER — LISDEXAMFETAMINE DIMESYLATE 70 MG/1
70 CAPSULE ORAL EVERY MORNING
COMMUNITY
End: 2025-04-16 | Stop reason: SDUPTHER

## 2025-04-16 RX ORDER — CLONAZEPAM 1 MG/1
1 TABLET ORAL 2 TIMES DAILY PRN
Qty: 60 TABLET | Refills: 2 | Status: SHIPPED | OUTPATIENT
Start: 2025-04-16 | End: 2025-07-15

## 2025-04-16 RX ORDER — LISDEXAMFETAMINE DIMESYLATE 70 MG/1
70 CAPSULE ORAL EVERY MORNING
Qty: 30 CAPSULE | Refills: 0 | Status: SHIPPED | OUTPATIENT
Start: 2025-04-16 | End: 2025-05-16

## 2025-04-16 RX ORDER — LISDEXAMFETAMINE DIMESYLATE 70 MG/1
70 CAPSULE ORAL EVERY MORNING
Qty: 30 CAPSULE | Refills: 0 | Status: SHIPPED | OUTPATIENT
Start: 2025-04-16 | End: 2025-04-16 | Stop reason: SDUPTHER

## 2025-04-16 RX ORDER — DEXTROAMPHETAMINE SACCHARATE, AMPHETAMINE ASPARTATE, DEXTROAMPHETAMINE SULFATE AND AMPHETAMINE SULFATE 5; 5; 5; 5 MG/1; MG/1; MG/1; MG/1
10 TABLET ORAL
Qty: 15 TABLET | Refills: 0 | Status: SHIPPED | OUTPATIENT
Start: 2025-04-16 | End: 2025-05-16

## 2025-04-16 NOTE — TELEPHONE ENCOUNTER
Comments:     Last Office Visit (last PCP visit):   3/7/2025    Next Visit Date:  Future Appointments   Date Time Provider Department Center   6/9/2025  7:30 AM Lisandro Suarez APRN - CNP [address]   8/22/2025 10:15 AM Rayray King MD [place] [facility] Neurology -       **If hasn't been seen in over a year OR hasn't followed up according to last diabetes/ADHD visit, make appointment for patient before sending refill to provider.    Rx requested:  Requested Prescriptions      No prescriptions requested or ordered in this encounter

## 2025-04-16 NOTE — TELEPHONE ENCOUNTER
Informed     Pt states he was in the process of moving out of parents home. He states accidentally tossed out his Klonopin he is currently out but will refill on Friday. He is asking if short supply can be sent in?

## 2025-04-16 NOTE — TELEPHONE ENCOUNTER
Please let patient know that okay taking these medications over.  I did send in for refill the Vyvanse, we will have to wait for about another 7 or 8 days until we can send the Adderall into the pharmacy.  Please have patient message me when he is approximately 5 to 7 days out from needing a refill and we will send this in.

## 2025-04-16 NOTE — TELEPHONE ENCOUNTER
Patient is  requesting medication refill. Please approve or deny this request.    Rx requested:  Requested Prescriptions     Pending Prescriptions Disp Refills    lisdexamfetamine (VYVANSE) 70 MG capsule 30 capsule 0     Sig: Take 1 capsule by mouth every morning for 30 days. Max Daily Amount: 70 mg    amphetamine-dextroamphetamine (ADDERALL, 20MG,) 20 MG tablet 15 tablet 0     Sig: Take 0.5 tablets by mouth Daily with lunch for 30 days. Max Daily Amount: 10 mg         Last Office Visit:   2/25/2025      Next Visit Date:  Future Appointments   Date Time Provider Department Center   6/9/2025  7:30 AM Lisandro Suarez, YING - CNP [facility] PC2 [facility]   8/22/2025 10:15 AM [facility]

## 2025-04-17 NOTE — TELEPHONE ENCOUNTER
Pt calling because he wants his Klonopin refilled sooner than the pharmacy is allowing.  I told the pt that the script was sent 4/16/25 for this medication. And that the pharmacy can't refill any sooner than allowed by law and insurance. He hung up on my after that.    Home

## 2025-04-17 NOTE — TELEPHONE ENCOUNTER
Pt calling in saying pharmacy won't let pt  klonopin until tomorrow due to it being too early. Pt states he really needs to take it today and can't wait until tomorrow.

## 2025-04-17 NOTE — TELEPHONE ENCOUNTER
Pt is calling to see if there is any thing can be done about getting the Klonopin today because he accidentally threw out the lase of the last prescription ans he is out and needs to have it today.   Is there he can get a one day prescription or something.  Or maybe get xanax instead.   New phone number is [phone].

## 2025-04-21 ENCOUNTER — APPOINTMENT (OUTPATIENT)
Dept: ORTHOPEDIC SURGERY | Facility: CLINIC | Age: 35
End: 2025-04-21
Payer: COMMERCIAL

## 2025-05-12 DIAGNOSIS — F41.1 GAD (GENERALIZED ANXIETY DISORDER): ICD-10-CM

## 2025-05-13 RX ORDER — ESCITALOPRAM OXALATE 20 MG/1
20 TABLET ORAL DAILY
Qty: 90 TABLET | Refills: 1 | Status: SHIPPED | OUTPATIENT
Start: 2025-05-13

## 2025-05-13 NOTE — TELEPHONE ENCOUNTER
Comments:     Last Office Visit (last PCP visit):   3/7/2025    Next Visit Date:  Future Appointments   Date Time Provider Department Center   6/9/2025  7:30 AM Lisandro Suarez, YING - CNP [facility]   8/22/2025 10:15 AM Rayray King MD [place] [facility] -       **If hasn't been seen in over a year OR hasn't followed up according to last diabetes/ADHD visit, make appointment for patient before sending refill to provider.    Rx requested:  Requested Prescriptions     Pending Prescriptions Disp Refills    escitalopram (LEXAPRO) 20 MG tablet [Pharmacy Med Name: Escitalopram Oxalate Oral Tablet 20 MG] 90 tablet 0     Sig: TAKE ONE TABLET BY MOUTH DAILY

## 2025-05-13 NOTE — TELEPHONE ENCOUNTER
Comments:     Last Office Visit (last PCP visit):   3/7/2025    Next Visit Date:  Future Appointments   Date Time Provider Department Center   6/9/2025  7:30 AM Lisandro Suarez APRN - CNP [facility]   8/22/2025 10:15 AM Rayray King MD [place] [facility] -       **If hasn't been seen in over a year OR hasn't followed up according to last diabetes/ADHD visit, make appointment for patient before sending refill to provider.    Rx requested:  Requested Prescriptions     Pending Prescriptions Disp Refills    clonazePAM (KLONOPIN) 1 MG tablet 60 tablet 2     Sig: Take 1 tablet by mouth 2 times daily as needed for Anxiety for up to 90 days. Max Daily Amount: 2 mg

## 2025-05-14 RX ORDER — CLONAZEPAM 1 MG/1
1 TABLET ORAL 2 TIMES DAILY PRN
Qty: 60 TABLET | Refills: 2 | Status: SHIPPED | OUTPATIENT
Start: 2025-05-14 | End: 2025-08-12

## 2025-05-23 DIAGNOSIS — F90.9 ATTENTION DEFICIT HYPERACTIVITY DISORDER (ADHD), UNSPECIFIED ADHD TYPE: ICD-10-CM

## 2025-05-23 NOTE — TELEPHONE ENCOUNTER
Patient is  requesting medication refill. Please approve or deny this request.    Rx requested:  Requested Prescriptions     Pending Prescriptions Disp Refills    lisdexamfetamine (VYVANSE) 70 MG capsule 30 capsule 0     Sig: Take 1 capsule by mouth every morning for 30 days. Max Daily Amount: 70 mg         Last Office Visit:   2/25/2025      Next Visit Date:  Future Appointments   Date Time Provider Department Center   6/9/2025  7:30 AM Lisandro Suarez APRN - CNP [facility]   8/22/2025 10:15 AM Rayray King MD [place] [facility] -

## 2025-05-24 RX ORDER — LISDEXAMFETAMINE DIMESYLATE 70 MG/1
70 CAPSULE ORAL EVERY MORNING
Qty: 30 CAPSULE | Refills: 0 | Status: SHIPPED | OUTPATIENT
Start: 2025-05-24 | End: 2025-05-27 | Stop reason: SDUPTHER

## 2025-05-27 DIAGNOSIS — F90.9 ATTENTION DEFICIT HYPERACTIVITY DISORDER (ADHD), UNSPECIFIED ADHD TYPE: ICD-10-CM

## 2025-05-28 DIAGNOSIS — F90.9 ATTENTION DEFICIT HYPERACTIVITY DISORDER (ADHD), UNSPECIFIED ADHD TYPE: ICD-10-CM

## 2025-05-28 RX ORDER — LISDEXAMFETAMINE DIMESYLATE 70 MG/1
70 CAPSULE ORAL EVERY MORNING
Qty: 30 CAPSULE | Refills: 0 | Status: SHIPPED | OUTPATIENT
Start: 2025-05-28 | End: 2025-05-28 | Stop reason: SDUPTHER

## 2025-05-28 RX ORDER — LISDEXAMFETAMINE DIMESYLATE 70 MG/1
70 CAPSULE ORAL EVERY MORNING
Qty: 30 CAPSULE | Refills: 0 | Status: SHIPPED | OUTPATIENT
Start: 2025-05-28 | End: 2025-06-27

## 2025-05-28 NOTE — TELEPHONE ENCOUNTER
Patient called this morning, stated he gave the wrong pharmacy location and Vyvanse RX needs sent to [place] of [place].  Rx that was sent to [place] has been canceled.

## 2025-06-09 ENCOUNTER — TELEPHONE (OUTPATIENT)
Dept: FAMILY MEDICINE CLINIC | Age: 35
End: 2025-06-09

## 2025-06-12 ENCOUNTER — OFFICE VISIT (OUTPATIENT)
Dept: FAMILY MEDICINE CLINIC | Age: 35
End: 2025-06-12
Payer: COMMERCIAL

## 2025-06-12 VITALS
TEMPERATURE: 98.7 F | HEIGHT: 70 IN | OXYGEN SATURATION: 97 % | HEART RATE: 85 BPM | WEIGHT: 174.4 LBS | DIASTOLIC BLOOD PRESSURE: 70 MMHG | BODY MASS INDEX: 24.97 KG/M2 | SYSTOLIC BLOOD PRESSURE: 106 MMHG

## 2025-06-12 DIAGNOSIS — G89.29 INSOMNIA SECONDARY TO CHRONIC PAIN: ICD-10-CM

## 2025-06-12 DIAGNOSIS — F90.9 ATTENTION DEFICIT HYPERACTIVITY DISORDER (ADHD), UNSPECIFIED ADHD TYPE: Primary | ICD-10-CM

## 2025-06-12 DIAGNOSIS — G47.01 INSOMNIA SECONDARY TO CHRONIC PAIN: ICD-10-CM

## 2025-06-12 DIAGNOSIS — F41.1 GAD (GENERALIZED ANXIETY DISORDER): ICD-10-CM

## 2025-06-12 PROCEDURE — 3074F SYST BP LT 130 MM HG: CPT | Performed by: NURSE PRACTITIONER

## 2025-06-12 PROCEDURE — 3078F DIAST BP <80 MM HG: CPT | Performed by: NURSE PRACTITIONER

## 2025-06-12 PROCEDURE — G8417 CALC BMI ABV UP PARAM F/U: HCPCS | Performed by: NURSE PRACTITIONER

## 2025-06-12 PROCEDURE — G8427 DOCREV CUR MEDS BY ELIG CLIN: HCPCS | Performed by: NURSE PRACTITIONER

## 2025-06-12 PROCEDURE — 99214 OFFICE O/P EST MOD 30 MIN: CPT | Performed by: NURSE PRACTITIONER

## 2025-06-12 PROCEDURE — 1036F TOBACCO NON-USER: CPT | Performed by: NURSE PRACTITIONER

## 2025-06-12 RX ORDER — TOPIRAMATE 50 MG/1
50 TABLET, FILM COATED ORAL
COMMUNITY
Start: 2025-05-23

## 2025-06-12 RX ORDER — ZOLPIDEM TARTRATE 10 MG/1
TABLET ORAL
Qty: 30 TABLET | Refills: 0 | Status: SHIPPED | OUTPATIENT
Start: 2025-06-12 | End: 2025-07-12

## 2025-06-12 RX ORDER — HYDROXYZINE PAMOATE 25 MG/1
25 CAPSULE ORAL
COMMUNITY
Start: 2025-05-25

## 2025-06-12 RX ORDER — ARMODAFINIL 150 MG/1
150 TABLET ORAL DAILY
Qty: 14 TABLET | Refills: 0 | Status: SHIPPED | OUTPATIENT
Start: 2025-06-12 | End: 2025-06-26

## 2025-06-12 RX ORDER — CLONAZEPAM 1 MG/1
1 TABLET ORAL 2 TIMES DAILY PRN
Qty: 60 TABLET | Refills: 2 | Status: CANCELLED | OUTPATIENT
Start: 2025-06-12 | End: 2025-09-10

## 2025-06-12 RX ORDER — LABETALOL 100 MG/1
100 TABLET, FILM COATED ORAL
COMMUNITY
Start: 2025-06-03

## 2025-06-12 NOTE — PROGRESS NOTES
Date of Visit:  2025  Patient Name: Michael Montes   Patient :  1990     CHIEF COMPLAINT:     Michael Montes is a 34 y.o. male who presents today for an general visit to be evaluated for the following condition(s):  Chief Complaint   Patient presents with    3 Month Follow-Up     He would like to discuss weaning of the klonopin. Also discuss switching from Vyvanse to nuvigli.        HISTORY OF PRESENT ILLNESS     I reviewed staff HPI/chief complaint and do agree with above    Patient presents for chronic follow-up with history of ADHD, anxiety/depression    Subjective:  - Patient is requesting to switch from Vyvanse to armodafinil for ADHD management as the recovery center where patient will be staying does not allow controlled medications.  - Patient recently completed 30 days at a recovery center for Kratom use, which patient describes as an over-the-counter substance with opioid-like properties that patient initially used as an alternative to Vicodin following a penile procedure.  - Patient reports Kratom also affected mood, leading to continued use without awareness of addiction potential.  - Patient is currently taking Klonopin (clonazepam) 1mg twice daily for anxiety and needs to taper off this medication.  - Patient reports living with parents who were concerned about Kratom use and arranged for treatment.  - Patient will be returning to a recovery program that prohibits controlled substances but will allow Armodafinil and Lexapro.  - Patient has noticed some increasing anxiety with recent changes to Lexapro does continue to be effective for treating symptoms.  Denies any noted side effects on this medication                REVIEW OF SYSTEM      Review of Systems   Constitutional:  Negative for activity change, appetite change, diaphoresis, fatigue and fever.   Respiratory:  Negative for chest tightness and shortness of breath.    Cardiovascular:  Negative for chest pain and

## 2025-06-17 ENCOUNTER — TELEPHONE (OUTPATIENT)
Dept: FAMILY MEDICINE CLINIC | Age: 35
End: 2025-06-17

## 2025-06-17 NOTE — TELEPHONE ENCOUNTER
Pt would like to know if he can go from 150 mg up to 200 or 250 mg on his Nuvigil does not feel it is strong enough and not working , thank you

## 2025-06-19 DIAGNOSIS — F90.9 ATTENTION DEFICIT HYPERACTIVITY DISORDER (ADHD), UNSPECIFIED ADHD TYPE: ICD-10-CM

## 2025-06-19 NOTE — TELEPHONE ENCOUNTER
Requesting medication refill. Please approve or deny this request.    Rx requested:  Requested Prescriptions     Pending Prescriptions Disp Refills    lisdexamfetamine (VYVANSE) 70 MG capsule 30 capsule 0     Sig: Take 1 capsule by mouth every morning for 30 days. Max Daily Amount: 70 mg         Last Office Visit:   2/25/2025      Next Visit Date:  Future Appointments   Date Time Provider Department Center   7/10/2025 10:30 AM Lisandro Suarez, YING - CNP [facility]   8/22/2025 10:15 AM Rayray King MD [place] [facility] -               Last refill 5/28/25. Please approve or deny.

## 2025-06-20 RX ORDER — LISDEXAMFETAMINE DIMESYLATE 70 MG/1
70 CAPSULE ORAL EVERY MORNING
Qty: 30 CAPSULE | Refills: 0 | Status: SHIPPED | OUTPATIENT
Start: 2025-06-20 | End: 2025-07-02

## 2025-06-20 ASSESSMENT — ENCOUNTER SYMPTOMS
DIARRHEA: 0
ABDOMINAL PAIN: 0
CHEST TIGHTNESS: 0
NAUSEA: 0
CONSTIPATION: 0
SHORTNESS OF BREATH: 0
VOMITING: 0

## 2025-06-20 NOTE — TELEPHONE ENCOUNTER
We should be able to increase that however would not be until his current course of the 150 mg is completed

## 2025-06-23 DIAGNOSIS — F90.9 ATTENTION DEFICIT HYPERACTIVITY DISORDER (ADHD), UNSPECIFIED ADHD TYPE: ICD-10-CM

## 2025-06-24 ENCOUNTER — PATIENT MESSAGE (OUTPATIENT)
Dept: FAMILY MEDICINE CLINIC | Age: 35
End: 2025-06-24

## 2025-06-24 DIAGNOSIS — F90.9 ATTENTION DEFICIT HYPERACTIVITY DISORDER (ADHD), UNSPECIFIED ADHD TYPE: Primary | ICD-10-CM

## 2025-06-24 RX ORDER — ARMODAFINIL 200 MG/1
200 TABLET ORAL DAILY
Qty: 30 TABLET | Refills: 1 | Status: SHIPPED | OUTPATIENT
Start: 2025-06-24 | End: 2025-08-23

## 2025-06-24 RX ORDER — ARMODAFINIL 150 MG/1
TABLET ORAL
Qty: 14 TABLET | Refills: 0 | OUTPATIENT
Start: 2025-06-24

## 2025-07-02 ENCOUNTER — TELEPHONE (OUTPATIENT)
Age: 35
End: 2025-07-02

## 2025-07-02 DIAGNOSIS — F98.8 ATTENTION DEFICIT DISORDER, UNSPECIFIED TYPE: Primary | ICD-10-CM

## 2025-07-02 RX ORDER — LISDEXAMFETAMINE DIMESYLATE 60 MG/1
60 CAPSULE ORAL DAILY
Qty: 7 CAPSULE | Refills: 0 | Status: SHIPPED | OUTPATIENT
Start: 2025-07-02 | End: 2025-07-09

## 2025-07-02 NOTE — TELEPHONE ENCOUNTER
I sent a prescription for a lower dose of 60 mg for 7 days until Dr. King can return and discuss this with him further.  Please advise patient.  Thank you

## 2025-07-02 NOTE — TELEPHONE ENCOUNTER
Patient called and states he has to be off of vyvanse and needs his dosage decrease he is going to a sober house and needs it in writing that he going to be off the vyvanse and wanted to know can it be titrated first

## 2025-07-07 RX ORDER — HYDROXYZINE PAMOATE 25 MG/1
25 CAPSULE ORAL 3 TIMES DAILY PRN
Qty: 90 CAPSULE | Refills: 2 | Status: SHIPPED | OUTPATIENT
Start: 2025-07-07

## 2025-07-07 RX ORDER — LABETALOL 100 MG/1
100 TABLET, FILM COATED ORAL 2 TIMES DAILY
Qty: 60 TABLET | Refills: 5 | Status: SHIPPED | OUTPATIENT
Start: 2025-07-07

## 2025-07-07 NOTE — TELEPHONE ENCOUNTER
Pt states that when he was in rehab he was on the labetalol and the vistaril and would like those refilled please.

## 2025-07-07 NOTE — TELEPHONE ENCOUNTER
Comments:     Last Office Visit (last PCP visit):   6/12/2025    Next Visit Date:  Future Appointments   Date Time Provider Department Center   7/10/2025 10:30 AM Lisandro Suarez APRN - CNP [facility]   8/22/2025 10:15 AM Rayray King MD [place] [facility] -       **If hasn't been seen in over a year OR hasn't followed up according to last diabetes/ADHD visit, make appointment for patient before sending refill to provider.    Rx requested:  Requested Prescriptions     Pending Prescriptions Disp Refills    labetalol (NORMODYNE) 100 MG tablet 60 tablet      Sig: Take 1 tablet by mouth    hydrOXYzine pamoate (VISTARIL) 25 MG capsule       Sig: Take 1 capsule by mouth

## 2025-07-14 DIAGNOSIS — F98.8 ATTENTION DEFICIT DISORDER, UNSPECIFIED TYPE: ICD-10-CM

## 2025-07-14 RX ORDER — LISDEXAMFETAMINE DIMESYLATE 60 MG/1
60 CAPSULE ORAL DAILY
Qty: 15 CAPSULE | Refills: 0 | Status: SHIPPED | OUTPATIENT
Start: 2025-07-14 | End: 2025-07-29

## 2025-07-14 NOTE — TELEPHONE ENCOUNTER
Per phone encounter. Please advise.     Requesting medication refill. Please approve or deny this request.    Rx requested:  Requested Prescriptions     Pending Prescriptions Disp Refills    Lisdexamfetamine Dimesylate (VYVANSE) 60 MG CAPS 15 capsule 0     Sig: Take 60 mg by mouth daily for 15 days. Max Daily Amount: 60 mg         Last Office Visit:   2/25/2025      Next Visit Date:  Future Appointments   Date Time Provider Department Center   8/22/2025 10:15 AM Rayray King MD [place] [facility] -

## 2025-07-16 ENCOUNTER — TELEPHONE (OUTPATIENT)
Dept: FAMILY MEDICINE CLINIC | Age: 35
End: 2025-07-16

## 2025-07-21 ENCOUNTER — TELEPHONE (OUTPATIENT)
Dept: FAMILY MEDICINE CLINIC | Age: 35
End: 2025-07-21

## 2025-07-22 ENCOUNTER — TELEPHONE (OUTPATIENT)
Age: 35
End: 2025-07-22

## 2025-07-22 NOTE — TELEPHONE ENCOUNTER
Patient called wanting to decrease Vyvanse from 60 mg to 50 mg.  He got a refill of the 60 mg #15 on 07/14/25.  He wants to only get quantity of 15 of the 50 mg as well.  Please advise

## 2025-07-22 NOTE — TELEPHONE ENCOUNTER
Pt called office again regarding the vyvanse 50mg. Pt was also informed since he just received 2wks supply of the 60mg the pharmacy will probably not fill until those 2 wks are up.